# Patient Record
Sex: MALE
[De-identification: names, ages, dates, MRNs, and addresses within clinical notes are randomized per-mention and may not be internally consistent; named-entity substitution may affect disease eponyms.]

---

## 2017-06-08 ENCOUNTER — HOSPITAL ENCOUNTER (EMERGENCY)
Dept: HOSPITAL 42 - ED | Age: 57
Discharge: HOME | End: 2017-06-08
Payer: MEDICAID

## 2017-06-08 VITALS
RESPIRATION RATE: 18 BRPM | HEART RATE: 92 BPM | SYSTOLIC BLOOD PRESSURE: 135 MMHG | OXYGEN SATURATION: 96 % | DIASTOLIC BLOOD PRESSURE: 71 MMHG

## 2017-06-08 VITALS — BODY MASS INDEX: 25.7 KG/M2

## 2017-06-08 VITALS — TEMPERATURE: 99.7 F

## 2017-06-08 DIAGNOSIS — J44.1: Primary | ICD-10-CM

## 2017-06-08 LAB
ADD MANUAL DIFF?: NO
ALBUMIN/GLOB SERPL: 1.5 {RATIO} (ref 1.1–1.8)
ALP SERPL-CCNC: 82 U/L (ref 38–133)
ALT SERPL-CCNC: 54 U/L (ref 7–56)
AST SERPL-CCNC: 25 U/L (ref 15–59)
BASE EXCESS BLDV CALC-SCNC: 2.2 MMOL/L (ref 0–2)
BASOPHILS # BLD AUTO: 0.02 K/MM3 (ref 0–2)
BASOPHILS NFR BLD: 0.2 % (ref 0–3)
BILIRUB SERPL-MCNC: 0.9 MG/DL (ref 0.2–1.3)
BUN SERPL-MCNC: 20 MG/DL (ref 7–21)
CALCIUM SERPL-MCNC: 9.4 MG/DL (ref 8.4–10.5)
CHLORIDE SERPL-SCNC: 99 MMOL/L (ref 98–107)
CO2 SERPL-SCNC: 29 MMOL/L (ref 21–33)
EOSINOPHIL # BLD: 1.2 10*3/UL (ref 0–0.7)
EOSINOPHIL NFR BLD: 14.3 % (ref 1.5–5)
ERYTHROCYTE [DISTWIDTH] IN BLOOD BY AUTOMATED COUNT: 13.5 % (ref 11.5–14.5)
GLOBULIN SER-MCNC: 2.6 GM/DL
GLUCOSE SERPL-MCNC: 339 MG/DL (ref 70–110)
GRANULOCYTES # BLD: 5.03 10*3/UL (ref 1.4–6.5)
GRANULOCYTES NFR BLD: 61.5 % (ref 50–68)
HCT VFR BLD CALC: 45.1 % (ref 42–52)
LYMPHOCYTES # BLD: 1.3 10*3/UL (ref 1.2–3.4)
LYMPHOCYTES NFR BLD AUTO: 16.1 % (ref 22–35)
MCH RBC QN AUTO: 33.5 PG (ref 25–35)
MCHC RBC AUTO-ENTMCNC: 34.6 G/DL (ref 31–37)
MCV RBC AUTO: 97 FL (ref 80–105)
MONOCYTES # BLD AUTO: 0.7 10*3/UL (ref 0.1–0.6)
MONOCYTES NFR BLD: 7.9 % (ref 1–6)
PH BLDV: 7.39 [PH] (ref 7.32–7.43)
PLATELET # BLD: 155 10^3/UL (ref 120–450)
PMV BLD AUTO: 10.3 FL (ref 7–11)
POTASSIUM SERPL-SCNC: 4.5 MMOL/L (ref 3.6–5)
PROT SERPL-MCNC: 6.5 G/DL (ref 5.8–8.3)
SODIUM SERPL-SCNC: 134 MMOL/L (ref 132–148)
TROPONIN I SERPL-MCNC: 0.02 NG/ML
WBC # BLD AUTO: 8.2 10^3/UL (ref 4.5–11)

## 2017-06-08 PROCEDURE — 93005 ELECTROCARDIOGRAM TRACING: CPT

## 2017-06-08 PROCEDURE — 80053 COMPREHEN METABOLIC PANEL: CPT

## 2017-06-08 PROCEDURE — 84484 ASSAY OF TROPONIN QUANT: CPT

## 2017-06-08 PROCEDURE — 82803 BLOOD GASES ANY COMBINATION: CPT

## 2017-06-08 PROCEDURE — 71010: CPT

## 2017-06-08 PROCEDURE — 96374 THER/PROPH/DIAG INJ IV PUSH: CPT

## 2017-06-08 PROCEDURE — 99284 EMERGENCY DEPT VISIT MOD MDM: CPT

## 2017-06-08 PROCEDURE — 83880 ASSAY OF NATRIURETIC PEPTIDE: CPT

## 2017-06-08 PROCEDURE — 85025 COMPLETE CBC W/AUTO DIFF WBC: CPT

## 2017-06-08 RX ADMIN — IPRATROPIUM BROMIDE AND ALBUTEROL SULFATE SCH ML: .5; 3 SOLUTION RESPIRATORY (INHALATION) at 19:56

## 2017-06-08 RX ADMIN — IPRATROPIUM BROMIDE AND ALBUTEROL SULFATE SCH ML: .5; 3 SOLUTION RESPIRATORY (INHALATION) at 20:36

## 2017-06-08 RX ADMIN — IPRATROPIUM BROMIDE AND ALBUTEROL SULFATE SCH ML: .5; 3 SOLUTION RESPIRATORY (INHALATION) at 20:12

## 2017-06-08 NOTE — ED PDOC
Arrival/HPI





- General


Time Seen by Provider: 06/08/17 19:09





- History of Present Illness


Narrative History of Present Illness (Text): 





57M hx COPD and CHF c/o productive cough for last month and sob worsening over 

last 2 days. home neb not helping. no fever or chest pain. saw pcp yesterday 

and rx augmentin and "cough medicine" but feels worse today. 








Past Medical History





- Tetanus Immunization


Tetanus Immunization: Unknown





- Cardiac


Hx Pacemaker: Yes (ST AN IMPLANT 4/2008/6-2015)





- Pulmonary


Hx Bronchitis: Yes (2 weeks ago)





- Neurological


Hx Paralysis: No





- Renal


Hx Renal Failure: Yes





- Endocrine/Metabolic


Hx Diabetes Mellitus Type 2: Yes





- Hematological/Oncological


Hx Blood Transfusions: No


Hx Blood Transfusion Reaction: No





- Musculoskeletal/Rheumatological


Hx Musculoskeletal Disorders: No





- Psychiatric


Hx Emotional Abuse: No


Hx Physical Abuse: No


Hx Substance Use: No





- Surgical History


Hx Kidney Transplant: Yes (5-2011)


Other/Comment: Open heart surgery, pacemaker, Kidney transplant





- Anesthesia


Hx Anesthesia Reactions: No


Hx Malignant Hyperthermia: No





- Suicidal Assessment


Feels Threatened In Home Enviroment: No





Family/Social History


Family/Social History: Other (nc)


Smoking Status: Former Smoker


Hx Alcohol Use: Yes (social)


Hx Substance Use: No


Hx Substance Use Treatment: No





Allergies/Home Meds


Allergies/Adverse Reactions: 


Allergies





No Known Allergies Allergy (Verified 10/12/15 20:00)


 








Home Medications: 


 Home Meds











 Medication  Instructions  Recorded  Confirmed


 


Atorvastatin Calcium [Lipitor] 40 mg PO DAILY 10/30/14 06/08/17


 


Escitalopram [Lexapro] 5 mg PO DAILY 10/30/14 06/08/17


 


Famotidine [Pepcid] 20 mg PO DAILY 10/30/14 06/08/17


 


Insulin Aspart, Recombinant 25 - 30 unit SC AC PRN 10/30/14 06/08/17





[Novolog]   


 


Insulin Detemir [Levemir] 40 - 50 unit SC HS 10/30/14 06/08/17


 


Mycophenolate Sodium [Myfortic] 360 mg PO BID 10/30/14 06/08/17


 


Sulfamethoxazole/Trimethoprim 1 tab PO DAILY 10/30/14 06/08/17





[Bactrim 400-80 mg Tablet]   


 


Tacrolimus [Prograf] 1.5 mg PO HS 10/30/14 06/08/17


 


amLODIPine [Norvasc] 10 mg PO DAILY 10/30/14 06/08/17


 


Aspirin [Ecotrin] 81 mg PO DAILY 06/01/16 06/08/17


 


Docusate [Colace] 100 mg PO DAILY PRN 06/01/16 06/08/17


 


Metoprolol Tartrate 50 mg PO BID 06/01/16 06/08/17


 


Tacrolimus [Prograf] 2 mg PO DAILY 06/01/16 06/08/17


 


traMADol [Ultram] 1 tab PO Q6H PRN 06/20/16 06/08/17














Review of Systems





- Physician Review


All systems were reviewed & negative as marked: Yes





- Review of Systems


Constitutional: absent: Weight Change, Fevers, Night Sweats


Respiratory: SOB, Cough


Cardiovascular: Edema.  absent: Chest Pain


Gastrointestinal: absent: Vomiting





Physical Exam


Vital Signs











  Temp Pulse Resp BP Pulse Ox


 


 06/08/17 19:45      91 L


 


 06/08/17 19:10  99.7 F H  86  24  121/76  89 L














- Systems Exam


Head: Present: Atraumatic


Pupils: Present: PERRL


Mouth: Present: Moist Mucous Membranes


Neck: Present: Normal Range of Motion


Respiratory/Chest: Present: Wheezes, Decreased Breath Sounds.  No: Respiratory 

Distress, Accessory Muscle Use


Cardiovascular: Present: Regular Rate and Rhythm


Abdomen: No: Tenderness, Distention


Upper Extremity: Present: NORMAL PULSES


Lower Extremity: Present: NORMAL PULSES.  No: Edema


Neurological: Present: GCS=15, Motor Func Grossly Intact, Normal Sensory 

Function


Skin: Present: Warm, Dry


Psychiatric: Present: Alert, Oriented x 3





Medical Decision Making


ED Course and Treatment: 





ecg- nsr 1st deg avb 88, no stemi





06/08/17 20:28


the pt says he feels much better and would like to go home. I disc possible 

admission for continued nebs but he says he would rather manage at home and 

will return if worse. 





- Lab Interpretations


Lab Results: 








 06/08/17 19:34 





 06/08/17 19:34 





 Lab Results





06/08/17 19:34: Sodium 134, Chloride 99, Potassium 4.5, Carbon Dioxide 29, 

Anion Gap 11, BUN 20, Creatinine 1.0, Est GFR (African Amer) > 60, Est GFR (Non-

Af Amer) > 60, Random Glucose 339 H* D, Calcium 9.4, Total Bilirubin 0.9, AST 25

, ALT 54, Alkaline Phosphatase 82, Troponin I 0.02, NT-Pro-B Natriuret Pep 4670 

H, Total Protein 6.5, Albumin 3.9, Globulin 2.6, Albumin/Globulin Ratio 1.5


06/08/17 19:34: WBC 8.2  D, RBC 4.65, Hgb 15.6, Hct 45.1, MCV 97.0, MCH 33.5, 

MCHC 34.6, RDW 13.5, Plt Count 155, MPV 10.3, Gran % 61.5, Lymph % (Auto) 16.1 L

, Mono % (Auto) 7.9 H, Eos % (Auto) 14.3 H, Baso % (Auto) 0.2, Gran # 5.03, 

Lymph # 1.3, Mono # 0.7 H, Eos # 1.2 H, Baso # 0.02


06/08/17 19:34: pO2 65 H, VBG pH 7.39, VBG pCO2 46.0, VBG HCO3 27.8, VBG Total 

CO2 29.2 H, VBG O2 Sat (Calc) 94.0 H, VBG Base Excess 2.2 H, VBG Potassium 4.6, 

Sodium 135.0, Chloride 98.0, Glucose 365 H, Lactate 1.6, FiO2 21.0, Venous 

Blood Potassium 4.6











- RAD Interpretation


Radiology Orders: 








06/08/17 19:14


CHEST PORTABLE [RAD] Stat 














- Medication Orders


Current Medication Orders: 











Discontinued Medications





Albuterol/Ipratropium (Duoneb 3 Mg/0.5 Mg (3 Ml) Ud)  3 [facility]


   Stop: 06/08/17 19:46


   Last Admin: 06/08/17 20:12  Dose: 3 ml





Azithromycin (Zithromax)  500 mg PO STAT STA


   PRN Reason: Protocol


   Stop: 06/08/17 20:29


Methylprednisolone (Solu-Medrol)  80 mg IVP STAT STA


   Stop: 06/08/17 19:15


   Last Admin: 06/08/17 19:55  Dose: 80 mg











Disposition/Present on Arrival





- Present on Arrival


Any Indicators Present on Arrival: No


History of DVT/PE: No


History of Uncontrolled Diabetes: No


Urinary Catheter: No


History Surgical Site Infection Following: None





- Disposition


Have Diagnosis and Disposition been Completed?: Yes


Diagnosis: 


 COPD exacerbation





Disposition: HOME/ ROUTINE


Disposition Time: 20:33


Patient Problems: 


 Current Active Problems











Problem Status Onset


 


COPD exacerbation Acute  











Condition: IMPROVED


Discharge Instructions (ExitCare):  COPD (Chronic Obstructive Pulmonary Disease

) (ED)


Additional Instructions: 


Please follow up with your doctor. Start your prescriptions tomorrow. Return to 

the ER for any worsening symptoms or for any other concerns. 


Prescriptions: 


Azithromycin 250 mg PO DAILY #4 tab


Prednisone [Deltasone] 40 mg PO DAILY #8 tablet


Referrals: 


John Conner MD [Primary Care Provider] - Follow up with primary

## 2017-06-09 NOTE — RAD
HISTORY:

sob  



COMPARISON:

06/01/2016 



FINDINGS:



LUNGS:

No active pulmonary disease.



PLEURA:

No significant pleural effusion identified, no pneumothorax apparent.



CARDIOVASCULAR:

Normal.



OSSEOUS STRUCTURES:

No significant abnormalities.



VISUALIZED UPPER ABDOMEN:

Normal.



OTHER FINDINGS:

Dual lead pacemaker.  Sternal wires



IMPRESSION:

No active disease.

## 2017-06-09 NOTE — CARD
--------------- APPROVED REPORT --------------





EKG Measurement

Heart [id number]

MS [id number]

[id number]

[id number]

[id number]



<Conclusion>

100 % V. Paced. A. sensed.

No change except the pacer spikes are less visable

## 2017-07-17 ENCOUNTER — HOSPITAL ENCOUNTER (OUTPATIENT)
Dept: HOSPITAL 42 - SDS | Age: 57
Discharge: HOME | End: 2017-07-17
Attending: PODIATRIST
Payer: MEDICARE

## 2017-07-17 VITALS — RESPIRATION RATE: 18 BRPM

## 2017-07-17 VITALS — HEART RATE: 72 BPM | SYSTOLIC BLOOD PRESSURE: 127 MMHG | DIASTOLIC BLOOD PRESSURE: 67 MMHG

## 2017-07-17 VITALS — OXYGEN SATURATION: 98 %

## 2017-07-17 VITALS — TEMPERATURE: 98 F

## 2017-07-17 VITALS — BODY MASS INDEX: 25.7 KG/M2

## 2017-07-17 DIAGNOSIS — I96: ICD-10-CM

## 2017-07-17 DIAGNOSIS — E11.9: ICD-10-CM

## 2017-07-17 DIAGNOSIS — Z94.0: ICD-10-CM

## 2017-07-17 DIAGNOSIS — N28.9: ICD-10-CM

## 2017-07-17 DIAGNOSIS — I25.10: ICD-10-CM

## 2017-07-17 DIAGNOSIS — I10: ICD-10-CM

## 2017-07-17 DIAGNOSIS — M86.172: Primary | ICD-10-CM

## 2017-07-17 PROCEDURE — 87075 CULTR BACTERIA EXCEPT BLOOD: CPT

## 2017-07-17 PROCEDURE — 88311 DECALCIFY TISSUE: CPT

## 2017-07-17 PROCEDURE — 28820 AMPUTATION OF TOE: CPT

## 2017-07-17 PROCEDURE — 73630 X-RAY EXAM OF FOOT: CPT

## 2017-07-17 PROCEDURE — 88305 TISSUE EXAM BY PATHOLOGIST: CPT

## 2017-07-17 PROCEDURE — 87070 CULTURE OTHR SPECIMN AEROBIC: CPT

## 2017-07-17 NOTE — RAD
PROCEDURE:  Left Foot Radiographs.



HISTORY:

s/p left foot surgery  



COMPARISON:

None.



FINDINGS:



BONES:

Status post amputation of the little toe.  There is no acute fracture 

or bone destruction. There is diffuse bone demineralization. . 



JOINTS:

Normal. 



SOFT TISSUES:

Normal. 



OTHER FINDINGS:

There are atherosclerotic vascular calcifications.



IMPRESSION:

Status post amputation of the little toe. No acute fracture or bone 

destruction.

## 2017-07-17 NOTE — PCM.SURG1
Surgeon's Initial Post Op Note





- Surgeon's Notes


Surgeon: Dr. MYLA Goel


Assistant: Dr. HENRRY Banuelos PGY-2


Type of Anesthesia: IV Sedation, Local


Anesthesia Administered By: Dr. Roblero


Pre-Operative Diagnosis: left foot 5th digit gangrene


Operative Findings: see operative report


Post-Operative Diagnosis: same


Operation Performed: amputation of 5th digit left foot


Specimen/Specimens Removed: bone and soft tissue left 5th digit


Estimated Blood Loss: EBL {In ML}: 20


Blood Products Given: N/A


Post-Op Condition: Good


Date of Surgery/Procedure: 07/17/17


Time of Surgery/Procedure: 09:30

## 2017-07-18 NOTE — PCM.OP
Operative Report





- Operative Report


Date of Surgery/Procedure: 07/17/17


Time of Surgery/Procedure: 20:00


Surgeon: Dr. MYLA Goel


Assistant: Dr. HENRRY Banuelos, PGY-2


Anesthesia/Sedation: IV sedation + local


Pre-Operative Diagnosis: gangrene of 5th digit left foot


Post-Operative Diagnosis: gangrene of 5th digit left foot


Indication for Surgery: The patient is a 57 year-old diabetic male with the 

above diagnosis.  The patient has exhausted conservative treatment at this time 

and now requires surgical intervention. The patient signed the consent after 

careful explanation of risks, benefits, complication and alternatives for 

surgical procedure.  No guarantees were given nor implied. NPO status was 

confirmed prior to taking pt to the OR.


Operative Findings: The patient was brought to the operating room and placed on 

the operating room table in supine position.  Tournique was not requried for 

this procedure. After induction of IV sedation, the patient received a total of 

10 cc of 1:1 mixture of 0.5% Marcaine and 2% lidocaine plain in local block 

fashion to the left foot. Once local anesthesia was achieved, the left  foot 

was then prepped and draped in usual sterile manner and the procedure began.  

Procedure: amputation of 5th digit left foot.  Attention was then directed to 

the left foot 5th digit where necrotic dry gangrenous changed were noted to the 

digit which appeared to be demaracted to the level of the PIPJ. Using a #15 

blade and a towel clamp to stabilize the digit, an incision was made directly 

to the level of bone in a circumferential manner. A combination of sharp and 

blunt dissection were used to free all soft tissue attachments of the 5th MTPJ 

and the toe was then disarticulated from the MTPJ and passed from the operative 

field. At this time wound cultures were taken from the surgical wound. Upon 

inspection of the remaining bone, the 5th metatarsal bone appeard to have 

normal cartilage and appeared healthy with no remaining necrotic tissue seen. 

Using a kocher, the remaining tendons were then resected away and passed from 

the field. The incision was then copiously flushed with sterile normal saline. 

The sucutaneous tissue were then reapproximated using #2-0 vicyl and skin edges 

reapproximated using #3-0 nylon using simple suture technique. The wound was 

then dressed with betadine-soaked adaptic and dressed with DSD, kerlix and 

light ACE bandage.


Procedure/Operation Description: amputation of 5th digit left foot


Estimated Blood Loss: 20


Blood Replaced: none


Sponge/Instrument Count: correct


Drains: none


Complications: none


Specimen: soft tissue and bone left foot 5th toe


Discharge & Condition: The patient tolerated the anesthesia and procedure well 

and was escorted to the recovery room with vital signs stable and neurovascular 

status intact to the left foot. Patient will be weight-bearing as tolerated 

with surgical shoe and will follow up with Dr. MYLA Goel in the office within 1 

week.

## 2017-07-24 ENCOUNTER — HOSPITAL ENCOUNTER (INPATIENT)
Dept: HOSPITAL 42 - ED | Age: 57
LOS: 4 days | Discharge: HOME | DRG: 271 | End: 2017-07-28
Attending: INTERNAL MEDICINE | Admitting: INTERNAL MEDICINE
Payer: MEDICARE

## 2017-07-24 VITALS — BODY MASS INDEX: 25.4 KG/M2

## 2017-07-24 DIAGNOSIS — E11.52: Primary | ICD-10-CM

## 2017-07-24 DIAGNOSIS — I25.2: ICD-10-CM

## 2017-07-24 DIAGNOSIS — L03.116: ICD-10-CM

## 2017-07-24 DIAGNOSIS — I50.9: ICD-10-CM

## 2017-07-24 DIAGNOSIS — E11.621: ICD-10-CM

## 2017-07-24 DIAGNOSIS — Z79.4: ICD-10-CM

## 2017-07-24 DIAGNOSIS — Z79.899: ICD-10-CM

## 2017-07-24 DIAGNOSIS — F41.9: ICD-10-CM

## 2017-07-24 DIAGNOSIS — E78.1: ICD-10-CM

## 2017-07-24 DIAGNOSIS — E11.40: ICD-10-CM

## 2017-07-24 DIAGNOSIS — Z94.0: ICD-10-CM

## 2017-07-24 DIAGNOSIS — Z95.1: ICD-10-CM

## 2017-07-24 DIAGNOSIS — L97.529: ICD-10-CM

## 2017-07-24 DIAGNOSIS — Z79.52: ICD-10-CM

## 2017-07-24 DIAGNOSIS — F32.9: ICD-10-CM

## 2017-07-24 DIAGNOSIS — J44.9: ICD-10-CM

## 2017-07-24 DIAGNOSIS — Z95.810: ICD-10-CM

## 2017-07-24 DIAGNOSIS — I70.262: ICD-10-CM

## 2017-07-24 DIAGNOSIS — D64.9: ICD-10-CM

## 2017-07-24 DIAGNOSIS — I25.10: ICD-10-CM

## 2017-07-24 DIAGNOSIS — Z89.422: ICD-10-CM

## 2017-07-24 DIAGNOSIS — N18.3: ICD-10-CM

## 2017-07-24 DIAGNOSIS — I13.0: ICD-10-CM

## 2017-07-24 DIAGNOSIS — E11.22: ICD-10-CM

## 2017-07-24 DIAGNOSIS — E78.5: ICD-10-CM

## 2017-07-24 LAB
ALBUMIN SERPL-MCNC: 3.6 G/DL (ref 3–4.8)
ALBUMIN/GLOB SERPL: 1.2 {RATIO} (ref 1.1–1.8)
ALT SERPL-CCNC: 49 U/L (ref 7–56)
APPEARANCE UR: CLEAR
APTT BLD: 30.6 SECONDS (ref 23.7–30.8)
AST SERPL-CCNC: 39 U/L (ref 15–59)
BASOPHILS # BLD AUTO: 0.01 K/MM3 (ref 0–2)
BASOPHILS NFR BLD: 0.1 % (ref 0–3)
BILIRUB UR-MCNC: NEGATIVE MG/DL
BUN SERPL-MCNC: 23 MG/DL (ref 7–21)
CALCIUM SERPL-MCNC: 9 MG/DL (ref 8.4–10.5)
COLOR UR: YELLOW
EOSINOPHIL # BLD: 0.3 10*3/UL (ref 0–0.7)
EOSINOPHIL NFR BLD: 2.7 % (ref 1.5–5)
ERYTHROCYTE [DISTWIDTH] IN BLOOD BY AUTOMATED COUNT: 13.3 % (ref 11.5–14.5)
GFR NON-AFRICAN AMERICAN: > 60
GLUCOSE UR STRIP-MCNC: >=1000 MG/DL
GRANULOCYTES # BLD: 6.74 10*3/UL (ref 1.4–6.5)
GRANULOCYTES NFR BLD: 73.8 % (ref 50–68)
HGB BLD-MCNC: 13 GM/DL (ref 14–18)
INR PPP: 1.02 (ref 0.93–1.08)
LEUKOCYTE ESTERASE UR-ACNC: NEGATIVE LEU/UL
LYMPHOCYTES # BLD: 1.2 10*3/UL (ref 1.2–3.4)
LYMPHOCYTES NFR BLD AUTO: 12.6 % (ref 22–35)
MCH RBC QN AUTO: 32.7 PG (ref 25–35)
MCHC RBC AUTO-ENTMCNC: 35 G/DL (ref 31–37)
MCV RBC AUTO: 93.5 FL (ref 80–105)
MONOCYTES # BLD AUTO: 1 10*3/UL (ref 0.1–0.6)
MONOCYTES NFR BLD: 10.8 % (ref 1–6)
PH UR STRIP: 6.5 [PH] (ref 4.7–8)
PLATELET # BLD: 199 10^3/UL (ref 120–450)
PMV BLD AUTO: 9.3 FL (ref 7–11)
PROT UR STRIP-MCNC: NEGATIVE MG/DL
PROTHROMBIN TIME: 11 SECONDS (ref 9.9–11.8)
RBC # BLD AUTO: 3.97 10^6/UL (ref 3.5–6.1)
RBC # UR STRIP: NEGATIVE /UL
SP GR UR STRIP: 1.01 (ref 1–1.03)
URINE NITRATE: NEGATIVE
UROBILINOGEN UR STRIP-ACNC: 2 E.U./DL
WBC # BLD AUTO: 9.1 10^3/UL (ref 4.5–11)

## 2017-07-24 RX ADMIN — MORPHINE SULFATE PRN MG: 4 INJECTION, SOLUTION INTRAMUSCULAR; INTRAVENOUS at 21:42

## 2017-07-24 NOTE — RAD
HISTORY:

cellulitis  



COMPARISON:

06/08/2017 



FINDINGS:



LUNGS:

No active pulmonary disease.



PLEURA:

No significant pleural effusion identified, no pneumothorax apparent.



CARDIOVASCULAR:

Normal.



OSSEOUS STRUCTURES:

Sternal wires



VISUALIZED UPPER ABDOMEN:

Normal.



OTHER FINDINGS:

Dual lead pacemaker.



IMPRESSION:

No active disease.

## 2017-07-24 NOTE — CP.PCM.CON
History of Present Illness





- History of Present Illness


History of Present Illness: 





Vascular Surgery








58 y/o M w/ PMHx of CHF, COPD, DM2 who presented to the ED via Dr. Goel's 

office for evaluation. Pt had his L 5th toe amputated 1week ago. On f/u today, 

wound was found to be non-healing and gangrenous. Pt denies any F/C, discharge, 

or odor from the wound prior to the office visit. Pt's pain is appropriate post 

amputation. Pt denies new or worsening numbness, tingling, pallor, or 

temperature changes. Pt denies CP, SOB, N/V, D/C. 





PMHx: CHF, COPD, DM2, 


Meds: reviewed in chart


NKDA


PSHx: CABG, AICD, kidney transplant, 5th toe amputation


SHx: (+) tobacco use, social EtOH, denies drug use


FHx: noncontributory








Review of Systems





- Review of Systems


All systems: reviewed and no additional remarkable complaints except (see HPI)





Past Patient History





- Infectious Disease


Hx of Infectious Diseases: None





- Tetanus Immunizations


Tetanus Immunization: Unknown





- Past Social History


Smoking Status: Heavy Smoker > 10 Cigarettes Daily





- CARDIAC


Hx Hypertension: Yes


Hx Pacemaker: Yes (ST AN IMPLANT 4/2008/6-2015)


Other/Comment: CABG





- PULMONARY


Hx Bronchitis: Yes (2 weeks ago)


Hx Chronic Obstructive Pulmonary Disease (COPD): Yes





- NEUROLOGICAL


Hx Paralysis: No





- RENAL


Hx Renal Failure: Yes


Other/Comment: s/p Renal transplant





- ENDOCRINE/METABOLIC


Hx Diabetes Mellitus Type 2: Yes





- HEMATOLOGICAL/ONCOLOGICAL


Hx Blood Transfusions: No


Hx Blood Transfusion Reaction: No





- MUSCULOSKELETAL/RHEUMATOLOGICAL


Hx Musculoskeletal Disorders: No





- GASTROINTESTINAL


Other/Comment: h/o c diff





- PSYCHIATRIC


Hx Emotional Abuse: No


Hx Physical Abuse: No


Hx Substance Use: No





- SURGICAL HISTORY


Other/Comment: CABG.  L 5th toe amputation.  Kidney transplant





- ANESTHESIA


Hx Anesthesia: Yes


Hx Anesthesia Reactions: No


Hx Malignant Hyperthermia: No





Meds


Allergies/Adverse Reactions: 


 Allergies











Allergy/AdvReac Type Severity Reaction Status Date / Time


 


No Known Allergies Allergy   Verified 07/24/17 20:12














Physical Exam





- Constitutional


Appears: Non-toxic, No Acute Distress





- Head Exam


Head Exam: NORMAL INSPECTION





- Eye Exam


Eye Exam: Normal appearance





- ENT Exam


ENT Exam: Mucous Membranes Moist





- Respiratory Exam


Respiratory Exam: NORMAL BREATHING PATTERN.  absent: Accessory Muscle Use, 

Respiratory Distress





- Cardiovascular Exam


Cardiovascular Exam: absent: Bradycardia, Tachycardia





- GI/Abdominal Exam


GI & Abdominal Exam: Soft.  absent: Distended, Tenderness





- Extremities Exam


Extremities exam: Positive for: pedal edema, tenderness (L foot)


Additional comments: 





L foot dressing c/d/i


necrotic tissue visible under dressing. foul odor, non-purulent drainage present


DP/TP difficult to palpate





- Neurological Exam


Neurological exam: Alert, Oriented x3





- Psychiatric Exam


Psychiatric exam: Normal Affect, Normal Mood





- Skin


Skin Exam: Dry, Warm





Results





- Vital Signs


Recent Vital Signs: 


 Last Vital Signs











Temp  97.5 F L  07/24/17 11:38


 


Pulse  90   07/24/17 17:22


 


Resp  18   07/24/17 17:22


 


BP  147/85   07/24/17 17:22


 


Pulse Ox  96   07/24/17 14:04














- Labs


Result Diagrams: 


 07/24/17 13:05





 07/24/17 13:05


Labs: 

















  07/24/17 07/24/17 07/24/17





  14:50 14:09 13:10


 


WBC   


 


RBC   


 


Hgb   


 


Hct   


 


MCV   


 


MCH   


 


MCHC   


 


RDW   


 


Plt Count   


 


MPV   


 


Gran %   


 


Lymph % (Auto)   


 


Mono % (Auto)   


 


Eos % (Auto)   


 


Baso % (Auto)   


 


Gran #   


 


Lymph #   


 


Mono #   


 


Eos #   


 


Baso #   


 


ESR   


 


PT   


 


INR   


 


APTT   


 


Sodium   


 


Potassium   


 


Chloride   


 


Carbon Dioxide   


 


Anion Gap   


 


BUN   


 


Creatinine   


 


Est GFR ( Amer)   


 


Est GFR (Non-Af Amer)   


 


Random Glucose   


 


Calcium   


 


Total Bilirubin   


 


AST   


 


ALT   


 


Alkaline Phosphatase   


 


C-React Prot High Sens   


 


Total Protein   


 


Albumin   


 


Globulin   


 


Albumin/Globulin Ratio   


 


Urine Color  Yellow  


 


Urine Appearance  Clear  


 


Urine pH  6.5  


 


Ur Specific Gravity  1.015  


 


Urine Protein  Negative  


 


Urine Glucose (UA)  >=1000  


 


Urine Ketones  Negative  


 


Urine Blood  Negative  


 


Urine Nitrate  Negative  


 


Urine Bilirubin  Negative  


 


Urine Urobilinogen  2.0 H  


 


Ur Leukocyte Esterase  Negative  


 


Blood Type    O POSITIVE


 


Blood Type Confirm   O POSITIVE 


 


Antibody Screen    Negative


 


BBK History Checked    No verified bt














  07/24/17 07/24/17 07/24/17





  13:05 13:05 13:05


 


WBC   


 


RBC   


 


Hgb   


 


Hct   


 


MCV   


 


MCH   


 


MCHC   


 


RDW   


 


Plt Count   


 


MPV   


 


Gran %   


 


Lymph % (Auto)   


 


Mono % (Auto)   


 


Eos % (Auto)   


 


Baso % (Auto)   


 


Gran #   


 


Lymph #   


 


Mono #   


 


Eos #   


 


Baso #   


 


ESR   


 


PT    11.0


 


INR    1.02


 


APTT    30.6


 


Sodium   132 


 


Potassium   4.3 


 


Chloride   95 L 


 


Carbon Dioxide   29 


 


Anion Gap   12 


 


BUN   23 H 


 


Creatinine   0.8 


 


Est GFR ( Amer)   > 60 


 


Est GFR (Non-Af Amer)   > 60 


 


Random Glucose   249 H 


 


Calcium   9.0 


 


Total Bilirubin   0.7 


 


AST   39 


 


ALT   49 


 


Alkaline Phosphatase   139 H 


 


C-React Prot High Sens  > 15.00 H  


 


Total Protein   6.5 


 


Albumin   3.6 


 


Globulin   2.9 


 


Albumin/Globulin Ratio   1.2 


 


Urine Color   


 


Urine Appearance   


 


Urine pH   


 


Ur Specific Gravity   


 


Urine Protein   


 


Urine Glucose (UA)   


 


Urine Ketones   


 


Urine Blood   


 


Urine Nitrate   


 


Urine Bilirubin   


 


Urine Urobilinogen   


 


Ur Leukocyte Esterase   


 


Blood Type   


 


Blood Type Confirm   


 


Antibody Screen   


 


BBK History Checked   














  07/24/17





  13:05


 


WBC  9.1


 


RBC  3.97


 


Hgb  13.0 L


 


Hct  37.1 L


 


MCV  93.5


 


MCH  32.7


 


MCHC  35.0


 


RDW  13.3


 


Plt Count  199


 


MPV  9.3


 


Gran %  73.8 H


 


Lymph % (Auto)  12.6 L


 


Mono % (Auto)  10.8 H


 


Eos % (Auto)  2.7


 


Baso % (Auto)  0.1


 


Gran #  6.74 H


 


Lymph #  1.2


 


Mono #  1.0 H


 


Eos #  0.3


 


Baso #  0.01


 


ESR  43 H


 


PT 


 


INR 


 


APTT 


 


Sodium 


 


Potassium 


 


Chloride 


 


Carbon Dioxide 


 


Anion Gap 


 


BUN 


 


Creatinine 


 


Est GFR ( Amer) 


 


Est GFR (Non-Af Amer) 


 


Random Glucose 


 


Calcium 


 


Total Bilirubin 


 


AST 


 


ALT 


 


Alkaline Phosphatase 


 


C-React Prot High Sens 


 


Total Protein 


 


Albumin 


 


Globulin 


 


Albumin/Globulin Ratio 


 


Urine Color 


 


Urine Appearance 


 


Urine pH 


 


Ur Specific Gravity 


 


Urine Protein 


 


Urine Glucose (UA) 


 


Urine Ketones 


 


Urine Blood 


 


Urine Nitrate 


 


Urine Bilirubin 


 


Urine Urobilinogen 


 


Ur Leukocyte Esterase 


 


Blood Type 


 


Blood Type Confirm 


 


Antibody Screen 


 


BBK History Checked 














- Imaging and Cardiology


  ** Foot x-ray


Status: Image reviewed by me, Report reviewed by me





Assessment & Plan





- Assessment and Plan (Free Text)


Assessment: 





58 y/o M w/ 5th to gangrene s/p amputation





- pain management


- wound care per Dr. Goel


- F/u Dr. Conner recommendations


- previous AIXA shows calcification at multiple levels w/ inaccurate resting 

ABIs. Bilateral tibial occlusive disease.


- recommend CTA w/ Ileofemoral runoff


- Pre-hydrate w/ IVF prior to imaging


- further recs per Dr. Prescott





Pt discussed w/ Dr. Mospamela Coats DO PGY2

## 2017-07-24 NOTE — ED PDOC
Arrival/HPI





- General


Chief Complaint: Lower Extremity Problem/Injury


Time Seen by Provider: 07/24/17 11:05


Historian: Patient, Spouse





- History of Present Illness


Narrative History of Present Illness (Text): 


07/24/17 11:5


A 57 year old male, whose past medical history includes diabetes, CHF, COPD, 

pacemaker and kidney transplant, was sent into the emergency department by 

podiatrist for left foot infection after left 5th toe amputation 1 week ago. 

Wife notes black discoloration in left 5th toe but denies any discharge. The 

patient denies any fever, nausea, vomiting, diarrhea, headaches, chest pain, 

shortness of breath, or any other complaint. 





PMD: Dr. John Conner


Podiatrist: Dr. Benjamin Goel 





Time/Duration: 1 week


Symptom Course: Unchanged


Context: Home





Past Medical History





- Provider Review


Nursing Documentation Reviewed: Yes





- Infectious Disease


Hx of Infectious Diseases: None





- Tetanus Immunization


Tetanus Immunization: Unknown





- Cardiac


Hx Hypertension: Yes


Hx Pacemaker: Yes (ST AN IMPLANT 4/2008/6-2015)


Other/Comment: CABG





- Pulmonary


Hx Bronchitis: Yes (2 weeks ago)


Hx Chronic Obstructive Pulmonary Disease (COPD): Yes





- Neurological


Hx Paralysis: No





- Renal


Hx Renal Failure: Yes


Other/Comment: s/p Renal transplant





- Endocrine/Metabolic


Hx Diabetes Mellitus Type 2: Yes





- Hematological/Oncological


Hx Blood Transfusions: No


Hx Blood Transfusion Reaction: No





- Musculoskeletal/Rheumatological


Hx Musculoskeletal Disorders: No





- Gastrointestinal


Other/Comment: h/o c diff





- Psychiatric


Hx Emotional Abuse: No


Hx Physical Abuse: No


Hx Substance Use: No





- Surgical History


Other/Comment: CABG.  L 5th toe amputation.  Kidney transplant





- Anesthesia


Hx Anesthesia: Yes


Hx Anesthesia Reactions: No


Hx Malignant Hyperthermia: No





- Suicidal Assessment


Feels Threatened In Home Enviroment: No





Family/Social History





- Physician Review


Nursing Documentation Reviewed: Yes


Family/Social History: No Known Family HX


Smoking Status: Heavy Smoker > 10 Cigarettes Daily


Hx Alcohol Use: Yes (social)


Hx Substance Use: No


Hx Substance Use Treatment: No





Allergies/Home Meds


Allergies/Adverse Reactions: 


Allergies





No Known Allergies Allergy (Verified 10/12/15 20:00)


 








Home Medications: 


 Home Meds











 Medication  Instructions  Recorded  Confirmed


 


Atorvastatin Calcium [Lipitor] 40 mg PO DAILY 10/30/14 07/24/17


 


Escitalopram [Lexapro] 5 mg PO DAILY 10/30/14 07/24/17


 


Famotidine [Pepcid] 20 mg PO DAILY 10/30/14 07/24/17


 


Insulin Aspart, Recombinant 25 - 30 unit SC AC PRN 10/30/14 07/24/17





[Novolog]   


 


Insulin Detemir [Levemir] 40 - 50 unit SC HS 10/30/14 07/24/17


 


Mycophenolate Sodium [Myfortic] 360 mg PO BID 10/30/14 07/24/17


 


Sulfamethoxazole/Trimethoprim 1 tab PO DAILY 10/30/14 07/24/17





[Bactrim 400-80 mg Tablet]   


 


Tacrolimus [Prograf] 1.5 mg PO HS 10/30/14 07/24/17


 


amLODIPine [Norvasc] 10 mg PO DAILY 10/30/14 07/24/17


 


Aspirin [Ecotrin] 81 mg PO DAILY 06/01/16 07/24/17


 


Docusate [Colace] 100 mg PO DAILY 06/01/16 07/24/17


 


Metoprolol Tartrate 50 mg PO BID 06/01/16 07/24/17


 


Tacrolimus [Prograf] 2 mg PO DAILY 06/01/16 07/24/17


 


oxyCODONE/Acetaminophen [Percocet 1 tab PO Q4 PRN 07/17/17 07/24/17





5/325 mg Tab]   














Review of Systems





- Physician Review


All systems were reviewed & negative as marked: Yes





- Review of Systems


Constitutional: Normal.  absent: Fevers


Respiratory: absent: SOB


Cardiovascular: absent: Chest Pain


Gastrointestinal: absent: Diarrhea, Nausea, Vomiting


Musculoskeletal: Other (Left foot infection after left 5th toe amputation)


Neurological: absent: Headache





Physical Exam





- Physical Exam


Narrative Physical Exam (Text): 


Constitutional: No acute distress.


Head: Normocephalic.  Atraumatic.  


Eyes:  PERRL.


ENT:  Moist mucous membranes.


Neck:  Supple.


Cardiovascular:  Regular rate.


Chest: No tenderness.


Respiratory:  Clear to auscultation bilaterally.


GI:  Soft. Nontender. Nondistended. 


Back:  No CVA tenderness.


Musculoskeletal:  No tenderness or swelling of extremities. Left 5th toe 

amputation, clean dry dressing intact. 


Skin: Warm, erythematous, blanching area to left upper foot and lower ankle.


Neurologic:  Alert, no focal deficit.





Vital Signs Reviewed: Yes


Vital Signs











  Temp Pulse Resp BP Pulse Ox


 


 07/24/17 14:04   80  18  139/77  96


 


 07/24/17 13:15    18  139/77  98


 


 07/24/17 11:38  97.5 F L  79  18  131/75  95











Temperature: Afebrile


Blood Pressure: Normal


Pulse: Regular


Respiratory Rate: Normal


Appearance: Positive for: Well-Appearing, Non-Toxic, Comfortable


Pain Distress: None


Mental Status: Positive for: Alert and Oriented X 3





Medical Decision Making


ED Course and Treatment: 


07/24/17 12:08


Impression:


A 57 year old male sent in left foot infection after left 5th toe amputation.





Plan:


-- Left foot xray


-- Chest xray


-- EKG


-- Labs


-- Blood and Urine culture


-- Urinalysis 


-- Morphine and Vancomycin





Prior Visits:


Notes and results from previous visits were reviewed. Patient last seen in ED 

on 06/08/17 for COPD exacerbation.   





Progress Notes:


EKG shows ventricular paced rhythm at 80 BPM with small pacemaker spikes, no ST/

T changes, unchanged from prior EKG on 06/08/17. Interpreted by me.





Report Date : 07/24/2017 12:38:16


PROCEDURE:  Left Foot Radiographs.


Dictator : Zach Florentino MD


IMPRESSION: Amputation of the 5th digit. The remainder the foot is unremarkable





Report Date : 07/24/2017 12:41:56


Procedure: Chest xray


Dictator : Zach Florentino MD


IMPRESSION: No active disease.





Dr. Calderon accepts patient to his service. Dr. Goel consult placed. Also as 

per Dr. Goel, Dr. Busch consulted for poor circulation. Dr. Calderon also 

recommends Dr. Conner for vascular.





- Lab Interpretations


Lab Results: 








 07/24/17 13:05 





 07/24/17 13:05 





 Lab Results





07/24/17 14:50: Urine Color Yellow, Urine Appearance Clear, Urine pH 6.5, Ur 

Specific Gravity 1.015, Urine Protein Negative, Urine Glucose (UA) >=1000, 

Urine Ketones Negative, Urine Blood Negative, Urine Nitrate Negative, Urine 

Bilirubin Negative, Urine Urobilinogen 2.0 H, Ur Leukocyte Esterase Negative


07/24/17 14:09: Blood Type Confirm O POSITIVE


07/24/17 13:10: Blood Type O POSITIVE, Antibody Screen Negative, BBK History 

Checked No verified bt


07/24/17 13:05: Sodium 132, Potassium 4.3, Chloride 95 L, Carbon Dioxide 29, 

Anion Gap 12, BUN 23 H, Creatinine 0.8, Est GFR (African Amer) > 60, Est GFR (

Non-Af Amer) > 60, Random Glucose 249 H, Calcium 9.0, Total Bilirubin 0.7, AST 

39, ALT 49, Alkaline Phosphatase 139 H, Total Protein 6.5, Albumin 3.6, 

Globulin 2.9, Albumin/Globulin Ratio 1.2


07/24/17 13:05: PT 11.0, INR 1.02, APTT 30.6


07/24/17 13:05: WBC 9.1, RBC 3.97, Hgb 13.0 L, Hct 37.1 L, MCV 93.5, MCH 32.7, 

MCHC 35.0, RDW 13.3, Plt Count 199, MPV 9.3, Gran % 73.8 H, Lymph % (Auto) 12.6 

L, Mono % (Auto) 10.8 H, Eos % (Auto) 2.7, Baso % (Auto) 0.1, Gran # 6.74 H, 

Lymph # 1.2, Mono # 1.0 H, Eos # 0.3, Baso # 0.01, ESR 43 H








I have reviewed the lab results: Yes





- RAD Interpretation


Radiology Orders: 








07/24/17 11:54


CHEST PORTABLE [RAD] Stat 


FOOT LEFT 3 VIEWS ROUTINE [RAD] Stat 














- Medication Orders


Current Medication Orders: 











Discontinued Medications





Vancomycin HCl (Vancomycin 1gm)  1 gm in 250 mls @ 167 mls/hr IVPB STAT STA


   PRN Reason: Protocol


   Stop: 07/24/17 13:24


   Last Admin: 07/24/17 13:14  Dose: 167 mls/hr





Morphine Sulfate (Morphine)  2 mg IVP STAT STA


   Stop: 07/24/17 11:56


   Last Admin: 07/24/17 13:13  Dose: 2 mg











- Scribe Statement


The provider has reviewed the documentation as recorded by the [facility] training under Sangeetha Jones





Provider Scribe Attestation:


All medical record entries made by the Scribe were at my direction and 

personally dictated by me. I have reviewed the chart and agree that the record 

accurately reflects my personal performance of the history, physical exam, 

medical decision making, and the department course for this patient. I have 

also personally directed, reviewed, and agree with the discharge instructions 

and disposition.








Disposition/Present on Arrival





- Present on Arrival


Any Indicators Present on Arrival: No


History of DVT/PE: No


History of Uncontrolled Diabetes: No


Urinary Catheter: No


History of Decub. Ulcer: No


History Surgical Site Infection Following: None





- Disposition


Have Diagnosis and Disposition been Completed?: Yes


Diagnosis: 


 Gangrene, Cellulitis





Disposition: HOSPITALIZED


Disposition Time: 14:30


Patient Plan: Admission


Condition: FAIR


Discharge Instructions (ExitCare):  Cellulitis (ED)


Referrals: 


PCP,NO [Primary Care Provider] - Follow up with primary

## 2017-07-24 NOTE — RAD
PROCEDURE:  Left Foot Radiographs.



HISTORY:

5th digit amputation, gangrene  



COMPARISON:

None.



FINDINGS:



BONES:

Amputation of the 5th digit. The remainder the foot is unremarkable 



JOINTS:

Normal. 



SOFT TISSUES:

Normal. 



OTHER FINDINGS:

None.



IMPRESSION:

Amputation of the 5th digit. The remainder the foot is unremarkable

## 2017-07-24 NOTE — CARD
--------------- APPROVED REPORT --------------





EKG Measurement

Heart [id number]

CT [id number]

[id number]

[id number]

[id number]



<Conclusion>

Atrial sensed, ventricular paced rhythm

Possible Left atrial enlargement



Abnormal ECG

## 2017-07-25 RX ADMIN — MORPHINE SULFATE PRN MG: 4 INJECTION, SOLUTION INTRAMUSCULAR; INTRAVENOUS at 20:15

## 2017-07-25 RX ADMIN — SULFAMETHOXAZOLE AND TRIMETHOPRIM SCH TAB: 400; 80 TABLET ORAL at 10:09

## 2017-07-25 RX ADMIN — MORPHINE SULFATE PRN MG: 4 INJECTION, SOLUTION INTRAMUSCULAR; INTRAVENOUS at 15:02

## 2017-07-25 RX ADMIN — INSULIN LISPRO SCH: 100 INJECTION, SOLUTION INTRAVENOUS; SUBCUTANEOUS at 17:19

## 2017-07-25 RX ADMIN — INSULIN HUMAN SCH: 100 INJECTION, SOLUTION PARENTERAL at 16:03

## 2017-07-25 RX ADMIN — MORPHINE SULFATE PRN MG: 4 INJECTION, SOLUTION INTRAMUSCULAR; INTRAVENOUS at 06:07

## 2017-07-25 RX ADMIN — INSULIN HUMAN SCH UNITS: 100 INJECTION, SOLUTION PARENTERAL at 22:27

## 2017-07-25 RX ADMIN — INSULIN HUMAN SCH: 100 INJECTION, SOLUTION PARENTERAL at 08:06

## 2017-07-25 RX ADMIN — MEROPENEM AND SODIUM CHLORIDE SCH MLS/HR: 1 INJECTION, SOLUTION INTRAVENOUS at 22:28

## 2017-07-25 RX ADMIN — INSULIN LISPRO SCH UNITS: 100 INJECTION, SOLUTION INTRAVENOUS; SUBCUTANEOUS at 11:47

## 2017-07-25 RX ADMIN — MEROPENEM AND SODIUM CHLORIDE SCH: 1 INJECTION, SOLUTION INTRAVENOUS at 14:39

## 2017-07-25 RX ADMIN — OXYCODONE HYDROCHLORIDE AND ACETAMINOPHEN PRN TAB: 5; 325 TABLET ORAL at 17:54

## 2017-07-25 RX ADMIN — MORPHINE SULFATE PRN MG: 4 INJECTION, SOLUTION INTRAMUSCULAR; INTRAVENOUS at 10:50

## 2017-07-25 RX ADMIN — INSULIN LISPRO SCH: 100 INJECTION, SOLUTION INTRAVENOUS; SUBCUTANEOUS at 08:03

## 2017-07-25 RX ADMIN — INSULIN DETEMIR SCH UNIT: 100 INJECTION, SOLUTION SUBCUTANEOUS at 22:27

## 2017-07-25 RX ADMIN — INSULIN HUMAN SCH UNITS: 100 INJECTION, SOLUTION PARENTERAL at 11:47

## 2017-07-25 RX ADMIN — MEROPENEM AND SODIUM CHLORIDE SCH MLS/HR: 1 INJECTION, SOLUTION INTRAVENOUS at 14:39

## 2017-07-25 NOTE — CON
DATE:  07/25/2017



HISTORY OF PRESENT ILLNESS:  The patient is a 57-year-old white male with a

history of IDDM currently on insulin, history of kidney transplant being

maintained on triple immunosuppressive therapy.  The kidney transplant was

related to donor transplant back in May 2012.  The patient has maintained

excellent BUN and creatinine numbers over the years.  The patient is

followed by Dr. Calderon in the outpatient setting.  History of COPD,

history of CHF, history of peripheral vascular disease, history of ASHD

status post CABG followed by  _____.  The patient comes in for a

nonhealing wound of the left big toe area.  He is status post a left toe

amputation.  He has developed gangrene in the area.  The surgery was done

by Dr. Gaming one week ago.



PAST MEDICAL HISTORY:  Significant for kidney transplant being maintained

on triple immunosuppressive therapy, history of IDDM, COPD, CHF, ASHD,

status post CABG, peripheral vascular disease.



MEDICATIONS:  At home includes that of Percocet, Norvasc, Prograf, Bactrim,

prednisone, Myfortic, metoprolol, Levemir, NovoLog, Pepcid, Lexapro,

Colace, Lipitor, and Ecotrin.



ALLERGIES:  NO KNOWN ALLERGIES TO MEDICATIONS.



CURRENT MEDICATIONS:  In the hospital include that of Bactrim, Colace,

Ecotrin, Myfortic, Humalog, Levemir, Lexapro, Lipitor, Lopressor, p.r.n.

Morphine, Norvasc, Percocet, prednisone, Prograf, Tylenol, and Zofran

p.r.n.



FAMILY HISTORY:  Positive for ASHD.  No history of chronic kidney disease.



SOCIAL HISTORY:  No alcohol and no cigarettes.



REVIEW OF SYSTEMS:  A 10-point systems were reviewed with the patient all

negative except for what is noted above.  Of note, the patient does have

mild diabetic neuropathy.



PHYSICAL EXAMINATION:

GENERAL:  The patient is currently on 5R lying supine in bed.

VITAL SIGNS:  Blood pressure 128/77, temperature 98.8, respiratory rate is

20 with a pulse of 68.

HEENT:  Seem to be normocephalic atraumatic.  Conjunctiva are pink. 

Sclerae nonicteric.  Pupils are equal, round, and reactive to light and

accommodation.  Extraocular muscles are intact.

NECK:  Supple.  No neck vein distention.  No thyromegaly.  No

lymphadenopathy.  No bruits.

CHEST:  Clear to auscultation and percussion.  No rales, no rhonchi, and no

wheezing.

CARDIOVASCULAR:  Shows a regular rate and rhythm without audible murmurs,

rubs, or gallops.

ABDOMEN:  Soft.  Bowel sounds are normal.  No tenderness over his lower

quadrant.  Kidney transplant.  No rebound, no guarding, and no masses.

EXTREMITIES:  Showed diminished pulses in his lower extremity bilaterally. 

No cyanosis, no clubbing, or edema.  I did not remove the dressing from the

left foot in the area that he has gangrene and the area where he had a left

fifth toe amputation.

NEUROLOGIC:  Alert and oriented x2 with no gross focal motor or sensory

deficits noted.



LABORATORY DATA AND IMAGING:  Foot x-ray done yesterday shows amputation of

the fifth left digit, otherwise, unremarkable.  Chest x-ray shows no acute

pulmonary disease.  CBC:  White blood cell count 9.1, hemoglobin 13 with a

platelet count of 199,000.  Coags are normal.  Chemistries show normal

electrolytes.  BUN 23 with a creatinine of 0.8.  Glucose is 249, now down

to 92.  Liver enzymes are normal.  C-reactive protein is elevated greater

than 15.  Albumin is 3.6.  Urine showed no protein.  No white blood cells

and no red blood cells.  Microbiology, nothing available as of yet.



ASSESSMENT:

1.  Stable living related daughter kidney transplant dating back to May

2012.  Creatinine is less than 1.  The patient may be safely maintained on

triple immunosuppressive therapy.  His outpatient course has been managed

excellently by Dr. Calderon.

2.  History of insulin dependent diabetes mellitus.  Continue sliding scale

insulin, long-acting insulin.

3.  History of peripheral vascular disease status post left fifth toe

amputation with nonhealing wound and development of gangrene at the wound

site.  The patient is being evaluated by vascular and by podiatry.

4.  History of atherosclerotic heart disease, status post coronary artery

bypass graft, history of congestive heart failure, currently stable.  The

patient follows with  _____.

5.  History of chronic obstructive pulmonary disease, currently stable.



PLAN:

1.  From a renal standpoint, the patient is entirely stable.  He may

continue to be maintained on triple immunosuppressive therapy as ordered. 

Prograf levels I am certain have been checked in the outpatient setting and

do not need to be done during the hospitalization.

2.  Check magnesium level.

3.  Continue to monitor labs on a routine basis.

4.  We will continue to monitor the patient along with you.



Thank you for letting us sharing the care of your patient.







__________________________________________

Lonnie Alvarez MD



DD:  07/25/2017 10:31:09

DT:  07/25/2017 20:32:03

Job # [id number]

## 2017-07-25 NOTE — CP.PCM.PN
Subjective





- Date & Time of Evaluation


Date of Evaluation: 07/25/17


Time of Evaluation: 08:27





- Subjective


Subjective: 





General Surgery Note:


Resident: Abadier


Attending: Tessa





HPI: Patient seen and examined at bedside. Doing well with no complaints at 

this time. Tolerating diet. +Flatus/+BM. -N/V/D/F/CP/SOB





Objective





- Vital Signs/Intake and Output


Vital Signs (last 24 hours): 


 











Temp Pulse Resp BP Pulse Ox


 


 97.5 F L  104 H  18   157/83 H  98 


 


 07/24/17 23:25  07/24/17 23:25  07/24/17 23:25  07/24/17 23:25  07/24/17 19:00








Intake and Output: 


 











 07/25/17 07/25/17





 06:59 18:59


 


Intake Total 600 


 


Balance 600 














- Medications


Medications: 


 Current Medications





Acetaminophen (Tylenol 325mg Tab)  650 mg PO Q6H PRN


   PRN Reason: Pain, Mild (1-3)


Amlodipine Besylate (Norvasc)  10 mg PO DAILY [facility]


Aspirin (Ecotrin)  81 mg PO DAILY [facility]


Atorvastatin Calcium (Lipitor)  40 mg PO DAILY [facility]


Docusate Sodium (Colace)  100 mg PO DAILY [facility]


Escitalopram Oxalate (Lexapro)  5 mg PO DAILY [facility]


Home Med (Home Med)  2 unit PO BID [facility]


Insulin Detemir (Levemir)  40 unit SC HS [facility]


   Last Admin: 07/24/17 22:43 Dose:  40 unit


Insulin Human Lispro (Humalog)  25 units SC AC [facility]


   Last Admin: 07/25/17 08:03 Dose:  Not Given


Insulin Human Regular (Humulin R High)  0 units SC [facility]


   PRN Reason: Protocol


   Last Admin: 07/25/17 08:06 Dose:  Not Given


Metoprolol Tartrate (Lopressor)  50 mg PO BID [facility]


   Last Admin: 07/24/17 22:42 Dose:  50 mg


Morphine Sulfate (Morphine)  4 mg IVP Q4H PRN


   PRN Reason: Pain, severe (8-10)


   Last Admin: 07/25/17 06:07 Dose:  4 mg


Ondansetron HCl (Zofran Inj)  4 mg IVP Q4H PRN


   PRN Reason: Nausea/Vomiting


   Last Admin: 07/25/17 06:07 Dose:  4 mg


Oxycodone/Acetaminophen (Percocet 5/325 Mg Tab)  1 tab PO Q4 PRN


   PRN Reason: Pain, moderate (4-7)


   Stop: 07/27/17 22:00


Tacrolimus (Prograf Cap)  1.5 mg PO HS [facility]


   Last Admin: 07/24/17 22:42 Dose:  1.5 mg


Tacrolimus (Prograf Cap)  2 mg PO DAILY [facility]


Trimethoprim/Sulfamethoxazole (Bactrim Ss Tab)  1 tab PO DAILY [facility]


   PRN Reason: Protocol











- Labs


Labs: 


 











PT  11.0 Seconds (9.9-11.8)   07/24/17  13:05    


 


INR  1.02  (0.93-1.08)   07/24/17  13:05    


 


APTT  30.6 Seconds (23.7-30.8)   07/24/17  13:05    














- Constitutional


Appears: Well





- Head Exam


Head Exam: ATRAUMATIC





- Eye Exam


Eye Exam: EOMI





- ENT Exam


ENT Exam: Mucous Membranes Moist





- Respiratory Exam


Respiratory Exam: Clear to Ausculation Bilateral





- Cardiovascular Exam


Cardiovascular Exam: REGULAR RHYTHM





- GI/Abdominal Exam


GI & Abdominal Exam: Soft.  absent: Distended, Firm, Tenderness





- Extremities Exam


Additional comments: 





dry gangrene ([id number]) over site of previous L. little toe amputation





- Neurological Exam


Neurological Exam: Alert, Awake, Oriented x3





Assessment and Plan





- Assessment and Plan (Free Text)


Assessment: 





56 y/o WM w/ dry gangrene of L. little toe. 


* possible CTA/MRA


* Pain: Morphine, Perc


* DVT ppx: AE 


* Will discuss with attending





Michael Abadier DO


PGY-1

## 2017-07-25 NOTE — CON
DATE OF CONSULTATION:  07/25/2017



TIME OF CONSULTATION:  0700 hours.



LOCATION OF CONSULTATION:  Room 570, bed 2.



REASON FOR CONSULTATION:  Gangrene and PAD of the left foot.



HISTORY OF PRESENT ILLNESS:  The patient is a 57-year-old white male known

to me who had undergone regular diabetic foot care over the past 10 years,

developed a trauma to the left fifth toe approximately four weeks ago,

which gradually entered into a cyanotic and then gangrenous mode,

demarcated to the digit alone and one week prior on 07/17/2017, he

underwent outpatient surgery for amputation of the left fifth toe and

primary closure.  The wound did well at the three day checkup with some

elements of local postoperative rubor.  The patient came in for the seven

day check up yesterday and developed a 3 cm to 4 cm area of dorsal flap

gangrene and local increased cellulitis and cyanosis of the more proximal

tissues and was referred to the ED and admission with Dr. Borden as his

primary.  He is currently inpatient resting, alert and in bed with the team

of surgical residents in attendance this morning.



PAST MEDICAL HISTORY:  Positive for cardiac conditions status post CABG and

pacemaker implant.  He has history of CKD status post renal transplant,

COPD and recently admitted two weeks ago for bronchitis.  He has type 2

diabetes with long standing with advanced diabetic neuropathy.  He has

associated PAD with known existing to be occlusive disease of the lower

extremities.



REVIEW OF SYSTEMS:  Noncontributory for fever, chills, dyspepsia, diarrhea,

calf or upper limb tenderness.



PAST SURGICAL HISTORY:  As noted above.  The recent fifth toe amputation on

the left foot as well as the CABG, pacemaker placement, and renal

transplant.



FAMILY HISTORY AND SOCIAL HISTORY:  The patient lives at home with his wife

and is retried.  He continues to be a tobacco user, reporting approximately

half a pack per day, long term history of this habit.  He is socially

positive for alcohol use and he denies any recreational or other

substances.



ALLERGIES:  HE REPORTS NO KNOWN DRUG ALLERGIES.



CURRENT MEDICATIONS:  Is as per the medical record is significant.



PHYSICAL EXAMINATION:

GENERAL:  The patient is resting, comfortably in bed this morning.  Aler,

and oriented x3.

VITAL SIGNS:  Stable and in no acute distress.

EXTREMITIES:  The left lower extremity exhibits an improved cellulitis. 

There is local rubor in the left forefoot.  There is a dry gangrenous

plaque on the dorsal flap of the left fifth toe that is approximately 3 cm

x 4 cm in area.  There is no discharge.  There is no moist incision line

where the derm tissue closure was noted.  There is no cyanosis or gangrene

noted on the plantar sole flap of the left foot.  The area remains tender

and the patient is pain controlled with oral narcotics.  There is no calf

tenderness and the patient is ambulatory, but is decreasing his activity in

a current situation.



IMPRESSION:  Diabetes mellitus neuropathy, peripheral arterial disease, the

patient is status post amputation of the left fifth toe by one week with

postoperative flap necrosis and gangrene and local cellulitis.



PLAN:  The patient is admitted for medical workup and vascular workup.  He

is waiting results of vascular for possible intervention, Dr. Conner and Dr. Prescott were consulted for opinions as to vascular course.  No

significant wound care is required with the dry gangrene.  Orders were

written for dressing changes with just dry gauze and no compressive

dressing to be utilized and the patient will be ambulatory, partial

weightbearing in a postoperative surgical shoe only.  He shall endeavor to

keep the left foot dry.  We will await the results of his vascular workup

for possible vascular intervention of some kind and see if we can improve

the local blood flow to the foot and wait for final demarcation of the

narcotic flap and then pending results of the workup, consider debridement

options at that point.







__________________________________________

Benjamin Goel DPM





DD:  07/25/2017 10:15:24

DT:  07/25/2017 13:09:31

Job # [id number]

## 2017-07-25 NOTE — HP
DATE;  07/25/2017



CHIEF COMPLAINT AND HISTORY OF PRESENT ILLNESS:  This is a 57-year-old male

who is coming into the hospital who was sent by Dr. Gaming his podiatrist for

evaluation of the left foot that was infected.  The patient has a past

medical history of diabetes type 2, COPD, pacemaker, renal transplant.  The

patient has been treated for his left foot infection and had a fifth toe

amputation about one week ago.  The patient denies any fevers or chills. 

No nausea, no vomiting.  No abdominal pain.  No back pain.  No dysuria or

frequency.  No nocturia.  He does have pain that time.  All other review of

symptoms are within normal limits except what is mentioned.



ALLERGIES:  NO KNOWN DRUG ALLERGIES.



HOME MEDICATIONS:  Lipitor, Lexapro, Pepcid, NovoLog, Levemir, myfortic,

Bactrim, Norvasc, aspirin, Colace, metoprolol, Prograf.



PAST MEDICAL HISTORY:

1.  Pacemaker [facility].

2.  Dyslipidemia.

3.  Hypertension.

4.  Renal transplant in 05/2011.

5.  Diabetes type 2.

6.  COPD.



SOCIAL HISTORY:  _____ former smoker.



FAMILY HISTORY:  Noncontributory.



PHYSICAL EXAMINATION:

VITAL SIGNS:  Temperature is 97.5, pulse of 104, blood pressure is 157/83,

respirations is 18, O2 saturation 96%,

GENERAL:  The patient is lying on bed, flat, comfortable, in no acute

distress.

HEENT:  Atraumatic and normocephalic.  Anicteric sclerae.  Moist mucosa. 

No oral lesions.  EOMI, PERRLA.

NECK:  No JVD, adenopathy, thyromegaly or bruits.

HEART:  S1 and S2 is regular.  No murmurs, rubs, or gallops.

LUNGS:  Good bilateral air entry.  No wheezes, rales, or rhonchi.

ABDOMEN:  Bowel sounds are positive, soft, nontender, nondistended.  No

hepatosplenomegaly.  There is a scar in the right lower quadrant.  _____

kidney can be felt.

EXTREMITIES:  No cyanosis, clubbing, or edema.

NEUROLOGIC:  No fascial asymmetry.  Tongue is midline.  No uvula deviation.

Power is 5/5 in upper extremity and lower extremity.  Sensation is intact.

VASCULAR:  1+ pulses in the leg and in the pedal pulses.

PSYCHIATRIC:  Alert, awake, and oriented x3.  No facial asymmetry.  Tongue

is midline.  There is good insight.  Normal affect.

GENITOURINARY:  No CVA tenderness.

SKIN:  No erythema or nodules.

SPINE:  There is normal curvature.

ADENOPATHY:  There is no anterior or posterior cervical adenopathy.



In the left foot, the fifth toe, there is a necrotic tissue that discharge

drainage.



Chest x-ray shows no active disease.  Left foot x-ray shows amputation of

the fifth digit, although it has been unremarkable.



ASSESSMENT:

1.  Left foot ulcer.

2.  Pacemaker.

3.  Diabetes type 2.

4.  Renal transplant.

5.  Dyslipidemia.

6.  Anxiety.

7.  Hypertension.



PLAN:  The patient is currently comfortable.  The patient will need

evaluation by Dr. Benjamin Conner for peripheral arterial disease.  The patient

is on Colace for consultation.  The EKG done shows sinus rhythm with

possible left arterial enlargement.  The patient is going to be on his

insulin with homolog, he is on Levemir for his diabetes as well, I placed

him on a high dose insulin sliding scale.  The patient is on Lipitor for

dyslipidemia, he is going to be on amlodipine for hypertension.  He is on

tacrolimus and he is on mycophenolate, and he is also taking prednisone, he

is not sure about his dosages.  The patient would also have evaluation by

ID and podiatry and vascular evaluation from surgery.





__________________________________________

Andrea Calderon MD



cc:



DD:  07/25/2017 8:38:29

DT:  07/25/2017 11:47:58

Job # [id number]

## 2017-07-26 LAB
ALBUMIN SERPL-MCNC: 3.6 G/DL (ref 3–4.8)
ALBUMIN/GLOB SERPL: 1.2 {RATIO} (ref 1.1–1.8)
ALT SERPL-CCNC: 36 U/L (ref 7–56)
AST SERPL-CCNC: 21 U/L (ref 15–59)
BUN SERPL-MCNC: 20 MG/DL (ref 7–21)
CALCIUM SERPL-MCNC: 8.7 MG/DL (ref 8.4–10.5)
ERYTHROCYTE [DISTWIDTH] IN BLOOD BY AUTOMATED COUNT: 13.5 % (ref 11.5–14.5)
GFR NON-AFRICAN AMERICAN: > 60
HGB BLD-MCNC: 14 GM/DL (ref 14–18)
MAGNESIUM SERPL-MCNC: 1.7 MG/DL (ref 1.7–2.2)
MCH RBC QN AUTO: 32.5 PG (ref 25–35)
MCHC RBC AUTO-ENTMCNC: 34.3 G/DL (ref 31–37)
MCV RBC AUTO: 94.7 FL (ref 80–105)
PLATELET # BLD: 220 10^3/UL (ref 120–450)
PMV BLD AUTO: 9.1 FL (ref 7–11)
RBC # BLD AUTO: 4.31 10^6/UL (ref 3.5–6.1)
WBC # BLD AUTO: 9.4 10^3/UL (ref 4.5–11)

## 2017-07-26 RX ADMIN — MORPHINE SULFATE PRN MG: 4 INJECTION, SOLUTION INTRAMUSCULAR; INTRAVENOUS at 21:43

## 2017-07-26 RX ADMIN — INSULIN HUMAN SCH UNITS: 100 INJECTION, SOLUTION PARENTERAL at 08:41

## 2017-07-26 RX ADMIN — OXYCODONE HYDROCHLORIDE AND ACETAMINOPHEN PRN TAB: 5; 325 TABLET ORAL at 18:33

## 2017-07-26 RX ADMIN — INSULIN LISPRO SCH: 100 INJECTION, SOLUTION INTRAVENOUS; SUBCUTANEOUS at 17:31

## 2017-07-26 RX ADMIN — MORPHINE SULFATE PRN MG: 4 INJECTION, SOLUTION INTRAMUSCULAR; INTRAVENOUS at 13:57

## 2017-07-26 RX ADMIN — SULFAMETHOXAZOLE AND TRIMETHOPRIM SCH TAB: 400; 80 TABLET ORAL at 11:04

## 2017-07-26 RX ADMIN — MEROPENEM AND SODIUM CHLORIDE SCH MLS/HR: 1 INJECTION, SOLUTION INTRAVENOUS at 21:43

## 2017-07-26 RX ADMIN — INSULIN LISPRO SCH UNITS: 100 INJECTION, SOLUTION INTRAVENOUS; SUBCUTANEOUS at 11:05

## 2017-07-26 RX ADMIN — MEROPENEM AND SODIUM CHLORIDE SCH MLS/HR: 1 INJECTION, SOLUTION INTRAVENOUS at 06:03

## 2017-07-26 RX ADMIN — INSULIN DETEMIR SCH UNIT: 100 INJECTION, SOLUTION SUBCUTANEOUS at 21:43

## 2017-07-26 RX ADMIN — MORPHINE SULFATE PRN MG: 4 INJECTION, SOLUTION INTRAMUSCULAR; INTRAVENOUS at 08:47

## 2017-07-26 RX ADMIN — INSULIN HUMAN SCH UNITS: 100 INJECTION, SOLUTION PARENTERAL at 12:21

## 2017-07-26 RX ADMIN — INSULIN LISPRO SCH UNITS: 100 INJECTION, SOLUTION INTRAVENOUS; SUBCUTANEOUS at 22:04

## 2017-07-26 RX ADMIN — MEROPENEM AND SODIUM CHLORIDE SCH MLS/HR: 1 INJECTION, SOLUTION INTRAVENOUS at 13:58

## 2017-07-26 NOTE — CON
DATE:



The patient is in room 570, bed 2.



CHIEF COMPLAINT:  Left foot infection x1 to 2 days' duration.



HISTORY OF PRESENT ILLNESS:  This is a 57-year-old male with significant

peripheral vascula disease, diabetes mellitus, chronic obstructive lung

disease, congestive heart failure, coronary artery disease, myocardial

infarction.  The patient had renal disease and a renal transplant in 2011

on immunosuppressive medication.  The patient with a history of depression

and also with hypertension and hyperlipidemia.  However, he is a long-time

ex-smoker.  Approximately over a week ago, had a left fifth toe amputation,

now postprocedure day #8.  Says his left foot is become erythematous and no

abrasion of skin.  No discharge.  No fevers.  No chills.  No chest pain,

shortness of breath or cough.



PAST MEDICAL HISTORY:  Significant for peripheral vascular disease,

coronary artery disease, myocardial infarction, diabetes mellitus,

hypertension, chronic obstructive lung disease, depression,

hypertriglyceridemia, renal disease and renal failure.



PAST SURGICAL HISTORY:  Significant for renal transplant in 2011.  The

patient also had an AICD placement and recent left fifth toe amputation 8

days ago.



ALLERGIES:  The patient has no known allergies.



MEDICATIONS:  At home include Lexapro, Lipitor, Ecotrin, insulin,

metoprolol, mycophenolate, prednisone 40 mg.  The patient is also on

Bactrim, tacrolimus, Percocet and Norvasc.



PHYSICAL EXAMINATION:

GENERAL:  He is in bed, in no acute distress, nontoxic.

VITAL SIGNS:  Temperature of 98, blood pressure is 150/80, respiratory rate

of 20, heart rate of 104.

HEENT:  Unremarkable.

NECK:  Supple.

LUNGS:  Decreased breath sounds.

HEART:  Normal S1 and S2.

ABDOMEN:  Soft and nontender.  No organomegaly.  No rebound.  No guarding.

EXTREMITIES:  Examination of the left foot, there is erythema but no break

in the skin.  There is essential necrotic area with an essential eschar. 

The patient's right foot was also examined.  There is chronic changes.  No

acute infection there.



LABORATORY DATA:  Reveals the patient's white count of 9.1, hemoglobin of

13 and platelets of 199.  BUN of 23, creatinine of 0.8.  Random glucose of

249.  C-reactive protein is greater than 15.  Urinalysis reveals

unremarkable.  Urine cultures, no growth.



The patient's x-ray of the foot is noted, reviewed.  Chest x-ray is

negative.



ASSESSMENT AND PLAN:  This is a 57-year-old male with chronic obstructive

lung disease, diabetes, congestive heart failure, peripheral vascular

disease, coronary artery disease, myocardial infarction, depression,

hypertension, hyperlipidemia, status post amputation of the left fifth

digit, on postprocedure day #8 now with a wound.  Left foot cellulitis and

patient has renal transplant, on immunosuppressive medications, is on

automatic implantable cardioverter-defibrillator.  We will treat the

patient with doxycycline and meropenem.  The patient is already given a

dose of vancomycin.  We will follow the patient clinically.  The pathology

of the toe reveals the margins were free of osteomyelitis 8 days ago.  We

will continue to follow any concern about that and we will make further

recommendations.







__________________________________________

Duc Bee MD



DD:  07/25/2017 13:49:53

DT:  07/26/2017 1:40:12

Job # [id number]

## 2017-07-26 NOTE — CP.PCM.PN
Subjective





- Date & Time of Evaluation


Date of Evaluation: 07/26/17


Time of Evaluation: 11:23





- Subjective


Subjective: 





General Surgery Progress Note


Resident: Abadier


Attending: Kenyon





HPI: Patient seen and examined at bedside. Doing well with no complaints at 

this time. Tolerating diet. 





Objective





- Vital Signs/Intake and Output


Vital Signs (last 24 hours): 


 











Temp Pulse Resp BP Pulse Ox


 


 97.5 F L  77   18   115/65   100 


 


 07/26/17 07:30  07/26/17 11:04  07/26/17 07:30  07/26/17 11:05  07/26/17 07:30








Intake and Output: 


 











 07/26/17 07/26/17





 06:59 18:59


 


Intake Total 840 


 


Balance 840 














- Medications


Medications: 


 Current Medications





Acetaminophen (Tylenol 325mg Tab)  650 mg PO Q6H PRN


   PRN Reason: Pain, Mild (1-3)


Acetylcysteine (Acetylcysteine 20%)  3 ml PO BID [facility]


   Stop: 07/28/17 20:00


Amlodipine Besylate (Norvasc)  10 mg PO DAILY [facility]


   Last Admin: 07/26/17 11:05 Dose:  10 mg


Aspirin (Ecotrin)  81 mg PO DAILY [facility]


   Last Admin: 07/26/17 11:04 Dose:  81 mg


Atorvastatin Calcium (Lipitor)  40 mg PO DAILY [facility]


   Last Admin: 07/26/17 11:04 Dose:  40 mg


Docusate Sodium (Colace)  100 mg PO DAILY [facility]


   Last Admin: 07/26/17 11:04 Dose:  100 mg


Doxycycline Hyclate (Doryx)  100 mg PO Q12 [facility]


   PRN Reason: Protocol


   Stop: 08/02/17 22:01


   Last Admin: 07/26/17 11:04 Dose:  100 mg


Escitalopram Oxalate (Lexapro)  5 mg PO DAILY [facility]


   Last Admin: 07/26/17 11:03 Dose:  5 mg


Home Med (Home Med)  2 unit PO BID [facility]


   Last Admin: 07/26/17 11:05 Dose:  2 unit


Meropenem 1g/NS 100mL IVPB (Meropenem 1g/Ns 100ml Ivpb)  1 gm in 100 mls @ 100 

mls/hr IVPB Q8 [facility]


   PRN Reason: Protocol


   Stop: 11/01/17 13:39


   Last Admin: 07/26/17 06:03 Dose:  100 mls/hr


Sodium Chloride (Sodium Chloride 0.45%)  1,000 mls @ 80 mls/hr IV .[id number] [facility]


   Stop: 07/28/17 12:00


Insulin Detemir (Levemir)  20 unit SC [facility]


   Last Admin: 07/25/17 22:27 Dose:  20 unit


Insulin Human Lispro (Humalog)  12 units SC BID [facility]


   Last Admin: 07/26/17 11:05 Dose:  12 units


Insulin Human Regular (Humulin R High)  0 units SC ACHS [facility]


   PRN Reason: Protocol


   Last Admin: 07/26/17 08:41 Dose:  10 units


Metoprolol Tartrate (Lopressor)  50 mg PO BID [facility]


   Last Admin: 07/26/17 11:04 Dose:  50 mg


Morphine Sulfate (Morphine)  4 mg IVP Q4H PRN


   PRN Reason: Pain, severe (8-10)


   Last Admin: 07/26/17 08:47 Dose:  4 mg


Ondansetron HCl (Zofran Inj)  4 mg IVP Q4H PRN


   PRN Reason: Nausea/Vomiting


   Last Admin: 07/26/17 08:48 Dose:  4 mg


Oxycodone/Acetaminophen (Percocet 5/325 Mg Tab)  1 tab PO Q4 PRN


   PRN Reason: Pain, moderate (4-7)


   Stop: 07/27/17 22:00


   Last Admin: 07/25/17 17:54 Dose:  1 tab


Prednisone (Prednisone Tab)  5 mg PO DAILY [facility]


   Last Admin: 07/26/17 11:04 Dose:  5 mg


Tacrolimus (Prograf Cap)  1.5 mg PO [facility]


   Last Admin: 07/25/17 22:33 Dose:  1.5 mg


Tacrolimus (Prograf Cap)  2 mg PO DAILY [facility]


   Last Admin: 07/26/17 11:03 Dose:  2 mg


Trimethoprim/Sulfamethoxazole (Bactrim Ss Tab)  1 tab PO DAILY [facility]


   PRN Reason: Protocol


   Last Admin: 07/26/17 11:04 Dose:  1 tab











- Labs


Labs: 


 





 07/26/17 07:10 





 07/26/17 07:10 





 











PT  11.0 Seconds (9.9-11.8)   07/24/17  13:05    


 


INR  1.02  (0.93-1.08)   07/24/17  13:05    


 


APTT  30.6 Seconds (23.7-30.8)   07/24/17  13:05    














- Constitutional


Appears: Well, No Acute Distress





- Head Exam


Head Exam: NORMAL INSPECTION





- Eye Exam


Eye Exam: EOMI





- ENT Exam


ENT Exam: Mucous Membranes Moist





- Respiratory Exam


Respiratory Exam: Clear to Ausculation Bilateral





- Cardiovascular Exam


Cardiovascular Exam: Tachycardia





- GI/Abdominal Exam


GI & Abdominal Exam: Distended, Tenderness.  absent: Soft





- Extremities Exam


Additional comments: 





dry gangrene over L. little toe. 





Assessment and Plan





- Assessment and Plan (Free Text)


Assessment: 





58 y/o WM with dry gangrene over L. Little toe


* dry dressing per podiatry


* Angioplasty on thursday per IR


* No surgical intervention at this time





Michael Abadier DO


PGY-1

## 2017-07-26 NOTE — PN
DATE:  07/26/2017



SUBJECTIVE:  The patient is seen lying in bed.  He denies any headaches,

dizziness, lightheadedness.  He denies any chest pain.  He denies any

shortness of breath.  He denies any pain in his left toe.



PHYSICAL EXAMINATION

GENERAL:  Middle age male, lying in bed.

VITAL SIGNS:  Blood pressure 115/65, heart rate 77, respiratory rate 18,

temperature 97.5.

HEENT:  Normocephalic, atraumatic, positive pallor.

NECK:  Supple.  No JVD.

LUNGS:  Bilateral equal air entry, no rales.

CARDIAC:  S1, S2, regular rate and rhythm.  No murmur.  No rubs.

ABDOMEN:  Obese, distended, soft, nontender.  No tenderness over the right

lower quadrant, allograft.

EXTREMITIES:  No known extremity edema.

INTAKE AND OUTPUT:  Not charted.



LABORATORY DATA:  WBC 9.4, hemoglobin 14, hematocrit 40.8, platelets 220. 

Sodium 132, potassium 4.7, chloride 93, CO2 30, BUN 20, creatinine 1.0,

glucose 263, calcium 8.7, phosphorus 3.2, magnesium 1.7, AST 21, ALT 36,

albumin 3.6.  Urinalysis yellow clear, pH 6.5, specific gravity 1.015,

cocaine negative, glucose more than 1000, blood negative, nitrate negative,

bilirubin negative.



CURRENT MEDICATIONS:  Mucomyst, Bactrim, Colace, doxycycline 100 q. 12,

aspirin 81, insulin, mycophenolate 360 b.i.d. Lexapro, Lipitor, Lopressor,

meropenem 1 g q. 8, morphine, amlodipine 10, Percocet, prednisone 5,

Prograf 2 mg a.m., 1.5 mg at bedtime, half-normal saline at 80, Tylenol.



ASSESSMENT:

1.  Kidney transplant.

2.  Noninsulin-dependent diabetes mellitus.

3.  Hypertension.

4.  Peripheral vascular disease.

5.  Atherosclerotic heart disease.

6.  Gangrene, left fifth toe, status post amputation.



PLAN:

1.  The patient is scheduled for angiogram tomorrow, agree with Mucomyst,

we will change IV fluids to normal saline at 1 mL/kg.

2.  Continue triple immunosuppression.

3.  Monitor urine output closely.

4.  Monitor electrolytes.

5.  Dose all antibiotics for creatinine clearance about 50 mL/minute.







__________________________________________

Keyona Mathews MD







DD:  07/26/2017 16:51:17

DT:  07/26/2017 18:02:14

[facility] # [id number]

## 2017-07-26 NOTE — PN
DATE:  07/26/2017



SUBJECTIVE:  The patient has no complaints of any chest pain and no

shortness of breath.  No headaches and no dizziness.



PHYSICAL EXAMINATION:

VITAL SIGNS:  Temperature is 97.9, pulse of 80, blood pressure of 121/61,

and respirations of 20.

NECK:  No JVD, adenopathy, or thyromegaly.

HEENT:  Head is atraumatic and normocephalic.  Anicteric sclerae.  Moist

mucosa.

CARDIOVASCULAR:  S1 and S2 is regular.  No murmurs, rubs, or gallops.

LUNGS:  Clear to auscultation bilaterally.  No wheezes, rales, or rhonchi.

ABDOMEN:  Bowels sounds are positive.  Soft, nontender, and nondistended. 

No hepatosplenomegaly.

EXTREMITIES:  Lower extremities; no cyanosis, clubbing, or edema.  He has

left foot ulcer.



LABORATORY DATA:  Creatinine is 1.0.



ASSESSMENT:

1.  Peripheral arterial disease.

2.  Diabetes type 2.

3.  Renal transplant.

4.  Dyslipidemia.

5.  Anxiety.

6.  Hypertension.

7.  Pacemaker.



PLAN:  The patient is going to have angiogram done tomorrow by Dr. Benjamin Conner, I did speak to him.  The patient is on aspirin, he is going to be

continued.  He is on insulin for diabetes and he is on Levemir for his

diabetes as well.  He is on Lipitor for dyslipidemia.  The patient is

getting meropenem for antibiotics.  Blood cultures and urine cultures have

been negative.  The patient is on morphine for pain as he is receiving his

immunosuppressive regimen.  He is on carbohydrate consistent diet.  He is

agreeable to undergo the procedure with contrast, given that there is a

risk for acute kidney injury secondary to his transplant.  The patient

understands and given the benefits and risk, it is more beneficial for him

to undergo the procedure if he does have a fairly good creatinine and

should make it through the procedure.  If he does not get this procedure

for intervention whether it is an angioplasty or possible bypass, he will

lose his foot and may need amputation, could lead to sepsis and possible

_____ if he develops complications.







__________________________________________

Andrea Calderon MD





DD:  07/26/2017 9:53:12

DT:  07/26/2017 10:41:18

[facility] # [id number]

## 2017-07-27 LAB
ALBUMIN SERPL-MCNC: 3.5 G/DL (ref 3–4.8)
ALBUMIN/GLOB SERPL: 1.3 {RATIO} (ref 1.1–1.8)
ALT SERPL-CCNC: 36 U/L (ref 7–56)
AST SERPL-CCNC: 21 U/L (ref 15–59)
BUN SERPL-MCNC: 17 MG/DL (ref 7–21)
CALCIUM SERPL-MCNC: 8.7 MG/DL (ref 8.4–10.5)
ERYTHROCYTE [DISTWIDTH] IN BLOOD BY AUTOMATED COUNT: 13.6 % (ref 11.5–14.5)
GFR NON-AFRICAN AMERICAN: > 60
HGB BLD-MCNC: 13.3 GM/DL (ref 14–18)
MCH RBC QN AUTO: 32.4 PG (ref 25–35)
MCHC RBC AUTO-ENTMCNC: 34 G/DL (ref 31–37)
MCV RBC AUTO: 95.1 FL (ref 80–105)
PLATELET # BLD: 206 10^3/UL (ref 120–450)
PMV BLD AUTO: 9.4 FL (ref 7–11)
RBC # BLD AUTO: 4.11 10^6/UL (ref 3.5–6.1)
WBC # BLD AUTO: 9.8 10^3/UL (ref 4.5–11)

## 2017-07-27 PROCEDURE — 04CL3ZZ EXTIRPATION OF MATTER FROM LEFT FEMORAL ARTERY, PERCUTANEOUS APPROACH: ICD-10-PCS | Performed by: RADIOLOGY

## 2017-07-27 PROCEDURE — 04CQ3ZZ EXTIRPATION OF MATTER FROM LEFT ANTERIOR TIBIAL ARTERY, PERCUTANEOUS APPROACH: ICD-10-PCS | Performed by: RADIOLOGY

## 2017-07-27 PROCEDURE — 047Q3ZZ DILATION OF LEFT ANTERIOR TIBIAL ARTERY, PERCUTANEOUS APPROACH: ICD-10-PCS | Performed by: RADIOLOGY

## 2017-07-27 PROCEDURE — B41CYZZ FLUOROSCOPY OF PELVIC ARTERIES USING OTHER CONTRAST: ICD-10-PCS | Performed by: RADIOLOGY

## 2017-07-27 PROCEDURE — 047L3ZZ DILATION OF LEFT FEMORAL ARTERY, PERCUTANEOUS APPROACH: ICD-10-PCS | Performed by: RADIOLOGY

## 2017-07-27 RX ADMIN — MORPHINE SULFATE PRN MG: 4 INJECTION, SOLUTION INTRAMUSCULAR; INTRAVENOUS at 08:55

## 2017-07-27 RX ADMIN — INSULIN LISPRO SCH UNITS: 100 INJECTION, SOLUTION INTRAVENOUS; SUBCUTANEOUS at 08:55

## 2017-07-27 RX ADMIN — ACETYLCYSTEINE SCH ML: 200 INHALANT RESPIRATORY (INHALATION) at 08:41

## 2017-07-27 RX ADMIN — MEROPENEM AND SODIUM CHLORIDE SCH MLS/HR: 1 INJECTION, SOLUTION INTRAVENOUS at 16:14

## 2017-07-27 RX ADMIN — SULFAMETHOXAZOLE AND TRIMETHOPRIM SCH TAB: 400; 80 TABLET ORAL at 20:40

## 2017-07-27 RX ADMIN — MEROPENEM AND SODIUM CHLORIDE SCH MLS/HR: 1 INJECTION, SOLUTION INTRAVENOUS at 08:40

## 2017-07-27 RX ADMIN — ACETYLCYSTEINE SCH: 200 INHALANT RESPIRATORY (INHALATION) at 10:00

## 2017-07-27 RX ADMIN — INSULIN LISPRO SCH: 100 INJECTION, SOLUTION INTRAVENOUS; SUBCUTANEOUS at 11:30

## 2017-07-27 RX ADMIN — MEROPENEM AND SODIUM CHLORIDE SCH: 1 INJECTION, SOLUTION INTRAVENOUS at 22:00

## 2017-07-27 RX ADMIN — INSULIN LISPRO SCH: 100 INJECTION, SOLUTION INTRAVENOUS; SUBCUTANEOUS at 18:15

## 2017-07-27 RX ADMIN — POLYETHYLENE GLYCOL 3350 SCH: 17 POWDER, FOR SOLUTION ORAL at 10:00

## 2017-07-27 RX ADMIN — ACETYLCYSTEINE SCH ML: 200 INHALANT RESPIRATORY (INHALATION) at 18:28

## 2017-07-27 RX ADMIN — INSULIN LISPRO SCH: 100 INJECTION, SOLUTION INTRAVENOUS; SUBCUTANEOUS at 22:00

## 2017-07-27 NOTE — VASCULAR
PROCEDURE:  1. Abdominal and pelvic arteriogram with left lower 

extremity runoff 



2. Left anterior tibial artery silver Hawk atherectomy and balloon 

angioplasty



3. Left SFA silver Hawk atherectomy and drug-eluting balloon 

angioplasty



HISTORY:

Severe peripheral vascular disease. Renal transplant. Recent left 5th 

toe amputation with gangrene. 



PHYSICIAN(S):  Benjamin Conner M.D.







TECHNIQUE:

The relative risks and indications of the procedure were explained to 

the patient and consent obtained. The patient was hydrated prior to 

the procedure and the appropriate labs drawn. The patient was placed 

supine on the arteriogram table and the right groin prepped and 

draped in the usual sterile fashion. Conscious sedation and 

monitoring were provided throughout the procedure by a nurse. 



Via a right common femoral artery approach, a 5 Greek sheath was 

placed in the right groin. Through the sheath and over a guidewire, a 

5 Greek flush catheter was placed in the abdominal aorta at the 

level of the aortic bifurcation and an LPO DSA abdominal and pelvic 

arteriogram performed. The catheter was advanced over the bifurcation 

and placed in the left common femoral artery. An overlapping left 

lower extremity DSA arteriogram was performed. 



A 0.035 angled Glidewire was advanced over the bifurcation and placed 

in the distal left SFA. A 7 Greek 65 cm destination sheath was 

placed in the mid left SFA.   Heparin 5000 units IV and nitroglycerin 

in 250 mcg aliquots were given. The calcified irregular stenoses in 

the distal left SFA and the coaxial critical stenoses in the proximal 

left anterior tibial artery were crossed with 5 Greek catheter and 

angled Glidewire. Exchange was made for a 0.014 support guidewire. 



Silver Hawk atherectomy of the proximal left anterior tibial artery 

was performed with an SS catheter. 3 passes were performed. An 

improved but suboptimal result was obtained. The proximal left 

anterior tibial artery was dilated with 3.5 x 120 mm balloon.  An 

excellent angiographic result was obtained. 



Silver Hawk atherectomy of the calcified distal left SFA was 

performed with an LS -M catheter. Six passes were performed. The 

distal left SFA and above knee popliteal artery were dilated a 6 mm 

by 120 mm drug-eluting balloon.  An excellent angiographic result was 

obtained.  No stent was required.  Completion angiograms were 

performed.  The sheath was removed and hemostasis obtained with a 

Mynx device.  The patient tolerated the procedure well. 



FINDINGS:

The terminal abdominal aorta is patent. The common external iliac 

arteries are widely patent on single view. A transplant is noted in 

the right lower quadrant. Arterial supply appears to be off the right 

external iliac artery and patent. 



Left lower extremity: The left common femoral artery is patent.  Left 

profunda femoral artery is patent. The left SFA is diffusely 

calcified.  There are polypoid moderate severe stenoses in the 

terminal left SFA. The left popliteal artery is continuous. There is 

single vessel runoff via the left anterior tibial artery. The left 

posterior tibial and peroneal arteries are occluded proximally.  

There are severe stenoses in the proximal left anterior tibial 

artery. The left dorsalis pedis artery is patent.  The plantar arch 

is occluded.  Collaterals are present. 



IMPRESSION:

1.Successful left anterior tibial artery silver Hawk atherectomy and 

balloon angioplasty



2. Successful distal left SFA silver Hawk atherectomy and 

drug-eluting balloon angioplasty.



3. Severe left tibial and pedal occlusive disease.  There is 1 vessel 

runoff via the anterior tibial artery.

## 2017-07-27 NOTE — PN
DATE:  07/27/2017



SUBJECTIVE:  The patient in bed, was seen earlier this morning in room 517.

No fevers and no chills.



PHYSICAL EXAMINATION

VITAL SIGNS:  Temperature is 98, blood pressure 120/70, respiratory rate of

18.

HEENT:  Unremarkable.

NECK:  Supple.

LUNGS:  Decreased breath sounds.

HEART:  Normal S1, S2.

ABDOMEN:  Soft.



LABORATORY DATA:  Reveals a white count of 9.8, hemoglobin of 13, platelets

of 206,000.  Chemistry reveals a BUN of 17, creatinine of 0.9.  Urinalysis

is noted.  Microbiology; blood cultures are no growth, urine cultures, no

growth.



ASSESSMENT AND PLAN:  This is a 57-year-old male who was seen early this

morning in room 570, bed 2 with the history of chronic obstructive lung

disease, diabetes mellitus, peripheral vascular disease, coronary artery

disease, myocardial infarction, depression, hypertension, hyperlipidemia,

status post amputation of left fifth digit during the post procedure day

#10 and a left foot cellulitis in a patient with the renal transplant on

immunosuppressive medications and automatic implantable cardioverter

defibrillator.  Currently, on meropenem and doxycycline.  A dose of

vancomycin was given in the emergency room.  The patient is scheduled for

vascular intervention.  Dr. Thornton _____ note is reviewed for possible

angioplasty by Dr. Benjamin Conner in IR today.





__________________________________________

Duc Bee MD



cc:



DD:  07/27/2017 11:11:42

DT:  07/27/2017 13:51:52

Job # [id number]

## 2017-07-27 NOTE — PN
DATE OF CONSULTATION:  07/27/2017



TIME OF CONSULTATION:  0645 hours.



LOCATION OF THE PATIENT:  Room 570, bed 2.



SUBJECTIVE:  The patient is approximately 9 days status post amputation of

a gangrenous left fifth toe with no osteomyelitis present on approximately

day number 6, he developed dry gangrene of the dorsal flap and was admitted

into hospital with gangrenous cellulitis.  The patient remains

significantly comorbidity.  The patient with history of ASHD, HTN, and CKD

with kidney transplant, diabetes with neuropathy, diabetes with advanced

peripheral artery disease, and history of depression.  He is currently

maintained on IV antibiotic per infectious disease and immunosuppressive

drugs with renal monitoring by the team.  He is on dry gauze dressings only

for the dry gangrene and he is awaiting scheduled angioplasty by Dr. Benjamin Conner today in hopes of improving his occluded tibial disease and

increasing blood flow to the distal half of the left foot.



PHYSICAL EXAMINATION:

GENERAL:  The patient is alert and oriented x3, sitting in bed comfortably.

No complaint of pain in the left foot.

VITAL SIGNS:  Stable and he is in good condition.

SKIN:  The left dressing is removed.  The dressing applied by the nurses

yesterday was a wound care.  Prefab dressing and bandage _____ and this

morning the gangrene seems to be somewhat moist.  There is no dehiscence. 

There is no sue discharge.

EXTREMITIES:  The cellulitis of the left foot seems improved.  There is

some indication of clear demarcation of the flap.  There is some cyanosis

noted now for the first time in the margins of the plantar flap near the

surgical incision.  There is no lymphangitis or streaking on the calf and

no calf tenderness.  Rest of the toes on the left foot have no evidence of

cyanosis or gangrenous changes.



ASSESSMENT AND PLAN:  We discussed with the patient the current short-term

goal, angioplasty to improve the flow to the left foot and hopefully avoid

more proximal amputation.  We discussed the possibility of open arterial

reconstruction in the form of a bypass graft should the angioplasty fail to

improve the patients' condition.  This would be unnecessary if the

angioplasty sufficiently improves the flow to the left foot.  We discussed

the risk of the angioplasty and the dye and the medical considerations, but

balance them with the medical considerations, if the wound gets infected

and needs more proximal amputation with possible sepsis.  We discussed with

the patient, the advantage of using dry gauze only and trying to avoid

developing a moist gangrenous wound and following the angioplasty today. 

We anticipate being able to start local wound care with debridements and

wound management once sufficient flow to the left foot is obtained.  The

patient understands all that we discussed, a clean, dry gauze dressing is

applied to the left foot.  The surgical residence and the tendons are

advised to write orders and maintain dry gauze dressings as opposed to any

Band-Aid style wound care products and wound gels.  We will await some word

from Dr. Conner following the angioplasty that his opinion of his success.







__________________________________________

Benjamin Goel DPM



DD:  07/27/2017 8:44:04

DT:  07/27/2017 9:39:52

Job # [id number]

## 2017-07-27 NOTE — CP.PCM.PN
Subjective





- Date & Time of Evaluation


Date of Evaluation: 07/27/17


Time of Evaluation: 10:42





- Subjective


Subjective: 





General Surgery Progress Note


Resident: Abadier


Attending: Kenyon





HPI: Pt seen and examined at bedside. Doing well this no complaints at this 

time. Denies N/V/CP/SOB/F 





Objective





- Vital Signs/Intake and Output


Vital Signs (last 24 hours): 


 











Temp Pulse Resp BP Pulse Ox


 


 98.2 F   80   18   122/72   96 


 


 07/27/17 07:30  07/27/17 07:30  07/27/17 07:30  07/27/17 07:30  07/27/17 07:30








Intake and Output: 


 











 07/27/17 07/27/17





 06:59 18:59


 


Intake Total 480 


 


Balance 480 














- Medications


Medications: 


 Current Medications





Acetaminophen (Tylenol 325mg Tab)  650 mg PO Q6H PRN


   PRN Reason: Pain, Mild (1-3)


Acetylcysteine (Acetylcysteine 20%)  3 ml PO BID [facility]


   Stop: 07/28/17 20:00


   Last Admin: 07/27/17 08:41 Dose:  3 ml


Amlodipine Besylate (Norvasc)  10 mg PO DAILY [facility]


   Last Admin: 07/26/17 11:05 Dose:  10 mg


Aspirin (Ecotrin)  81 mg PO DAILY [facility]


   Last Admin: 07/26/17 11:04 Dose:  81 mg


Atorvastatin Calcium (Lipitor)  40 mg PO DAILY [facility]


   Last Admin: 07/26/17 11:04 Dose:  40 mg


Docusate Sodium (Colace)  100 mg PO DAILY [facility]


   Last Admin: 07/26/17 11:04 Dose:  100 mg


Doxycycline Hyclate (Doryx)  100 mg PO Q12 [facility]


   PRN Reason: Protocol


   Stop: 08/02/17 22:01


   Last Admin: 07/26/17 21:42 Dose:  100 mg


Escitalopram Oxalate (Lexapro)  5 mg PO DAILY [facility]


   Last Admin: 07/26/17 11:03 Dose:  5 mg


Home Med (Home Med)  2 unit PO BID [facility]


   Last Admin: 07/26/17 17:31 Dose:  2 unit


Meropenem 1g/NS 100mL IVPB (Meropenem 1g/Ns 100ml Ivpb)  1 gm in 100 mls @ 100 

mls/hr IVPB Q8 [facility]


   PRN Reason: Protocol


   Stop: 11/01/17 13:39


   Last Admin: 07/27/17 08:40 Dose:  100 mls/hr


Sodium Chloride (Sodium Chloride 0.9%)  1,000 mls @ 80 mls/hr IV .[id number] [facility]


   Last Admin: 07/26/17 23:44 Dose:  80 mls/hr


Insulin Detemir (Levemir)  20 unit SC [facility]


   Last Admin: 07/26/17 21:43 Dose:  20 unit


Insulin Human Lispro (Humalog High)  0 units SC ACHS [facility]


   PRN Reason: Protocol


   Last Admin: 07/27/17 08:55 Dose:  7 units


Metoprolol Tartrate (Lopressor)  50 mg PO BID [facility]


   Last Admin: 07/26/17 17:32 Dose:  50 mg


Morphine Sulfate (Morphine)  4 mg IVP Q4H PRN


   PRN Reason: Pain, severe (8-10)


   Last Admin: 07/27/17 08:55 Dose:  4 mg


Ondansetron HCl (Zofran Inj)  4 mg IVP Q4H PRN


   PRN Reason: Nausea/Vomiting


   Last Admin: 07/27/17 07:10 Dose:  4 mg


Oxycodone/Acetaminophen (Percocet 5/325 Mg Tab)  1 tab PO Q4 PRN


   PRN Reason: Pain, moderate (4-7)


   Stop: 07/27/17 22:00


   Last Admin: 07/26/17 18:33 Dose:  1 tab


Polyethylene Glycol (Miralax)  17 gm PO DAILY [facility]


Prednisone (Prednisone Tab)  5 mg PO DAILY [facility]


   Last Admin: 07/26/17 11:04 Dose:  5 mg


Tacrolimus (Prograf Cap)  1.5 mg PO [facility]


   Last Admin: 07/26/17 21:44 Dose:  1.5 mg


Tacrolimus (Prograf Cap)  2 mg PO DAILY [facility]


   Last Admin: 07/26/17 11:03 Dose:  2 mg


Trimethoprim/Sulfamethoxazole (Bactrim Ss Tab)  1 tab PO DAILY [facility]


   PRN Reason: Protocol


   Last Admin: 07/26/17 11:04 Dose:  1 tab











- Labs


Labs: 


 





 07/27/17 07:30 





 07/27/17 07:30 





 











PT  11.0 Seconds (9.9-11.8)   07/24/17  13:05    


 


INR  1.02  (0.93-1.08)   07/24/17  13:05    


 


APTT  30.6 Seconds (23.7-30.8)   07/24/17  13:05    














- Constitutional


Appears: Well, No Acute Distress





- Head Exam


Head Exam: NORMAL INSPECTION





- Eye Exam


Eye Exam: EOMI





- ENT Exam


ENT Exam: Mucous Membranes Moist





- Respiratory Exam


Respiratory Exam: Clear to Ausculation Bilateral





- Cardiovascular Exam


Cardiovascular Exam: REGULAR RHYTHM





- GI/Abdominal Exam


GI & Abdominal Exam: Soft.  absent: Distended, Tenderness





- Extremities Exam


Additional comments: 





well demarcated area of dry gangrene over L. Little toe





Assessment and Plan





- Assessment and Plan (Free Text)


Assessment: 





58 y/o WM w/ dry gangrene of L. Little toe


* NPO


* Angioplasty per IR today





Michael Abadier DO


PGY-1

## 2017-07-27 NOTE — PN
DATE:  07/27/2017



SUBJECTIVE:  The patient is seen lying in bed.  He is awake.  He is alert. 

He is comfortable.



PHYSICAL EXAMINATION

GENERAL:  Middle-aged male, lying in bed.

VITAL SIGNS:  Blood pressure 132/72, heart rate 80, respiratory rate 18,

temperature 98.2.

HEENT:  Normocephalic, atraumatic, positive pallor.

NECK:  No JVD.

LUNGS:  Bilaterally equal air entry, no rales.

CARDIAC:  S1 and S2, regular rate and rhythm. No murmur, no rub.

ABDOMEN:  Obese, distended, soft, nontender, bowel sounds present.

EXTREMITIES:  No lower extremity edema.

INTAKE AND OUTPUT:  Not charted.



LABORATORY DATA:  WBC 9.8, hemoglobin 13.3, hematocrit 39, platelets 206. 

Sodium 134, potassium 5.2, chloride 98, CO2 of 25, BUN 17, creatinine 0.9,

glucose 263, calcium 8.7.  Urine culture:  No growth.  Blood culture:  No

growth.



CURRENT MEDICATIONS:  Mucomyst, Bactrim, Colace, doxycycline 100 mg q. 12,

Ecotrin 81 mg, insulin, Lexapro, Lipitor, Lopressor, ertapenem 1 g q. 8,

MiraLax, morphine, amlodipine 10 mg, prednisone 5, Prograf, normal saline

at 80, Tylenol, Zofran.



ASSESSMENT:

1.  Dry gangrene of left middle toe.

2.  Kidney transplant.

3.  Hypertension.

4.  Non-insulin-dependent diabetes mellitus.

5.  Chronic kidney disease stage 3T.

6.  Peripheral vascular disease, nonhealing ulcer.



PLAN:

1.  Normal saline at 1 mL/kg for renal protection.

2.  Angiogram today.

3.  Continue triple immunosuppression.

4.  Dose all antibiotics for creatinine clearance about 50 mL/minute.

5.  Avoid nephrotoxins.







__________________________________________

Keyona Mathews MD





DD:  07/27/2017 10:38:02

DT:  07/27/2017 13:04:44

Job # [id number]

## 2017-07-27 NOTE — PN
DATE:  07/26/2017



SUBJECTIVE:  The patient in bed in no acute distress, nontoxic.  No fevers.

No chills.



PHYSICAL EXAMINATION:

VITAL SIGNS:  Temperature is 98, blood pressure 120/70, respiratory rate of

18.

HEENT:  Unremarkable.

NECK:  Supple.

CARDIOPULMONARY:  Heart; normal S1, S2.

LUNGS:  Decreased breath sounds.

ABDOMEN:  Soft and nontender.



LABORATORY EXAMINATION:  Reveals white count of 9.4, hemoglobin of 14,

platelets of 220.  Chemistry reveals BUN of 20, creatinine of 1.0. 

Urinalysis is noted.  Microbiology reveals blood cultures, no growth.



ASSESSMENT AND PLAN: This is a 57-year-old male with chronic obstructive

lung disease, diabetes, congestive heart failure, peripheral vascular

disease, coronary artery disease, myocardial infarction, depression,

hypertension, hyperlipidemia, status post amputation of left fifth digit

and post procedure day #9 with a left foot cellulitis in a patient with

renal transplant, on immunosuppressive medications, automatic implantable

cardioverter-defibrillator, on meropenem, doxycycline, dose of vancomycin

was given with blood and urine cultures negative.  Review of the orders

reveal the patient to be on p.o. doxycycline and meropenem, and podiatry

note is reviewed.  Dr. Borden's note is reviewed.  We will follow

closely with you.





__________________________________________

Duc Bee MD





DD:  07/26/2017 20:28:25

DT:  07/26/2017 21:47:09

Job # [id number]

## 2017-07-28 VITALS
TEMPERATURE: 98.5 F | DIASTOLIC BLOOD PRESSURE: 73 MMHG | RESPIRATION RATE: 22 BRPM | HEART RATE: 82 BPM | OXYGEN SATURATION: 97 % | SYSTOLIC BLOOD PRESSURE: 140 MMHG

## 2017-07-28 LAB
ALBUMIN SERPL-MCNC: 3.5 G/DL (ref 3–4.8)
ALBUMIN/GLOB SERPL: 1.1 {RATIO} (ref 1.1–1.8)
ALT SERPL-CCNC: 32 U/L (ref 7–56)
ARTERIAL BLOOD GAS HEMOGLOBIN: 13.7 G/DL (ref 11.7–17.4)
ARTERIAL BLOOD GAS O2 SAT: 94.2 % (ref 95–98)
ARTERIAL BLOOD GAS PCO2: 35 MM/HG (ref 35–45)
ARTERIAL BLOOD GAS TCO2: 23.8 MMOL.L (ref 22–28)
AST SERPL-CCNC: 19 U/L (ref 15–59)
BUN SERPL-MCNC: 15 MG/DL (ref 7–21)
CALCIUM SERPL-MCNC: 8.9 MG/DL (ref 8.4–10.5)
ERYTHROCYTE [DISTWIDTH] IN BLOOD BY AUTOMATED COUNT: 13.6 % (ref 11.5–14.5)
GFR NON-AFRICAN AMERICAN: > 60
HCO3 BLDA-SCNC: 22.7 MMOL/L (ref 21–28)
HGB BLD-MCNC: 14 GM/DL (ref 14–18)
INHALED O2 CONCENTRATION: 40 %
MCH RBC QN AUTO: 32.9 PG (ref 25–35)
MCHC RBC AUTO-ENTMCNC: 35 G/DL (ref 31–37)
MCV RBC AUTO: 93.9 FL (ref 80–105)
O2 CAP BLDA-SCNC: 18.8 ML/DL (ref 16–24)
O2 CT BLDA-SCNC: 17.7 ML/DL (ref 15–23)
PH BLDA: 7.42 [PH] (ref 7.35–7.45)
PLATELET # BLD: 229 10^3/UL (ref 120–450)
PMV BLD AUTO: 9.1 FL (ref 7–11)
PO2 BLDA: 62 MM/HG (ref 80–100)
RBC # BLD AUTO: 4.26 10^6/UL (ref 3.5–6.1)
WBC # BLD AUTO: 11.3 10^3/UL (ref 4.5–11)

## 2017-07-28 RX ADMIN — INSULIN LISPRO SCH: 100 INJECTION, SOLUTION INTRAVENOUS; SUBCUTANEOUS at 09:43

## 2017-07-28 RX ADMIN — MEROPENEM AND SODIUM CHLORIDE SCH MLS/HR: 1 INJECTION, SOLUTION INTRAVENOUS at 06:39

## 2017-07-28 RX ADMIN — INSULIN DETEMIR SCH UNIT: 100 INJECTION, SOLUTION SUBCUTANEOUS at 01:17

## 2017-07-28 RX ADMIN — POLYETHYLENE GLYCOL 3350 SCH: 17 POWDER, FOR SOLUTION ORAL at 09:44

## 2017-07-28 RX ADMIN — SULFAMETHOXAZOLE AND TRIMETHOPRIM SCH: 400; 80 TABLET ORAL at 09:43

## 2017-07-28 NOTE — CP.PCM.PN
Subjective





- Date & Time of Evaluation


Date of Evaluation: 07/28/17


Time of Evaluation: 09:16





- Subjective


Subjective: 





General Surgery Progress Note


Resident: Abadier


Attending: Kenyon





HPI: Patient seen and examined at bedside. Doing well with no complaints at 

this time. Denies N/V/CP/SOB/F








Objective





- Vital Signs/Intake and Output


Vital Signs (last 24 hours): 


 











Temp Pulse Resp BP Pulse Ox


 


 98.5 F   82   22   140/73   97 


 


 07/28/17 07:30  07/28/17 07:30  07/28/17 07:30  07/28/17 07:30  07/28/17 07:30








Intake and Output: 


 











 07/28/17 07/28/17





 06:59 18:59


 


Intake Total 240 


 


Balance 240 














- Medications


Medications: 


 Current Medications





Acetaminophen (Tylenol 325mg Tab)  650 mg PO Q6H PRN


   PRN Reason: Pain, Mild (1-3)


Amlodipine Besylate (Norvasc)  10 mg PO DAILY [facility] the [facility]


   Last Admin: 07/27/17 16:14 Dose:  10 mg


Aspirin (Ecotrin)  81 mg PO DAILY [facility] includes the [facility]


   Last Admin: 07/27/17 15:55 Dose:  Not Given


Atorvastatin Calcium (Lipitor)  40 mg PO DAILY Count includes the [facility]


   Last Admin: 07/27/17 16:14 Dose:  40 mg


Docusate Sodium (Colace)  100 mg PO DAILY [facility] includes the [facility]


   Last Admin: 07/27/17 16:13 Dose:  100 mg


Doxycycline Hyclate (Doryx)  100 mg PO Q12 [facility] includes the [facility]


   PRN Reason: Protocol


   Stop: 08/02/17 22:01


   Last Admin: 07/27/17 22:00 Dose:  Not Given


Escitalopram Oxalate (Lexapro)  5 mg PO DAILY [facility] includes the [facility]


   Last Admin: 07/27/17 16:13 Dose:  5 mg


Home Med (Home Med)  2 unit PO BID [facility] includes the [facility]


   Last Admin: 07/27/17 10:00 Dose:  Not Given


Meropenem 1g/NS 100mL IVPB (Meropenem 1g/Ns 100ml Ivpb)  1 gm in 100 mls @ 100 

mls/hr IVPB Q8 Count includes the [facility]


   PRN Reason: Protocol


   Stop: 11/01/17 13:39


   Last Admin: 07/28/17 06:39 Dose:  100 mls/hr


Insulin Detemir (Levemir)  20 unit SC [facility]


   Last Admin: 07/28/17 01:17 Dose:  20 unit


Insulin Human Lispro (Humalog High)  0 units SC ACHS Count includes the [facility]


   PRN Reason: Protocol


   Last Admin: 07/27/17 22:00 Dose:  Not Given


Metoprolol Tartrate (Lopressor)  50 mg PO BID Count includes the [facility]


   Last Admin: 07/27/17 18:28 Dose:  50 mg


Morphine Sulfate (Morphine)  4 mg IVP Q4H PRN


   PRN Reason: Pain, severe (8-10)


   Last Admin: 07/27/17 08:55 Dose:  4 mg


Ondansetron HCl (Zofran Inj)  4 mg IVP Q4H PRN


   PRN Reason: Nausea/Vomiting


   Last Admin: 07/28/17 06:23 Dose:  4 mg


Polyethylene Glycol (Miralax)  17 gm PO DAILY Count includes the [facility]


   Last Admin: 07/27/17 10:00 Dose:  Not Given


Prednisone (Prednisone Tab)  5 mg PO DAILY [facility] includes the [facility]


   Last Admin: 07/27/17 16:15 Dose:  5 mg


Tacrolimus (Prograf Cap)  1.5 mg PO HS Count includes the [facility]


   Last Admin: 07/27/17 22:00 Dose:  Not Given


Tacrolimus (Prograf Cap)  2 mg PO DAILY Count includes the [facility]


   Last Admin: 07/27/17 16:15 Dose:  2 mg


Trimethoprim/Sulfamethoxazole (Bactrim Ss Tab)  1 tab PO DAILY Count includes the [facility]


   PRN Reason: Protocol


   Last Admin: 07/26/17 11:04 Dose:  1 tab











- Labs


Labs: 


 





 07/28/17 06:45 





 07/28/17 06:45 





 











PT  11.0 Seconds (9.9-11.8)   07/24/17  13:05    


 


INR  1.02  (0.93-1.08)   07/24/17  13:05    


 


APTT  30.6 Seconds (23.7-30.8)   07/24/17  13:05    














- Constitutional


Appears: Well





- Eye Exam


Eye Exam: EOMI





- ENT Exam


ENT Exam: Mucous Membranes Moist





- Respiratory Exam


Respiratory Exam: Clear to Ausculation Bilateral





- Cardiovascular Exam


Cardiovascular Exam: REGULAR RHYTHM





- GI/Abdominal Exam


GI & Abdominal Exam: Soft.  absent: Distended, Tenderness





- Neurological Exam


Neurological Exam: Alert, Awake





Assessment and Plan





- Assessment and Plan (Free Text)


Assessment: 





56 y/o WM with dry gangrene


* IR angio yesterday


* cont. podiatry recs


* No surgical intervention needed at this time





Michael Abadier DO


PGY-1

## 2017-07-28 NOTE — PN
DATE:  07/27/2017



SUBJECTIVE:  The patient has several complaints chest pain, shortness of

breath, headache and dizziness.



PHYSICAL EXAMINATION

GENERAL:  The patient is lying in bed, flat, comfortable, in no acute

distress.

VITAL SIGNS:  Temperature is 98, pulse of 86, blood pressure 146/82,

respirations 18.

HEENT:  Anicteric sclerae.  Moist mucosa.  No oral lesions.

NECK:  No JVD, adenopathy, or thyromegaly or bruits.

CARDIOVASCULAR:  S1 and S2 is regular.  No murmurs, rubs, or gallops.

ABDOMEN:  Bowels sounds are positive.  Soft, nontender, and nondistended. 

No hepatosplenomegaly.

EXTREMITIES:  Lower extremities, no cyanosis, clubbing, or edema.

NEUROLOGIC:  Alert, awake and oriented x3.



LABORATORY DATA:  Potassium is 5.25 and _____.  Hemoglobin is 13.3.



ASSESSMENT:

1.  Peripheral arterial disease.

2.  Diabetes type 2.

3.  Renal transplant.

4.  Dyslipidemia.

5.  Anxiety.

6.  Hypertension.

7.  Pacemaker.

8.  Left foot ulcer.



PLAN:  The patient is currently comfortable.  He is on doxycycline for

antibiotics, Bactrim for prophylaxis.  The patient is on Lexapro.  He is

going to continue his Lipitor for dyslipidemia.  The patient is on

meropenem for antibiotics.  He is on insulin for his diabetes.  He is on

morphine for pain.  He is on his prednisone, Prograf.  For his transplant,

he is being followed by renal.  I did speak to Dr. Benjamin Conner regarding

the case.  He has a non-healing ulcer secondary to peripheral arterial

disease.





__________________________________________

Andrea Calderon MD



DD:  07/27/2017 22:58:29

DT:  07/28/2017 0:09:15

Job # [id number]

## 2017-07-28 NOTE — CP.PCM.PN
Subjective





- Date & Time of Evaluation


Date of Evaluation: 07/28/17


Time of Evaluation: 00:30





- Subjective


Subjective: 





Patient evaluated as was sob and needing O2. 


H/o long term tobacco abuse, vascular procedure yesterday, cabg, s/p pacemaker, 

? recent ef. Patient c/o sob, requested neb as he uses it infrequently at home, 

immunocompromised due to renal transplant on meds. Upon titration of O2 the 

needed about 4lit via NC.





Chest no wheezing on exam post neb


[facility] regular


PA soft


Ext no edema, dressing left foot


CNS patient alert oriented. 


 


ABG confirmed po2 62 on 4lit


CXR done showed increased interstitial markings





Assessment


* SOB hypoxia suspected from interstitial congestion early chf, clinically not 

infection, probably related perioperative ivf


Plan


* Hold ivf


* Lasix 20mg iv single dose


* Titrate o2 to slowly lower down


* Primary team will be notified by nursing


* CHF w/u according to the primary team.











Objective





- Vital Signs/Intake and Output


Vital Signs (last 24 hours): 


 











Temp Pulse Resp BP Pulse Ox


 


 98 F   86   18   136/79   98 


 


 07/27/17 15:50  07/27/17 18:50  07/27/17 18:50  07/28/17 02:55  07/27/17 13:30








Intake and Output: 


 











 07/27/17 07/28/17





 18:59 06:59


 


Intake Total  240


 


Balance  240














- Medications


Medications: 


 Current Medications





Acetaminophen (Tylenol 325mg Tab)  650 mg PO Q6H PRN


   PRN Reason: Pain, Mild (1-3)


Amlodipine Besylate (Norvasc)  10 mg PO DAILY [facility]


   Last Admin: 07/27/17 16:14 Dose:  10 mg


Aspirin (Ecotrin)  81 mg PO DAILY [facility]


   Last Admin: 07/27/17 15:55 Dose:  Not Given


Atorvastatin Calcium (Lipitor)  40 mg PO DAILY [facility]


   Last Admin: 07/27/17 16:14 Dose:  40 mg


Docusate Sodium (Colace)  100 mg PO DAILY [facility]


   Last Admin: 07/27/17 16:13 Dose:  100 mg


Doxycycline Hyclate (Doryx)  100 mg PO Q12 [facility]


   PRN Reason: Protocol


   Stop: 08/02/17 22:01


   Last Admin: 07/27/17 22:00 Dose:  Not Given


Escitalopram Oxalate (Lexapro)  5 mg PO DAILY [facility]


   Last Admin: 07/27/17 16:13 Dose:  5 mg


Home Med (Home Med)  2 unit PO BID [facility]


   Last Admin: 07/27/17 10:00 Dose:  Not Given


Meropenem 1g/NS 100mL IVPB (Meropenem 1g/Ns 100ml Ivpb)  1 gm in 100 mls @ 100 

mls/hr IVPB Q8 [facility]


   PRN Reason: Protocol


   Stop: 11/01/17 13:39


   Last Admin: 07/27/17 22:00 Dose:  Not Given


Insulin Detemir (Levemir)  20 unit SC HS [facility]


   Last Admin: 07/28/17 01:17 Dose:  20 unit


Insulin Human Lispro (Humalog High)  0 units SC ACHS [facility]


   PRN Reason: Protocol


   Last Admin: 07/27/17 22:00 Dose:  Not Given


Metoprolol Tartrate (Lopressor)  50 mg PO BID [facility]


   Last Admin: 07/27/17 18:28 Dose:  50 mg


Morphine Sulfate (Morphine)  4 mg IVP Q4H PRN


   PRN Reason: Pain, severe (8-10)


   Last Admin: 07/27/17 08:55 Dose:  4 mg


Ondansetron HCl (Zofran Inj)  4 mg IVP Q4H PRN


   PRN Reason: Nausea/Vomiting


   Last Admin: 07/28/17 06:23 Dose:  4 mg


Polyethylene Glycol (Miralax)  17 gm PO DAILY [facility]


   Last Admin: 07/27/17 10:00 Dose:  Not Given


Prednisone (Prednisone Tab)  5 mg PO DAILY [facility]


   Last Admin: 07/27/17 16:15 Dose:  5 mg


Tacrolimus (Prograf Cap)  1.5 mg PO HS [facility]


   Last Admin: 07/27/17 22:00 Dose:  Not Given


Tacrolimus (Prograf Cap)  2 mg PO DAILY [facility]


   Last Admin: 07/27/17 16:15 Dose:  2 mg


Trimethoprim/Sulfamethoxazole (Bactrim Ss Tab)  1 tab PO DAILY [facility]


   PRN Reason: Protocol


   Last Admin: 07/26/17 11:04 Dose:  1 tab











- Labs


Labs: 


 





 07/27/17 07:30 





 07/27/17 07:30 





 











PT  11.0 Seconds (9.9-11.8)   07/24/17  13:05    


 


INR  1.02  (0.93-1.08)   07/24/17  13:05    


 


APTT  30.6 Seconds (23.7-30.8)   07/24/17  13:05

## 2017-07-28 NOTE — PN
DATE:  07/28/2017



SUBJECTIVE:  The patient is seen lying in bed.  He is awake, he is alert. 

He reports he was short of breath earlier, but he feels great now.



PHYSICAL EXAMINATION

GENERAL:  Middle-aged male, lying in bed.

VITAL SIGNS:  Blood pressure 140/73, heart rate 82, respiratory rate 22,

temperature 98.5.

HEENT:  Normocephalic, atraumatic.

NECK:  Supple, no JVD.

LUNGS:  Bilateral equal air entry, no rales.

CARDIAC:  S1 and S2, regular rate and rhythm, no murmurs, no rubs.

ABDOMEN:  Obese, distended, soft, nontender, bowel sounds present.

EXTREMITIES:  No new extremity edema.

INTAKE AND OUTPUT:  Not charted.



LABORATORY DATA:  WBC 11, hemoglobin 14, hematocrit 40, platelets 229. 

Sodium 133, potassium 4.5, chloride 95, CO2 of 26, BUN of 15, creatinine

0.9, glucose 290, calcium 8.9, AST 19, ALT 32, albumin 3.5.



CURRENT MEDICATIONS:  Augmentin 875 b.i.d.; Bactrim on Monday, Wednesday,

and Friday; Colace 100; doxycycline 100 q. 12; Ecotrin; Lasix; Levemir;

Lexapro; Lipitor; Lopressor; meropenem; MiraLax; morphine; Prograf 1.5 mg

in the a.m., 2 mg in p.m.; mycophenolate 360 b.i.d.; prednisone 5 mg daily.



ASSESSMENT:

1.  Living related transplant.

2.  Non-insulin-dependent diabetes mellitus.

3.  Hypertension.

4.  Chronic kidney disease, stage 3T.

5.  Peripheral vascular disease.

6.  Status post angiogram and angioplasty of multiple vessels in the left

lower extremity.





PLAN:

1.  Monitor urine output closely.

2.  Monitor creatinine.

3.  Continue triple antirejection meds.

4.  Continue antihypertensive.

5.  Continue antibiotics, status post infectious disease recommendations.

6.  Dose for antibiotics creatinine clearance 50.







__________________________________________

Keyona Mathews MD





DD:  07/28/2017 15:05:29

DT:  07/28/2017 15:28:29

Job # [id number]

## 2017-07-28 NOTE — PN
DATE:  07/28/2017.



SUBJECTIVE:  Patient is in room 570, bed 2.  Patient was seen earlier this

morning, doing well.  He is complaining of occasional nausea, no vomiting,

no chest pain.



PHYSICAL EXAMINATION

VITAL SIGNS:  Temperature is 98, blood pressure is 140/70, respiratory rate

of 16.

HEENT:  Unremarkable.

NECK:  Supple.

LUNGS:  Decreased breath sounds.

HEART:  Normal S1, S2.

ABDOMEN:  Soft.



LABORATORY DATA:  Reveals a white count of 11,300, hemoglobin of 14, and

chemistries are noted: BUN of 15, creatinine of 0.5.  Urinalysis is noted. 

Urine culture is negative.  Blood cultures are negative.  Cultures with

enterococcus corynebacterium are from the fifth toe cultures from

07/17/2017 and corynebacterium _____ and sensitive to ampicillin.  Dr. Calderon's note is reviewed.



ASSESSMENT AND PLAN:  A 57-year-old male, seen earlier this morning,

history of chronic obstructive lung disease, diabetes mellitus, peripheral

vascular disease, coronary artery disease, myocardial infarction,

depression, hypertension, hyperlipidemia, status post amputation of the

left fifth digit post procedure day #11, left foot cellulitis in a patient

with renal transplant, anemia, on suppressive medication and automatic

implantable cardioverter-defibrillator.  Nausea maybe from the doxycycline.

Patient is also on morphine, which can give the nausea.  We will

discontinue the doxycycline.  We will discontinue the meropenem.  Patient's

pathology report from the bone from the OR shows no osteomyelitis.  We will

complete with 5 days of p.o. Augmentin.  Case discussed with Dr. Calderon.





__________________________________________

Duc Bee MD





DD:  07/28/2017 10:00:26

DT:  07/28/2017 11:26:33

Job # [id number]

## 2017-07-28 NOTE — RAD
HISTORY:

hypoxia  



COMPARISON:

No prior. 



FINDINGS:



LUNGS:

Mild diffuse bilateral infiltrates have progressed when compared with 

the prior exam.  Findings may represent pulmonary edema/ CHF for 

clinical correlation recommended.



PLEURA:

No significant pleural effusion identified, no pneumothorax apparent.



CARDIOVASCULAR:

Sternotomy wires, bipolar pacemaker unchanged.  Heart appears mildly 

enlarged.



OSSEOUS STRUCTURES:

No significant abnormalities.



VISUALIZED UPPER ABDOMEN:

Normal.



OTHER FINDINGS:

None.



IMPRESSION:

Mild diffuse bilateral infiltrates have progressed when compared with 

the prior exam.  Findings may represent pulmonary edema/ CHF for 

clinical correlation recommended.

## 2017-07-30 NOTE — DS
HISTORY OF PRESENT ILLNESS:  This is a 57-year-old male who had come in to

the hospital with a left foot ulcer.  The patient had surgery.  He most

likely had peripheral arterial disease.  He had an angiogram done that

showed areas of stenosis in the left leg.  The patient was evaluated by Dr. Benjamin Conner, Dr. Goel, and Dr. Bee.  The patient has successful

left anterior vertebral artery atherectomy and balloon angioplasty.  There

was distal SFA drug eluting balloon angioplasty that was done.  He had

severe left tibial total occlusive disease.  He did well after the

procedure with no significant complications and discharged home to follow

up.  No complaints of any chest pain, any shortness of breath.  No

headaches.  No dizziness.



PHYSICAL EXAMINATION:

VITAL SIGNS:  Temperature is 98, pulse 79, blood pressure 156/75,

respirations 18.

GENERAL:  The patient is comfortable, in no acute distress.

HEENT:  Anicteric sclerae.  Moist mucosa.

NECK:  No JVD or adenopathy.

CARDIAC:  S1, S2.  No murmurs.  No rubs.  Regular.

RESPIRATORY:  Clear to auscultation bilaterally.  No wheezes, rales, or

rhonchi.  Good air entry.

ABDOMEN:  Bowel sounds are positive, soft, nontender, and nondistended.

EXTREMITIES:  No edema.  Has 1+ pulses.



ASSESSMENT:

1.  Left foot ulcer status post angioplasty of left tibial artery.

2.  Diabetes type 2.

3.  Renal transplant.

4.  Dyslipidemia.

5.  Anxiety.

6.  Hypertension.

7.  Pacemaker.

8.  Left foot ulcer.



PLAN:  The patient is currently comfortable.  He needs to continue his

antibiotics orally.  I placed him on p.o. Augmentin with consultation with

Dr. Bee.  I did give him prescriptions for Percocet for pain.  He is

going to follow up with Dr. Goel.  He is continuing his therapy with his

_____ medications.  He is going to follow up in the office.  Condition

stable.  Activities increase as tolerated.





__________________________________________

Andrea Calderon MD



DD:  07/29/2017 6:24:24

DT:  07/30/2017 1:40:32

Job # [id number]

## 2017-08-14 ENCOUNTER — HOSPITAL ENCOUNTER (INPATIENT)
Dept: HOSPITAL 42 - ED | Age: 57
LOS: 3 days | Discharge: NURSING, REHAB OR LONG-TERM CARE | DRG: 617 | End: 2017-08-17
Attending: INTERNAL MEDICINE | Admitting: INTERNAL MEDICINE
Payer: MEDICARE

## 2017-08-14 VITALS — BODY MASS INDEX: 25.5 KG/M2

## 2017-08-14 DIAGNOSIS — B96.89: ICD-10-CM

## 2017-08-14 DIAGNOSIS — J44.9: ICD-10-CM

## 2017-08-14 DIAGNOSIS — F32.89: ICD-10-CM

## 2017-08-14 DIAGNOSIS — E11.22: ICD-10-CM

## 2017-08-14 DIAGNOSIS — N18.9: ICD-10-CM

## 2017-08-14 DIAGNOSIS — E11.69: Primary | ICD-10-CM

## 2017-08-14 DIAGNOSIS — Z89.412: ICD-10-CM

## 2017-08-14 DIAGNOSIS — Z94.0: ICD-10-CM

## 2017-08-14 DIAGNOSIS — I13.0: ICD-10-CM

## 2017-08-14 DIAGNOSIS — B95.2: ICD-10-CM

## 2017-08-14 DIAGNOSIS — I73.9: ICD-10-CM

## 2017-08-14 DIAGNOSIS — M86.172: ICD-10-CM

## 2017-08-14 DIAGNOSIS — E78.5: ICD-10-CM

## 2017-08-14 DIAGNOSIS — L97.519: ICD-10-CM

## 2017-08-14 DIAGNOSIS — Z79.82: ICD-10-CM

## 2017-08-14 DIAGNOSIS — K59.00: ICD-10-CM

## 2017-08-14 DIAGNOSIS — E11.621: ICD-10-CM

## 2017-08-14 DIAGNOSIS — Z95.810: ICD-10-CM

## 2017-08-14 DIAGNOSIS — I49.9: ICD-10-CM

## 2017-08-14 DIAGNOSIS — Z87.891: ICD-10-CM

## 2017-08-14 DIAGNOSIS — E78.00: ICD-10-CM

## 2017-08-14 DIAGNOSIS — K52.9: ICD-10-CM

## 2017-08-14 DIAGNOSIS — I50.9: ICD-10-CM

## 2017-08-14 DIAGNOSIS — L08.9: ICD-10-CM

## 2017-08-14 DIAGNOSIS — Z95.0: ICD-10-CM

## 2017-08-14 DIAGNOSIS — J98.11: ICD-10-CM

## 2017-08-14 DIAGNOSIS — L81.8: ICD-10-CM

## 2017-08-14 DIAGNOSIS — Z79.4: ICD-10-CM

## 2017-08-14 DIAGNOSIS — T81.30XA: ICD-10-CM

## 2017-08-14 DIAGNOSIS — R40.2412: ICD-10-CM

## 2017-08-14 DIAGNOSIS — I25.10: ICD-10-CM

## 2017-08-14 DIAGNOSIS — Z79.899: ICD-10-CM

## 2017-08-14 DIAGNOSIS — Z95.1: ICD-10-CM

## 2017-08-14 DIAGNOSIS — L97.529: ICD-10-CM

## 2017-08-14 DIAGNOSIS — R26.81: ICD-10-CM

## 2017-08-14 LAB
ALBUMIN SERPL-MCNC: 3.9 G/DL (ref 3–4.8)
ALBUMIN/GLOB SERPL: 1.4 {RATIO} (ref 1.1–1.8)
ALT SERPL-CCNC: 25 U/L (ref 7–56)
APTT BLD: 31.4 SECONDS (ref 23.7–30.8)
AST SERPL-CCNC: 21 U/L (ref 15–59)
BASOPHILS # BLD AUTO: 0.01 K/MM3 (ref 0–2)
BASOPHILS NFR BLD: 0.1 % (ref 0–3)
BUN SERPL-MCNC: 20 MG/DL (ref 7–21)
CALCIUM SERPL-MCNC: 10 MG/DL (ref 8.4–10.5)
EOSINOPHIL # BLD: 0.5 10*3/UL (ref 0–0.7)
EOSINOPHIL NFR BLD: 5.1 % (ref 1.5–5)
ERYTHROCYTE [DISTWIDTH] IN BLOOD BY AUTOMATED COUNT: 13.9 % (ref 11.5–14.5)
GFR NON-AFRICAN AMERICAN: > 60
GRANULOCYTES # BLD: 6.89 10*3/UL (ref 1.4–6.5)
GRANULOCYTES NFR BLD: 70.3 % (ref 50–68)
HGB BLD-MCNC: 13.4 G/DL (ref 14–18)
INR PPP: 1.08 (ref 0.93–1.08)
LYMPHOCYTES # BLD: 1.4 10*3/UL (ref 1.2–3.4)
LYMPHOCYTES NFR BLD AUTO: 13.9 % (ref 22–35)
MCH RBC QN AUTO: 32.4 PG (ref 25–35)
MCHC RBC AUTO-ENTMCNC: 34.6 G/DL (ref 31–37)
MCV RBC AUTO: 93.5 FL (ref 80–105)
MONOCYTES # BLD AUTO: 1 10*3/UL (ref 0.1–0.6)
MONOCYTES NFR BLD: 10.6 % (ref 1–6)
PLATELET # BLD: 231 10^3/UL (ref 120–450)
PMV BLD AUTO: 9.7 FL (ref 7–11)
PROTHROMBIN TIME: 11.7 SECONDS (ref 9.9–11.8)
RBC # BLD AUTO: 4.14 10^6/UL (ref 3.5–6.1)
WBC # BLD AUTO: 9.8 10^3/UL (ref 4.5–11)

## 2017-08-14 RX ADMIN — INSULIN HUMAN SCH: 100 INJECTION, SOLUTION PARENTERAL at 22:39

## 2017-08-14 RX ADMIN — MORPHINE SULFATE PRN MG: 4 INJECTION, SOLUTION INTRAMUSCULAR; INTRAVENOUS at 23:02

## 2017-08-14 RX ADMIN — OXYCODONE HYDROCHLORIDE AND ACETAMINOPHEN PRN TAB: 5; 325 TABLET ORAL at 21:30

## 2017-08-14 RX ADMIN — INSULIN DETEMIR SCH UNIT: 100 INJECTION, SOLUTION SUBCUTANEOUS at 22:35

## 2017-08-14 NOTE — ED PDOC
Arrival/HPI





- General


Chief Complaint: Lower Extremity Problem/Injury


Time Seen by Provider: 08/14/17 15:59


Historian: Patient





- History of Present Illness


Narrative History of Present Illness (Text): 





08/14/17 16:00





Ady Mcqueen is a 57 year old male, whose past medical history includes 

diabetes and a pacemaker, who was sent to the emergency department from 

podiatrist's office, Dr. Danielson, for a toe infection today. Patient was sent 

for IV antibiotics and admission.





PMD: Dr. Calderon





Time/Duration: 4-6 hours


Symptom Onset: Gradual


Symptom Course: Unchanged


Activities at Onset: Rest


Context: Home





Past Medical History





- Provider Review


Nursing Documentation Reviewed: Yes





- Infectious Disease


Hx of Infectious Diseases: None





- Tetanus Immunization


Tetanus Immunization: Unknown





- Cardiac


Hx Cardiac Disorders: Yes (MI,CAD)


Hx Hypertension: Yes


Hx Pacemaker: Yes (ST AN IMPLANT 4/2008/6-2015)


Other/Comment: CABG





- Pulmonary


Hx Respiratory Disorders: Yes


Hx Bronchitis: Yes (2 weeks ago)


Hx Chronic Obstructive Pulmonary Disease (COPD): Yes





- Neurological


Hx Neurological Disorder: No





- HEENT


Hx HEENT Disorder: No





- Renal


Hx Renal Disorder: Yes


Hx Renal Failure: Yes


Other/Comment: s/p Renal transplant





- Endocrine/Metabolic


Hx Endocrine Disorders: Yes


Hx Diabetes Mellitus Type 2: Yes





- Hematological/Oncological


Hx Blood Disorders: No





- Integumentary


Hx Dermatological Disorder: Yes


Other/Comment: LEFT BIG TOE POST AMPUTATION-GANGRENE TO AREA AMPUTATED.7-24-17





- Musculoskeletal/Rheumatological


Hx Musculoskeletal Disorders: Yes (GANGRENE L BIG TOE.POST AMPUTATION.HAS 

GANGRENE TO THAT AREA.7-27-`17)


Hx Falls: Yes


Hx Unsteady Gait: Yes





- Gastrointestinal


Hx Gastrointestinal Disorders: Yes (INCARCERATED INCISIONAL HERNIA,

GASTROENTERITIS, CONSTIPATION)


Other/Comment: h/o c diff





- Genitourinary/Gynecological


Hx Genitourinary Disorders: No





- Psychiatric


Hx Psychophysiologic Disorder: Yes (H/O SMOKING CIGARETTES 1/2 PPD,ETOH USE 

SOCAILLY)


Hx Depression: Yes


Hx Emotional Abuse: No


Hx Physical Abuse: No


Hx Substance Use: No





- Surgical History


Other/Comment: CABG.  L 5th toe amputation.  Kidney transplant





- Anesthesia


Hx Anesthesia: Yes


Hx Anesthesia Reactions: No


Hx Malignant Hyperthermia: No





- Suicidal Assessment


Feels Threatened In Home Enviroment: No





Family/Social History





- Physician Review


Nursing Documentation Reviewed: Yes


Family/Social History: No Known Family HX


Smoking Status: Former Smoker


Hx Alcohol Use: Yes (SOCIALLY)


Hx Substance Use: No


Hx Substance Use Treatment: No





Allergies/Home Meds


Allergies/Adverse Reactions: 


Allergies





No Known Allergies Allergy (Verified 07/24/17 20:12)


 








Home Medications: 


 Home Meds











 Medication  Instructions  Recorded  Confirmed


 


Atorvastatin Calcium [Lipitor] 40 mg PO DAILY 10/30/14 08/14/17


 


Escitalopram [Lexapro] 5 mg PO DAILY 10/30/14 08/14/17


 


Famotidine [Pepcid] 20 mg PO DAILY 10/30/14 08/14/17


 


Insulin Aspart, Recombinant 25 - 30 unit SC AC PRN 10/30/14 08/14/17





[Novolog]   


 


Insulin Detemir [Levemir] 40 - 50 unit SC HS 10/30/14 08/14/17


 


Mycophenolate Sodium [Myfortic] 360 mg PO BID 10/30/14 08/14/17


 


Sulfamethoxazole/Trimethoprim 1 tab PO DAILY 10/30/14 08/14/17





[Bactrim 400-80 mg Tablet]   


 


Tacrolimus [Prograf] 1.5 mg PO HS 10/30/14 08/14/17


 


amLODIPine [Norvasc] 10 mg PO DAILY 10/30/14 08/14/17


 


Aspirin [Ecotrin] 81 mg PO DAILY 06/01/16 08/14/17


 


Docusate [Colace] 100 mg PO DAILY 06/01/16 08/14/17


 


Metoprolol Tartrate 50 mg PO BID 06/01/16 08/14/17


 


Tacrolimus [Prograf] 2 mg PO DAILY 06/01/16 08/14/17


 


oxyCODONE/Acetaminophen [Percocet 1 tab PO Q4 PRN 07/17/17 08/14/17





5/325 mg Tab]   














Physical Exam





- Physical Exam


Narrative Physical Exam (Text): 


- Review of Systems





Constitutional: Normal.  absent: Fatigue, Weight Change, Fevers


Eyes: Normal


ENT: Normal


Respiratory: Normal  absent: SOB, Cough, Sputum


Cardiovascular: Normal absent: Chest pain, Palpitations, Syncope


Gastrointestinal: Normal absent: Abdominal pain, Diarrhea, Nausea, Vomiting


Genitourinary: Normal.  absent: Dysuria, Frequency, Hematuria


Musculoskeletal: Normal.  absent: Arthralgias, Back Pain, Neck Pain


Skin: Toe infection.


Neurological: Normal absent: Focal Weakness


Endocrine: Normal


Hemo/Lymphatic: Normal


Psychiatric: Normal





- Physical exam





Patient appears age appropriate, speaking full sentences without difficulty. 





- Systems Exam





Head: Present: Atraumatic, Normocephalic


Pupils: Present: PERRL


Extraocular Muscles: Present: EOMI


Conjunctiva: Present: Normal


Mouth: Present: Moist Mucous Membranes


Neck: Present: Normal Range of Motion.  No: MIDLINE TENDERNESS, Paraspinal 

Tenderness


Respiratory/Chest: Present: Clear to Auscultation, Good Air Exchange.  No: 

Respiratory Distress, Accessory Muscle Use, Tachypnic


Cardiovascular: Present: Regular Rate and Rhythm, Normal S1, S2, Peripheral 

Pulses Present.  No: Murmurs


Abdomen: Present: Normal Bowel Sounds,  No: Tenderness, Peritoneal Signs, 

Rebound, Guarding, Distention


Back: Present: Normal Inspection.  No: Midline Tenderness, Paraspinal Tenderness


Upper Extremity: Present: Normal Inspection.  No: Cyanosis, Edema


Lower Extremity: Clean dressing in place to left foot. Patient refuses to have 

it unwrapped. 


Neurological: Present: GCS=15, Speech Normal, cranial nerves II through XII 

fully intact with no cerebellar abnormality, neuro-sensory fully intact. No 

focal neurological deficits.


Skin: Present: Warm, Dry, Normal Color.  No: Rashes


Lymphatic: Present: OX3, NI, NC


Psychiatric: Present: Alert, Oriented x 3, Normal Insight, Normal Concentration








Vital Signs Reviewed: Yes


Vital Signs











  Temp Pulse Resp BP Pulse Ox


 


 08/14/17 17:24   72  17  140/80  98


 


 08/14/17 15:20  97.9 F  74  18  145/82  97











Temperature: Afebrile


Blood Pressure: Normal


Pulse: Regular


Respiratory Rate: Normal


Appearance: Positive for: Well-Appearing, Non-Toxic, Comfortable


Pain Distress: None


Mental Status: Positive for: Alert and Oriented X 3





Medical Decision Making


ED Course and Treatment: 





08/14/17 16:10


Impression:





57 year old sent in from podiatrist's office for a toe infection. 





Plan:


-- Chest X-ray


-- Right foot x-ray


-- Blood Culture


-- Labs


-- Vibramycin, Mepropenem, and Morphine 


-- Reassess and disposition





Prior Visits:


Notes and results from previous visits were reviewed. Patient was last in 

emergency department on 07/24/17 for one week duration of a left foot infection 

after left 5th toe amputation. Patient was admitted to hospitalist care for 

further evaluation.





Progress Notes:





Case discussed with Dr. Calderon, who states to place patient on IV antibiotics 

per Dr. Bee's previous treatment. Previous records reviewed as per Dr. Bee on 7/27/17,  Patient was on meropenem  and doxycycline.





08/14/17 18:12


Chest X-ray:


Creator : Brittney Gonzalez MD


FINDINGS:


LUNGS:


Interval improvement in previously demonstrated bilateral infiltrates.


Please note that chest x-ray has limited sensitivity for the detection of 

pulmonary masses.


PLEURA:No significant pleural effusion identified. No definite pneumothorax .


CARDIOVASCULAR:Dual lead left-sided pacemaker.  Median sternotomy wires with 

evidence of CABG.  Mild cardiomegaly.


OSSEOUS STRUCTURES: Degenerative changes. Osseous demineralization.


VISUALIZED UPPER ABDOMEN:Mild elevation of the right hemidiaphragm.


OTHER FINDINGS:None.


IMPRESSION:


Interval improvement in previously demonstrated bilateral infiltrates. 


Cardiomegaly.  Left-sided pacemaker.





08/14/17 18:13


Left Foot Radiographs:


Dictator : Eriberto Whitaker MD


FINDINGS:


BONES:


Patient is status post previous amputation of the 5th digit phalanges. Compared 

to the prior exam, the distal half of the 5th metatarsal is absent with 

irregular cortical border of the distal 5th midshaft with decorticated bone, 

suggestive of osteomyelitis. There appears to be an overlying skin defect in 

this region. Bones are demineralized. 


JOINTS:No dislocation seen. Additional details as above. 


SOFT TISSUES:Vascular calcifications noted. 


OTHER FINDINGS:None.


IMPRESSION:


Absent distal half of the 5th metatarsal with the distal cortex appearing 

irregular suggestive of osteomyelitis. Probable overlying soft tissue skin 

defect in this region.








- Lab Interpretations


I have reviewed the lab results: Yes





- Medication Orders


Current Medication Orders: 








Doxycycline Hyclate 100 mg/ (Sodium Chloride)  100 mls @ 100 mls/hr IVPB Q12 MARTÍN


   PRN Reason: Protocol





Discontinued Medications





Meropenem 1g/NS 100mL IVPB (Meropenem 1g/Ns 100ml Ivpb)  1 gm in 100 mls @ 100 

mls/hr IVPB STAT STA


   PRN Reason: Protocol


   Stop: 08/14/17 17:13


   Last Admin: 08/14/17 17:23  Dose: 100 mls/hr





Morphine Sulfate (Morphine)  6 mg IVP STAT STA


   Stop: 08/14/17 16:00


   Last Admin: 08/14/17 16:43  Dose: 6 mg











- Scribe Statement


The provider has reviewed the documentation as recorded by the Girma Carmen





Provider Scribe Attestation:


All medical record entries made by the Scribe were at my direction and 

personally dictated by me. I have reviewed the chart and agree that the record 

accurately reflects my personal performance of the history, physical exam, 

medical decision making, and the department course for this patient. I have 

also personally directed, reviewed, and agree with the discharge instructions 

and disposition.





Disposition/Present on Arrival





- Present on Arrival


Any Indicators Present on Arrival: No


History of DVT/PE: No


History of Uncontrolled Diabetes: No


Urinary Catheter: No


History of Decub. Ulcer: No


History Surgical Site Infection Following: None





- Disposition


Have Diagnosis and Disposition been Completed?: Yes


Diagnosis: 


 Foot infection





Disposition: HOSPITALIZED


Disposition Time: 15:53


Patient Plan: Admission


Condition: FAIR

## 2017-08-14 NOTE — RAD
PROCEDURE:  Left Foot Radiographs.



HISTORY:

ro osteo  



COMPARISON:

Left foot radiographs 07/24/2017.



FINDINGS:



BONES:

Patient is status post previous amputation of the 5th digit 

phalanges. Compared to the prior exam, the distal half of the 5th 

metatarsal is absent with irregular cortical border of the distal 5th 

midshaft with decorticated bone, suggestive of osteomyelitis. There 

appears to be an overlying skin defect in this region. Bones are 

demineralized. 



JOINTS:

No dislocation seen. Additional details as above. 



SOFT TISSUES:

Vascular calcifications noted. 



OTHER FINDINGS:

None.



IMPRESSION:

Absent distal half of the 5th metatarsal with the distal cortex 

appearing irregular suggestive of osteomyelitis. Probable overlying 

soft tissue skin defect in this region.

## 2017-08-15 PROCEDURE — 0JBR0ZZ EXCISION OF LEFT FOOT SUBCUTANEOUS TISSUE AND FASCIA, OPEN APPROACH: ICD-10-PCS | Performed by: PODIATRIST

## 2017-08-15 PROCEDURE — 0Y6N0ZF DETACHMENT AT LEFT FOOT, PARTIAL 5TH RAY, OPEN APPROACH: ICD-10-PCS | Performed by: PODIATRIST

## 2017-08-15 RX ADMIN — SULFAMETHOXAZOLE AND TRIMETHOPRIM SCH TAB: 400; 80 TABLET ORAL at 12:26

## 2017-08-15 RX ADMIN — INSULIN HUMAN SCH UNITS: 100 INJECTION, SOLUTION PARENTERAL at 16:43

## 2017-08-15 RX ADMIN — PIPERACILLIN AND TAZOBACTAM SCH MLS/HR: 3; .375 INJECTION, POWDER, LYOPHILIZED, FOR SOLUTION INTRAVENOUS; PARENTERAL at 23:19

## 2017-08-15 RX ADMIN — INSULIN LISPRO SCH UNITS: 100 INJECTION, SOLUTION INTRAVENOUS; SUBCUTANEOUS at 16:42

## 2017-08-15 RX ADMIN — INSULIN HUMAN SCH: 100 INJECTION, SOLUTION PARENTERAL at 22:36

## 2017-08-15 RX ADMIN — MORPHINE SULFATE PRN MG: 4 INJECTION, SOLUTION INTRAMUSCULAR; INTRAVENOUS at 23:09

## 2017-08-15 RX ADMIN — PIPERACILLIN AND TAZOBACTAM SCH MLS/HR: 3; .375 INJECTION, POWDER, LYOPHILIZED, FOR SOLUTION INTRAVENOUS; PARENTERAL at 06:04

## 2017-08-15 RX ADMIN — INSULIN DETEMIR SCH UNIT: 100 INJECTION, SOLUTION SUBCUTANEOUS at 22:37

## 2017-08-15 RX ADMIN — INSULIN LISPRO SCH: 100 INJECTION, SOLUTION INTRAVENOUS; SUBCUTANEOUS at 12:28

## 2017-08-15 RX ADMIN — PIPERACILLIN AND TAZOBACTAM SCH MLS/HR: 3; .375 INJECTION, POWDER, LYOPHILIZED, FOR SOLUTION INTRAVENOUS; PARENTERAL at 12:27

## 2017-08-15 RX ADMIN — MORPHINE SULFATE PRN MG: 4 INJECTION, SOLUTION INTRAMUSCULAR; INTRAVENOUS at 17:55

## 2017-08-15 RX ADMIN — PIPERACILLIN AND TAZOBACTAM SCH MLS/HR: 3; .375 INJECTION, POWDER, LYOPHILIZED, FOR SOLUTION INTRAVENOUS; PARENTERAL at 17:13

## 2017-08-15 RX ADMIN — PIPERACILLIN AND TAZOBACTAM SCH MLS/HR: 3; .375 INJECTION, POWDER, LYOPHILIZED, FOR SOLUTION INTRAVENOUS; PARENTERAL at 00:15

## 2017-08-15 RX ADMIN — INSULIN HUMAN SCH: 100 INJECTION, SOLUTION PARENTERAL at 12:28

## 2017-08-15 RX ADMIN — INSULIN LISPRO SCH: 100 INJECTION, SOLUTION INTRAVENOUS; SUBCUTANEOUS at 16:50

## 2017-08-15 RX ADMIN — INSULIN LISPRO SCH: 100 INJECTION, SOLUTION INTRAVENOUS; SUBCUTANEOUS at 08:28

## 2017-08-15 RX ADMIN — MORPHINE SULFATE PRN MG: 4 INJECTION, SOLUTION INTRAMUSCULAR; INTRAVENOUS at 13:06

## 2017-08-15 RX ADMIN — INSULIN HUMAN SCH: 100 INJECTION, SOLUTION PARENTERAL at 08:28

## 2017-08-15 NOTE — PCM.SURG1
Surgeon's Initial Post Op Note





- Surgeon's Notes


Surgeon: Dr. Danielson DPM


Assistant: VELVET Matute PGY2


Type of Anesthesia: MAC, Local


Anesthesia Administered By: Dr. Maria Alejandra MD


Pre-Operative Diagnosis: 1) Left foot infected ulcer.


Operative Findings: See dictation.  0 Vicryl


Post-Operative Diagnosis: 1) Left foot infected ulcer.  2) Left foot 5th 

metatarsal osteomyelitis.


Operation Performed: 1) Left foot wound debridement with excisional of infected 

non-viable bone and soft tissue


Specimen/Specimens Removed: 1) Wound culture ( infected margins).  2) Wound 

Culture ( clean margins).  3) 5th ray bone.  4) Resected 5th metararsal shaft


Estimated Blood Loss: EBL {In ML}: 1


Blood Products Given: N/A


Drains Used: No Drains


Post-Op Condition: Good


Date of Surgery/Procedure: 08/15/17


Time of Surgery/Procedure: 09:11

## 2017-08-15 NOTE — OP
PROCEDURE DATE:  08/14/2017



PREOPERATIVE DIAGNOSIS:  Infected left foot ulcer.



POSTOPERATIVE DIAGNOSES:

1.  Infected left foot ulcer.

2.  Left foot fifth metatarsal osteomyelitis.



PROCEDURES PERFORMED:

1.  Left foot wound debridement.

2.  Left foot revisional fifth ray resection.



PRIMARY SURGEON:  Dr. Mitzy Danielson DPM.



ASSISTANTS:  Dr. Hasmukh Matute, PGY2.



ANESTHESIOLOGIST:  Dr. Maria Alejandra MD.



ANESTHESIA TYPE:  MAC IV sedation with local.



SPECIMENS:

1.  Wound culture.

2.  Deep wound culture; clean margins.

3.  Left foot bone, unspecified. 

4.  Left fifth metatarsal, resected shaft.



INDICATIONS:  The patient is a 57-year-old male with the above-stated

diagnoses. The patient has exhausted all conservative outpatient treatment

options including but not limited to local wound care and outpatient

antibiotics and now is in need of surgical intervention.  The patient

signed the surgical consent after careful explanation of risks, benefits,

complications and potential alternatives to the proposed procedure.  The

patient verbalizes understanding and wishes to proceed with the proposed

procedure.  No guarantees were either given nor implied.  All questions

were answered.  The patient's n.p.o. status was confirmed prior to bringing

the patient to the operating room.



PREPARATION:  The patient was brought into the operating room and placed on

the operating room table in a supine position.  Once IV sedation was

confirmed to have been achieved, the patient received a total of 10 mL of

2% lidocaine plain in a local block-type fashion to the left foot.  Once

local anesthesia was confirmed to have been achieved, the left foot was

then prepped and draped in the usual sterile manner and the procedure

began.



PROCEDURE 1:  Attention was then directed to the lateral aspect of the left

foot where a 6.3 x 7.2 cm full-thickness ulceration was noted with

exposure of the remaining segment of fifth metatarsal shaft was noted in

full view of the operative field.  At this time, initial wound culture was

taken and passed from the operative field.  Using a #15 blade, sharp

excisional debridement was performed to all necrotic tissue on the wound

base and margins which accounted for a total of 5% of the total wound base

area. This necrotic tissue was passed from the operative field.  At this

time, an oscillating bone saw was introduced to the operating field and a

transverse bone cut of nonviable bone of the fifth metatarsal shaft was

performed.  This resected section of bone, measuring approximately 0.5 inch, 
was passed from the operative

field to be used as specimen for pathology.  At this time, the wound base

was fully inspected and plantar lateral spicule of bone outside the

previous resected area and remnant total length of the fifth metatarsal was

noted.  This bony fragment was excised and passed from the operative field

to be sent for specimen pathology. At this time, the Versajet system was 
introduced to the operative field.  

High-pressure, mechanical debridement utilized over the entire area of the 
wound base to remove all non-viable, sloughing

fibrotic and hyperkeratotic tissue base and rim of wound.  At this time,

upon completion, total area of wound remeasured.  Total measurements remain

same at 6.3 x 7.2 fibrotic pase with granular rim. At this stage, all excision 
and debridement was

completed, with care having been taken to not violate adjacent periosteal 
structures. Simpulse- pulse lavage system was introduced to the operating

field and the surgical site was flushed with copious amounts of normal

sterile saline.  At this time a clean margin wound culture was taken of 
medullary bone of

fifth metatarsal and accompanying soft-tissue envelope of periosteum,

and passed from the operating field.  Fifth metatarsal periosteum was adhered 
to periosteal envelope of 4th metatarsal using Vicryl.

Drainage portal left, in attempt to accomodate future medullary bone drainage. 

Surgical site was then dressed with bacitracin ointment, Xeroform, 4x4 gauze, 
ABD pad, Kerlix, and

stockinette.



POSTOPERATIVE CONDITION:  The patient tolerated the procedure and

anesthesia well and was escorted to the recovery room with vital signs

stable and neurovascular status intact.  The patient had no complaints, no

complications.  The patient will be followed in-house at [facility]

by Podiatry team.







__________________________________________

Hasmukh Matute DPM







DD:  08/15/2017 11:52:42

DT:  08/15/2017 14:37:31

Job # [id number]









LUCY

## 2017-08-15 NOTE — CP.PCM.HP
<Loretta Burciaga - Last Filed: 08/15/17 13:06>





History of Present Illness





- History of Present Illness


History of Present Illness: 


CC: Sent from podiatrist office for infected foot.





Patient is an 56 y/o with pmh of IDDM2, HTN, dyslipidemia, anxiety, pacemaker, 

renal transplant, PAD, left foot ulcer s/p angioplasty of left tibial artery, s/

p amputated left 5th foot sent from podiatric office for worsening in left foot 

infection. Patient was admitted back in July of this year with similar problems

, and at the time underwent angioplasty. Patient states he has been following 

up with Dr Danielson since. Patient states the pain in the left foot has gotten 

worst.


Patient denies fever, chills, n/v/d, denies cp or sob. Patient denies cough. 


Left foot x-ray in the ED revealed osteomyolitis of the foot. Patient is 

admitted for IV antibiotics and possible debridement. 





PMH: IDDM2, HTN, dyslipidemia, anxiety, pacemaker, CAD, renal transplant, PAD, 

left foot ulcer s/p angioplasty of left tibial artery, s/p amputated left 5th 

foot.


PSH: amputated left 5th toe, angioplasty, pacemaker placement, CABG ( 5 years 

ago) 


Social: Former tobacco, denies alcohol, or illicit drug use. Lives with his 

wife. Patient is able to ambulate unassisted.





Present on Admission





- Present on Admission


Any Indicators Present on Admission: No


History of DVT/PE: No


History of Uncontrolled Diabetes: No


Urinary Catheter: No


Decubitus Ulcer Present: No


History Surgical Site Infection Following: None





Review of Systems





- Review of Systems


All systems: reviewed and no additional remarkable complaints except


Review of Systems: 





12 point ROS reviewed, all negative except as per HPI. 





Past Patient History





- Infectious Disease


Hx of Infectious Diseases: None





- Tetanus Immunizations


Tetanus Immunization: Unknown





- Past Social History


Smoking Status: Former Smoker


Alcohol: None


Drugs: Denies


Home Situation {Lives}: With Family





- CARDIAC


Hx Cardiac Disorders: Yes (MI,CAD)


Hx Congestive Heart Failure: Yes


Hx Hypercholesterolemia: Yes


Hx Hypertension: Yes


Hx Pacemaker: Yes (ST AN IMPLANT 4/2008/6-2015)


Other/Comment: CABG 4 vessels [facility]





- PULMONARY


Hx Respiratory Disorders: Yes


Hx Bronchitis: Yes (2 weeks ago)


Hx Chronic Obstructive Pulmonary Disease (COPD): Yes





- NEUROLOGICAL


Hx Neurological Disorder: No





- HEENT


Hx HEENT Problems: No





- RENAL


Hx Chronic Kidney Disease: Yes


Hx Renal Failure: Yes


Other/Comment: s/p Renal transplant right 5/2012, pt was on dialysis 2011 until 

transplant in 2012, has left arm shunt not in use, pt does void





- ENDOCRINE/METABOLIC


Hx Endocrine Disorders: Yes


Hx Diabetes Mellitus Type 2: Yes





- HEMATOLOGICAL/ONCOLOGICAL


Hx Blood Disorders: No





- INTEGUMENTARY


Hx Dermatological Problems: Yes


Other/Comment: gangrene left 5th toe 7/27/17 infected post amputation dressing 

done by dr danielson today, r heel ulcer round 1cm round wound bed white, 

multiple scabs to ble from gardening, multiple tatoos





- MUSCULOSKELETAL/RHEUMATOLOGICAL


Hx Musculoskeletal Disorders: Yes (GANGRENE L BIG TOE.POST AMPUTATION.HAS 

GANGRENE TO THAT AREA.7-27-`17)


Hx Falls: Yes (past)


Hx Unsteady Gait: Yes





- GASTROINTESTINAL


Hx Gastrointestinal Disorders: Yes (INCARCERATED INCISIONAL HERNIA,

GASTROENTERITIS, CONSTIPATION)


Other/Comment: h/o c diff, gastroenteritis





- GENITOURINARY/GYNECOLOGICAL


Hx Genitourinary Disorders: No





- PSYCHIATRIC


Hx Psychophysiologic Disorder: Yes (H/O SMOKING CIGARETTES 1/2 PPD,ETOH USE 

SOCAILLY)


Hx Depression: Yes


Hx Emotional Abuse: No


Hx Physical Abuse: No


Hx Substance Use: No





- SURGICAL HISTORY


Hx Surgeries: Yes





- ANESTHESIA


Hx Anesthesia Reactions: No





Meds


Allergies/Adverse Reactions: 


 Allergies











Allergy/AdvReac Type Severity Reaction Status Date / Time


 


No Known Allergies Allergy   Verified 07/24/17 20:12














Physical Exam





- Constitutional


Appears: No Acute Distress, Chronically Ill





- Head Exam


Head Exam: ATRAUMATIC, NORMAL INSPECTION, NORMOCEPHALIC





- Eye Exam


Eye Exam: EOMI, Normal appearance, PERRL.  absent: Scleral icterus





- ENT Exam


ENT Exam: Mucous Membranes Moist





- Neck Exam


Neck exam: Positive for: Normal Inspection





- Respiratory Exam


Respiratory Exam: Clear to Auscultation Bilateral, NORMAL BREATHING PATTERN.  

absent: Rales, Rhonchi, Wheezes, Respiratory Distress, Stridor





- Cardiovascular Exam


Cardiovascular Exam: REGULAR RHYTHM, +S1, +S2.  absent: Systolic Murmur





- GI/Abdominal Exam


GI & Abdominal Exam: Normal Bowel Sounds, Soft.  absent: Distended, Firm, 

Guarding, Rigid, Tenderness





- Extremities Exam


Extremities exam: Negative for: pedal edema


Additional comments: 





Left foot with clean dressing,


Unable to palpate posterior tibial and dorsalis pedis pulses on the left. 





- Back Exam


Back exam: NORMAL INSPECTION





- Neurological Exam


Neurological exam: Alert, Oriented x3





- Psychiatric Exam


Psychiatric exam: Normal Affect, Normal Mood





- Skin


Skin Exam: Dry, Normal Color





Results





- Vital Signs


Recent Vital Signs: 





 Last Vital Signs











Temp  97.9 F   08/14/17 21:35


 


Pulse  79   08/14/17 22:40


 


Resp  17   08/14/17 21:35


 


BP  145/89   08/14/17 22:40


 


Pulse Ox  98   08/14/17 18:40














- Labs


Result Diagrams: 


 08/14/17 16:30





 08/14/17 16:30


Labs: 





 Laboratory Results - last 24 hr











  08/14/17 08/14/17 08/14/17





  16:30 16:30 16:30


 


WBC   9.8 


 


RBC   4.14 


 


Hgb   13.4 L 


 


Hct   38.7 L 


 


MCV   93.5 


 


MCH   32.4 


 


MCHC   34.6 


 


RDW   13.9 


 


Plt Count   231 


 


MPV   9.7 


 


Gran %   70.3 H 


 


Lymph % (Auto)   13.9 L 


 


Mono % (Auto)   10.6 H 


 


Eos % (Auto)   5.1 H 


 


Baso % (Auto)   0.1 


 


Gran #   6.89 H 


 


Lymph #   1.4 


 


Mono #   1.0 H 


 


Eos #   0.5 


 


Baso #   0.01 


 


PT    11.7


 


INR    1.08


 


APTT    31.4 H


 


Sodium  135  


 


Potassium  4.8  


 


Chloride  99  


 


Carbon Dioxide  25  


 


Anion Gap  16  


 


BUN  20  


 


Creatinine  0.8  


 


Est GFR ( Amer)  > 60  


 


Est GFR (Non-Af Amer)  > 60  


 


POC Glucose (mg/dL)   


 


Random Glucose  178 H  


 


Calcium  10.0  


 


Total Bilirubin  0.9  


 


AST  21  


 


ALT  25  


 


Alkaline Phosphatase  115  


 


Total Protein  6.6  


 


Albumin  3.9  


 


Globulin  2.7  


 


Albumin/Globulin Ratio  1.4  














  08/14/17 08/15/17





  22:37 06:18


 


WBC  


 


RBC  


 


Hgb  


 


Hct  


 


MCV  


 


MCH  


 


MCHC  


 


RDW  


 


Plt Count  


 


MPV  


 


Gran %  


 


Lymph % (Auto)  


 


Mono % (Auto)  


 


Eos % (Auto)  


 


Baso % (Auto)  


 


Gran #  


 


Lymph #  


 


Mono #  


 


Eos #  


 


Baso #  


 


PT  


 


INR  


 


APTT  


 


Sodium  


 


Potassium  


 


Chloride  


 


Carbon Dioxide  


 


Anion Gap  


 


BUN  


 


Creatinine  


 


Est GFR ( Amer)  


 


Est GFR (Non-Af Amer)  


 


POC Glucose (mg/dL)  269 H  229 H


 


Random Glucose  


 


Calcium  


 


Total Bilirubin  


 


AST  


 


ALT  


 


Alkaline Phosphatase  


 


Total Protein  


 


Albumin  


 


Globulin  


 


Albumin/Globulin Ratio  














Assessment & Plan





- Assessment and Plan (Free Text)


Assessment: 


1) Osteomyolitis of the left distal cortex of the 5th metatarsal. 


 2) Bilateral infiltration on chest x-ray, likely atelectasis 


 3) PAD s/p angioplasty of the left tibial artery


 4) s/p amputated left 5th foot 


 5) IDDM2


 6) Dyslipidemia 


 7) htn


 8) renal transplant


 9) Arrhythmias s/p pacemaker


 10) Constipation


 11) CAD, h/o CABG 


Plan: 


Patient is admitted for wound debridement by podiatry and IV antibiotics. 


Cultures sent, pending. S/p a dose of merrem in the ED, now on zosyn. Patient 

is also on doxy for possible respiratory infection. 


Continued on basal and pre-meal insulin for DM. On tacrolimus, prednisone, 

myfortic, and Bactrim for renal transplant and prophylaxis. Lopressor, asa, and 

Norvasc for htn/CAD. 


Morphine and percocet prn for pain. Pepcid for gi prophylaxis. Patient is also 

on colace for constipation. 





Patient seen, examined and case discussed with Dr Calderon. 





- Date & Time


Date: 08/15/17


Time: 07:00





<Andrea Calderon S - Last Filed: 08/15/17 18:59>





Results





- Vital Signs


Recent Vital Signs: 





 Last Vital Signs











Temp  98.2 F   08/15/17 16:30


 


Pulse  76   08/15/17 17:13


 


Resp  20   08/15/17 16:30


 


BP  111/69   08/15/17 17:13


 


Pulse Ox  97   08/15/17 16:30














- Labs


Result Diagrams: 


 08/14/17 16:30





 08/14/17 16:30


Labs: 





 Laboratory Results - last 24 hr











  08/14/17 08/15/17 08/15/17





  22:37 06:18 06:30


 


ESR    40 H


 


POC Glucose (mg/dL)  269 H  229 H 


 


C-React Prot High Sens   














  08/15/17 08/15/17





  06:30 11:33


 


ESR  


 


POC Glucose (mg/dL)   214 H


 


C-React Prot High Sens  > 15.00 H 














Assessment & Plan





- Assessment and Plan (Free Text)


Plan: 





Pt seen and examined by me. Resident note reviewed and I agree with it. Pt 

going to OR today for intervention by Dr Danielson. He has PAD. Old records 

reviewed.

## 2017-08-15 NOTE — CP.PCM.CON
History of Present Illness





- History of Present Illness


History of Present Illness: 


57 year old male with PMH of CAD S/P CABG, chronic CHF, DM, COPD, S/P AICD 

placement, S/P kidney transplant has been seeing his Podiatrist for weeks now 

and followed up yesterday and was told that he had to be admitted because of 

infection in his left foot with probable osteomyelitis. Today he had excision 

of the bone and dedridement done and was noted to have left 5th metatarsal 

osteomyelitis. He denies fever or chills, no nausea or vomiting, no headache or 

dizziness, no chest pain, no SOB, no cough or colds, no animal contacts. 

Infectious Diseases consult is requested for antibiotic management.











Review of Systems





- Review of Systems


All systems: reviewed and no additional remarkable complaints except (as per HPI

)





Past Patient History





- Infectious Disease


Hx of Infectious Diseases: None





- Tetanus Immunizations


Tetanus Immunization: Unknown





- Past Social History


Smoking Status: Former Smoker





- CARDIAC


Hx Cardiac Disorders: Yes (MI,CAD)


Hx Hypertension: Yes


Hx Pacemaker: Yes (ST AN IMPLANT 4/2008/6-2015)


Other/Comment: CABG





- PULMONARY


Hx Respiratory Disorders: Yes


Hx Bronchitis: Yes (2 weeks ago)


Hx Chronic Obstructive Pulmonary Disease (COPD): Yes





- NEUROLOGICAL


Hx Neurological Disorder: No





- HEENT


Hx HEENT Problems: No





- RENAL


Hx Chronic Kidney Disease: Yes


Hx Renal Failure: Yes


Other/Comment: s/p Renal transplant





- ENDOCRINE/METABOLIC


Hx Endocrine Disorders: Yes


Hx Diabetes Mellitus Type 2: Yes





- HEMATOLOGICAL/ONCOLOGICAL


Hx Blood Disorders: No





- INTEGUMENTARY


Hx Dermatological Problems: Yes


Other/Comment: LEFT BIG TOE POST AMPUTATION-GANGRENE TO AREA AMPUTATED.7-24-17





- MUSCULOSKELETAL/RHEUMATOLOGICAL


Hx Musculoskeletal Disorders: Yes (GANGRENE L BIG TOE.POST AMPUTATION.HAS 

GANGRENE TO THAT AREA.7-27-`17)


Hx Falls: Yes


Hx Unsteady Gait: Yes





- GASTROINTESTINAL


Hx Gastrointestinal Disorders: Yes (INCARCERATED INCISIONAL HERNIA,

GASTROENTERITIS, CONSTIPATION)


Other/Comment: h/o c diff





- GENITOURINARY/GYNECOLOGICAL


Hx Genitourinary Disorders: No





- PSYCHIATRIC


Hx Psychophysiologic Disorder: Yes (H/O SMOKING CIGARETTES 1/2 PPD,ETOH USE 

SOCAILLY)


Hx Depression: Yes


Hx Emotional Abuse: No


Hx Physical Abuse: No


Hx Substance Use: No





- SURGICAL HISTORY


Other/Comment: CABG.  L 5th toe amputation.  Kidney transplant





- ANESTHESIA


Hx Anesthesia: Yes


Hx Anesthesia Reactions: No


Hx Malignant Hyperthermia: No





Meds


Allergies/Adverse Reactions: 


 Allergies











Allergy/AdvReac Type Severity Reaction Status Date / Time


 


No Known Allergies Allergy   Verified 07/24/17 20:12














- Medications


Medications: 


 Current Medications





Amlodipine Besylate (Norvasc)  10 mg PO DAILY [facility]


Aspirin (Ecotrin)  81 mg PO DAILY [facility]


Docusate Sodium (Colace)  100 mg PO DAILY [facility]


Famotidine (Pepcid)  20 mg PO DAILY [facility]


Doxycycline Hyclate 100 mg/ (Sodium Chloride)  100 mls @ 100 mls/hr IVPB Q12 [facility]


   PRN Reason: Protocol


Piperacillin Sod/Tazobactam Sod (Zosyn 3.375 In Ns 100ml)  100 mls @ 200 mls/hr 

IVPB Q6 [facility]


   PRN Reason: Protocol


   Stop: 08/22/17 00:01


Insulin Detemir (Levemir)  40 unit SC HS [facility]


Insulin Human Lispro (Humalog)  20 units SC AC [facility]


Metoprolol Tartrate (Lopressor)  50 mg PO BID [facility]


Non-Formulary Medication (Mycophenolate Sodium [Myfortic])  360 mg PO BID [facility]


Oxycodone/Acetaminophen (Percocet 5/325 Mg Tab)  1 tab PO Q4 PRN


   PRN Reason: Pain, moderate (4-7)


   Stop: 08/17/17 20:01


Prednisone (Prednisone Tab)  10 mg PO DAILY [facility]


Tacrolimus (Prograf Cap)  1.5 mg PO HS [facility]


Tacrolimus (Prograf Cap)  2 mg PO DAILY [facility]


Trimethoprim/Sulfamethoxazole (Bactrim Ss Tab)  1 tab PO DAILY [facility]


   PRN Reason: Protocol











Physical Exam





- Constitutional


Appears: Non-toxic, No Acute Distress





- Head Exam


Head Exam: NORMAL INSPECTION





- ENT Exam


ENT Exam: Mucous Membranes Moist





- Neck Exam


Neck exam: Negative for: Lymphadenopathy, Meningismus





- Respiratory Exam


Respiratory Exam: Decreased Breath Sounds





- Cardiovascular Exam


Cardiovascular Exam: +S1, +S2





- GI/Abdominal Exam


GI & Abdominal Exam: Soft.  absent: Tenderness





- Extremities Exam


Additional comments: 





left foot with dry dressings in place





Results





- Vital Signs


Recent Vital Signs: 


 Last Vital Signs











Temp  97.9 F   08/14/17 15:20


 


Pulse  70   08/14/17 18:40


 


Resp  17   08/14/17 18:40


 


BP  135/82   08/14/17 18:40


 


Pulse Ox  98   08/14/17 18:40














- Labs


Result Diagrams: 


 08/14/17 16:30





 08/14/17 16:30


Labs: 


 Laboratory Results - last 24 hr











  08/14/17 08/14/17 08/14/17





  16:30 16:30 16:30


 


WBC   9.8 


 


RBC   4.14 


 


Hgb   13.4 L 


 


Hct   38.7 L 


 


MCV   93.5 


 


MCH   32.4 


 


MCHC   34.6 


 


RDW   13.9 


 


Plt Count   231 


 


MPV   9.7 


 


Gran %   70.3 H 


 


Lymph % (Auto)   13.9 L 


 


Mono % (Auto)   10.6 H 


 


Eos % (Auto)   5.1 H 


 


Baso % (Auto)   0.1 


 


Gran #   6.89 H 


 


Lymph #   1.4 


 


Mono #   1.0 H 


 


Eos #   0.5 


 


Baso #   0.01 


 


PT    11.7


 


INR    1.08


 


APTT    31.4 H


 


Sodium  135  


 


Potassium  4.8  


 


Chloride  99  


 


Carbon Dioxide  25  


 


Anion Gap  16  


 


BUN  20  


 


Creatinine  0.8  


 


Est GFR ( Amer)  > 60  


 


Est GFR (Non-Af Amer)  > 60  


 


Random Glucose  178 H  


 


Calcium  10.0  


 


Total Bilirubin  0.9  


 


AST  21  


 


ALT  25  


 


Alkaline Phosphatase  115  


 


Total Protein  6.6  


 


Albumin  3.9  


 


Globulin  2.7  


 


Albumin/Globulin Ratio  1.4  














Assessment & Plan





- Assessment and Plan (Free Text)


Plan: 





Assessment


Left 5th metatarsal osteomyelitis S/P debridement and bone excision today


CAD S/P CABG


chronic CHF


DM


COPD


S/P AICD placement


S/P kidney transplant





Plan


Started patient on Doxycycline and Zosyn pending OR cx and pathology


will monitor clinically


will get baseline ESR and CRP

## 2017-08-15 NOTE — CP.PCM.CON
History of Present Illness





- History of Present Illness


History of Present Illness: 





57 year old male seen at bedside concerning right foot ulcer. Pt was sent into 

the emergency department from [facility] by attending Dr. Danielson

, for a toe infection today. Wound is a surgical complication s/p left 5th 

digit amputation (DOS: 7/27/17) and subsequent metatarsal head resection (date 

unknown). Regular outpatient podiatrist is Dr. Benjamin Goel. Pt deneis recent f/c

/cp/sob/n/v. 





Past Patient History





- Infectious Disease


Hx of Infectious Diseases: None





- Tetanus Immunizations


Tetanus Immunization: Unknown





- Past Social History


Smoking Status: Former Smoker





- CARDIAC


Hx Cardiac Disorders: Yes (MI,CAD)


Hx Congestive Heart Failure: Yes


Hx Hypercholesterolemia: Yes


Hx Hypertension: Yes


Hx Pacemaker: Yes (ST AN IMPLANT 4/2008/6-2015)


Other/Comment: CABG 4 vessels [facility]





- PULMONARY


Hx Respiratory Disorders: Yes


Hx Bronchitis: Yes (2 weeks ago)


Hx Chronic Obstructive Pulmonary Disease (COPD): Yes





- NEUROLOGICAL


Hx Neurological Disorder: No





- HEENT


Hx HEENT Problems: No





- RENAL


Hx Chronic Kidney Disease: Yes


Hx Renal Failure: Yes


Other/Comment: s/p Renal transplant right 5/2012, pt was on dialysis 2011 until 

transplant in 2012, has left arm shunt not in use, pt does void





- ENDOCRINE/METABOLIC


Hx Endocrine Disorders: Yes


Hx Diabetes Mellitus Type 2: Yes





- HEMATOLOGICAL/ONCOLOGICAL


Hx Blood Disorders: No





- INTEGUMENTARY


Hx Dermatological Problems: Yes


Other/Comment: gangrene left 5th toe 7/27/17 infected post amputation dressing 

done by dr danielson today, r heel ulcer round 1cm round wound bed white, 

multiple scabs to ble from gardening, multiple tatoos





- MUSCULOSKELETAL/RHEUMATOLOGICAL


Hx Musculoskeletal Disorders: Yes (GANGRENE L BIG TOE.POST AMPUTATION.HAS 

GANGRENE TO THAT AREA.7-27-`17)


Hx Falls: Yes (past)


Hx Unsteady Gait: Yes





- GASTROINTESTINAL


Hx Gastrointestinal Disorders: Yes (INCARCERATED INCISIONAL HERNIA,

GASTROENTERITIS, CONSTIPATION)


Other/Comment: h/o c diff, gastroenteritis





- GENITOURINARY/GYNECOLOGICAL


Hx Genitourinary Disorders: No





- PSYCHIATRIC


Hx Psychophysiologic Disorder: Yes (H/O SMOKING CIGARETTES 1/2 PPD,ETOH USE 

SOCAILLY)


Hx Depression: Yes


Hx Emotional Abuse: No


Hx Physical Abuse: No


Hx Substance Use: No





- SURGICAL HISTORY


Hx Surgeries: Yes





- ANESTHESIA


Hx Anesthesia Reactions: No





Meds


Allergies/Adverse Reactions: 


 Allergies











Allergy/AdvReac Type Severity Reaction Status Date / Time


 


No Known Allergies Allergy   Verified 07/24/17 20:12














- Medications


Medications: 


 Current Medications





Amlodipine Besylate (Norvasc)  10 mg PO DAILY [facility]


Aspirin (Ecotrin)  81 mg PO DAILY [facility]


Docusate Sodium (Colace)  100 mg PO DAILY [facility]


Famotidine (Pepcid)  20 mg PO DAILY [facility]


Doxycycline Hyclate 100 mg/ (Sodium Chloride)  100 mls @ 100 mls/hr IVPB Q12 [facility]


   PRN Reason: Protocol


   Last Admin: 08/14/17 22:34 Dose:  100 mls/hr


Piperacillin Sod/Tazobactam Sod (Zosyn 3.375 In Ns 100ml)  100 mls @ 200 mls/hr 

IVPB Q6 [facility]


   PRN Reason: Protocol


   Stop: 08/22/17 00:01


   Last Admin: 08/15/17 06:04 Dose:  200 mls/hr


Insulin Detemir (Levemir)  40 unit SC HS [facility]


   Last Admin: 08/14/17 22:35 Dose:  40 unit


Insulin Human Lispro (Humalog)  20 units SC AC [facility]


Insulin Human Regular (Humulin R High)  0 units SC ACHS [facility]


   PRN Reason: Protocol


   Last Admin: 08/14/17 22:39 Dose:  Not Given


Metoprolol Tartrate (Lopressor)  50 mg PO BID [facility]


   Last Admin: 08/14/17 22:40 Dose:  50 mg


Morphine Sulfate (Morphine)  4 mg IVP Q4H [facility]


   PRN Reason: Pain, severe (8-10)


   Last Admin: 08/14/17 23:02 Dose:  4 mg


Non-Formulary Medication (Mycophenolate Sodium [Myfortic])  360 mg PO BID [facility]


   Last Admin: 08/14/17 23:15 Dose:  Not Given


Oxycodone/Acetaminophen (Percocet 5/325 Mg Tab)  1 tab PO Q4 PRN


   PRN Reason: Pain, moderate (4-7)


   Stop: 08/17/17 20:01


   Last Admin: 08/14/17 21:30 Dose:  1 tab


Prednisone (Prednisone Tab)  10 mg PO DAILY [facility]


Tacrolimus (Prograf Cap)  1.5 mg PO HS [facility]


   Last Admin: 08/14/17 22:41 Dose:  1.5 mg


Tacrolimus (Prograf Cap)  2 mg PO DAILY [facility]


Trimethoprim/Sulfamethoxazole (Bactrim Ss Tab)  1 tab PO DAILY [facility]


   PRN Reason: Protocol











Physical Exam





- Constitutional


Appears: Well, Non-toxic, No Acute Distress





- Extremities Exam


Additional comments: 





Right lower extremity focused. 





Neuro-vascalar statis is intact to left foot. Cyanotic changes noted to lateral 

borders of left 4th digit. Cappilary refill time to all 4 digital tamiko is < 4 

seconds. 





DERM: 6.3 x 7.2 full thickness ulcer with 3% necrotic 97% fibrotic wound base. 

Distal end of 5th metatarsal shaft noted, medulary bone exposed due to prior 

amputation. No malodor or active drainage noted. Minor non-streaking xiomara-woind 

erythema noted. 














- Neurological Exam


Neurological exam: Alert, Oriented x3





- Psychiatric Exam


Psychiatric exam: Normal Affect, Normal Mood





Results





- Vital Signs


Recent Vital Signs: 


 Last Vital Signs











Temp  97.9 F   08/14/17 21:35


 


Pulse  79   08/14/17 22:40


 


Resp  17   08/14/17 21:35


 


BP  145/89   08/14/17 22:40


 


Pulse Ox  98   08/14/17 18:40














- Labs


Result Diagrams: 


 08/14/17 16:30





 08/14/17 16:30


Labs: 


 Laboratory Results - last 24 hr











  08/14/17 08/14/17 08/14/17





  16:30 16:30 16:30


 


WBC   9.8 


 


RBC   4.14 


 


Hgb   13.4 L 


 


Hct   38.7 L 


 


MCV   93.5 


 


MCH   32.4 


 


MCHC   34.6 


 


RDW   13.9 


 


Plt Count   231 


 


MPV   9.7 


 


Gran %   70.3 H 


 


Lymph % (Auto)   13.9 L 


 


Mono % (Auto)   10.6 H 


 


Eos % (Auto)   5.1 H 


 


Baso % (Auto)   0.1 


 


Gran #   6.89 H 


 


Lymph #   1.4 


 


Mono #   1.0 H 


 


Eos #   0.5 


 


Baso #   0.01 


 


PT    11.7


 


INR    1.08


 


APTT    31.4 H


 


Sodium  135  


 


Potassium  4.8  


 


Chloride  99  


 


Carbon Dioxide  25  


 


Anion Gap  16  


 


BUN  20  


 


Creatinine  0.8  


 


Est GFR ( Amer)  > 60  


 


Est GFR (Non-Af Amer)  > 60  


 


POC Glucose (mg/dL)   


 


Random Glucose  178 H  


 


Calcium  10.0  


 


Total Bilirubin  0.9  


 


AST  21  


 


ALT  25  


 


Alkaline Phosphatase  115  


 


Total Protein  6.6  


 


Albumin  3.9  


 


Globulin  2.7  


 


Albumin/Globulin Ratio  1.4  














  08/14/17 08/15/17





  22:37 06:18


 


WBC  


 


RBC  


 


Hgb  


 


Hct  


 


MCV  


 


MCH  


 


MCHC  


 


RDW  


 


Plt Count  


 


MPV  


 


Gran %  


 


Lymph % (Auto)  


 


Mono % (Auto)  


 


Eos % (Auto)  


 


Baso % (Auto)  


 


Gran #  


 


Lymph #  


 


Mono #  


 


Eos #  


 


Baso #  


 


PT  


 


INR  


 


APTT  


 


Sodium  


 


Potassium  


 


Chloride  


 


Carbon Dioxide  


 


Anion Gap  


 


BUN  


 


Creatinine  


 


Est GFR ( Amer)  


 


Est GFR (Non-Af Amer)  


 


POC Glucose (mg/dL)  269 H  229 H


 


Random Glucose  


 


Calcium  


 


Total Bilirubin  


 


AST  


 


ALT  


 


Alkaline Phosphatase  


 


Total Protein  


 


Albumin  


 


Globulin  


 


Albumin/Globulin Ratio  














Assessment & Plan





- Assessment and Plan (Free Text)


Assessment: 





57 year old male with left foot Nolan grade __ ulcer, secondary to wound 

dehiscence due resulting from prior surgical complication 


Plan: 


Pt seen and evalauted with attending, Dr. Danielson. Chart, labs, and vitals 

reviewed. Afebrile, absent leukocytosis. Pre-op testing was in chart


-Pre-operative radiographs at time of admission show 5th digit and 5th 

metatarsal head are absent, resected from prior amputation. Digital 5th 

metatarsal shaft margins shows cortical changes consistent with osteomyelitis. 


Pt's NPO status was confirmed. 





Pt signed surgical consent after careful explanation of risks, benefits 

complications and alternative. No guarantees were either given or implied. All 

pt's questions were answered. 








- Date & Time


Date: 08/15/17


Time: 07:30

## 2017-08-15 NOTE — RAD
PROCEDURE:  Left Foot Radiographs.



HISTORY:

s/p left foot surgery  



COMPARISON:

08/14/2017



FINDINGS:



BONES:

There is a new more proximal level of amputation in the 5th 

metatarsal. There is an adjacent soft tissue defect. 



JOINTS:

Normal. 



SOFT TISSUES:

Normal. 



OTHER FINDINGS:

None.



IMPRESSION:

There is a new more proximal level of amputation in the 5th 

metatarsal. There is an adjacent soft tissue defect.

## 2017-08-16 LAB
BASOPHILS # BLD AUTO: 0 K/MM3 (ref 0–2)
BASOPHILS NFR BLD: 0 % (ref 0–3)
BUN SERPL-MCNC: 16 MG/DL (ref 7–21)
CALCIUM SERPL-MCNC: 8.8 MG/DL (ref 8.4–10.5)
EOSINOPHIL # BLD: 0.1 10*3/UL (ref 0–0.7)
EOSINOPHIL NFR BLD: 1.2 % (ref 1.5–5)
ERYTHROCYTE [DISTWIDTH] IN BLOOD BY AUTOMATED COUNT: 13.9 % (ref 11.5–14.5)
GFR NON-AFRICAN AMERICAN: > 60
GRANULOCYTES # BLD: 7.92 10*3/UL (ref 1.4–6.5)
GRANULOCYTES NFR BLD: 83.2 % (ref 50–68)
HGB BLD-MCNC: 12.4 G/DL (ref 14–18)
LYMPHOCYTES # BLD: 0.8 10*3/UL (ref 1.2–3.4)
LYMPHOCYTES NFR BLD AUTO: 8 % (ref 22–35)
MCH RBC QN AUTO: 31.6 PG (ref 25–35)
MCHC RBC AUTO-ENTMCNC: 33.7 G/DL (ref 31–37)
MCV RBC AUTO: 93.6 FL (ref 80–105)
MONOCYTES # BLD AUTO: 0.7 10*3/UL (ref 0.1–0.6)
MONOCYTES NFR BLD: 7.6 % (ref 1–6)
PLATELET # BLD: 220 10^3/UL (ref 120–450)
PMV BLD AUTO: 9.5 FL (ref 7–11)
RBC # BLD AUTO: 3.93 10^6/UL (ref 3.5–6.1)
WBC # BLD AUTO: 9.5 10^3/UL (ref 4.5–11)

## 2017-08-16 PROCEDURE — 02HV33Z INSERTION OF INFUSION DEVICE INTO SUPERIOR VENA CAVA, PERCUTANEOUS APPROACH: ICD-10-PCS | Performed by: RADIOLOGY

## 2017-08-16 PROCEDURE — B518ZZA FLUOROSCOPY OF SUPERIOR VENA CAVA, GUIDANCE: ICD-10-PCS | Performed by: RADIOLOGY

## 2017-08-16 PROCEDURE — B54MZZA ULTRASONOGRAPHY OF RIGHT UPPER EXTREMITY VEINS, GUIDANCE: ICD-10-PCS | Performed by: RADIOLOGY

## 2017-08-16 RX ADMIN — INSULIN HUMAN SCH: 100 INJECTION, SOLUTION PARENTERAL at 17:27

## 2017-08-16 RX ADMIN — INSULIN HUMAN SCH: 100 INJECTION, SOLUTION PARENTERAL at 22:00

## 2017-08-16 RX ADMIN — MORPHINE SULFATE PRN MG: 4 INJECTION, SOLUTION INTRAMUSCULAR; INTRAVENOUS at 21:00

## 2017-08-16 RX ADMIN — OXYCODONE HYDROCHLORIDE AND ACETAMINOPHEN PRN TAB: 5; 325 TABLET ORAL at 17:21

## 2017-08-16 RX ADMIN — PIPERACILLIN AND TAZOBACTAM SCH MLS/HR: 3; .375 INJECTION, POWDER, LYOPHILIZED, FOR SOLUTION INTRAVENOUS; PARENTERAL at 12:28

## 2017-08-16 RX ADMIN — PIPERACILLIN AND TAZOBACTAM SCH MLS/HR: 3; .375 INJECTION, POWDER, LYOPHILIZED, FOR SOLUTION INTRAVENOUS; PARENTERAL at 05:34

## 2017-08-16 RX ADMIN — INSULIN LISPRO SCH: 100 INJECTION, SOLUTION INTRAVENOUS; SUBCUTANEOUS at 17:26

## 2017-08-16 RX ADMIN — PIPERACILLIN AND TAZOBACTAM SCH MLS/HR: 3; .375 INJECTION, POWDER, LYOPHILIZED, FOR SOLUTION INTRAVENOUS; PARENTERAL at 23:51

## 2017-08-16 RX ADMIN — MORPHINE SULFATE PRN MG: 4 INJECTION, SOLUTION INTRAMUSCULAR; INTRAVENOUS at 12:44

## 2017-08-16 RX ADMIN — INSULIN DETEMIR SCH UNIT: 100 INJECTION, SOLUTION SUBCUTANEOUS at 21:44

## 2017-08-16 RX ADMIN — INSULIN HUMAN SCH UNITS: 100 INJECTION, SOLUTION PARENTERAL at 12:25

## 2017-08-16 RX ADMIN — SULFAMETHOXAZOLE AND TRIMETHOPRIM SCH TAB: 400; 80 TABLET ORAL at 12:22

## 2017-08-16 RX ADMIN — PIPERACILLIN AND TAZOBACTAM SCH MLS/HR: 3; .375 INJECTION, POWDER, LYOPHILIZED, FOR SOLUTION INTRAVENOUS; PARENTERAL at 17:25

## 2017-08-16 RX ADMIN — INSULIN HUMAN SCH UNITS: 100 INJECTION, SOLUTION PARENTERAL at 07:00

## 2017-08-16 RX ADMIN — INSULIN LISPRO SCH: 100 INJECTION, SOLUTION INTRAVENOUS; SUBCUTANEOUS at 12:25

## 2017-08-16 NOTE — CP.PCM.PN
<Loretta Burciaga - Last Filed: 08/16/17 12:44>





Subjective





- Date & Time of Evaluation


Date of Evaluation: 08/16/17


Time of Evaluation: 07:40





- Subjective


Subjective: 


Progress note for Dr Calderon service. 





Patient with no complaints. Patient states the LE pin has improved. Patient 

denies cp, sob, n.v.d. Patient is afebrile. Patient to get a picc line for long 

term antibiotics. 





Objective





- Vital Signs/Intake and Output


Vital Signs (last 24 hours): 


 











Temp Pulse Resp BP Pulse Ox


 


 98.2 F   76   20   111/69   97 


 


 08/15/17 16:30  08/15/17 17:13  08/15/17 16:30  08/15/17 17:13  08/15/17 16:30








Intake and Output: 


 











 08/16/17 08/16/17





 06:59 18:59


 


Intake Total 1220 


 


Balance 1220 














- Medications


Medications: 


 Current Medications





Amlodipine Besylate (Norvasc)  10 mg PO DAILY [facility]


   Last Admin: 08/15/17 15:32 Dose:  10 mg


Aspirin (Ecotrin)  81 mg PO DAILY [facility]


   Last Admin: 08/15/17 15:32 Dose:  81 mg


Docusate Sodium (Colace)  100 mg PO DAILY [facility]


   Last Admin: 08/15/17 15:32 Dose:  100 mg


Famotidine (Pepcid)  20 mg PO DAILY [facility]


   Last Admin: 08/15/17 12:27 Dose:  20 mg


Doxycycline Hyclate 100 mg/ (Sodium Chloride)  100 mls @ 100 mls/hr IVPB Q12 [facility]


   PRN Reason: Protocol


   Last Admin: 08/15/17 22:10 Dose:  100 mls/hr


Piperacillin Sod/Tazobactam Sod (Zosyn 3.375 In Ns 100ml)  100 mls @ 200 mls/hr 

IVPB Q6 [facility]


   PRN Reason: Protocol


   Stop: 08/22/17 00:01


   Last Admin: 08/16/17 05:34 Dose:  200 mls/hr


Insulin Detemir (Levemir)  40 unit SC HS [facility]


   Last Admin: 08/15/17 22:37 Dose:  40 unit


Insulin Human Lispro (Humalog)  20 units SC AC [facility]


   Last Admin: 08/15/17 16:50 Dose:  Not Given


Insulin Human Regular (Humulin R High)  0 units SC ACHS [facility]


   PRN Reason: Protocol


   Last Admin: 08/15/17 22:36 Dose:  Not Given


Metoprolol Tartrate (Lopressor)  50 mg PO BID [facility]


   Last Admin: 08/15/17 17:13 Dose:  50 mg


Morphine Sulfate (Morphine)  4 mg IVP Q4H [facility]N


   PRN Reason: Pain, severe (8-10)


   Last Admin: 08/15/17 23:09 Dose:  4 mg


Mupirocin (Bactroban Ointment)  1 gm TOP BID [facility]


   Last Admin: 08/15/17 17:10 Dose:  Not Given


Non-Formulary Medication (Mycophenolate Sodium [Myfortic])  360 mg PO BID [facility]


   Last Admin: 08/15/17 17:10 Dose:  Not Given


Oxycodone/Acetaminophen (Percocet 5/325 Mg Tab)  1 tab PO Q4 PRN


   PRN Reason: Pain, moderate (4-7)


   Stop: 08/17/17 20:01


   Last Admin: 08/14/17 21:30 Dose:  1 tab


Prednisone (Prednisone Tab)  10 mg PO DAILY [facility]


   Last Admin: 08/15/17 15:32 Dose:  10 mg


Tacrolimus (Prograf Cap)  1.5 mg PO HS [facility]


   Last Admin: 08/15/17 22:11 Dose:  1.5 mg


Tacrolimus (Prograf Cap)  2 mg PO DAILY [facility]


   Last Admin: 08/15/17 12:26 Dose:  2 mg


Trimethoprim/Sulfamethoxazole (Bactrim Ss Tab)  1 tab PO DAILY [facility]


   PRN Reason: Protocol


   Last Admin: 08/15/17 12:26 Dose:  1 tab











- Labs


Labs: 


 





 08/16/17 06:15 





 08/16/17 06:15 





 











PT  11.7 Seconds (9.9-11.8)   08/14/17  16:30    


 


INR  1.08  (0.93-1.08)   08/14/17  16:30    


 


APTT  31.4 Seconds (23.7-30.8)  H  08/14/17  16:30    














Assessment and Plan





- Assessment and Plan (Free Text)


Assessment: 


1) Osteomyolitis of the left distal cortex of the 5th metatarsal s/p wound 

debridement and bone excision. 


 2) Bilateral infiltration on chest x-ray, likely atelectasis 


 3) PAD s/p angioplasty of the left tibial artery


 4) s/p amputated left 5th foot 


 5) IDDM2


 6) Dyslipidemia 


 7) htn


 8) renal transplant


 9) Arrhythmias s/p pacemaker


 10) Constipation


 11) CAD, h/o CABG 








Plan: 


S/P wound debridement and bone incision day 1. wound culture growing gram 

negative etta and positive cocci pending sensitivity. On zosyn and doxy. 


AM glucose elevated, will check fingerstick, will continue basal and pre-meal 

insulin for DM.


On tacrolimus, prednisone, myfortic, and Bactrim for renal transplant and 

prophylaxis. 


On lopressor, asa, and Norvasc for htn/CAD. 


Morphine and percocet prn for pain. 


Heparin/Pepcid for dvt/gi prophylaxis. 








Patient seen, examined and case discussed with Dr Calderon. 








<Andrea Calderon S - Last Filed: 08/16/17 17:57>





Objective





- Vital Signs/Intake and Output


Vital Signs (last 24 hours): 


 











Temp Pulse Resp BP Pulse Ox


 


 98.0 F   74   20   118/72   96 


 


 08/16/17 16:03  08/16/17 17:26  08/16/17 16:03  08/16/17 17:26  08/16/17 16:03








Intake and Output: 


 











 08/16/17 08/16/17





 06:59 18:59


 


Intake Total 1220 660


 


Balance 1220 660














- Medications


Medications: 


 Current Medications





Amlodipine Besylate (Norvasc)  10 mg PO DAILY [facility]


   Last Admin: 08/16/17 12:23 Dose:  10 mg


Aspirin (Ecotrin)  81 mg PO DAILY [facility]


   Last Admin: 08/16/17 12:24 Dose:  81 mg


Docusate Sodium (Colace)  100 mg PO DAILY [facility]


   Last Admin: 08/16/17 12:23 Dose:  100 mg


Famotidine (Pepcid)  20 mg PO DAILY [facility]


   Last Admin: 08/16/17 12:24 Dose:  20 mg


Heparin Sodium (Porcine) (Heparin)  5,000 units SC Q12 [facility]


   PRN Reason: Protocol


Doxycycline Hyclate 100 mg/ (Sodium Chloride)  100 mls @ 100 mls/hr IVPB Q12 [facility]


   PRN Reason: Protocol


   Last Admin: 08/16/17 14:05 Dose:  100 mls/hr


Piperacillin Sod/Tazobactam Sod (Zosyn 3.375 In Ns 100ml)  100 mls @ 200 mls/hr 

IVPB Q6 [facility]


   PRN Reason: Protocol


   Stop: 08/22/17 00:01


   Last Admin: 08/16/17 17:25 Dose:  200 mls/hr


Insulin Detemir (Levemir)  40 unit SC HS [facility]


   Last Admin: 08/15/17 22:37 Dose:  40 unit


Insulin Human Lispro (Humalog)  20 units SC AC [facility]


   Last Admin: 08/16/17 17:26 Dose:  Not Given


Insulin Human Regular (Humulin R High)  0 units SC ACHS [facility]


   PRN Reason: Protocol


   Last Admin: 08/16/17 17:27 Dose:  Not Given


Metoprolol Tartrate (Lopressor)  50 mg PO BID [facility]


   Last Admin: 08/16/17 17:26 Dose:  50 mg


Morphine Sulfate (Morphine)  4 mg IVP Q4H [facility]


   PRN Reason: Pain, severe (8-10)


   Last Admin: 08/16/17 12:44 Dose:  4 mg


Mupirocin (Bactroban Ointment)  1 gm TOP BID [facility]


   Last Admin: 08/16/17 17:15 Dose:  Not Given


Non-Formulary Medication (Mycophenolate Sodium [Myfortic])  360 mg PO BID [facility]


   Last Admin: 08/16/17 17:28 Dose:  Not Given


Oxycodone/Acetaminophen (Percocet 5/325 Mg Tab)  1 tab PO Q4 [facility]


   PRN Reason: Pain, moderate (4-7)


   Stop: 08/17/17 20:01


   Last Admin: 08/16/17 17:21 Dose:  1 tab


Prednisone (Prednisone Tab)  5 mg PO DAILY [facility]


Tacrolimus (Prograf Cap)  1.5 mg PO [facility]


   Last Admin: 08/15/17 22:11 Dose:  1.5 mg


Tacrolimus (Prograf Cap)  2 mg PO DAILY [facility]


   Last Admin: 08/16/17 12:23 Dose:  2 mg


Trimethoprim/Sulfamethoxazole (Bactrim Ss Tab)  1 tab PO DAILY [facility]


   PRN Reason: Protocol


   Last Admin: 08/16/17 12:22 Dose:  1 tab











- Labs


Labs: 


 





 08/16/17 06:15 





 08/16/17 06:15 





 











PT  11.7 Seconds (9.9-11.8)   08/14/17  16:30    


 


INR  1.08  (0.93-1.08)   08/14/17  16:30    


 


APTT  31.4 Seconds (23.7-30.8)  H  08/14/17  16:30    














Assessment and Plan





- Assessment and Plan (Free Text)


Plan: 





Pt was seen and examined. Resident note reviewed and I agree with it. Will need 

PICC line. May need outpt Abx. Wound Cx pending. Spoke to Dr Danielson.

## 2017-08-16 NOTE — CP.PCM.PN
Subjective





- Date & Time of Evaluation


Date of Evaluation: 08/16/17


Time of Evaluation: 13:10





- Subjective


Subjective: 





57 year old male seen at bedside concerning prognosis 1 day s/p debridement of 

infected right foot ulcer. Pt has no reported post-op pain. Pt has no post-op 

complications. Pt is good spirits. Pt denies recent f/c/cp/sob/n/v. 


 





Objective





- Vital Signs/Intake and Output


Vital Signs (last 24 hours): 


 











Temp Pulse Resp BP Pulse Ox


 


 97.7 F   77   20   177/95 H  96 


 


 08/16/17 08:00  08/16/17 08:00  08/16/17 08:00  08/16/17 12:23  08/16/17 08:00








Intake and Output: 


 











 08/16/17 08/16/17





 06:59 18:59


 


Intake Total 1220 


 


Balance 1220 














- Medications


Medications: 


 Current Medications





Amlodipine Besylate (Norvasc)  10 mg PO DAILY [facility]


   Last Admin: 08/16/17 12:23 Dose:  10 mg


Aspirin (Ecotrin)  81 mg PO DAILY [facility]


   Last Admin: 08/16/17 12:24 Dose:  81 mg


Docusate Sodium (Colace)  100 mg PO DAILY [facility]


   Last Admin: 08/16/17 12:23 Dose:  100 mg


Famotidine (Pepcid)  20 mg PO DAILY [facility]


   Last Admin: 08/16/17 12:24 Dose:  20 mg


Heparin Sodium (Porcine) (Heparin)  5,000 units SC Q12 [facility]


   PRN Reason: Protocol


Doxycycline Hyclate 100 mg/ (Sodium Chloride)  100 mls @ 100 mls/hr IVPB Q12 [facility]


   PRN Reason: Protocol


   Last Admin: 08/15/17 22:10 Dose:  100 mls/hr


Piperacillin Sod/Tazobactam Sod (Zosyn 3.375 In Ns 100ml)  100 mls @ 200 mls/hr 

IVPB Q6 [facility]


   PRN Reason: Protocol


   Stop: 08/22/17 00:01


   Last Admin: 08/16/17 12:28 Dose:  200 mls/hr


Insulin Detemir (Levemir)  40 unit SC HS [facility]


   Last Admin: 08/15/17 22:37 Dose:  40 unit


Insulin Human Lispro (Humalog)  20 units SC AC [facility]


   Last Admin: 08/16/17 12:25 Dose:  Not Given


Insulin Human Regular (Humulin R High)  0 units SC ACHS [facility]


   PRN Reason: Protocol


   Last Admin: 08/16/17 12:25 Dose:  15 units


Metoprolol Tartrate (Lopressor)  50 mg PO BID [facility]


   Last Admin: 08/16/17 12:24 Dose:  50 mg


Morphine Sulfate (Morphine)  4 mg IVP Q4H [facility]N


   PRN Reason: Pain, severe (8-10)


   Last Admin: 08/16/17 12:44 Dose:  4 mg


Mupirocin (Bactroban Ointment)  1 gm TOP BID [facility]


   Last Admin: 08/16/17 12:27 Dose:  Not Given


Non-Formulary Medication (Mycophenolate Sodium [Myfortic])  360 mg PO BID [facility]


   Last Admin: 08/16/17 12:28 Dose:  Not Given


Oxycodone/Acetaminophen (Percocet 5/325 Mg Tab)  1 tab PO Q4 [facility]


   PRN Reason: Pain, moderate (4-7)


   Stop: 08/17/17 20:01


   Last Admin: 08/14/17 21:30 Dose:  1 tab


Prednisone (Prednisone Tab)  5 mg PO DAILY [facility]


Tacrolimus (Prograf Cap)  1.5 mg PO HS [facility]


   Last Admin: 08/15/17 22:11 Dose:  1.5 mg


Tacrolimus (Prograf Cap)  2 mg PO DAILY [facility]


   Last Admin: 08/16/17 12:23 Dose:  2 mg


Trimethoprim/Sulfamethoxazole (Bactrim Ss Tab)  1 tab PO DAILY [facility]


   PRN Reason: Protocol


   Last Admin: 08/16/17 12:22 Dose:  1 tab











- Labs


Labs: 


 





 08/16/17 06:15 





 08/16/17 06:15 





 











PT  11.7 Seconds (9.9-11.8)   08/14/17  16:30    


 


INR  1.08  (0.93-1.08)   08/14/17  16:30    


 


APTT  31.4 Seconds (23.7-30.8)  H  08/14/17  16:30    














- Constitutional


Appears: Well, Non-toxic, No Acute Distress





- Extremities Exam


Additional comments: 





Right lower extremity focused. 





Neuro-vascalar statis is intact to left foot. Cyanotic changes noted to lateral 

borders of left 4th digit. Capillary refill time to all 4 digital tamiko is < 4 

seconds. 





DERM: 5.1 x 7.0 full thickness ulcer with 70% fibrotic to 30% granular wound 

base. No exposire of remaining 5th metatarsal. No malodor or active drainage 

noted. Minor non-streaking xiomara-woind erythema noted. 








- Neurological Exam


Neurological Exam: Alert, Awake, Oriented x3





- Psychiatric Exam


Psychiatric exam: Normal Affect, Normal Mood





Assessment and Plan





- Assessment and Plan (Free Text)


Assessment: 





57 year old male 1 day s/p Left foot Nolan grade 3 ulcer, 5th metatarsal 

Osteomyelitis, secondary to wound dehiscence due resulting from prior surgical 

complication 


Plan: 





Pt seen and evaluated with attending, Dr. Danielson. Chart, labs, and vitals 

reviewed. Afebrile, absent leukocytosis. 


ESR= 51. 


Wound culture preliminary results: G(-) Rods & G(+) Cocci.


PICC line pending. 


Cleansed wound with sterile saline. Dressed with Hydrogel, xeroform, and DSD. 

Resume use of Bactroban to site tomorrow. 


Pt to continue to use forefoot offloading shoe to left foot while 

weightbearing. 


Pt pt be placed partial weightbearing to avoid 5th metatarsal wound 

complications. 





Podiatry will continue to follow patient while inhouse.

## 2017-08-16 NOTE — VASCULAR
PROCEDURE:  Ultrasound and fluoroscopically placed right upper 

extremity PICC line.



HISTORY:

Osteomyelitis and nonhealing postsurgical wound of left foot.  

Long-term IV antibiotics.  Needs PICC line.



PHYSICIAN(S):  Benjamin Conner MD.



TECHNIQUE:

The relative risks and indications of the procedure were explained to 

the patient and consent obtained. The patient was placed supine on 

the arteriogram table and the right arm prepped and draped in the 

usual sterile fashion. A tourniquet was applied to the right axilla.



1% Xylocaine was used to anesthetize the skin and soft tissues at the 

puncture site above the elbow. The right basilic vein was punctured 

under direct ultrasound guidance with a micropuncture set. A 0.018 

guidewire was advanced centrally and used to measure the length to 

the SVC/RA junction. A 5 [place] single-lumen PICC line 40 cm long was 

advanced to the SVC/RA junction. The catheter was flushed and 

secured. The patient tolerated the procedure well.



IMPRESSION:

1. Ultrasound and fluoroscopically placed right upper extremity PICC 

line. A 5 [place] single-lumen PICC line 40 cm long was advanced to 

the SVC/RA junction.

## 2017-08-17 ENCOUNTER — HOSPITAL ENCOUNTER (INPATIENT)
Dept: HOSPITAL 42 - TRCU | Age: 57
LOS: 8 days | Discharge: HOME | DRG: 638 | End: 2017-08-25
Attending: INTERNAL MEDICINE | Admitting: INTERNAL MEDICINE
Payer: MEDICAID

## 2017-08-17 VITALS — BODY MASS INDEX: 27.1 KG/M2

## 2017-08-17 VITALS
DIASTOLIC BLOOD PRESSURE: 84 MMHG | HEART RATE: 70 BPM | SYSTOLIC BLOOD PRESSURE: 122 MMHG | OXYGEN SATURATION: 96 % | RESPIRATION RATE: 18 BRPM | TEMPERATURE: 97.5 F

## 2017-08-17 DIAGNOSIS — I50.9: ICD-10-CM

## 2017-08-17 DIAGNOSIS — I25.10: ICD-10-CM

## 2017-08-17 DIAGNOSIS — E78.5: ICD-10-CM

## 2017-08-17 DIAGNOSIS — E11.65: ICD-10-CM

## 2017-08-17 DIAGNOSIS — L97.529: ICD-10-CM

## 2017-08-17 DIAGNOSIS — Z79.4: ICD-10-CM

## 2017-08-17 DIAGNOSIS — E11.52: ICD-10-CM

## 2017-08-17 DIAGNOSIS — E11.43: ICD-10-CM

## 2017-08-17 DIAGNOSIS — E11.69: Primary | ICD-10-CM

## 2017-08-17 DIAGNOSIS — Z94.0: ICD-10-CM

## 2017-08-17 DIAGNOSIS — Z79.2: ICD-10-CM

## 2017-08-17 DIAGNOSIS — Z95.1: ICD-10-CM

## 2017-08-17 DIAGNOSIS — M86.9: ICD-10-CM

## 2017-08-17 DIAGNOSIS — E11.621: ICD-10-CM

## 2017-08-17 DIAGNOSIS — F41.9: ICD-10-CM

## 2017-08-17 DIAGNOSIS — I11.0: ICD-10-CM

## 2017-08-17 DIAGNOSIS — J44.9: ICD-10-CM

## 2017-08-17 DIAGNOSIS — K59.00: ICD-10-CM

## 2017-08-17 DIAGNOSIS — E11.42: ICD-10-CM

## 2017-08-17 DIAGNOSIS — L03.032: ICD-10-CM

## 2017-08-17 DIAGNOSIS — Z87.891: ICD-10-CM

## 2017-08-17 DIAGNOSIS — K31.84: ICD-10-CM

## 2017-08-17 DIAGNOSIS — Z95.810: ICD-10-CM

## 2017-08-17 LAB
BASOPHILS # BLD AUTO: 0.02 K/MM3 (ref 0–2)
BASOPHILS NFR BLD: 0.2 % (ref 0–3)
BUN SERPL-MCNC: 18 MG/DL (ref 7–21)
CALCIUM SERPL-MCNC: 8.5 MG/DL (ref 8.4–10.5)
EOSINOPHIL # BLD: 0.3 10*3/UL (ref 0–0.7)
EOSINOPHIL NFR BLD: 2.8 % (ref 1.5–5)
ERYTHROCYTE [DISTWIDTH] IN BLOOD BY AUTOMATED COUNT: 14.2 % (ref 11.5–14.5)
GFR NON-AFRICAN AMERICAN: > 60
GRANULOCYTES # BLD: 7.42 10*3/UL (ref 1.4–6.5)
GRANULOCYTES NFR BLD: 74.4 % (ref 50–68)
HGB BLD-MCNC: 11.6 G/DL (ref 14–18)
LYMPHOCYTES # BLD: 1.5 10*3/UL (ref 1.2–3.4)
LYMPHOCYTES NFR BLD AUTO: 15.1 % (ref 22–35)
MCH RBC QN AUTO: 31.5 PG (ref 25–35)
MCHC RBC AUTO-ENTMCNC: 33.5 G/DL (ref 31–37)
MCV RBC AUTO: 94 FL (ref 80–105)
MONOCYTES # BLD AUTO: 0.8 10*3/UL (ref 0.1–0.6)
MONOCYTES NFR BLD: 7.5 % (ref 1–6)
PLATELET # BLD: 223 10^3/UL (ref 120–450)
PMV BLD AUTO: 9.6 FL (ref 7–11)
RBC # BLD AUTO: 3.68 10^6/UL (ref 3.5–6.1)
WBC # BLD AUTO: 10 10^3/UL (ref 4.5–11)

## 2017-08-17 RX ADMIN — TAZOBACTAM SODIUM AND PIPERACILLIN SODIUM SCH MLS/HR: 500; 4 INJECTION, SOLUTION INTRAVENOUS at 21:40

## 2017-08-17 RX ADMIN — TAZOBACTAM SODIUM AND PIPERACILLIN SODIUM SCH MLS/HR: 500; 4 INJECTION, SOLUTION INTRAVENOUS at 12:38

## 2017-08-17 RX ADMIN — SULFAMETHOXAZOLE AND TRIMETHOPRIM SCH TAB: 400; 80 TABLET ORAL at 09:38

## 2017-08-17 RX ADMIN — MORPHINE SULFATE PRN MG: 4 INJECTION, SOLUTION INTRAMUSCULAR; INTRAVENOUS at 16:31

## 2017-08-17 RX ADMIN — INSULIN DETEMIR SCH UNIT: 100 INJECTION, SOLUTION SUBCUTANEOUS at 21:46

## 2017-08-17 RX ADMIN — TAZOBACTAM SODIUM AND PIPERACILLIN SODIUM SCH MLS/HR: 500; 4 INJECTION, SOLUTION INTRAVENOUS at 17:45

## 2017-08-17 RX ADMIN — INSULIN HUMAN SCH: 100 INJECTION, SOLUTION PARENTERAL at 21:47

## 2017-08-17 RX ADMIN — INSULIN LISPRO SCH: 100 INJECTION, SOLUTION INTRAVENOUS; SUBCUTANEOUS at 12:31

## 2017-08-17 RX ADMIN — MORPHINE SULFATE PRN MG: 4 INJECTION, SOLUTION INTRAMUSCULAR; INTRAVENOUS at 09:44

## 2017-08-17 RX ADMIN — INSULIN HUMAN SCH: 100 INJECTION, SOLUTION PARENTERAL at 16:40

## 2017-08-17 RX ADMIN — INSULIN LISPRO SCH: 100 INJECTION, SOLUTION INTRAVENOUS; SUBCUTANEOUS at 16:40

## 2017-08-17 RX ADMIN — INSULIN HUMAN SCH UNITS: 100 INJECTION, SOLUTION PARENTERAL at 12:31

## 2017-08-17 RX ADMIN — INSULIN LISPRO SCH: 100 INJECTION, SOLUTION INTRAVENOUS; SUBCUTANEOUS at 08:25

## 2017-08-17 RX ADMIN — PIPERACILLIN AND TAZOBACTAM SCH MLS/HR: 3; .375 INJECTION, POWDER, LYOPHILIZED, FOR SOLUTION INTRAVENOUS; PARENTERAL at 06:10

## 2017-08-17 RX ADMIN — INSULIN HUMAN SCH UNITS: 100 INJECTION, SOLUTION PARENTERAL at 08:25

## 2017-08-17 RX ADMIN — OXYCODONE HYDROCHLORIDE AND ACETAMINOPHEN PRN TAB: 5; 325 TABLET ORAL at 14:04

## 2017-08-17 RX ADMIN — OXYCODONE HYDROCHLORIDE AND ACETAMINOPHEN PRN TAB: 5; 325 TABLET ORAL at 20:49

## 2017-08-17 NOTE — PN
DATE:  08/17/2017



SUBJECTIVE:  The patient has no complaints of any chest pain or shortness

of breath.  He is comfortable.  He was admitted to the hospital because he

had foot that had a large ulceration.  The left foot was debrided by Dr. Danielson.  Cultures are pending.  The patient has no complaints of any

headaches or dizziness.  No nausea.  No vomiting.  He does have history of

peripheral arterial disease.  The patient's wound cultures show Gram

negative rods.  The sensitivities are pending.



He has a PICC line that was placed in the right arm.  Once the culture and

sensitivities are back, we will decide regarding the patient's discharge. 

He is comfortable.  He has no pain.



PHYSICAL EXAMINATION:

VITAL SIGNS:  Temperature is 98, pulse is 74, blood pressure is 118/72,

respirations 20, and O2 saturation 96%.

GENERAL:  The patient is lying in bed, flat, comfortable.

HEENT:  No oral lesion.  Anicteric sclerae.  Moist mucosa.

NECK:  No JVD, adenopathy, or thyromegaly.

CARDIOVASCULAR:  S1 and S2, regular.  No murmurs, rubs, or gallops.

LUNGS:  Clear to auscultation bilaterally.  No wheeze, rales, or rhonchi.

ABDOMEN:  Bowel sounds are positive, soft, nontender and nondistended.

EXTREMITIES:  Left foot is covered with dressing.



ASSESSMENT:

1.  Osteomyelitis of the left foot metatarsal, status post debridement.

2.  Peripheral arterial disease, status post angioplasty of the left tibial

artery.

3.  Status post amputation of left fifth toe.

4.  Diabetes type 2.

5.  Dyslipidemia.

6.  Hypertension.

7.  Renal transplant.

8.  Pacemaker.

9.  Constipation.

10.  Coronary artery disease/coronary artery bypass grafting.



PLAN:  The patient has cultures that are positive.  There are Gram negative

rods and Gram positive cocci.  The patient is currently on Bactrim as

prophylaxis.  He is on Colace.  The patient is on heparin for DVT

prophylaxis.  He is going to continue with metoprolol for his coronary

artery disease.  He is also on aspirin for his coronary artery disease.  He

is taking his triple immunosuppressive regimen with mycophenolate 360 mg

twice a day.  He is receiving prednisone 5 mg daily, which is his usual

dose.  He is on tacrolimus 1.5 mg in the evening and 2 mg in the daytime. 

He is on Zosyn of his antibiotics.



I did speak with Dr. Guevara yesterday.  We will wait for the culture reports

to see if he requires outpatient versus inpatient antibiotics.



Condition is stable.



Activities increase as tolerated.



The patient's creatinine has been stable at 1.





__________________________________________

Andrea Calderon MD



DD:  08/17/2017 7:35:54

DT:  08/17/2017 9:47:12

Job # [id number]

## 2017-08-17 NOTE — CP.PCM.PN
Subjective





- Date & Time of Evaluation


Date of Evaluation: 08/17/17


Time of Evaluation: 10:55





- Subjective


Subjective: 





No new complaints, no fevers.





Objective





- Vital Signs/Intake and Output


Vital Signs (last 24 hours): 


 











Temp Pulse Resp BP Pulse Ox


 


 98.0 F   74   20   118/72   96 


 


 08/16/17 16:03  08/16/17 17:26  08/16/17 16:03  08/16/17 17:26  08/16/17 16:03








Intake and Output: 


 











 08/17/17 08/17/17





 06:59 18:59


 


Intake Total 1480 


 


Output Total 3 


 


Balance 1477 














- Medications


Medications: 


 Current Medications





Amlodipine Besylate (Norvasc)  10 mg PO DAILY [facility]


   Last Admin: 08/16/17 12:23 Dose:  10 mg


Aspirin (Ecotrin)  81 mg PO DAILY [facility]


   Last Admin: 08/16/17 12:24 Dose:  81 mg


Docusate Sodium (Colace)  100 mg PO DAILY [facility]


   Last Admin: 08/16/17 12:23 Dose:  100 mg


Famotidine (Pepcid)  20 mg PO DAILY [facility]


   Last Admin: 08/16/17 12:24 Dose:  20 mg


Heparin Sodium (Porcine) (Heparin)  5,000 units SC Q12 [facility]


   PRN Reason: Protocol


   Last Admin: 08/16/17 21:00 Dose:  5,000 units


Doxycycline Hyclate 100 mg/ (Sodium Chloride)  100 mls @ 100 mls/hr IVPB Q12 [facility]


   PRN Reason: Protocol


   Last Admin: 08/16/17 21:47 Dose:  100 mls/hr


Piperacillin Sod/Tazobactam Sod (Zosyn 4.5 Gm In Ns 100ml)  4.5 gm in 100 mls @ 

200 mls/hr IVPB Q6 [facility]


   PRN Reason: Protocol


   Stop: 08/25/17 12:01


Insulin Detemir (Levemir)  40 unit SC HS [facility]


   Last Admin: 08/16/17 21:44 Dose:  40 unit


Insulin Human Lispro (Humalog)  20 units SC AC [facility]


   Last Admin: 08/17/17 08:25 Dose:  Not Given


Insulin Human Regular (Humulin R High)  0 units SC ACHS [facility]


   PRN Reason: Protocol


   Last Admin: 08/17/17 08:25 Dose:  7 units


Metoprolol Tartrate (Lopressor)  50 mg PO BID [facility]


   Last Admin: 08/16/17 17:26 Dose:  50 mg


Morphine Sulfate (Morphine)  4 mg IVP Q4H [facility]


   PRN Reason: Pain, severe (8-10)


   Last Admin: 08/16/17 21:00 Dose:  4 mg


Mupirocin (Bactroban Ointment)  1 gm TOP BID [facility]


   Last Admin: 08/16/17 17:15 Dose:  Not Given


Non Formulary Medication ( Mycophenolate Sodium [Myfortic] 180 Mg)  360 mg PO 

BID [facility]


Oxycodone/Acetaminophen (Percocet 5/325 Mg Tab)  1 tab PO Q4 [facility]


   PRN Reason: Pain, moderate (4-7)


   Stop: 08/17/17 20:01


   Last Admin: 08/16/17 17:21 Dose:  1 tab


Prednisone (Prednisone Tab)  5 mg PO DAILY [facility]


Tacrolimus (Prograf Cap)  1.5 mg PO HS [facility]


   Last Admin: 08/16/17 21:01 Dose:  1.5 mg


Tacrolimus (Prograf Cap)  2 mg PO DAILY [facility]


   Last Admin: 08/16/17 12:23 Dose:  2 mg


Trimethoprim/Sulfamethoxazole (Bactrim Ss Tab)  1 tab PO DAILY [facility]


   PRN Reason: Protocol


   Last Admin: 08/16/17 12:22 Dose:  1 tab











- Labs


Labs: 


 





 08/17/17 06:17 





 08/17/17 06:17 





 











PT  11.7 Seconds (9.9-11.8)   08/14/17  16:30    


 


INR  1.08  (0.93-1.08)   08/14/17  16:30    


 


APTT  31.4 Seconds (23.7-30.8)  H  08/14/17  16:30    














- Constitutional


Appears: Non-toxic, No Acute Distress





- Head Exam


Head Exam: NORMAL INSPECTION





- Neck Exam


Neck Exam: absent: Meningismus





- Respiratory Exam


Respiratory Exam: Decreased Breath Sounds





- Cardiovascular Exam


Cardiovascular Exam: +S1, +S2





- GI/Abdominal Exam


GI & Abdominal Exam: Soft.  absent: Tenderness





- Extremities Exam


Additional comments: 





left foot with dressings in place





Assessment and Plan





- Assessment and Plan (Free Text)


Plan: 





Assessment


Left 5th metatarsal osteomyelitis S/P debridement and bone excision POD #1, 

growing Acinetobacter and E. faecalis


CAD S/P CABG


chronic CHF


DM


COPD


S/P AICD placement


S/P kidney transplant





Plan


on Doxycycline and Zosyn - we can d/c Doxycycline and wound recommend 4-6 weeks 

of antibiotics with weekly ESR, CRP, CBC, CMP; as discussed with Dr. Danielson, 

there is still infected bone that's within the foot; follow up final pathology 

report


will continue to monitor clinically

## 2017-08-17 NOTE — CP.PCM.PN
Subjective





- Date & Time of Evaluation


Date of Evaluation: 08/17/17


Time of Evaluation: 10:53





- Subjective


Subjective: 





57 year old male seen at bedside concerning prognosis 2 days s/p debridement of 

infected right foot ulcer. Patient denies of pain to the site of debridement.  

Dressing to Left lower extremity clean dry and intact. Pt denies recent f/c/cp/

sob/n/v. 





Objective





- Vital Signs/Intake and Output


Vital Signs (last 24 hours): 


 











Temp Pulse Resp BP Pulse Ox


 


 98.1 F   74   20   144/89   97 


 


 08/17/17 08:00  08/17/17 09:40  08/17/17 08:00  08/17/17 09:41  08/17/17 08:00








Intake and Output: 


 











 08/17/17 08/17/17





 06:59 18:59


 


Intake Total 1480 


 


Output Total 3 


 


Balance 1477 














- Medications


Medications: 


 Current Medications





Amlodipine Besylate (Norvasc)  10 mg PO DAILY [facility]


   Last Admin: 08/17/17 09:41 Dose:  10 mg


Aspirin (Ecotrin)  81 mg PO DAILY [facility]


   Last Admin: 08/17/17 09:39 Dose:  81 mg


Docusate Sodium (Colace)  100 mg PO DAILY [facility]


   Last Admin: 08/17/17 09:39 Dose:  100 mg


Famotidine (Pepcid)  20 mg PO DAILY [facility]


   Last Admin: 08/17/17 09:41 Dose:  20 mg


Heparin Sodium (Porcine) (Heparin)  5,000 units SC Q12 [facility]


   PRN Reason: Protocol


   Last Admin: 08/17/17 09:39 Dose:  5,000 units


Doxycycline Hyclate 100 mg/ (Sodium Chloride)  100 mls @ 100 mls/hr IVPB Q12 [facility]


   PRN Reason: Protocol


   Last Admin: 08/17/17 09:42 Dose:  100 mls/hr


Piperacillin Sod/Tazobactam Sod (Zosyn 4.5 Gm In Ns 100ml)  4.5 gm in 100 mls @ 

200 mls/hr IVPB Q6 [facility]


   PRN Reason: Protocol


   Stop: 08/25/17 12:01


Insulin Detemir (Levemir)  40 unit SC HS [facility]


   Last Admin: 08/16/17 21:44 Dose:  40 unit


Insulin Human Lispro (Humalog)  20 units SC AC [facility]


   Last Admin: 08/17/17 08:25 Dose:  Not Given


Insulin Human Regular (Humulin R High)  0 units SC ACHS [facility]


   PRN Reason: Protocol


   Last Admin: 08/17/17 08:25 Dose:  7 units


Metoprolol Tartrate (Lopressor)  50 mg PO BID [facility]


   Last Admin: 08/17/17 09:40 Dose:  50 mg


Morphine Sulfate (Morphine)  4 mg IVP Q4H PRN


   PRN Reason: Pain, severe (8-10)


   Last Admin: 08/17/17 09:44 Dose:  4 mg


Mupirocin (Bactroban Ointment)  1 gm TOP BID [facility]


   Last Admin: 08/17/17 09:38 Dose:  Not Given


Non Formulary Medication ( Mycophenolate Sodium [Myfortic] 180 Mg)  360 mg PO 

BID [facility]


   Last Admin: 08/17/17 09:40 Dose:  360 mg


Oxycodone/Acetaminophen (Percocet 5/325 Mg Tab)  1 tab PO Q4 PRN


   PRN Reason: Pain, moderate (4-7)


   Stop: 08/17/17 20:01


   Last Admin: 08/16/17 17:21 Dose:  1 tab


Prednisone (Prednisone Tab)  5 mg PO DAILY [facility]


   Last Admin: 08/17/17 09:41 Dose:  5 mg


Tacrolimus (Prograf Cap)  1.5 mg PO HS [facility]


   Last Admin: 08/16/17 21:01 Dose:  1.5 mg


Tacrolimus (Prograf Cap)  2 mg PO DAILY [facility]


   Last Admin: 08/17/17 09:42 Dose:  2 mg


Trimethoprim/Sulfamethoxazole (Bactrim Ss Tab)  1 tab PO DAILY [facility]


   PRN Reason: Protocol


   Last Admin: 08/17/17 09:38 Dose:  1 tab











- Labs


Labs: 


 





 08/17/17 06:17 





 08/17/17 06:17 





 











PT  11.7 Seconds (9.9-11.8)   08/14/17  16:30    


 


INR  1.08  (0.93-1.08)   08/14/17  16:30    


 


APTT  31.4 Seconds (23.7-30.8)  H  08/14/17  16:30    














- Constitutional


Appears: Well, Non-toxic, No Acute Distress





- Extremities Exam


Additional comments: 








Right lower extremity focused. 


DERM: 5.1 x 7.0 full thickness ulcer with 80% fibrotic to 20% granular wound 

base. No exposire of remaining 5th metatarsal. No malodor or active drainage 

noted. Minor non-streaking xiomara-woind erythema noted. 








Neuro-vascalar statis is intact to left foot. Cyanotic changes noted to lateral 

borders of left 4th digit. Capillary refill time to all 4 digital tamiko is < 4 

seconds. 








- Neurological Exam


Neurological Exam: Awake, Oriented x3





- Psychiatric Exam


Psychiatric exam: Normal Affect, Normal Mood





- Skin


Skin Exam: Normal Color, Warm





Assessment and Plan





- Assessment and Plan (Free Text)


Assessment: 





57 year old male 2 days s/p Left foot Nolan grade 3 ulcer, 5th metatarsal 

Osteomyelitis, secondary to wound dehiscence due resulting from prior surgical 

complication 


Plan: 








Pt seen and evaluated with attending, Dr. Danielson. Chart, labs, and vitals 

reviewed. Afebrile, absent leukocytosis. 


Wound culture preliminary results: G(-) Rods & G(+) Cocci.





Cleansed wound with sterile saline. 


Dressed with Bactroban, xeroform, and DSD.


Pt to continue to use forefoot offloading shoe to left foot while 

weightbearing. 


Pt pt be placed partial weightbearing to avoid 5th metatarsal wound 

complications. 





Podiatry will continue to follow patient while inhouse.

## 2017-08-18 PROCEDURE — F07Z9FZ GAIT TRAINING/FUNCTIONAL AMBULATION TREATMENT USING ASSISTIVE, ADAPTIVE, SUPPORTIVE OR PROTECTIVE EQUIPMENT: ICD-10-PCS | Performed by: INTERNAL MEDICINE

## 2017-08-18 PROCEDURE — F08Z4FZ HOME MANAGEMENT TREATMENT USING ASSISTIVE, ADAPTIVE, SUPPORTIVE OR PROTECTIVE EQUIPMENT: ICD-10-PCS | Performed by: INTERNAL MEDICINE

## 2017-08-18 RX ADMIN — INSULIN HUMAN SCH UNITS: 100 INJECTION, SOLUTION PARENTERAL at 16:27

## 2017-08-18 RX ADMIN — TAZOBACTAM SODIUM AND PIPERACILLIN SODIUM SCH MLS/HR: 500; 4 INJECTION, SOLUTION INTRAVENOUS at 12:22

## 2017-08-18 RX ADMIN — MORPHINE SULFATE PRN MG: 4 INJECTION, SOLUTION INTRAMUSCULAR; INTRAVENOUS at 18:47

## 2017-08-18 RX ADMIN — INSULIN LISPRO SCH: 100 INJECTION, SOLUTION INTRAVENOUS; SUBCUTANEOUS at 16:26

## 2017-08-18 RX ADMIN — INSULIN HUMAN SCH UNITS: 100 INJECTION, SOLUTION PARENTERAL at 06:52

## 2017-08-18 RX ADMIN — OXYCODONE HYDROCHLORIDE AND ACETAMINOPHEN PRN TAB: 5; 325 TABLET ORAL at 21:05

## 2017-08-18 RX ADMIN — MORPHINE SULFATE PRN MG: 4 INJECTION, SOLUTION INTRAMUSCULAR; INTRAVENOUS at 12:09

## 2017-08-18 RX ADMIN — INSULIN HUMAN SCH: 100 INJECTION, SOLUTION PARENTERAL at 21:55

## 2017-08-18 RX ADMIN — INSULIN HUMAN SCH: 100 INJECTION, SOLUTION PARENTERAL at 12:01

## 2017-08-18 RX ADMIN — INSULIN LISPRO SCH: 100 INJECTION, SOLUTION INTRAVENOUS; SUBCUTANEOUS at 06:55

## 2017-08-18 RX ADMIN — INSULIN DETEMIR SCH UNIT: 100 INJECTION, SOLUTION SUBCUTANEOUS at 21:58

## 2017-08-18 RX ADMIN — INSULIN LISPRO SCH: 100 INJECTION, SOLUTION INTRAVENOUS; SUBCUTANEOUS at 12:01

## 2017-08-18 RX ADMIN — SULFAMETHOXAZOLE AND TRIMETHOPRIM SCH TAB: 400; 80 TABLET ORAL at 12:08

## 2017-08-18 RX ADMIN — TAZOBACTAM SODIUM AND PIPERACILLIN SODIUM SCH MLS/HR: 500; 4 INJECTION, SOLUTION INTRAVENOUS at 23:37

## 2017-08-18 RX ADMIN — TAZOBACTAM SODIUM AND PIPERACILLIN SODIUM SCH MLS/HR: 500; 4 INJECTION, SOLUTION INTRAVENOUS at 17:03

## 2017-08-18 RX ADMIN — TAZOBACTAM SODIUM AND PIPERACILLIN SODIUM SCH MLS/HR: 500; 4 INJECTION, SOLUTION INTRAVENOUS at 03:02

## 2017-08-18 NOTE — CP.PCM.CON
History of Present Illness





- History of Present Illness


History of Present Illness: 


57 year old male with PMH of CAD S/P CABG, chronic CHF, DM, COPD, S/P AICD 

placement, S/P kidney transplant was initially admitted because of 

osteomyelitis of the left foot and debdridement was done. Not all of the 

infected bone was removed and he remains on prolonged antibiotics to treat it. 

He is now transferred to [facility] for continued medical therapy and physical 

rehabiltiation. Infectious Diseases consult is requested to continue antibiotic 

therapy. Currently he is comfortable, not in distress, denies fever or chills, 

no nausea or vomiting, no headache or dizziness, no chest pain, no SOB, no 

cough or colds, no diarrhea, no dysuria.





Review of Systems





- Review of Systems


All systems: reviewed and no additional remarkable complaints except (as per HPI

)





Past Patient History





- Infectious Disease


Hx of Infectious Diseases: None





- Tetanus Immunizations


Tetanus Immunization: Unknown





- Past Social History


Smoking Status: Former Smoker





- CARDIAC


Hx Cardiac Disorders: Yes (MI,CAD)


Hx Hypertension: Yes





- PULMONARY


Hx Chronic Obstructive Pulmonary Disease (COPD): Yes





- NEUROLOGICAL


Hx Neurological Disorder: No





- HEENT


Hx HEENT Problems: No





- RENAL


Hx Renal Failure: Yes





- ENDOCRINE/METABOLIC


Hx Diabetes Mellitus Type 2: Yes





- HEMATOLOGICAL/ONCOLOGICAL


Hx Blood Disorders: No





- INTEGUMENTARY


Hx Dermatological Problems: Yes


Other/Comment: LEFT BIG TOE POST AMPUTATION-GANGRENE TO AREA AMPUTATED.7-24-17





- MUSCULOSKELETAL/RHEUMATOLOGICAL


Hx Falls: No





- GASTROINTESTINAL


Hx Gastrointestinal Disorders: Yes (INCARCERATED INCISIONAL HERNIA,

GASTROENTERITIS, CONSTIPATION)


Other/Comment: h/o c diff





- GENITOURINARY/GYNECOLOGICAL


Hx Genitourinary Disorders:  (kidney transplant 2012)


Hx Reproductive Disorders: No





- PSYCHIATRIC


Hx Psychophysiologic Disorder: Yes (H/O SMOKING CIGARETTES 1/2 PPD,ETOH USE 

SOCAILLY)


Hx Depression: Yes


Hx Emotional Abuse: No


Hx Physical Abuse: No





- SURGICAL HISTORY


Other/Comment: CABG.  L 5th toe amputation.  Kidney transplant





- ANESTHESIA


Hx Anesthesia: Yes


Hx Anesthesia Reactions: No


Hx Malignant Hyperthermia: No





Meds


Allergies/Adverse Reactions: 


 Allergies











Allergy/AdvReac Type Severity Reaction Status Date / Time


 


No Known Allergies Allergy   Verified 07/24/17 20:12














- Medications


Medications: 


 Current Medications





Amlodipine Besylate (Norvasc)  10 mg PO DAILY [facility]


   PRN Reason: Protocol


Aspirin (Ecotrin)  81 mg PO 0800 [facility]


   PRN Reason: Protocol


Docusate Sodium (Colace)  100 mg PO DAILY [facility]


   PRN Reason: Protocol


Famotidine (Pepcid)  20 mg PO DAILY [facility]


   PRN Reason: Protocol


Heparin Sodium (Porcine) (Heparin)  5,000 units SC Q12 [facility]


   PRN Reason: Protocol


   Last Admin: 08/17/17 21:34 Dose:  5,000 units


Piperacillin Sod/Tazobactam Sod (Zosyn 4.5 Gm In Ns 100ml)  4.5 gm in 100 mls @ 

200 mls/hr IVPB Q6 [facility]


   PRN Reason: Protocol


   Stop: 08/25/17 12:01


Insulin Detemir (Levemir)  40 unit SC HS [facility]


   PRN Reason: Protocol


   Last Admin: 08/17/17 21:46 Dose:  40 unit


Insulin Human Lispro (Humalog)  20 units SC AC [facility]


   PRN Reason: Protocol


Insulin Human Regular (Humulin R High)  0 units SC ACHS [facility]


   PRN Reason: Protocol


   Last Admin: 08/17/17 21:47 Dose:  Not Given


Metoprolol Tartrate (Lopressor)  50 mg PO 0800,1800 [facility]


   PRN Reason: Protocol


Morphine Sulfate (Morphine)  4 mg IVP Q4H PRN; Protocol


   PRN Reason: Pain, severe (8-10)


Mupirocin (Bactroban Ointment)  0 gm TOP BID [facility]


   PRN Reason: Protocol


Oxycodone/Acetaminophen (Percocet 5/325 Mg Tab)  1 tab PO Q4H PRN; Protocol


   PRN Reason: Pain, moderate (4-7)


   Stop: 08/20/17 20:09


   Last Admin: 08/17/17 20:49 Dose:  1 tab


Prednisone (Prednisone Tab)  5 mg PO DAILY [facility]


   PRN Reason: Protocol


Tacrolimus (Prograf Cap)  1.5 mg PO HS [facility]


   PRN Reason: Protocol


   Last Admin: 08/17/17 21:39 Dose:  1.5 mg


Tacrolimus (Prograf Cap)  2 mg PO DAILY [facility]


   PRN Reason: Protocol


Trimethoprim/Sulfamethoxazole (Bactrim Ss Tab)  1 tab PO DAILY [facility]


   PRN Reason: Protocol











Physical Exam





- Constitutional


Appears: Non-toxic, No Acute Distress





- Head Exam


Head Exam: NORMAL INSPECTION





- ENT Exam


ENT Exam: Mucous Membranes Moist





- Neck Exam


Neck exam: Negative for: Lymphadenopathy, Meningismus





- Respiratory Exam


Respiratory Exam: Decreased Breath Sounds





- Cardiovascular Exam


Cardiovascular Exam: +S1, +S2





- GI/Abdominal Exam


GI & Abdominal Exam: Soft.  absent: Tenderness





- Extremities Exam


Additional comments: 





left foot with dressings in place





Results





- Vital Signs


Recent Vital Signs: 


 Last Vital Signs











Temp  97.6 F   08/17/17 22:02


 


Pulse  72   08/17/17 22:02


 


Resp  20   08/17/17 22:02


 


BP  114/78   08/17/17 22:02


 


Pulse Ox      














Assessment & Plan





- Assessment and Plan (Free Text)


Plan: 





Assessment


Left 5th metatarsal osteomyelitis S/P debridement and bone excision POD #3, 

growing Acinetobacter and E. faecalis


CAD S/P CABG


chronic CHF


DM


COPD


S/P AICD placement


S/P kidney transplant





Plan


continue Zosynfor 4-6 weeks with weekly ESR, CRP, CBC, CMP; as discussed with 

Dr. Danielson, there is still infected bone that's within the foot; follow up 

final pathology report


will continue to monitor clinically

## 2017-08-18 NOTE — CON
DATE:



HISTORY OF PRESENT ILLNESS:  I was called for consult on [facility] for

hyperbaric oxygen.  I saw him in  care unit, called in by Dr. Danielson

and Dr. Calderon.  He is a 57-year-old male, sitting up in bed, resting

comfortably.  He is having a left foot issue.  He had surgery of the left

foot ulcer and debridement with podiatry and he needs hyperbaric oxygen to

help it heal.



PAST MEDICAL HISTORY:  He has a past medical history of insulin-dependent

diabetes, hypertension, high cholesterol, anxiety, pacemaker, renal

transplant, peripheral artery disease, left foot ulcer, status post

angioplasty of left tibial artery, status post amputated left fifth toe,

worsening of the left foot infection.  He is being seen by Infectious

Disease and Podiatry.



PAST SURGICAL HISTORY:  He has had pacemaker placement, CABG.



SOCIAL HISTORY:  Former smoker, no alcohol, no illicit drugs.  He lives

with his wife.  He can ambulate unassisted.



MEDICATIONS:  Now are Bactrim, Bactroban cream, Colace, Ecotrin, heparin,

insulin, Humalog, Humulin, Levemir,Lopressor, morphine, Norvasc, Pepcid,

Percocet, prednisone, Prograf and Zosyn IV.



ALLERGIES:  HE HAS NO KNOWN DRUG ALLERGIES.



REVIEW OF SYSTEMS:  No acute vision changes or hearing changes.  No sore

throat.  No neck pain.  No chest pain or shortness of breath, no

palpitations, no coughing, no wheezing.  No abdominal pain, no nausea,

vomiting, constipation or diarrhea.  Extremities, left foot is bandaged,

right foot is also bandaged, little bit of pain in the feet, status post

debridement.  No numbness or tingling.



PHYSICAL EXAMINATION:

VITAL SIGNS:  He has a temperature of 97.6, pulse 68, blood pressure

114/70, respiratory rate 20.

HEENT:  Head is atraumatic, normocephalic.  Extraocular muscles intact. 

Throat is moist.

NECK:  Supple.

HEART:  Regular rate.

LUNGS:  Decreased breath sounds, but clear to auscultation.

ABDOMEN:  Soft, nontender, positive bowel sounds.

EXTREMITIES:  Wrapped, mild trace edema, status post surgery of the left

foot.

NEUROLOGIC:  He is alert and oriented x3.  Cranial nerves II through XII

grossly intact.

PSYCHIATRIC:  He has normal affect, comfortable, smiling, good mood.

SKIN:  For the most part, is intact except for both feet.

LYMPH:  Thyroid midline.  No palpable appreciable lymphadenopathy.



LABORATORY DATA:  He had a white count of 9.8, hemoglobin 13.4, hematocrit

38.7, with platelets 231.  He had a sodium of 135, potassium 4.8, BUN 20,

creatinine 0.8, GFR is greater than 60, sugar is 178,  is 200 plus,

calcium is 10, total bilirubin 0.9, AST 21, ALT 25, alkaline phosphatase

150, total protein 6.6, albumin 3.9.



ASSESSMENT AND PLAN:  He is here for osteomyelitis of the left foot, he had

bilateral infiltrates on x-ray, likely atelectasis, peripheral artery

disease, status post angioplasty, status post anterior left foot, status

post incision and debridement of the left foot, diabetes, high cholesterol,

hypertension, status post renal transplant, he is status post pacemaker,

coronary artery disease, history of coronary artery bypass graft.  After

review of his chest x-ray, EKG, labs, medications, nonsmoking, I also

looked in his ears, they were clear, tympanic membranes are visualized, in

fact no infection.  He is medically cleared for hyperbaric oxygen.





__________________________________________

Grant Neal DO



DD:  08/18/2017 8:21:54

DT:  08/18/2017 12:38:02

Job # [id number]







MTDD

## 2017-08-18 NOTE — CP.PCM.HP
<Loretta Burciaga - Last Filed: 08/18/17 08:59>





History of Present Illness





- History of Present Illness


History of Present Illness: 


CC: Left foot ulcer s/p surgery, need rehab. 








Patient is a 56 y/o with pmhx of IDDM2, HTN, dyslipidemia, anxiety, pacemaker, 

renal transplant, PAD, left foot ulcer s/p angioplasty of left tibial artery, s/

p amputated left 5th foot whom was sent on 8/14 from podiatric office for 

worsening left foot infection and pain. Left foot x-ray in the ED revealed 

osteomyolitis of the foot. Patient underwent wound debridement and bone 

resection on the 15th. Wound cultures currently growing acinetobacter and E. 

faecalis. Patient is currently on TCU for IV antibiotics and rehab. 


Patient seen and examined in TCU, comfortable, no complaints. Patient denies cp 

sob, n/v/d, denies fever or chills. Patient reported he has occasional left 

lower extremities pain, especially when ambulating, but overall the pain is 

improved compared to when he was admitted. 








PMH: IDDM2, HTN, dyslipidemia, anxiety, pacemaker, CAD, renal transplant, PAD, 

left foot ulcer s/p angioplasty of left tibial artery, s/p amputated left 5th 

foot.


PSH: amputated left 5th toe, angioplasty, pacemaker placement, CABG ( 5 years 

ago) 


FMH: Positive for DM and htn. 


Social: Former tobacco, denies alcohol, or illicit drug use. Lives with his 

wife. Able to ambulate without a cane. 








Present on Admission





- Present on Admission


Any Indicators Present on Admission: No


History of DVT/PE: No


History of Uncontrolled Diabetes: No


Urinary Catheter: No


Decubitus Ulcer Present: No


History Surgical Site Infection Following: Orthopedic Procedures (amputated 

left 5th toe)





Review of Systems





- Review of Systems


All systems: reviewed and no additional remarkable complaints except


Review of Systems: 





As stated in HPI. 





Past Patient History





- Infectious Disease


Hx of Infectious Diseases: None





- Tetanus Immunizations


Tetanus Immunization: Unknown





- Past Social History


Smoking Status: Former Smoker


Alcohol: None


Drugs: Denies


Home Situation {Lives}: With Family





- CARDIAC


Hx Cardiac Disorders: Yes (MI,CAD)


Hx Hypertension: Yes





- PULMONARY


Hx Chronic Obstructive Pulmonary Disease (COPD): Yes





- NEUROLOGICAL


Hx Neurological Disorder: No





- HEENT


Hx HEENT Problems: No





- RENAL


Hx Renal Failure: Yes





- ENDOCRINE/METABOLIC


Hx Diabetes Mellitus Type 2: Yes





- HEMATOLOGICAL/ONCOLOGICAL


Hx Blood Disorders: No





- INTEGUMENTARY


Hx Dermatological Problems: Yes


Other/Comment: LEFT BIG TOE POST AMPUTATION-GANGRENE TO AREA AMPUTATED.7-24-17





- MUSCULOSKELETAL/RHEUMATOLOGICAL


Hx Falls: No





- GASTROINTESTINAL


Hx Gastrointestinal Disorders: Yes (INCARCERATED INCISIONAL HERNIA,

GASTROENTERITIS, CONSTIPATION)


Other/Comment: h/o c diff





- GENITOURINARY/GYNECOLOGICAL


Hx Genitourinary Disorders:  (kidney transplant 2012)


Hx Reproductive Disorders: No





- PSYCHIATRIC


Hx Psychophysiologic Disorder: Yes (H/O SMOKING CIGARETTES 1/2 PPD,ETOH USE 

SOCAILLY)


Hx Depression: Yes


Hx Emotional Abuse: No


Hx Physical Abuse: No





- SURGICAL HISTORY


Other/Comment: CABG.  L 5th toe amputation.  Kidney transplant





- ANESTHESIA


Hx Anesthesia: Yes


Hx Anesthesia Reactions: No


Hx Malignant Hyperthermia: No





Meds


Allergies/Adverse Reactions: 


 Allergies











Allergy/AdvReac Type Severity Reaction Status Date / Time


 


No Known Allergies Allergy   Verified 07/24/17 20:12














Physical Exam





- Constitutional


Appears: No Acute Distress, Chronically Ill





- Head Exam


Head Exam: ATRAUMATIC, NORMAL INSPECTION, NORMOCEPHALIC





- Eye Exam


Eye Exam: EOMI, Normal appearance, PERRL.  absent: Scleral icterus


Pupil Exam: NORMAL ACCOMODATION





- ENT Exam


ENT Exam: Mucous Membranes Moist, Normal Exam





- Neck Exam


Neck exam: Positive for: Full Rom, Normal Inspection.  Negative for: Tenderness





- Respiratory Exam


Respiratory Exam: Clear to Auscultation Bilateral, NORMAL BREATHING PATTERN.  

absent: Prolonged Expiratory Phase, Rales, Rhonchi, Wheezes, Respiratory 

Distress, Stridor





- Cardiovascular Exam


Cardiovascular Exam: REGULAR RHYTHM, RRR, +S1, +S2.  absent: Bradycardia, 

Tachycardia, Gallop, JVD, Rubs, Systolic Murmur





- GI/Abdominal Exam


GI & Abdominal Exam: Normal Bowel Sounds, Soft.  absent: Distended, Firm, 

Guarding, Organomegaly, Rebound, Rigid, Tenderness





- Extremities Exam


Extremities exam: Negative for: pedal edema


Additional comments: 





Left foot wrapped in clean dressing, no signs of drainage thru the dressing. 





- Back Exam


Back exam: NORMAL INSPECTION.  absent: tenderness





- Neurological Exam


Neurological exam: Alert, Oriented x3, Reflexes Normal





- Psychiatric Exam


Psychiatric exam: Normal Affect, Normal Mood





- Skin


Skin Exam: Abrasion (on the legs bilaterally, superficial. ), Dry, Normal Color

, Warm





Results





- Vital Signs


Recent Vital Signs: 





 Last Vital Signs











Temp  97.6 F   08/17/17 22:02


 


Pulse  68   08/18/17 08:07


 


Resp  20   08/17/17 22:02


 


BP  133/72   08/18/17 08:07


 


Pulse Ox      














Assessment & Plan





- Assessment and Plan (Free Text)


Assessment: 


1) Left 5th metatarsal osteomyelitis S/P debridement and bone excision 


 2) PAD s/p angioplasty of the left tibial artery


 3) s/p amputated left 5th foot 


 4) IDDM2


 5) Dyslipidemia 


 6) htn


 7) renal transplant


 8) Arrhythmias s/p pacemaker


 9) Constipation


 10) CAD, h/o CABG 








Plan: 


Wound cultures are growing Acinetobacter and E. faecalis. On zosyn for 4-6 

weeks as per ID. 


Wound care as per prodiatry. 


Will continued on basal and pre-meal insulin for DM. 


On tacrolimus, prednisone, myfortic for renal transplant and bactrim for 

prophylaxis.


Will continue Lopressor, asa for CAD and Norvasc for htn. 


Morphine and percocet prn for pain. 


On pepcid/heparin for GI/DVT prophylaxis. Patient is also on colace for 

constipation prevention. 





Patient seen, examined and case discussed with Dr Calderon. 








- Date & Time


Date: 08/18/17


Time: 07:50





<Andrea Calderon S - Last Filed: 08/18/17 19:49>





Results





- Vital Signs


Recent Vital Signs: 





 Last Vital Signs











Temp  97.8 F   08/18/17 09:08


 


Pulse  70   08/18/17 17:04


 


Resp  20   08/18/17 09:08


 


BP  137/82   08/18/17 17:04


 


Pulse Ox  96   08/18/17 09:08














- Labs


Labs: 





 Laboratory Results - last 24 hr











  08/18/17 08/18/17





  08:58 11:09


 


POC Glucose (mg/dL)  176 H  122 H














Assessment & Plan





- Assessment and Plan (Free Text)


Plan: 








Pt seen and examined. Agree with resident note and plan of care. Meds and labs 

reviewed by me. Will follow. This is a consult note.

## 2017-08-18 NOTE — CP.PCM.PN
<Rosario Ignacio - Last Filed: 08/18/17 12:09>





Subjective





- Date & Time of Evaluation


Date of Evaluation: 08/18/17


Time of Evaluation: 12:09





- Subjective


Subjective: 





57 year old male seen at bedside concerning prognosis 3 days s/p debridement of 

infected right foot ulcer. Dressing appreas clean dry and intact. Patient 

denies of pain to the site of debridement.  Dressing to Left lower extremity 

clean dry and intact. Patient denies recent f/c/cp/sob/n/v. 





Objective





- Vital Signs/Intake and Output


Vital Signs (last 24 hours): 


 











Temp Pulse Resp BP Pulse Ox


 


 97.8 F   68   20   133/72   96 


 


 08/18/17 09:08  08/18/17 09:08  08/18/17 09:08  08/18/17 09:08  08/18/17 09:08











- Medications


Medications: 


 Current Medications





Amlodipine Besylate (Norvasc)  10 mg PO DAILY [facility]


   PRN Reason: Protocol


Aspirin (Ecotrin)  81 mg PO 0800 [facility]


   PRN Reason: Protocol


   Last Admin: 08/18/17 08:06 Dose:  81 mg


Docusate Sodium (Colace)  100 mg PO DAILY [facility]


   PRN Reason: Protocol


Famotidine (Pepcid)  20 mg PO DAILY [facility]


   PRN Reason: Protocol


Heparin Sodium (Porcine) (Heparin)  5,000 units SC Q12 [facility]


   PRN Reason: Protocol


   Last Admin: 08/17/17 21:34 Dose:  5,000 units


Piperacillin Sod/Tazobactam Sod (Zosyn 4.5 Gm In Ns 100ml)  4.5 gm in 100 mls @ 

200 mls/hr IVPB Q6 [facility]


   PRN Reason: Protocol


   Stop: 08/25/17 12:01


Insulin Detemir (Levemir)  40 unit SC HS [facility]


   PRN Reason: Protocol


   Last Admin: 08/17/17 21:46 Dose:  40 unit


Insulin Human Lispro (Humalog)  20 units SC AC [facility]


   PRN Reason: Protocol


   Last Admin: 08/18/17 06:55 Dose:  Not Given


Insulin Human Regular (Humulin R High)  0 units SC ACHS [facility]


   PRN Reason: Protocol


   Last Admin: 08/18/17 06:52 Dose:  4 units


Metoprolol Tartrate (Lopressor)  50 mg PO 0800,1800 [facility]


   PRN Reason: Protocol


   Last Admin: 08/18/17 08:07 Dose:  50 mg


Morphine Sulfate (Morphine)  4 mg IVP Q4H PRN; Protocol


   PRN Reason: Pain, severe (8-10)


Mupirocin (Bactroban Ointment)  0 gm TOP BID [facility]


   PRN Reason: Protocol


Oxycodone/Acetaminophen (Percocet 5/325 Mg Tab)  1 tab PO Q4H PRN; Protocol


   PRN Reason: Pain, moderate (4-7)


   Stop: 08/20/17 20:09


   Last Admin: 08/17/17 20:49 Dose:  1 tab


Prednisone (Prednisone Tab)  5 mg PO DAILY [facility]


   PRN Reason: Protocol


Tacrolimus (Prograf Cap)  1.5 mg PO HS [facility]


   PRN Reason: Protocol


   Last Admin: 08/17/17 21:39 Dose:  1.5 mg


Tacrolimus (Prograf Cap)  2 mg PO DAILY [facility]


   PRN Reason: Protocol


Trimethoprim/Sulfamethoxazole (Bactrim Ss Tab)  1 tab PO DAILY [facility]


   PRN Reason: Protocol











- Constitutional


Appears: Well, Non-toxic, No Acute Distress





- Extremities Exam


Additional comments: 











Right lower extremity focused. 


DERM: 5.1 x 7.0 full thickness ulcer with 80% fibrotic to 20% granular wound 

base. No exposire of remaining 5th metatarsal. No malodor or active drainage 

noted. Minor non-streaking xiomara-woind erythema noted. No purulent discharge is 

noted.








Neuro-vascalar statis is intact to left foot. Cyanotic changes noted to lateral 

borders of left 4th digit. Capillary refill time to all 4 digital tamiko is < 4 

seconds. 





- Neurological Exam


Neurological Exam: Alert, Awake, Oriented x3





- Psychiatric Exam


Psychiatric exam: Normal Affect, Normal Mood





- Skin


Skin Exam: Normal Color, Warm





Assessment and Plan





- Assessment and Plan (Free Text)


Assessment: 





57 year old male 3 days s/p Left foot Nolan grade 3 ulcer, 5th metatarsal 

Osteomyelitis, secondary to wound dehiscence due resulting from prior surgical 

complication 


Plan: 





Pt seen and evaluated with attending, Dr. Delvalle. 


Chart, labs, and vitals reviewed. Afebrile, absent leukocytosis. 


Wound culture preliminary results: G(-) Rods & G(+) Cocci.





Cleansed wound with sterile saline. 


Dressed with Bactroban, xeroform, and DSD.


Pt to continue to use forefoot offloading shoe to left foot while 

weightbearing. 


Pt pt be placed partial weightbearing to avoid 5th metatarsal wound 

complications. 





Podiatry will continue to follow patient while inhouse.  





<Gerald Delvalle - Last Filed: 08/19/17 08:21>





Objective





- Vital Signs/Intake and Output


Vital Signs (last 24 hours): 


 











Temp Pulse Resp BP Pulse Ox


 


 97.8 F   79   20   122/77   96 


 


 08/18/17 09:08  08/19/17 08:14  08/18/17 09:08  08/19/17 08:14  08/18/17 09:08











- Medications


Medications: 


 Current Medications





Amlodipine Besylate (Norvasc)  10 mg PO DAILY [facility]


   PRN Reason: Protocol


   Last Admin: 08/18/17 12:08 Dose:  10 mg


Aspirin (Ecotrin)  81 mg PO 0800 [facility]


   PRN Reason: Protocol


   Last Admin: 08/19/17 08:14 Dose:  81 mg


Docusate Sodium (Colace)  100 mg PO DAILY [facility]


   PRN Reason: Protocol


   Last Admin: 08/18/17 12:07 Dose:  100 mg


Famotidine (Pepcid)  20 mg PO DAILY [facility]


   PRN Reason: Protocol


   Last Admin: 08/18/17 12:08 Dose:  20 mg


Heparin Sodium (Porcine) (Heparin)  5,000 units SC Q12 [facility]


   PRN Reason: Protocol


   Last Admin: 08/18/17 21:04 Dose:  5,000 units


Piperacillin Sod/Tazobactam Sod (Zosyn 4.5 Gm In Ns 100ml)  4.5 gm in 100 mls @ 

200 mls/hr IVPB Q6 [facility]


   PRN Reason: Protocol


   Stop: 08/25/17 12:01


   Last Admin: 08/19/17 06:09 Dose:  200 mls/hr


Insulin Detemir (Levemir)  40 unit SC HS [facility]


   PRN Reason: Protocol


   Last Admin: 08/18/17 21:58 Dose:  40 unit


Insulin Human Lispro (Humalog)  20 units SC AC [facility]


   PRN Reason: Protocol


   Last Admin: 08/19/17 07:48 Dose:  Not Given


Insulin Human Regular (Humulin R High)  0 units SC ACHS [facility]


   PRN Reason: Protocol


   Last Admin: 08/19/17 06:45 Dose:  7 units


Metoprolol Tartrate (Lopressor)  50 mg PO 0800,1800 [facility]


   PRN Reason: Protocol


   Last Admin: 08/19/17 08:14 Dose:  50 mg


Morphine Sulfate (Morphine)  4 mg IVP Q4H PRN; Protocol


   PRN Reason: Pain, severe (8-10)


   Last Admin: 08/18/17 18:47 Dose:  4 mg


Mupirocin (Bactroban Ointment)  0 gm TOP BID [facility]


   PRN Reason: Protocol


   Last Admin: 08/18/17 17:04 Dose:  Not Given


Oxycodone/Acetaminophen (Percocet 5/325 Mg Tab)  1 tab PO Q4H PRN; Protocol


   PRN Reason: Pain, moderate (4-7)


   Stop: 08/20/17 20:09


   Last Admin: 08/18/17 21:05 Dose:  1 tab


Prednisone (Prednisone Tab)  5 mg PO DAILY [facility]


   PRN Reason: Protocol


   Last Admin: 08/18/17 12:06 Dose:  5 mg


Tacrolimus (Prograf Cap)  1.5 mg PO HS [facility]


   PRN Reason: Protocol


   Last Admin: 08/18/17 21:05 Dose:  1.5 mg


Tacrolimus (Prograf Cap)  2 mg PO DAILY [facility]


   PRN Reason: Protocol


   Last Admin: 08/18/17 12:07 Dose:  2 mg


Trimethoprim/Sulfamethoxazole (Bactrim Ss Tab)  1 tab PO DAILY [facility]


   PRN Reason: Protocol


   Last Admin: 08/18/17 12:08 Dose:  1 tab











Attending/Attestation





- Attestation


I have personally seen and examined this patient.: Yes


I have fully participated in the care of the patient.: Yes


I have reviewed all pertinent clinical information, including history, physical 

exam and plan: Yes

## 2017-08-19 RX ADMIN — TAZOBACTAM SODIUM AND PIPERACILLIN SODIUM SCH MLS/HR: 500; 4 INJECTION, SOLUTION INTRAVENOUS at 13:01

## 2017-08-19 RX ADMIN — INSULIN HUMAN SCH UNITS: 100 INJECTION, SOLUTION PARENTERAL at 17:08

## 2017-08-19 RX ADMIN — INSULIN LISPRO SCH: 100 INJECTION, SOLUTION INTRAVENOUS; SUBCUTANEOUS at 07:48

## 2017-08-19 RX ADMIN — INSULIN HUMAN SCH: 100 INJECTION, SOLUTION PARENTERAL at 12:23

## 2017-08-19 RX ADMIN — INSULIN LISPRO SCH: 100 INJECTION, SOLUTION INTRAVENOUS; SUBCUTANEOUS at 12:23

## 2017-08-19 RX ADMIN — SULFAMETHOXAZOLE AND TRIMETHOPRIM SCH TAB: 400; 80 TABLET ORAL at 10:35

## 2017-08-19 RX ADMIN — TAZOBACTAM SODIUM AND PIPERACILLIN SODIUM SCH MLS/HR: 500; 4 INJECTION, SOLUTION INTRAVENOUS at 06:09

## 2017-08-19 RX ADMIN — TAZOBACTAM SODIUM AND PIPERACILLIN SODIUM SCH MLS/HR: 500; 4 INJECTION, SOLUTION INTRAVENOUS at 23:07

## 2017-08-19 RX ADMIN — MORPHINE SULFATE PRN MG: 4 INJECTION, SOLUTION INTRAMUSCULAR; INTRAVENOUS at 16:33

## 2017-08-19 RX ADMIN — INSULIN LISPRO SCH: 100 INJECTION, SOLUTION INTRAVENOUS; SUBCUTANEOUS at 16:36

## 2017-08-19 RX ADMIN — INSULIN HUMAN SCH: 100 INJECTION, SOLUTION PARENTERAL at 21:38

## 2017-08-19 RX ADMIN — TAZOBACTAM SODIUM AND PIPERACILLIN SODIUM SCH MLS/HR: 500; 4 INJECTION, SOLUTION INTRAVENOUS at 17:05

## 2017-08-19 RX ADMIN — OXYCODONE HYDROCHLORIDE AND ACETAMINOPHEN PRN TAB: 5; 325 TABLET ORAL at 14:20

## 2017-08-19 RX ADMIN — INSULIN HUMAN SCH UNITS: 100 INJECTION, SOLUTION PARENTERAL at 06:45

## 2017-08-19 RX ADMIN — INSULIN DETEMIR SCH UNIT: 100 INJECTION, SOLUTION SUBCUTANEOUS at 21:38

## 2017-08-19 RX ADMIN — MORPHINE SULFATE PRN MG: 4 INJECTION, SOLUTION INTRAMUSCULAR; INTRAVENOUS at 09:20

## 2017-08-19 NOTE — PN
DATE:  08/19/2017



SUBJECTIVE:  The patient was seen earlier today in room 318.  No fevers and

no chills.  She is doing well.



PHYSICAL EXAMINATION

VITAL SIGNS:  Temperature is 98, blood pressure is 137/80, respiratory rate

of 16.

HEENT:  Unremarkable.

NECK:  Supple.

LUNGS:  Decreased breath sounds.

HEART:  Normal S1 and S2.

ABDOMEN:  Soft and nontender.



LABORATORY DATA:  Examination reveals laboratories are noted and review of

orders reveal the patient to be on p.o. Bactrim and prednisone, tacrolimus,

and Zofran.  Microbiology reveals left foot cultures are pending.  Cultures

from the 15th show enterococcus and Acinetobacter.



ASSESSMENT AND PLAN:  A 57-year-old male with coronary artery disease,

status post coronary bypass graft; chronic congestive heart failure;

diabetes; chronic obstructive lung disease; automatic implantable

cardioverter-defibrillator, and kidney transplant admitted because of

osteomyelitis of left foot and debridement and now in transitional care

with a diagnosis of a left 5th metatarsal osteomyelitis, status post

debridement and bone excision, postoperative day #4, grown Acinetobacter

and Enterococcus faecalis in a patient with coronary artery disease,

congestive heart failure, chronic obstructive lung disease, diabetes

mellitus, history of kidney transplant and history of automatic implantable

cardioverter-defibrillator placement and coronary bypass graft, and

currently on Zosyn for 4-6 weeks with weekly laboratories of chemistries

and sedimentation rate, C-reactive protein, and CBC.  Dr. Danielson feels

there is severely infected bone that is within the foot and will check on

the pathology report.  We will follow closely with you.



__________________________________________

Duc Bee MD





DD:  08/19/2017 15:50:30

DT:  08/19/2017 19:28:36

Job # [id number]

## 2017-08-19 NOTE — CP.PCM.PN
<CarltonFatou - Last Filed: 08/19/17 09:44>





Subjective





- Date & Time of Evaluation


Date of Evaluation: 08/19/17


Time of Evaluation: 09:44





- Subjective


Subjective: 


57 year old male seen at bedside with attending Dr. Delvalle 4 days s/p 

debridement of infected right foot ulcer. Dressing clean dry and intact. 

Patient denies of pain to the site of debridement.  Dressing to Left lower 

extremity clean dry and intact. Patient denies any n/v/f/c/sob/cp.





Objective





- Vital Signs/Intake and Output


Vital Signs (last 24 hours): 


 











Temp Pulse Resp BP Pulse Ox


 


 97.8 F   79   20   122/77   96 


 


 08/18/17 09:08  08/19/17 08:14  08/18/17 09:08  08/19/17 08:14  08/18/17 09:08











- Medications


Medications: 


 Current Medications





Amlodipine Besylate (Norvasc)  10 mg PO DAILY [facility]


   PRN Reason: Protocol


   Last Admin: 08/18/17 12:08 Dose:  10 mg


Aspirin (Ecotrin)  81 mg PO 0800 [facility]


   PRN Reason: Protocol


   Last Admin: 08/19/17 08:14 Dose:  81 mg


Docusate Sodium (Colace)  100 mg PO DAILY [facility]


   PRN Reason: Protocol


   Last Admin: 08/18/17 12:07 Dose:  100 mg


Famotidine (Pepcid)  20 mg PO DAILY [facility]


   PRN Reason: Protocol


   Last Admin: 08/18/17 12:08 Dose:  20 mg


Heparin Sodium (Porcine) (Heparin)  5,000 units SC Q12 [facility]


   PRN Reason: Protocol


   Last Admin: 08/18/17 21:04 Dose:  5,000 units


Piperacillin Sod/Tazobactam Sod (Zosyn 4.5 Gm In Ns 100ml)  4.5 gm in 100 mls @ 

200 mls/hr IVPB Q6 [facility]


   PRN Reason: Protocol


   Stop: 08/25/17 12:01


   Last Admin: 08/19/17 06:09 Dose:  200 mls/hr


Insulin Detemir (Levemir)  40 unit SC HS [facility]


   PRN Reason: Protocol


   Last Admin: 08/18/17 21:58 Dose:  40 unit


Insulin Human Lispro (Humalog)  20 units SC AC [facility]


   PRN Reason: Protocol


   Last Admin: 08/19/17 07:48 Dose:  Not Given


Insulin Human Regular (Humulin R High)  0 units SC ACHS [facility]


   PRN Reason: Protocol


   Last Admin: 08/19/17 06:45 Dose:  7 units


Metoprolol Tartrate (Lopressor)  50 mg PO 0800,1800 [facility]


   PRN Reason: Protocol


   Last Admin: 08/19/17 08:14 Dose:  50 mg


Morphine Sulfate (Morphine)  4 mg IVP Q4H PRN; Protocol


   PRN Reason: Pain, severe (8-10)


   Last Admin: 08/19/17 09:20 Dose:  4 mg


Mupirocin (Bactroban Ointment)  0 gm TOP BID [facility]


   PRN Reason: Protocol


   Last Admin: 08/18/17 17:04 Dose:  Not Given


Oxycodone/Acetaminophen (Percocet 5/325 Mg Tab)  1 tab PO Q4H PRN; Protocol


   PRN Reason: Pain, moderate (4-7)


   Stop: 08/20/17 20:09


   Last Admin: 08/18/17 21:05 Dose:  1 tab


Prednisone (Prednisone Tab)  5 mg PO DAILY [facility]


   PRN Reason: Protocol


   Last Admin: 08/18/17 12:06 Dose:  5 mg


Tacrolimus (Prograf Cap)  1.5 mg PO HS [facility]


   PRN Reason: Protocol


   Last Admin: 08/18/17 21:05 Dose:  1.5 mg


Tacrolimus (Prograf Cap)  2 mg PO DAILY [facility]


   PRN Reason: Protocol


   Last Admin: 08/18/17 12:07 Dose:  2 mg


Trimethoprim/Sulfamethoxazole (Bactrim Ss Tab)  1 tab PO DAILY [facility]


   PRN Reason: Protocol


   Last Admin: 08/18/17 12:08 Dose:  1 tab











- Constitutional


Appears: Well, Non-toxic, No Acute Distress





- Extremities Exam


Additional comments: 


Left lower extremity focused exam:





DERM: An approximately 5.1 cm by 7.0 cm full thickness ulcer with 80% fibrotic 

to 20% granular wound base. No exposed bone of remaining 5th metatarsal. No 

malodor or active drainage noted. Minor non-streaking xiomara-woind erythema 

noted. No purulent discharge is noted. 4th digit is dusky and CFT is delayed to 

the digit.





VASC: DP and PT pulses palpable. Skin temperature warm to warm from proximal to 

distal





NEURO: Gross sensation diminished





- Neurological Exam


Neurological Exam: Alert, Awake, Oriented x3





- Psychiatric Exam


Psychiatric exam: Normal Affect, Normal Mood





Assessment and Plan





- Assessment and Plan (Free Text)


Assessment: 


57 year old male 4 days s/p Left foot Nolan grade 3 ulcer, 5th metatarsal 

Osteomyelitis, secondary to wound dehiscence due resulting from prior surgical 

complication 


Plan: 


Patient examined and evaluated with attending, Dr. Delvalle. 


Chart, labs, and vitals reviewed. Afebrile, absent leukocytosis. 


Wound culture preliminary results: G(-) Rods & G(+) Cocci.


Surgical site cleansed with sterile saline. 


Dressed with Bactroban, xeroform, and DSD.


Pt to continue to use forefoot offloading shoe to left foot while 

weightbearing. 


Pt be placed partial weightbearing to avoid 5th metatarsal wound complications. 


Podiatry will continue to follow patient while inhouse.  





<Gerald Delvalle - Last Filed: 08/22/17 11:50>





Objective





- Vital Signs/Intake and Output


Vital Signs (last 24 hours): 


 











Temp Pulse Resp BP Pulse Ox


 


 98.5 F   69   15   126/73   97 


 


 08/21/17 17:13  08/22/17 08:17  08/21/17 17:13  08/22/17 09:49  08/21/17 17:13











- Medications


Medications: 


 Current Medications





Amlodipine Besylate (Norvasc)  10 mg PO DAILY [facility]


   PRN Reason: Protocol


   Last Admin: 08/22/17 09:49 Dose:  10 mg


Aspirin (Ecotrin)  81 mg PO 0800 [facility]


   PRN Reason: Protocol


   Last Admin: 08/22/17 08:13 Dose:  81 mg


Docusate Sodium (Colace)  100 mg PO DAILY [facility]


   PRN Reason: Protocol


   Last Admin: 08/22/17 09:48 Dose:  100 mg


Famotidine (Pepcid)  20 mg PO DAILY [facility]


   PRN Reason: Protocol


   Last Admin: 08/22/17 09:50 Dose:  20 mg


Heparin Sodium (Porcine) (Heparin)  5,000 units SC Q12 [facility]


   PRN Reason: Protocol


   Last Admin: 08/21/17 22:27 Dose:  5,000 units


Piperacillin Sod/Tazobactam Sod (Zosyn 4.5 Gm In Ns 100ml)  4.5 gm in 100 mls @ 

200 mls/hr IVPB Q6 [facility]


   PRN Reason: Protocol


   Stop: 08/25/17 12:01


   Last Admin: 08/22/17 05:46 Dose:  200 mls/hr


Insulin Detemir (Levemir)  40 unit SC HS [facility]


   PRN Reason: Protocol


   Last Admin: 08/21/17 22:32 Dose:  Not Given


Insulin Human Lispro (Humalog)  20 units SC AC [facility]


   PRN Reason: Protocol


   Last Admin: 08/22/17 08:37 Dose:  Not Given


Insulin Human Regular (Humulin R High)  0 units SC ACHS [facility]


   PRN Reason: Protocol


   Last Admin: 08/22/17 08:17 Dose:  15 units


Metoprolol Tartrate (Lopressor)  50 mg PO 0800,1800 [facility]


   PRN Reason: Protocol


   Last Admin: 08/22/17 08:17 Dose:  50 mg


Morphine Sulfate (Morphine)  4 mg IVP Q4H PRN; Protocol


   PRN Reason: Pain, severe (8-10)


   Last Admin: 08/20/17 09:33 Dose:  4 mg


Mupirocin (Bactroban Ointment)  0 gm TOP BID [facility]


   PRN Reason: Protocol


   Last Admin: 08/22/17 09:48 Dose:  Not Given


Ondansetron HCl (Zofran Inj)  4 mg IVP Q6H PRN


   PRN Reason: Nausea/Vomiting


   Last Admin: 08/20/17 15:36 Dose:  4 mg


Oxycodone/Acetaminophen (Percocet 5/325 Mg Tab)  1 tab PO Q4H PRN


   PRN Reason: Pain, moderate (4-7)


   Stop: 08/24/17 11:38


   Last Admin: 08/22/17 06:26 Dose:  1 tab


Prednisone (Prednisone Tab)  5 mg PO DAILY [facility]


   PRN Reason: Protocol


   Last Admin: 08/22/17 09:50 Dose:  Not Given


Tacrolimus (Prograf Cap)  1.5 mg PO HS [facility]


   PRN Reason: Protocol


   Last Admin: 08/21/17 22:31 Dose:  1.5 mg


Tacrolimus (Prograf Cap)  2 mg PO DAILY [facility]


   PRN Reason: Protocol


   Last Admin: 08/22/17 09:50 Dose:  2 mg


Trimethoprim/Sulfamethoxazole (Bactrim Ss Tab)  1 tab PO DAILY [facility]


   PRN Reason: Protocol


   Last Admin: 08/22/17 09:48 Dose:  1 tab











Attending/Attestation





- Attestation


I have personally seen and examined this patient.: Yes


I have fully participated in the care of the patient.: Yes


I have reviewed all pertinent clinical information, including history, physical 

exam and plan: Yes

## 2017-08-19 NOTE — PN
SUBJECTIVE:  The patient is 57-year-old, seen and examined, complaining of

feeling a little nauseous, otherwise doing well, eating and tolerating.  No

vomiting.  No diarrhea.



PHYSICAL EXAMINATION:

VITAL SIGNS:  He is afebrile, pulse 70, respirations 18, blood pressure

120/73.

LUNGS:  Bilateral fair airflow.  No rhonchi or crackles.

HEART:  S1, S2 audible.

ABDOMEN:  Soft, nontender.  No rebound.  No guarding.

NEUROLOGIC:  He is awake and alert, able to communicate well.

EXTREMITIES:  He has left foot wrapped in the dressing, but done by a

podiatrist.



ASSESSMENT:

1.  Poorly-controlled diabetes.

2.  Severe peripheral vascular disease.

3.  Left fifth and fourth toe cellulitis, status post fifth toe amputation.

4.  Gastroparesis.

5.  Anxiety disorder.

6.  Coronary artery disease.

7.  Status post renal transplant.

8.  Status post angioplasty, left tibial artery.

9.  Hypertension.

10.  Status post open heart surgery.

11.  Hyperlipidemia.



PLAN:  Currently, the patient is on p.o. Bactrim.  He is getting local

wound care.  We will continue him on his usual medication.  Blood sugar is

being monitored.  We will follow this patient in the morning.







__________________________________________

Christine Zelaya MD



DD:  08/19/2017 11:57:11

DT:  08/19/2017 12:33:56

Job # [id number]

## 2017-08-20 RX ADMIN — OXYCODONE HYDROCHLORIDE AND ACETAMINOPHEN PRN TAB: 5; 325 TABLET ORAL at 20:01

## 2017-08-20 RX ADMIN — INSULIN HUMAN SCH: 100 INJECTION, SOLUTION PARENTERAL at 11:39

## 2017-08-20 RX ADMIN — TAZOBACTAM SODIUM AND PIPERACILLIN SODIUM SCH MLS/HR: 500; 4 INJECTION, SOLUTION INTRAVENOUS at 12:38

## 2017-08-20 RX ADMIN — MORPHINE SULFATE PRN MG: 4 INJECTION, SOLUTION INTRAMUSCULAR; INTRAVENOUS at 09:33

## 2017-08-20 RX ADMIN — INSULIN HUMAN SCH UNITS: 100 INJECTION, SOLUTION PARENTERAL at 21:54

## 2017-08-20 RX ADMIN — SULFAMETHOXAZOLE AND TRIMETHOPRIM SCH TAB: 400; 80 TABLET ORAL at 10:06

## 2017-08-20 RX ADMIN — INSULIN HUMAN SCH UNITS: 100 INJECTION, SOLUTION PARENTERAL at 06:31

## 2017-08-20 RX ADMIN — INSULIN LISPRO SCH: 100 INJECTION, SOLUTION INTRAVENOUS; SUBCUTANEOUS at 18:43

## 2017-08-20 RX ADMIN — INSULIN LISPRO SCH: 100 INJECTION, SOLUTION INTRAVENOUS; SUBCUTANEOUS at 11:39

## 2017-08-20 RX ADMIN — TAZOBACTAM SODIUM AND PIPERACILLIN SODIUM SCH MLS/HR: 500; 4 INJECTION, SOLUTION INTRAVENOUS at 18:49

## 2017-08-20 RX ADMIN — TAZOBACTAM SODIUM AND PIPERACILLIN SODIUM SCH MLS/HR: 500; 4 INJECTION, SOLUTION INTRAVENOUS at 05:50

## 2017-08-20 RX ADMIN — OXYCODONE HYDROCHLORIDE AND ACETAMINOPHEN PRN TAB: 5; 325 TABLET ORAL at 16:18

## 2017-08-20 RX ADMIN — INSULIN LISPRO SCH: 100 INJECTION, SOLUTION INTRAVENOUS; SUBCUTANEOUS at 07:34

## 2017-08-20 RX ADMIN — INSULIN HUMAN SCH UNITS: 100 INJECTION, SOLUTION PARENTERAL at 18:45

## 2017-08-20 RX ADMIN — INSULIN DETEMIR SCH UNIT: 100 INJECTION, SOLUTION SUBCUTANEOUS at 21:55

## 2017-08-20 RX ADMIN — TAZOBACTAM SODIUM AND PIPERACILLIN SODIUM SCH MLS/HR: 500; 4 INJECTION, SOLUTION INTRAVENOUS at 23:50

## 2017-08-20 NOTE — PN
DATE:  08/20/2017



SUBJECTIVE:  The patient is seen earlier today in room 319.  He states he

is doing very well.  He is ready to run a "marathon."



PHYSICAL EXAMINATION:

VITAL SIGNS:  On exam, temperature is 97, blood pressure is 108/50,

respiratory rate of 16.

HEENT:  Unremarkable.

NECK:  Supple.

LUNGS:  Have decreased breath sounds.

HEART:  Normal S1, S2.

ABDOMEN:  Soft, nontender.



MEDICATIONS:  Review of the medications reveals the patient is on Zosyn.



ASSESSMENT AND PLAN:  This is a 57-year-old male who has coronary artery

disease and coronary bypass graft, chronic congestive heart failure,

diabetes, chronic obstructive lung disease, AICD, osteomyelitis of the foot

debridement, now in transitional care with a diagnosis of left fifth

metatarsal osteomyelitis status post debridement and bone excision,

postoperative day #5, growing Acinetobacter and Enterococcus faecalis, in

the setting of coronary artery disease and congestive heart failure and

chronic obstructive lung disease, diabetes mellitus, and history of kidney

transplant, and history of AICD, and coronary bypass graft, currently on

Zosyn, will need 4 to 6 weeks of therapy with weekly laboratories is

recommended.  We will follow with you.





________________________________________

Duc Bee MD



DD:  08/20/2017 13:08:03

DT:  08/20/2017 19:00:31

Job # [id number]

## 2017-08-20 NOTE — PN
SUBJECTIVE:  The patient is 57-year-old seen and examined, ambulating,

eating and tolerating.  No fever.  No chills.  No nausea, vomiting, or

diarrhea.



PHYSICAL EXAMINATION

VITAL SIGNS:  He is afebrile, pulse 69, respirations 18, blood pressure

131/78.

LUNGS:  Bilateral fair airflow.  No rhonchi or crackles.

HEART:  S1, S2 audible.

ABDOMEN:  Soft and nontender.  No rebound.  No guarding.

NEUROLOGIC:  He is awake and alert, communicative.



LABORATORY DATA:  Blood sugar this morning was 335, this afternoon is 111. 

His wound culture positive for gram-positive cocci in chain.



ASSESSMENT AND PLAN:

1.  Left fourth and fifth toe ulcer.

2.  Severe peripheral vascular disease.

3.  Coronary artery disease, status post open heart surgery.

4.  Stable congestive heart failure.

5.  Chronic obstructive pulmonary disease, stable.

6.  Status post defibrillator placement.

7.  Status post renal transplant.



PLAN:  Currently, the patient is on Zosyn, getting local wound care.  Blood

sugar is being monitored.  Encourage ambulation .   Dr. Cobb will

follow up the patient.





__________________________________________

Christine Zelaya MD





DD:  08/20/2017 13:06:17

DT:  08/20/2017 16:27:48

Job # [id number]

## 2017-08-20 NOTE — CP.PCM.PN
<CarltonFatou - Last Filed: 08/20/17 14:25>





Subjective





- Date & Time of Evaluation


Date of Evaluation: 08/20/17


Time of Evaluation: 14:25





- Subjective


Subjective: 


57 year old male seen at bedside  5 days s/p debridement of infected right foot 

ulcer. Dressing clean dry and intact. Patient denies of pain to the site of 

debridement.  Dressing to Left lower extremity clean dry and intact. Patient 

denies any n/v/f/c/sob/cp.





Objective





- Vital Signs/Intake and Output


Vital Signs (last 24 hours): 


 











Temp Pulse Resp BP Pulse Ox


 


 97.9 F   80   18   131/78   100 


 


 08/19/17 15:55  08/20/17 08:19  08/19/17 15:55  08/20/17 10:07  08/19/17 09:55








Intake and Output: 


 











 08/20/17 08/20/17





 06:59 18:59


 


Intake Total 360 600


 


Balance 360 600














- Medications


Medications: 


 Current Medications





Amlodipine Besylate (Norvasc)  10 mg PO DAILY [facility]


   PRN Reason: Protocol


   Last Admin: 08/20/17 10:07 Dose:  10 mg


Aspirin (Ecotrin)  81 mg PO 0800 [facility]


   PRN Reason: Protocol


   Last Admin: 08/20/17 08:19 Dose:  81 mg


Docusate Sodium (Colace)  100 mg PO DAILY [facility]


   PRN Reason: Protocol


   Last Admin: 08/20/17 10:07 Dose:  100 mg


Famotidine (Pepcid)  20 mg PO DAILY [facility]


   PRN Reason: Protocol


   Last Admin: 08/20/17 10:13 Dose:  20 mg


Heparin Sodium (Porcine) (Heparin)  5,000 units SC Q12 [facility]


   PRN Reason: Protocol


   Last Admin: 08/20/17 10:19 Dose:  5,000 units


Piperacillin Sod/Tazobactam Sod (Zosyn 4.5 Gm In Ns 100ml)  4.5 gm in 100 mls @ 

200 mls/hr IVPB Q6 [facility]


   PRN Reason: Protocol


   Stop: 08/25/17 12:01


   Last Admin: 08/20/17 12:38 Dose:  200 mls/hr


Insulin Detemir (Levemir)  40 unit SC HS [facility]


   PRN Reason: Protocol


   Last Admin: 08/19/17 21:38 Dose:  40 unit


Insulin Human Lispro (Humalog)  20 units SC AC [facility]


   PRN Reason: Protocol


   Last Admin: 08/20/17 11:39 Dose:  Not Given


Insulin Human Regular (Humulin R High)  0 units SC ACHS [facility]


   PRN Reason: Protocol


   Last Admin: 08/20/17 11:39 Dose:  Not Given


Metoprolol Tartrate (Lopressor)  50 mg PO 0800,1800 [facility]


   PRN Reason: Protocol


   Last Admin: 08/20/17 08:19 Dose:  50 mg


Morphine Sulfate (Morphine)  4 mg IVP Q4H PRN; Protocol


   PRN Reason: Pain, severe (8-10)


   Last Admin: 08/20/17 09:33 Dose:  4 mg


Mupirocin (Bactroban Ointment)  0 gm TOP BID [facility]


   PRN Reason: Protocol


   Last Admin: 08/20/17 10:06 Dose:  1 applic


Ondansetron HCl (Zofran Odt)  4 mg PO Q8H PRN


   PRN Reason: Nausea/Vomiting


   Last Admin: 08/20/17 11:20 Dose:  4 mg


Oxycodone/Acetaminophen (Percocet 5/325 Mg Tab)  1 tab PO Q4H PRN; Protocol


   PRN Reason: Pain, moderate (4-7)


   Stop: 08/20/17 20:09


   Last Admin: 08/19/17 14:20 Dose:  1 tab


Prednisone (Prednisone Tab)  5 mg PO DAILY [facility]


   PRN Reason: Protocol


   Last Admin: 08/20/17 10:13 Dose:  5 mg


Tacrolimus (Prograf Cap)  1.5 mg PO HS [facility]


   PRN Reason: Protocol


   Last Admin: 08/19/17 21:14 Dose:  1.5 mg


Tacrolimus (Prograf Cap)  2 mg PO DAILY [facility]


   PRN Reason: Protocol


   Last Admin: 08/20/17 10:14 Dose:  2 mg


Trimethoprim/Sulfamethoxazole (Bactrim Ss Tab)  1 tab PO DAILY [facility]


   PRN Reason: Protocol


   Last Admin: 08/20/17 10:06 Dose:  1 tab











- Constitutional


Appears: Well, Non-toxic, No Acute Distress





- Extremities Exam


Additional comments: 


Left lower extremity focused exam:





VASC: DP and PT pulses palpable. Skin temperature warm to warm from proximal to 

distal





DERM: An approximately 5.1 cm by 7.0 cm full thickness ulcer with 80% fibrotic 

to 20% granular wound base. No exposed bone of remaining 5th metatarsal. No 

malodor or active drainage noted. Minor non-streaking xiomara-woind erythema 

noted. No purulent discharge is noted. 4th digit is dusky, with necrotic eschar 

noted to the dorsum of the digit and CFT is delayed to the digit.





NEURO: Gross sensation diminished





- Neurological Exam


Neurological Exam: Alert, Awake, Oriented x3





- Psychiatric Exam


Psychiatric exam: Normal Affect, Normal Mood





Assessment and Plan





- Assessment and Plan (Free Text)


Assessment: 


57 year old male 5days s/p Left foot Nolan grade 3 ulcer, 5th metatarsal 

Osteomyelitis, secondary to wound dehiscence due resulting from prior surgical 

complication


Plan: 


Patient examined and evaluated 


Discussed with attending, Dr. Delvalle. 


Chart, labs, and vitals reviewed. Afebrile, absent leukocytosis. 


Wound culture preliminary results: gram + cocci in chains


Surgical site cleansed with sterile saline. 


Dressed with Bactroban, xeroform, and DSD.


Pt to continue to use forefoot offloading shoe to left foot while 

weightbearing. 


Pt be placed partial weightbearing to avoid 5th metatarsal wound complications. 


Podiatry will continue to follow patient while inhouse.  





<Gerald Delvalle - Last Filed: 08/22/17 11:56>





Objective





- Vital Signs/Intake and Output


Vital Signs (last 24 hours): 


 











Temp Pulse Resp BP Pulse Ox


 


 98.5 F   69   15   126/73   97 


 


 08/21/17 17:13  08/22/17 08:17  08/21/17 17:13  08/22/17 09:49  08/21/17 17:13











- Medications


Medications: 


 Current Medications





Amlodipine Besylate (Norvasc)  10 mg PO DAILY [facility]


   PRN Reason: Protocol


   Last Admin: 08/22/17 09:49 Dose:  10 mg


Aspirin (Ecotrin)  81 mg PO 0800 [facility]


   PRN Reason: Protocol


   Last Admin: 08/22/17 08:13 Dose:  81 mg


Docusate Sodium (Colace)  100 mg PO DAILY [facility]


   PRN Reason: Protocol


   Last Admin: 08/22/17 09:48 Dose:  100 mg


Famotidine (Pepcid)  20 mg PO DAILY [facility]


   PRN Reason: Protocol


   Last Admin: 08/22/17 09:50 Dose:  20 mg


Heparin Sodium (Porcine) (Heparin)  5,000 units SC Q12 [facility]


   PRN Reason: Protocol


   Last Admin: 08/21/17 22:27 Dose:  5,000 units


Piperacillin Sod/Tazobactam Sod (Zosyn 4.5 Gm In Ns 100ml)  4.5 gm in 100 mls @ 

200 mls/hr IVPB Q6 [facility]


   PRN Reason: Protocol


   Stop: 08/25/17 12:01


   Last Admin: 08/22/17 05:46 Dose:  200 mls/hr


Insulin Detemir (Levemir)  40 unit SC HS [facility]


   PRN Reason: Protocol


   Last Admin: 08/21/17 22:32 Dose:  Not Given


Insulin Human Lispro (Humalog)  20 units SC AC [facility]


   PRN Reason: Protocol


   Last Admin: 08/22/17 08:37 Dose:  Not Given


Insulin Human Regular (Humulin R High)  0 units SC ACHS [facility]


   PRN Reason: Protocol


   Last Admin: 08/22/17 08:17 Dose:  15 units


Metoprolol Tartrate (Lopressor)  50 mg PO 0800,1800 [facility]


   PRN Reason: Protocol


   Last Admin: 08/22/17 08:17 Dose:  50 mg


Morphine Sulfate (Morphine)  4 mg IVP Q4H PRN; Protocol


   PRN Reason: Pain, severe (8-10)


   Last Admin: 08/20/17 09:33 Dose:  4 mg


Mupirocin (Bactroban Ointment)  0 gm TOP BID [facility]


   PRN Reason: Protocol


   Last Admin: 08/22/17 09:48 Dose:  Not Given


Ondansetron HCl (Zofran Inj)  4 mg IVP Q6H PRN


   PRN Reason: Nausea/Vomiting


   Last Admin: 08/20/17 15:36 Dose:  4 mg


Oxycodone/Acetaminophen (Percocet 5/325 Mg Tab)  1 tab PO Q4H PRN


   PRN Reason: Pain, moderate (4-7)


   Stop: 08/24/17 11:38


   Last Admin: 08/22/17 06:26 Dose:  1 tab


Prednisone (Prednisone Tab)  5 mg PO DAILY [facility]


   PRN Reason: Protocol


   Last Admin: 08/22/17 09:50 Dose:  Not Given


Tacrolimus (Prograf Cap)  1.5 mg PO HS [facility]


   PRN Reason: Protocol


   Last Admin: 08/21/17 22:31 Dose:  1.5 mg


Tacrolimus (Prograf Cap)  2 mg PO DAILY [facility]


   PRN Reason: Protocol


   Last Admin: 08/22/17 09:50 Dose:  2 mg


Trimethoprim/Sulfamethoxazole (Bactrim Ss Tab)  1 tab PO DAILY [facility]


   PRN Reason: Protocol


   Last Admin: 08/22/17 09:48 Dose:  1 tab











Attending/Attestation





- Attestation


I have personally seen and examined this patient.: Yes


I have fully participated in the care of the patient.: Yes


I have reviewed all pertinent clinical information, including history, physical 

exam and plan: Yes

## 2017-08-21 RX ADMIN — OXYCODONE HYDROCHLORIDE AND ACETAMINOPHEN PRN TAB: 5; 325 TABLET ORAL at 12:06

## 2017-08-21 RX ADMIN — INSULIN DETEMIR SCH: 100 INJECTION, SOLUTION SUBCUTANEOUS at 22:32

## 2017-08-21 RX ADMIN — SULFAMETHOXAZOLE AND TRIMETHOPRIM SCH TAB: 400; 80 TABLET ORAL at 11:27

## 2017-08-21 RX ADMIN — INSULIN LISPRO SCH UNITS: 100 INJECTION, SOLUTION INTRAVENOUS; SUBCUTANEOUS at 18:29

## 2017-08-21 RX ADMIN — TAZOBACTAM SODIUM AND PIPERACILLIN SODIUM SCH MLS/HR: 500; 4 INJECTION, SOLUTION INTRAVENOUS at 18:28

## 2017-08-21 RX ADMIN — INSULIN LISPRO SCH: 100 INJECTION, SOLUTION INTRAVENOUS; SUBCUTANEOUS at 07:07

## 2017-08-21 RX ADMIN — INSULIN HUMAN SCH: 100 INJECTION, SOLUTION PARENTERAL at 22:33

## 2017-08-21 RX ADMIN — INSULIN HUMAN SCH UNITS: 100 INJECTION, SOLUTION PARENTERAL at 12:07

## 2017-08-21 RX ADMIN — TAZOBACTAM SODIUM AND PIPERACILLIN SODIUM SCH MLS/HR: 500; 4 INJECTION, SOLUTION INTRAVENOUS at 06:14

## 2017-08-21 RX ADMIN — TAZOBACTAM SODIUM AND PIPERACILLIN SODIUM SCH MLS/HR: 500; 4 INJECTION, SOLUTION INTRAVENOUS at 12:27

## 2017-08-21 RX ADMIN — OXYCODONE HYDROCHLORIDE AND ACETAMINOPHEN PRN TAB: 5; 325 TABLET ORAL at 18:27

## 2017-08-21 RX ADMIN — INSULIN LISPRO SCH UNITS: 100 INJECTION, SOLUTION INTRAVENOUS; SUBCUTANEOUS at 11:29

## 2017-08-21 RX ADMIN — INSULIN HUMAN SCH: 100 INJECTION, SOLUTION PARENTERAL at 07:07

## 2017-08-21 RX ADMIN — INSULIN HUMAN SCH UNITS: 100 INJECTION, SOLUTION PARENTERAL at 18:31

## 2017-08-21 NOTE — CP.PCM.PN
<Rosario Ignacio - Last Filed: 08/21/17 13:15>





Subjective





- Date & Time of Evaluation


Date of Evaluation: 08/21/17


Time of Evaluation: 13:16





- Subjective


Subjective: 





57 year old male seen at [facility] this morning. Today he is 6 days s/p 

debridement of infected right foot ulcer. Dressing clean dry and intact. 

Patient denies of pain to the site of debridement.  Dressing to Left lower 

extremity clean dry and intact. Patient denies any n/v/f/c/sob/cp.





Objective





- Vital Signs/Intake and Output


Vital Signs (last 24 hours): 


 











Temp Pulse Resp BP Pulse Ox


 


 98.3 F   96 H  20   132/73   95 


 


 08/20/17 16:13  08/21/17 08:13  08/20/17 16:13  08/21/17 11:27  08/20/17 16:13








Intake and Output: 


 











 08/21/17 08/21/17





 06:59 18:59


 


Intake Total 680 


 


Balance 680 














- Medications


Medications: 


 Current Medications





Amlodipine Besylate (Norvasc)  10 mg PO DAILY [facility]


   PRN Reason: Protocol


   Last Admin: 08/21/17 11:27 Dose:  10 mg


Aspirin (Ecotrin)  81 mg PO 0800 [facility]


   PRN Reason: Protocol


   Last Admin: 08/21/17 08:13 Dose:  81 mg


Docusate Sodium (Colace)  100 mg PO DAILY [facility]


   PRN Reason: Protocol


   Last Admin: 08/21/17 11:27 Dose:  100 mg


Famotidine (Pepcid)  20 mg PO DAILY [facility]


   PRN Reason: Protocol


   Last Admin: 08/21/17 11:28 Dose:  20 mg


Heparin Sodium (Porcine) (Heparin)  5,000 units SC Q12 [facility]


   PRN Reason: Protocol


   Last Admin: 08/21/17 11:32 Dose:  5,000 units


Piperacillin Sod/Tazobactam Sod (Zosyn 4.5 Gm In Ns 100ml)  4.5 gm in 100 mls @ 

200 mls/hr IVPB Q6 [facility]


   PRN Reason: Protocol


   Stop: 08/25/17 12:01


   Last Admin: 08/21/17 06:14 Dose:  200 mls/hr


Insulin Detemir (Levemir)  40 unit SC HS [facility]


   PRN Reason: Protocol


   Last Admin: 08/20/17 21:55 Dose:  40 unit


Insulin Human Lispro (Humalog)  20 units SC AC [facility]


   PRN Reason: Protocol


   Last Admin: 08/21/17 07:07 Dose:  Not Given


Insulin Human Regular (Humulin R High)  0 units SC ACHS [facility]


   PRN Reason: Protocol


   Last Admin: 08/21/17 12:07 Dose:  7 units


Metoprolol Tartrate (Lopressor)  50 mg PO 0800,1800 [facility]


   PRN Reason: Protocol


   Last Admin: 08/21/17 08:13 Dose:  50 mg


Morphine Sulfate (Morphine)  4 mg IVP Q4H PRN; Protocol


   PRN Reason: Pain, severe (8-10)


   Last Admin: 08/20/17 09:33 Dose:  4 mg


Mupirocin (Bactroban Ointment)  0 gm TOP BID [facility]


   PRN Reason: Protocol


   Last Admin: 08/20/17 18:48 Dose:  Not Given


Ondansetron HCl (Zofran Inj)  4 mg IVP Q6H PRN


   PRN Reason: Nausea/Vomiting


   Last Admin: 08/20/17 15:36 Dose:  4 mg


Oxycodone/Acetaminophen (Percocet 5/325 Mg Tab)  1 tab PO Q4H PRN


   PRN Reason: Pain, moderate (4-7)


   Stop: 08/24/17 11:38


   Last Admin: 08/21/17 12:06 Dose:  1 tab


Prednisone (Prednisone Tab)  5 mg PO DAILY [facility]


   PRN Reason: Protocol


   Last Admin: 08/21/17 11:28 Dose:  5 mg


Tacrolimus (Prograf Cap)  1.5 mg PO HS [facility]


   PRN Reason: Protocol


   Last Admin: 08/20/17 21:58 Dose:  1.5 mg


Tacrolimus (Prograf Cap)  2 mg PO DAILY [facility]


   PRN Reason: Protocol


   Last Admin: 08/21/17 11:28 Dose:  2 mg


Trimethoprim/Sulfamethoxazole (Bactrim Ss Tab)  1 tab PO DAILY [facility]


   PRN Reason: Protocol


   Last Admin: 08/21/17 11:27 Dose:  1 tab











- Constitutional


Appears: Well, Non-toxic, No Acute Distress





- Extremities Exam


Additional comments: 





Left lower extremity focused exam:





DERM: Open ulceration to lateral aspect of the Left foot measuring 

approximately 7.0 cm x 5.0 cm x 2.0 cm full thickness ulcer with 90% fibrotic 

to 10% granular wound base. No exposed bone of remaining 5th metatarsal. No 

malodor or active drainage noted. Minor non-streaking xiomara-woind erythema 

noted. No purulent discharge is noted. 4th digit is dusky, with necrotic eschar 

noted to the entire digit and CFT is delayed to the digit.





VASC: DP and PT pulses palpable. Skin temperature warm to warm from proximal to 

distal





NEURO: Gross sensation diminished





- Neurological Exam


Neurological Exam: Alert, Awake, Oriented x3





- Psychiatric Exam


Psychiatric exam: Normal Affect, Normal Mood





- Skin


Skin Exam: Normal Color, Warm





Assessment and Plan





- Assessment and Plan (Free Text)


Assessment: 





57 year old male 5days s/p Left foot Nolan grade 3 ulcer, 5th metatarsal 

Osteomyelitis, secondary to wound dehiscence due resulting from prior surgical 

complication


Plan: 





Patient examined and evaluated 


Discussed with attending, Dr. Danielson


Chart, labs, and vitals reviewed. Afebrile, absent leukocytosis. 


Wound culture: Enterococcus Faecalis


Surgical site cleansed with normal sterile saline. 


Dressed with Bactroban, xeroform, and DSD.


Pt to continue to use forefoot offloading shoe to left foot while 

weightbearing. 


Pt to be placed partial weightbearing to avoid 5th metatarsal wound 

complications. 


Podiatry will continue to follow patient while inhouse.  








<Mitzy Danielson - Last Filed: 09/03/17 19:27>





Objective





- Vital Signs/Intake and Output


Vital Signs (last 24 hours): 


 











Temp Pulse Resp BP Pulse Ox


 


 97.6 F   68   18   134/81   98 


 


 08/25/17 14:00  08/25/17 14:00  08/25/17 14:00  08/25/17 17:14  08/25/17 14:00











- Labs


Labs: 


 





 08/25/17 06:45 





 08/25/17 06:45

## 2017-08-21 NOTE — PN
DATE:  08/21/2017



SUBJECTIVE:  The patient has no complaints of any chest pain.  No shortness

of breath.  No headaches or dizziness.



PHYSICAL EXAMINATION:

VITAL SIGNS:  Temperature 98.3, pulse of 96, blood pressure 132/73, and

respirations are 20.

GENERAL:  The patient is lying in bed, flat, comfortable.

HEENT:  No oral lesion.  Anicteric sclerae.  Moist mucosa.

NECK:  No JVD, adenopathy, or thyromegaly.

CARDIOVASCULAR:  S1 and S2, regular.  No murmurs, rubs, or gallops.

LUNGS:  Clear to auscultation bilaterally.  No wheeze, rales, or rhonchi.

ABDOMEN:  Bowel sounds are positive, soft, nontender and nondistended.

EXTREMITIES:  No cyanosis, clubbing or edema.



ASSESSMENT:

1.  Left fifth metatarsal osteomyelitis, status post debridement.

2.  Peripheral arterial disease, status post angioplasty of left tibial

artery.

3.  Diabetes type 2.

4.  Dyslipidemia.

5.  Renal transplant.

6.  Hypertension.

7.  Pacemaker.

8.  Coronary artery disease and coronary artery bypass graft.

9.  Constipation.



PLAN:  The patient is currently comfortable.  He is receiving his triple

immunosuppressive therapy.  The patient is on Bactrim for prophylaxis.  He

is on heparin for DVT prophylaxis.  He is on Levemir for his diabetes and

metoprolol.  The patient is on warfarin for pain.  He is on prednisone

daily.  He is on tacrolimus.  He is on his mycophenolate.  He is being

followed by Physical Therapy.  He is on Zosyn.







__________________________________________

Andrea Calderon MD





DD:  08/21/2017 9:30:42

DT:  08/21/2017 10:41:06

Job # [id number]

## 2017-08-21 NOTE — CP.PCM.PN
Subjective





- Date & Time of Evaluation


Date of Evaluation: 08/21/17


Time of Evaluation: 10:40





- Subjective


Subjective: 





Less pain in the foot, no fevers overnight, no nausea, no diarrhea.





Objective





- Vital Signs/Intake and Output


Vital Signs (last 24 hours): 


 











Temp Pulse Resp BP Pulse Ox


 


 98.3 F   96 H  20   132/73   95 


 


 08/20/17 16:13  08/21/17 08:13  08/20/17 16:13  08/21/17 08:13  08/20/17 16:13








Intake and Output: 


 











 08/21/17 08/21/17





 06:59 18:59


 


Intake Total 680 


 


Balance 680 














- Medications


Medications: 


 Current Medications





Amlodipine Besylate (Norvasc)  10 mg PO DAILY [facility]


   PRN Reason: Protocol


   Last Admin: 08/20/17 10:07 Dose:  10 mg


Aspirin (Ecotrin)  81 mg PO 0800 [facility]


   PRN Reason: Protocol


   Last Admin: 08/21/17 08:13 Dose:  81 mg


Docusate Sodium (Colace)  100 mg PO DAILY [facility]


   PRN Reason: Protocol


   Last Admin: 08/20/17 10:07 Dose:  100 mg


Famotidine (Pepcid)  20 mg PO DAILY [facility]


   PRN Reason: Protocol


   Last Admin: 08/20/17 10:13 Dose:  20 mg


Heparin Sodium (Porcine) (Heparin)  5,000 units SC Q12 [facility]


   PRN Reason: Protocol


   Last Admin: 08/20/17 22:01 Dose:  5,000 units


Piperacillin Sod/Tazobactam Sod (Zosyn 4.5 Gm In Ns 100ml)  4.5 gm in 100 mls @ 

200 mls/hr IVPB Q6 [facility]


   PRN Reason: Protocol


   Stop: 08/25/17 12:01


   Last Admin: 08/21/17 06:14 Dose:  200 mls/hr


Insulin Detemir (Levemir)  40 unit SC HS [facility]


   PRN Reason: Protocol


   Last Admin: 08/20/17 21:55 Dose:  40 unit


Insulin Human Lispro (Humalog)  20 units SC AC [facility]


   PRN Reason: Protocol


   Last Admin: 08/21/17 07:07 Dose:  Not Given


Insulin Human Regular (Humulin R High)  0 units SC ACHS [facility]


   PRN Reason: Protocol


   Last Admin: 08/21/17 07:07 Dose:  Not Given


Metoprolol Tartrate (Lopressor)  50 mg PO 0800,1800 [facility]


   PRN Reason: Protocol


   Last Admin: 08/21/17 08:13 Dose:  50 mg


Morphine Sulfate (Morphine)  4 mg IVP Q4H PRN; Protocol


   PRN Reason: Pain, severe (8-10)


   Last Admin: 08/20/17 09:33 Dose:  4 mg


Mupirocin (Bactroban Ointment)  0 gm TOP BID [facility]


   PRN Reason: Protocol


   Last Admin: 08/20/17 18:48 Dose:  Not Given


Ondansetron HCl (Zofran Inj)  4 mg IVP Q6H PRN


   PRN Reason: Nausea/Vomiting


   Last Admin: 08/20/17 15:36 Dose:  4 mg


Prednisone (Prednisone Tab)  5 mg PO DAILY [facility]


   PRN Reason: Protocol


   Last Admin: 08/20/17 10:13 Dose:  5 mg


Tacrolimus (Prograf Cap)  1.5 mg PO HS [facility]


   PRN Reason: Protocol


   Last Admin: 08/20/17 21:58 Dose:  1.5 mg


Tacrolimus (Prograf Cap)  2 mg PO DAILY [facility]


   PRN Reason: Protocol


   Last Admin: 08/20/17 10:14 Dose:  2 mg


Trimethoprim/Sulfamethoxazole (Bactrim Ss Tab)  1 tab PO DAILY [facility]


   PRN Reason: Protocol


   Last Admin: 08/20/17 10:06 Dose:  1 tab











- Constitutional


Appears: Non-toxic, No Acute Distress





- Head Exam


Head Exam: NORMAL INSPECTION





- Neck Exam


Neck Exam: absent: Meningismus





- Respiratory Exam


Respiratory Exam: Decreased Breath Sounds





- Cardiovascular Exam


Cardiovascular Exam: +S1, +S2





- GI/Abdominal Exam


GI & Abdominal Exam: Soft.  absent: Tenderness





- Extremities Exam


Additional comments: 





left foot with dressings in place





Assessment and Plan





- Assessment and Plan (Free Text)


Plan: 





Assessment


Left 5th metatarsal osteomyelitis S/P debridement and bone excision POD #6, 

growing Acinetobacter and E. faecalis


CAD S/P CABG


chronic CHF


DM


COPD


S/P AICD placement


S/P kidney transplant





Plan


continue Zosyn for at least 4-6 weeks with weekly ESR, CRP, CBC, CMP; as 

discussed with Dr. Danielson, there is still infected bone that's within the foot

; follow up final pathology report


will continue to monitor clinically

## 2017-08-22 RX ADMIN — TAZOBACTAM SODIUM AND PIPERACILLIN SODIUM SCH MLS/HR: 500; 4 INJECTION, SOLUTION INTRAVENOUS at 05:46

## 2017-08-22 RX ADMIN — OXYCODONE HYDROCHLORIDE AND ACETAMINOPHEN PRN TAB: 5; 325 TABLET ORAL at 06:26

## 2017-08-22 RX ADMIN — INSULIN LISPRO SCH UNITS: 100 INJECTION, SOLUTION INTRAVENOUS; SUBCUTANEOUS at 08:14

## 2017-08-22 RX ADMIN — INSULIN LISPRO SCH: 100 INJECTION, SOLUTION INTRAVENOUS; SUBCUTANEOUS at 08:37

## 2017-08-22 RX ADMIN — INSULIN DETEMIR SCH UNIT: 100 INJECTION, SOLUTION SUBCUTANEOUS at 22:52

## 2017-08-22 RX ADMIN — MORPHINE SULFATE PRN MG: 4 INJECTION, SOLUTION INTRAMUSCULAR; INTRAVENOUS at 17:08

## 2017-08-22 RX ADMIN — INSULIN HUMAN SCH UNITS: 100 INJECTION, SOLUTION PARENTERAL at 08:17

## 2017-08-22 RX ADMIN — INSULIN HUMAN SCH UNITS: 100 INJECTION, SOLUTION PARENTERAL at 17:46

## 2017-08-22 RX ADMIN — INSULIN HUMAN SCH UNITS: 100 INJECTION, SOLUTION PARENTERAL at 12:54

## 2017-08-22 RX ADMIN — TAZOBACTAM SODIUM AND PIPERACILLIN SODIUM SCH MLS/HR: 500; 4 INJECTION, SOLUTION INTRAVENOUS at 17:53

## 2017-08-22 RX ADMIN — MORPHINE SULFATE PRN MG: 4 INJECTION, SOLUTION INTRAMUSCULAR; INTRAVENOUS at 13:01

## 2017-08-22 RX ADMIN — SULFAMETHOXAZOLE AND TRIMETHOPRIM SCH TAB: 400; 80 TABLET ORAL at 09:48

## 2017-08-22 RX ADMIN — OXYCODONE HYDROCHLORIDE AND ACETAMINOPHEN PRN TAB: 5; 325 TABLET ORAL at 20:52

## 2017-08-22 RX ADMIN — TAZOBACTAM SODIUM AND PIPERACILLIN SODIUM SCH MLS/HR: 500; 4 INJECTION, SOLUTION INTRAVENOUS at 12:56

## 2017-08-22 RX ADMIN — INSULIN LISPRO SCH: 100 INJECTION, SOLUTION INTRAVENOUS; SUBCUTANEOUS at 12:53

## 2017-08-22 RX ADMIN — INSULIN HUMAN SCH: 100 INJECTION, SOLUTION PARENTERAL at 22:52

## 2017-08-22 RX ADMIN — TAZOBACTAM SODIUM AND PIPERACILLIN SODIUM SCH MLS/HR: 500; 4 INJECTION, SOLUTION INTRAVENOUS at 00:22

## 2017-08-22 RX ADMIN — INSULIN LISPRO SCH: 100 INJECTION, SOLUTION INTRAVENOUS; SUBCUTANEOUS at 17:08

## 2017-08-22 NOTE — CP.PCM.PN
Subjective





- Date & Time of Evaluation


Date of Evaluation: 08/22/17


Time of Evaluation: 11:50





- Subjective


Subjective: 





Comfortable, not in distress, afebrile, still with pain in the left foot but 

better.





Objective





- Vital Signs/Intake and Output


Vital Signs (last 24 hours): 


 











Temp Pulse Resp BP Pulse Ox


 


 98.5 F   69   15   126/73   97 


 


 08/21/17 17:13  08/22/17 08:17  08/21/17 17:13  08/22/17 08:17  08/21/17 17:13











- Medications


Medications: 


 Current Medications





Amlodipine Besylate (Norvasc)  10 mg PO DAILY [facility]


   PRN Reason: Protocol


   Last Admin: 08/21/17 11:27 Dose:  10 mg


Aspirin (Ecotrin)  81 mg PO 0800 [facility]


   PRN Reason: Protocol


   Last Admin: 08/22/17 08:13 Dose:  81 mg


Docusate Sodium (Colace)  100 mg PO DAILY [facility]


   PRN Reason: Protocol


   Last Admin: 08/21/17 11:27 Dose:  100 mg


Famotidine (Pepcid)  20 mg PO DAILY [facility]


   PRN Reason: Protocol


   Last Admin: 08/21/17 11:28 Dose:  20 mg


Heparin Sodium (Porcine) (Heparin)  5,000 units SC Q12 [facility]


   PRN Reason: Protocol


   Last Admin: 08/21/17 22:27 Dose:  5,000 units


Piperacillin Sod/Tazobactam Sod (Zosyn 4.5 Gm In Ns 100ml)  4.5 gm in 100 mls @ 

200 mls/hr IVPB Q6 [facility]


   PRN Reason: Protocol


   Stop: 08/25/17 12:01


   Last Admin: 08/22/17 05:46 Dose:  200 mls/hr


Insulin Detemir (Levemir)  40 unit SC HS [facility]


   PRN Reason: Protocol


   Last Admin: 08/21/17 22:32 Dose:  Not Given


Insulin Human Lispro (Humalog)  20 units SC AC [facility]


   PRN Reason: Protocol


   Last Admin: 08/22/17 08:37 Dose:  Not Given


Insulin Human Regular (Humulin R High)  0 units SC ACHS [facility]


   PRN Reason: Protocol


   Last Admin: 08/22/17 08:17 Dose:  15 units


Metoprolol Tartrate (Lopressor)  50 mg PO 0800,1800 [facility]


   PRN Reason: Protocol


   Last Admin: 08/22/17 08:17 Dose:  50 mg


Morphine Sulfate (Morphine)  4 mg IVP Q4H PRN; Protocol


   PRN Reason: Pain, severe (8-10)


   Last Admin: 08/20/17 09:33 Dose:  4 mg


Mupirocin (Bactroban Ointment)  0 gm TOP BID [facility]


   PRN Reason: Protocol


   Last Admin: 08/21/17 18:24 Dose:  Not Given


Ondansetron HCl (Zofran Inj)  4 mg IVP Q6H PRN


   PRN Reason: Nausea/Vomiting


   Last Admin: 08/20/17 15:36 Dose:  4 mg


Oxycodone/Acetaminophen (Percocet 5/325 Mg Tab)  1 tab PO Q4H PRN


   PRN Reason: Pain, moderate (4-7)


   Stop: 08/24/17 11:38


   Last Admin: 08/22/17 06:26 Dose:  1 tab


Prednisone (Prednisone Tab)  5 mg PO DAILY [facility]


   PRN Reason: Protocol


   Last Admin: 08/22/17 08:18 Dose:  5 mg


Tacrolimus (Prograf Cap)  1.5 mg PO HS [facility]


   PRN Reason: Protocol


   Last Admin: 08/21/17 22:31 Dose:  1.5 mg


Tacrolimus (Prograf Cap)  2 mg PO DAILY [facility]


   PRN Reason: Protocol


   Last Admin: 08/21/17 11:28 Dose:  2 mg


Trimethoprim/Sulfamethoxazole (Bactrim Ss Tab)  1 tab PO DAILY [facility]


   PRN Reason: Protocol


   Last Admin: 08/21/17 11:27 Dose:  1 tab











- Constitutional


Appears: Non-toxic, No Acute Distress





- Head Exam


Head Exam: NORMAL INSPECTION





- ENT Exam


ENT Exam: Mucous Membranes Moist





- Neck Exam


Neck Exam: absent: Meningismus





- Respiratory Exam


Respiratory Exam: Decreased Breath Sounds





- Cardiovascular Exam


Cardiovascular Exam: +S1, +S2





- GI/Abdominal Exam


GI & Abdominal Exam: Soft.  absent: Tenderness





Assessment and Plan





- Assessment and Plan (Free Text)


Plan: 





Assessment


Left 5th metatarsal osteomyelitis S/P debridement and bone excision POD #7, 

growing Acinetobacter and E. faecalis


CAD S/P CABG


chronic CHF


DM


COPD


S/P AICD placement


S/P kidney transplant





Plan


continue Zosyn for at least 4-6 weeks with weekly ESR, CRP, CBC, CMP; as 

discussed with Dr. Danielson, there is still infected bone that's within the foot

; follow up final pathology report


will continue to follow clinically

## 2017-08-22 NOTE — RAD
PROCEDURE:  Left Foot Radiographs.



HISTORY:

post debridement evaluation left foot  



COMPARISON:

08/15/2017



FINDINGS:



BONES:

Amputation at the level of the proximal 5th metatarsal. Soft tissue 

debridement at this level. No other bony abnormalities 



JOINTS:

Normal. 



SOFT TISSUES:

Normal. 



OTHER FINDINGS:

None.



IMPRESSION:

Amputation at the level of the proximal 5th metatarsal. Soft tissue 

debridement at this level. No other bony abnormalities

## 2017-08-22 NOTE — CON
DATE:  08/21/2017



TIME OF CONSULTATION:  13:00 hours.



PRIMARY PHYSICIAN:  Dr. Duc Conner and Dr. Calderon.



LOCATION OF CONSULTATION:  [facility].  Room #318, bed 1.



REASON FOR CONSULTATION:  The patient was admitted with a septic left foot

during my vacation by my covering doctor, Dr. Danielson and is in

transitional care, obtaining wound care and IV antibiotic.



HISTORY OF PRESENT ILLNESS:  The patient developed a trauma to the left

fifth toe approximately 8 weeks prior, which developed into acute gangrene

of left fifth toe.  He underwent a fifth toe amputation, which on day #5,

the flaps necrosed into more gangrene.  He underwent evaluation and

angioplasty by Dr. Benjamin Conner, which was successful in improving flow down

to the midfoot level, but the patient continued to worsen with slow vessel

disease.  He was on wound care by the [place] visiting nurses and all

antibiotic at the time of my vacation and the wound worsened with

cellulitis on or about 08/14, was admitted by Dr. Danielson, my covering

doctor and underwent wound debridement and incision and drainage of the

existing necrotic ulceration.



PAST MEDICAL HISTORY:  This is a 57-year-old male with a history of insulin

dependent diabetes type 2, hypertension, CAD, anxiety, dyslipidemia,

pacemaker placement and renal transplant, peripheral artery disease with

current left foot cellulitis and ulceration.



PAST SURGICAL HISTORY:  The recent amputation of the left fifth toe,

repetitive wound care and debridement of the wound, recent angioplasty of

the left leg, the previously mentioned pacemaker, and a 5-year-old CABG

procedure as well as the renal transplant.



SOCIAL HISTORY:  The patient lives with his wife.  He is a former tobacco

smoker who recently stopped.  He denies alcohol or recreational drug use

and he is able to ambulate, currently using a cane because of a forefoot

offloading shoe on the left side.



REVIEW OF SYSTEMS:  Obtained and is noncontributory except for the current

history.



ALLERGIES:  THE PATIENT HAS NO KNOWN DRUG ALLERGIES.



PHYSICAL EXAMINATION

GENERAL:  The patient is alert and oriented x3, sitting up in bed this

afternoon when I attended.

VITAL SIGNS:  Stable and in no acute distress.

EXTREMITIES:  The bandaging on the left is removed.  The patient has a

palpable dorsalis pedis pulse.  He has no calf pain.  Negative Homans sign.

He has a rubor in the left forefoot.  He has a cyanosis with evidence of

early dry gangrenosis of the left fourth toe.  There is an existing

ulceration.  There is 90% coverage of subcutaneous fibrin and fluff.  There

is no exposed bone at this time.  The ulcer is on the left lateral forefoot

towards the proximal fifth metatarsal level.  The plantar and dorsal

margins are without evidence of gangrene.  There is not overt odor and

there is no sue pus or discharge.  The wound edges are moist and

non-desiccated.  There is no sue lymphangitis or striations noted. 

Capillary return on the first three digits as well as the distal forefoot

both dorsally and plantarly is delayed indicative of small vessel disease.



IMPRESSION:  Type 2 diabetic patient with advanced peripheral artery

disease and small vessel disease with an existing diabetic foot ulcer,

full-thickness tear of the distal lateral left foot with pre-existing

insulin-dependent diabetes mellitus, peripheral arterial disease, coronary

artery disease, and renal transplant.



PLAN:  The case was discussed this morning with Dr. Danielson on hand over of

the patient.  The patient is receiving and hoping to be improved for

continued episodes of ongoing hyperbaric therapy in addition to local wound

care as recommended by Dr. Benjamin Conner.  The wound dressings are being

maintained with Xeroform gauze to maintain the exposed tissue, moisture and

not desiccate the wound.  The patient is ambulatory with partial weight

bearing in a left forefoot offloading shoe.  He is receiving physical

therapy, he has a PICC line and is receiving his IV antibiotic which will

be extended hopefully after discharge from [facility] to home use.  Subsequent

wound care will be performed in the office, daily dressing changes will be

performed at home with the assistance of the [place] visiting nurses, at

such time as the patient is not receiving hyperbaric; otherwise, wound care

will be performed by the hyperbaric nurses and dressing changes at the

hyperbaric center after discharge.  New set of x-rays will be obtained

prior to discharge, and we will follow up with the patient this week before

discharge from [facility].







__________________________________________

Benjamin Goel DPM





DD:  08/22/2017 8:13:36

DT:  08/22/2017 10:09:26

Job # [id number]

## 2017-08-23 RX ADMIN — INSULIN LISPRO SCH: 100 INJECTION, SOLUTION INTRAVENOUS; SUBCUTANEOUS at 16:23

## 2017-08-23 RX ADMIN — TAZOBACTAM SODIUM AND PIPERACILLIN SODIUM SCH MLS/HR: 500; 4 INJECTION, SOLUTION INTRAVENOUS at 00:03

## 2017-08-23 RX ADMIN — INSULIN HUMAN SCH UNITS: 100 INJECTION, SOLUTION PARENTERAL at 11:58

## 2017-08-23 RX ADMIN — INSULIN HUMAN SCH UNITS: 100 INJECTION, SOLUTION PARENTERAL at 21:40

## 2017-08-23 RX ADMIN — TAZOBACTAM SODIUM AND PIPERACILLIN SODIUM SCH MLS/HR: 500; 4 INJECTION, SOLUTION INTRAVENOUS at 05:38

## 2017-08-23 RX ADMIN — OXYCODONE HYDROCHLORIDE AND ACETAMINOPHEN PRN TAB: 5; 325 TABLET ORAL at 11:28

## 2017-08-23 RX ADMIN — SULFAMETHOXAZOLE AND TRIMETHOPRIM SCH TAB: 400; 80 TABLET ORAL at 11:17

## 2017-08-23 RX ADMIN — INSULIN HUMAN SCH UNITS: 100 INJECTION, SOLUTION PARENTERAL at 16:53

## 2017-08-23 RX ADMIN — INSULIN DETEMIR SCH UNIT: 100 INJECTION, SOLUTION SUBCUTANEOUS at 21:42

## 2017-08-23 RX ADMIN — MORPHINE SULFATE PRN MG: 4 INJECTION, SOLUTION INTRAMUSCULAR; INTRAVENOUS at 06:45

## 2017-08-23 RX ADMIN — INSULIN LISPRO SCH: 100 INJECTION, SOLUTION INTRAVENOUS; SUBCUTANEOUS at 11:32

## 2017-08-23 RX ADMIN — INSULIN HUMAN SCH: 100 INJECTION, SOLUTION PARENTERAL at 08:41

## 2017-08-23 RX ADMIN — TAZOBACTAM SODIUM AND PIPERACILLIN SODIUM SCH MLS/HR: 500; 4 INJECTION, SOLUTION INTRAVENOUS at 17:01

## 2017-08-23 RX ADMIN — TAZOBACTAM SODIUM AND PIPERACILLIN SODIUM SCH MLS/HR: 500; 4 INJECTION, SOLUTION INTRAVENOUS at 11:18

## 2017-08-23 RX ADMIN — MORPHINE SULFATE PRN MG: 4 INJECTION, SOLUTION INTRAMUSCULAR; INTRAVENOUS at 20:14

## 2017-08-23 RX ADMIN — INSULIN LISPRO SCH: 100 INJECTION, SOLUTION INTRAVENOUS; SUBCUTANEOUS at 08:41

## 2017-08-23 NOTE — CP.PCM.PN
Subjective





- Date & Time of Evaluation


Date of Evaluation: 08/23/17


Time of Evaluation: 11:00





- Subjective


Subjective: 





No new complaints, not in distress, afebrile.





Objective





- Vital Signs/Intake and Output


Vital Signs (last 24 hours): 


 











Temp Pulse Resp BP Pulse Ox


 


 98.5 F   69   15   138/69   97 


 


 08/21/17 17:13  08/23/17 08:11  08/21/17 17:13  08/23/17 08:11  08/21/17 17:13











- Medications


Medications: 


 Current Medications





Amlodipine Besylate (Norvasc)  10 mg PO DAILY [facility]


   PRN Reason: Protocol


   Last Admin: 08/22/17 09:49 Dose:  10 mg


Aspirin (Ecotrin)  81 mg PO 0800 [facility]


   PRN Reason: Protocol


   Last Admin: 08/23/17 08:10 Dose:  81 mg


Docusate Sodium (Colace)  100 mg PO DAILY [facility]


   PRN Reason: Protocol


   Last Admin: 08/22/17 09:48 Dose:  100 mg


Famotidine (Pepcid)  20 mg PO DAILY [facility]


   PRN Reason: Protocol


   Last Admin: 08/22/17 09:50 Dose:  20 mg


Heparin Sodium (Porcine) (Heparin)  5,000 units SC 0600,1800 [facility]


   PRN Reason: Protocol


   Last Admin: 08/23/17 05:40 Dose:  5,000 units


Piperacillin Sod/Tazobactam Sod (Zosyn 4.5 Gm In Ns 100ml)  4.5 gm in 100 mls @ 

200 mls/hr IVPB Q6 [facility]


   PRN Reason: Protocol


   Stop: 09/22/17 12:01


   Last Admin: 08/23/17 05:38 Dose:  200 mls/hr


Insulin Detemir (Levemir)  40 unit SC HS [facility]


   PRN Reason: Protocol


   Last Admin: 08/22/17 22:52 Dose:  40 unit


Insulin Human Lispro (Humalog)  20 units SC AC [facility]


   PRN Reason: Protocol


   Last Admin: 08/23/17 08:41 Dose:  Not Given


Insulin Human Regular (Humulin R High)  0 units SC ACHS [facility]


   PRN Reason: Protocol


   Last Admin: 08/23/17 08:41 Dose:  Not Given


Metoprolol Tartrate (Lopressor)  50 mg PO 0800,1800 [facility]


   PRN Reason: Protocol


   Last Admin: 08/23/17 08:11 Dose:  50 mg


Morphine Sulfate (Morphine)  4 mg IVP Q4H PRN; Protocol


   PRN Reason: Pain, severe (8-10)


   Last Admin: 08/23/17 06:45 Dose:  4 mg


Mupirocin (Bactroban Ointment)  0 gm TOP BID [facility]


   PRN Reason: Protocol


   Last Admin: 08/22/17 17:45 Dose:  Not Given


Ondansetron HCl (Zofran Inj)  4 mg IVP Q6H PRN


   PRN Reason: Nausea/Vomiting


   Last Admin: 08/20/17 15:36 Dose:  4 mg


Oxycodone/Acetaminophen (Percocet 5/325 Mg Tab)  1 tab PO Q4H PRN


   PRN Reason: Pain, moderate (4-7)


   Stop: 08/24/17 11:38


   Last Admin: 08/22/17 20:52 Dose:  1 tab


Prednisone (Prednisone Tab)  5 mg PO 0800 [facility]


   PRN Reason: Protocol


   Last Admin: 08/23/17 08:12 Dose:  5 mg


Tacrolimus (Prograf Cap)  1.5 mg PO HS [facility]


   PRN Reason: Protocol


   Last Admin: 08/22/17 21:38 Dose:  1.5 mg


Tacrolimus (Prograf Cap)  2 mg PO DAILY [facility]


   PRN Reason: Protocol


   Last Admin: 08/22/17 09:50 Dose:  2 mg


Trimethoprim/Sulfamethoxazole (Bactrim Ss Tab)  1 tab PO DAILY [facility]


   PRN Reason: Protocol


   Last Admin: 08/22/17 09:48 Dose:  1 tab











- Constitutional


Appears: Non-toxic, No Acute Distress





- Head Exam


Head Exam: NORMAL INSPECTION





- Neck Exam


Neck Exam: absent: Meningismus





- Respiratory Exam


Respiratory Exam: Decreased Breath Sounds





- Cardiovascular Exam


Cardiovascular Exam: +S1, +S2





- GI/Abdominal Exam


GI & Abdominal Exam: Soft.  absent: Tenderness





- Extremities Exam


Additional comments: 





left foot with dressings in place





Assessment and Plan





- Assessment and Plan (Free Text)


Plan: 





Assessment


Left 5th metatarsal osteomyelitis S/P debridement and bone excision POD #8, 

growing Acinetobacter and E. faecalis


CAD S/P CABG


chronic CHF


DM


COPD


S/P AICD placement


S/P kidney transplant





Plan


continue Zosyn for at least 4-6 weeks with weekly ESR, CRP, CBC, CMP; as 

discussed with Dr. Danielson, there is still infected bone that's within the foot

; pathology shows acute osteomyelitis


will continue to follow clinically

## 2017-08-23 NOTE — CP.PCM.PN
Subjective





- Date & Time of Evaluation


Date of Evaluation: 08/23/17


Time of Evaluation: 12:20





- Subjective


Subjective: 





57 year old male seen at [facility] this morning. 

Patient is 8 days s/p debridement of infected LEFT foot ulcer. Dressing clean 

dry and intact. Dressing to Left lower extremity clean dry and intact. Patient 

denies of any other pedal complains. Patient denies any n/v/f/c/sob/cp.





Objective





- Vital Signs/Intake and Output


Vital Signs (last 24 hours): 


 











Temp Pulse Resp BP Pulse Ox


 


 98.5 F   69   15   130/75   97 


 


 08/21/17 17:13  08/23/17 08:11  08/21/17 17:13  08/23/17 11:16  08/21/17 17:13











- Medications


Medications: 


 Current Medications





Amlodipine Besylate (Norvasc)  10 mg PO DAILY [facility]


   PRN Reason: Protocol


   Last Admin: 08/23/17 11:16 Dose:  10 mg


Aspirin (Ecotrin)  81 mg PO 0800 [facility]


   PRN Reason: Protocol


   Last Admin: 08/23/17 08:10 Dose:  81 mg


Docusate Sodium (Colace)  100 mg PO DAILY [facility]


   PRN Reason: Protocol


   Last Admin: 08/23/17 11:17 Dose:  100 mg


Famotidine (Pepcid)  20 mg PO DAILY [facility]


   PRN Reason: Protocol


   Last Admin: 08/23/17 11:17 Dose:  20 mg


Heparin Sodium (Porcine) (Heparin)  5,000 units SC 0600,1800 [facility]


   PRN Reason: Protocol


   Last Admin: 08/23/17 05:40 Dose:  5,000 units


Piperacillin Sod/Tazobactam Sod (Zosyn 4.5 Gm In Ns 100ml)  4.5 gm in 100 mls @ 

200 mls/hr IVPB Q6 [facility]


   PRN Reason: Protocol


   Stop: 09/22/17 12:01


   Last Admin: 08/23/17 11:18 Dose:  200 mls/hr


Insulin Detemir (Levemir)  40 unit SC HS [facility]


   PRN Reason: Protocol


   Last Admin: 08/22/17 22:52 Dose:  40 unit


Insulin Human Lispro (Humalog)  20 units SC AC [facility]


   PRN Reason: Protocol


   Last Admin: 08/23/17 11:32 Dose:  Not Given


Insulin Human Regular (Humulin R High)  0 units SC ACHS [facility]


   PRN Reason: Protocol


   Last Admin: 08/23/17 11:58 Dose:  7 units


Metoprolol Tartrate (Lopressor)  50 mg PO 0800,1800 [facility]


   PRN Reason: Protocol


   Last Admin: 08/23/17 08:11 Dose:  50 mg


Morphine Sulfate (Morphine)  4 mg IVP Q4H PRN; Protocol


   PRN Reason: Pain, severe (8-10)


   Last Admin: 08/23/17 06:45 Dose:  4 mg


Mupirocin (Bactroban Ointment)  0 gm TOP BID [facility]


   PRN Reason: Protocol


   Last Admin: 08/23/17 09:40 Dose:  Not Given


Ondansetron HCl (Zofran Inj)  4 mg IVP Q6H PRN


   PRN Reason: Nausea/Vomiting


   Last Admin: 08/20/17 15:36 Dose:  4 mg


Oxycodone/Acetaminophen (Percocet 5/325 Mg Tab)  1 tab PO Q4H PRN


   PRN Reason: Pain, moderate (4-7)


   Stop: 08/24/17 11:38


   Last Admin: 08/23/17 11:28 Dose:  1 tab


Prednisone (Prednisone Tab)  5 mg PO 0800 [facility]


   PRN Reason: Protocol


   Last Admin: 08/23/17 08:12 Dose:  5 mg


Tacrolimus (Prograf Cap)  1.5 mg PO HS [facility]


   PRN Reason: Protocol


   Last Admin: 08/22/17 21:38 Dose:  1.5 mg


Tacrolimus (Prograf Cap)  2 mg PO DAILY [facility]


   PRN Reason: Protocol


   Last Admin: 08/23/17 11:16 Dose:  2 mg


Trimethoprim/Sulfamethoxazole (Bactrim Ss Tab)  1 tab PO DAILY [facility]


   PRN Reason: Protocol


   Last Admin: 08/23/17 11:17 Dose:  1 tab











- Constitutional


Appears: Well, Non-toxic, No Acute Distress





- Extremities Exam


Additional comments: 





Left lower extremity focused exam:





DERM: Open ulceration to lateral aspect of the Left foot measuring 

approximately 7.0 cm x 5.0 cm x 2.0 cm full thickness ulcer with 90% fibrotic 

to 10% granular wound base. No exposed bone of remaining 5th metatarsal. No 

malodor or active drainage noted. Mild xiomara-woind erythema noted with <1cm 

margin. No purulent discharge is noted. 4th digit is dusky, with necrotic 

eschar noted to the entire digit and CFT is delayed to the digit.





VASC: DP and PT pulses palpable. Skin temperature warm to warm from proximal to 

distal





NEURO: Gross sensation diminished





- Neurological Exam


Neurological Exam: Alert, Awake, Oriented x3





- Psychiatric Exam


Psychiatric exam: Normal Affect, Normal Mood





- Skin


Skin Exam: Normal Color, Warm





Assessment and Plan





- Assessment and Plan (Free Text)


Assessment: 





57 year old male 8 days s/p Left foot wound debridement 


Plan: 








Patient examined and evaluated 


Discussed with attending, Dr. Goel


Chart, labs, and vitals reviewed. Afebrile, absent leukocytosis. 


Wound culture: Enterococcus Faecalis


Surgical site cleansed with normal sterile saline. 


Dressed with Xeroform, and DSD.


Continue Hyperbarics therapy


Pt to continue to use forefoot offloading shoe to left foot while 

weightbearing. 


Pt to be placed partial weightbearing to avoid 5th metatarsal wound 

complications. 


Podiatry will continue to follow patient while inhouse.

## 2017-08-24 VITALS — OXYGEN SATURATION: 98 %

## 2017-08-24 RX ADMIN — MORPHINE SULFATE PRN MG: 4 INJECTION, SOLUTION INTRAMUSCULAR; INTRAVENOUS at 21:12

## 2017-08-24 RX ADMIN — INSULIN HUMAN SCH UNITS: 100 INJECTION, SOLUTION PARENTERAL at 18:12

## 2017-08-24 RX ADMIN — INSULIN HUMAN SCH UNITS: 100 INJECTION, SOLUTION PARENTERAL at 06:31

## 2017-08-24 RX ADMIN — MORPHINE SULFATE PRN MG: 4 INJECTION, SOLUTION INTRAMUSCULAR; INTRAVENOUS at 16:21

## 2017-08-24 RX ADMIN — INSULIN HUMAN SCH UNITS: 100 INJECTION, SOLUTION PARENTERAL at 12:46

## 2017-08-24 RX ADMIN — TAZOBACTAM SODIUM AND PIPERACILLIN SODIUM SCH MLS/HR: 500; 4 INJECTION, SOLUTION INTRAVENOUS at 05:42

## 2017-08-24 RX ADMIN — TAZOBACTAM SODIUM AND PIPERACILLIN SODIUM SCH MLS/HR: 500; 4 INJECTION, SOLUTION INTRAVENOUS at 12:57

## 2017-08-24 RX ADMIN — MORPHINE SULFATE PRN MG: 4 INJECTION, SOLUTION INTRAMUSCULAR; INTRAVENOUS at 10:34

## 2017-08-24 RX ADMIN — INSULIN HUMAN SCH: 100 INJECTION, SOLUTION PARENTERAL at 22:51

## 2017-08-24 RX ADMIN — TAZOBACTAM SODIUM AND PIPERACILLIN SODIUM SCH MLS/HR: 500; 4 INJECTION, SOLUTION INTRAVENOUS at 00:10

## 2017-08-24 RX ADMIN — INSULIN DETEMIR SCH UNIT: 100 INJECTION, SOLUTION SUBCUTANEOUS at 22:52

## 2017-08-24 RX ADMIN — SULFAMETHOXAZOLE AND TRIMETHOPRIM SCH TAB: 400; 80 TABLET ORAL at 10:44

## 2017-08-24 RX ADMIN — TAZOBACTAM SODIUM AND PIPERACILLIN SODIUM SCH MLS/HR: 500; 4 INJECTION, SOLUTION INTRAVENOUS at 18:18

## 2017-08-24 NOTE — CP.PCM.PN
Subjective





- Date & Time of Evaluation


Date of Evaluation: 08/24/17


Time of Evaluation: 10:00





- Subjective


Subjective: 





Patient has no new complaints, no fevers.





Objective





- Vital Signs/Intake and Output


Vital Signs (last 24 hours): 


 











Temp Pulse Resp BP Pulse Ox


 


 98.1 F   80   14   135/79   97 


 


 08/23/17 16:17  08/24/17 10:43  08/23/17 16:17  08/24/17 10:43  08/21/17 17:13











- Medications


Medications: 


 Current Medications





Amlodipine Besylate (Norvasc)  10 mg PO DAILY [facility]


   PRN Reason: Protocol


   Last Admin: 08/24/17 10:42 Dose:  10 mg


Aspirin (Ecotrin)  81 mg PO 0800 [facility]


   PRN Reason: Protocol


   Last Admin: 08/24/17 10:40 Dose:  81 mg


Docusate Sodium (Colace)  100 mg PO DAILY [facility]


   PRN Reason: Protocol


   Last Admin: 08/24/17 10:44 Dose:  100 mg


Famotidine (Pepcid)  20 mg PO DAILY [facility]


   PRN Reason: Protocol


   Last Admin: 08/24/17 10:44 Dose:  20 mg


Heparin Sodium (Porcine) (Heparin)  5,000 units SC 0600,1800 [facility]


   PRN Reason: Protocol


   Last Admin: 08/24/17 05:42 Dose:  5,000 units


Piperacillin Sod/Tazobactam Sod (Zosyn 4.5 Gm In Ns 100ml)  4.5 gm in 100 mls @ 

200 mls/hr IVPB Q6 [facility]


   PRN Reason: Protocol


   Stop: 09/22/17 12:01


   Last Admin: 08/24/17 05:42 Dose:  200 mls/hr


Insulin Detemir (Levemir)  40 unit SC HS [facility]


   PRN Reason: Protocol


   Last Admin: 08/23/17 21:42 Dose:  40 unit


Insulin Human Regular (Humulin R High)  0 units SC ACHS [facility]


   PRN Reason: Protocol


   Last Admin: 08/24/17 06:31 Dose:  12 units


Metoprolol Tartrate (Lopressor)  50 mg PO 0800,1800 [facility]


   PRN Reason: Protocol


   Last Admin: 08/24/17 10:43 Dose:  50 mg


Morphine Sulfate (Morphine)  4 mg IVP Q4H PRN; Protocol


   PRN Reason: Pain, severe (8-10)


   Last Admin: 08/24/17 10:34 Dose:  4 mg


Mupirocin (Bactroban Ointment)  0 gm TOP BID [facility]


   PRN Reason: Protocol


   Last Admin: 08/23/17 18:48 Dose:  Not Given


Ondansetron HCl (Zofran Inj)  4 mg IVP Q6H PRN


   PRN Reason: Nausea/Vomiting


   Last Admin: 08/23/17 16:36 Dose:  4 mg


Oxycodone/Acetaminophen (Percocet 5/325 Mg Tab)  1 tab PO Q4H PRN


   PRN Reason: Pain, moderate (4-7)


   Stop: 08/24/17 11:38


   Last Admin: 08/23/17 11:28 Dose:  1 tab


Prednisone (Prednisone Tab)  5 mg PO 0800 [facility]


   PRN Reason: Protocol


   Last Admin: 08/24/17 10:41 Dose:  5 mg


Tacrolimus (Prograf Cap)  1.5 mg PO HS [facility]


   PRN Reason: Protocol


   Last Admin: 08/23/17 21:41 Dose:  1.5 mg


Tacrolimus (Prograf Cap)  2 mg PO DAILY [facility]


   PRN Reason: Protocol


   Last Admin: 08/24/17 10:40 Dose:  2 mg











- Constitutional


Appears: Non-toxic, No Acute Distress





- Head Exam


Head Exam: NORMAL INSPECTION





- ENT Exam


ENT Exam: Mucous Membranes Moist





- Neck Exam


Neck Exam: absent: Lymphadenopathy, Meningismus





- Respiratory Exam


Respiratory Exam: Decreased Breath Sounds





- Cardiovascular Exam


Cardiovascular Exam: +S1, +S2





- GI/Abdominal Exam


GI & Abdominal Exam: Soft.  absent: Tenderness





- Extremities Exam


Additional comments: 





left foot with dressings in place





Assessment and Plan





- Assessment and Plan (Free Text)


Plan: 





Assessment


Left 5th metatarsal osteomyelitis S/P debridement and bone excision POD #9, 

growing Acinetobacter and E. faecalis


CAD S/P CABG


chronic CHF


DM


COPD


S/P AICD placement


S/P kidney transplant





Plan


continue Zosyn for at least 4-6 weeks with weekly ESR, CRP, CBC, CMP (will get 

labs for tomorrow); as discussed with Dr. Danielson, there is still infected bone 

that's within the foot; pathology shows acute osteomyelitis


will continue to monitor clinically

## 2017-08-25 VITALS — RESPIRATION RATE: 18 BRPM

## 2017-08-25 VITALS — HEART RATE: 68 BPM | DIASTOLIC BLOOD PRESSURE: 81 MMHG | SYSTOLIC BLOOD PRESSURE: 134 MMHG | TEMPERATURE: 97.6 F

## 2017-08-25 LAB
ALBUMIN/GLOB SERPL: 1.4 {RATIO} (ref 1.1–1.8)
ALP SERPL-CCNC: 80 U/L (ref 38–133)
ALT SERPL-CCNC: 34 U/L (ref 7–56)
AST SERPL-CCNC: 31 U/L (ref 15–59)
BASOPHILS # BLD AUTO: 0.02 K/MM3 (ref 0–2)
BASOPHILS NFR BLD: 0.2 % (ref 0–3)
BILIRUB SERPL-MCNC: 0.4 MG/DL (ref 0.2–1.3)
BUN SERPL-MCNC: 26 MG/DL (ref 7–21)
CALCIUM SERPL-MCNC: 9.2 MG/DL (ref 8.4–10.5)
CHLORIDE SERPL-SCNC: 100 MMOL/L (ref 98–107)
CO2 SERPL-SCNC: 29 MMOL/L (ref 21–33)
EOSINOPHIL # BLD: 0.6 10*3/UL (ref 0–0.7)
EOSINOPHIL NFR BLD: 6.9 % (ref 1.5–5)
ERYTHROCYTE [DISTWIDTH] IN BLOOD BY AUTOMATED COUNT: 14.5 % (ref 11.5–14.5)
ERYTHROCYTE [SEDIMENTATION RATE] IN BLOOD: 20 MM/HR (ref 0–15)
GLOBULIN SER-MCNC: 2.9 GM/DL
GLUCOSE SERPL-MCNC: 155 MG/DL (ref 70–110)
GRANULOCYTES # BLD: 5.41 10*3/UL (ref 1.4–6.5)
GRANULOCYTES NFR BLD: 64.6 % (ref 50–68)
HCT VFR BLD CALC: 38.4 % (ref 42–52)
LYMPHOCYTES # BLD: 1.6 10*3/UL (ref 1.2–3.4)
LYMPHOCYTES NFR BLD AUTO: 19.6 % (ref 22–35)
MCH RBC QN AUTO: 31.6 PG (ref 25–35)
MCHC RBC AUTO-ENTMCNC: 33.6 G/DL (ref 31–37)
MCV RBC AUTO: 94.1 FL (ref 80–105)
MONOCYTES # BLD AUTO: 0.7 10*3/UL (ref 0.1–0.6)
MONOCYTES NFR BLD: 8.7 % (ref 1–6)
PLATELET # BLD: 198 10^3/UL (ref 120–450)
PMV BLD AUTO: 9.6 FL (ref 7–11)
POTASSIUM SERPL-SCNC: 4.6 MMOL/L (ref 3.6–5)
PROT SERPL-MCNC: 6.9 G/DL (ref 5.8–8.3)
SODIUM SERPL-SCNC: 140 MMOL/L (ref 132–148)
WBC # BLD AUTO: 8.4 10^3/UL (ref 4.5–11)

## 2017-08-25 RX ADMIN — TAZOBACTAM SODIUM AND PIPERACILLIN SODIUM SCH MLS/HR: 500; 4 INJECTION, SOLUTION INTRAVENOUS at 17:13

## 2017-08-25 RX ADMIN — INSULIN HUMAN SCH: 100 INJECTION, SOLUTION PARENTERAL at 07:11

## 2017-08-25 RX ADMIN — TAZOBACTAM SODIUM AND PIPERACILLIN SODIUM SCH MLS/HR: 500; 4 INJECTION, SOLUTION INTRAVENOUS at 06:40

## 2017-08-25 RX ADMIN — TAZOBACTAM SODIUM AND PIPERACILLIN SODIUM SCH MLS/HR: 500; 4 INJECTION, SOLUTION INTRAVENOUS at 12:40

## 2017-08-25 RX ADMIN — MORPHINE SULFATE PRN MG: 4 INJECTION, SOLUTION INTRAMUSCULAR; INTRAVENOUS at 06:56

## 2017-08-25 RX ADMIN — INSULIN HUMAN SCH UNITS: 100 INJECTION, SOLUTION PARENTERAL at 12:38

## 2017-08-25 RX ADMIN — INSULIN HUMAN SCH UNITS: 100 INJECTION, SOLUTION PARENTERAL at 17:20

## 2017-08-25 RX ADMIN — TAZOBACTAM SODIUM AND PIPERACILLIN SODIUM SCH MLS/HR: 500; 4 INJECTION, SOLUTION INTRAVENOUS at 00:03

## 2017-08-25 NOTE — CP.PCM.PN
Subjective





- Date & Time of Evaluation


Date of Evaluation: 08/25/17


Time of Evaluation: 09:40





- Subjective


Subjective: 





57 year old male seen at bedside this morning. Patient is 9 days s/p 

debridement of infected LEFT foot ulcer. Dressing clean dry and intact. 

Dressing to Left lower extremity clean dry and intact. Patient denies of any 

other pedal complains. Patient denies any n/v/f/c/sob/cp.








Objective





- Vital Signs/Intake and Output


Vital Signs (last 24 hours): 


 











Temp Pulse Resp BP Pulse Ox


 


 97.9 F   65   18   157/67 H  98 


 


 08/25/17 11:13  08/25/17 11:13  08/25/17 11:13  08/25/17 11:26  08/25/17 11:13











- Medications


Medications: 


 Current Medications





Amlodipine Besylate (Norvasc)  10 mg PO DAILY [facility]


   PRN Reason: Protocol


   Last Admin: 08/25/17 11:26 Dose:  10 mg


Aspirin (Ecotrin)  81 mg PO 0800 [facility]


   PRN Reason: Protocol


   Last Admin: 08/25/17 08:37 Dose:  81 mg


Docusate Sodium (Colace)  100 mg PO DAILY [facility]


   PRN Reason: Protocol


   Last Admin: 08/25/17 11:24 Dose:  100 mg


Famotidine (Pepcid)  20 mg PO DAILY [facility]


   PRN Reason: Protocol


   Last Admin: 08/25/17 11:27 Dose:  20 mg


Heparin Sodium (Porcine) (Heparin)  5,000 units SC 0600,1800 [facility]


   PRN Reason: Protocol


   Last Admin: 08/25/17 06:55 Dose:  5,000 units


Piperacillin Sod/Tazobactam Sod (Zosyn 4.5 Gm In Ns 100ml)  4.5 gm in 100 mls @ 

200 mls/hr IVPB Q6 [facility]


   PRN Reason: Protocol


   Stop: 09/22/17 12:01


   Last Admin: 08/25/17 12:40 Dose:  200 mls/hr


Insulin Detemir (Levemir)  40 unit SC HS [facility]


   PRN Reason: Protocol


   Last Admin: 08/24/17 22:52 Dose:  40 unit


Insulin Human Regular (Humulin R High)  0 units SC ACHS [facility]


   PRN Reason: Protocol


   Last Admin: 08/25/17 12:38 Dose:  2 units


Metoprolol Tartrate (Lopressor)  50 mg PO 0800,1800 [facility]


   PRN Reason: Protocol


   Last Admin: 08/25/17 08:37 Dose:  50 mg


Morphine Sulfate (Morphine)  4 mg IVP Q4H PRN


   PRN Reason: Pain, moderate (4-7)


   Last Admin: 08/25/17 12:40 Dose:  4 mg


Mupirocin (Bactroban Ointment)  0 gm TOP BID [facility]


   PRN Reason: Protocol


   Last Admin: 08/25/17 11:24 Dose:  Not Given


Prednisone (Prednisone Tab)  5 mg PO 0800 [facility]


   PRN Reason: Protocol


   Last Admin: 08/25/17 08:38 Dose:  5 mg


Tacrolimus (Prograf Cap)  1.5 mg PO HS [facility]


   PRN Reason: Protocol


   Last Admin: 08/24/17 22:57 Dose:  1.5 mg


Tacrolimus (Prograf Cap)  2 mg PO DAILY [facility]


   PRN Reason: Protocol


   Last Admin: 08/25/17 11:27 Dose:  2 mg











- Labs


Labs: 


 





 08/25/17 06:45 





 08/25/17 06:45 











- Constitutional


Appears: Well, Non-toxic, No Acute Distress





- Extremities Exam


Additional comments: 





Left lower extremity focused exam:





DERM: Open ulceration to lateral aspect of the Left foot measuring 

approximately 7.0 cm x 5.0 cm x 2.0 cm full thickness ulcer with 90% fibrotic 

to 10% granular wound base. No exposed bone of remaining 5th metatarsal. No 

malodor or active drainage noted. Mild xiomara-woind erythema noted with <1cm 

margin. No purulent discharge is noted. 4th digit is dusky, with necrotic 

eschar noted to the entire digit and CFT is delayed to the digit.





VASC: DP and PT pulses palpable. Skin temperature warm to warm from proximal to 

distal





NEURO: Gross sensation diminished








- Neurological Exam


Neurological Exam: Alert, Awake, Oriented x3





- Psychiatric Exam


Psychiatric exam: Normal Affect, Normal Mood





- Skin


Skin Exam: Normal Color, Warm





Assessment and Plan





- Assessment and Plan (Free Text)


Assessment: 





57 year old male 9 days s/p Left foot wound debridement 


Plan: 





Patient examined and evaluated 


Discussed with attending, Dr. Goel


Chart, labs, and vitals reviewed. Afebrile 


Wound culture: Enterococcus Faecalis


Surgical site cleansed with normal sterile saline. 


Dressed with Xeroform, and DSD.


Pt to continue to use forefoot offloading shoe to left foot while 

weightbearing. 


Pt to be placed partial weightbearing to avoid 5th metatarsal wound 

complications. 


Podiatry will continue to follow patient while inhouse.

## 2017-08-25 NOTE — PN
DATE:  08/24/2017



SUBJECTIVE:  The patient has no complaints of any headaches, no dizziness.

no nausea, and no vomiting.  He came to the transitional care unit for IV

antibiotics.  He has been going to the hyperbaric chamber.  The patient has

had a debridement and bone excision. It showed a growth of Acinetobacter. 

He is receiving Zosyn.  He will need to continue Zosyn for about 4 to 6

weeks.  The patient's pathology does shows an infected bone osteomyelitis. 

He may need subacute rehab to go to for his IV antibiotics.  I am not sure

if we can arrange for home antibiotics.  I have discussed with the social

worker and  today.



PHYSICAL EXAMINATION:

VITAL SIGNS:  Temperature is 97.8, pulse is 76, blood pressure is 132/74,

and respiratory rate is 16.

GENERAL:  The patient is lying in bed, flat, comfortable.

HEENT:  No oral lesion.  Anicteric sclerae.  Moist mucosa.

NECK:  No JVD, adenopathy, or thyromegaly.

CARDIOVASCULAR:  S1 and S2, regular.  No murmurs, rubs, or gallops.

LUNGS:  Clear to auscultation bilaterally.  No wheeze, rales, or rhonchi.

ABDOMEN:  Bowel sounds are positive, soft, nontender and nondistended.

EXTREMITIES:  no cyanosis, clubbing or edema.



ASSESSMENT:

1.  Left fifth toe osteomyelitis, status post debridement.

2.  Peripheral arterial disease, status post angioplasty of left tibial

artery.

3.  Diabetes type 2.

4.  Dyslipidemia.

5.  Renal transplantation.

6.  Hypertension.

7.  Pacemaker.

8.  Coronary artery disease status post/coronary artery bypass graft.

9.  Constipation.



PLAN:  The patient is currently comfortable.  He is _____ 4 to 6 weeks of

antibiotics.  He is on Colace for constipation as well as continue with

aspirin.  He is on heparin for DVT prophylaxis.  He is receiving Levemir

for his diabetes and he is on metoprolol.  The patient is on Zosyn, I will

discontinue his Zofran.  He is on tacrolimus, steroids and mycophenolate. 

Condition is stable and activities to increase as tolerated.  Follow up

with podiatry in one to two weeks after discharge and follow up with

primary care doctor in one to two weeks.







__________________________________________

Andrea Calderon MD





DD:  08/24/2017 22:45:21

DT:  08/25/2017 9:55:12

[facility] # [id number]

## 2017-08-26 NOTE — PN
SUBJECTIVE:  The patient is in bed, in no acute distress, nontoxic.



PHYSICAL EXAMINATION

VITAL SIGNS:  Temperature is 98, blood pressure is 130/80 and respiratory

rate of 80.

HEENT:  Unremarkable.

NECK:  Supple.

LUNGS:  Decreased breath sounds.

HEART:  Normal S1 and S2.

ABDOMEN:  Soft, nontender.



LABORATORY DATA:  Reveal white count of 8.4, hemoglobin of 12 and platelets

of 198.  BUN of 26, creatinine of 1.3.



ASSESSMENT AND PLAN:  He is a 57-year-old with a left fifth metatarsal

osteomyelitis, status post debridement and bone excision, postoperative

procedure day #10, growing Acinetobacter and Enterococcus faecalis, history

of coronary artery disease, on Zosyn for complete 4 to 6 weeks with a

weekly sed rate, C-reactive protein and SMA-18.  Case discussed with Dr. Danielson and nursing care at length.







__________________________________________

Duc Bee MD





DD:  08/25/2017 21:28:56

DT:  08/26/2017 0:13:15

Job # [id number]

## 2017-08-26 NOTE — DS
HISTORY OF PRESENT ILLNESS:  This is a 57-year-old male who came into the

transitional care unit for IV antibiotics.  He has osteomyelitis of his

left foot.  He has been placed on Zosyn q. 6 hours.  He had at least 1 week

of antibiotics.  He is going to be discharged home on meropenem IV q. 12

hours.  He is going to be getting IV antibiotics in the hospital for

additional 3 weeks.  He is currently comfortable and written prescriptions

for his IV antibiotics as well as admission to the oncology clinic for IV

infusion.  He want to continue his home medications including his

immunosuppressant medications.  He is currently comfortable.  Has no

headaches or dizziness.



PHYSICAL EXAMINATION:

VITAL SIGNS:  Temperature is 97.9, pulse is 65, blood pressure is 107/67,

respirations are 18, and O2 saturation is 98%.

GENERAL:  The patient is lying in bed, flat, comfortable.

HEENT:  No oral lesion.  Anicteric sclerae.  Moist mucosa.

NECK:  No JVD, adenopathy, or thyromegaly.

CARDIOVASCULAR:  S1 and S2, regular.  No murmurs, rubs, or gallops.

LUNGS:  Clear to auscultation bilaterally.  No wheeze, rales, or rhonchi.

ABDOMEN:  Bowel sounds are positive, soft, nontender and nondistended.

EXTREMITIES:  no cyanosis, clubbing or edema.



ASSESSMENT:

1.  Left toe osteomyelitis status post debridement.

2.  Peripheral arterial disease, status post angioplasty of the left tibial

artery.

3.  Diabetes type 2.

4.  Dyslipidemia.

5.  Renal transplantation.

6.  Hypertension.

7.  Pacemaker.

8.  Coronary artery disease status post coronary artery bypass graft.

9  Constipation.



PLAN:  The patient is currently comfortable.  He is on prednisone,

mycophenolate, and tacrolimus for his immunosuppressive medications.  He

will going to continue with meropenem.  He is on aspirin.  He is going to

continue his Levemir for his diabetes.  Condition is stable.  Activity

increase as tolerated.



I spent an additional 30 minutes in coordinating care with the patient's

 and writing prescriptions and helping with the discharge

planning..







__________________________________________

Andrea Calderon MD







DD:  08/25/2017 20:52:59

DT:  08/26/2017 3:17:53

Job # [id number]

## 2017-11-07 ENCOUNTER — HOSPITAL ENCOUNTER (INPATIENT)
Dept: HOSPITAL 42 - ED | Age: 57
LOS: 3 days | Discharge: NURSING, REHAB OR LONG-TERM CARE | DRG: 240 | End: 2017-11-10
Attending: INTERNAL MEDICINE | Admitting: INTERNAL MEDICINE
Payer: MEDICARE

## 2017-11-07 VITALS — BODY MASS INDEX: 26.2 KG/M2

## 2017-11-07 DIAGNOSIS — Z83.3: ICD-10-CM

## 2017-11-07 DIAGNOSIS — M86.172: ICD-10-CM

## 2017-11-07 DIAGNOSIS — E11.69: ICD-10-CM

## 2017-11-07 DIAGNOSIS — I50.9: ICD-10-CM

## 2017-11-07 DIAGNOSIS — E78.5: ICD-10-CM

## 2017-11-07 DIAGNOSIS — I25.2: ICD-10-CM

## 2017-11-07 DIAGNOSIS — Z94.0: ICD-10-CM

## 2017-11-07 DIAGNOSIS — I25.10: ICD-10-CM

## 2017-11-07 DIAGNOSIS — E11.52: Primary | ICD-10-CM

## 2017-11-07 DIAGNOSIS — Z82.49: ICD-10-CM

## 2017-11-07 DIAGNOSIS — Z89.422: ICD-10-CM

## 2017-11-07 DIAGNOSIS — Z95.1: ICD-10-CM

## 2017-11-07 DIAGNOSIS — I11.0: ICD-10-CM

## 2017-11-07 DIAGNOSIS — J44.9: ICD-10-CM

## 2017-11-07 DIAGNOSIS — E11.621: ICD-10-CM

## 2017-11-07 DIAGNOSIS — Z87.891: ICD-10-CM

## 2017-11-07 DIAGNOSIS — Z79.899: ICD-10-CM

## 2017-11-07 DIAGNOSIS — L97.409: ICD-10-CM

## 2017-11-07 DIAGNOSIS — L08.9: ICD-10-CM

## 2017-11-07 DIAGNOSIS — Z95.810: ICD-10-CM

## 2017-11-07 LAB
ALBUMIN/GLOB SERPL: 1.3 {RATIO} (ref 1.1–1.8)
ALP SERPL-CCNC: 114 U/L (ref 38–126)
ALT SERPL-CCNC: 32 U/L (ref 7–56)
APTT BLD: 30.7 SECONDS (ref 25.1–36.5)
AST SERPL-CCNC: 27 U/L (ref 17–59)
BASOPHILS # BLD AUTO: 0.01 K/MM3 (ref 0–2)
BASOPHILS NFR BLD: 0.1 % (ref 0–3)
BILIRUB SERPL-MCNC: 0.8 MG/DL (ref 0.2–1.3)
BUN SERPL-MCNC: 23 MG/DL (ref 7–21)
CALCIUM SERPL-MCNC: 9.8 MG/DL (ref 8.4–10.5)
CHLORIDE SERPL-SCNC: 99 MMOL/L (ref 98–107)
CO2 SERPL-SCNC: 28 MMOL/L (ref 21–33)
EOSINOPHIL # BLD: 0.3 10*3/UL (ref 0–0.7)
EOSINOPHIL NFR BLD: 2.3 % (ref 1.5–5)
ERYTHROCYTE [DISTWIDTH] IN BLOOD BY AUTOMATED COUNT: 14 % (ref 11.5–14.5)
GLOBULIN SER-MCNC: 3.2 GM/DL
GLUCOSE SERPL-MCNC: 202 MG/DL (ref 70–110)
GRANULOCYTES # BLD: 8.87 10*3/UL (ref 1.4–6.5)
GRANULOCYTES NFR BLD: 81.5 % (ref 50–68)
HCT VFR BLD CALC: 37 % (ref 42–52)
INR PPP: 1.25 (ref 0.93–1.08)
LYMPHOCYTES # BLD: 1.1 10*3/UL (ref 1.2–3.4)
LYMPHOCYTES NFR BLD AUTO: 9.7 % (ref 22–35)
MCH RBC QN AUTO: 31.3 PG (ref 25–35)
MCHC RBC AUTO-ENTMCNC: 33.2 G/DL (ref 31–37)
MCV RBC AUTO: 94.1 FL (ref 80–105)
MONOCYTES # BLD AUTO: 0.7 10*3/UL (ref 0.1–0.6)
MONOCYTES NFR BLD: 6.4 % (ref 1–6)
PLATELET # BLD: 269 10^3/UL (ref 120–450)
PMV BLD AUTO: 9.4 FL (ref 7–11)
POTASSIUM SERPL-SCNC: 4.7 MMOL/L (ref 3.6–5)
PROT SERPL-MCNC: 7.3 G/DL (ref 5.8–8.3)
SODIUM SERPL-SCNC: 137 MMOL/L (ref 132–148)
WBC # BLD AUTO: 10.9 10^3/UL (ref 4.5–11)

## 2017-11-07 RX ADMIN — MORPHINE SULFATE PRN MG: 2 INJECTION, SOLUTION INTRAMUSCULAR; INTRAVENOUS at 18:46

## 2017-11-07 NOTE — ED PDOC
Arrival/HPI





- General


Chief Complaint: Medical Clearance


Time Seen by Provider: 11/07/17 13:25


Historian: Patient





- History of Present Illness


Narrative History of Present Illness (Text): 





11/07/17 13:42


56yo male with history of diabetes and a kidney transplant patient referred to 

ED by his Podiatrist for admission. Patient notes that he is scheduled for 

surgery on Thursday morning, and was sent to ED for admission. He notes 

increasing discharge from his left foot and pain. Notes surgical history of 

amputation to the foot. He other wise denies fever, chills, trauma, calf pain, 

SOB, any other complaint.








Past Medical History





- Provider Review


Nursing Documentation Reviewed: Yes





- Infectious Disease


Hx of Infectious Diseases: None





- Tetanus Immunization


Tetanus Immunization: Unknown





- Cardiac


Hx Cardiac Disorders: Yes (MI,CAD)


Hx Hypertension: Yes





- Pulmonary


Hx Chronic Obstructive Pulmonary Disease (COPD): Yes





- Neurological


Hx Neurological Disorder: No





- HEENT


Hx HEENT Disorder: No





- Renal


Hx Renal Failure: Yes





- Endocrine/Metabolic


Hx Diabetes Mellitus Type 2: Yes





- Hematological/Oncological


Hx Blood Disorders: No





- Integumentary


Hx Dermatological Disorder: Yes


Other/Comment: LEFT BIG TOE POST AMPUTATION-GANGRENE TO AREA AMPUTATED.7-24-17





- Musculoskeletal/Rheumatological


Hx Falls: No





- Gastrointestinal


Hx Gastrointestinal Disorders: Yes (INCARCERATED INCISIONAL HERNIA,

GASTROENTERITIS, CONSTIPATION)


Other/Comment: h/o c diff





- Genitourinary/Gynecological


Hx Genitourinary Disorders:  (kidney transplant 2012)





- Psychiatric


Hx Psychophysiologic Disorder: Yes (H/O SMOKING CIGARETTES 1/2 PPD,ETOH USE 

SOCAILLY)


Hx Depression: Yes


Hx Emotional Abuse: No


Hx Physical Abuse: No


Hx Substance Use: No





- Surgical History


Other/Comment: CABG.  L 5th toe amputation.  Kidney transplant





- Anesthesia


Hx Anesthesia: Yes


Hx Anesthesia Reactions: No


Hx Malignant Hyperthermia: No





- Suicidal Assessment


Feels Threatened In Home Enviroment: No





Family/Social History





- Physician Review


Nursing Documentation Reviewed: Yes


Family/Social History: Unknown Family HX


Smoking Status: Former Smoker


Hx Alcohol Use: Yes (social occasional)


Frequency of alcohol use: Socially


Hx Substance Use: No


Hx Substance Use Treatment: No





Allergies/Home Meds


Allergies/Adverse Reactions: 


Allergies





No Known Allergies Allergy (Verified 11/07/17 13:08)


 








Home Medications: 


 Home Meds











 Medication  Instructions  Recorded  Confirmed


 


Atorvastatin Calcium [Lipitor] 40 mg PO DAILY 10/30/14 11/07/17


 


Escitalopram [Lexapro] 5 mg PO DAILY 10/30/14 11/07/17


 


Mycophenolate Sodium [Myfortic] 360 mg PO BID 10/30/14 11/07/17














Review of Systems





- Physician Review


All systems were reviewed & negative as marked: Yes





- Review of Systems


Constitutional: Normal


Eyes: Normal


ENT: Normal


Respiratory: Normal


Cardiovascular: Normal


Gastrointestinal: Normal


Genitourinary Male: Normal


Musculoskeletal: Arthralgias (Left foot)


Skin: Normal


Neurological: Normal


Endocrine: Normal


Hemo/Lymphatic: Normal


Psychiatric: Normal





Physical Exam


Vital Signs Reviewed: Yes


Vital Signs











  Temp Pulse Resp BP Pulse Ox


 


 11/07/17 14:40  97.8 F  84  16  121/66  99


 


 11/07/17 13:33  97.8 F  74  16  121/66  99











Temperature: Afebrile


Blood Pressure: Normal


Pulse: Regular


Respiratory Rate: Normal


Appearance: Positive for: Well-Appearing, Non-Toxic, Comfortable


Pain Distress: None


Mental Status: Positive for: Alert and Oriented X 3





- Systems Exam


Head: Present: Atraumatic, Normocephalic


Pupils: Present: PERRL


Extroacular Muscles: Present: EOMI


Conjunctiva: Present: Normal


Mouth: Present: Moist Mucous Membranes


Neck: Present: Normal Range of Motion


Respiratory/Chest: Present: Clear to Auscultation, Good Air Exchange.  No: 

Respiratory Distress, Accessory Muscle Use


Cardiovascular: Present: Regular Rate and Rhythm, Normal S1, S2.  No: Murmurs


Abdomen: Present: Normal Bowel Sounds.  No: Tenderness, Distention, Peritoneal 

Signs


Back: Present: Normal Inspection


Upper Extremity: Present: Normal Inspection.  No: Cyanosis, Edema


Lower Extremity: Present: Other (Dressing with dishcarge noted in place on left 

foot).  No: Edema, CALF TENDERNESS, Tenderness, Neurovascularly Intact


Neurological: Present: GCS=15, CN II-XII Intact, Speech Normal


Skin: Present: Warm, Dry, Normal Color.  No: Rashes


Psychiatric: Present: Alert, Oriented x 3, Normal Insight, Normal Concentration





Medical Decision Making


ED Course and Treatment: 





11/07/17 18:38


Case was DW Dr. Goel who referred pt to ED. He requested pt to be admitted to 

Dr. Calderon's service. States he notified Dr. calderon.


Notes pt is a kidney transplant patient nd he wants him to be optimized with 

his medications before the surgery that is schedule for Thursday morning. 





Lab was unremarkable, with mild decrease in H/H.  Pt's pain was controlled in 

ED.








- Medication Orders


Current Medication Orders: 








Amlodipine Besylate (Norvasc)  10 mg PO DAILY Replaced by [facility]


Aspirin (Ecotrin)  81 mg PO 0800 Replaced by [facility]


Atorvastatin Calcium (Lipitor)  40 mg PO HS Replaced by [facility]


Docusate Sodium (Colace)  100 mg PO DAILY Replaced by [facility]


Escitalopram Oxalate (Lexapro)  5 mg PO DAILY [facility]


Famotidine (Pepcid)  20 mg PO DAILY Replaced by [facility]


Insulin Detemir (Levemir)  40 unit SC HS Replaced by [facility]


Metoprolol Tartrate (Lopressor)  50 mg PO 0800,1800 Replaced by [facility]


Morphine Sulfate (Morphine)  2 mg IVP Q4H PRN


   PRN Reason: Pain, moderate (4-7)


Non-Formulary Medication (Mycophenolate Sodium [Myfortic])  360 mg PO BID Replaced by [facility]


Prednisone (Prednisone Tab)  5 mg PO 0800 [facility]


Tacrolimus (Prograf Cap)  1.5 mg PO HS Replaced by [facility]


Tacrolimus (Prograf Cap)  2 mg PO DAILY [facility]





Discontinued Medications





Morphine Sulfate (Morphine)  2 mg IVP STAT STA


   Stop: 11/07/17 14:09


   Last Admin: 11/07/17 14:17  Dose: 2 mg





MAR Pain Assessment


 Document     11/07/17 14:17  MS  (Rec: 11/07/17 14:19  MS  [facility]-[id number])


     Pain Reassessment


      Is this a pain reassessment?               No


     Sleep


      Is patient sleeping during reassessment?   No


     Presence of Pain


      Presence of Pain                           Yes


     Pain Scale Used


      Pain Scale Used                            Numeric


     Location


      Left, Right or Bilateral                   Left


      Pain Location Body Site                    Foot


     Description


      Description                                Throbbing


      Alleviating Factors/Management             Medication


       Techniques                                


      Alleviating Factors                        Medication


IVP Administration


 Document     11/07/17 14:17  MS  (Rec: 11/07/17 14:19  MS  [facility]-[id number])


     Charges for Administration


      # of IVP Administrations                   1














Disposition/Present on Arrival





- Present on Arrival


Any Indicators Present on Arrival: No


History of DVT/PE: No


History of Uncontrolled Diabetes: No


Urinary Catheter: No


History of Decub. Ulcer: No


History Surgical Site Infection Following: Orthopedic Procedures





- Disposition


Have Diagnosis and Disposition been Completed?: Yes


Diagnosis: 


 Foot infection





Disposition: HOSPITALIZED


Disposition Time: 13:45


Patient Problems: 


 Current Active Problems











Problem Status Onset


 


Foot infection Acute  











Condition: FAIR

## 2017-11-07 NOTE — RAD
HISTORY:

admission  



COMPARISON:

08/14/2017 



FINDINGS:



LUNGS:

No active pulmonary disease.



PLEURA:

No significant pleural effusion identified, no pneumothorax apparent.



CARDIOVASCULAR:

Mild cardiomegaly.



OSSEOUS STRUCTURES:

Wires



VISUALIZED UPPER ABDOMEN:

Normal.



OTHER FINDINGS:

None.



IMPRESSION:

No active disease.

## 2017-11-08 LAB
APPEARANCE UR: CLEAR
BILIRUB UR-MCNC: NEGATIVE MG/DL
COLOR UR: YELLOW
GLUCOSE UR STRIP-MCNC: >=1000 MG/DL
KETONES UR STRIP-MCNC: NEGATIVE MG/DL
LEUKOCYTE ESTERASE UR-ACNC: NEGATIVE LEU/UL
PH UR STRIP: 5.5 [PH] (ref 4.7–8)
PROT UR STRIP-MCNC: NEGATIVE MG/DL
RBC # UR STRIP: NEGATIVE /UL
SP GR UR STRIP: 1.02 (ref 1–1.03)
UROBILINOGEN UR STRIP-ACNC: 0.2 E.U./DL

## 2017-11-08 RX ADMIN — INSULIN HUMAN SCH UNITS: 100 INJECTION, SOLUTION PARENTERAL at 21:48

## 2017-11-08 RX ADMIN — PIPERACILLIN AND TAZOBACTAM SCH MLS/HR: 3; .375 INJECTION, POWDER, LYOPHILIZED, FOR SOLUTION INTRAVENOUS; PARENTERAL at 18:07

## 2017-11-08 RX ADMIN — MORPHINE SULFATE PRN MG: 2 INJECTION, SOLUTION INTRAMUSCULAR; INTRAVENOUS at 13:21

## 2017-11-08 RX ADMIN — PIPERACILLIN AND TAZOBACTAM SCH MLS/HR: 3; .375 INJECTION, POWDER, LYOPHILIZED, FOR SOLUTION INTRAVENOUS; PARENTERAL at 23:48

## 2017-11-08 RX ADMIN — MORPHINE SULFATE PRN MG: 2 INJECTION, SOLUTION INTRAMUSCULAR; INTRAVENOUS at 09:18

## 2017-11-08 RX ADMIN — INSULIN HUMAN SCH UNITS: 100 INJECTION, SOLUTION PARENTERAL at 16:45

## 2017-11-08 RX ADMIN — MORPHINE SULFATE PRN MG: 2 INJECTION, SOLUTION INTRAMUSCULAR; INTRAVENOUS at 21:48

## 2017-11-08 RX ADMIN — PIPERACILLIN AND TAZOBACTAM SCH MLS/HR: 3; .375 INJECTION, POWDER, LYOPHILIZED, FOR SOLUTION INTRAVENOUS; PARENTERAL at 13:06

## 2017-11-08 RX ADMIN — MORPHINE SULFATE PRN MG: 2 INJECTION, SOLUTION INTRAMUSCULAR; INTRAVENOUS at 18:06

## 2017-11-08 RX ADMIN — INSULIN HUMAN SCH: 100 INJECTION, SOLUTION PARENTERAL at 12:49

## 2017-11-08 RX ADMIN — INSULIN HUMAN SCH UNITS: 100 INJECTION, SOLUTION PARENTERAL at 08:26

## 2017-11-08 NOTE — CP.PCM.PN
Objective





- Vital Signs/Intake and Output


Vital Signs (last 24 hours): 


 











Temp Pulse Resp BP Pulse Ox


 


 98.3 F   70   18   140/80   96 


 


 11/08/17 07:30  11/08/17 08:28  11/08/17 07:30  11/08/17 09:19  11/08/17 07:30








Intake and Output: 


 











 11/08/17 11/08/17





 06:59 18:59


 


Intake Total 540 840


 


Output Total 1 


 


Balance 539 840














- Medications


Medications: 


 Current Medications





Amlodipine Besylate (Norvasc)  10 mg PO DAILY [facility]


   Last Admin: 11/08/17 09:19 Dose:  10 mg


Aspirin (Ecotrin)  81 mg PO 0800 [facility]


   Last Admin: 11/08/17 08:26 Dose:  81 mg


Atorvastatin Calcium (Lipitor)  40 mg PO HS [facility]


   Last Admin: 11/07/17 21:23 Dose:  40 mg


Docusate Sodium (Colace)  100 mg PO DAILY [facility]


   Last Admin: 11/08/17 09:18 Dose:  100 mg


Escitalopram Oxalate (Lexapro)  5 mg PO DAILY [facility]


   Last Admin: 11/08/17 09:18 Dose:  5 mg


Famotidine (Pepcid)  20 mg PO DAILY [facility]


   Last Admin: 11/08/17 09:19 Dose:  20 mg


Piperacillin Sod/Tazobactam Sod (Zosyn 3.375 In Ns 100ml)  100 mls @ 200 mls/hr 

IVPB Q6 [facility]


   PRN Reason: Protocol


   Stop: 11/15/17 12:01


   Last Admin: 11/08/17 13:06 Dose:  200 mls/hr


Insulin Detemir (Levemir)  40 unit SC HS [facility]


   Last Admin: 11/07/17 21:42 Dose:  40 unit


Insulin Human Regular (Humulin R Med)  0 units SC ACHS [facility]


   PRN Reason: Protocol


   Last Admin: 11/08/17 12:49 Dose:  Not Given


Metoprolol Tartrate (Lopressor)  50 mg PO 0800,1800 [facility]


   Last Admin: 11/08/17 08:28 Dose:  50 mg


Morphine Sulfate (Morphine)  2 mg IVP Q4H PRN


   PRN Reason: Pain, moderate (4-7)


   Last Admin: 11/08/17 13:21 Dose:  2 mg


Mycophenolate Mofetil (Cellcept Cap)  750 mg PO BID [facility]


   Last Admin: 11/08/17 12:49 Dose:  750 mg


Non-Formulary Medication (Mycophenolate Sodium [Myfortic])  360 mg PO BID [facility]


Prednisone (Prednisone Tab)  5 mg PO 0800 [facility]


   Last Admin: 11/08/17 08:28 Dose:  5 mg


Tacrolimus (Prograf Cap)  1.5 mg PO HS [facility]


   Last Admin: 11/07/17 21:23 Dose:  1.5 mg


Tacrolimus (Prograf Cap)  2 mg PO DAILY [facility]


   Last Admin: 11/08/17 09:20 Dose:  2 mg











- Labs


Labs: 


 





 11/07/17 14:11 





 11/07/17 14:11 





 











PT  13.7 SECONDS (9.4-12.5)  H  11/07/17  14:11    


 


INR  1.25  (0.93-1.08)  H  11/07/17  14:11    


 


APTT  30.7 Seconds (25.1-36.5)   11/07/17  14:11

## 2017-11-08 NOTE — CP.PCM.CON
History of Present Illness





- History of Present Illness


History of Present Illness: 


56 y/o male known to Dr. Goel's service seen at bedside for left foot non-

healing wound with gangrenous toes. Pt states he has had a wound on his left 

foot for several months now. Pt admits to a previous 5th toe surgery which did 

not heal, and his toes 3 and 4 went on to turn black. Pt admits that his 

diabetes is not always well controlled. Pt admits to pain in the left foot on 

the outside, right where the wound ends. Pt states the pain is throbbing and 

comes and goes. Pt also states he has had a wound on the bottom of his right 

foot for a few months, which is usually just painful when he walks on it. Pt 

denies any F/C/N/V/CP/SOB at this time. 





PSH: kidney transplant, left foot 5th digit amputation, heart surgery with 

pacemakers


All: NKDA


Social: quit smoking 6 mo ago; social EtOH; denies illicit drug use





Review of Systems





- Review of Systems


All systems: reviewed and no additional remarkable complaints except (per HPI)





Past Patient History





- Infectious Disease


Hx of Infectious Diseases: None





- Tetanus Immunizations


Tetanus Immunization: Unknown





- Past Social History


Smoking Status: Former Smoker





- CARDIAC


Hx Cardiac Disorders: Yes (MI,CAD)


Hx Hypertension: Yes





- PULMONARY


Hx Chronic Obstructive Pulmonary Disease (COPD): Yes





- NEUROLOGICAL


Hx Neurological Disorder: No





- HEENT


Hx HEENT Problems: No





- RENAL


Hx Renal Failure: Yes





- ENDOCRINE/METABOLIC


Hx Diabetes Mellitus Type 2: Yes





- HEMATOLOGICAL/ONCOLOGICAL


Hx Blood Disorders: No





- INTEGUMENTARY


Hx Dermatological Problems: Yes


Other/Comment: LEFT BIG TOE POST AMPUTATION-GANGRENE TO AREA AMPUTATED.7-24-17





- MUSCULOSKELETAL/RHEUMATOLOGICAL


Hx Falls: No





- GASTROINTESTINAL


Hx Gastrointestinal Disorders: Yes (INCARCERATED INCISIONAL HERNIA,

GASTROENTERITIS, CONSTIPATION)


Other/Comment: h/o c diff





- GENITOURINARY/GYNECOLOGICAL


Hx Genitourinary Disorders:  (kidney transplant 2012)





- PSYCHIATRIC


Hx Psychophysiologic Disorder: Yes (H/O SMOKING CIGARETTES 1/2 PPD,ETOH USE 

SOCAILLY)


Hx Depression: Yes


Hx Emotional Abuse: No


Hx Physical Abuse: No


Hx Substance Use: No





- SURGICAL HISTORY


Other/Comment: CABG.  L 5th toe amputation.  Kidney transplant





- ANESTHESIA


Hx Anesthesia: Yes


Hx Anesthesia Reactions: No


Hx Malignant Hyperthermia: No





Meds


Allergies/Adverse Reactions: 


 Allergies











Allergy/AdvReac Type Severity Reaction Status Date / Time


 


No Known Allergies Allergy   Verified 11/07/17 13:08














- Medications


Medications: 


 Current Medications





Amlodipine Besylate (Norvasc)  10 mg PO DAILY [facility]


   Last Admin: 11/08/17 09:19 Dose:  10 mg


Aspirin (Ecotrin)  81 mg PO 0800 [facility]


   Last Admin: 11/08/17 08:26 Dose:  81 mg


Atorvastatin Calcium (Lipitor)  40 mg PO HS [facility]


   Last Admin: 11/07/17 21:23 Dose:  40 mg


Docusate Sodium (Colace)  100 mg PO DAILY [facility]


   Last Admin: 11/08/17 09:18 Dose:  100 mg


Escitalopram Oxalate (Lexapro)  5 mg PO DAILY [facility]


   Last Admin: 11/08/17 09:18 Dose:  5 mg


Famotidine (Pepcid)  20 mg PO DAILY [facility]


   Last Admin: 11/08/17 09:19 Dose:  20 mg


Piperacillin Sod/Tazobactam Sod (Zosyn 3.375 In Ns 100ml)  100 mls @ 200 mls/hr 

IVPB Q6 [facility]


   PRN Reason: Protocol


   Stop: 11/15/17 12:01


   Last Admin: 11/08/17 13:06 Dose:  200 mls/hr


Insulin Detemir (Levemir)  40 unit SC [facility]


   Last Admin: 11/07/17 21:42 Dose:  40 unit


Insulin Human Regular (Humulin R Med)  [facility]


   PRN Reason: Protocol


   Last Admin: 11/08/17 12:49 Dose:  Not Given


Metoprolol Tartrate (Lopressor)  50 mg PO 0800,1800 [facility]


   Last Admin: 11/08/17 08:28 Dose:  50 mg


Morphine Sulfate (Morphine)  2 mg IVP Q4H PRN


   PRN Reason: Pain, moderate (4-7)


   Last Admin: 11/08/17 13:21 Dose:  2 mg


Mycophenolate Mofetil (Cellcept Cap)  750 mg PO BID [facility]


   Last Admin: 11/08/17 12:49 Dose:  750 mg


Non-Formulary Medication (Mycophenolate Sodium [Myfortic])  360 mg PO BID [facility]


Prednisone (Prednisone Tab)  5 mg PO 0800 [facility]


   Last Admin: 11/08/17 08:28 Dose:  5 mg


Tacrolimus (Prograf Cap)  1.5 mg PO [facility]


   Last Admin: 11/07/17 21:23 Dose:  1.5 mg


Tacrolimus (Prograf Cap)  2 mg PO DAILY [facility]


   Last Admin: 11/08/17 09:20 Dose:  2 mg











Physical Exam





- Constitutional


Appears: Well, Non-toxic, No Acute Distress





- Extremities Exam


Additional comments: 


Lower extremity focused examination:


Vasc: DP/PT pulses palpable 2/4 B/L. Temperature gradient warm to cool B/L. No 

pedal edema noted at this time. CFT < 3 sec to all digits on R foot, to 1st and 

2nd digits on L foot. Left foot 3rd and 4th digits exhibit dry gangrenous 

ischemic changes.


Derm: Dry gangrenous ischemic changes noted to 3rd an 4th digits of L foot. 

Lateral aspect of L foot at previous 5th digit amputation site exhibits non 

healing wound, measuring approx 5cm x 3cm x 0.3cm with fibrotic wound base and 

slightly macerated wound borders. Significant malodor present. No active 

drainage at this time. Additional circular ulceration measuring 0.4cm in 

diameter noted to distal anterior left leg with sloughing fibrotic tissue 

exiting wound. No xiomara wound erythema, no maceration, no malodor, no active 

drainage, no fluctuance. 


Neuro: Protective sensation grossly diminished


Ortho: Mild tenderness to palpation at L foot 5th metatarsal base and dorsum of 

foot along proximal wound borders. No tenderness noted to right foot. 





- Neurological Exam


Neurological exam: Alert, Oriented x3





- Psychiatric Exam


Psychiatric exam: Normal Affect, Normal Mood





Results





- Vital Signs


Recent Vital Signs: 


 Last Vital Signs











Temp  98.3 F   11/08/17 07:30


 


Pulse  70   11/08/17 08:28


 


Resp  18   11/08/17 07:30


 


BP  140/80   11/08/17 09:19


 


Pulse Ox  96   11/08/17 07:30














- Labs


Result Diagrams: 


 11/07/17 14:11





 11/07/17 14:11





Assessment & Plan





- Assessment and Plan (Free Text)


Assessment: 


56 y/o diabetic male with left lateral foot non-healing wound with gangrenous 

3rd and 4th digits





Plan: 


Pt seen and evaluated at bedside


Discussed plan in detail with attending Dr. Goel


Chart, labs and vitals reviewed- afebrile, WBC 10.9


Left foot X-rays reveal cortical mineralization changes, concerning for 

osteomyelitis of 4th distal metatarsal and lateral 3rd met head


Cleansed wounds with saline


Dressed B/L foot wounds with betadine and DSD


Pt to go to OR tomorrow for transmetatarsal amputation of L foot


Continue IV abx-  Zosyn


Medical and cardiac clearance pending, is appreciated


New EKG ordered; CXR and blood work in chart


Pt to be NPO past midnight


Pt scheduled for OR at 7:30am with Dr. Goel

## 2017-11-08 NOTE — HP
DATE:  2017



CHIEF COMPLAINT AND HISTORY OF PRESENT ILLNESS:  This is a 57-year-old

male, who is coming in to the hospital complaining of left foot gangrene. 

The patient has been seen by Dr. Goel, who is his podiatrist.  The patient

was getting medical management as an outpatient, but did not have

improvement of his foot ulcer; it declined and worsened.  The patient is

now being admitted to the hospital for surgery of his foot.  The patient

has been having significant discharge.  He has been having pain.  He has no

fever or chills.  No nausea, no vomiting.  No abdominal pain or back pain. 

No dysuria or frequency.  No nocturia.



REVIEW OF SYSTEMS:  All other review of systems are within normal limits

except as mentioned.



ALLERGIES:  NO KNOWN DRUG ALLERGIES.



HOME MEDICATIONS:  Lipitor, Lexapro, Myfortic, prednisone, Prograf,

Levemir.  All other medications have been reviewed.



PAST MEDICAL HISTORY:

1.  Diabetes type 2.

2.  Hypertension.

3.  Dyslipidemia.

4.  Anxiety.

5.  Pacemaker.

6.  Coronary artery disease.

7.  Status post CABG.

8.  Renal transplant,  donor.

9.  Peripheral arterial disease.

10.  Angioplasty of the left vertebral artery.



PAST SURGICAL HISTORY:  Status post amputation of the 5th toe.  Also had

pacemaker, CABG.



FAMILY HISTORY:  There is hypertension and diabetes in the family.



PHYSICAL EXAMINATION:

VITAL SIGNS:  Temperature is 98, pulse of 68, blood pressure 141/81,

respirations 18, O2 saturation is 94%, height is 5 feet 9 inches, weight is

178 pounds, BMI is 26.3.

GENERAL:  The patient lying in bed, uncomfortable, and in no acute

distress.

HEENT:  Atraumatic and normocephalic.  Anicteric sclerae.  Moist mucosa.

Pink conjunctivae.  No oral lesions.

NECK:  No JVD, anterior and posterior adenopathy, thyromegaly, or bruits.

CARDIOVASCULAR:  S1 and S2 regular.  No murmur, rubs, or gallop.

LUNGS:  Clear to auscultation bilaterally.  No wheezes, rales, or rhonchi.

ABDOMEN:  Bowel sounds are positive.  Soft, nontender and nondistended.  No

hepatosplenomegaly. No rebound and no guarding.

EXTREMITIES:  On left foot, there is dressing in place.

NEUROLOGIC:  No facial asymmetry.  Tongue is midline.  No uvula deviation. 

Power is 5/5 upper extremity and lower extremity.  Sensation intact in

upper extremity and lower extremity.

PSYCHIATRIC:  She is awake, alert and oriented x3.  No anxiety or

depression.  She has normal affect.

GENITOURINARY:  No CVA tenderness.

VASCULAR:  2+ pulses in the carotid pulses and pedal pulses.

SKIN:  No erythema or nodules

SPINE:  Shows normal curvature.



LABORATORY DATA:  White count of 10.9, hemoglobin of 12.3, INR is 1.25. 

Chemistry shows sodium of 137, potassium 4.7, creatinine is 1.1.



Chest x-ray done shows no active disease.



ASSESSMENT:

1.  Left foot with gangrene, drainage.

2.  Peripheral arterial disease.

3.  Diabetes type 2.

4.  Dyslipidemia.

5.  Hypertension.

6.  Renal transplantation.

7.  Pacemaker.

8.  Coronary artery disease, status post coronary artery bypass graft.



PLAN:  The patient is currently comfortable.  He does not have his Myfortic

with him for his immunosuppressive medications he is going.  He does not

have any bowls at home and has taken them and placed them in his pillbox. 

As per the pharmacy policy, we are not able to give him his home

medications.  He is on 360 mg 2 tables twice a day of Myfortic; I will

change that to mycophenolate at 250 mg 3 tables 3 times a day for total of

750 mg twice a day.  He is going to continue with Levemir for his diabetes.

The patient is going to be on morphine for pain.  He is on Lipitor for

dyslipidemia.  He is going to continue with Norvasc for hypertension.  He

is receiving Pepcid.  The patient is on tacrolimus and prednisone, we will

continue that.  He is going to meet Dr. Goel to perform the procedure. 

Dr. Bee will be on consult for antibiotics.  I will get Dr. Odom

to evaluate the patient for preop.







__________________________________________

Andrea Calderon MD





DD:  2017 8:18:26

DT:  2017 10:08:54

Job # [id number]

## 2017-11-08 NOTE — CP.PCM.CON
History of Present Illness





- History of Present Illness


History of Present Illness: 


57 year old male with PMH of CAD S/P CABG, chronic CHF, DM, COPD, S/P AICD 

placement, S/P kidney transplant, S/P Left 5th metatarsal osteomyelitis S/P 

debridement and bone excision, grew Acinetobacter and E. faecalis was sent in 

by his Podiatrist for admission because he needs further surgery on his left 

foot. He has been having increased pain and discharge from the foot. He denies 

animal contacts, no soaking of feet in water, no fever or chills, no nausea or 

vomiting, no chest pain, no SOB, no headache or dizziness, no abdominal pain, 

no cough or colds, no diarrhea, no dysuria. Infectious Diseases consult is 

requested to further evaluate and manage.





Review of Systems





- Review of Systems


All systems: reviewed and no additional remarkable complaints except (as per HPI

)





Past Patient History





- Infectious Disease


Hx of Infectious Diseases: None





- Tetanus Immunizations


Tetanus Immunization: Unknown





- Past Social History


Smoking Status: Former Smoker





- CARDIAC


Hx Cardiac Disorders: Yes (MI,CAD)


Hx Hypertension: Yes





- PULMONARY


Hx Chronic Obstructive Pulmonary Disease (COPD): Yes





- NEUROLOGICAL


Hx Neurological Disorder: No





- HEENT


Hx HEENT Problems: No





- RENAL


Hx Renal Failure: Yes





- ENDOCRINE/METABOLIC


Hx Diabetes Mellitus Type 2: Yes





- HEMATOLOGICAL/ONCOLOGICAL


Hx Blood Disorders: No





- INTEGUMENTARY


Hx Dermatological Problems: Yes


Other/Comment: LEFT BIG TOE POST AMPUTATION-GANGRENE TO AREA AMPUTATED.7-24-17





- MUSCULOSKELETAL/RHEUMATOLOGICAL


Hx Falls: No





- GASTROINTESTINAL


Hx Gastrointestinal Disorders: Yes (INCARCERATED INCISIONAL HERNIA,

GASTROENTERITIS, CONSTIPATION)


Other/Comment: h/o c diff





- GENITOURINARY/GYNECOLOGICAL


Hx Genitourinary Disorders:  (kidney transplant 2012)





- PSYCHIATRIC


Hx Psychophysiologic Disorder: Yes (H/O SMOKING CIGARETTES 1/2 PPD,ETOH USE 

SOCAILLY)


Hx Depression: Yes


Hx Emotional Abuse: No


Hx Physical Abuse: No


Hx Substance Use: No





- SURGICAL HISTORY


Other/Comment: CABG.  L 5th toe amputation.  Kidney transplant





- ANESTHESIA


Hx Anesthesia: Yes


Hx Anesthesia Reactions: No


Hx Malignant Hyperthermia: No





Meds


Allergies/Adverse Reactions: 


 Allergies











Allergy/AdvReac Type Severity Reaction Status Date / Time


 


No Known Allergies Allergy   Verified 11/07/17 13:08














- Medications


Medications: 


 Current Medications





Amlodipine Besylate (Norvasc)  10 mg PO DAILY [facility]


Aspirin (Ecotrin)  81 mg PO 0800 [facility]


Atorvastatin Calcium (Lipitor)  40 mg PO HS [facility]


Docusate Sodium (Colace)  100 mg PO DAILY [facility]


Escitalopram Oxalate (Lexapro)  5 mg PO DAILY [facility]


Famotidine (Pepcid)  20 mg PO DAILY [facility]


Insulin Detemir (Levemir)  40 unit SC HS [facility]


Metoprolol Tartrate (Lopressor)  50 mg PO 0800,1800 [facility]


   Last Admin: 11/07/17 18:46 Dose:  50 mg


Morphine Sulfate (Morphine)  2 mg IVP Q4H PRN


   PRN Reason: Pain, moderate (4-7)


   Last Admin: 11/07/17 18:46 Dose:  2 mg


Non-Formulary Medication (Mycophenolate Sodium [Myfortic])  360 mg PO BID [facility]


Prednisone (Prednisone Tab)  5 mg PO 0800 [facility]


Tacrolimus (Prograf Cap)  1.5 mg PO HS [facility]


Tacrolimus (Prograf Cap)  2 mg PO DAILY [facility]











Physical Exam





- Constitutional


Appears: Non-toxic





- Head Exam


Head Exam: NORMAL INSPECTION





- Respiratory Exam


Respiratory Exam: Decreased Breath Sounds





- Cardiovascular Exam


Cardiovascular Exam: +S1, +S2





- GI/Abdominal Exam


GI & Abdominal Exam: Soft.  absent: Tenderness (left foot with dressings in 

place)





Results





- Vital Signs


Recent Vital Signs: 


 Last Vital Signs











Temp  97.8 F   11/07/17 18:17


 


Pulse  78   11/07/17 18:46


 


Resp  20   11/07/17 18:17


 


BP  117/77   11/07/17 18:46


 


Pulse Ox  96   11/07/17 16:17














- Labs


Result Diagrams: 


 11/07/17 14:11





 11/07/17 14:11


Labs: 


 Laboratory Results - last 24 hr











  11/07/17 11/07/17 11/07/17





  14:11 14:11 14:11


 


WBC  10.9  D  


 


RBC  3.93  


 


Hgb  12.3 L  


 


Hct  37.0 L  


 


MCV  94.1  


 


MCH  31.3  


 


MCHC  33.2  


 


RDW  14.0  


 


Plt Count  269  


 


MPV  9.4  


 


Gran %  81.5 H  


 


Lymph % (Auto)  9.7 L  


 


Mono % (Auto)  6.4 H  


 


Eos % (Auto)  2.3  


 


Baso % (Auto)  0.1  


 


Gran #  8.87 H  


 


Lymph #  1.1 L  


 


Mono #  0.7 H  


 


Eos #  0.3  


 


Baso #  0.01  


 


PT    13.7 H


 


INR    1.25 H


 


APTT    30.7


 


Sodium   137 


 


Potassium   4.7 


 


Chloride   99 


 


Carbon Dioxide   28 


 


Anion Gap   15 


 


BUN   23 H 


 


Creatinine   1.1 


 


Est GFR ( Amer)   > 60 


 


Est GFR (Non-Af Amer)   > 60 


 


Random Glucose   202 H 


 


Calcium   9.8 


 


Total Bilirubin   0.8 


 


AST   27 


 


ALT   32 


 


Alkaline Phosphatase   114 


 


Total Protein   7.3 


 


Albumin   4.1 


 


Globulin   3.2 


 


Albumin/Globulin Ratio   1.3 














Assessment & Plan





- Assessment and Plan (Free Text)


Plan: 





Assessment


left foot skin and skin structure infection, with probable osteomyelitis


S/P Left 5th metatarsal osteomyelitis S/P debridement and bone excision grew 

Acinetobacter and E. faecalis


CAD S/P CABG


chronic CHF


DM


COPD


S/P AICD placement


S/P kidney transplant





Plan


base on previous cultures, started patient on Zosyn - follow up OR cx and 

pathology - scheduled for tomorrow 


will get baseline ESR, CRP


will monitor clinically

## 2017-11-08 NOTE — CARD
--------------- APPROVED REPORT --------------





EKG Measurement

Heart [id number]

UT [id number]

[id number]

[id number]

[id number]



<Conclusion>

V paced rythm

Underlying NSR

## 2017-11-08 NOTE — RAD
PROCEDURE:  Left Foot Radiographs.



HISTORY:

pre-op eval  



COMPARISON:

8/22/2017



FINDINGS:



BONES:

Normal. No fractureThe mid shaft 5th metatarsal amputation shows 

trace irregularity to the amputated end. Interval radiolucency 

demineralization and penciling of the distal 4th metatarsal and ill 

definition and inferred cortical superficial destruction lateral 3rd 

metatarsal head. The 1st and 2nd digits appear similar and grossly 

intact without periosteal reaction or cortical destruction here. . 



JOINTS:

First metatarsal-phalangeal joint minimal arthrosis. 



SOFT TISSUES:

Extensive arterial vascular calcifications. Extensive soft tissue 

loss radiolucency cellulitis - the 4th metatarsal phalangeal joint 

level it is a consideration radiographically 



OTHER FINDINGS:

None.



IMPRESSION:

Fifth metatarsal postsurgical amputation.  Interval cortical and bone 

mineralization changes concerning for osteomyelitis 4th distal 

metatarsal lateral 3rd metatarsal head.

## 2017-11-08 NOTE — CON
DATE:  11/08/2017



REQUESTING PHYSICIAN:  Dr. Calderon.



REASON FOR CONSULTATION:  Preoperative cardiac evaluation.



HISTORY OF PRESENT ILLNESS:  This is a 57-year-old man well known to me

with history of coronary artery disease, status post prior myocardial

infarction and bypass surgery, who has a fairly complex past medical

history.  He was admitted with gangrenous left toes and plan is being made

for amputation tomorrow.  Preoperative assessment was requested.  He

recently underwent peripheral angiography at [facility] and had

rotational atherectomy of an unspecified lesion.  He is being considered

for possible peripheral bypass in the near future.  He has longstanding

history of diabetes.  He has undergone prior renal transplantation, and he

has had prior amputation of the left fifth toe.  He does have a history of

COPD as well.



PAST MEDICAL HISTORY:  Notable for problems mentioned above.  He has

incarcerated inguinal hernia.



CURRENT MEDICATIONS:  Include CellCept 750 mg b.i.d., Colace, Ecotrin 81 mg

daily, insulin coverage, Levemir insulin 40 units at bedtime, Lexapro 5 mg

daily, Lipitor 40 mg daily, metoprolol 50 mg b.i.d., Norvasc 10 mg daily,

Pepcid 20 mg daily, prednisone 5 mg daily, Prograf 1.5 mg at bedtime and 2

mg in the a.m. and as well as Zosyn.



ALLERGIES:  NONE.



SOCIAL HISTORY:  He has a history of tobacco use for many years and

occasional alcohol use.



FAMILY HISTORY:  His father remains alive with history of heart disease. 

His mother passed away for age-related illness.



REVIEW OF SYSTEMS:  A 10-point review of systems is otherwise unremarkable.



PHYSICAL EXAMINATION:

GENERAL:  He is a healthy-appearing middle-aged man.

VITAL SIGNS:  His blood pressure is 140/80 with pulse of 76 and regular,

respirations are 14.  He is afebrile.

HEENT:  Normocephalic, atraumatic.

NECK:  Supple.  No JVD noted.

CHEST:  Few scattered rhonchi heard.

HEART:  PMI displaced laterally with a systolic murmur at left sternal

border.

ABDOMEN:  Soft, nontender with normoactive bowel sounds.

EXTREMITIES:  His left foot is in a boot.  Pulses were nonpalpable.

SKIN:  Warm and dry.

PSYCHIATRIC:  Normal mood and affect.

NEUROLOGIC:  Alert and oriented x3.  No gross motor or sensory is

appreciable.



DIAGNOSTIC DATA:  Potassium 4.7, BUN and creatinine 23 and 1.1, glucose is

252.  White count 10.9, hemoglobin and hematocrit 12.3 and 37.0 with

platelet count of 269,000.  PT/PTT are normal.  Electrocardiogram reveals

sinus rhythm with nonspecific ST-T abnormalities.  Chest x-ray:  Reveals

post-sternotomy changes, clear lung fields, and normal cardiac silhouette.



IMPRESSION:

1.  Coronary artery disease, status post prior myocardial fraction and

bypass surgery, appears clinically stable at the present time.

2.  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

3.  History of diabetes and tobacco abuse.

4.  Rest of problems as noted.



RECOMMENDATIONS:  From cardiac standpoint, she appears stable and optimized

to proceed with surgery as planned.  No further preoperative workup appears

necessary at this time.  His current oral medications should be continued. 

We will continue to follow and make further recommendations as needed.



Thank you for this consultation.







__________________________________________

Oliver Bradford MD





DD:  11/08/2017 12:41:00

DT:  11/08/2017 12:46:23

Job # [id number]

## 2017-11-09 VITALS — RESPIRATION RATE: 20 BRPM

## 2017-11-09 PROCEDURE — 0Y6N0ZC DETACHMENT AT LEFT FOOT, PARTIAL 3RD RAY, OPEN APPROACH: ICD-10-PCS | Performed by: PODIATRIST

## 2017-11-09 PROCEDURE — 0Y6N0ZD DETACHMENT AT LEFT FOOT, PARTIAL 4TH RAY, OPEN APPROACH: ICD-10-PCS | Performed by: PODIATRIST

## 2017-11-09 PROCEDURE — 0Y6N0Z9 DETACHMENT AT LEFT FOOT, PARTIAL 1ST RAY, OPEN APPROACH: ICD-10-PCS | Performed by: PODIATRIST

## 2017-11-09 PROCEDURE — 0Y6N0ZF DETACHMENT AT LEFT FOOT, PARTIAL 5TH RAY, OPEN APPROACH: ICD-10-PCS | Performed by: PODIATRIST

## 2017-11-09 PROCEDURE — 0Y6N0ZB DETACHMENT AT LEFT FOOT, PARTIAL 2ND RAY, OPEN APPROACH: ICD-10-PCS | Performed by: PODIATRIST

## 2017-11-09 RX ADMIN — PIPERACILLIN AND TAZOBACTAM SCH MLS/HR: 3; .375 INJECTION, POWDER, LYOPHILIZED, FOR SOLUTION INTRAVENOUS; PARENTERAL at 17:40

## 2017-11-09 RX ADMIN — COLLAGENASE SANTYL SCH: 250 OINTMENT TOPICAL at 11:00

## 2017-11-09 RX ADMIN — INSULIN HUMAN SCH UNITS: 100 INJECTION, SOLUTION PARENTERAL at 16:53

## 2017-11-09 RX ADMIN — PIPERACILLIN AND TAZOBACTAM SCH MLS/HR: 3; .375 INJECTION, POWDER, LYOPHILIZED, FOR SOLUTION INTRAVENOUS; PARENTERAL at 05:47

## 2017-11-09 RX ADMIN — INSULIN HUMAN SCH: 100 INJECTION, SOLUTION PARENTERAL at 08:00

## 2017-11-09 RX ADMIN — INSULIN HUMAN SCH UNITS: 100 INJECTION, SOLUTION PARENTERAL at 13:29

## 2017-11-09 RX ADMIN — MORPHINE SULFATE PRN MG: 2 INJECTION, SOLUTION INTRAMUSCULAR; INTRAVENOUS at 21:59

## 2017-11-09 RX ADMIN — MORPHINE SULFATE PRN MG: 2 INJECTION, SOLUTION INTRAMUSCULAR; INTRAVENOUS at 13:36

## 2017-11-09 RX ADMIN — INSULIN HUMAN SCH: 100 INJECTION, SOLUTION PARENTERAL at 22:02

## 2017-11-09 RX ADMIN — PIPERACILLIN AND TAZOBACTAM SCH MLS/HR: 3; .375 INJECTION, POWDER, LYOPHILIZED, FOR SOLUTION INTRAVENOUS; PARENTERAL at 13:35

## 2017-11-09 RX ADMIN — BUPIVACAINE HYDROCHLORIDE ONE ML: 5 INJECTION, SOLUTION EPIDURAL; INTRACAUDAL; PERINEURAL at 09:23

## 2017-11-09 RX ADMIN — BUPIVACAINE HYDROCHLORIDE ONE ML: 5 INJECTION, SOLUTION EPIDURAL; INTRACAUDAL; PERINEURAL at 08:22

## 2017-11-09 NOTE — CP.PCM.PN
Subjective





- Date & Time of Evaluation


Date of Evaluation: 11/09/17


Time of Evaluation: 07:10





- Subjective


Subjective: 





56 y/o male seen at bedside this morning with attending Dr. Goel prior to AM 

surgery. Pt confirms NPO status after 8pm last night. Pt is doing well this 

morning and denies any acute events overnight. Pt denies F/C/N/V/CP/SOB. Pt has 

no further questions regarding the procedure.





Objective





- Vital Signs/Intake and Output


Vital Signs (last 24 hours): 


 











Temp Pulse Resp BP Pulse Ox


 


 97.7 F   69   18   140/71   98 


 


 11/09/17 07:15  11/09/17 07:15  11/09/17 07:15  11/09/17 07:15  11/09/17 07:15








Intake and Output: 


 











 11/09/17 11/09/17





 06:59 18:59


 


Intake Total 300 0


 


Output Total 200 


 


Balance 100 0














- Medications


Medications: 


 Current Medications





Amlodipine Besylate (Norvasc)  10 mg PO DAILY [facility]


   Last Admin: 11/08/17 09:19 Dose:  10 mg


Aspirin (Ecotrin)  81 mg PO 0800 [facility]


   Last Admin: 11/08/17 08:26 Dose:  81 mg


Atorvastatin Calcium (Lipitor)  40 mg PO HS [facility]


   Last Admin: 11/08/17 21:46 Dose:  40 mg


Docusate Sodium (Colace)  100 mg PO DAILY [facility]


   Last Admin: 11/08/17 09:18 Dose:  100 mg


Escitalopram Oxalate (Lexapro)  5 mg PO DAILY [facility]


   Last Admin: 11/08/17 09:18 Dose:  5 mg


Famotidine (Pepcid)  20 mg PO DAILY [facility]


   Last Admin: 11/08/17 09:19 Dose:  20 mg


Piperacillin Sod/Tazobactam Sod (Zosyn 3.375 In Ns 100ml)  100 mls @ 200 mls/hr 

IVPB Q6 [facility]


   PRN Reason: Protocol


   Stop: 11/15/17 12:01


   Last Admin: 11/09/17 05:47 Dose:  200 mls/hr


Insulin Detemir (Levemir)  40 unit SC HS [facility]


   Last Admin: 11/07/17 21:42 Dose:  40 unit


Insulin Human Regular (Humulin R Med)  0 units SC ACHS [facility]


   PRN Reason: Protocol


   Last Admin: 11/08/17 21:48 Dose:  2 units


Metoprolol Tartrate (Lopressor)  50 mg PO 0800,1800 [facility]


   Last Admin: 11/09/17 06:20 Dose:  50 mg


Morphine Sulfate (Morphine)  2 mg IVP Q4H PRN


   PRN Reason: Pain, moderate (4-7)


   Last Admin: 11/08/17 21:48 Dose:  2 mg


Mycophenolate Mofetil (Cellcept Cap)  750 mg PO BID [facility]


   Last Admin: 11/08/17 18:02 Dose:  750 mg


Non-Formulary Medication (Mycophenolate Sodium [Myfortic])  360 mg PO BID [facility]


Prednisone (Prednisone Tab)  5 mg PO 0800 [facility]


   Last Admin: 11/08/17 08:28 Dose:  5 mg


Tacrolimus (Prograf Cap)  1.5 mg PO HS [facility]


   Last Admin: 11/07/17 21:23 Dose:  1.5 mg


Tacrolimus (Prograf Cap)  2 mg PO DAILY [facility]


   Last Admin: 11/08/17 09:20 Dose:  2 mg











- Labs


Labs: 


 





 11/07/17 14:11 





 11/07/17 14:11 





 











PT  13.7 SECONDS (9.4-12.5)  H  11/07/17  14:11    


 


INR  1.25  (0.93-1.08)  H  11/07/17  14:11    


 


APTT  30.7 Seconds (25.1-36.5)   11/07/17  14:11    














- Constitutional


Appears: Well, Non-toxic, No Acute Distress





- Extremities Exam


Additional comments: 





dressings to bilateral feet left clean dry and intact at this time prior to 

surgery. no strikethrough noted. patient able to wiggle toes on left foot. 

neurologic sensation is intact B/L





- Neurological Exam


Neurological Exam: Alert, Awake, Oriented x3





- Psychiatric Exam


Psychiatric exam: Normal Affect, Normal Mood





Assessment and Plan





- Assessment and Plan (Free Text)


Assessment: 


56 y/o male with osteomyelitis of the L foot and gangrenous 3rd and 4th digits, 

to go to OR today for transmetatarsal amputation at 7:30am with Dr. Goel. Pt 

has additional ulcerations that are stable in nature at anterior distal left 

leg and plantar R heel





Plan: 


Pt seen and evaluated at bedside with attending Dr. Goel


Chart, labs, vitals reviewed- afebrile, WBC 10.9


Pt questions and concerns addressed and answered


Cardiac clearance in chart


NPO status confirmed after 8pm last night


Pt to OR with [facility] at 7:30am for TMA of L foot


Cont IV Zosyn on floors


Podiatry will continue to follow

## 2017-11-09 NOTE — CP.PCM.PN
Subjective





- Date & Time of Evaluation


Date of Evaluation: 11/09/17


Time of Evaluation: 11:30





- Subjective


Subjective: 





Had surgery on the foot today, no fevers, still groggy from the anesthesia from 

surgery.





Objective





- Vital Signs/Intake and Output


Vital Signs (last 24 hours): 


 











Temp Pulse Resp BP Pulse Ox


 


 97.7 F   69   18   140/71   98 


 


 11/09/17 07:15  11/09/17 07:15  11/09/17 07:15  11/09/17 07:15  11/09/17 07:15








Intake and Output: 


 











 11/09/17 11/09/17





 06:59 18:59


 


Intake Total 300 0


 


Output Total 200 


 


Balance 100 0














- Medications


Medications: 


 Current Medications





Amlodipine Besylate (Norvasc)  10 mg PO DAILY [facility]


   Last Admin: 11/08/17 09:19 Dose:  10 mg


Aspirin (Ecotrin)  81 mg PO 0800 [facility]


   Last Admin: 11/08/17 08:26 Dose:  81 mg


Atorvastatin Calcium (Lipitor)  40 mg PO HS [facility]


   Last Admin: 11/08/17 21:46 Dose:  40 mg


Docusate Sodium (Colace)  100 mg PO DAILY [facility]


   Last Admin: 11/08/17 09:18 Dose:  100 mg


Escitalopram Oxalate (Lexapro)  5 mg PO DAILY [facility]


   Last Admin: 11/08/17 09:18 Dose:  5 mg


Famotidine (Pepcid)  20 mg PO DAILY [facility]


   Last Admin: 11/08/17 09:19 Dose:  20 mg


Piperacillin Sod/Tazobactam Sod (Zosyn 3.375 In Ns 100ml)  100 mls @ 200 mls/hr 

IVPB Q6 [facility]


   PRN Reason: Protocol


   Stop: 11/15/17 12:01


   Last Admin: 11/09/17 05:47 Dose:  200 mls/hr


Insulin Detemir (Levemir)  40 unit SC HS [facility]


   Last Admin: 11/07/17 21:42 Dose:  40 unit


Insulin Human Regular (Humulin R Med)  0 units SC ACHS [facility]


   PRN Reason: Protocol


   Last Admin: 11/08/17 21:48 Dose:  2 units


Metoprolol Tartrate (Lopressor)  50 mg PO 0800,1800 [facility]


   Last Admin: 11/09/17 06:20 Dose:  50 mg


Morphine Sulfate (Morphine)  2 mg IVP Q4H [facility]


   PRN Reason: Pain, moderate (4-7)


   Last Admin: 11/08/17 21:48 Dose:  2 mg


Mycophenolate Mofetil (Cellcept Cap)  750 mg PO BID [facility]


   Last Admin: 11/08/17 18:02 Dose:  750 mg


Non-Formulary Medication (Mycophenolate Sodium [Myfortic])  360 mg PO BID [facility]


Prednisone (Prednisone Tab)  5 mg PO 0800 [facility]


   Last Admin: 11/08/17 08:28 Dose:  5 mg


Tacrolimus (Prograf Cap)  1.5 mg PO HS [facility]


   Last Admin: 11/07/17 21:23 Dose:  1.5 mg


Tacrolimus (Prograf Cap)  2 mg PO DAILY [facility]


   Last Admin: 11/08/17 09:20 Dose:  2 mg











- Labs


Labs: 


 





 11/07/17 14:11 





 11/07/17 14:11 





 











PT  13.7 SECONDS (9.4-12.5)  H  11/07/17  14:11    


 


INR  1.25  (0.93-1.08)  H  11/07/17  14:11    


 


APTT  30.7 Seconds (25.1-36.5)   11/07/17  14:11    














- Constitutional


Appears: Non-toxic





- Head Exam


Head Exam: NORMAL INSPECTION





- ENT Exam


ENT Exam: Mucous Membranes Moist





- Neck Exam


Neck Exam: absent: Lymphadenopathy, Meningismus





- Respiratory Exam


Respiratory Exam: Decreased Breath Sounds





- Cardiovascular Exam


Cardiovascular Exam: +S1, +S2





- GI/Abdominal Exam


GI & Abdominal Exam: Soft.  absent: Tenderness





- Extremities Exam


Additional comments: 





left foot with dressings in place





Assessment and Plan





- Assessment and Plan (Free Text)


Plan: 





Assessment


left foot skin and skin structure infection, with probable osteomyelitis, S/P 

transmetatarsal amputation today


S/P Left 5th metatarsal osteomyelitis S/P debridement and bone excision grew 

Acinetobacter and E. faecalis


CAD S/P CABG


chronic CHF


DM


COPD


S/P AICD placement


S/P kidney transplant





Plan


base on previous cultures, continue Zosyn - follow up OR cx and pathology 


baseline ESR, CRP are elevated


will continue to monitor clinically

## 2017-11-09 NOTE — RAD
PROCEDURE:  Left Foot Radiographs.



HISTORY:

s/p left foot surgery  



COMPARISON:

11/08/2017.



FINDINGS:



BONES:

Status post amputation at the level of the proximal metatarsals. 

There is no acute fracture or bone destruction.  Bone alignment is 

normal.  There is diffuse bone demineralization. 



JOINTS:

Normal. 



SOFT TISSUES:

There are surgical clips overlying the amputation stump and an 

overlying drain. 



OTHER FINDINGS:

Atherosclerotic vascular calcifications are present.



IMPRESSION:

Status post amputation at the level of the proximal metatarsals.  

Postsurgical changes in the amputation stump.

## 2017-11-09 NOTE — PN
DATE:  11/09/2017



SUBJECTIVE:  The patient was not seen today because he was in the OR.





__________________________________________

Andrea Calderon MD





DD:  11/09/2017 17:41:31

DT:  11/09/2017 19:14:44

Job # [id number]

## 2017-11-09 NOTE — PCM.SURG1
Surgeon's Initial Post Op Note





- Surgeon's Notes


Surgeon: Dr. Benjamin Goel


Assistant: Dr. Jory Mckeon PGY-1 


Type of Anesthesia: General Endo


Anesthesia Administered By: Dr. Son


Pre-Operative Diagnosis: osteomyelitis of left foot with gangrenous 3rd and 4th 

digits


Operative Findings: see op report.  I: 20cc 0.5% Marcaine plain post-op.  M: 2-

0 Prolene sutures and staples. USMAN drain placed.


Post-Operative Diagnosis: same


Operation Performed: transmetatarsal amputation L foot


Specimen/Specimens Removed: digits 1-4, distal mets 1-5


Estimated Blood Loss: EBL {In ML}: 75


Blood Products Given: N/A


Drains Used: Vitor Caban


Post-Op Condition: Good


Date of Surgery/Procedure: 11/09/17


Time of Surgery/Procedure: 07:40

## 2017-11-10 ENCOUNTER — HOSPITAL ENCOUNTER (INPATIENT)
Dept: HOSPITAL 42 - TRCU | Age: 57
LOS: 6 days | Discharge: HOME | DRG: 638 | End: 2017-11-16
Attending: INTERNAL MEDICINE | Admitting: INTERNAL MEDICINE
Payer: COMMERCIAL

## 2017-11-10 VITALS
HEART RATE: 71 BPM | TEMPERATURE: 97.9 F | DIASTOLIC BLOOD PRESSURE: 70 MMHG | SYSTOLIC BLOOD PRESSURE: 119 MMHG | OXYGEN SATURATION: 97 %

## 2017-11-10 VITALS — BODY MASS INDEX: 26.2 KG/M2

## 2017-11-10 DIAGNOSIS — J44.9: ICD-10-CM

## 2017-11-10 DIAGNOSIS — E11.621: ICD-10-CM

## 2017-11-10 DIAGNOSIS — D64.9: ICD-10-CM

## 2017-11-10 DIAGNOSIS — Z87.891: ICD-10-CM

## 2017-11-10 DIAGNOSIS — I25.2: ICD-10-CM

## 2017-11-10 DIAGNOSIS — E78.5: ICD-10-CM

## 2017-11-10 DIAGNOSIS — I11.0: ICD-10-CM

## 2017-11-10 DIAGNOSIS — L97.409: ICD-10-CM

## 2017-11-10 DIAGNOSIS — M86.8X7: ICD-10-CM

## 2017-11-10 DIAGNOSIS — I50.9: ICD-10-CM

## 2017-11-10 DIAGNOSIS — E11.69: Primary | ICD-10-CM

## 2017-11-10 DIAGNOSIS — Z95.1: ICD-10-CM

## 2017-11-10 DIAGNOSIS — Z95.810: ICD-10-CM

## 2017-11-10 DIAGNOSIS — Z94.0: ICD-10-CM

## 2017-11-10 DIAGNOSIS — D72.829: ICD-10-CM

## 2017-11-10 DIAGNOSIS — E11.52: ICD-10-CM

## 2017-11-10 DIAGNOSIS — Z89.432: ICD-10-CM

## 2017-11-10 DIAGNOSIS — I25.10: ICD-10-CM

## 2017-11-10 LAB
ALBUMIN/GLOB SERPL: 1.1 {RATIO} (ref 1.1–1.8)
ALP SERPL-CCNC: 80 U/L (ref 38–126)
ALT SERPL-CCNC: 31 U/L (ref 7–56)
AST SERPL-CCNC: 17 U/L (ref 17–59)
BILIRUB SERPL-MCNC: 1.2 MG/DL (ref 0.2–1.3)
BUN SERPL-MCNC: 17 MG/DL (ref 7–21)
CALCIUM SERPL-MCNC: 9 MG/DL (ref 8.4–10.5)
CHLORIDE SERPL-SCNC: 100 MMOL/L (ref 98–107)
CO2 SERPL-SCNC: 29 MMOL/L (ref 21–33)
ERYTHROCYTE [DISTWIDTH] IN BLOOD BY AUTOMATED COUNT: 14 % (ref 11.5–14.5)
GLOBULIN SER-MCNC: 3.2 GM/DL
GLUCOSE SERPL-MCNC: 147 MG/DL (ref 70–110)
HCT VFR BLD CALC: 35.2 % (ref 42–52)
MCH RBC QN AUTO: 31.2 PG (ref 25–35)
MCHC RBC AUTO-ENTMCNC: 33.2 G/DL (ref 31–37)
MCV RBC AUTO: 93.9 FL (ref 80–105)
PLATELET # BLD: 240 10^3/UL (ref 120–450)
PMV BLD AUTO: 9.5 FL (ref 7–11)
POTASSIUM SERPL-SCNC: 4.7 MMOL/L (ref 3.6–5)
PROT SERPL-MCNC: 6.5 G/DL (ref 5.8–8.3)
SODIUM SERPL-SCNC: 135 MMOL/L (ref 132–148)
WBC # BLD AUTO: 12.9 10^3/UL (ref 4.5–11)

## 2017-11-10 RX ADMIN — INSULIN DETEMIR SCH UNIT: 100 INJECTION, SOLUTION SUBCUTANEOUS at 22:10

## 2017-11-10 RX ADMIN — INSULIN HUMAN SCH UNITS: 100 INJECTION, SOLUTION PARENTERAL at 12:00

## 2017-11-10 RX ADMIN — COLLAGENASE SANTYL SCH APPLIC: 250 OINTMENT TOPICAL at 11:00

## 2017-11-10 RX ADMIN — MORPHINE SULFATE PRN MG: 2 INJECTION, SOLUTION INTRAMUSCULAR; INTRAVENOUS at 10:34

## 2017-11-10 RX ADMIN — INSULIN HUMAN SCH UNITS: 100 INJECTION, SOLUTION PARENTERAL at 17:21

## 2017-11-10 RX ADMIN — INSULIN HUMAN SCH: 100 INJECTION, SOLUTION PARENTERAL at 22:11

## 2017-11-10 RX ADMIN — MORPHINE SULFATE PRN MG: 2 INJECTION, SOLUTION INTRAMUSCULAR; INTRAVENOUS at 14:28

## 2017-11-10 RX ADMIN — MORPHINE SULFATE PRN MG: 2 INJECTION, SOLUTION INTRAMUSCULAR; INTRAVENOUS at 06:16

## 2017-11-10 RX ADMIN — PIPERACILLIN AND TAZOBACTAM SCH MLS/HR: 3; .375 INJECTION, POWDER, LYOPHILIZED, FOR SOLUTION INTRAVENOUS; PARENTERAL at 11:57

## 2017-11-10 RX ADMIN — MORPHINE SULFATE PRN MG: 2 INJECTION, SOLUTION INTRAMUSCULAR; INTRAVENOUS at 18:37

## 2017-11-10 RX ADMIN — PIPERACILLIN AND TAZOBACTAM SCH MLS/HR: 3; .375 INJECTION, POWDER, LYOPHILIZED, FOR SOLUTION INTRAVENOUS; PARENTERAL at 06:20

## 2017-11-10 RX ADMIN — PIPERACILLIN AND TAZOBACTAM SCH MLS/HR: 3; .375 INJECTION, POWDER, LYOPHILIZED, FOR SOLUTION INTRAVENOUS; PARENTERAL at 00:07

## 2017-11-10 RX ADMIN — MORPHINE SULFATE PRN MG: 2 INJECTION, SOLUTION INTRAMUSCULAR; INTRAVENOUS at 22:20

## 2017-11-10 RX ADMIN — INSULIN HUMAN SCH: 100 INJECTION, SOLUTION PARENTERAL at 08:00

## 2017-11-10 RX ADMIN — PIPERACILLIN AND TAZOBACTAM SCH MLS/HR: 3; .375 INJECTION, POWDER, LYOPHILIZED, FOR SOLUTION INTRAVENOUS; PARENTERAL at 17:17

## 2017-11-10 NOTE — CP.PCM.PN
Subjective





- Date & Time of Evaluation


Date of Evaluation: 11/10/17


Time of Evaluation: 11:45





- Subjective


Subjective: 





Comfortable, not in distress, afebrile, still with pain in the left foot.





Objective





- Vital Signs/Intake and Output


Vital Signs (last 24 hours): 


 











Temp Pulse Resp BP Pulse Ox


 


 98.3 F   70   20   136/88   96 


 


 11/10/17 07:57  11/10/17 07:57  11/10/17 07:57  11/10/17 07:57  11/10/17 07:57








Intake and Output: 


 











 11/10/17 11/10/17





 06:59 18:59


 


Intake Total 5124 


 


Output Total 480 


 


Balance 4644 














- Medications


Medications: 


 Current Medications





Amlodipine Besylate (Norvasc)  10 mg PO DAILY [facility]


   Last Admin: 11/09/17 13:29 Dose:  10 mg


Aspirin (Ecotrin)  81 mg PO 0800 [facility]


   Last Admin: 11/09/17 08:00 Dose:  Not Given


Atorvastatin Calcium (Lipitor)  40 mg PO HS [facility]


   Last Admin: 11/10/17 00:07 Dose:  40 mg


Collagenase (Santyl)  0 gm TOP DAILY [facility]


   Last Admin: 11/09/17 11:00 Dose:  Not Given


Docusate Sodium (Colace)  100 mg PO DAILY [facility]


   Last Admin: 11/09/17 13:28 Dose:  100 mg


Escitalopram Oxalate (Lexapro)  5 mg PO DAILY [facility]


   Last Admin: 11/09/17 13:28 Dose:  5 mg


Famotidine (Pepcid)  20 mg PO DAILY [facility]


   Last Admin: 11/09/17 13:28 Dose:  20 mg


Piperacillin Sod/Tazobactam Sod (Zosyn 3.375 In Ns 100ml)  100 mls @ 200 mls/hr 

IVPB Q6 [facility]


   PRN Reason: Protocol


   Stop: 11/15/17 12:01


   Last Admin: 11/10/17 06:20 Dose:  200 mls/hr


Insulin Detemir (Levemir)  40 unit SC HS [facility]


   Last Admin: 11/07/17 21:42 Dose:  40 unit


Insulin Human Regular (Humulin R Med)  0 units SC ACHS [facility]


   PRN Reason: Protocol


   Last Admin: 11/09/17 22:02 Dose:  Not Given


Metoprolol Tartrate (Lopressor)  50 mg PO 0800,1800 [facility]


   Last Admin: 11/09/17 17:39 Dose:  Not Given


Morphine Sulfate (Morphine)  2 mg IVP Q4H PRN


   PRN Reason: Pain, moderate (4-7)


   Last Admin: 11/10/17 06:16 Dose:  2 mg


Mycophenolate Mofetil (Cellcept Cap)  750 mg PO BID [facility]


   Last Admin: 11/09/17 17:39 Dose:  Not Given


Non-Formulary Medication (Mycophenolate Sodium [Myfortic])  360 mg PO BID [facility]


Ondansetron HCl (Zofran Inj)  4 mg IVP Q4H PRN


   PRN Reason: Nausea/Vomiting


   Last Admin: 11/10/17 04:44 Dose:  4 mg


Prednisone (Prednisone Tab)  5 mg PO 0800 [facility]


   Last Admin: 11/09/17 08:00 Dose:  Not Given


Tacrolimus (Prograf Cap)  1.5 mg PO HS [facility]


   Last Admin: 11/10/17 00:06 Dose:  1.5 mg


Tacrolimus (Prograf Cap)  2 mg PO DAILY [facility]


   Last Admin: 11/09/17 13:28 Dose:  2 mg











- Labs


Labs: 


 





 11/07/17 14:11 





 11/07/17 14:11 





 











PT  13.7 SECONDS (9.4-12.5)  H  11/07/17  14:11    


 


INR  1.25  (0.93-1.08)  H  11/07/17  14:11    


 


APTT  30.7 Seconds (25.1-36.5)   11/07/17  14:11    














- Constitutional


Appears: Non-toxic, No Acute Distress





- Head Exam


Head Exam: NORMAL INSPECTION





- ENT Exam


ENT Exam: Mucous Membranes Moist





- Neck Exam


Neck Exam: absent: Lymphadenopathy, Meningismus





- Respiratory Exam


Respiratory Exam: Decreased Breath Sounds





- Cardiovascular Exam


Cardiovascular Exam: +S1, +S2





- GI/Abdominal Exam


GI & Abdominal Exam: Soft.  absent: Tenderness





- Extremities Exam


Additional comments: 





left foot with dressings in place





Assessment and Plan





- Assessment and Plan (Free Text)


Plan: 





Assessment


left foot skin and skin structure infection, with probable osteomyelitis, S/P 

transmetatarsal amputation POD #1


S/P Left 5th metatarsal osteomyelitis S/P debridement and bone excision grew 

Acinetobacter and E. faecalis


CAD S/P CABG


chronic CHF


DM


COPD


S/P AICD placement


S/P kidney transplant





Plan


base on previous cultures, continue Zosyn - follow up OR cx and pathology 


baseline ESR, CRP are elevated


will continue to follow clinically

## 2017-11-10 NOTE — CP.PCM.PN
Subjective





- Date & Time of Evaluation


Date of Evaluation: 11/10/17


Time of Evaluation: 11:50





- Subjective


Subjective: 





56 y/o male seen at bedside 1 day s/p left foot transmetatarsal amputation. Pt 

also has right foot plantar heel ulceration and left anterior distal leg 

ulceration. Pt states he has been having some pain at his surgical site today, 

which is being managed by medications. Pt has kept all dressings clean, dry and 

intact. Pt denies any acute events overnight. Pt has been tolerating his diet 

well and has voided several times since the surgery. Pt admits to using the 

surgical shoe when ambulating. Pt denies F/C/N/V/CP/SOB. 





Objective





- Vital Signs/Intake and Output


Vital Signs (last 24 hours): 


 











Temp Pulse Resp BP Pulse Ox


 


 98.3 F   70   20   136/88   96 


 


 11/10/17 07:57  11/10/17 07:57  11/10/17 07:57  11/10/17 10:28  11/10/17 07:57








Intake and Output: 


 











 11/10/17 11/10/17





 06:59 18:59


 


Intake Total 5124 


 


Output Total 480 


 


Balance 4644 














- Medications


Medications: 


 Current Medications





Amlodipine Besylate (Norvasc)  10 mg PO DAILY [facility]


   Last Admin: 11/10/17 10:28 Dose:  10 mg


Aspirin (Ecotrin)  81 mg PO 0800 [facility]


   Last Admin: 11/10/17 10:22 Dose:  81 mg


Atorvastatin Calcium (Lipitor)  40 mg PO HS [facility]


   Last Admin: 11/10/17 00:07 Dose:  40 mg


Collagenase (Santyl)  0 gm TOP DAILY [facility]


   Last Admin: 11/09/17 11:00 Dose:  Not Given


Docusate Sodium (Colace)  100 mg PO DAILY [facility]


   Last Admin: 11/10/17 10:28 Dose:  100 mg


Escitalopram Oxalate (Lexapro)  5 mg PO DAILY [facility]


   Last Admin: 11/10/17 10:32 Dose:  5 mg


Famotidine (Pepcid)  20 mg PO DAILY [facility]


   Last Admin: 11/10/17 10:28 Dose:  20 mg


Piperacillin Sod/Tazobactam Sod (Zosyn 3.375 In Ns 100ml)  100 mls @ 200 mls/hr 

IVPB Q6 [facility]


   PRN Reason: Protocol


   Stop: 11/15/17 12:01


   Last Admin: 11/10/17 06:20 Dose:  200 mls/hr


Insulin Detemir (Levemir)  40 unit SC [facility]


   Last Admin: 11/07/17 21:42 Dose:  40 unit


Ins[facility] Human [facility] R Med)  [facility]


   PRN Reason: Protocol


   Last Admin: 11/10/17 08:00 Dose:  Not Given


Metoprolol Tartrate (Lopressor)  50 mg PO 0800,1800 [facility]


   Last Admin: 11/10/17 10:22 Dose:  50 mg


Morphine Sulfate (Morphine)  2 mg IVP Q4H PRN


   PRN Reason: Pain, moderate (4-7)


   Last Admin: 11/10/17 10:34 Dose:  2 mg


Mycophenolate Mofetil (Cellcept Cap)  750 mg PO BID [facility]


   Last Admin: 11/10/17 10:28 Dose:  750 mg


Non-Formulary Medication (Mycophenolate Sodium [Myfortic])  360 mg PO BID [facility]


Ondansetron HCl (Zofran Inj)  4 mg IVP Q4H PRN


   PRN Reason: Nausea/Vomiting


   Last Admin: 11/10/17 04:44 Dose:  4 mg


Prednisone (Prednisone Tab)  5 mg PO 0800 [facility]


   Last Admin: 11/10/17 10:22 Dose:  5 mg


Tacrolimus (Prograf Cap)  1.5 mg PO HS [facility]


   Last Admin: 11/10/17 00:06 Dose:  1.5 mg


Tacrolimus (Prograf Cap)  2 mg PO DAILY [facility]


   Last Admin: 11/10/17 10:27 Dose:  2 mg











- Labs


Labs: 


 





 11/10/17 08:55 





 11/10/17 08:55 





 











PT  13.7 SECONDS (9.4-12.5)  H  11/07/17  14:11    


 


INR  1.25  (0.93-1.08)  H  11/07/17  14:11    


 


APTT  30.7 Seconds (25.1-36.5)   11/07/17  14:11    














- Constitutional


Appears: Well, Non-toxic, No Acute Distress





- Extremities Exam


Additional comments: 





Left lower extremity with post-op surgical dressing clean, dry and intact


Right lower extremity focused examination:


Vasc: DP/PT pulses palpable 2/4. Temperature gradient warm to cool. No pedal 

edema noted at this time. CFT < 3 sec to all digits on R foot.


Derm: Plantar heel exhibits stable 0.5 diameter circular ulceration with 

granular wound base. No active drainage, no malodor, no fluctuance. Wound 

borders are hyperkeratotic.


Neuro: Protective sensation grossly diminished


Ortho: Mild tenderness noted to right foot on palpation of ulceration site








- Neurological Exam


Neurological Exam: Alert, Awake, Oriented x3





- Psychiatric Exam


Psychiatric exam: Normal Affect, Normal Mood





Assessment and Plan





- Assessment and Plan (Free Text)


Assessment: 





56 y/o male seen 1 day s/p left foot transmetatarsal amputation, as well as 

left distal anterior leg ulceration and right plantar heel ulceration. 


Plan: 


Pt seen and evaluated at bedside


Discussed plan with attending Dr. Goel


Chart, labs and vitals reviewed- afebrile, WBC 12.9 today


Wound cultures from OR pending; prelim shows growth of gram neg rods


Right foot ulceration cleansed with saline and dressed with Santyl, ABD and DSD 


Continue IV Zosyn as per ID


Left surgical dressing to remain intact until Monday, when it will be examined 

and changed


Pt advised to continue ambulating with surgical shoe to L foot at all times


Encourage patient to participate actively in physical therapy to help minimize 

swelling and practice ambulation post-operatively


Podiatry will continue to monitor closely

## 2017-11-10 NOTE — PN
DATE:  11/10/2017



SUBJECTIVE:  The patient has no complaints of any chest pain.  No shortness

of breath.  No headaches or dizziness.



PHYSICAL EXAMINATION:

VITAL SIGNS:  Temperature is 98.3, pulse is 78, blood pressure is 124/60,

respirations 20.

GENERAL:  The patient is lying in bed, flat, comfortable.

HEENT:  No oral lesion.  Anicteric sclerae.  Moist mucosa.

NECK:  No JVD, adenopathy, or thyromegaly.

CARDIOVASCULAR:  S1 and S2, regular.  No murmurs, rubs, or gallops.

LUNGS:  Clear to auscultation bilaterally.  No wheeze, rales, or rhonchi.

ABDOMEN:  Bowel sounds are positive, soft, nontender and nondistended.

EXTREMITIES:  No cyanosis, clubbing or edema.



LABS:  White count of 10.9, hemoglobin 12.3, creatinine is 1.1.



ASSESSMENT:

1.  Transmetatarsal amputation of left foot secondary to osteomyelitis

postop #1.

2.  Peripheral arterial disease.

3.  Diabetes type 2.

4.  Dyslipidemia.

5.  Hypertension.

6.  Renal transplantation.

7.  Pacemaker.

8.  Coronary artery disease status post coronary artery bypass grafting.



PLAN:  The patient is currently comfortable.  He is on Cellcept for his

immunosuppression as well as prednisone and Prograf.  The patient is going

to continue with Zosyn for antibiotics.  He is having vomiting history and

nausea.  He is on Zofran for that.  He has improvement in his symptoms.  He

is on Norvasc for hypertension.  The patient is on Lipitor for

dyslipidemia.  He is on metoprolol.  The patient is receiving Motrin for

pain.  I renewed his pain medications.  He is being followed by Podiatry as

well as Infectious Disease.





__________________________________________

Andrea Calderon MD



DD:  11/10/2017 6:43:05

DT:  11/10/2017 7:20:08

Job # [id number]

## 2017-11-11 PROCEDURE — F07M6ZZ THERAPEUTIC EXERCISE TREATMENT OF MUSCULOSKELETAL SYSTEM - WHOLE BODY: ICD-10-PCS | Performed by: INTERNAL MEDICINE

## 2017-11-11 PROCEDURE — F08Z2ZZ GROOMING/PERSONAL HYGIENE TREATMENT: ICD-10-PCS | Performed by: INTERNAL MEDICINE

## 2017-11-11 PROCEDURE — F07Z9FZ GAIT TRAINING/FUNCTIONAL AMBULATION TREATMENT USING ASSISTIVE, ADAPTIVE, SUPPORTIVE OR PROTECTIVE EQUIPMENT: ICD-10-PCS | Performed by: INTERNAL MEDICINE

## 2017-11-11 PROCEDURE — F08Z1ZZ DRESSING TECHNIQUES TREATMENT: ICD-10-PCS | Performed by: INTERNAL MEDICINE

## 2017-11-11 RX ADMIN — PIPERACILLIN AND TAZOBACTAM SCH MLS/HR: 3; .375 INJECTION, POWDER, LYOPHILIZED, FOR SOLUTION INTRAVENOUS; PARENTERAL at 05:34

## 2017-11-11 RX ADMIN — PIPERACILLIN AND TAZOBACTAM SCH MLS/HR: 3; .375 INJECTION, POWDER, LYOPHILIZED, FOR SOLUTION INTRAVENOUS; PARENTERAL at 17:20

## 2017-11-11 RX ADMIN — PIPERACILLIN AND TAZOBACTAM SCH MLS/HR: 3; .375 INJECTION, POWDER, LYOPHILIZED, FOR SOLUTION INTRAVENOUS; PARENTERAL at 12:33

## 2017-11-11 RX ADMIN — COLLAGENASE SANTYL SCH APPLIC: 250 OINTMENT TOPICAL at 10:32

## 2017-11-11 RX ADMIN — COLLAGENASE SANTYL SCH APPFUL: 250 OINTMENT TOPICAL at 10:31

## 2017-11-11 RX ADMIN — MORPHINE SULFATE PRN MG: 2 INJECTION, SOLUTION INTRAMUSCULAR; INTRAVENOUS at 12:33

## 2017-11-11 RX ADMIN — MORPHINE SULFATE PRN MG: 2 INJECTION, SOLUTION INTRAMUSCULAR; INTRAVENOUS at 04:14

## 2017-11-11 RX ADMIN — INSULIN HUMAN SCH: 100 INJECTION, SOLUTION PARENTERAL at 12:25

## 2017-11-11 RX ADMIN — MORPHINE SULFATE PRN MG: 2 INJECTION, SOLUTION INTRAMUSCULAR; INTRAVENOUS at 08:40

## 2017-11-11 RX ADMIN — INSULIN DETEMIR SCH UNIT: 100 INJECTION, SOLUTION SUBCUTANEOUS at 21:58

## 2017-11-11 RX ADMIN — INSULIN HUMAN SCH UNITS: 100 INJECTION, SOLUTION PARENTERAL at 22:02

## 2017-11-11 RX ADMIN — PIPERACILLIN AND TAZOBACTAM SCH MLS/HR: 3; .375 INJECTION, POWDER, LYOPHILIZED, FOR SOLUTION INTRAVENOUS; PARENTERAL at 00:00

## 2017-11-11 RX ADMIN — MORPHINE SULFATE PRN MG: 2 INJECTION, SOLUTION INTRAMUSCULAR; INTRAVENOUS at 16:44

## 2017-11-11 RX ADMIN — INSULIN HUMAN SCH UNITS: 100 INJECTION, SOLUTION PARENTERAL at 06:36

## 2017-11-11 RX ADMIN — INSULIN HUMAN SCH UNITS: 100 INJECTION, SOLUTION PARENTERAL at 17:18

## 2017-11-11 RX ADMIN — MORPHINE SULFATE PRN MG: 2 INJECTION, SOLUTION INTRAMUSCULAR; INTRAVENOUS at 20:56

## 2017-11-11 NOTE — CP.PCM.CON
History of Present Illness





- History of Present Illness


History of Present Illness: 


56 y/o male known to Dr. Goel's service seen at bedside in TCU 2 days s/p left 

foot transmetatarsal amputation. Pt also has right foot plantar heel ulceration 

and left anterior distal leg ulceration. Pt states his pain is ok today and the 

medicines are helping. Pt has kept all dressings clean, dry and intact. Pt 

denies any acute events overnight. Pt admits to using the surgical shoe when 

ambulating. Pt says his dressings are staying in place with therapy. Pt denies F

/C/N/V/CP/SOB. 








Review of Systems





- Review of Systems


All systems: reviewed and no additional remarkable complaints except (per HPI)





Past Patient History





- Infectious Disease


Hx of Infectious Diseases: None





- Tetanus Immunizations


Tetanus Immunization: Unknown





- Past Social History


Smoking Status: Former Smoker





- CARDIAC


Hx Cardiac Disorders: Yes (MI, CABG)


Hx Hypertension: Yes





- PULMONARY


Hx Chronic Obstructive Pulmonary Disease (COPD): Yes





- NEUROLOGICAL


Hx Neurological Disorder: No





- HEENT


Hx HEENT Problems: No





- RENAL


Hx Renal Failure: Yes (s/p kidney transplant)





- ENDOCRINE/METABOLIC


Hx Diabetes Mellitus Type 2: Yes





- HEMATOLOGICAL/ONCOLOGICAL


Hx Blood Disorders: No





- INTEGUMENTARY


Hx Dermatological Problems: Yes


Other/Comment: LEFT BIG TOE POST AMPUTATION-GANGRENE TO AREA AMPUTATED.7-24-17





- MUSCULOSKELETAL/RHEUMATOLOGICAL


Hx Falls: No





- GASTROINTESTINAL


Hx Gastrointestinal Disorders: Yes (INCARCERATED INCISIONAL HERNIA,

GASTROENTERITIS, CONSTIPATION)


Other/Comment: h/o c diff





- GENITOURINARY/GYNECOLOGICAL


Hx Genitourinary Disorders:  (kidney transplant 2012)





- PSYCHIATRIC


Hx Psychophysiologic Disorder: Yes (H/O SMOKING CIGARETTES 1/2 PPD,ETOH USE 

SOCAILLY)


Hx Depression: Yes


Hx Emotional Abuse: No


Hx Physical Abuse: No





- SURGICAL HISTORY


Hx Surgeries: Yes (RENAL TRANSPLANT,PM INSERTION X 2, AMP LEFT 5TH TOE)





- ANESTHESIA


Hx Anesthesia Reactions: No


Hx Malignant Hyperthermia: No





Meds


Allergies/Adverse Reactions: 


 Allergies











Allergy/AdvReac Type Severity Reaction Status Date / Time


 


No Known Allergies Allergy   Verified 11/10/17 19:49














- Medications


Medications: 


 Current Medications





Amlodipine Besylate (Norvasc)  10 mg PO DAILY [facility]


Aspirin (Ecotrin)  81 mg PO 0800 [facility]


   Last Admin: 11/11/17 08:39 Dose:  81 mg


Atorvastatin Calcium (Lipitor)  40 mg PO [facility]


   Last Admin: 11/10/17 21:50 Dose:  40 mg


Collagenase (Santyl)  1 gm TOP DAILY [facility]


Docusate Sodium (Colace)  100 mg PO DAILY [facility]


Escitalopram Oxalate (Lexapro)  5 mg PO DAILY [facility]


Famotidine (Pepcid)  20 mg PO DAILY [facility]


Piperacillin Sod/Tazobactam Sod (Zosyn 3.375 In Ns 100ml)  100 mls @ 200 mls/hr 

IVPB Q6 [facility]


   PRN Reason: Protocol


   Stop: 11/18/17 00:01


   Last Admin: 11/11/17 05:34 Dose:  200 mls/hr


Insulin Detemir (Levemir)  40 unit SC [facility]


   Last Admin: 11/10/17 22:10 Dose:  40 unit


Insulin Human Regular (Humulin R Med)  [facility]


   PRN Reason: Protocol


   Last Admin: 11/11/17 06:36 Dose:  1 units


Metoprolol Tartrate (Lopressor)  50 mg PO 0800,1800 [facility]


   Last Admin: 11/11/17 08:39 Dose:  50 mg


Morphine Sulfate (Morphine)  2 mg IVP Q4H PRN


   PRN Reason: Pain, moderate (4-7)


   Last Admin: 11/11/17 08:40 Dose:  2 mg


Mycophenolate Mofetil (Cellcept Cap)  750 mg PO BID [facility]


Ondansetron HCl (Zofran Inj)  4 mg IVP Q4H PRN


   PRN Reason: Nausea/Vomiting


Prednisone (Prednisone Tab)  5 mg PO 0800 [facility]


   Last Admin: 11/11/17 08:39 Dose:  5 mg


Tacrolimus (Prograf Cap)  1.5 mg PO [facility]


   Last Admin: 11/10/17 21:51 Dose:  1.5 mg


Tacrolimus (Prograf Cap)  2 mg PO DAILY [facility]











Physical Exam





- Constitutional


Appears: Well, Non-toxic, No Acute Distress





- Extremities Exam


Additional comments: 


Left lower extremity with post-op surgical dressing clean, dry and intact. USMAN 

drain in place with 7mL of drainage noted (last changed today AM)


Right lower extremity focused examination:


Vasc: DP/PT pulses palpable 2/4. Temperature gradient warm to cool. No pedal 

edema noted at this time. CFT < 3 sec to all digits on R foot.


Derm: Plantar heel exhibits stable 0.5 diameter circular ulceration with 

granular wound base. No active drainage, no malodor, no fluctuance. Wound 

borders are hyperkeratotic.


Neuro: Protective sensation grossly diminished


Ortho: Mild tenderness noted to right foot on palpation of ulceration site








- Neurological Exam


Neurological exam: Alert, Oriented x3





- Psychiatric Exam


Psychiatric exam: Normal Affect, Normal Mood





Results





- Vital Signs


Recent Vital Signs: 


 Last Vital Signs











Temp  97.4 F L  11/11/17 06:00


 


Pulse  78   11/11/17 06:00


 


Resp  16   11/11/17 06:00


 


BP  128/78   11/11/17 08:39


 


Pulse Ox  96   11/11/17 06:00














Assessment & Plan





- Assessment and Plan (Free Text)


Assessment: 


56 y/o male seen in TCU 2 days s/p left foot transmetatarsal amputation, as 

well as left distal anterior leg ulceration and right plantar heel ulceration. 








Plan: 


Pt seen and evaluated at bedside


Discussed plan with attending Dr. Goel


Chart, labs and vitals reviewed- afebrile, last WBC 12.9


Wound cultures from OR show growth of Enterobacter Cloacae


Right foot ulceration cleansed with saline and dressed with Santyl, ABD and DSD 


Continue IV Zosyn as per ID, await new recommendations for directed abx 

treatment


Left surgical dressing to remain intact until Monday, when it will be examined 

and changed


USMAN drain to be changed by nursing as needed


Pt advised to continue ambulating with surgical shoe to L foot at all times


Encourage patient to participate actively in physical therapy to help minimize 

swelling and practice ambulation post-operatively


Podiatry will continue to monitor closely

## 2017-11-11 NOTE — CP.PCM.CON
History of Present Illness





- History of Present Illness


History of Present Illness: 


57 year old male with PMH of CAD S/P CABG, chronic CHF, DM, COPD, S/P AICD 

placement, S/P kidney transplant, S/P Left 5th metatarsal osteomyelitis S/P 

debridement and bone excision, grew Acinetobacter and E. faecalis was initially 

admitted in [facility] for surgery on the foot. He underwent TMA on the left foot and 

is now transferred to [facility] for continued medical therapy and physical rehab. 

Infectious Diseases consult is requested to continue his antibiotic therapy. He 

is currently comfortable but still with pain in the foot, no fevers, no 

headache or dizziness, no abdominal pain, no cough or colds, no diarrhea, no 

dysuria. 





Review of Systems





- Review of Systems


All systems: reviewed and no additional remarkable complaints except (as per HPI

)





Past Patient History





- Infectious Disease


Hx of Infectious Diseases: None





- Tetanus Immunizations


Tetanus Immunization: Unknown





- Past Social History


Smoking Status: Former Smoker





- CARDIAC


Hx Cardiac Disorders: Yes (MI, CABG)


Hx Hypertension: Yes





- PULMONARY


Hx Chronic Obstructive Pulmonary Disease (COPD): Yes





- NEUROLOGICAL


Hx Neurological Disorder: No





- HEENT


Hx HEENT Problems: No





- RENAL


Hx Renal Failure: Yes (s/p kidney transplant)





- ENDOCRINE/METABOLIC


Hx Diabetes Mellitus Type 2: Yes





- HEMATOLOGICAL/ONCOLOGICAL


Hx Blood Disorders: No





- INTEGUMENTARY


Hx Dermatological Problems: Yes


Other/Comment: LEFT BIG TOE POST AMPUTATION-GANGRENE TO AREA AMPUTATED.7-24-17





- MUSCULOSKELETAL/RHEUMATOLOGICAL


Hx Falls: No





- GASTROINTESTINAL


Hx Gastrointestinal Disorders: Yes (INCARCERATED INCISIONAL HERNIA,

GASTROENTERITIS, CONSTIPATION)


Other/Comment: h/o c diff





- GENITOURINARY/GYNECOLOGICAL


Hx Genitourinary Disorders:  (kidney transplant 2012)





- PSYCHIATRIC


Hx Psychophysiologic Disorder: Yes (H/O SMOKING CIGARETTES 1/2 PPD,ETOH USE 

SOCAILLY)


Hx Depression: Yes


Hx Emotional Abuse: No


Hx Physical Abuse: No





- SURGICAL HISTORY


Hx Surgeries: Yes (RENAL TRANSPLANT,PM INSERTION X 2, AMP LEFT 5TH TOE)





- ANESTHESIA


Hx Anesthesia Reactions: No


Hx Malignant Hyperthermia: No





Meds


Allergies/Adverse Reactions: 


 Allergies











Allergy/AdvReac Type Severity Reaction Status Date / Time


 


No Known Allergies Allergy   Verified 11/10/17 19:49














- Medications


Medications: 


 Current Medications





Amlodipine Besylate (Norvasc)  10 mg PO DAILY [facility]


Aspirin (Ecotrin)  81 mg PO 0800 [facility]


Atorvastatin Calcium (Lipitor)  40 mg PO HS [facility]


   Last Admin: 11/10/17 21:50 Dose:  40 mg


Collagenase (Santyl)  1 gm TOP DAILY [facility]


Docusate Sodium (Colace)  100 mg PO DAILY [facility]


Escitalopram Oxalate (Lexapro)  5 mg PO DAILY [facility]


Famotidine (Pepcid)  20 mg PO DAILY [facility]


Piperacillin Sod/Tazobactam Sod (Zosyn 3.375 In Ns 100ml)  100 mls @ 200 mls/hr 

IVPB Q6 [facility]


   PRN Reason: Protocol


   Stop: 11/11/17 06:29


Insulin Detemir (Levemir)  40 unit SC HS [facility]


   Last Admin: 11/10/17 22:10 Dose:  40 unit


Insulin Human Regular (Humulin R Med)  [facility]


   PRN Reason: Protocol


   Last Admin: 11/10/17 22:11 Dose:  Not Given


Metoprolol Tartrate (Lopressor)  50 mg PO 0800,1800 [facility]


Morphine Sulfate (Morphine)  2 mg IVP Q4H PRN


   PRN Reason: Pain, moderate (4-7)


   Last Admin: 11/10/17 22:20 Dose:  2 mg


Mycophenolate Mofetil (Cellcept Cap)  750 mg PO BID [facility]


Ondansetron HCl (Zofran Inj)  4 mg IVP Q4H PRN


   PRN Reason: Nausea/Vomiting


Prednisone (Prednisone Tab)  5 mg PO 0800 [facility]


Tacrolimus (Prograf Cap)  1.5 mg PO HS [facility]


   Last Admin: 11/10/17 21:51 Dose:  1.5 mg


Tacrolimus (Prograf Cap)  2 mg PO DAILY [facility]











Physical Exam





- Constitutional


Appears: Non-toxic, No Acute Distress





- Head Exam


Head Exam: NORMAL INSPECTION





- ENT Exam


ENT Exam: Mucous Membranes Moist





- Neck Exam


Neck exam: Negative for: Lymphadenopathy, Meningismus





- Respiratory Exam


Respiratory Exam: Decreased Breath Sounds





- Cardiovascular Exam


Cardiovascular Exam: +S1, +S2





- GI/Abdominal Exam


GI & Abdominal Exam: Soft.  absent: Tenderness





- Extremities Exam


Additional comments: 





left foot with dressings in place





Assessment & Plan





- Assessment and Plan (Free Text)


Plan: 





Assessment


left foot skin and skin structure infection, with probable osteomyelitis, S/P 

transmetatarsal amputation POD #2


S/P Left 5th metatarsal osteomyelitis S/P debridement and bone excision grew 

Acinetobacter and E. faecalis


CAD S/P CABG


chronic CHF


DM


COPD


S/P AICD placement


S/P kidney transplant





Plan


base on previous cultures, continue Zosyn - follow up OR cx and pathology 


baseline ESR, CRP are elevated


will continue to follow clinically

## 2017-11-12 VITALS — RESPIRATION RATE: 18 BRPM

## 2017-11-12 RX ADMIN — INSULIN HUMAN SCH: 100 INJECTION, SOLUTION PARENTERAL at 22:03

## 2017-11-12 RX ADMIN — INSULIN HUMAN SCH: 100 INJECTION, SOLUTION PARENTERAL at 12:18

## 2017-11-12 RX ADMIN — PIPERACILLIN AND TAZOBACTAM SCH MLS/HR: 3; .375 INJECTION, POWDER, LYOPHILIZED, FOR SOLUTION INTRAVENOUS; PARENTERAL at 01:00

## 2017-11-12 RX ADMIN — MORPHINE SULFATE PRN MG: 2 INJECTION, SOLUTION INTRAMUSCULAR; INTRAVENOUS at 05:28

## 2017-11-12 RX ADMIN — PIPERACILLIN AND TAZOBACTAM SCH MLS/HR: 3; .375 INJECTION, POWDER, LYOPHILIZED, FOR SOLUTION INTRAVENOUS; PARENTERAL at 12:17

## 2017-11-12 RX ADMIN — MORPHINE SULFATE PRN MG: 2 INJECTION, SOLUTION INTRAMUSCULAR; INTRAVENOUS at 17:55

## 2017-11-12 RX ADMIN — INSULIN DETEMIR SCH UNIT: 100 INJECTION, SOLUTION SUBCUTANEOUS at 22:03

## 2017-11-12 RX ADMIN — PIPERACILLIN AND TAZOBACTAM SCH MLS/HR: 3; .375 INJECTION, POWDER, LYOPHILIZED, FOR SOLUTION INTRAVENOUS; PARENTERAL at 05:28

## 2017-11-12 RX ADMIN — INSULIN HUMAN SCH UNITS: 100 INJECTION, SOLUTION PARENTERAL at 17:19

## 2017-11-12 RX ADMIN — MORPHINE SULFATE PRN MG: 2 INJECTION, SOLUTION INTRAMUSCULAR; INTRAVENOUS at 09:40

## 2017-11-12 RX ADMIN — MORPHINE SULFATE PRN MG: 2 INJECTION, SOLUTION INTRAMUSCULAR; INTRAVENOUS at 13:42

## 2017-11-12 RX ADMIN — PIPERACILLIN AND TAZOBACTAM SCH MLS/HR: 3; .375 INJECTION, POWDER, LYOPHILIZED, FOR SOLUTION INTRAVENOUS; PARENTERAL at 17:17

## 2017-11-12 RX ADMIN — INSULIN HUMAN SCH UNITS: 100 INJECTION, SOLUTION PARENTERAL at 06:43

## 2017-11-12 RX ADMIN — MORPHINE SULFATE PRN MG: 2 INJECTION, SOLUTION INTRAMUSCULAR; INTRAVENOUS at 01:38

## 2017-11-12 RX ADMIN — COLLAGENASE SANTYL SCH APPLIC: 250 OINTMENT TOPICAL at 09:42

## 2017-11-12 RX ADMIN — COLLAGENASE SANTYL SCH: 250 OINTMENT TOPICAL at 09:42

## 2017-11-12 NOTE — CP.PCM.PN
Subjective





- Date & Time of Evaluation


Date of Evaluation: 11/12/17


Time of Evaluation: 11:40





- Subjective


Subjective: 


Podiatry Progress Note- Dr. Goel


56 y/o male seen at bedside this morning 3 days s/p left foot transmetatarsal 

amputation, as well as right foot plantar heel ulceration and left distal 

anterior leg ulceration. Pt states he feels well today and is not in any pain 

at this time. Pt has kept his dressings clean, dry and intact. Pt says he is 

walking a lot more since yesterday and feels more stable in his surgical shoes. 

Pt denies any acute events overnight. Pt denies F/C/N/V/CP/SOB. 





Objective





- Vital Signs/Intake and Output


Vital Signs (last 24 hours): 


 











Temp Pulse Resp BP Pulse Ox


 


 98 F   68   18   100/47 L  96 


 


 11/12/17 10:47  11/12/17 10:47  11/12/17 10:47  11/12/17 10:47  11/12/17 06:00











- Medications


Medications: 


 Current Medications





Amlodipine Besylate (Norvasc)  10 mg PO DAILY [facility]


   Last Admin: 11/12/17 09:41 Dose:  10 mg


Aspirin (Ecotrin)  81 mg PO 0800 [facility]


   Last Admin: 11/12/17 08:44 Dose:  81 mg


Atorvastatin Calcium (Lipitor)  40 mg PO HS [facility]


   Last Admin: 11/11/17 22:00 Dose:  40 mg


Collagenase (Santyl)  1 gm TOP DAILY [facility]


   Last Admin: 11/12/17 09:42 Dose:  Not Given


Collagenase (Santyl)  0 gm TOP DAILY [facility]


   Last Admin: 11/12/17 09:42 Dose:  1 applic


Docusate Sodium (Colace)  100 mg PO DAILY [facility]


   Last Admin: 11/12/17 09:40 Dose:  100 mg


Escitalopram Oxalate (Lexapro)  5 mg PO DAILY [facility]


   Last Admin: 11/12/17 09:42 Dose:  5 mg


Famotidine (Pepcid)  20 mg PO HS [facility]


Piperacillin Sod/Tazobactam Sod (Zosyn 3.375 In Ns 100ml)  100 mls @ 200 mls/hr 

IVPB Q6 [facility]


   PRN Reason: Protocol


   Stop: 11/18/17 00:01


   Last Admin: 11/12/17 12:17 Dose:  200 mls/hr


Insulin Detemir (Levemir)  40 unit SC HS [facility]


   Last Admin: 11/11/17 21:58 Dose:  40 unit


Insulin Human Regular (Humulin R Med)  0 units SC ACHS [facility]


   PRN Reason: Protocol


   Last Admin: 11/12/17 12:18 Dose:  Not Given


Metoprolol Tartrate (Lopressor)  50 mg PO 0800,1800 [facility]


   Last Admin: 11/12/17 08:45 Dose:  50 mg


Morphine Sulfate (Morphine)  2 mg IVP Q4H PRN


   PRN Reason: Pain, moderate (4-7)


   Last Admin: 11/12/17 13:42 Dose:  2 mg


Mycophenolate Mofetil (Cellcept Cap)  750 mg PO BID [facility]


   Last Admin: 11/12/17 09:41 Dose:  750 mg


Ondansetron HCl (Zofran Inj)  4 mg IVP Q4H PRN


   PRN Reason: Nausea/Vomiting


Prednisone (Prednisone Tab)  5 mg PO 0800 [facility]


   Last Admin: 11/12/17 08:46 Dose:  5 mg


Tacrolimus (Prograf Cap)  1.5 mg PO [facility]


   Last Admin: 11/11/17 22:00 Dose:  1.5 mg


Tacrolimus (Prograf Cap)  2 mg PO DAILY [facility]


   Last Admin: 11/12/17 09:41 Dose:  2 mg











- Constitutional


Appears: Well, Non-toxic, No Acute Distress





- Extremities Exam


Additional comments: 


Left lower extremity with post-op surgical dressing clean, dry and intact. USMAN 

drain in place,15mL of drainage noted


Right lower extremity focused examination:


Vasc: DP/PT pulses palpable 2/4. Temperature gradient warm to cool. No pedal 

edema noted. CFT < 3 sec to all digits on R foot.


Derm: Plantar heel exhibits stable 0.5 diameter circular ulceration with 

granular wound base. No active drainage, no malodor, no fluctuance. Wound 

borders are hyperkeratotic.


Neuro: Protective sensation grossly diminished


Ortho: No tenderness noted to right foot at ulceration site








- Neurological Exam


Neurological Exam: Alert, Awake, Oriented x3





- Psychiatric Exam


Psychiatric exam: Normal Affect, Normal Mood





Assessment and Plan





- Assessment and Plan (Free Text)


Assessment: 


56 y/o male seen in TCU 3 days s/p left foot transmetatarsal amputation, as 

well as left distal anterior leg ulceration and right plantar heel ulceration. 


Plan: 


Pt seen and evaluated at bedside


Discussed plan with attending Dr. Goel


Chart, labs and vitals reviewed- afebrile, last WBC 12.9


Wound cultures from OR show growth of Enterobacter Cloacae


Right foot ulceration cleansed with saline and dressed with Santyl, ABD and DSD 


Continue IV Zosyn as per ID, await new recommendations for directed abx 

treatment


Left surgical dressing to remain intact until Monday, when it will be examined 

and changed


USMAN drain to be changed by nursing as needed


Pt advised to continue ambulating with surgical shoe to L foot at all times


Encouraged continued participation with PT and ambulation


Podiatry will continue to monitor closely

## 2017-11-12 NOTE — PN
DATE:  11/12/2017



SUBJECTIVE:  He is comfortable in bed, in no acute distress.  Both feet in

dressing.  He is status post amputation of left foot tarsal and metatarsal.

Denies any pain.  He is ambulating with support.  Podiatry is following,

dressing changed.  No bleeding from the surgical site.  No pain.  No fever

or cough with expectoration.



REVIEW OF SYSTEMS:  As per HPI.  Rest of 12-point review of system reviewed

and negative.



PHYSICAL EXAMINATION

GENERAL:  Comfortable in bed, in no acute distress.

VITAL SIGNS:  Temperature 98.7, heart rate is 80 per minute, blood pressure

120/70, and respiratory rate 16 per minute.

HEENT:  Pallor positive.

NECK:  No lymphadenopathy.

CHEST:  Air entry present equal and bilaterally.  No added sounds.

ABDOMEN:  Soft and nontender.  No hepatosplenomegaly.

EXTREMITIES:  No edema.

CENTRAL NERVOUS SYSTEM:  Alert and oriented x3.  No focal sensory or motor

deficits.



MEDICATIONS:  Metoprolol 50 mg b.i.d., morphine p.r.n., Zofran p.r.n.,

prednisone 5 mg daily, Prograf 2 mg and 1.5 mg daily, and Lexapro 5 mg

daily.



ASSESSMENT AND PLAN:

1.  Status post renal transplant, renal function currently is stable.

2.  Osteomyelitis, left foot tarsal and metatarsal amputation.  Podiatry

following.  Currently on ceftriaxone 1 g daily, continue that.  He is

current on Pepcid.  Blood pressure controlled with current medications.  He

is on anti-rejection medication, Prograf, prednisone, and Cellcept and

continue the same dose.  Pain control with the current dose of morphine.





__________________________________________

Yoly Smith MD





DD:  11/12/2017 21:34:54

DT:  11/12/2017 21:59:24

Job # [id number]

## 2017-11-12 NOTE — CP.PCM.PN
Subjective





- Date & Time of Evaluation


Date of Evaluation: 11/12/17


Time of Evaluation: 10:40





- Subjective


Subjective: 





Comfortable on a chair, no fevers.





Objective





- Vital Signs/Intake and Output


Vital Signs (last 24 hours): 


 











Temp Pulse Resp BP Pulse Ox


 


 97.6 F   69   16   137/75   96 


 


 11/12/17 06:00  11/12/17 08:45  11/12/17 06:00  11/12/17 08:45  11/12/17 06:00











- Medications


Medications: 


 Current Medications





Amlodipine Besylate (Norvasc)  10 mg PO DAILY [facility]


   Last Admin: 11/11/17 10:31 Dose:  10 mg


Aspirin (Ecotrin)  81 mg PO 0800 [facility]


   Last Admin: 11/12/17 08:44 Dose:  81 mg


Atorvastatin Calcium (Lipitor)  40 mg PO HS [facility]


   Last Admin: 11/11/17 22:00 Dose:  40 mg


Collagenase (Santyl)  1 gm TOP DAILY [facility]


   Last Admin: 11/11/17 10:31 Dose:  1 appful


Collagenase (Santyl)  0 gm TOP DAILY [facility]


   Last Admin: 11/11/17 10:32 Dose:  1 applic


Docusate Sodium (Colace)  100 mg PO DAILY [facility]


   Last Admin: 11/11/17 10:47 Dose:  100 mg


Escitalopram Oxalate (Lexapro)  5 mg PO DAILY [facility]


   Last Admin: 11/11/17 10:31 Dose:  5 mg


Famotidine (Pepcid)  20 mg PO DAILY [facility]


   Last Admin: 11/11/17 10:31 Dose:  20 mg


Piperacillin Sod/Tazobactam Sod (Zosyn 3.375 In Ns 100ml)  100 mls @ 200 mls/hr 

IVPB Q6 [facility]


   PRN Reason: Protocol


   Stop: 11/18/17 00:01


   Last Admin: 11/12/17 05:28 Dose:  200 mls/hr


Insulin Detemir (Levemir)  40 unit SC HS [facility]


   Last Admin: 11/11/17 21:58 Dose:  40 unit


Insulin Human Regular (Humulin R Med)  0 units SC ACHS [facility]


   PRN Reason: Protocol


   Last Admin: 11/12/17 06:43 Dose:  5 units


Metoprolol Tartrate (Lopressor)  50 mg PO 0800,1800 [facility]


   Last Admin: 11/12/17 08:45 Dose:  50 mg


Morphine Sulfate (Morphine)  2 mg IVP Q4H PRN


   PRN Reason: Pain, moderate (4-7)


   Last Admin: 11/12/17 05:28 Dose:  2 mg


Mycophenolate Mofetil (Cellcept Cap)  750 mg PO BID [facility]


   Last Admin: 11/11/17 17:22 Dose:  750 mg


Ondansetron HCl (Zofran Inj)  4 mg IVP Q4H PRN


   PRN Reason: Nausea/Vomiting


Prednisone (Prednisone Tab)  5 mg PO 0800 [facility]


   Last Admin: 11/12/17 08:46 Dose:  5 mg


Tacrolimus (Prograf Cap)  1.5 mg PO HS [facility]


   Last Admin: 11/11/17 22:00 Dose:  1.5 mg


Tacrolimus (Prograf Cap)  2 mg PO DAILY [facility]


   Last Admin: 11/11/17 10:46 Dose:  2 mg











- Constitutional


Appears: Non-toxic, No Acute Distress





- Head Exam


Head Exam: NORMAL INSPECTION





- ENT Exam


ENT Exam: Mucous Membranes Moist





- Neck Exam


Neck Exam: absent: Lymphadenopathy, Meningismus





- Respiratory Exam


Respiratory Exam: Decreased Breath Sounds





- Cardiovascular Exam


Cardiovascular Exam: +S1, +S2





- GI/Abdominal Exam


GI & Abdominal Exam: Soft.  absent: Tenderness





- Extremities Exam


Additional comments: 





left foot with dressings in place





Assessment and Plan





- Assessment and Plan (Free Text)


Plan: 





Assessment


left foot skin and skin structure infection, with probable osteomyelitis, S/P 

transmetatarsal amputation POD #3, growing Enterobacter


S/P Left 5th metatarsal osteomyelitis S/P debridement and bone excision grew 

Acinetobacter and E. faecalis


CAD S/P CABG


chronic CHF


DM


COPD


S/P AICD placement


S/P kidney transplant





Plan


can switch antibiotics to Rocephin; follow up OR pathology 


baseline ESR, CRP are elevated


will continue to follow clinically

## 2017-11-13 RX ADMIN — INSULIN HUMAN SCH UNITS: 100 INJECTION, SOLUTION PARENTERAL at 06:40

## 2017-11-13 RX ADMIN — COLLAGENASE SANTYL SCH APPLIC: 250 OINTMENT TOPICAL at 10:39

## 2017-11-13 RX ADMIN — INSULIN DETEMIR SCH UNIT: 100 INJECTION, SOLUTION SUBCUTANEOUS at 22:25

## 2017-11-13 RX ADMIN — INSULIN HUMAN SCH UNITS: 100 INJECTION, SOLUTION PARENTERAL at 17:25

## 2017-11-13 RX ADMIN — INSULIN HUMAN SCH: 100 INJECTION, SOLUTION PARENTERAL at 22:19

## 2017-11-13 RX ADMIN — MORPHINE SULFATE PRN MG: 2 INJECTION, SOLUTION INTRAMUSCULAR; INTRAVENOUS at 05:34

## 2017-11-13 RX ADMIN — CEFTRIAXONE SCH MLS/HR: 1 INJECTION, SOLUTION INTRAVENOUS at 05:32

## 2017-11-13 RX ADMIN — COLLAGENASE SANTYL SCH: 250 OINTMENT TOPICAL at 10:47

## 2017-11-13 RX ADMIN — INSULIN HUMAN SCH: 100 INJECTION, SOLUTION PARENTERAL at 12:14

## 2017-11-13 RX ADMIN — OXYCODONE HYDROCHLORIDE AND ACETAMINOPHEN PRN TAB: 5; 325 TABLET ORAL at 16:45

## 2017-11-13 NOTE — CP.PCM.PN
Subjective





- Date & Time of Evaluation


Date of Evaluation: 11/13/17


Time of Evaluation: 11:25





- Subjective


Subjective: 





Left foot pain is less, no fevers overnight. No nausea or diarrhea.





Objective





- Vital Signs/Intake and Output


Vital Signs (last 24 hours): 


 











Temp Pulse Resp BP Pulse Ox


 


 97.8 F   79   18   117/66   99 


 


 11/12/17 16:19  11/13/17 08:28  11/12/17 16:19  11/13/17 08:28  11/12/17 16:19








Intake and Output: 


 











 11/13/17 11/13/17





 06:59 18:59


 


Output Total 50 


 


Balance -50 














- Medications


Medications: 


 Current Medications





Amlodipine Besylate (Norvasc)  10 mg PO DAILY [facility]


   Last Admin: 11/12/17 09:41 Dose:  10 mg


Aspirin (Ecotrin)  81 mg PO 0800 [facility]


   Last Admin: 11/13/17 08:28 Dose:  81 mg


Atorvastatin Calcium (Lipitor)  40 mg PO HS [facility]


   Last Admin: 11/12/17 21:25 Dose:  40 mg


Collagenase (Santyl)  1 gm TOP DAILY [facility]


   Last Admin: 11/12/17 09:42 Dose:  Not Given


Collagenase (Santyl)  0 gm TOP DAILY [facility]


   Last Admin: 11/12/17 09:42 Dose:  1 applic


Docusate Sodium (Colace)  100 mg PO DAILY [facility]


   Last Admin: 11/12/17 09:40 Dose:  100 mg


Escitalopram Oxalate (Lexapro)  5 mg PO DAILY [facility]


   Last Admin: 11/12/17 09:42 Dose:  5 mg


Famotidine (Pepcid)  20 mg PO HS [facility]


   Last Admin: 11/12/17 21:26 Dose:  20 mg


Ceftriaxone Sodium (Rocephin 1 Gram Ivpb)  1 gm in 100 mls @ 100 mls/hr IVPB 

0600 [facility]


   PRN Reason: Protocol


   Last Admin: 11/13/17 05:32 Dose:  100 mls/hr


Insulin Detemir (Levemir)  40 unit SC HS [facility]


   Last Admin: 11/12/17 22:03 Dose:  40 unit


Insulin Human Regular (Humulin R Med)  0 units SC ACHS [facility]


   PRN Reason: Protocol


   Last Admin: 11/13/17 06:40 Dose:  7 units


Metoprolol Tartrate (Lopressor)  50 mg PO 0800,1800 [facility]


   Last Admin: 11/13/17 08:28 Dose:  50 mg


Mycophenolate Mofetil (Cellcept Cap)  750 mg PO BID [facility]


   Last Admin: 11/12/17 17:18 Dose:  750 mg


Ondansetron HCl (Zofran Inj)  4 mg IVP Q4H PRN


   PRN Reason: Nausea/Vomiting


   Last Admin: 11/13/17 06:17 Dose:  4 mg


Oxycodone/Acetaminophen (Percocet 5/325 Mg Tab)  1 tab PO Q4H PRN


   PRN Reason: Pain, moderate (4-7)


   Stop: 11/16/17 09:32


Prednisone (Prednisone Tab)  5 mg PO 0800 [facility]


   Last Admin: 11/13/17 08:28 Dose:  5 mg


Tacrolimus (Prograf Cap)  1.5 mg PO HS [facility]


   Last Admin: 11/12/17 21:26 Dose:  1.5 mg


Tacrolimus (Prograf Cap)  2 mg PO DAILY [facility]


   Last Admin: 11/12/17 09:41 Dose:  2 mg











- Constitutional


Appears: Non-toxic





- Head Exam


Head Exam: NORMAL INSPECTION





- ENT Exam


ENT Exam: Mucous Membranes Moist





- Neck Exam


Neck Exam: absent: Lymphadenopathy, Meningismus





- Respiratory Exam


Respiratory Exam: absent: Rales, Rhonchi





- Cardiovascular Exam


Cardiovascular Exam: +S1, +S2





- GI/Abdominal Exam


GI & Abdominal Exam: Soft.  absent: Tenderness





- Extremities Exam


Additional comments: 





left foot with dressings in place and drain in place





Assessment and Plan





- Assessment and Plan (Free Text)


Plan: 





Assessment


left foot skin and skin structure infection, with probable osteomyelitis, S/P 

transmetatarsal amputation POD #4, growing Enterobacter


S/P Left 5th metatarsal osteomyelitis S/P debridement and bone excision grew 

Acinetobacter and E. faecalis


CAD S/P CABG


chronic CHF


DM


COPD


S/P AICD placement


S/P kidney transplant





Plan


continue Rocephin; follow up OR pathology 


baseline ESR, CRP are elevated


will continue to follow clinically

## 2017-11-13 NOTE — CP.PCM.PN
Subjective





- Date & Time of Evaluation


Date of Evaluation: 11/13/17


Time of Evaluation: 10:05





- Subjective


Subjective: 


58 y/o male seen at bedside with attending Dr. Goel 4 days s/p left foot 

transmetatarsal amputation. Pt has no pedal complaints at this time and is 

resting comfortably at time of visit. Pt denies any acute events overnight. Pt 

states his pain is well managed at this time, and has decreased since his 

surgery on Thursday. Pt denies F/C/N/V/CP/SOB. Dressings to bilateral LE remain 

clean, dry and intact, with USMAN drain in place





Objective





- Vital Signs/Intake and Output


Vital Signs (last 24 hours): 


 











Temp Pulse Resp BP Pulse Ox


 


 97.8 F   79   18   117/66   99 


 


 11/12/17 16:19  11/13/17 08:28  11/12/17 16:19  11/13/17 08:28  11/12/17 16:19








Intake and Output: 


 











 11/13/17 11/13/17





 06:59 18:59


 


Intake Total  420


 


Output Total 50 


 


Balance -50 420














- Medications


Medications: 


 Current Medications





Amlodipine Besylate (Norvasc)  10 mg PO DAILY [facility]


   Last Admin: 11/12/17 09:41 Dose:  10 mg


Aspirin (Ecotrin)  81 mg PO 0800 [facility]


   Last Admin: 11/13/17 08:28 Dose:  81 mg


Atorvastatin Calcium (Lipitor)  40 mg PO HS [facility]


   Last Admin: 11/12/17 21:25 Dose:  40 mg


Collagenase (Santyl)  1 gm TOP DAILY [facility]


   Last Admin: 11/12/17 09:42 Dose:  Not Given


Collagenase (Santyl)  0 gm TOP DAILY [facility]


   Last Admin: 11/12/17 09:42 Dose:  1 applic


Docusate Sodium (Colace)  100 mg PO DAILY [facility]


   Last Admin: 11/12/17 09:40 Dose:  100 mg


Escitalopram Oxalate (Lexapro)  5 mg PO DAILY [facility]


   Last Admin: 11/12/17 09:42 Dose:  5 mg


Famotidine (Pepcid)  20 mg PO HS [facility]


   Last Admin: 11/12/17 21:26 Dose:  20 mg


Ceftriaxone Sodium (Rocephin 1 Gram Ivpb)  1 gm in 100 mls @ 100 mls/hr IVPB 

0600 [facility]


   PRN Reason: Protocol


   Last Admin: 11/13/17 05:32 Dose:  100 mls/hr


Insulin Detemir (Levemir)  40 unit SC [facility]


   Last Admin: 11/12/17 22:03 Dose:  40 unit


Insulin Human Regular (Humulin R Med)  [facility]


   PRN Reason: Protocol


   Last Admin: 11/13/17 06:40 Dose:  7 units


Metoprolol Tartrate (Lopressor)  50 mg PO 0800,1800 [facility]


   Last Admin: 11/13/17 08:28 Dose:  50 mg


Mycophenolate Mofetil (Cellcept Cap)  750 mg PO BID [facility]


   Last Admin: 11/12/17 17:18 Dose:  750 mg


Ondansetron HCl (Zofran Inj)  4 mg IVP Q4H [facility]


   PRN Reason: Nausea/Vomiting


   Last Admin: 11/13/17 06:17 Dose:  4 mg


Oxycodone/Acetaminophen (Percocet 5/325 Mg Tab)  1 tab PO Q4H [facility]


   PRN Reason: Pain, moderate (4-7)


   Stop: 11/16/17 09:32


Prednisone (Prednisone Tab)  5 mg PO 0800 [facility]


   Last Admin: 11/13/17 08:28 Dose:  5 mg


Tacrolimus (Prograf Cap)  1.5 mg PO [facility]


   Last Admin: 11/12/17 21:26 Dose:  1.5 mg


Tacrolimus (Prograf Cap)  2 mg PO DAILY [facility]


   Last Admin: 11/12/17 09:41 Dose:  2 mg











- Constitutional


Appears: Well, Non-toxic, No Acute Distress





- Extremities Exam


Additional comments: 


Right plantar heel dressing left intact at this time. No strikethrough noted.





Left lower extremity focused exam:


USMAN drain in place, approx 5cc drainage at time of visit.


Vasc: DP pulse non-palpable due to non pitting post-surgical edema; PT pulse 1/

4. 


Derm: Transmetatarsal amputation surgical incision site well-coapted with 

sutures and staples. No dehiscence noted. No signs of necrosis at surgical 

incision site. No drainage, no malodor, no erythema, no maceration. 


Neuro: Protective sensation slightly diminished


Ortho: No tenderness elicited on palpation of surgical site. 








- Neurological Exam


Neurological Exam: Alert, Awake, Oriented x3





- Psychiatric Exam


Psychiatric exam: Normal Affect, Normal Mood





Assessment and Plan





- Assessment and Plan (Free Text)


Assessment: 





58 y/o male 4 days s/p left foot transmetatarsal amputation with USMAN drain in 

place. Additional wounds noted to right plantar heel and superficial wound to 

distal anterior left leg. Wounds do not appear clinically infected at this time.


Plan: 


Pt seen and evaluated at bedside with attending Dr. Goel


Labs and vitals reviewed- afebrile, NNL


OR pathology reveals growth of Enterobacter Cloacae


Per ID, pt can be switched to IV Rocephin


Drain remains in place to left foot transmetatarsal amputation site, will pull 

drain in next 1-2 days


Cleansed surgical site with sterile saline and dressed L foot with Adaptic, ABD 

and DSD


Podiatry will continue to monitor surgical site and follow patient for all 

wounds


Encouraged continue participation in physical therapy with surgical shoe to R 

foot and Darco wound shoe to L foot

## 2017-11-13 NOTE — OP
PROCEDURE DATE:  11/09/2017



PREOPERATIVE DIAGNOSES:  Left nonhealing wound with osteomyelitis and

gangrenous third and fourth digits of left foot.



POSTOPERATIVE DIAGNOSES:  Left nonhealing wound with osteomyelitis and

gangrenous third and fourth digits of left foot.



PROCEDURE:  Left foot transmetatarsal amputation.



SURGEON:  Benjamin Goel DPM



ASSISTANT:  Jory Mckeon PGY1



TYPE OF ANESTHESIA:  General endotracheal.



ANESTHESIA ADMINISTERED BY:  Dr. Son.



INDICATIONS:  The patient is a 57-year-old male with the above diagnoses. 

The patient has exhausted all conservative treatment at this time and now

requires surgical intervention.



DESCRIPTION OF PROCEDURE:  The patient signed the consent, after careful

explanation of risks, benefits, complications, and alternatives for

surgical procedure.  No guarantees were given nor implied.  N.p.o. status

was confirmed prior to taking the patient to the OR.



PREPARATION:  The patient was brought into the operating room and placed on

the operating room table in a supine position.  Time out was performed for

identification of the correct patient and procedure.  After induction of

general anesthesia, the left lower extremity was then prepped and draped in

normal sterile manner and the procedure began.  No tourniquet was utilized

during the procedure.  No local anesthesia was necessary as the patient was

fully asleep.



PROCEDURE:  Attention was then directed to the distal aspect of the left

foot, in which the use of a #15 blade was used to create a fish-mouth

incision circumferentially around the level of the mid foot with the

plantar incision made distally to create a long plantar flap.  At this

time, all soft tissue was freed from the distal aspect of the head and neck

of metatarsals 1 through 5, and periosteum was freed using a Key elevator. 

A sagittal saw was then used to resect the first metatarsal at the level of

the neck and the blade was angled from dorsal distal to plantar proximal. 

All devitalized bone and tissue was then passed off the field and sent for

pathology. This step was then repeated for metatarsals 2 through 5.  All

devitalized bone and tissue was passed off the field and sent for

pathology.  The remaining sharp bone edges were then debrided down for

smoothness using a bone rasp.  The site was then irrigated with copious

amounts of bacitracin-infused saline solution with a bulb syringe.  The

plantar tissue was then brought up towards the dorsum of the foot to cover the 
distal

aspects of the metatarsal shaft. 2-0 Prolene was utilized to place several 
vertical mattress retention sutures to hold the flap in place. At this time, a 
Vitor Caban drain was inserted and suture to the dorsal aspect of the flap. 
The remaining skin edges was then reapproximated using

2-0 Prolene and skin staples.  The left foot was dressed with a nonadherent

dressing, ABD pad, Kerlix and stockinette.



POSTOPERATIVE CONDITION:  The patient tolerated the anesthesia and

procedure well and was escorted to the recovery with vital signs stable and

neurovascular status intact to the left lower extremity.  The patient is to

be partial weightbearing to the left lower extremity with axillary

crutches.  Podiatry will continue to follow the patient while in-house. 

The patient will follow up with Dr. Goel in his office upon his discharge

home.





__________________________________________

Jory Mckeon DPM





__________________________________________

Benjamin Goel DPM



DD:  11/10/2017 16:53:03

DT:  11/10/2017 19:51:43

Job # [id number]

LUCY

## 2017-11-13 NOTE — HP
HISTORY OF PRESENT ILLNESS:  Mr. Mcqueen is a 57-year-old male admitted to

Transitional Care Unit.  He underwent left foot tarsometatarsal amputation

secondary to osteomyelitis.  Has right foot plantar heel ulceration. 

Denies any pain overnight.  He is being followed by surgical team.  He has

history of myocardial infarction status post CABG.  No chest pain.  COPD,

no exacerbation in recent past.  He is status post renal transplant,

currently on immunosuppressive agents and diabetes mellitus type 2,

controlled with current medications.



PAST MEDICAL HISTORY:  Coronary artery disease status post CABG, COPD,

status post kidney transplantation, osteomyelitis, and diabetes mellitus

type 2.



PAST SURGICAL HISTORY:  Renal transplantation and amputation of the toe

secondary to osteomyelitis.



ALLERGIES:  NO KNOWN DRUG ALLERGIES.



FAMILY HISTORY:  Noncontributory.



PERSONAL HISTORY:  Nonsmoker.  No history of alcohol abuse.



SOCIAL HISTORY:  Lives at home.



CURRENT MEDICATIONS:  Norvasc 10 mg daily, aspirin 81 mg daily, Lipitor 40

mg daily, Colace 100 mg p.o. daily, Lexapro 5 mg daily, Pepcid 20 mg daily,

Levemir 40 units subcu daily, Lopressor 50 mg b.i.d., morphine p.r.n. for

pain, Cellcept 750 p.o. b.i.d., Zofran 4 mg q.4 hours p.r.n. for pain,

Zosyn, prednisone 5 mg daily, and Prograf 1.5 mg at bedtime and 2 mg daily.



PHYSICAL EXAMINATION

GENERAL:  Comfortable in bed, in no acute distress.

VITAL SIGNS:  Temperature is 98.7, heart rate is 69 per minute, blood

pressure is 116/68, respiratory rate is 18 per minute, and oxygen

saturation is 98% on room air.

HEENT:  Pallor positive.

NECK:  Supple.  No lymphadenopathy.

CHEST:  Air entry present equal and bilaterally.  No added sounds.

CARDIOVASCULAR:  S1 and S2 normal.  No murmur.  No gallop.

ABDOMEN:  Soft and nontender.  No hepatosplenomegaly.

EXTREMITIES:  No edema.  Left foot in dressing.

SKIN:  No petechiae.  No rash.

SPINE:  Nontender.



LABORATORY DATA:  White count 12.9, hemoglobin 11.7, hematocrit 35.2, and

platelets 240.  Sodium 135, potassium 4.7, creatinine 1.1, AST 17, and ALT

31.



IMPRESSION:

1.  Left foot osteomyelitis.

2.  Diabetes mellitus type 2.

3.  Status post renal transplant.

4.  Coronary artery disease.

5.  Leukocytosis.

6.  Anemia.



PLAN:  He is currently admitted to Transitional Care Unit for gait

improvement and deconditioning.  Continue amlodipine 10 mg daily.  Continue

aspirin 81 mg daily and Lipitor 40 mg daily.  He is currently on IV

antibiotic Zosyn, we will continue that.  Continue anti-rejection

medications Prograf, Cellcept, and prednisone.  Pain control with current

medication morphine 2 mg IV q.4 hours p.r.n. for pain.  Continue sliding

scale insulin.





__________________________________________

Yoly Smith MD





DD:  11/11/2017 11:20:16

DT:  11/11/2017 12:04:14

Job # [id number]

## 2017-11-13 NOTE — PN
DATE:  11/13/2017



SUBJECTIVE:  The patient has no complaints of any chest pain.  No shortness

of breath.  No headaches or dizziness.



PHYSICAL EXAMINATION:

VITAL SIGNS: Temperature is 97.8, pulse of 79, blood pressure 117/66, and

respirations 18.

GENERAL:  The patient is lying in bed, flat, comfortable.

HEENT:  No oral lesion.  Anicteric sclerae.  Moist mucosa.

NECK:  No JVD, adenopathy, or thyromegaly.

CARDIOVASCULAR:  S1 and S2, regular.  No murmurs, rubs, or gallops.

LUNGS:  Clear to auscultation bilaterally.  No wheeze, rales, or rhonchi.

ABDOMEN:  Bowel sounds are positive, soft, nontender and nondistended.

EXTREMITIES:  No cyanosis, clubbing or edema.



ASSESSMENT:

1.  Transmetatarsal amputation of the left foot secondary to osteomyelitis.

2.  Peripheral arterial disease.

3.  Diabetes type 2.

4.  Dyslipidemia.

5.  Hypertension.

6.  Renal transplant.

7.  Pacemaker.

8.  Coronary artery disease, status post coronary artery bypass graft.

9.  Status post automatic implantable cardioverter defibrillator placement.



PLAN:  The patient is currently comfortable.  His pain is better

controlled.  We are awaiting for pathology report.  The patient is on

Rocephin for antibiotics.  The patient is currently receiving his Prograf,

prednisone, and CellCept for his kidney transplant.  The patient is on

aspirin.  He is going to continue with Lexapro for anxiety.  He is going to

be on morphine for pain.  He is on Norvasc for hypertension.  I will place

the patient on Percocet for pain and discontinue the patient's morphine. 

Currently, he is being followed by ID as well as Podiatry.





__________________________________________

Andrea Calderon MD



DD:  11/13/2017 9:36:00

DT:  11/13/2017 13:24:57

Job # [id number]

## 2017-11-14 RX ADMIN — INSULIN HUMAN SCH UNITS: 100 INJECTION, SOLUTION PARENTERAL at 06:35

## 2017-11-14 RX ADMIN — COLLAGENASE SANTYL SCH APPLIC: 250 OINTMENT TOPICAL at 10:24

## 2017-11-14 RX ADMIN — OXYCODONE HYDROCHLORIDE AND ACETAMINOPHEN PRN TAB: 5; 325 TABLET ORAL at 13:54

## 2017-11-14 RX ADMIN — INSULIN HUMAN SCH: 100 INJECTION, SOLUTION PARENTERAL at 14:17

## 2017-11-14 RX ADMIN — INSULIN HUMAN SCH: 100 INJECTION, SOLUTION PARENTERAL at 22:27

## 2017-11-14 RX ADMIN — OXYCODONE HYDROCHLORIDE AND ACETAMINOPHEN PRN TAB: 5; 325 TABLET ORAL at 18:37

## 2017-11-14 RX ADMIN — CEFTRIAXONE SCH MLS/HR: 1 INJECTION, SOLUTION INTRAVENOUS at 05:50

## 2017-11-14 RX ADMIN — OXYCODONE HYDROCHLORIDE AND ACETAMINOPHEN PRN TAB: 5; 325 TABLET ORAL at 08:56

## 2017-11-14 RX ADMIN — COLLAGENASE SANTYL SCH APPFUL: 250 OINTMENT TOPICAL at 10:24

## 2017-11-14 RX ADMIN — INSULIN HUMAN SCH: 100 INJECTION, SOLUTION PARENTERAL at 18:19

## 2017-11-14 RX ADMIN — INSULIN DETEMIR SCH UNIT: 100 INJECTION, SOLUTION SUBCUTANEOUS at 22:27

## 2017-11-14 NOTE — CP.PCM.PN
Subjective





- Date & Time of Evaluation


Date of Evaluation: 11/14/17


Time of Evaluation: 11:10





- Subjective


Subjective: 


Less pain in the left foot, no fevers overnight, no nausea, no diarrhea.











Objective





- Vital Signs/Intake and Output


Vital Signs (last 24 hours): 


 











Temp Pulse Resp BP Pulse Ox


 


 97.8 F   84   18   110/57 L  99 


 


 11/12/17 16:19  11/13/17 17:23  11/12/17 16:19  11/14/17 10:24  11/12/17 16:19








Intake and Output: 


 











 11/14/17 11/14/17





 06:59 18:59


 


Intake Total 220 420


 


Output Total 40 


 


Balance 180 420














- Medications


Medications: 


 Current Medications





Amlodipine Besylate (Norvasc)  10 mg PO DAILY [facility]


   Last Admin: 11/14/17 10:24 Dose:  10 mg


Aspirin (Ecotrin)  81 mg PO 0800 [facility]


   Last Admin: 11/14/17 08:53 Dose:  81 mg


Atorvastatin Calcium (Lipitor)  40 mg PO HS [facility]


   Last Admin: 11/13/17 22:21 Dose:  40 mg


Collagenase (Santyl)  1 gm TOP DAILY [facility]


   Last Admin: 11/14/17 10:24 Dose:  1 appful


Collagenase (Santyl)  0 gm TOP DAILY [facility]


   Last Admin: 11/14/17 10:24 Dose:  1 applic


Docusate Sodium (Colace)  100 mg PO DAILY [facility]


   Last Admin: 11/14/17 10:23 Dose:  100 mg


Escitalopram Oxalate (Lexapro)  5 mg PO DAILY [facility]


   Last Admin: 11/14/17 10:23 Dose:  5 mg


Famotidine (Pepcid)  20 mg PO HS [facility]


   Last Admin: 11/13/17 22:22 Dose:  20 mg


Ceftriaxone Sodium (Rocephin 1 Gram Ivpb)  1 gm in 100 mls @ 100 mls/hr IVPB 

0600 [facility]


   PRN Reason: Protocol


   Last Admin: 11/14/17 05:50 Dose:  100 mls/hr


Insulin Detemir (Levemir)  40 unit SC HS [facility]


   Last Admin: 11/13/17 22:25 Dose:  40 unit


Insulin Human Regular (Humulin R Med)  0 units SC ACHS [facility]


   PRN Reason: Protocol


   Last Admin: 11/14/17 06:35 Dose:  7 units


Metoprolol Tartrate (Lopressor)  50 mg PO 0800,1800 [facility]


   Last Admin: 11/14/17 08:53 Dose:  50 mg


Mycophenolate Mofetil (Cellcept Cap)  750 mg PO BID [facility]


   Last Admin: 11/14/17 10:23 Dose:  750 mg


Ondansetron HCl (Zofran Inj)  4 mg IVP Q4H PRN


   PRN Reason: Nausea/Vomiting


   Last Admin: 11/14/17 05:53 Dose:  4 mg


Oxycodone/Acetaminophen (Percocet 5/325 Mg Tab)  1 tab PO Q4H PRN


   PRN Reason: Pain, moderate (4-7)


   Stop: 11/16/17 09:32


   Last Admin: 11/14/17 08:56 Dose:  1 tab


Prednisone (Prednisone Tab)  5 mg PO 0800 [facility]


   Last Admin: 11/14/17 08:53 Dose:  5 mg


Tacrolimus (Prograf Cap)  1.5 mg PO HS [facility]


   Last Admin: 11/13/17 22:22 Dose:  1.5 mg


Tacrolimus (Prograf Cap)  2 mg PO DAILY [facility]


   Last Admin: 11/14/17 10:24 Dose:  2 mg











- Constitutional


Appears: Non-toxic





- Head Exam


Head Exam: NORMAL INSPECTION





- ENT Exam


ENT Exam: Mucous Membranes Moist





- Neck Exam


Neck Exam: absent: Lymphadenopathy, Meningismus





- Respiratory Exam


Respiratory Exam: Decreased Breath Sounds





- Cardiovascular Exam


Cardiovascular Exam: +S1, +S2





- GI/Abdominal Exam


GI & Abdominal Exam: Soft.  absent: Tenderness





- Extremities Exam


Additional comments: 





left foot with dressings in place





Assessment and Plan





- Assessment and Plan (Free Text)


Plan: 





Assessment


left foot skin and skin structure infection, with probable osteomyelitis, S/P 

transmetatarsal amputation POD #5, growing Enterobacter


S/P Left 5th metatarsal osteomyelitis S/P debridement and bone excision grew 

Acinetobacter and E. faecalis


CAD S/P CABG


chronic CHF


DM


COPD


S/P AICD placement


S/P kidney transplant





Plan


continue Rocephin; follow up OR pathology 


baseline ESR, CRP are elevated


will continue to monitor clinically

## 2017-11-14 NOTE — CP.PCM.PN
Subjective





- Date & Time of Evaluation


Date of Evaluation: 11/14/17


Time of Evaluation: 07:15





- Subjective


Subjective: 


Podiatry Progress Note for Dr. Goel-


56 y/o male seen at bedside in TCU with attending Dr. Goel, 5 days s/p left 

foot transmetatarsal amputation. Pt denies any acute events overnight. Pt 

denies any pain at this time. Pt says he has been feeling a little nauseous the 

last 24 hours but is getting Zofran, which helps. Pt denies F/C/V/CP/SOB.





Objective





- Vital Signs/Intake and Output


Vital Signs (last 24 hours): 


 











Temp Pulse Resp BP Pulse Ox


 


 97.8 F   84   18   127/70   99 


 


 11/12/17 16:19  11/13/17 17:23  11/12/17 16:19  11/13/17 17:23  11/12/17 16:19








Intake and Output: 


 











 11/14/17 11/14/17





 06:59 18:59


 


Intake Total 220 


 


Output Total 40 


 


Balance 180 














- Medications


Medications: 


 Current Medications





Amlodipine Besylate (Norvasc)  10 mg PO DAILY [facility]


   Last Admin: 11/13/17 10:37 Dose:  10 mg


Aspirin (Ecotrin)  81 mg PO 0800 [facility]


   Last Admin: 11/13/17 08:28 Dose:  81 mg


Atorvastatin Calcium (Lipitor)  40 mg PO HS [facility]


   Last Admin: 11/13/17 22:21 Dose:  40 mg


Collagenase (Santyl)  1 gm TOP DAILY [facility]


   Last Admin: 11/13/17 10:47 Dose:  Not Given


Collagenase (Santyl)  0 gm TOP DAILY [facility]


   Last Admin: 11/13/17 10:39 Dose:  1 applic


Docusate Sodium (Colace)  100 mg PO DAILY [facility]


   Last Admin: 11/13/17 10:38 Dose:  100 mg


Escitalopram Oxalate (Lexapro)  5 mg PO DAILY [facility]


   Last Admin: 11/13/17 10:38 Dose:  5 mg


Famotidine (Pepcid)  20 mg PO HS [facility]


   Last Admin: 11/13/17 22:22 Dose:  20 mg


Ceftriaxone Sodium (Rocephin 1 Gram Ivpb)  1 gm in 100 mls @ 100 mls/hr IVPB 

0600 [facility]


   PRN Reason: Protocol


   Last Admin: 11/14/17 05:50 Dose:  100 mls/hr


Insulin Detemir (Levemir)  40 unit SC [facility]


   Last Admin: 11/13/17 22:25 Dose:  40 unit


Insulin Human Regular (Humulin R Med)  0 units SC ACHS [facility]


   PRN Reason: Protocol


   Last Admin: 11/14/17 06:35 Dose:  7 units


Metoprolol Tartrate (Lopressor)  50 mg PO 0800,1800 [facility]


   Last Admin: 11/13/17 17:23 Dose:  50 mg


Mycophenolate Mofetil (Cellcept Cap)  750 mg PO BID [facility]


   Last Admin: 11/13/17 17:23 Dose:  750 mg


Ondansetron HCl (Zofran Inj)  4 mg IVP Q4H PRN


   PRN Reason: Nausea/Vomiting


   Last Admin: 11/14/17 05:53 Dose:  4 mg


Oxycodone/Acetaminophen (Percocet 5/325 Mg Tab)  1 tab PO Q4H PRN


   PRN Reason: Pain, moderate (4-7)


   Stop: 11/16/17 09:32


   Last Admin: 11/13/17 16:45 Dose:  1 tab


Prednisone (Prednisone Tab)  5 mg PO 0800 [facility]


   Last Admin: 11/13/17 08:28 Dose:  5 mg


Tacrolimus (Prograf Cap)  1.5 mg PO [facility]


   Last Admin: 11/13/17 22:22 Dose:  1.5 mg


Tacrolimus (Prograf Cap)  2 mg PO DAILY [facility]


   Last Admin: 11/13/17 10:38 Dose:  2 mg











- Constitutional


Appears: Well, Non-toxic, No Acute Distress





- Extremities Exam


Additional comments: 


Right plantar heel dressing left intact with no strikethrough.





Left lower extremity focused exam:


USMAN drain in place to LLE TMA surgical site, approx 12cc drainage at time of 

visit.


Vasc: DP pulse non-palpable due to non pitting post-surgical edema; PT pulse 1/

4. 


Derm: Transmetatarsal amputation surgical incision site well-coapted with 

sutures and staples. No dehiscence noted. No signs of necrosis at surgical 

incision site. No drainage, no malodor, no erythema, no maceration. 


Neuro: Protective sensation slightly diminished


Ortho: No tenderness elicited on palpation of surgical site. 





- Neurological Exam


Neurological Exam: Alert, Awake, Oriented x3





- Psychiatric Exam


Psychiatric exam: Normal Affect, Normal Mood





Assessment and Plan





- Assessment and Plan (Free Text)


Assessment: 


56 y/o male 5 days s/p left foot transmetatarsal amputation with USMAN drain in 

place. Additional wounds noted to right plantar heel and superficial wound to 

distal anterior left leg. Wounds do not appear clinically infected at this time.


Plan: 


Pt seen and evaluated at bedside this morning with attending Dr. Goel


Labs and vitals reviewed- afebrile, NNL


OR pathology left foot reveals positive growth of Enterobacter Cloacae


Per ID, pt to continue with IV Rocephin


Drain remains in place to left foot transmetatarsal amputation site, will pull 

drain in next couple of days


Cleansed surgical site with sterile saline and dressed L foot with Adaptic, ABD 

and DSD


Podiatry will continue to monitor surgical site and follow patient for all 

wounds


Encouraged continue participation in physical therapy with surgical shoe to R 

foot and Darco wound shoe to L foot

## 2017-11-14 NOTE — DS
HOSPITAL COURSE:  This is a 57-year-old male who is coming to the hospital

because of osteomyelitis of his left foot.  He had transmetatarsal

amputation of the left foot secondary to osteomyelitis.  He did well.  He

was seen by Dr. Goel from Podiatry and  _____ from Infectious Disease. 

The patient was discharged to the Transitional Care Unit for wound care and

rehab.





__________________________________________

Andrea Calderon MD





DD:  11/13/2017 14:36:35

DT:  11/14/2017 3:00:55

Job # [id number]

## 2017-11-15 RX ADMIN — INSULIN HUMAN SCH UNITS: 100 INJECTION, SOLUTION PARENTERAL at 06:38

## 2017-11-15 RX ADMIN — COLLAGENASE SANTYL SCH APPLIC: 250 OINTMENT TOPICAL at 10:24

## 2017-11-15 RX ADMIN — CEFTRIAXONE SCH MLS/HR: 1 INJECTION, SOLUTION INTRAVENOUS at 05:48

## 2017-11-15 RX ADMIN — COLLAGENASE SANTYL SCH APPFUL: 250 OINTMENT TOPICAL at 10:24

## 2017-11-15 RX ADMIN — INSULIN HUMAN SCH: 100 INJECTION, SOLUTION PARENTERAL at 12:26

## 2017-11-15 RX ADMIN — INSULIN DETEMIR SCH UNIT: 100 INJECTION, SOLUTION SUBCUTANEOUS at 22:51

## 2017-11-15 RX ADMIN — INSULIN HUMAN SCH UNITS: 100 INJECTION, SOLUTION PARENTERAL at 22:52

## 2017-11-15 RX ADMIN — OXYCODONE HYDROCHLORIDE AND ACETAMINOPHEN PRN TAB: 5; 325 TABLET ORAL at 22:57

## 2017-11-15 RX ADMIN — OXYCODONE HYDROCHLORIDE AND ACETAMINOPHEN PRN TAB: 5; 325 TABLET ORAL at 18:32

## 2017-11-15 RX ADMIN — INSULIN HUMAN SCH UNITS: 100 INJECTION, SOLUTION PARENTERAL at 18:28

## 2017-11-15 NOTE — CP.PCM.PN
Subjective





- Date & Time of Evaluation


Date of Evaluation: 11/15/17


Time of Evaluation: 11:25





- Subjective


Subjective: 





Comfortable on a chair, no fevers, less pain in the left foot.





Objective





- Vital Signs/Intake and Output


Vital Signs (last 24 hours): 


 











Temp Pulse Resp BP Pulse Ox


 


 97.8 F   79   18   144/85   99 


 


 11/12/17 16:19  11/14/17 18:19  11/12/17 16:19  11/14/17 18:19  11/12/17 16:19








Intake and Output: 


 











 11/15/17 11/15/17





 06:59 18:59


 


Intake Total  420


 


Balance  420














- Medications


Medications: 


 Current Medications





Amlodipine Besylate (Norvasc)  10 mg PO DAILY [facility]


   Last Admin: 11/14/17 10:24 Dose:  10 mg


Aspirin (Ecotrin)  81 mg PO 0800 [facility]


   Last Admin: 11/15/17 09:00 Dose:  81 mg


Atorvastatin Calcium (Lipitor)  40 mg PO HS [facility]


   Last Admin: 11/14/17 22:28 Dose:  40 mg


Collagenase (Santyl)  1 gm TOP DAILY [facility]


   Last Admin: 11/14/17 10:24 Dose:  1 appful


Collagenase (Santyl)  0 gm TOP DAILY [facility]


   Last Admin: 11/14/17 10:24 Dose:  1 applic


Docusate Sodium (Colace)  100 mg PO DAILY [facility]


   Last Admin: 11/14/17 10:23 Dose:  100 mg


Escitalopram Oxalate (Lexapro)  5 mg PO DAILY [facility]


   Last Admin: 11/14/17 10:23 Dose:  5 mg


Famotidine (Pepcid)  20 mg PO HS [facility]


   Last Admin: 11/14/17 22:28 Dose:  20 mg


Ceftriaxone Sodium (Rocephin 1 Gram Ivpb)  1 gm in 100 mls @ 100 mls/hr IVPB 

0600 [facility]


   PRN Reason: Protocol


   Last Admin: 11/15/17 05:48 Dose:  100 mls/hr


Insulin Detemir (Levemir)  40 unit SC HS [facility]


   Last Admin: 11/14/17 22:27 Dose:  40 unit


Insulin Human Regular (Humulin R Med)  0 units SC ACHS [facility]


   PRN Reason: Protocol


   Last Admin: 11/15/17 06:38 Dose:  10 units


Metoprolol Tartrate (Lopressor)  50 mg PO 0800,1800 [facility]


   Last Admin: 11/14/17 18:19 Dose:  50 mg


Mycophenolate Mofetil (Cellcept Cap)  750 mg PO BID [facility]


   Last Admin: 11/14/17 10:23 Dose:  750 mg


Oxycodone/Acetaminophen (Percocet 5/325 Mg Tab)  1 tab PO Q4H PRN


   PRN Reason: Pain, moderate (4-7)


   Stop: 11/16/17 09:32


   Last Admin: 11/14/17 18:37 Dose:  1 tab


Prednisone (Prednisone Tab)  5 mg PO 0800 [facility]


   Last Admin: 11/15/17 09:00 Dose:  5 mg


Tacrolimus (Prograf Cap)  1.5 mg PO HS [facility]


   Last Admin: 11/14/17 22:28 Dose:  1.5 mg


Tacrolimus (Prograf Cap)  2 mg PO DAILY [facility]


   Last Admin: 11/14/17 10:24 Dose:  2 mg











- Constitutional


Appears: Non-toxic





- Head Exam


Head Exam: NORMAL INSPECTION





- ENT Exam


ENT Exam: Mucous Membranes Moist





- Neck Exam


Neck Exam: absent: Lymphadenopathy, Meningismus





- Respiratory Exam


Respiratory Exam: Decreased Breath Sounds





- Cardiovascular Exam


Cardiovascular Exam: +S1, +S2





- GI/Abdominal Exam


GI & Abdominal Exam: Soft.  absent: Tenderness





- Extremities Exam


Additional comments: 





left foot with dressings in place





Assessment and Plan





- Assessment and Plan (Free Text)


Plan: 





Assessment


left foot skin and skin structure infection, with probable osteomyelitis, S/P 

transmetatarsal amputation POD #6, growing Enterobacter


S/P Left 5th metatarsal osteomyelitis S/P debridement and bone excision grew 

Acinetobacter and E. faecalis


CAD S/P CABG


chronic CHF


DM


COPD


S/P AICD placement


S/P kidney transplant





Plan


on Rocephin; OR pathology showing acute osteomyelitis but margins are clear for 

the bone, still with soft tissue inflammation - can switch to PO Vantin to 

complete another 7-10 days with outpatient follow up with Podiatry - told 

patient follow up is extremely important


discussed with Dr. Bradshaw

## 2017-11-15 NOTE — PN
SUBJECTIVE:  The patient has no complaints of any chest pain.  No shortness

of breath.  No headaches or dizziness.



PHYSICAL EXAMINATION:

VITAL SIGNS:  Temperature is 97.8, pulse of 79, blood pressure is 144/85,

respirations 18.

GENERAL:  The patient is lying in bed, flat, comfortable.

HEENT:  No oral lesion.  Anicteric sclerae.  Moist mucosa.

NECK:  No JVD, adenopathy, or thyromegaly.

CARDIOVASCULAR:  S1 and S2, regular.  No murmurs, rubs, or gallops.

LUNGS:  Clear to auscultation bilaterally.  No wheeze, rales, or rhonchi.

ABDOMEN:  Bowel sounds are positive, soft, nontender and nondistended.

EXTREMITIES:  No cyanosis, clubbing or edema.  The left foot has a dressing

in place.



LABS:  Pathology of the left transmetatarsal amputation shows burning

fourth toes with gangrenous necrosis.  The acute inflammation and necrosis

involving soft tissue of the forefoot looked focal acute osteomyelitis. 

Skin and soft tissue resection margin shows inflammation and granulation

tissue.  Infected bone with no evidence of acute inflammation.



ASSESSMENT:

1.  Left foot osteomyelitis.

2.  Left second and third toe gangrene.

3.  Status post transmetatarsal amputation.

4.  Peripheral arterial disease.

5.  Renal transplant.

6.  Diabetes type 2.

7.  Dyslipidemia.

8.  Hypertension.

9.  Pacemaker.

10.  Coronary artery disease, status post coronary artery bypass graft.

11.  Status post automatic implantable cardioverter defibrillator.



PLAN:  The patient is currently comfortable.  He is receiving his

antibiotics of Rocephin.  We will speak with ID regarding the pathology

report to help assist with discharge planning.  The patient is currently on

aspirin for his coronary artery disease.  He is on insulin for his

diabetes.  He wants to continue with Lexapro.  He is on Lipitor for

dyslipidemia.  He is on metoprolol.  He is on Percocet for pain.  He is

receiving prednisone, Prograf and mycophenolate for his kidney transplant. 

We will discontinue his Zofran.  He is doing well with physical therapy. 

His pain is controlled.  He is going to continue now with heart-healthy

diet.







__________________________________________

Andrea Calderon MD





DD:  11/15/2017 6:44:06

DT:  11/15/2017 7:02:12

Job # [id number]

## 2017-11-16 VITALS — DIASTOLIC BLOOD PRESSURE: 68 MMHG | SYSTOLIC BLOOD PRESSURE: 130 MMHG

## 2017-11-16 VITALS — HEART RATE: 76 BPM | TEMPERATURE: 97.9 F | OXYGEN SATURATION: 96 %

## 2017-11-16 LAB
ALBUMIN/GLOB SERPL: 1.2 {RATIO} (ref 1.1–1.8)
ALP SERPL-CCNC: 87 U/L (ref 38–126)
ALT SERPL-CCNC: 36 U/L (ref 7–56)
AST SERPL-CCNC: 21 U/L (ref 17–59)
BILIRUB SERPL-MCNC: 0.4 MG/DL (ref 0.2–1.3)
BUN SERPL-MCNC: 24 MG/DL (ref 7–21)
CALCIUM SERPL-MCNC: 9.2 MG/DL (ref 8.4–10.5)
CHLORIDE SERPL-SCNC: 99 MMOL/L (ref 98–107)
CO2 SERPL-SCNC: 28 MMOL/L (ref 21–33)
ERYTHROCYTE [DISTWIDTH] IN BLOOD BY AUTOMATED COUNT: 14 % (ref 11.5–14.5)
GLOBULIN SER-MCNC: 2.9 GM/DL
GLUCOSE SERPL-MCNC: 250 MG/DL (ref 70–110)
HCT VFR BLD CALC: 34 % (ref 42–52)
MCH RBC QN AUTO: 30.6 PG (ref 25–35)
MCHC RBC AUTO-ENTMCNC: 32.9 G/DL (ref 31–37)
MCV RBC AUTO: 92.9 FL (ref 80–105)
PLATELET # BLD: 271 10^3/UL (ref 120–450)
PMV BLD AUTO: 9.4 FL (ref 7–11)
POTASSIUM SERPL-SCNC: 4.2 MMOL/L (ref 3.6–5)
PROT SERPL-MCNC: 6.4 G/DL (ref 5.8–8.3)
SODIUM SERPL-SCNC: 135 MMOL/L (ref 132–148)
WBC # BLD AUTO: 8.8 10^3/UL (ref 4.5–11)

## 2017-11-16 RX ADMIN — OXYCODONE HYDROCHLORIDE AND ACETAMINOPHEN PRN TAB: 5; 325 TABLET ORAL at 06:40

## 2017-11-16 RX ADMIN — COLLAGENASE SANTYL SCH APPFUL: 250 OINTMENT TOPICAL at 10:16

## 2017-11-16 RX ADMIN — INSULIN HUMAN SCH: 100 INJECTION, SOLUTION PARENTERAL at 11:20

## 2017-11-16 RX ADMIN — INSULIN HUMAN SCH UNITS: 100 INJECTION, SOLUTION PARENTERAL at 06:41

## 2017-11-16 RX ADMIN — OXYCODONE HYDROCHLORIDE AND ACETAMINOPHEN PRN TAB: 5; 325 TABLET ORAL at 10:17

## 2017-11-16 RX ADMIN — CEFTRIAXONE SCH MLS/HR: 1 INJECTION, SOLUTION INTRAVENOUS at 05:40

## 2017-11-16 RX ADMIN — COLLAGENASE SANTYL SCH APPLIC: 250 OINTMENT TOPICAL at 10:16

## 2017-11-16 NOTE — CP.PCM.PN
Subjective





- Date & Time of Evaluation


Date of Evaluation: 11/16/17


Time of Evaluation: 10:26





- Subjective


Subjective: 





Podiatry progress note for Dr. Goel





56 y/o male seen and evaluated at bedside with attending Dr. Goel, 7 days s/p 

left foot transmetatarsal amputation.  Pt denies any acute events overnight. Pt 

denies any pain at this time. Patient denies of any recent F/N/V/C/SOB/CP 

today. Patient denies of any other pedal complains at this time





Objective





- Vital Signs/Intake and Output


Vital Signs (last 24 hours): 


 











Temp Pulse Resp BP Pulse Ox


 


 97.9 F   76   18   130/68   96 


 


 11/16/17 06:00  11/16/17 08:12  11/16/17 06:00  11/16/17 10:12  11/16/17 06:00








Intake and Output: 


 











 11/16/17 11/16/17





 06:59 18:59


 


Intake Total 160 420


 


Output Total 40 


 


Balance 120 420














- Medications


Medications: 


 Current Medications





Amlodipine Besylate (Norvasc)  10 mg PO DAILY [facility]


   Last Admin: 11/16/17 10:12 Dose:  10 mg


Aspirin (Ecotrin)  81 mg PO 0800 [facility]


   Last Admin: 11/16/17 08:05 Dose:  81 mg


Atorvastatin Calcium (Lipitor)  40 mg PO [facility]


   Last Admin: 11/15/17 22:54 Dose:  40 mg


Collagenase (Santyl)  1 gm TOP DAILY [facility]


   Last Admin: 11/16/17 10:16 Dose:  1 appful


Collagenase (Santyl)  0 gm TOP DAILY [facility]


   Last Admin: 11/16/17 10:16 Dose:  1 applic


Docusate Sodium (Colace)  100 mg PO DAILY [facility]


   Last Admin: 11/16/17 10:10 Dose:  100 mg


Escitalopram Oxalate (Lexapro)  5 mg PO DAILY [facility]


   Last Admin: 11/16/17 10:11 Dose:  5 mg


Famotidine (Pepcid)  20 mg PO [facility]


   Last Admin: 11/15/17 22:53 Dose:  20 mg


Ceftriaxone Sodium (Rocephin 1 Gram Ivpb)  1 gm in 100 mls @ 100 mls/hr IVPB 

0600 [facility]


   PRN Reason: Protocol


   Last Admin: 11/16/17 05:40 Dose:  100 mls/hr


Insulin Detemir (Levemir)  40 unit SC [facility]


   Last Admin: 11/15/17 22:51 Dose:  40 unit


Insulin Human Regular (Humulin R Med)  0 units SC [facility]


   PRN Reason: Protocol


   Last Admin: 11/16/17 06:41 Dose:  3 units


Metoprolol Tartrate (Lopressor)  50 mg PO 0800,1800 [facility]


   Last Admin: 11/16/17 08:12 Dose:  50 mg


Mycophenolate Mofetil (Cellcept Cap)  750 mg PO BID [facility]


   Last Admin: 11/16/17 10:10 Dose:  750 mg


Prednisone (Prednisone Tab)  5 mg PO 0800 [facility]


   Last Admin: 11/16/17 08:12 Dose:  5 mg


Tacrolimus (Prograf Cap)  1.5 mg PO HS [facility]


   Last Admin: 11/15/17 22:54 Dose:  1.5 mg


Tacrolimus (Prograf Cap)  2 mg PO DAILY [facility]


   Last Admin: 11/16/17 10:15 Dose:  2 mg











- Labs


Labs: 


 





 11/16/17 06:30 





 11/16/17 06:30 











- Constitutional


Appears: Well, Non-toxic, No Acute Distress





- Extremities Exam


Extremities Exam: absent: Calf Tenderness


Additional comments: 





Right plantar heel dressing left intact with minimal strikethrough.





Left lower extremity focused exam:


USMAN drain in place to LLE TMA surgical site, approx 3cc drainage at time of 

visit.


Vasc: DP pulse non-palpable due to non pitting post-surgical edema; PT pulse 1/

4. 


Derm: Transmetatarsal amputation surgical incision site well-coapted with 

sutures and staples. No dehiscence noted. No signs of necrosis at surgical 

incision site. No drainage, no malodor, no erythema, no maceration. 


Neuro: Protective sensation slightly diminished


Ortho: No tenderness elicited on palpation of surgical site.





- Neurological Exam


Neurological Exam: Alert, Awake, Oriented x3





- Psychiatric Exam


Psychiatric exam: Normal Affect, Normal Mood





Assessment and Plan





- Assessment and Plan (Free Text)


Assessment: 





56 y/o male 7 days s/p left foot transmetatarsal amputation. Additional wounds 

noted to right plantar heel and superficial wound to distal anterior left leg. 

Wounds do not appear clinically infected at this time.


Plan: 





Pt seen and evaluated at bedside this morning with attending Dr. Goel


Labs and vitals reviewed- afebrile, WNL


OR pathology left foot reveals positive growth of Enterobacter Cloacae


Per ID, pt to continue with IV Rocephin


Drain removed with no complication from the left foot transmetatarsal 

amputation site


Cleansed surgical site with sterile saline and dressed L foot with Adaptic, ABD 

and DSD


Podiatry will continue to monitor surgical site and follow patient for all 

wounds


Encouraged continue participation in physical therapy with surgical shoe to R 

foot and Darco wound shoe to L foot


Patient is stable from podiatry standpoint and will continue to follow until in-

house

## 2017-11-16 NOTE — CP.PCM.PN
Subjective





- Date & Time of Evaluation


Date of Evaluation: 11/16/17


Time of Evaluation: 10:55





- Subjective


Subjective: 





Patient is ready to go home, no fevers overnight, less pain in the left foot, 

no diarrhea, no vomiting.





Objective





- Vital Signs/Intake and Output


Vital Signs (last 24 hours): 


 











Temp Pulse Resp BP Pulse Ox


 


 97.9 F   76   18   135/78   96 


 


 11/16/17 06:00  11/16/17 06:00  11/16/17 06:00  11/16/17 06:00  11/16/17 06:00








Intake and Output: 


 











 11/16/17 11/16/17





 06:59 18:59


 


Intake Total 160 420


 


Output Total 40 


 


Balance 120 420














- Medications


Medications: 


 Current Medications





Amlodipine Besylate (Norvasc)  10 mg PO DAILY [facility]


   Last Admin: 11/15/17 10:23 Dose:  Not Given


Aspirin (Ecotrin)  81 mg PO 0800 [facility]


   Last Admin: 11/15/17 09:00 Dose:  81 mg


Atorvastatin Calcium (Lipitor)  40 mg PO [facility]


   Last Admin: 11/15/17 22:54 Dose:  40 mg


Collagenase (Santyl)  1 gm TOP DAILY [facility]


   Last Admin: 11/15/17 10:24 Dose:  1 appful


Collagenase (Santyl)  0 gm TOP DAILY [facility]


   Last Admin: 11/15/17 10:24 Dose:  1 applic


Docusate Sodium (Colace)  100 mg PO DAILY [facility]


   Last Admin: 11/15/17 10:21 Dose:  100 mg


Escitalopram Oxalate (Lexapro)  5 mg PO DAILY [facility]


   Last Admin: 11/15/17 10:21 Dose:  5 mg


Famotidine (Pepcid)  20 mg PO [facility]


   Last Admin: 11/15/17 22:53 Dose:  20 mg


Ceftriaxone Sodium (Rocephin 1 Gram Ivpb)  1 gm in 100 mls @ 100 mls/hr IVPB 

0600 [facility]


   PRN Reason: Protocol


   Last Admin: 11/16/17 05:40 Dose:  100 mls/hr


Insulin Detemir (Levemir)  40 unit SC [facility]


   Last Admin: 11/15/17 22:51 Dose:  40 unit


Insulin Human Regular (Humulin R Med)  0 units SC [facility]


   PRN Reason: Protocol


   Last Admin: 11/16/17 06:41 Dose:  3 units


Metoprolol Tartrate (Lopressor)  50 mg PO 0800,1800 [facility]


   Last Admin: 11/15/17 18:28 Dose:  50 mg


Mycophenolate Mofetil (Cellcept Cap)  750 mg PO BID [facility]


   Last Admin: 11/15/17 18:27 Dose:  750 mg


Oxycodone/Acetaminophen (Percocet 5/325 Mg Tab)  1 tab PO Q4H [facility]


   PRN Reason: Pain, moderate (4-7)


   Stop: 11/16/17 09:32


   Last Admin: 11/16/17 06:40 Dose:  1 tab


Prednisone (Prednisone Tab)  5 mg PO 0800 [facility]


   Last Admin: 11/15/17 09:00 Dose:  5 mg


Tacrolimus (Prograf Cap)  1.5 mg PO HS [facility]


   Last Admin: 11/15/17 22:54 Dose:  1.5 mg


Tacrolimus (Prograf Cap)  2 mg PO DAILY [facility]


   Last Admin: 11/15/17 10:24 Dose:  2 mg











- Labs


Labs: 


 





 11/16/17 06:30 





 11/16/17 06:30 











- Constitutional


Appears: Non-toxic, No Acute Distress





- Head Exam


Head Exam: NORMAL INSPECTION





- ENT Exam


ENT Exam: Mucous Membranes Moist





- Neck Exam


Neck Exam: absent: Lymphadenopathy, Meningismus





- Respiratory Exam


Respiratory Exam: Decreased Breath Sounds.  absent: Rales





- Cardiovascular Exam


Cardiovascular Exam: +S1, +S2





- GI/Abdominal Exam


GI & Abdominal Exam: Soft.  absent: Tenderness





- Extremities Exam


Additional comments: 





left foot with dressings in place





Assessment and Plan





- Assessment and Plan (Free Text)


Plan: 





Assessment


left foot skin and skin structure infection, with probable osteomyelitis, S/P 

transmetatarsal amputation POD #7, growing Enterobacter


S/P Left 5th metatarsal osteomyelitis S/P debridement and bone excision grew 

Acinetobacter and E. faecalis


CAD S/P CABG


chronic CHF


DM


COPD


S/P AICD placement


S/P kidney transplant





Plan


on Rocephin; OR pathology showing acute osteomyelitis but margins are clear for 

the bone, still with soft tissue inflammation - can switch to PO Vantin to 

complete another 7-10 days with outpatient follow up with Podiatry - told 

patient follow up is extremely important


discussed with Dr. Bradshaw previously

## 2017-11-17 NOTE — DS
HISTORY OF PRESENT ILLNESS:  This is a 57-year-old male who had come in to

the Transitional Care Unit for rehab.  He started improving with his

walking.  He had a drain that was in his left foot after TMA secondary to

osteomyelitis.  His drain was taken out.  His IV antibiotics were switched

over to p.o. after discussion with infectious diseases, Dr. Waterman.  The

patient was discharged home to follow up as an outpatient.



PHYSICAL EXAMINATION:

VITAL SIGNS:  Temperature is 97.9, pulse is 76, blood pressure is 135/78,

respirations 18, O2 saturation 96%.

GENERAL:  The patient lying in bed, uncomfortable, and in no acute

distress.

HEENT:  Atraumatic and normocephalic.  Anicteric sclerae.  Moist mucosa.

Pink conjunctivae.  No oral lesions.

NECK:  No JVD, anterior and posterior adenopathy, thyromegaly, or bruits.

CARDIOVASCULAR:  S1 and S2 regular.  No murmur, rubs, or gallop.

LUNGS:  Clear to auscultation bilaterally.  No wheezes, rales, or rhonchi.

ABDOMEN:  Bowel sounds are positive.  Soft, nontender and nondistended.  No

hepatosplenomegaly. No rebound and no guarding

EXTREMITIES:  No cyanosis, clubbing, or edema.

NEUROLOGIC:  No facial asymmetry.  Tongue is midline.  No uvula deviation. 

Power is 5/5 upper extremity and lower extremity.  Sensation intact in

upper extremity and lower extremity.

PSYCHIATRIC:  She is awake, alert and oriented x3.  No anxiety or

depression.  She has normal affect.

GENITOURINARY:  No CVA tenderness.

VASCULAR:  2+ pulses in the carotid pulses and pedal pulses.

SKIN:  No erythema or nodules

SPINE:  Shows normal curvature.

EXTREMITIES:  No Cyanosis and clubbing, no edema.



ASSESSMENT:

1.  Left foot osteomyelitis status post transmetatarsal amputation.

2.  Left second and third toe gangrene.

3.  Peripheral arterial disease.

4.  Renal transplant.

5.  Diabetes type 2.

6.  Dyslipidemia.

7.  Hypertension.

8.  Pacemaker.

9.  Coronary artery disease status post coronary artery bypass graft.

10.  Status post automatic implantable cardioverter-defibrillator.



PLAN:  The patient is currently comfortable.  He is going to continue his

antibiotics orally at home.  He is going to continue his Prograf,

mycophenolate, and prednisone.



CONDITION:  Stable.



ACTIVITIES:  Increase as tolerated.





__________________________________________

Andrea Calderon MD



DD:  11/16/2017 21:51:42

DT:  11/17/2017 0:42:18

Job # [id number]

## 2017-12-05 ENCOUNTER — HOSPITAL ENCOUNTER (INPATIENT)
Dept: HOSPITAL 42 - ED | Age: 57
LOS: 4 days | Discharge: NURSING, REHAB OR LONG-TERM CARE | DRG: 565 | End: 2017-12-09
Attending: INTERNAL MEDICINE | Admitting: INTERNAL MEDICINE
Payer: MEDICARE

## 2017-12-05 VITALS — BODY MASS INDEX: 25.5 KG/M2

## 2017-12-05 DIAGNOSIS — F41.9: ICD-10-CM

## 2017-12-05 DIAGNOSIS — T87.44: ICD-10-CM

## 2017-12-05 DIAGNOSIS — E87.1: ICD-10-CM

## 2017-12-05 DIAGNOSIS — Z95.1: ICD-10-CM

## 2017-12-05 DIAGNOSIS — Z95.0: ICD-10-CM

## 2017-12-05 DIAGNOSIS — E78.5: ICD-10-CM

## 2017-12-05 DIAGNOSIS — Z95.810: ICD-10-CM

## 2017-12-05 DIAGNOSIS — T87.89: Primary | ICD-10-CM

## 2017-12-05 DIAGNOSIS — E11.621: ICD-10-CM

## 2017-12-05 DIAGNOSIS — Z79.899: ICD-10-CM

## 2017-12-05 DIAGNOSIS — I25.10: ICD-10-CM

## 2017-12-05 DIAGNOSIS — J44.9: ICD-10-CM

## 2017-12-05 DIAGNOSIS — R40.2412: ICD-10-CM

## 2017-12-05 DIAGNOSIS — Z94.0: ICD-10-CM

## 2017-12-05 DIAGNOSIS — Z87.891: ICD-10-CM

## 2017-12-05 DIAGNOSIS — E11.51: ICD-10-CM

## 2017-12-05 DIAGNOSIS — I25.2: ICD-10-CM

## 2017-12-05 DIAGNOSIS — L03.116: ICD-10-CM

## 2017-12-05 DIAGNOSIS — I50.9: ICD-10-CM

## 2017-12-05 DIAGNOSIS — I11.0: ICD-10-CM

## 2017-12-05 DIAGNOSIS — Z79.4: ICD-10-CM

## 2017-12-05 DIAGNOSIS — L97.419: ICD-10-CM

## 2017-12-05 DIAGNOSIS — E11.69: ICD-10-CM

## 2017-12-05 DIAGNOSIS — L97.529: ICD-10-CM

## 2017-12-05 NOTE — ED PDOC
Arrival/HPI





<John Henry - Last Filed: 12/06/17 00:24>





- General


Historian: Patient, Spouse





- History of Present Illness


Time/Duration: 1 week


Symptom Onset: Gradual


Symptom Course: Worsening


Quality: Stabbing, Burning


Severity Level: 10


Activities at Onset: Rest, Light


Context: Home





<KamCody - Last Filed: 12/06/17 01:20>





- General


Chief Complaint: Lower Extremity Problem/Injury





- History of Present Illness


Narrative History of Present Illness (Text): 


12/05/17 23:36





Mr. Mcqueen is a 57 year old male with a past medical history significant for CAD 

S/P CABG, CHF, DM, COPD, S/P AICD placement, S/P kidney transplant, S/P Left 

5th metatarsal osteomyelitis requiring debridement and bone excision on 11/11/ 17 who [place] to the [facility] [place] after being sent by his PMD for IV antibiotics 

for left foot infection. Patient reports that he was seen by his podiatrist 

today and was told that his left foot looked "worse" and that he should go to 

the hospital for IV antibiotics. Patient reports that he was just started on 

augmentin at home. Patient endorses fever to 101 at home intermittently as well 

as vomiting twice per day for the past 7 days and one weeks worth of 

constipation. Patient denies chills, headache, changes in his vision, chest pain

, palpitations, SOB, cough, abdominal pain, nausea, diarrhea, burning/pain with 

urination, or any new rash.  (Cody Humphrey)





Past Medical History





- Provider Review


Nursing Documentation Reviewed: Yes





- Travel History


Have you recently traveled outside [place] w/in the past 3 mons?: No





- Past History


Past History: No Previous





- Infectious Disease


Hx of Infectious Diseases: None





- Tetanus Immunization


Tetanus Immunization: Unknown





- Cardiac


Hx Cardiac Disorders: Yes (MI, CABG)


Hx Hypertension: Yes





- Pulmonary


Hx Chronic Obstructive Pulmonary Disease (COPD): Yes





- Neurological


Hx Neurological Disorder: No





- HEENT


Hx HEENT Disorder: No





- Renal


Hx Renal Failure: Yes (s/p kidney transplant)





- Endocrine/Metabolic


Hx Diabetes Mellitus Type 2: Yes





- Hematological/Oncological


Hx Blood Disorders: No





- Integumentary


Hx Dermatological Disorder: Yes


Other/Comment: LEFT BIG TOE POST AMPUTATION-GANGRENE TO AREA AMPUTATED.7-24-17





- Musculoskeletal/Rheumatological


Hx Falls: No





- Gastrointestinal


Hx Gastrointestinal Disorders: Yes (INCARCERATED INCISIONAL HERNIA,

GASTROENTERITIS, CONSTIPATION)


Other/Comment: h/o c diff





- Genitourinary/Gynecological


Hx Genitourinary Disorders:  (kidney transplant 2012)





- Psychiatric


Hx Psychophysiologic Disorder: Yes (H/O SMOKING CIGARETTES 1/2 PPD,ETOH USE 

SOCAILLY)


Hx Depression: Yes


Hx Emotional Abuse: No


Hx Physical Abuse: No


Hx Substance Use: No





- Surgical History


Other/Comment: CABG.  L 5th toe amputation.  Kidney transplant





- Anesthesia


Hx Anesthesia Reactions: No


Hx Malignant Hyperthermia: No





- Suicidal Assessment


Feels Threatened In Home Enviroment: No





<Cody Humphrey - Last Filed: 12/06/17 01:20>





Family/Social History





- Physician Review


Nursing Documentation Reviewed: Yes


Family/Social History: No Known Family HX


Smoking Status: Former Smoker


Hx Alcohol Use: Yes (social occasional)


Hx Substance Use: No


Hx Substance Use Treatment: No





<Cody Humphrey - Last Filed: 12/06/17 01:20>





Allergies/Home Meds





<John Henry - Last Filed: 12/06/17 00:24>





<Cody Humphrey - Last Filed: 12/06/17 01:20>


Allergies/Adverse Reactions: 


Allergies





No Known Allergies Allergy (Verified 12/05/17 22:40)


 








Home Medications: 


 Home Meds











 Medication  Instructions  Recorded  Confirmed


 


Atorvastatin Calcium [Lipitor] 40 mg PO DAILY 10/30/14 12/05/17


 


Escitalopram [Lexapro] 5 mg PO DAILY 10/30/14 12/05/17


 


Mycophenolate Sodium [Myfortic] 360 mg PO BID 10/30/14 12/05/17














Review of Systems





- Physician Review


All systems were reviewed & negative as marked: Yes





- Review of Systems


Constitutional: Fevers (101 at home).  absent: Normal, Night Sweats


Eyes: Normal.  absent: Vision Changes


ENT: Normal


Respiratory: Normal.  absent: SOB, Cough


Cardiovascular: Normal.  absent: Chest Pain, Palpitations


Gastrointestinal: Constipation, Vomiting.  absent: Normal, Abdominal Pain, 

Diarrhea, Nausea


Genitourinary Male: Normal.  absent: Dysuria


Musculoskeletal: Arthralgias (Left foot pain).  absent: Normal, Back Pain, Neck 

Pain


Skin: Cellulitis.  absent: Normal (Left foot)


Neurological: Normal.  absent: Headache


Endocrine: Normal


Hemo/Lymphatic: Normal


Psychiatric: Normal





<Cody Humphrey - Last Filed: 12/06/17 01:20>





Physical Exam


Vital Signs Reviewed: Yes


Temperature: Afebrile


Blood Pressure: Normal


Pulse: Regular


Respiratory Rate: Normal


Appearance: Positive for: Well-Appearing, Non-Toxic, Uncomfortable.  No: 

Comfortable


Pain Distress: Moderate


Mental Status: Positive for: Alert and Oriented X 3





- Systems Exam


Head: Present: Atraumatic, Normocephalic


Pupils: Present: PERRL


Extroacular Muscles: Present: EOMI


Conjunctiva: Present: Normal


Mouth: Present: Moist Mucous Membranes


Neck: Present: Normal Range of Motion


Respiratory/Chest: Present: Clear to Auscultation, Good Air Exchange.  No: 

Respiratory Distress, Accessory Muscle Use


Cardiovascular: Present: Regular Rate and Rhythm, Normal S1, S2, Peripheal 

Pulses Present.  No: Murmurs, Irregular Rhythm, Tachycardic, Bradycardic


Abdomen: Present: Normal Bowel Sounds.  No: Tenderness, Distention, Peritoneal 

Signs


Back: Present: Normal Inspection.  No: CVA Tenderness, Midline Tenderness


Upper Extremity: Present: Normal Inspection.  No: Cyanosis, Edema


Lower Extremity: Present: NORMAL PULSES, Tenderness, Erythema, Deformity, 

Neurovascularly Intact, Capillary Refill < 2 s, Other (1cm ulceration in R heel 

with granulation tissue and minimal white discharge; Left foot noted to have no 

digits, erythema diffusely to ankle, and 2cm ulceration with scant white 

discharge in lateral aspect ).  No: Normal Inspection, Edema, CALF TENDERNESS, 

Philip's Sign, Swelling, Temperature Abnormalties


Neurological: Present: GCS=15, CN II-XII Intact, Speech Normal


Skin: Present: Warm, Dry, Normal Color.  No: Rashes


Lymphatic: No: Cervical Adenopathy


Psychiatric: Present: Alert, Oriented x 3, Normal Insight, Normal Concentration





<Cody Humphrey - Last Filed: 12/06/17 01:20>


Vital Signs











  Temp Pulse Resp BP Pulse Ox


 


 12/05/17 22:39  97.9 F  86  16  138/72  99














Medical Decision Making





- Lab Interpretations


I have reviewed the lab results: Yes





<John Henry - Last Filed: 12/06/17 00:24>





- Lab Interpretations


I have reviewed the lab results: Yes





<Cody Humphrey - Last Filed: 12/06/17 01:20>


ED Course and Treatment: 


Impression:


Pt seen and evaluated with medical resident. Aware and agree with HPI, clinical 

findings, plan, and management. Pt, whose past medical history includes CAD s/p 

CABG, CHF, diabetes, COPD, AICD placement, kidney transplant, and left 5th 

metatarsal debridement, presented for left foot infection, told by  podiatrist 

to present to the hospital for further evaluation and admission.





Plan:


-- Labs, blood cultures, wound cultures


-- Vanco


-- Zosyn


-- Zofran


-- Morphine


-- Reassess and disposition


 (John Henry)


12/05/17 23:48





Impression: 57 year old male with a past medical history significant for CAD S/

P CABG, CHF, DM, COPD, S/P AICD placement, S/P kidney transplant, S/P Left 5th 

metatarsal osteomyelitis requiring debridement and bone excision on 11/11/17 

who presents to the [facility] [place] after being sent by his PMD for IV antibiotics for 

left foot infection





Plan:


-CBC, CMP, Blood/Wound culture


-1 dose of Vancomycin and Zosyn IVPB


-4mg Morphine IVP for pain control


-4mg Zofran ODT for vomiting


-Reassure and disposition





Prior Visits:


Patient seen and evaluated on multiple occasions for complications with 

diabetic foot infections





12/06/17 01:16


Spoke with Dr. Bradshaw who accepts patient to his service for diabetic foot 

ulcer (Cody Humphrey)





- Lab Interpretations


Lab Results: 








 12/06/17 00:08 





 12/06/17 00:08 





 Lab Results





12/06/17 00:08: WBC 10.1, RBC 3.69, Hgb 11.1 L, Hct 33.0 L, MCV 89.4  D, MCH 

30.1, MCHC 33.6, RDW 14.1, Plt Count 308, MPV 9.3, Gran % 76.6 H, Lymph % (Auto

) 11.9 L, Mono % (Auto) 9.1 H, Eos % (Auto) 2.4, Baso % (Auto) 0.0, Gran # 7.76 

H, Lymph # 1.2, Mono # 0.9 H, Eos # 0.2, Baso # 0.00


12/06/17 00:08: Sodium 129 L, Potassium 4.2, Chloride 92 L, Carbon Dioxide 26, 

Anion Gap 15, BUN 14, Creatinine 1.0, Est GFR (African Amer) > 60, Est GFR (Non-

Af Amer) > 60, Random Glucose 306 H* D, Calcium 9.1, Total Bilirubin 1.0, AST 20

, ALT 32, Alkaline Phosphatase 99, Total Protein 6.8, Albumin 3.6, Globulin 3.2

, Albumin/Globulin Ratio 1.1











- Medication Orders


Current Medication Orders: 








Amlodipine Besylate (Norvasc)  10 mg PO DAILY [facility]


Aspirin (Ecotrin)  81 mg PO 0800 [facility]


Atorvastatin Calcium (Lipitor)  40 mg PO DAILY [facility]


Docusate Sodium (Colace)  100 mg PO DAILY [facility]


Escitalopram Oxalate (Lexapro)  5 mg PO DAILY [facility]


Famotidine (Pepcid)  20 mg PO DAILY [facility]


Insulin Detemir (Levemir)  40 unit SC HS [facility]


Insulin Human Regular (Humulin R Med)  0 units SC ACHS [facility]


   PRN Reason: Protocol


Metoprolol Tartrate (Lopressor)  50 mg PO 0800,1800 [facility]


Morphine Sulfate (Morphine)  4 mg IVP Q4H PRN


   PRN Reason: Pain, severe (8-10)


Mycophenolate Mofetil (Cellcept Cap)  750 mg PO BID [facility]


Prednisone (Prednisone Tab)  5 mg PO 0800 [facility]


Tacrolimus (Prograf Cap)  1.5 mg PO HS [facility]


Tacrolimus (Prograf Cap)  2 mg PO DAILY [facility]





Discontinued Medications





Vancomycin HCl (Vancomycin 1gm)  1 gm in 250 mls @ 167 mls/hr IVPB STAT STA


   PRN Reason: Protocol


   Stop: 12/06/17 00:45


   Last Admin: 12/06/17 01:17  Dose: 167 mls/hr





eMAR Start Stop


 Document     12/06/17 01:17  MARIA LUZ  (Rec: 12/06/17 01:17  MARIA LUZ  [facility]


     Intravenous Solution


      Start Date                                 12/06/17


      Start Time                                 01:17





Piperacillin Sod/Tazobactam Sod (Zosyn 3.375 In Ns 100ml)  100 mls @ 200 mls/hr 

IVPB STAT STA


   PRN Reason: Protocol


   Stop: 12/05/17 23:51


   Last Admin: 12/06/17 01:15  Dose: 200 mls/hr





eMAR Start Stop


 Document     12/06/17 01:15  MARIA LUZ  (Rec: 12/06/17 01:16  MARIA LUZ  [facility]


     Intravenous Solution


      Start Date                                 12/06/17


      Start Time                                 00:35


      End Date                                   12/06/17


      End time                                   01:16


      Total Infusion Time                        41





Morphine Sulfate (Morphine)  4 mg IVP STAT STA


   Stop: 12/05/17 23:17


   Last Admin: 12/06/17 00:35  Dose: 4 mg





MAR Pain Assessment


 Document     12/06/17 00:35  CAST  (Rec: 12/06/17 00:35  [id number]

[id number])


     Pain Reassessment


      Is this a pain reassessment?               No


     Sleep


      Is patient sleeping during reassessment?   No


     Presence of Pain


      Presence of Pain                           Yes


     Pain Scale Used


      Pain Scale Used                            Numeric


     Location


      Left, Right or Bilateral                   Left


      Pain Location Body Site                    Foot


     Description


      Description                                Constant


      Intensity of Pain at present               7


      Pain Behavior                              Facial Grimacing


      Aggravating Factors                        Changing Position


      Alleviating Factors/Management             Medication


       Techniques                                


      Alleviating Factors                        Medication


IVP Administration


 Document     12/06/17 00:35  CASTS1  (Rec: 12/06/17 00:35  [facility]  [id number]

[id number])


     Charges for Administration


      # of IVP Administrations                   1





Ondansetron HCl (Zofran Odt)  4 mg PO STAT STA


   Stop: 12/05/17 23:17


   Last Admin: 12/06/17 00:36  Dose: 4 mg











Disposition/Present on Arrival





<John Henry - Last Filed: 12/06/17 00:24>





- Present on Arrival


Any Indicators Present on Arrival: No


History of DVT/PE: No


History of Uncontrolled Diabetes: Yes


Urinary Catheter: No


History of Decub. Ulcer: No


History Surgical Site Infection Following: Orthopedic Procedures





- Disposition


Have Diagnosis and Disposition been Completed?: Yes


Disposition Time: 01:16





<Cody Humphrey - Last Filed: 12/06/17 01:20>





- Disposition


Diagnosis: 


 Foot infection





Disposition: HOSPITALIZED


Patient Problems: 


 Current Active Problems











Problem Status Onset


 


Foot infection Acute  











Condition: STABLE


Forms:  [employer] (English)

## 2017-12-06 LAB
ALBUMIN/GLOB SERPL: 1.1 {RATIO} (ref 1.1–1.8)
ALP SERPL-CCNC: 99 U/L (ref 38–126)
ALT SERPL-CCNC: 32 U/L (ref 7–56)
AST SERPL-CCNC: 20 U/L (ref 17–59)
BASOPHILS # BLD AUTO: 0 K/MM3 (ref 0–2)
BASOPHILS NFR BLD: 0 % (ref 0–3)
BILIRUB SERPL-MCNC: 1 MG/DL (ref 0.2–1.3)
BUN SERPL-MCNC: 14 MG/DL (ref 7–21)
CALCIUM SERPL-MCNC: 9.1 MG/DL (ref 8.4–10.5)
CHLORIDE SERPL-SCNC: 92 MMOL/L (ref 98–107)
CO2 SERPL-SCNC: 26 MMOL/L (ref 21–33)
EOSINOPHIL # BLD: 0.2 10*3/UL (ref 0–0.7)
EOSINOPHIL NFR BLD: 2.4 % (ref 1.5–5)
ERYTHROCYTE [DISTWIDTH] IN BLOOD BY AUTOMATED COUNT: 14.1 % (ref 11.5–14.5)
GLOBULIN SER-MCNC: 3.2 GM/DL
GLUCOSE SERPL-MCNC: 306 MG/DL (ref 70–110)
GRANULOCYTES # BLD: 7.76 10*3/UL (ref 1.4–6.5)
GRANULOCYTES NFR BLD: 76.6 % (ref 50–68)
HCT VFR BLD CALC: 33 % (ref 42–52)
LYMPHOCYTES # BLD: 1.2 10*3/UL (ref 1.2–3.4)
LYMPHOCYTES NFR BLD AUTO: 11.9 % (ref 22–35)
MCH RBC QN AUTO: 30.1 PG (ref 25–35)
MCHC RBC AUTO-ENTMCNC: 33.6 G/DL (ref 31–37)
MCV RBC AUTO: 89.4 FL (ref 80–105)
MONOCYTES # BLD AUTO: 0.9 10*3/UL (ref 0.1–0.6)
MONOCYTES NFR BLD: 9.1 % (ref 1–6)
PLATELET # BLD: 308 10^3/UL (ref 120–450)
PMV BLD AUTO: 9.3 FL (ref 7–11)
POTASSIUM SERPL-SCNC: 4.2 MMOL/L (ref 3.6–5)
PROT SERPL-MCNC: 6.8 G/DL (ref 5.8–8.3)
SODIUM SERPL-SCNC: 129 MMOL/L (ref 132–148)
WBC # BLD AUTO: 10.1 10^3/UL (ref 4.5–11)

## 2017-12-06 RX ADMIN — INSULIN HUMAN SCH: 100 INJECTION, SOLUTION PARENTERAL at 21:55

## 2017-12-06 RX ADMIN — PIPERACILLIN AND TAZOBACTAM STA MLS/HR: 3; .375 INJECTION, POWDER, LYOPHILIZED, FOR SOLUTION INTRAVENOUS; PARENTERAL at 00:36

## 2017-12-06 RX ADMIN — MORPHINE SULFATE PRN MG: 4 INJECTION, SOLUTION INTRAMUSCULAR; INTRAVENOUS at 20:36

## 2017-12-06 RX ADMIN — MORPHINE SULFATE PRN MG: 4 INJECTION, SOLUTION INTRAMUSCULAR; INTRAVENOUS at 16:39

## 2017-12-06 RX ADMIN — INSULIN HUMAN SCH UNITS: 100 INJECTION, SOLUTION PARENTERAL at 16:39

## 2017-12-06 RX ADMIN — PIPERACILLIN AND TAZOBACTAM STA MLS/HR: 3; .375 INJECTION, POWDER, LYOPHILIZED, FOR SOLUTION INTRAVENOUS; PARENTERAL at 01:15

## 2017-12-06 RX ADMIN — INSULIN DETEMIR SCH UNIT: 100 INJECTION, SOLUTION SUBCUTANEOUS at 22:11

## 2017-12-06 RX ADMIN — MORPHINE SULFATE PRN MG: 4 INJECTION, SOLUTION INTRAMUSCULAR; INTRAVENOUS at 12:58

## 2017-12-06 RX ADMIN — MORPHINE SULFATE PRN MG: 4 INJECTION, SOLUTION INTRAMUSCULAR; INTRAVENOUS at 03:31

## 2017-12-06 RX ADMIN — INSULIN DETEMIR SCH UNIT: 100 INJECTION, SOLUTION SUBCUTANEOUS at 22:00

## 2017-12-06 RX ADMIN — MORPHINE SULFATE PRN MG: 4 INJECTION, SOLUTION INTRAMUSCULAR; INTRAVENOUS at 08:05

## 2017-12-06 RX ADMIN — INSULIN HUMAN SCH UNITS: 100 INJECTION, SOLUTION PARENTERAL at 08:04

## 2017-12-06 RX ADMIN — INSULIN HUMAN SCH UNITS: 100 INJECTION, SOLUTION PARENTERAL at 11:06

## 2017-12-06 NOTE — CP.PCM.CON
History of Present Illness





- History of Present Illness


History of Present Illness: 





Podiatry Consult Note - Dr. Goel





57 year old PMHx CAD S/P CABG, CHF, DM, COPD, S/P AICD placement, S/P kidney 

transplant, hx of osteomyelitis seen and evaluated at bedside for left foot 

infection. Patient hemodynamically stable and NAD. Patient states he had 

surgery on his left foot last month (DOS: 11/09/17) and his post operative 

course was uneventful for the first two weeks; admits last week Tuesday a fever 

developed and experienced increased pain to his entire left foot. Patient 

admits to decreased level of activity. Patient also complains of swelling and 

erythema that started since his admission. Admits to episodes of vomiting but 

denies any N/V currently. Denies diarrhea, chills, shortness of breath. Offers 

no other pedal complaints at this time.





Review of Systems





- Review of Systems


All systems: reviewed and no additional remarkable complaints except (as per HPI

)





Past Patient History





- Infectious Disease


Hx of Infectious Diseases: None





- Tetanus Immunizations


Tetanus Immunization: Unknown





- Past Social History


Smoking Status: quit 3 wks





- CARDIAC


Hx Cardiac Disorders: Yes (MI, CABG)


Hx Hypertension: Yes





- PULMONARY


Hx Chronic Obstructive Pulmonary Disease (COPD): Yes





- NEUROLOGICAL


Hx Neurological Disorder: No





- HEENT


Hx HEENT Problems: No





- RENAL


Hx Renal Failure: Yes (s/p kidney transplant)





- ENDOCRINE/METABOLIC


Hx Diabetes Mellitus Type 2: Yes





- HEMATOLOGICAL/ONCOLOGICAL


Hx Blood Disorders: No





- INTEGUMENTARY


Hx Dermatological Problems: Yes


Other/Comment: LEFT BIG TOE POST AMPUTATION-GANGRENE TO AREA AMPUTATED.7-24-17.

  ALL TOES LEFT FOOT AMPUTATED, DATE UNKNOWN





- MUSCULOSKELETAL/RHEUMATOLOGICAL


Hx Falls: No





- GASTROINTESTINAL


Hx Gastrointestinal Disorders: Yes (INCARCERATED INCISIONAL HERNIA,

GASTROENTERITIS, CONSTIPATION)


Other/Comment: h/o c diff





- GENITOURINARY/GYNECOLOGICAL


Hx Genitourinary Disorders:  (kidney transplant 2012)





- PSYCHIATRIC


Hx Psychophysiologic Disorder: Yes (H/O SMOKING CIGARETTES 1/2 PPD,ETOH USE 

SOCAILLY)


Hx Depression: Yes


Hx Emotional Abuse: No


Hx Physical Abuse: No


Hx Substance Use: No


Other/Comment: SMOKING CESSATION 3 WEEKS AGO





- SURGICAL HISTORY


Hx Surgeries: Yes


Hx Amputation: Yes


Hx Kidney Transplant: Yes


Hx Orthopedic Surgery: Yes


Other/Comment: CABG.  L 5th toe amputation, All toes amputated l foot.  Kidney 

transplant





- ANESTHESIA


Hx Anesthesia Reactions: No


Hx Malignant Hyperthermia: No





Meds


Allergies/Adverse Reactions: 


 Allergies











Allergy/AdvReac Type Severity Reaction Status Date / Time


 


No Known Allergies Allergy   Verified 12/05/17 22:40














- Medications


Medications: 


 Current Medications





Amlodipine Besylate (Norvasc)  10 mg PO DAILY [facility]


   Last Admin: 12/06/17 10:55 Dose:  10 mg


Aspirin (Ecotrin)  81 mg PO 0800 [facility]


   Last Admin: 12/06/17 08:04 Dose:  81 mg


Atorvastatin Calcium (Lipitor)  40 mg PO DAILY [facility]


   Last Admin: 12/06/17 10:55 Dose:  40 mg


Docusate Sodium (Colace)  100 mg PO DAILY [facility]


   Last Admin: 12/06/17 10:55 Dose:  100 mg


Escitalopram Oxalate (Lexapro)  5 mg PO DAILY [facility]


   Last Admin: 12/06/17 10:55 Dose:  5 mg


Famotidine (Pepcid)  20 mg PO DAILY [facility]


   Last Admin: 12/06/17 10:55 Dose:  20 mg


Sodium Chloride (Sodium Chloride 0.9%)  1,000 mls @ 100 mls/hr IV .Q10H [facility]


   Stop: 12/06/17 18:14


   Last Admin: 12/06/17 08:15 Dose:  100 mls/hr


Insulin Detemir (Levemir)  40 unit SC [facility]


Insulin Human Regular (Humulin R Med)  [facility]


   PRN Reason: Protocol


   Last Admin: 12/06/17 11:06 Dose:  5 units


Metoprolol Tartrate (Lopressor)  50 mg PO 0800,1800 [facility]


   Last Admin: 12/06/17 08:04 Dose:  50 mg


Morphine Sulfate (Morphine)  4 mg IVP Q4H PRN


   PRN Reason: Pain, severe (8-10)


   Last Admin: 12/06/17 08:05 Dose:  4 mg


Mycophenolate Mofetil (Cellcept Cap)  750 mg PO BID [facility]


   Last Admin: 12/06/17 10:54 Dose:  750 mg


Prednisone (Prednisone Tab)  5 mg PO 0800 [facility]


   Last Admin: 12/06/17 08:04 Dose:  5 mg


Tacrolimus (Prograf Cap)  1.5 mg PO HS [facility]


Tacrolimus (Prograf Cap)  2 mg PO DAILY [facility]


   Last Admin: 12/06/17 10:55 Dose:  2 mg











Physical Exam





- Constitutional


Appears: Well, Non-toxic, No Acute Distress





- Extremities Exam


Additional comments: 





Vasc: DP/PT pulses palpable 2/4 right foot and nonpalpable to left secondary to 

edema. Temperature gradient cool to cool RLE, warm to hot LLE. Nonpitting edema 

noted to left foot. CFT < 3 sec to all digits on R foot.


Derm: Right foot plantar heel exhibits stable 0.5 diameter circular ulceration 

with granular wound base and hyperkeratotic rim. No active drainage, no malodor

, no fluctuance - no clinical signs of infection noted at this time. Left foot 

transmetatarsal amputation surgical site with sutures intact. Distal stump 

ulceration noted to plantarlateral aspect with mixed fibronecrotic base. Area 

of necrosis noted to distalmedial TMA stump. Erythema noted globally to left 

foot extending proximally to lower 1/3 of leg.


Neuro: Protective sensation grossly diminished


Ortho: Tenderness to palpation left foot TMA stump, plantar medial arch, and 

heel. Pain on palpation right plantar heel ulceration.





- Neurological Exam


Neurological exam: Alert, Oriented x3





- Psychiatric Exam


Psychiatric exam: Normal Affect, Normal Mood





Results





- Vital Signs


Recent Vital Signs: 


 Last Vital Signs











Temp  98.3 F   12/06/17 07:30


 


Pulse  74   12/06/17 08:04


 


Resp  19   12/06/17 07:30


 


BP  119/66   12/06/17 10:55


 


Pulse Ox  93 L  12/06/17 07:30














- Labs


Result Diagrams: 


 12/06/17 00:08





 12/06/17 00:08


Labs: 


 Laboratory Results - last 24 hr











  12/06/17 12/06/17 12/06/17





  03:54 07:47 11:03


 


POC Glucose (mg/dL)  329 H  287 H  250 H














Assessment & Plan





- Assessment and Plan (Free Text)


Assessment: 





57 year old male patient with 1) nonhealing left TMA w/ulceration 2) plantar 

ulceration right heel, stable


Plan: 





Patient seen and evaluated at bedside


Discussed with attending, Dr. Goel


Afebrisandie, WBC 10.1


Left foot XR reviewed: Plantarlateral ulceration. No periosteal reaction or 

cortical disruption to suggest interval OM. s/p TMA


Betadine applied to left foot TMA and dressed with DSD


Right foot plantar heel ulceration dressed with DSD


Multipodus boots ordered to be worn at all times while in bed


Left foot wound culture pending


Continue abx per ID - Ceftaroline


Pain mgmt per medicine


Podiatry will continue to follow patient while in house

## 2017-12-06 NOTE — RAD
PROCEDURE:  Left Foot Radiographs.



HISTORY:

L ft infection r/o osteo  



COMPARISON:

11/9/2017



FINDINGS:



BONES:

Status post 1st through 5th proximal metatarsal shaft amputations 

-similar status. No interval periosteal reaction or cortical 

destruction seen. 



JOINTS:

Normal. 



SOFT TISSUES:

Interval lateral and plantar soft tissue defect -amputation level 

-compatible with ulcer.  No gas-forming cellulitis 



OTHER FINDINGS:

Arterial vascular calcifications -similar



IMPRESSION:

Interval lateral and plantar soft tissue defect -compatible with 

interval ulcer.  No periosteal reaction or cortical disruption to 

suggest interval osteomyelitis.



Post amputation appearance otherwise similar

## 2017-12-06 NOTE — HP
HISTORY OF PRESENT ILLNESS:  This is a 57-year-old male who is coming into

the hospital because of pain in his left foot.  The patient had recent

osteomyelitis and had a transmetatarsal amputation done of the left foot. 

Since then, he had improvement of his symptoms.  He states over the last

few days, he has been having more pain.  He has been having discharge.  The

patient is concerned that his foot might be infected.  He has also been on

antibiotics.  He has had a fever of 101.  He states he had an episode of

vomiting at least twice in the last week.  He denies any chest pain or

nausea.  Today, he has no headaches, abdominal pain, or weakness in the

arms or in the legs.  He does have a past medical history of coronary

artery disease and CABG, diabetes type 2, COPD, AICD, kidney transplant,

osteomyelitis.  The patient states the pain medication is to help his

symptoms.  The pain is about 7 to 8 out of 10.  He is not able to put much

weight on his foot because of pain.



ALLERGIES:  NO KNOWN DRUG ALLERGIES.



FAMILY HISTORY:  Noncontributory.



SOCIAL HISTORY:  He does not smoke.  He denies alcohol use.



PAST SURGICAL HISTORY:  Renal transplantation, transmetatarsal amputation

of the left foot.



PAST MEDICAL HISTORY:

1.  Coronary artery disease, status post CABG.

2.  COPD.

3.  Kidney transplant.

4.  Diabetes type 2.

5.  Peripheral arterial disease.

6.  Dyslipidemia.

7.  Hypertension.

8.  Pacemaker.



PHYSICAL EXAMINATION

VITAL SIGNS:  Temperature is 97.9, pulse is 77, blood pressure is 143/74,

respirations 18, and O2 saturation is 98%.

GENERAL:  The patient lying in bed, uncomfortable, and in no acute

distress.

HEENT:  Atraumatic and normocephalic.  Anicteric sclerae.  Moist mucosa.

Pink conjunctivae.  No oral lesions.

NECK:  No JVD, anterior and posterior adenopathy, thyromegaly, or bruits.

CARDIOVASCULAR:  S1 and S2 regular.  No murmur, rubs, or gallop.

LUNGS:  Clear to auscultation bilaterally.  No wheezes, rales, or rhonchi.

ABDOMEN:  Bowel sounds are positive.  Soft, nontender and nondistended.  No

hepatosplenomegaly. No rebound and no guarding

EXTREMITIES:  No cyanosis, clubbing, or edema.

NEUROLOGIC:  No facial asymmetry.  Tongue is midline.  No uvula deviation. 

Power is 5/5 upper extremity and lower extremity.  Sensation intact in

upper extremity and lower extremity.

PSYCHIATRIC:  He is awake, alert and oriented x3.  No anxiety or

depression.  He has normal affect.

GENITOURINARY:  No CVA tenderness.

VASCULAR:  2+ pulses in the carotid pulses and pedal pulses.

SKIN:  No erythema or nodules

SPINE:  Shows normal curvature.

EXTREMITIES:  No cyanosis and clubbing, no edema.  In the left foot, there

is amputation, it is covered with dressing.  There is some discharge,

non-healing ulcer that is present in the left foot.



LABORATORY DATA:  The patient has sodium 129, creatinine is 1.0, AST and

ALT is 20 and 32, white count is 10.1, hemoglobin is 11.1, platelet count

is 308.



ASSESSMENT:

1.  Left foot ulcer, rule out osteomyelitis.

2.  Diabetes type 2.

3.  Peripheral arterial disease.

4.  Renal transplant.

5.  Dyslipidemia.

6.  Hypertension.

7.  Pacemaker.

8.  Coronary artery disease, status post coronary artery bypass graft.

9.  Status post  automatic implantable cardioverter-defibrillator.

10.  Hyponatremia.



PLAN:  The patient is going to be admitted to the hospital.  He will need

evaluation by Dr. oGel from Podiatry and also, Dr. Dr. Bee from

Infectious Disease.  The patient is going to continue with CellCept and is

going to continue on aspirin for his coronary artery disease, he is on

Lexapro for his anxiety.  The patient is on amlodipine for hypertension. 

The patient is going to continue with tacrolimus, prednisone, and

mycophenolate for his immunosuppressive medications.  He is on morphine for

pain and the patient is on Pepcid daily.  He is going to continue with

normal saline that I have given him for possible hypovolemia causing

hyponatremia.  He is going to be on a heart-healthy diet.  We will await

further input from the specialist.  He was going to need wound care.







_________________________________

Andrea Calderon MD





DD:  12/06/2017 8:18:51

DT:  12/06/2017 10:47:04

Job # [id number]

## 2017-12-07 LAB
ALBUMIN/GLOB SERPL: 1 {RATIO} (ref 1.1–1.8)
ALP SERPL-CCNC: 84 U/L (ref 38–126)
ALT SERPL-CCNC: 20 U/L (ref 7–56)
AST SERPL-CCNC: 27 U/L (ref 17–59)
BILIRUB SERPL-MCNC: 0.6 MG/DL (ref 0.2–1.3)
BUN SERPL-MCNC: 11 MG/DL (ref 7–21)
CALCIUM SERPL-MCNC: 9.1 MG/DL (ref 8.4–10.5)
CHLORIDE SERPL-SCNC: 93 MMOL/L (ref 98–107)
CO2 SERPL-SCNC: 33 MMOL/L (ref 21–33)
ERYTHROCYTE [DISTWIDTH] IN BLOOD BY AUTOMATED COUNT: 13.9 % (ref 11.5–14.5)
ERYTHROCYTE [SEDIMENTATION RATE] IN BLOOD: 78 MM/HR (ref 0–15)
GLOBULIN SER-MCNC: 3.4 GM/DL
GLUCOSE SERPL-MCNC: 166 MG/DL (ref 70–110)
HCT VFR BLD CALC: 33.5 % (ref 42–52)
MAGNESIUM SERPL-MCNC: 1.9 MG/DL (ref 1.7–2.2)
MCH RBC QN AUTO: 29.8 PG (ref 25–35)
MCHC RBC AUTO-ENTMCNC: 32.8 G/DL (ref 31–37)
MCV RBC AUTO: 90.8 FL (ref 80–105)
PLATELET # BLD: 306 10^3/UL (ref 120–450)
PMV BLD AUTO: 9.3 FL (ref 7–11)
POTASSIUM SERPL-SCNC: 3.9 MMOL/L (ref 3.6–5)
PROT SERPL-MCNC: 6.9 G/DL (ref 5.8–8.3)
SODIUM SERPL-SCNC: 134 MMOL/L (ref 132–148)
WBC # BLD AUTO: 8.6 10^3/UL (ref 4.5–11)

## 2017-12-07 RX ADMIN — INSULIN HUMAN SCH: 100 INJECTION, SOLUTION PARENTERAL at 22:42

## 2017-12-07 RX ADMIN — MORPHINE SULFATE PRN MG: 4 INJECTION, SOLUTION INTRAMUSCULAR; INTRAVENOUS at 14:41

## 2017-12-07 RX ADMIN — MORPHINE SULFATE PRN MG: 4 INJECTION, SOLUTION INTRAMUSCULAR; INTRAVENOUS at 06:12

## 2017-12-07 RX ADMIN — MORPHINE SULFATE PRN MG: 4 INJECTION, SOLUTION INTRAMUSCULAR; INTRAVENOUS at 00:36

## 2017-12-07 RX ADMIN — INSULIN HUMAN SCH: 100 INJECTION, SOLUTION PARENTERAL at 07:55

## 2017-12-07 RX ADMIN — INSULIN HUMAN SCH: 100 INJECTION, SOLUTION PARENTERAL at 11:33

## 2017-12-07 RX ADMIN — MORPHINE SULFATE PRN MG: 4 INJECTION, SOLUTION INTRAMUSCULAR; INTRAVENOUS at 22:45

## 2017-12-07 RX ADMIN — INSULIN DETEMIR SCH: 100 INJECTION, SOLUTION SUBCUTANEOUS at 22:42

## 2017-12-07 RX ADMIN — INSULIN HUMAN SCH UNITS: 100 INJECTION, SOLUTION PARENTERAL at 17:11

## 2017-12-07 RX ADMIN — MORPHINE SULFATE PRN MG: 4 INJECTION, SOLUTION INTRAMUSCULAR; INTRAVENOUS at 10:12

## 2017-12-07 NOTE — CP.PCM.PN
Subjective





- Date & Time of Evaluation


Date of Evaluation: 12/07/17


Time of Evaluation: 06:35





- Subjective


Subjective: 





Podiatry Progress Note - Dr. Goel





57 year old PMHx CAD S/P CABG, CHF, DM, COPD, S/P AICD placement, S/P kidney 

transplant, hx of osteomyelitis seen and evaluated at bedside with attending, 

Dr. Goel, for left foot cellulitis and TMA stump ulceration. Patient 

hemodynamically stable and NAD. Denies any acute events overnight. Mild pain to 

left foot surgical site and right heel, well-controlled. Believes the redness 

to left foot has decreased since yesterday. Denies N/V/F/D/C/SOB.





Objective





- Vital Signs/Intake and Output


Vital Signs (last 24 hours): 


 











Temp Pulse Resp BP Pulse Ox


 


 98.6 F   69   20   119/70   100 


 


 12/06/17 16:00  12/06/17 17:11  12/06/17 16:00  12/06/17 17:11  12/06/17 16:00








Intake and Output: 


 











 12/07/17 12/07/17





 06:59 18:59


 


Intake Total 1340 


 


Balance 1340 














- Medications


Medications: 


 Current Medications





Amlodipine Besylate (Norvasc)  10 mg PO DAILY [facility]


   Last Admin: 12/06/17 10:55 Dose:  10 mg


Aspirin (Ecotrin)  81 mg PO 0800 [facility]


   Last Admin: 12/06/17 08:04 Dose:  81 mg


Atorvastatin Calcium (Lipitor)  40 mg PO DAILY [facility]


   Last Admin: 12/06/17 10:55 Dose:  40 mg


Docusate Sodium (Colace)  100 mg PO DAILY [facility]


   Last Admin: 12/06/17 10:55 Dose:  100 mg


Escitalopram Oxalate (Lexapro)  5 mg PO DAILY [facility]


   Last Admin: 12/06/17 10:55 Dose:  5 mg


Famotidine (Pepcid)  20 mg PO DAILY [facility]


   Last Admin: 12/06/17 10:55 Dose:  20 mg


Ceftaroline Fosamil 600 mg/ (Sodium Chloride)  100 mls @ 100 mls/hr IVPB Q12 [facility]


   PRN Reason: Protocol


   Stop: 12/15/17 22:01


   Last Admin: 12/06/17 22:10 Dose:  100 mls/hr


Insulin Detemir (Levemir)  40 unit SC [facility]


   Last Admin: 12/06/17 22:00 Dose:  40 unit


Insulin Human Regular (Humulin R Med)  0 units SC ACHS [facility]


   PRN Reason: Protocol


   Last Admin: 12/06/17 21:55 Dose:  Not Given


Metoprolol Tartrate (Lopressor)  50 mg PO 0800,1800 [facility]


   Last Admin: 12/06/17 17:11 Dose:  50 mg


Morphine Sulfate (Morphine)  4 mg IVP Q4H PRN


   PRN Reason: Pain, severe (8-10)


   Last Admin: 12/07/17 06:12 Dose:  4 mg


Mycophenolate Mofetil (Cellcept Cap)  750 mg PO BID [facility]


   Last Admin: 12/06/17 17:11 Dose:  750 mg


Prednisone (Prednisone Tab)  5 mg PO 0800 [facility]


   Last Admin: 12/06/17 08:04 Dose:  5 mg


Tacrolimus (Prograf Cap)  1.5 mg PO HS [facility]


   Last Admin: 12/06/17 22:11 Dose:  1.5 mg


Tacrolimus (Prograf Cap)  2 mg PO DAILY [facility]


   Last Admin: 12/06/17 10:55 Dose:  2 mg











- Labs


Labs: 


 





 12/07/17 06:30 











- Constitutional


Appears: Well, Non-toxic, No Acute Distress





- Extremities Exam


Additional comments: 





Vasc: DP pulses palpable 2/4 b/l. PT pulse 2/4 right foot and nonpalpable left 

foot likely secondary to edema. Temperature gradient cool to cool RLE, warm to 

hot LLE. Nonpitting edema noted to left foot. CFT < 3 sec to all digits on R 

foot.





Derm: Right foot plantar heel exhibits stable 0.5 diameter circular ulceration 

with granular wound base and hyperkeratotic rim. No active drainage, no malodor

, no fluctuance - no clinical signs of infection noted at this time. Left foot 

transmetatarsal amputation surgical site with sutures intact. Distal stump 

ulceration noted to plantarlateral aspect with mixed fibronecrotic base. Area 

of necrosis noted to distalmedial TMA stump. Erythema noted globally to left 

foot extending proximally to lower 1/3 of leg.





Neuro: Protective sensation grossly diminished.





Ortho: Tenderness to palpation left foot TMA stump, plantar medial arch, and 

heel. Pain on palpation right plantar heel ulceration.





- Neurological Exam


Neurological Exam: Alert, Awake, Oriented x3





- Psychiatric Exam


Psychiatric exam: Normal Affect, Normal Mood





Assessment and Plan





- Assessment and Plan (Free Text)


Assessment: 





57 year old male patient with 1) left foot distal TMA ulceration 2) plantar 

ulceration right heel, stable


Plan: 





Patient seen and evaluated at bedside with attending, Dr. Goel


afebrile, WBC 8.6 (trending downwards - yesterday 10.1)


Left foot XR reviewed: Plantarlateral ulceration. No periosteal reaction or 

cortical disruption to suggest interval OM. s/p TMA


Erythema resolving


Betadine applied to left foot TMA ulceration and right plantar heel ulceration,

dressed with DSD


Santyl ordered for QD dressing changes


Continue multipodus boots


Left foot wound culture pending


Continue abx per ID - Ceftaroline


Pain mgmt per medicine


Podiatry will continue to follow patient while in house

## 2017-12-07 NOTE — PN
DATE:  12/07/2017



SUBJECTIVE:  The patient has no complaints of any chest pain, shortness of

breath or headaches.  He says his pain is better with narcotics that he is

receiving for his left foot.



PHYSICAL EXAMINATION:

VITAL SIGNS:  Temperature is 98.6, pulse of 69, blood pressure 119/70,

respirations 20.

GENERAL:  The patient is lying in bed, flat, comfortable.

HEENT:  No oral lesion.  Anicteric sclerae.  Moist mucosa.

NECK:  No JVD, adenopathy, or thyromegaly.

CARDIOVASCULAR:  S1 and S2, regular.  No murmurs, rubs, or gallops.

LUNGS:  Clear to auscultation bilaterally.  No wheeze, rales, or rhonchi.

ABDOMEN:  Bowel sounds are positive, soft, nontender and nondistended.

EXTREMITIES:  The left foot is covered with dressing.



LABORATORY DATA:  Foot x-ray done shows no periosteal reaction or cortical

disruption to suggest interval osteomyelitis.



ASSESSMENT:

1.  Left foot ulcer.

2.  Diabetes type 2.

3.  Status post left transmetatarsal surgery.

4.  Renal transplant.

5.  Peripheral arterial disease.

6.  Hypertension.

7.  Pacemaker.

8.  Coronary artery disease status post coronary artery bypass grafting.

9.  Hyponatremia.

10.  Status post automatic implantable cardioverter-defibrillator.



PLAN:  The patient is being followed by Dr. Goel from Podiatry.  The

patient is going to continue with aspirin daily.  He is on Colace for

constipation.  He is on Lipitor for dyslipidemia.  He is going to continue

Cellcept, prednisone and Prograf for his kidney transplant.  The patient is

on amlodipine for hypertension.  He is going to continue with Teflaro for

antibiotics.  He is on heart healthy diet.  His labs are pending _____

hyponatremia.





__________________________________________

Andrea Calderon MD



DD:  12/07/2017 6:11:15

DT:  12/07/2017 6:55:51

Job # [id number]

## 2017-12-07 NOTE — CON
DATE:  12/07/2017



IDENTIFICATION DATA:  The patient is in bed in no acute distress.  The

patient seen in room number 566, bed 3.



CHIEF COMPLAINT:  Left foot infection times several days.



HISTORY OF PRESENT ILLNESS:  This is a 57-year-old male with history of

peripheral vascular disease and diabetes, chronic obstructive lung disease,

congestive heart failure, coronary artery disease, myocardial infarction

and a left foot infection and had status post TMA who was admitted with

worsening pain in the left foot and Infectious Diseases consultation

requested.



REVIEW OF SYSTEMS:  There are no fevers and no chills.  No nausea or

vomiting.  No chest pain.



PAST MEDICAL HISTORY:  Significant for peripheral vascular disease,

diabetes, chronic obstructive pulmonary disease, congestive heart failure,

coronary artery disease, myocardial infarction and left foot skin

infection.



PAST SURGICAL HISTORY:  Significant for transmetatarsal amputation, AICD,

renal transplant in 2011 and the left fifth toe amputation.



ALLERGIES:  THE PATIENT HAS NO KNOWN ALLERGIES.



MEDICATIONS:  Include the patient to be on tacrolimus and CellCept,

prednisone, amlodipine and insulin.



PHYSICAL EXAMINATION:

GENERAL:  The patient is in bed, in no acute distress, and answering

questions appropriately.

VITAL SIGNS:  With a temperature of 98, pulse is 69 and respiratory rate of

20.  Blood pressure is 119/70.

HEENT:  Examination of HEENT is unremarkable.

NECK:  Supple.

LUNGS:  Have decreased breath sounds.

HEART:  Normal S1, S2.

GASTROINTESTINAL:  Abdominal examination is soft and nontender.  No

organomegaly.  No rebound, no guarding and no masses.

EXTREMITIES:  On examination, the foot is noted as per description of the

Podiatry/



LABORATORY DATA:  Laboratory examination reveals a white count of 10,000,

hemoglobin of 11, and platelets of 308.  Chemistries reveals a BUN of 11

and creatinine of 0.9.  C-reactive protein is greater than 15 and

sedimentation rate is 78.  Microbiology reveals the blood cultures are

negative.  The foot culture is pending.



RADIOLOGIC DATA:  X-ray of the left foot reveals no osteomyelitis.





ASSESSMENT AND PLAN:  He is a 57-year-old male with peripheral vascular

disease, diabetes, chronic obstructive lung disease, congestive heart

failure, coronary artery disease, myocardial infarction and left foot skin

infection with status post transmetatarsal amputation with the left foot

ulcer and the left foot cellulitis, _____ underlying osteomyelitis and

progression of peripheral vascular disease because of the renal transplant

immunocompromise _____ interaction is concerning.  The patient is also on a

Lexapro and unable to use Zyvox and the patient has had a renal transplant

and has had renal failure, unable to use vancomycin.  We will check on the

cultures and at this time we will use Teflaro.  The patient did have

Enterobacter cloacae culture from the left foot in November of 2017,

approximately a month ago and we will cover that and we will make further

recommendation, Vascular consultation, Podiatric consultation and imaging

to rule out osteomyelitis.  We will follow closely with you.







__________________________________________

Duc Bee MD







DD:  12/07/2017 13:22:45

DT:  12/07/2017 13:28:29

Job # [id number]

## 2017-12-08 LAB
BASOPHILS # BLD AUTO: 0.01 K/MM3 (ref 0–2)
BASOPHILS NFR BLD: 0.1 % (ref 0–3)
BUN SERPL-MCNC: 14 MG/DL (ref 7–21)
CALCIUM SERPL-MCNC: 9.1 MG/DL (ref 8.4–10.5)
CHLORIDE SERPL-SCNC: 93 MMOL/L (ref 98–107)
CO2 SERPL-SCNC: 29 MMOL/L (ref 21–33)
EOSINOPHIL # BLD: 0.2 10*3/UL (ref 0–0.7)
EOSINOPHIL NFR BLD: 2.2 % (ref 1.5–5)
ERYTHROCYTE [DISTWIDTH] IN BLOOD BY AUTOMATED COUNT: 13.9 % (ref 11.5–14.5)
GLUCOSE SERPL-MCNC: 262 MG/DL (ref 70–110)
GRANULOCYTES # BLD: 8.36 10*3/UL (ref 1.4–6.5)
GRANULOCYTES NFR BLD: 83 % (ref 50–68)
HCT VFR BLD CALC: 34.4 % (ref 42–52)
LYMPHOCYTES # BLD: 0.7 10*3/UL (ref 1.2–3.4)
LYMPHOCYTES NFR BLD AUTO: 7.4 % (ref 22–35)
MCH RBC QN AUTO: 29.7 PG (ref 25–35)
MCHC RBC AUTO-ENTMCNC: 32.6 G/DL (ref 31–37)
MCV RBC AUTO: 91.2 FL (ref 80–105)
MONOCYTES # BLD AUTO: 0.7 10*3/UL (ref 0.1–0.6)
MONOCYTES NFR BLD: 7.3 % (ref 1–6)
PLATELET # BLD: 307 10^3/UL (ref 120–450)
PMV BLD AUTO: 9.1 FL (ref 7–11)
POTASSIUM SERPL-SCNC: 4.7 MMOL/L (ref 3.6–5)
SODIUM SERPL-SCNC: 131 MMOL/L (ref 132–148)
WBC # BLD AUTO: 10.1 10^3/UL (ref 4.5–11)

## 2017-12-08 RX ADMIN — INSULIN HUMAN SCH UNITS: 100 INJECTION, SOLUTION PARENTERAL at 17:49

## 2017-12-08 RX ADMIN — INSULIN HUMAN SCH UNITS: 100 INJECTION, SOLUTION PARENTERAL at 15:21

## 2017-12-08 RX ADMIN — INSULIN HUMAN SCH UNITS: 100 INJECTION, SOLUTION PARENTERAL at 08:59

## 2017-12-08 RX ADMIN — COLLAGENASE SANTYL SCH CRE: 250 OINTMENT TOPICAL at 15:27

## 2017-12-08 RX ADMIN — MORPHINE SULFATE PRN MG: 4 INJECTION, SOLUTION INTRAMUSCULAR; INTRAVENOUS at 23:07

## 2017-12-08 RX ADMIN — INSULIN DETEMIR SCH UNIT: 100 INJECTION, SOLUTION SUBCUTANEOUS at 23:09

## 2017-12-08 RX ADMIN — MORPHINE SULFATE PRN MG: 4 INJECTION, SOLUTION INTRAMUSCULAR; INTRAVENOUS at 17:51

## 2017-12-08 RX ADMIN — MORPHINE SULFATE PRN MG: 4 INJECTION, SOLUTION INTRAMUSCULAR; INTRAVENOUS at 08:39

## 2017-12-08 RX ADMIN — INSULIN HUMAN SCH UNITS: 100 INJECTION, SOLUTION PARENTERAL at 23:10

## 2017-12-08 RX ADMIN — MORPHINE SULFATE PRN MG: 4 INJECTION, SOLUTION INTRAMUSCULAR; INTRAVENOUS at 12:18

## 2017-12-08 NOTE — PN
DATE:



SUBJECTIVE:  This is a 57-year-old vasculopath, who had a left TMA

approximately 1 month ago.  I know the patient from an angiogram performed

in the summer.  At that time, he had a left SFA atherectomy and

drug-eluting balloon angioplasty along with an atherectomy and angioplasty

of a solitary left anterior tibial artery.  A good angiographic result was

obtained.  The patient's TMA was healing well, but the patient presented to

Dr. Goel with swelling, erythema and appearance of progressive infection. 

He was subsequently admitted.



PAST MEDICAL HISTORY:  Significant for coronary artery disease, status post

CABG; diabetes; COPD; and renal transplant.  His current BUN and creatinine

are relatively normal.



I have reviewed the patient's AIXA/PVR exam today.  His pressures and

waveforms on the left are nearly normal.  He has evidence of right SFA and

tibial disease.  I spoke with Dr. Goel, and the patient will be managed

conservatively with wound care and antibiotics at this time.  If his wounds

progress, he can be evaluated for angiography at that time.  There is an

issue of a chronic right heel ulcer that may necessitate angiography

sometime in the future.





__________________________________________

Benjamin Conner MD



DD:  12/08/2017 17:46:53

DT:  12/08/2017 19:01:28

Job # [id number]



MTDD

## 2017-12-08 NOTE — CP.PCM.PN
Subjective





- Date & Time of Evaluation


Date of Evaluation: 12/08/17


Time of Evaluation: 13:29





- Subjective


Subjective: 





Podiatry Progress Note - Dr. Goel





57 year old PMHx CAD S/P CABG, CHF, DM, COPD, S/P AICD placement, S/P kidney 

transplant, hx of osteomyelitis seen and evaluated at bedside for left foot 

cellulitis and TMA stump wound, and right foot nonhealing plantar heel wound. 

Patient hemodynamically stable and NAD. Patient complains of increased 10/10 

pain to left heel and worsening redness to LLE. No new complaints to right 

heel. IV abx in progress at time of visit. Denies N/V/F/D/C/SOB/calf pain. 





Objective





- Vital Signs/Intake and Output


Vital Signs (last 24 hours): 


 











Temp Pulse Resp BP Pulse Ox


 


 98.7 F   70   20   129/72   95 


 


 12/08/17 09:51  12/08/17 09:51  12/08/17 09:51  12/08/17 10:43  12/08/17 09:51








Intake and Output: 


 











 12/08/17 12/08/17





 06:59 18:59


 


Intake Total 2400 


 


Output Total 800 


 


Balance 1600 














- Medications


Medications: 


 Current Medications





Amlodipine Besylate (Norvasc)  10 mg PO DAILY [facility]


   Last Admin: 12/08/17 10:43 Dose:  10 mg


Aspirin (Ecotrin)  81 mg PO 0800 [facility]


   Last Admin: 12/08/17 08:46 Dose:  81 mg


Atorvastatin Calcium (Lipitor)  40 mg PO DAILY [facility]


   Last Admin: 12/08/17 10:44 Dose:  40 mg


Collagenase (Santyl)  0 gm TOP DAILY [facility]


Docusate Sodium (Colace)  100 mg PO DAILY [facility]


   Last Admin: 12/08/17 10:43 Dose:  100 mg


Escitalopram Oxalate (Lexapro)  5 mg PO DAILY [facility]


   Last Admin: 12/08/17 10:44 Dose:  5 mg


Famotidine (Pepcid)  20 mg PO DAILY [facility]


   Last Admin: 12/08/17 10:44 Dose:  20 mg


Ceftaroline Fosamil 600 mg/ (Sodium Chloride)  100 mls @ 100 mls/hr IVPB Q12 [facility]


   PRN Reason: Protocol


   Stop: 12/15/17 22:01


   Last Admin: 12/07/17 22:41 Dose:  100 mls/hr


Insulin Detemir (Levemir)  40 unit SC [facility]


   Last Admin: 12/07/17 22:42 Dose:  Not Given


Insulin Human Regular (Humulin R Med)  0 units SC ACHS [facility]


   PRN Reason: Protocol


   Last Admin: 12/08/17 08:59 Dose:  3 units


Metoprolol Tartrate (Lopressor)  50 mg PO 0800,1800 [facility]


   Last Admin: 12/08/17 08:46 Dose:  50 mg


Morphine Sulfate (Morphine)  4 mg IVP Q4H [facility]


   PRN Reason: Pain, severe (8-10)


   Last Admin: 12/08/17 12:18 Dose:  4 mg


Mycophenolate Mofetil (Cellcept Cap)  750 mg PO BID [facility]


   Last Admin: 12/08/17 10:44 Dose:  750 mg


Ondansetron HCl (Zofran Inj)  4 mg IVP Q6H [facility]


   PRN Reason: Nausea/Vomiting


   Last Admin: 12/07/17 09:30 Dose:  4 mg


Prednisone (Prednisone Tab)  5 mg PO 0800 [facility]


   Last Admin: 12/08/17 08:46 Dose:  5 mg


Tacrolimus (Prograf Cap)  1.5 mg PO [facility]


   Last Admin: 12/07/17 22:44 Dose:  1.5 mg


Tacrolimus (Prograf Cap)  2 mg PO DAILY [facility]


   Last Admin: 12/08/17 10:49 Dose:  2 mg











- Labs


Labs: 


 





 12/07/17 06:30 





 12/07/17 06:30 











- Constitutional


Appears: Well, Non-toxic, No Acute Distress





- Extremities Exam


Additional comments: 





Vasc: DP pulses palpable 2/4 b/l. PT pulse 2/4 right foot and nonpalpable left 

foot likely secondary to edema. Temperature gradient cool to cool RLE, warm to 

hot LLE. Nonpitting edema noted to left foot. CFT < 3 sec to all digits on R 

foot.





Derm: Right foot plantar heel exhibits stable 0.5 diameter circular ulceration 

with granular wound base and hyperkeratotic rim. No active drainage, no malodor

, no fluctuance - no clinical signs of infection noted at this time. Left foot 

transmetatarsal amputation surgical site with sutures intact. Distal stump 

ulceration noted to plantarlateral aspect with mixed fibronecrotic base. Area 

of necrosis noted to distalmedial TMA stump. Erythema noted globally to left 

foot extending proximally to lower 1/3 of leg, deepened in color and extending 

further proximally this visit.





Neuro: Protective sensation grossly diminished.





Ortho: Tenderness to palpation left foot TMA stump, plantar medial arch, and 

heel. Pain on palpation right plantar heel ulceration.





- Neurological Exam


Neurological Exam: Alert, Awake, Oriented x3





- Psychiatric Exam


Psychiatric exam: Normal Affect, Normal Mood





Assessment and Plan





- Assessment and Plan (Free Text)


Assessment: 





57 year old male patient with 1) left foot distal TMA ulceration 2) plantar 

ulceration right heel, stable


Plan: 





Patient seen and evaluated at bedside 


Discussed with attending, Dr. Goel


afebrile, WBC 8.6 yesterday, f/u CBC


Left foot XR reviewed: Plantarlateral ulceration. No periosteal reaction or 

cortical disruption to suggest interval OM. s/p TMA


Continue local wound care - QD Santyl + DSD left foor TMA ulceration and right 

plantar heel ulceration


Right foot XR ordered for nonhealing ulcer plantar heel


Continue multipodus boots - Rigid multipodus boot for LLE with ambulation


Soft multipodus boots ordered to be worn while in bed


Left foot wound culture reveals growth of enterococcus faecalis


Continue abx per ID - Ceftaroline


Pain mgmt per medicine


Podiatry will continue to follow patient while in house

## 2017-12-08 NOTE — PN
DATE:  12/08/2017



SUBJECTIVE:  The patient has no complaints of any chest pain.  No shortness

or breath.  No headache.  No dizziness.



PHYSICAL EXAMINATION:

VITAL SIGNS:  Temperature is 98.7, pulse of 70, blood pressure is 129/72,

respirations 20.

GENERAL:  The patient is lying in bed, flat, comfortable.

HEENT:  No oral lesion.  Anicteric sclerae.  Moist mucosa.

NECK:  No JVD, adenopathy, or thyromegaly.

CARDIOVASCULAR:  S1 and S2, regular.  No murmurs, rubs, or gallops.

LUNGS:  Clear to auscultation bilaterally.  No wheeze, rales, or rhonchi.

ABDOMEN:  Bowel sounds are positive, soft, nontender and nondistended.

EXTREMITIES:  No cyanosis, clubbing or edema.



LABORATORY DATA:  White count of 8.6, hemoglobin of 11.  Creatinine is 0.9.



ASSESSMENT:

1.  Left foot ulcer.

2.  Diabetes type 2.

3.  Renal transplant.

4.  Peripheral arterial disease.

5.  Hypertension.

6.  Hyponatremia, improved.

7.  Status post left transmetatarsal surgery.

8.  Coronary artery disease, status post coronary artery bypass graft.

9.  Status post automated implantable cardioverter defibrillator.



PLAN:  The patient is currently comfortable.  The patient is on Levemir for

his diabetes.  He is on Lexapro for his anxiety.  The patient is on Lipitor

for his dyslipidemia.  The patient is receiving Norvasc for hypertension. 

He is going to continue with Prograf, prednisone and mycophenolate.  He is

on Rocephin for antibiotics.  He may go to transitional care unit tomorrow

_____.  The patient is going to continue with morphine for pain.  I gave

him lactulose for constipation.





__________________________________________

Andrea Calderon MD





DD:  12/08/2017 14:53:17

DT:  12/08/2017 15:20:34

Job # [id number]

## 2017-12-08 NOTE — US
PROCEDURE:  Lower extremity AIXA exam



HISTORY:

Peripheral vascular disease with pain and ulceration. Diabetes. 

Status post renal transplant. Recent left lower extremity 

endovascular intervention with subsequent TMA. 



PHYSICIAN(S):  Benjamin Conner MD.



FINDINGS:

The exam is limited by calcified noncompressible vessels. 



The right resting AIXA is not obtainable. The left resting AIXA is 

normal, 0.06 



The brachial systolic pressures are symmetric.



The high thigh pressures are noncompressible.  The high thigh PVR 

waveforms are normal and symmetric. 



On the left, the PVR waveforms are relatively normal and symmetric at 

all levels. This is consistent with patency of the recent left lower 

extremity endovascular procedures.



The right calf PVR waveform does not augment. This is suggestive of 

right SFA occlusive disease 



The right ankle and metatarsal waveforms are severely blunted. This 

is consistent with right tibial occlusive disease. 



IMPRESSION:

1. The left AIXA and PVR waveforms are relatively normal. 



2. Right SFA and tibial disease

## 2017-12-08 NOTE — CP.PCM.PN
Subjective





- Date & Time of Evaluation


Date of Evaluation: 12/08/17


Time of Evaluation: 12:10





- Subjective


Subjective: 





Comfortable in bed, not in distress, afebrile, less pain in the left foot.





Objective





- Vital Signs/Intake and Output


Vital Signs (last 24 hours): 


 











Temp Pulse Resp BP Pulse Ox


 


 98.7 F   70   20   129/72   95 


 


 12/08/17 09:51  12/08/17 09:51  12/08/17 09:51  12/08/17 10:43  12/08/17 09:51








Intake and Output: 


 











 12/08/17 12/08/17





 06:59 18:59


 


Intake Total 2400 


 


Output Total 800 


 


Balance 1600 














- Medications


Medications: 


 Current Medications





Amlodipine Besylate (Norvasc)  10 mg PO DAILY [facility]


   Last Admin: 12/08/17 10:43 Dose:  10 mg


Aspirin (Ecotrin)  81 mg PO 0800 [facility]


   Last Admin: 12/08/17 08:46 Dose:  81 mg


Atorvastatin Calcium (Lipitor)  40 mg PO DAILY [facility]


   Last Admin: 12/08/17 10:44 Dose:  40 mg


Collagenase (Santyl)  0 gm TOP DAILY [facility]


Docusate Sodium (Colace)  100 mg PO DAILY [facility]


   Last Admin: 12/08/17 10:43 Dose:  100 mg


Escitalopram Oxalate (Lexapro)  5 mg PO DAILY [facility]


   Last Admin: 12/08/17 10:44 Dose:  5 mg


Famotidine (Pepcid)  20 mg PO DAILY [facility]


   Last Admin: 12/08/17 10:44 Dose:  20 mg


Ceftaroline Fosamil 600 mg/ (Sodium Chloride)  100 mls @ 100 mls/hr IVPB Q12 [facility]


   PRN Reason: Protocol


   Stop: 12/15/17 22:01


   Last Admin: 12/07/17 22:41 Dose:  100 mls/hr


Insulin Detemir (Levemir)  40 unit SC HS [facility]


   Last Admin: 12/07/17 22:42 Dose:  Not Given


Insulin Human Regular (Humulin R Med)  0 units SC ACHS [facility]


   PRN Reason: Protocol


   Last Admin: 12/08/17 08:59 Dose:  3 units


Metoprolol Tartrate (Lopressor)  50 mg PO 0800,1800 [facility]


   Last Admin: 12/08/17 08:46 Dose:  50 mg


Morphine Sulfate (Morphine)  4 mg IVP Q4H PRN


   PRN Reason: Pain, severe (8-10)


   Last Admin: 12/08/17 08:39 Dose:  4 mg


Mycophenolate Mofetil (Cellcept Cap)  750 mg PO BID [facility]


   Last Admin: 12/08/17 10:44 Dose:  750 mg


Ondansetron HCl (Zofran Inj)  4 mg IVP Q6H PRN


   PRN Reason: Nausea/Vomiting


   Last Admin: 12/07/17 09:30 Dose:  4 mg


Prednisone (Prednisone Tab)  5 mg PO 0800 [facility]


   Last Admin: 12/08/17 08:46 Dose:  5 mg


Tacrolimus (Prograf Cap)  1.5 mg PO HS [facility]


   Last Admin: 12/07/17 22:44 Dose:  1.5 mg


Tacrolimus (Prograf Cap)  2 mg PO DAILY [facility]


   Last Admin: 12/08/17 10:49 Dose:  2 mg











- Labs


Labs: 


 





 12/07/17 06:30 





 12/07/17 06:30 











- Constitutional


Appears: Non-toxic





- Head Exam


Head Exam: NORMAL INSPECTION





- ENT Exam


ENT Exam: Mucous Membranes Moist





- Neck Exam


Neck Exam: absent: Lymphadenopathy, Meningismus





- Respiratory Exam


Respiratory Exam: Decreased Breath Sounds





- Cardiovascular Exam


Cardiovascular Exam: +S1, +S2





- GI/Abdominal Exam


GI & Abdominal Exam: Soft.  absent: Tenderness





- Extremities Exam


Additional comments: 





left foot with dressings in place





Assessment and Plan





- Assessment and Plan (Free Text)


Plan: 





Assessment


left TMA stump ulceration with cellulitis; growing E. faecalis but may be 

contamination - patient has history of left foot skin and skin structure 

infection, with osteomyelitis, S/P transmetatarsal amputation last month, grew 

Enterobacter


S/P Left 5th metatarsal osteomyelitis S/P debridement and bone excision grew 

Acinetobacter and E. faecalis


CAD S/P CABG


chronic CHF


DM


COPD


S/P AICD placement


S/P kidney transplant





Plan


on Teflaro - will switch to Rocephin; xray of the foot does not show acute 

osteomyelitis and doing MRI will show marrow edema which may be from recent 

surgery


will continue to monitor clinically

## 2017-12-09 ENCOUNTER — HOSPITAL ENCOUNTER (INPATIENT)
Dept: HOSPITAL 42 - TRCU | Age: 57
LOS: 5 days | Discharge: HOME | DRG: 564 | End: 2017-12-14
Attending: INTERNAL MEDICINE | Admitting: INTERNAL MEDICINE
Payer: COMMERCIAL

## 2017-12-09 VITALS — BODY MASS INDEX: 25.1 KG/M2

## 2017-12-09 VITALS — TEMPERATURE: 97.3 F

## 2017-12-09 VITALS — RESPIRATION RATE: 15 BRPM | OXYGEN SATURATION: 98 %

## 2017-12-09 VITALS — HEART RATE: 70 BPM | DIASTOLIC BLOOD PRESSURE: 77 MMHG | SYSTOLIC BLOOD PRESSURE: 131 MMHG

## 2017-12-09 DIAGNOSIS — Z95.810: ICD-10-CM

## 2017-12-09 DIAGNOSIS — I13.2: ICD-10-CM

## 2017-12-09 DIAGNOSIS — Z95.0: ICD-10-CM

## 2017-12-09 DIAGNOSIS — I50.9: ICD-10-CM

## 2017-12-09 DIAGNOSIS — L97.419: ICD-10-CM

## 2017-12-09 DIAGNOSIS — Z79.4: ICD-10-CM

## 2017-12-09 DIAGNOSIS — L03.116: ICD-10-CM

## 2017-12-09 DIAGNOSIS — E11.22: ICD-10-CM

## 2017-12-09 DIAGNOSIS — I25.10: ICD-10-CM

## 2017-12-09 DIAGNOSIS — E11.69: ICD-10-CM

## 2017-12-09 DIAGNOSIS — L97.529: ICD-10-CM

## 2017-12-09 DIAGNOSIS — J44.9: ICD-10-CM

## 2017-12-09 DIAGNOSIS — T87.44: Primary | ICD-10-CM

## 2017-12-09 DIAGNOSIS — Z79.82: ICD-10-CM

## 2017-12-09 DIAGNOSIS — B95.2: ICD-10-CM

## 2017-12-09 DIAGNOSIS — M86.9: ICD-10-CM

## 2017-12-09 DIAGNOSIS — Z94.0: ICD-10-CM

## 2017-12-09 DIAGNOSIS — Z79.899: ICD-10-CM

## 2017-12-09 DIAGNOSIS — F17.200: ICD-10-CM

## 2017-12-09 DIAGNOSIS — E11.51: ICD-10-CM

## 2017-12-09 DIAGNOSIS — K59.00: ICD-10-CM

## 2017-12-09 DIAGNOSIS — E11.621: ICD-10-CM

## 2017-12-09 DIAGNOSIS — Z95.1: ICD-10-CM

## 2017-12-09 DIAGNOSIS — N18.6: ICD-10-CM

## 2017-12-09 RX ADMIN — INSULIN HUMAN SCH: 100 INJECTION, SOLUTION PARENTERAL at 23:00

## 2017-12-09 RX ADMIN — MORPHINE SULFATE PRN MG: 4 INJECTION, SOLUTION INTRAMUSCULAR; INTRAVENOUS at 12:05

## 2017-12-09 RX ADMIN — INSULIN DETEMIR SCH UNIT: 100 INJECTION, SOLUTION SUBCUTANEOUS at 22:44

## 2017-12-09 RX ADMIN — COLLAGENASE SANTYL SCH CRE: 250 OINTMENT TOPICAL at 09:27

## 2017-12-09 RX ADMIN — MORPHINE SULFATE PRN MG: 4 INJECTION, SOLUTION INTRAMUSCULAR; INTRAVENOUS at 16:21

## 2017-12-09 RX ADMIN — INSULIN HUMAN SCH UNITS: 100 INJECTION, SOLUTION PARENTERAL at 08:13

## 2017-12-09 RX ADMIN — INSULIN HUMAN SCH UNITS: 100 INJECTION, SOLUTION PARENTERAL at 12:03

## 2017-12-09 RX ADMIN — MORPHINE SULFATE PRN MG: 4 INJECTION, SOLUTION INTRAMUSCULAR; INTRAVENOUS at 06:23

## 2017-12-09 RX ADMIN — MORPHINE SULFATE PRN MG: 4 INJECTION, SOLUTION INTRAMUSCULAR; INTRAVENOUS at 20:42

## 2017-12-09 RX ADMIN — INSULIN HUMAN SCH: 100 INJECTION, SOLUTION PARENTERAL at 17:25

## 2017-12-09 NOTE — CP.PCM.PN
Subjective





- Date & Time of Evaluation


Date of Evaluation: 12/09/17


Time of Evaluation: 12:30





- Subjective


Subjective: 





Comfortable, no fevers, less pain in the left foot.





Objective





- Vital Signs/Intake and Output


Vital Signs (last 24 hours): 


 











Temp Pulse Resp BP Pulse Ox


 


 97.3 F L  70   15   131/77   98 


 


 12/09/17 07:30  12/09/17 08:13  12/09/17 08:19  12/09/17 09:26  12/09/17 08:19











- Medications


Medications: 


 Current Medications





Amlodipine Besylate (Norvasc)  10 mg PO DAILY [facility]


   Last Admin: 12/09/17 09:26 Dose:  10 mg


Aspirin (Ecotrin)  81 mg PO 0800 [facility]


   Last Admin: 12/09/17 08:13 Dose:  81 mg


Atorvastatin Calcium (Lipitor)  40 mg PO DAILY [facility]


   Last Admin: 12/09/17 09:26 Dose:  40 mg


Collagenase (Santyl)  0 gm TOP DAILY [facility]


   Last Admin: 12/09/17 09:27 Dose:  1 cre


Docusate Sodium (Colace)  100 mg PO DAILY [facility]


   Last Admin: 12/09/17 09:25 Dose:  100 mg


Escitalopram Oxalate (Lexapro)  5 mg PO DAILY [facility]


   Last Admin: 12/09/17 09:26 Dose:  5 mg


Famotidine (Pepcid)  20 mg PO DAILY [facility]


   Last Admin: 12/09/17 09:26 Dose:  20 mg


Ceftriaxone Sodium (Rocephin 1 Gram Ivpb (D5w))  1 gm in 100 mls @ 100 mls/hr 

IVPB DAILY [facility]


   PRN Reason: Protocol


   Last Admin: 12/09/17 09:26 Dose:  100 mls/hr


Insulin Detemir (Levemir)  40 unit SC HS [facility]


   Last Admin: 12/08/17 23:09 Dose:  40 unit


Insulin Human Regular (Humulin R Med)  0 units SC ACHS [facility]


   PRN Reason: Protocol


   Last Admin: 12/09/17 08:13 Dose:  7 units


Metoprolol Tartrate (Lopressor)  50 mg PO 0800,1800 [facility]


   Last Admin: 12/09/17 08:13 Dose:  50 mg


Morphine Sulfate (Morphine)  4 mg IVP Q4H PRN


   PRN Reason: Pain, severe (8-10)


   Last Admin: 12/09/17 06:23 Dose:  4 mg


Mycophenolate Mofetil (Cellcept Cap)  750 mg PO BID [facility]


   Last Admin: 12/09/17 09:26 Dose:  750 mg


Ondansetron HCl (Zofran Inj)  4 mg IVP Q6H PRN


   PRN Reason: Nausea/Vomiting


   Last Admin: 12/09/17 06:22 Dose:  4 mg


Prednisone (Prednisone Tab)  5 mg PO 0800 [facility]


   Last Admin: 12/09/17 08:13 Dose:  5 mg


Tacrolimus (Prograf Cap)  1.5 mg PO HS [facility]


   Last Admin: 12/08/17 23:08 Dose:  1.5 mg


Tacrolimus (Prograf Cap)  2 mg PO DAILY [facility]


   Last Admin: 12/09/17 09:26 Dose:  2 mg











- Labs


Labs: 


 





 12/08/17 15:47 





 12/08/17 15:47 











- Constitutional


Appears: Non-toxic





- Head Exam


Head Exam: NORMAL INSPECTION





- Respiratory Exam


Respiratory Exam: Decreased Breath Sounds





- Cardiovascular Exam


Cardiovascular Exam: +S1, +S2





- GI/Abdominal Exam


GI & Abdominal Exam: Soft.  absent: Tenderness





- Extremities Exam


Additional comments: 





left foot with dressings in place





Assessment and Plan





- Assessment and Plan (Free Text)


Plan: 





Assessment


left TMA stump ulceration with cellulitis; growing E. faecalis but may be 

contamination - patient has history of left foot skin and skin structure 

infection, with osteomyelitis, S/P transmetatarsal amputation last month, grew 

Enterobacter


S/P Left 5th metatarsal osteomyelitis S/P debridement and bone excision grew 

Acinetobacter and E. faecalis


CAD S/P CABG


chronic CHF


DM


COPD


S/P AICD placement


S/P kidney transplant





Plan


on Teflaro - will switch to Rocephin based on previous cx and E. faecalis alone 

is more of colonization than infection; xray of the foot does not show acute 

osteomyelitis and doing MRI will show marrow edema which may be from recent 

surgery as per Podiatry


will continue to monitor clinically

## 2017-12-09 NOTE — DS
HISTORY OF PRESENT ILLNESS:  The patient is a 57-year-old, nondiabetic, who

came to emergency room because of worsening leg wound and also foot pain. 

The patient was recently treated for osteomyelitis and has transmetatarsal

amputation done, but his pain got worse and he started to have discharge

from the wound, so he came to emergency room for evaluation, has been on IV

antibiotics, doing well, and being transferred to TCU today.



PHYSICAL EXAMINATION:

GENERAL:  He is awake, alert, oriented, communicative, and complaint of

feeling nauseous.

VITAL SIGNS:  He is afebrile, pulse 70, respirations 18, and blood pressure

131/77.

LUNGS:  Bilateral fair airflow.  No rhonchi or crackles.

HEART:  S1 and S2 audible.

ABDOMEN:  Soft, obese, and nontender.  No rebound.  No guarding.

NEUROLOGIC:  He is awake, alert, oriented, and communicative.

EXTREMITIES:  Left transmetatarsal amputation, wound is in the dressing.



LABORATORY DATA:  Blood sugar is 287.  He is growing Enterococcus faecalis

from his left foot wound.



ASSESSMENT:

1.  Left foot wound infection, growing Enterococcus faecalis.

2.  Coronary artery disease, status post open heart surgery.

3.  Chronic obstructive pulmonary disease.

4.  Insulin-dependent diabetes.

5.  Status post pacemaker placement.

6.  History of kidney transplant.



PLAN:  The patient is being transferred to TCU.  We will resume all his

medications.  Monitor his blood sugar.  The patient will continue on

CellCept and aspirin.  He is on insulin coverage and Levemir.  He is on

statin and beta-blockers and he is on Rocephin.  He will be transferred to

TCU for wound care and antibiotic.  Follow up in _____ TCU.





__________________________________________

Christine Zelaya MD





DD:  12/09/2017 16:13:26

DT:  12/09/2017 20:27:00

Job # [id number]

## 2017-12-09 NOTE — CP.PCM.PN
Subjective





- Date & Time of Evaluation


Date of Evaluation: 12/09/17


Time of Evaluation: 09:54





- Subjective


Subjective: 





Podiatry Progress Note - Dr. Goel





57 year old PMHx CAD S/P CABG, CHF, DM, COPD, S/P AICD placement, S/P kidney 

transplant, hx of osteomyelitis seen and evaluated at bedside for left foot 

cellulitis and TMA stump wound, and right foot nonhealing plantar heel wound. 

Patient is AAO x 3 and NAD resting comfortably in bed. Patient states that the 

pain in his left foot has decreased since yesterday. No new complaints to right 

heel. No acute events overnight. Denies N/V/F/D/C/SOB/calf pain. 





Objective





- Vital Signs/Intake and Output


Vital Signs (last 24 hours): 


 











Temp Pulse Resp BP Pulse Ox


 


 97.3 F L  70   15   131/77   98 


 


 12/09/17 07:30  12/09/17 08:13  12/09/17 08:19  12/09/17 09:26  12/09/17 08:19











- Medications


Medications: 


 Current Medications





Amlodipine Besylate (Norvasc)  10 mg PO DAILY [facility]


   Last Admin: 12/09/17 09:26 Dose:  10 mg


Aspirin (Ecotrin)  81 mg PO 0800 [facility]


   Last Admin: 12/09/17 08:13 Dose:  81 mg


Atorvastatin Calcium (Lipitor)  40 mg PO DAILY [facility]


   Last Admin: 12/09/17 09:26 Dose:  40 mg


Collagenase (Santyl)  0 gm TOP DAILY [facility]


   Last Admin: 12/09/17 09:27 Dose:  1 cre


Docusate Sodium (Colace)  100 mg PO DAILY [facility]


   Last Admin: 12/09/17 09:25 Dose:  100 mg


Escitalopram Oxalate (Lexapro)  5 mg PO DAILY [facility]


   Last Admin: 12/09/17 09:26 Dose:  5 mg


Famotidine (Pepcid)  20 mg PO DAILY [facility]


   Last Admin: 12/09/17 09:26 Dose:  20 mg


Ceftriaxone Sodium (Rocephin 1 Gram Ivpb (D5w))  1 gm in 100 mls @ 100 mls/hr 

IVPB DAILY [facility]


   PRN Reason: Protocol


   Last Admin: 12/09/17 09:26 Dose:  100 mls/hr


Insulin Detemir (Levemir)  40 unit SC HS [facility]


   Last Admin: 12/08/17 23:09 Dose:  40 unit


Insulin Human Regular (Humulin R Med)  0 units SC ACHS [facility]


   PRN Reason: Protocol


   Last Admin: 12/09/17 08:13 Dose:  7 units


Metoprolol Tartrate (Lopressor)  50 mg PO 0800,1800 [facility]


   Last Admin: 12/09/17 08:13 Dose:  50 mg


Morphine Sulfate (Morphine)  4 mg IVP Q4H PRN


   PRN Reason: Pain, severe (8-10)


   Last Admin: 12/09/17 06:23 Dose:  4 mg


Mycophenolate Mofetil (Cellcept Cap)  750 mg PO BID [facility]


   Last Admin: 12/09/17 09:26 Dose:  750 mg


Ondansetron HCl (Zofran Inj)  4 mg IVP Q6H PRN


   PRN Reason: Nausea/Vomiting


   Last Admin: 12/09/17 06:22 Dose:  4 mg


Prednisone (Prednisone Tab)  5 mg PO 0800 [facility]


   Last Admin: 12/09/17 08:13 Dose:  5 mg


Tacrolimus (Prograf Cap)  1.5 mg PO HS [facility]


   Last Admin: 12/08/17 23:08 Dose:  1.5 mg


Tacrolimus (Prograf Cap)  2 mg PO DAILY [facility]


   Last Admin: 12/09/17 09:26 Dose:  2 mg











- Labs


Labs: 


 





 12/08/17 15:47 





 12/08/17 15:47 











- Constitutional


Appears: Well, Non-toxic, No Acute Distress





- Extremities Exam


Additional comments: 





Vasc: DP pulses palpable 2/4 b/l. PT pulse 2/4 right foot and nonpalpable left 

foot likely secondary to edema. Temperature gradient cool to cool RLE, warm to 

hot LLE. Nonpitting edema noted to left foot. CFT < 3 sec to all digits on R 

foot.





Derm: Right foot plantar heel exhibits stable 0.5 diameter circular ulceration 

with granular wound base and hyperkeratotic rim. No active drainage, no malodor

, no fluctuance - no clinical signs of infection noted at this time. Left foot 

transmetatarsal amputation surgical site with sutures intact. Distal stump 

ulceration noted to plantarlateral aspect with mixed fibronecrotic base. Area 

of necrosis noted to distalmedial TMA stump. Erythema noted globally to left 

foot extending proximally to lower 1/3 of leg, improved since yesterday. 

Fibronecrotic ulceration on medial foot where drain from TMA was placed noted 

today. No periwound erythema, no tunneling, undermining, tracking, probe to bone

, malodor noted. Minimal serous drainage noted at this time. 





Neuro: Protective sensation grossly diminished.





Ortho: Tenderness to palpation left foot TMA stump, plantar medial arch, and 

heel. Pain on palpation right plantar heel ulceration.





- Neurological Exam


Neurological Exam: Alert, Awake, Oriented x3





- Psychiatric Exam


Psychiatric exam: Normal Affect, Normal Mood





Assessment and Plan





- Assessment and Plan (Free Text)


Assessment: 





57 year old male patient with 1) left foot distal TMA ulceration 2) plantar 

ulceration right heel, stable


Plan: 





Patient seen and evaluated at bedside 


Discussed with attending, Dr. Goel


afebrile, WBC increased to 10.1 yesterday, f/u CBC


Left foot XR reviewed: Plantarlateral ulceration. No periosteal reaction or 

cortical disruption to suggest interval OM. s/p TMA


Continue local wound care - QD Santyl + DSD left foor TMA ulceration and right 

plantar heel ulceration


Right foot XR ordered for nonhealing ulcer plantar heel- still no result. Will f

/u tomorrow


Continue multipodus boots - Rigid multipodus boot for LLE with ambulation


Soft multipodus boots re-ordered. Patient to wear while in bed


Left foot wound culture reveals growth of enterococcus faecalis


Continue abx per ID - Ceftriaxone


Pain mgmt per medicine


Podiatry will continue to follow patient while in house

## 2017-12-10 PROCEDURE — F07M6ZZ THERAPEUTIC EXERCISE TREATMENT OF MUSCULOSKELETAL SYSTEM - WHOLE BODY: ICD-10-PCS | Performed by: INTERNAL MEDICINE

## 2017-12-10 PROCEDURE — F07Z9ZZ GAIT TRAINING/FUNCTIONAL AMBULATION TREATMENT: ICD-10-PCS | Performed by: INTERNAL MEDICINE

## 2017-12-10 RX ADMIN — INSULIN HUMAN SCH UNITS: 100 INJECTION, SOLUTION PARENTERAL at 17:05

## 2017-12-10 RX ADMIN — MORPHINE SULFATE PRN MG: 4 INJECTION, SOLUTION INTRAMUSCULAR; INTRAVENOUS at 22:00

## 2017-12-10 RX ADMIN — COLLAGENASE SANTYL SCH APPL: 250 OINTMENT TOPICAL at 10:11

## 2017-12-10 RX ADMIN — MORPHINE SULFATE PRN MG: 4 INJECTION, SOLUTION INTRAMUSCULAR; INTRAVENOUS at 17:33

## 2017-12-10 RX ADMIN — INSULIN HUMAN SCH: 100 INJECTION, SOLUTION PARENTERAL at 11:25

## 2017-12-10 RX ADMIN — MORPHINE SULFATE PRN MG: 4 INJECTION, SOLUTION INTRAMUSCULAR; INTRAVENOUS at 01:54

## 2017-12-10 RX ADMIN — INSULIN HUMAN SCH UNITS: 100 INJECTION, SOLUTION PARENTERAL at 06:57

## 2017-12-10 RX ADMIN — INSULIN HUMAN SCH UNITS: 100 INJECTION, SOLUTION PARENTERAL at 22:02

## 2017-12-10 RX ADMIN — MORPHINE SULFATE PRN MG: 4 INJECTION, SOLUTION INTRAMUSCULAR; INTRAVENOUS at 08:32

## 2017-12-10 RX ADMIN — MORPHINE SULFATE PRN MG: 4 INJECTION, SOLUTION INTRAMUSCULAR; INTRAVENOUS at 13:06

## 2017-12-10 RX ADMIN — PIPERACILLIN AND TAZOBACTAM SCH MLS/HR: 3; .375 INJECTION, POWDER, LYOPHILIZED, FOR SOLUTION INTRAVENOUS; PARENTERAL at 23:35

## 2017-12-10 RX ADMIN — PIPERACILLIN AND TAZOBACTAM SCH MLS/HR: 3; .375 INJECTION, POWDER, LYOPHILIZED, FOR SOLUTION INTRAVENOUS; PARENTERAL at 17:11

## 2017-12-10 RX ADMIN — INSULIN DETEMIR SCH UNIT: 100 INJECTION, SOLUTION SUBCUTANEOUS at 22:02

## 2017-12-10 RX ADMIN — PIPERACILLIN AND TAZOBACTAM SCH MLS/HR: 3; .375 INJECTION, POWDER, LYOPHILIZED, FOR SOLUTION INTRAVENOUS; PARENTERAL at 13:06

## 2017-12-10 NOTE — CP.PCM.CON
History of Present Illness





- History of Present Illness


History of Present Illness: 





Podiatry Progress Note - Dr. Goel





57 year old PMHx CAD S/P CABG, CHF, DM, COPD, S/P AICD placement, S/P kidney 

transplant, hx of osteomyelitis seen and evaluated at bedside for left foot 

cellulitis and TMA stump wound, and right foot nonhealing plantar heel wound. 

Patient is AAO x 3 and NAD resting comfortably in bed. Patient states that the 

pain in his left foot has decreased since yesterday. No new complaints to right 

heel. No acute events overnight. Denies N/V/F/D/C/SOB/calf pain. 





Review of Systems





- Review of Systems


Review of Systems: 





ROS unremarkable outside of HPI





Past Patient History





- Infectious Disease


Hx of Infectious Diseases: None





- Tetanus Immunizations


Tetanus Immunization: Unknown





- Past Social History


Smoking Status: quit 3 wks





- CARDIAC


Hx Cardiac Disorders: Yes


Hx Hypertension: Yes





- PULMONARY


Hx Chronic Obstructive Pulmonary Disease (COPD): Yes





- NEUROLOGICAL


Hx Neurological Disorder: No





- HEENT


Hx HEENT Problems: No





- RENAL


Hx Renal Failure: Yes





- ENDOCRINE/METABOLIC


Hx Diabetes Mellitus Type 2: Yes





- HEMATOLOGICAL/ONCOLOGICAL


Hx Blood Disorders: No





- INTEGUMENTARY


Hx Dermatological Problems: Yes


Other/Comment: LEFT BIG TOE POST AMPUTATION-GANGRENE TO AREA AMPUTATED.7-24-17.

  ALL TOES LEFT FOOT AMPUTATED, DATE UNKNOWN





- MUSCULOSKELETAL/RHEUMATOLOGICAL


Hx Falls: No





- GASTROINTESTINAL


Hx Gastrointestinal Disorders: Yes (CONSTIPATION,INCISIONAL HERNIA)





- GENITOURINARY/GYNECOLOGICAL


Hx Genitourinary Disorders: Yes (KIDNEY TRANSPLANT)


Hx Reproductive Disorders: No





- PSYCHIATRIC


Hx Psychophysiologic Disorder: Yes (H/O SMOKING CIGARETTES 1/2 PPD,ETOH USE 

SOCAILLY)


Hx Depression: Yes


Hx Emotional Abuse: No


Hx Physical Abuse: No


Hx Substance Use: No


Other/Comment: SMOKING CESSATION 3 WEEKS AGO





- SURGICAL HISTORY


Hx Surgeries: Yes


Hx Amputation: Yes


Hx Kidney Transplant: Yes


Hx Orthopedic Surgery: Yes


Other/Comment: CABG.  L 5th toe amputation, All toes amputated l foot.  Kidney 

transplant





- ANESTHESIA


Hx Anesthesia Reactions: No


Hx Malignant Hyperthermia: No





Meds


Allergies/Adverse Reactions: 


 Allergies











Allergy/AdvReac Type Severity Reaction Status Date / Time


 


No Known Allergies Allergy   Verified 12/09/17 16:31














- Medications


Medications: 


 Current Medications





Amlodipine Besylate (Norvasc)  10 mg PO DAILY [facility]


   PRN Reason: Protocol


   Last Admin: 12/10/17 10:10 Dose:  10 mg


Aspirin (Ecotrin)  81 mg PO 0800 [facility]


   PRN Reason: Protocol


   Last Admin: 12/10/17 08:20 Dose:  81 mg


Atorvastatin Calcium (Lipitor)  40 mg PO DIN [facility]


   PRN Reason: Protocol


Collagenase (Santyl)  0 gm TOP DAILY [facility]


   PRN Reason: Protocol


   Last Admin: 12/10/17 10:11 Dose:  1 appl


Docusate Sodium (Colace)  100 mg PO DAILY [facility]


   PRN Reason: Protocol


   Last Admin: 12/10/17 10:09 Dose:  100 mg


Escitalopram Oxalate (Lexapro)  5 mg PO DAILY [facility]


   PRN Reason: Protocol


   Last Admin: 12/10/17 10:10 Dose:  5 mg


Famotidine (Pepcid)  20 mg PO HS [facility]


   PRN Reason: Protocol


Piperacillin Sod/Tazobactam Sod (Zosyn 3.375 In Ns 100ml)  100 mls @ 200 mls/hr 

IVPB Q6 [facility]


   PRN Reason: Protocol


   Stop: 12/17/17 12:01


Insulin Detemir (Levemir)  40 unit SC HS [facility]


   PRN Reason: Protocol


   Last Admin: 12/09/17 22:44 Dose:  40 unit


Insulin Human Regular (Humulin R Med)  0 units SC ACHS [facility]


   PRN Reason: Protocol


   Last Admin: 12/10/17 11:25 Dose:  Not Given


Metoprolol Tartrate (Lopressor)  50 mg PO 0800,1800 [facility]


   PRN Reason: Protocol


   Last Admin: 12/10/17 08:20 Dose:  50 mg


Morphine Sulfate (Morphine)  4 mg IVP Q4H PRN; Protocol


   PRN Reason: Pain, severe (8-10)


   Last Admin: 12/10/17 08:32 Dose:  4 mg


Mycophenolate Mofetil (Cellcept Cap)  750 mg PO BID [facility]


   PRN Reason: Protocol


   Last Admin: 12/10/17 10:09 Dose:  750 mg


Ondansetron HCl (Zofran Inj)  4 mg IVP Q6H PRN; Protocol


   PRN Reason: Nausea/Vomiting


   Last Admin: 12/10/17 06:12 Dose:  4 mg


Prednisone (Prednisone Tab)  5 mg PO DAILY [facility]


   PRN Reason: Protocol


   Last Admin: 12/10/17 10:10 Dose:  5 mg


Tacrolimus (Prograf Cap)  2 mg PO DAILY [facility]


   PRN Reason: Protocol


   Last Admin: 12/10/17 10:11 Dose:  2 mg


Tacrolimus (Prograf Cap)  1.5 mg PO HS [facility]


   PRN Reason: Protocol


   Last Admin: 12/09/17 22:44 Dose:  1.5 mg











Physical Exam





- Constitutional


Appears: Well, Non-toxic, No Acute Distress





- Extremities Exam


Additional comments: 





Vasc: DP pulses palpable 2/4 b/l. PT pulse 2/4 right foot and nonpalpable left 

foot likely secondary to edema. Temperature gradient cool to cool RLE, warm to 

hot LLE. Nonpitting edema noted to left foot. CFT < 3 sec to all digits on R 

foot.





Derm: Right foot plantar heel exhibits stable 0.5 diameter circular ulceration 

with granular wound base and hyperkeratotic rim. No active drainage, no malodor

, no fluctuance - no clinical signs of infection noted at this time. Left foot 

transmetatarsal amputation surgical site with sutures intact. Distal stump 

ulceration noted to plantarlateral aspect with mixed fibronecrotic base. Area 

of necrosis noted to distalmedial TMA stump. Erythema noted globally to left 

foot extending proximally to lower 1/3 of leg, improved since yesterday. 

Fibronecrotic ulceration on medial foot where drain from TMA was placed noted 

today. No periwound erythema, no tunneling, undermining, tracking, probe to bone

, malodor noted. Minimal serous drainage noted at this time. 





Neuro: Protective sensation grossly diminished.





Ortho: Tenderness to palpation left foot TMA stump, plantar medial arch, and 

heel. Pain on palpation right plantar heel ulceration.





- Neurological Exam


Neurological exam: Alert, Oriented x3





- Psychiatric Exam


Psychiatric exam: Normal Affect, Normal Mood





Results





- Vital Signs


Recent Vital Signs: 


 Last Vital Signs











Temp  98.4 F   12/10/17 10:24


 


Pulse  77   12/10/17 10:24


 


Resp  18   12/10/17 10:24


 


BP  142/76   12/10/17 10:24


 


Pulse Ox  95   12/10/17 10:24














Assessment & Plan





- Assessment and Plan (Free Text)


Assessment: 





57 year old male patient with 1) left foot distal TMA ulceration 2) plantar 

ulceration right heel, stable


Plan: 





Patient seen and evaluated at bedside 


Discussed with attending, Dr. Goel


Charts, labs, vitals reviewed


Left foot XR reviewed: Plantarlateral ulceration. No periosteal reaction or 

cortical disruption to suggest interval OM. s/p TMA


Continue local wound care - QD Santyl + DSD left foor TMA ulceration and right 

plantar heel ulceration


Right foot XR ordered for nonhealing ulcer plantar heel- still no result. Tried 

calling radiology with no answer. Will follow up tomorrow


Continue multipodus boots


Left foot wound culture reveals growth of enterococcus faecalis


Continue abx per ID - Zosyn


Pain mgmt per medicine


Podiatry will continue to follow patient while in house





- Date & Time


Date: 12/10/17


Time: 13:09

## 2017-12-10 NOTE — HP
HISTORY OF PRESENT ILLNESS:  The patient is 57-years old, patient of Dr. Calderon, who came to emergency room on 12/06/2017 with a complaint of

increasing pain in his left foot with some discharge.  The patient was

found to have osteomyelitis and recently had transmetatarsal amputation

done.  He states he was doing well for couple of days, but after few days

prior to coming to the hospital, he started to have pain and discharge.  He

started to have fever and chills.  He got scarred that he got bone

infection again, so he came to emergency room on 12/06/2017.  He was

evaluated by foot doctor, wound was cleaned and started on antibiotic.



PAST MEDICAL HISTORY:  Significant for

1.  Hypertension.

2.  Insulin-dependent diabetes.

3.  History of end-stage renal disease, had renal transplant.

4.  History of COPD.

5.  Status post pacemaker placement.

6.  Hypertension.

7.  Peripheral vascular disease.

8.  Coronary artery disease, status post open heart surgery.



ALLERGIES:  HE IS NOT ALLERGIC TO ANY MEDICATIONS.



MEDICATIONS AT HOME:  He is on:

1.  Prednisone 5 mg daily.

2.  Amlodipine 10 mg daily.

3.  Prograf 2 mg daily.

4.  Tacrolimus 1.5 daily.

5.  He is on Cellcept 750 b.i.d.

6.  He is on Lopressor 50 twice a day.

7.  Levemir 40 units at bedtime.

8.  Pepcid 20 mg daily.

9.  Citalopram 5 mg daily.

10.  Docusate 100 daily.

11.  Aspirin 81 daily.



SOCIAL HISTORY:  He denies alcohol use.  However, he is active smoker.  He

used to be heavy smoker in the remote past.



PHYSICAL EXAMINATION:

GENERAL:  He is awake, alert, oriented, complain of constipation.

VITAL SIGNS:  He is afebrile, pulse is 77, respirations are 18, and blood

pressure is 142/76.

LUNGS:  Bilateral good airflow.  No rhonchi or crackle.

HEART:  S1 and S2 audible.

ABDOMEN:  Soft and nontender.  No rebound.  No guarding.

NEUROLOGIC:  The patient is awake, alert, oriented, able to communicate. 

His left transmetatarsal amputation wound is in dress.



ASSESSMENT AND PLAN:

1.  Left foot wound infection growing Enterococcus faecalis.

2.  Insulin-dependent diabetes.

3.  Constipation.

4.  Coronary artery disease.

5.  Chronic obstructive pulmonary disease.



PLAN:  Currently, the patient is on his usual maintenance medication for

renal transplant that include Cellcept and prednisone.  I will Continue him

on aspirin, stool softener, and Lexapro.  He is on amlodipine, famotidine

and Prograf.  We will give him a dose of Relistor.  We will give him

magnesium citrate.  We will continue him on his antibiotic that is _____. 

Dr. Calderon will follow the patient.





__________________________________________

Christine Zelaya MD





DD:  12/10/2017 12:03:35

DT:  12/10/2017 20:47:27

Job # [id number]

## 2017-12-10 NOTE — CP.PCM.CON
History of Present Illness





- History of Present Illness


History of Present Illness: 


57 year old male with PMH of CAD S/P CABG, chronic CHF, DM, COPD, S/P AICD 

placement, S/P kidney transplant, S/P Left 5th metatarsal osteomyelitis S/P 

debridement and bone excision, grew Acinetobacter and E. faecalis was admitted 

after the patient presented with pain in the left TMA stump after he was 

walking for many blocks on the street after his car was towed. He was found to 

have cellulitis but no evidence of exposed bone and the xray of the foot did 

not show osteomyelitis. He is now transferred to [facility] for continued medical 

therapy and physical rehab. Infectious Diseases consult is requested to 

continue his antibiotics. He currently denies fever or chills, no pain in the 

left foot currently, no nausea or vomiting, no chest pain, no SOB, no headache 

or dizziness, no abdominal pain, no diarrhea, no dysuria. 








Review of Systems





- Review of Systems


All systems: reviewed and no additional remarkable complaints except (as per HPI

)





Past Patient History





- Infectious Disease


Hx of Infectious Diseases: None





- Tetanus Immunizations


Tetanus Immunization: Unknown





- Past Social History


Smoking Status: quit 3 wks





- CARDIAC


Hx Cardiac Disorders: Yes (MI, CABG)


Hx Hypertension: Yes





- PULMONARY


Hx Chronic Obstructive Pulmonary Disease (COPD): Yes





- NEUROLOGICAL


Hx Neurological Disorder: No





- HEENT


Hx HEENT Problems: No





- RENAL


Hx Renal Failure: Yes (s/p kidney transplant)





- ENDOCRINE/METABOLIC


Hx Diabetes Mellitus Type 2: Yes





- HEMATOLOGICAL/ONCOLOGICAL


Hx Blood Disorders: No





- INTEGUMENTARY


Hx Dermatological Problems: Yes


Other/Comment: LEFT BIG TOE POST AMPUTATION-GANGRENE TO AREA AMPUTATED.[id number].

  ALL TOES LEFT FOOT AMPUTATED, DATE UNKNOWN





- MUSCULOSKELETAL/RHEUMATOLOGICAL


Hx Falls: No





- GASTROINTESTINAL


Hx Gastrointestinal Disorders: Yes (CONSTIPATION,INCISIONAL HERNIA)





- GENITOURINARY/GYNECOLOGICAL


Hx Genitourinary Disorders: Yes (KIDNEY TRANSPLANT)


Hx Reproductive Disorders: No





- PSYCHIATRIC


Hx Psychophysiologic Disorder: Yes (H/O SMOKING CIGARETTES 1/2 PPD,ETOH USE 

SOCAILLY)


Hx Depression: Yes


Hx Emotional Abuse: No


Hx Physical Abuse: No


Hx Substance Use: No


Other/Comment: SMOKING CESSATION 3 WEEKS AGO





- SURGICAL HISTORY


Hx Surgeries: Yes


Hx Amputation: Yes


Hx Kidney Transplant: Yes


Hx Orthopedic Surgery: Yes


Other/Comment: CABG.  L 5th toe amputation, All toes amputated l foot.  Kidney 

transplant





- ANESTHESIA


Hx Anesthesia Reactions: No


Hx Malignant Hyperthermia: No





Meds


Allergies/Adverse Reactions: 


 Allergies











Allergy/AdvReac Type Severity Reaction Status Date / Time


 


No Known Allergies Allergy   Verified 12/09/17 16:31














- Medications


Medications: 


 Current Medications





Amlodipine Besylate (Norvasc)  10 mg PO DAILY [facility]


   PRN Reason: Protocol


Aspirin (Ecotrin)  81 mg PO 0800 [facility]


   PRN Reason: Protocol


Atorvastatin Calcium (Lipitor)  40 mg PO DIN [facility]


   PRN Reason: Protocol


Collagenase (Santyl)  0 gm TOP DAILY [facility]


   PRN Reason: Protocol


Docusate Sodium (Colace)  100 mg PO DAILY [facility]


   PRN Reason: Protocol


Escitalopram Oxalate (Lexapro)  5 mg PO DAILY [facility]


   PRN Reason: Protocol


Famotidine (Pepcid)  20 mg PO HS [facility]


   PRN Reason: Protocol


Piperacillin Sod/Tazobactam Sod (Zosyn 3.375 In Ns 100ml)  100 mls @ 200 mls/hr 

IVPB Q6 [facility]


   PRN Reason: Protocol


   Stop: 12/17/17 12:01


Insulin Detemir (Levemir)  40 unit SC HS [facility]


   PRN Reason: Protocol


   Last Admin: 12/09/17 22:44 Dose:  40 unit


Insulin Human Regular (Humulin R Med)  0 units SC ACHS [facility]


   PRN Reason: Protocol


   Last Admin: 12/10/17 06:57 Dose:  5 units


Metoprolol Tartrate (Lopressor)  50 mg PO 0800,1800 [facility]


   PRN Reason: Protocol


   Last Admin: 12/09/17 17:26 Dose:  50 mg


Morphine Sulfate (Morphine)  4 mg IVP Q4H PRN; Protocol


   PRN Reason: Pain, severe (8-10)


   Last Admin: 12/10/17 01:54 Dose:  4 mg


Mycophenolate Mofetil (Cellcept Cap)  750 mg PO BID [facility]


   PRN Reason: Protocol


   Last Admin: 12/09/17 17:26 Dose:  750 mg


Ondansetron HCl (Zofran Inj)  4 mg IVP Q6H PRN; Protocol


   PRN Reason: Nausea/Vomiting


   Last Admin: 12/10/17 06:12 Dose:  4 mg


Prednisone (Prednisone Tab)  5 mg PO DAILY [facility]


   PRN Reason: Protocol


Tacrolimus (Prograf Cap)  2 mg PO DAILY [facility]


   PRN Reason: Protocol


Tacrolimus (Prograf Cap)  1.5 mg PO HS [facility]


   PRN Reason: Protocol


   Last Admin: 12/09/17 22:44 Dose:  1.5 mg











Physical Exam





- Constitutional


Appears: Non-toxic





- Head Exam


Head Exam: NORMAL INSPECTION





- ENT Exam


ENT Exam: Mucous Membranes Moist





- Neck Exam


Neck exam: Negative for: Lymphadenopathy, Meningismus





- Respiratory Exam


Respiratory Exam: Decreased Breath Sounds





- Cardiovascular Exam


Cardiovascular Exam: +S1, +S2





- GI/Abdominal Exam


GI & Abdominal Exam: Soft.  absent: Tenderness





- Extremities Exam


Additional comments: 





left foot with dressings in place





Results





- Vital Signs


Recent Vital Signs: 


 Last Vital Signs











Temp  98.1 F   12/09/17 16:41


 


Pulse  64   12/09/17 16:41


 


Resp  18   12/09/17 16:41


 


BP  111/66   12/09/17 17:26


 


Pulse Ox  99   12/09/17 16:41














Assessment & Plan





- Assessment and Plan (Free Text)


Plan: 





Assessment


left TMA stump ulceration with cellulitis; growing E. faecalis - patient has 

history of left foot skin and skin structure infection, with osteomyelitis, S/P 

transmetatarsal amputation last month, grew Enterobacter


S/P Left 5th metatarsal osteomyelitis S/P debridement and bone excision grew 

Acinetobacter and E. faecalis


CAD S/P CABG


chronic CHF


DM


COPD


S/P AICD placement


S/P kidney transplant





Plan


switch to Rocephin to Zosyn based on previous cx and E. faecalis this wound cx; 

xray of the foot does not show acute osteomyelitis and doing MRI will show 

marrow edema which may be from recent surgery as per Podiatry


will continue to monitor clinically

## 2017-12-11 RX ADMIN — INSULIN HUMAN SCH UNITS: 100 INJECTION, SOLUTION PARENTERAL at 07:00

## 2017-12-11 RX ADMIN — INSULIN HUMAN SCH UNITS: 100 INJECTION, SOLUTION PARENTERAL at 22:18

## 2017-12-11 RX ADMIN — OXYCODONE AND ACETAMINOPHEN PRN TAB: 10; 325 TABLET ORAL at 11:19

## 2017-12-11 RX ADMIN — MORPHINE SULFATE PRN MG: 4 INJECTION, SOLUTION INTRAMUSCULAR; INTRAVENOUS at 06:16

## 2017-12-11 RX ADMIN — INSULIN DETEMIR SCH UNIT: 100 INJECTION, SOLUTION SUBCUTANEOUS at 22:17

## 2017-12-11 RX ADMIN — PIPERACILLIN AND TAZOBACTAM SCH MLS/HR: 3; .375 INJECTION, POWDER, LYOPHILIZED, FOR SOLUTION INTRAVENOUS; PARENTERAL at 05:28

## 2017-12-11 RX ADMIN — COLLAGENASE SANTYL SCH APPL: 250 OINTMENT TOPICAL at 11:00

## 2017-12-11 RX ADMIN — INSULIN HUMAN SCH UNITS: 100 INJECTION, SOLUTION PARENTERAL at 17:00

## 2017-12-11 RX ADMIN — PIPERACILLIN AND TAZOBACTAM SCH MLS/HR: 3; .375 INJECTION, POWDER, LYOPHILIZED, FOR SOLUTION INTRAVENOUS; PARENTERAL at 18:08

## 2017-12-11 RX ADMIN — PIPERACILLIN AND TAZOBACTAM SCH MLS/HR: 3; .375 INJECTION, POWDER, LYOPHILIZED, FOR SOLUTION INTRAVENOUS; PARENTERAL at 11:53

## 2017-12-11 RX ADMIN — INSULIN HUMAN SCH: 100 INJECTION, SOLUTION PARENTERAL at 12:00

## 2017-12-11 RX ADMIN — PIPERACILLIN AND TAZOBACTAM SCH MLS/HR: 3; .375 INJECTION, POWDER, LYOPHILIZED, FOR SOLUTION INTRAVENOUS; PARENTERAL at 23:53

## 2017-12-11 RX ADMIN — OXYCODONE AND ACETAMINOPHEN PRN TAB: 10; 325 TABLET ORAL at 18:51

## 2017-12-11 NOTE — CP.PCM.PN
Subjective





- Date & Time of Evaluation


Date of Evaluation: 12/11/17


Time of Evaluation: 11:00





- Subjective


Subjective: 





Comfortable, less pain in the left foot, no fevers, no diarrhea, no nausea.





Objective





- Vital Signs/Intake and Output


Vital Signs (last 24 hours): 


 











Temp Pulse Resp BP Pulse Ox


 


 98.3 F   78   16   128/72   97 


 


 12/11/17 06:00  12/11/17 08:18  12/11/17 06:00  12/11/17 08:18  12/11/17 06:00











- Medications


Medications: 


 Current Medications





Amlodipine Besylate (Norvasc)  10 mg PO DAILY [facility]


   PRN Reason: Protocol


   Last Admin: 12/10/17 10:10 Dose:  10 mg


Aspirin (Ecotrin)  81 mg PO 0800 [facility]


   PRN Reason: Protocol


   Last Admin: 12/11/17 08:18 Dose:  81 mg


Atorvastatin Calcium (Lipitor)  40 mg PO DIN [facility]


   PRN Reason: Protocol


   Last Admin: 12/10/17 17:11 Dose:  40 mg


Collagenase (Santyl)  0 gm TOP DAILY [facility]


   PRN Reason: Protocol


   Last Admin: 12/10/17 10:11 Dose:  1 appl


Docusate Sodium (Colace)  100 mg PO DAILY [facility]


   PRN Reason: Protocol


   Last Admin: 12/10/17 10:09 Dose:  100 mg


Escitalopram Oxalate (Lexapro)  5 mg PO DAILY [facility]


   PRN Reason: Protocol


   Last Admin: 12/10/17 10:10 Dose:  5 mg


Famotidine (Pepcid)  20 mg PO HS [facility]


   PRN Reason: Protocol


   Last Admin: 12/10/17 22:05 Dose:  20 mg


Piperacillin Sod/Tazobactam Sod (Zosyn 3.375 In Ns 100ml)  100 mls @ 200 mls/hr 

IVPB Q6 [facility]


   PRN Reason: Protocol


   Stop: 12/17/17 12:01


   Last Admin: 12/11/17 05:28 Dose:  200 mls/hr


Insulin Detemir (Levemir)  40 unit SC HS [facility]


   PRN Reason: Protocol


   Last Admin: 12/10/17 22:02 Dose:  40 unit


Insulin Human Regular (Humulin R Med)  0 units SC ACHS [facility]


   PRN Reason: Protocol


   Last Admin: 12/11/17 07:00 Dose:  3 units


Metoprolol Tartrate (Lopressor)  50 mg PO 0800,1800 [facility]


   PRN Reason: Protocol


   Last Admin: 12/11/17 08:18 Dose:  50 mg


Mycophenolate Mofetil (Cellcept Cap)  750 mg PO BID [facility]


   PRN Reason: Protocol


   Last Admin: 12/10/17 17:11 Dose:  750 mg


Prednisone (Prednisone Tab)  5 mg PO DAILY [facility]


   PRN Reason: Protocol


   Last Admin: 12/10/17 10:10 Dose:  5 mg


Tacrolimus (Prograf Cap)  2 mg PO DAILY [facility]


   PRN Reason: Protocol


   Last Admin: 12/10/17 10:11 Dose:  2 mg


Tacrolimus (Prograf Cap)  1.5 mg PO HS [facility]


   PRN Reason: Protocol


   Last Admin: 12/10/17 22:05 Dose:  1.5 mg











- Constitutional


Appears: Non-toxic





- Head Exam


Head Exam: NORMAL INSPECTION





- ENT Exam


ENT Exam: Mucous Membranes Moist





- Neck Exam


Neck Exam: absent: Lymphadenopathy, Meningismus





- Respiratory Exam


Respiratory Exam: Decreased Breath Sounds





- Cardiovascular Exam


Cardiovascular Exam: +S1, +S2





- GI/Abdominal Exam


GI & Abdominal Exam: Soft.  absent: Tenderness





- Extremities Exam


Additional comments: 





left foot with dressings in place





Assessment and Plan





- Assessment and Plan (Free Text)


Plan: 





Assessment


left TMA stump ulceration with cellulitis; growing E. faecalis - patient has 

history of left foot skin and skin structure infection, with osteomyelitis, S/P 

transmetatarsal amputation last month, grew Enterobacter


S/P Left 5th metatarsal osteomyelitis S/P debridement and bone excision grew 

Acinetobacter and E. faecalis


CAD S/P CABG


chronic CHF


DM


COPD


S/P AICD placement


S/P kidney transplant





Plan


continue Zosyn based on previous cx and E. faecalis this wound cx; xray of the 

foot does not show acute osteomyelitis and doing MRI will show marrow edema 

which may be from recent surgery as per Podiatry - may switch to PO Augmentin 

on discharge


will continue to monitor clinically

## 2017-12-11 NOTE — PN
DATE:  12/11/2017



SUBJECTIVE:  The patient has no complaints of any chest pain.  No shortness

of breath.  No headache or dizziness.



PHYSICAL EXAMINATION:

VITAL SIGNS:  Temperature is 98.3.  Pulse is 78.  Blood pressure is 128/72.

Respirations 16.

GENERAL:  The patient is lying in bed, flat, comfortable.

HEENT:  No oral lesion.  Anicteric sclerae.  Moist mucosa.

NECK:  No JVD, adenopathy, or thyromegaly.

CARDIOVASCULAR:  S1 and S2, regular.  No murmurs, rubs, or gallops.

LUNGS:  Clear to auscultation bilaterally.  No wheeze, rales, or rhonchi.

ABDOMEN:  Bowel sounds are positive, soft, nontender and nondistended.

EXTREMITIES:  No cyanosis, clubbing or edema.



ASSESSMENT:

1.  Left foot ulcer.

2.  Diabetes type 2.

3.  Renal transplant.

4.  Peripheral arterial disease.

5.  Hypertension.

6.  Status post left transmetatarsal amputation surgery x1 month.

7.  Coronary artery disease, status post coronary artery bypass graft.

8.  Status post automatic implantable cardioverter-defibrillator.



PLAN:  The patient is currently comfortable.  He is getting physical

therapy.  _____.  We will seek Podiatry and ID about the length of

antibiotics.  The patient is currently receiving CellCept 750 mg b.i.d,

prednisone 5 mg daily, and tacrolimus 2 mg/1.5 mg.  The patient is on

Levemir for his diabetes.  He is going to continue with aspirin.  He is on

Colace for his constipation.  He is on insulin sliding scale.  We will

discontinue the patient's morphine.  The patient currently is on Zosyn, but

may need to be changed on to Rocephin.  He is on a heart-healthy diet.





__________________________________________

Andrea Calderon MD



DD:  12/11/2017 8:31:54

DT:  12/11/2017 9:17:47

Job # [id number]

## 2017-12-11 NOTE — CP.PCM.PN
Subjective





- Date & Time of Evaluation


Date of Evaluation: 12/11/17


Time of Evaluation: 06:40





- Subjective


Subjective: 





Podiatry Progress Note - Dr. Goel





57 year old PMHx CAD S/P CABG, CHF, DM, COPD, S/P AICD placement, S/P kidney 

transplant, hx of osteomyelitis seen and evaluated at bedside in TCU with 

attending, Dr. Goel, for left foot cellulitis and TMA stump wound, and right 

foot non-healing plantar heel wound. Patient is hemodynamically stable and NAD. 

Denies any acute events overnight. Admits to occasional pain in bilateral heels

, well-controlled. No new pedal complaints today. Denies N/V/F/D/C/SOB/calf 

pain.





Objective





- Vital Signs/Intake and Output


Vital Signs (last 24 hours): 


 











Temp Pulse Resp BP Pulse Ox


 


 98.3 F   78   16   128/72   97 


 


 12/11/17 06:00  12/11/17 06:00  12/11/17 06:00  12/11/17 06:00  12/11/17 06:00











- Medications


Medications: 


 Current Medications





Amlodipine Besylate (Norvasc)  10 mg PO DAILY [facility]


   PRN Reason: Protocol


   Last Admin: 12/10/17 10:10 Dose:  10 mg


Aspirin (Ecotrin)  81 mg PO 0800 [facility]


   PRN Reason: Protocol


   Last Admin: 12/10/17 08:20 Dose:  81 mg


Atorvastatin Calcium (Lipitor)  40 mg PO DIN [facility]


   PRN Reason: Protocol


   Last Admin: 12/10/17 17:11 Dose:  40 mg


Collagenase (Santyl)  0 gm TOP DAILY [facility]


   PRN Reason: Protocol


   Last Admin: 12/10/17 10:11 Dose:  1 appl


Docusate Sodium (Colace)  100 mg PO DAILY [facility]


   PRN Reason: Protocol


   Last Admin: 12/10/17 10:09 Dose:  100 mg


Escitalopram Oxalate (Lexapro)  5 mg PO DAILY [facility]


   PRN Reason: Protocol


   Last Admin: 12/10/17 10:10 Dose:  5 mg


Famotidine (Pepcid)  20 mg PO HS [facility]


   PRN Reason: Protocol


   Last Admin: 12/10/17 22:05 Dose:  20 mg


Piperacillin Sod/Tazobactam Sod (Zosyn 3.375 In Ns 100ml)  100 mls @ 200 mls/hr 

IVPB Q6 [facility]


   PRN Reason: Protocol


   Stop: 12/17/17 12:01


   Last Admin: 12/11/17 05:28 Dose:  200 mls/hr


Insulin Detemir (Levemir)  40 unit SC HS [facility]


   PRN Reason: Protocol


   Last Admin: 12/10/17 22:02 Dose:  40 unit


Insulin Human Regular (Humulin R Med)  0 units SC ACHS [facility]


   PRN Reason: Protocol


   Last Admin: 12/11/17 07:00 Dose:  3 units


Metoprolol Tartrate (Lopressor)  50 mg PO 0800,1800 [facility]


   PRN Reason: Protocol


   Last Admin: 12/10/17 17:11 Dose:  50 mg


Morphine Sulfate (Morphine)  4 mg IVP Q4H PRN; Protocol


   PRN Reason: Pain, severe (8-10)


   Last Admin: 12/11/17 06:16 Dose:  4 mg


Mycophenolate Mofetil (Cellcept Cap)  750 mg PO BID [facility]


   PRN Reason: Protocol


   Last Admin: 12/10/17 17:11 Dose:  750 mg


Ondansetron HCl (Zofran Inj)  4 mg IVP Q6H PRN; Protocol


   PRN Reason: Nausea/Vomiting


   Last Admin: 12/10/17 13:08 Dose:  4 mg


Prednisone (Prednisone Tab)  5 mg PO DAILY [facility]


   PRN Reason: Protocol


   Last Admin: 12/10/17 10:10 Dose:  5 mg


Tacrolimus (Prograf Cap)  2 mg PO DAILY [facility]


   PRN Reason: Protocol


   Last Admin: 12/10/17 10:11 Dose:  2 mg


Tacrolimus (Prograf Cap)  1.5 mg PO HS [facility]


   PRN Reason: Protocol


   Last Admin: 12/10/17 22:05 Dose:  1.5 mg











- Constitutional


Appears: Well, Non-toxic, No Acute Distress





- Extremities Exam


Additional comments: 





Vasc: DP pulses palpable 2/4 b/l. PT pulse 2/4 right foot and nonpalpable left 

foot likely secondary to edema. Temperature gradient warm to warm bilateral. 

Nonpitting edema noted to left foot. CFT < 3 sec to all digits on R foot.





Derm: Right foot plantar heel exhibits stable 0.5 diameter circular ulceration 

with mixed fibrogranular wound base and hyperkeratotic rim. No active drainage, 

no malodor, no fluctuance - no clinical signs of infection noted at this time. 

Left foot transmetatarsal amputation surgical site with sutures intact. Distal 

stump ulceration noted to plantarlateral aspect with mixed fibronecrotic base. 

Area of necrosis noted to distalmedial TMA stump. Erythema noted globally to 

left foot extending proximally to lower 1/3 of leg, resolving. Fibronecrotic 

ulceration on medial foot where drain from TMA was placed noted. No tunneling, 

undermining, tracking, probe to bone, malodor noted. Minimal serous drainage 

noted at this time. 





Neuro: Protective sensation grossly diminished.





Ortho: Tenderness to palpation left foot TMA stump, plantar medial arch, and 

heel. Pain on palpation right plantar heel ulceration.





- Neurological Exam


Neurological Exam: Alert, Awake





- Psychiatric Exam


Psychiatric exam: Normal Affect, Normal Mood





Assessment and Plan





- Assessment and Plan (Free Text)


Assessment: 





57 year old male patient with 1) left foot distal TMA ulceration 2) plantar 

ulceration right heel, stable


Plan: 





Patient seen and evaluated at bedside with attending, Dr. Goel


afebrile


Left foot XR reviewed: Plantarlateral ulceration. No periosteal reaction or 

cortical disruption to suggest interval OM. s/p TMA


Continue local wound care - QD Santyl + DSD left foor TMA ulceration and right 

plantar heel ulceration


Right foot XR ordered for nonhealing ulcer plantar heel on 12/9/17- still no 

result. Called radiology who state order was mistakenly updated as "taken" 

however never fulfilled.


Bilateral LE arterial duplex reiewed - Right SFA and tibial disease. Left AIXA/

PVR WNL


Continue multipodus boots


Left foot wound culture reveals growth of enterococcus faecalis


Continue abx per ID - Zosyn


-Per ID, may switch to PO augmentin on discharge


Pain mgmt per medicine


Podiatry will continue to follow patient while in house


Patient is to follow up with Dr. Goel in office within 1 week of discharge

## 2017-12-12 PROCEDURE — F08Z4ZZ HOME MANAGEMENT TREATMENT: ICD-10-PCS | Performed by: INTERNAL MEDICINE

## 2017-12-12 RX ADMIN — OXYCODONE AND ACETAMINOPHEN PRN TAB: 10; 325 TABLET ORAL at 11:22

## 2017-12-12 RX ADMIN — PIPERACILLIN AND TAZOBACTAM SCH MLS/HR: 3; .375 INJECTION, POWDER, LYOPHILIZED, FOR SOLUTION INTRAVENOUS; PARENTERAL at 05:02

## 2017-12-12 RX ADMIN — OXYCODONE AND ACETAMINOPHEN PRN TAB: 10; 325 TABLET ORAL at 00:35

## 2017-12-12 RX ADMIN — INSULIN HUMAN SCH UNITS: 100 INJECTION, SOLUTION PARENTERAL at 17:53

## 2017-12-12 RX ADMIN — INSULIN HUMAN SCH UNITS: 100 INJECTION, SOLUTION PARENTERAL at 06:56

## 2017-12-12 RX ADMIN — INSULIN DETEMIR SCH UNIT: 100 INJECTION, SOLUTION SUBCUTANEOUS at 22:33

## 2017-12-12 RX ADMIN — COLLAGENASE SANTYL SCH APPL: 250 OINTMENT TOPICAL at 11:19

## 2017-12-12 RX ADMIN — PIPERACILLIN AND TAZOBACTAM SCH MLS/HR: 3; .375 INJECTION, POWDER, LYOPHILIZED, FOR SOLUTION INTRAVENOUS; PARENTERAL at 23:05

## 2017-12-12 RX ADMIN — INSULIN HUMAN SCH: 100 INJECTION, SOLUTION PARENTERAL at 11:49

## 2017-12-12 RX ADMIN — PIPERACILLIN AND TAZOBACTAM SCH MLS/HR: 3; .375 INJECTION, POWDER, LYOPHILIZED, FOR SOLUTION INTRAVENOUS; PARENTERAL at 17:59

## 2017-12-12 RX ADMIN — OXYCODONE AND ACETAMINOPHEN PRN TAB: 10; 325 TABLET ORAL at 15:49

## 2017-12-12 RX ADMIN — INSULIN HUMAN SCH UNITS: 100 INJECTION, SOLUTION PARENTERAL at 22:32

## 2017-12-12 RX ADMIN — PIPERACILLIN AND TAZOBACTAM SCH MLS/HR: 3; .375 INJECTION, POWDER, LYOPHILIZED, FOR SOLUTION INTRAVENOUS; PARENTERAL at 15:39

## 2017-12-12 NOTE — CP.PCM.PN
Subjective





- Date & Time of Evaluation


Date of Evaluation: 12/12/17


Time of Evaluation: 11:38





- Subjective


Subjective: 





Podiatry Progress Note - Dr. Goel





57 year old PMHx CAD S/P CABG, CHF, DM, COPD, S/P AICD placement, S/P kidney 

transplant, hx of osteomyelitis seen and evaluated at bedside in TCU for left 

foot cellulitis and TMA stump wound, and right foot non-healing plantar heel 

wound. Patient is hemodynamically stable and NAD. Denies any acute events 

overnight. Dressing to left foot fell off while sleeping. Patient states has 

been using the rigid offloading boots without any issues. Reports decreased 

pain to LLE. Denies N/V/F/D/C/SOB/calf pain.





Objective





- Vital Signs/Intake and Output


Vital Signs (last 24 hours): 


 











Temp Pulse Resp BP Pulse Ox


 


 97.8 F   71   16   133/77   98 


 


 12/11/17 10:00  12/11/17 10:00  12/11/17 10:00  12/12/17 11:18  12/11/17 10:00











- Medications


Medications: 


 Current Medications





Amlodipine Besylate (Norvasc)  10 mg PO DAILY [facility]


   PRN Reason: Protocol


   Last Admin: 12/12/17 11:18 Dose:  10 mg


Aspirin (Ecotrin)  81 mg PO 0800 [facility]


   PRN Reason: Protocol


   Last Admin: 12/12/17 08:28 Dose:  81 mg


Atorvastatin Calcium (Lipitor)  40 mg PO DIN [facility]


   PRN Reason: Protocol


   Last Admin: 12/11/17 18:07 Dose:  40 mg


Collagenase (Santyl)  0 gm TOP DAILY [facility]


   PRN Reason: Protocol


   Last Admin: 12/12/17 11:19 Dose:  1 appl


Docusate Sodium (Colace)  100 mg PO DAILY [facility]


   PRN Reason: Protocol


   Last Admin: 12/12/17 11:17 Dose:  100 mg


Escitalopram Oxalate (Lexapro)  5 mg PO DAILY [facility]


   PRN Reason: Protocol


   Last Admin: 12/12/17 11:18 Dose:  5 mg


Famotidine (Pepcid)  20 mg PO HS [facility]


   PRN Reason: Protocol


   Last Admin: 12/11/17 21:16 Dose:  20 mg


Piperacillin Sod/Tazobactam Sod (Zosyn 3.375 In Ns 100ml)  100 mls @ 200 mls/hr 

IVPB Q6 [facility]


   PRN Reason: Protocol


   Stop: 12/17/17 12:01


   Last Admin: 12/12/17 05:02 Dose:  200 mls/hr


Insulin Detemir (Levemir)  40 unit SC HS [facility]


   PRN Reason: Protocol


   Last Admin: 12/11/17 22:17 Dose:  40 unit


Insulin Human Regular (Humulin R Med)  0 units SC ACHS [facility]


   PRN Reason: Protocol


   Last Admin: 12/12/17 06:56 Dose:  5 units


Metoprolol Tartrate (Lopressor)  50 mg PO 0800,1800 [facility]


   PRN Reason: Protocol


   Last Admin: 12/12/17 08:28 Dose:  50 mg


Mycophenolate Mofetil (Cellcept Cap)  750 mg PO BID [facility]


   PRN Reason: Protocol


   Last Admin: 12/12/17 11:17 Dose:  750 mg


Ondansetron HCl (Zofran Inj)  4 mg IVP Q6H PRN


   PRN Reason: Nausea/Vomiting


Oxycodone/Acetaminophen (Percocet 10/325 Mg Tab)  1 tab PO Q4H PRN; Protocol


   PRN Reason: Pain, severe (8-10)


   Last Admin: 12/12/17 11:22 Dose:  1 tab


Prednisone (Prednisone Tab)  5 mg PO DAILY [facility]


   PRN Reason: Protocol


   Last Admin: 12/12/17 11:18 Dose:  5 mg


Tacrolimus (Prograf Cap)  2 mg PO DAILY [facility]


   PRN Reason: Protocol


   Last Admin: 12/12/17 11:18 Dose:  2 mg


Tacrolimus (Prograf Cap)  1.5 mg PO HS [facility]


   PRN Reason: Protocol


   Last Admin: 12/11/17 21:17 Dose:  1.5 mg











- Constitutional


Appears: Well, Non-toxic, No Acute Distress





- Extremities Exam


Additional comments: 





Vasc: DP pulses palpable 2/4 b/l. PT pulse 2/4 right foot and nonpalpable left 

foot likely secondary to edema. Temperature gradient warm to warm bilateral. 

Nonpitting edema noted to left foot, decreasing. CFT < 3 sec to all digits on R 

foot.





Derm: Right foot plantar heel exhibits stable 0.5 diameter circular ulceration 

with mixed fibrogranular wound base and hyperkeratotic rim. No active drainage, 

no malodor, no fluctuance - no clinical signs of infection noted at this time. 

Left foot transmetatarsal amputation surgical site with sutures intact. Distal 

stump ulceration noted to plantarlateral aspect with mixed fibronecrotic base. 

Area of necrosis noted to distalmedial TMA stump, increasing. Erythema noted 

globally to left foot extending proximally to lower 1/3 of leg, resolving. 

Fibronecrotic ulceration on medial foot where drain from TMA was placed noted. 

No tunneling, undermining, tracking, probe to bone, malodor noted. Minimal 

serous drainage noted at this time. 





Neuro: Protective sensation grossly diminished.





Ortho: Mild tenderness to palpation left foot TMA stump, plantar medial arch, 

and heel. Pain on palpation right plantar heel ulceration.





- Neurological Exam


Neurological Exam: Alert, Awake, Oriented x3





- Psychiatric Exam


Psychiatric exam: Normal Affect, Normal Mood





Assessment and Plan





- Assessment and Plan (Free Text)


Assessment: 





57 year old male patient with 1) left foot distal TMA ulceration + cellulitis, 

resolving 2) plantar ulceration right heel, stable


Plan: 





Patient seen and evaluated at bedside 


Discussed with attending, Dr. Goel


afebrile


Left foot XR reviewed: Plantarlateral ulceration. No periosteal reaction or 

cortical disruption to suggest interval OM. s/p TMA


Continue local wound care - QD Santyl + DSD left foor TMA ulceration and right 

plantar heel ulceration


Right foot XR reviewed: Ulceration plantar heel. No cortical disruption; no 

evidence of osteomyelitis; severely calcified vessels.


Bilateral LE arterial duplex reiewed - Right SFA and tibial disease. Left AIXA/

PVR WNL


Continue multipodus boots


Left foot wound culture reveals growth of enterococcus faecalis


Continue abx per ID - Zosyn


-Per ID, may switch to PO augmentin on discharge


Pain mgmt per medicine


Continue PT


Podiatry will continue to follow patient while in house


Patient is to follow up with Dr. Goel in office within 1 week of discharge

## 2017-12-12 NOTE — PN
DATE:  12/12/2017



SUBJECTIVE:  The patient is in bed in no acute distress, nontoxic.



PHYSICAL EXAMINATION:

VITAL SIGNS:  Temperature is 98, blood pressure is 130/70, respiratory rate

18, heart rate of 80.

HEENT:  Examination of HEENT is unremarkable.

NECK:  Supple.

LUNGS:  Have decreased breath sounds.

HEART:  Normal S1, S2.

ABDOMEN:  Soft, nontender.



The patient had an x-ray of the foot yesterday.  No evidence of

osteomyelitis.



ASSESSMENT/PLAN:  A 57-year-old with a left transmetatarsal amputation

stump ulceration with cellulitis growing Enterococcus faecalis with a

history of left foot skin and skin soft tissue infection with

osteomyelitis, status post transmetatarsal amputation last month, grew

Enterobacter, and the patient with coronary artery disease, chronic

congestive heart failure, currently on Zosyn.  The x-ray of the foot does

not show acute osteomyelitis, may be able to switch to p.o. Augmentin upon

discharge.  The patient states that he is feeling much better and less

pain.  Upon the review of the orders reveals the patient to be on

prednisone and Zosyn.





__________________________________________

Duc Bee MD





DD:  12/12/2017 13:35:19

DT:  12/12/2017 13:38:17

Job # [id number]

## 2017-12-13 LAB
BASOPHILS # BLD AUTO: 0.01 K/MM3 (ref 0–2)
BASOPHILS NFR BLD: 0.1 % (ref 0–3)
BUN SERPL-MCNC: 18 MG/DL (ref 7–21)
CALCIUM SERPL-MCNC: 9.6 MG/DL (ref 8.4–10.5)
CHLORIDE SERPL-SCNC: 95 MMOL/L (ref 98–107)
CO2 SERPL-SCNC: 30 MMOL/L (ref 21–33)
EOSINOPHIL # BLD: 0.2 10*3/UL (ref 0–0.7)
EOSINOPHIL NFR BLD: 1.6 % (ref 1.5–5)
ERYTHROCYTE [DISTWIDTH] IN BLOOD BY AUTOMATED COUNT: 13.9 % (ref 11.5–14.5)
GLUCOSE SERPL-MCNC: 302 MG/DL (ref 70–110)
GRANULOCYTES # BLD: 9.01 10*3/UL (ref 1.4–6.5)
GRANULOCYTES NFR BLD: 81.2 % (ref 50–68)
HCT VFR BLD CALC: 34.6 % (ref 42–52)
LYMPHOCYTES # BLD: 1 10*3/UL (ref 1.2–3.4)
LYMPHOCYTES NFR BLD AUTO: 8.6 % (ref 22–35)
MCH RBC QN AUTO: 30.2 PG (ref 25–35)
MCHC RBC AUTO-ENTMCNC: 33.2 G/DL (ref 31–37)
MCV RBC AUTO: 90.8 FL (ref 80–105)
MONOCYTES # BLD AUTO: 0.9 10*3/UL (ref 0.1–0.6)
MONOCYTES NFR BLD: 8.5 % (ref 1–6)
PLATELET # BLD: 319 10^3/UL (ref 120–450)
PMV BLD AUTO: 9 FL (ref 7–11)
POTASSIUM SERPL-SCNC: 5.4 MMOL/L (ref 3.6–5)
SODIUM SERPL-SCNC: 132 MMOL/L (ref 132–148)
WBC # BLD AUTO: 11.1 10^3/UL (ref 4.5–11)

## 2017-12-13 RX ADMIN — INSULIN HUMAN SCH UNITS: 100 INJECTION, SOLUTION PARENTERAL at 17:29

## 2017-12-13 RX ADMIN — INSULIN DETEMIR SCH UNIT: 100 INJECTION, SOLUTION SUBCUTANEOUS at 22:09

## 2017-12-13 RX ADMIN — INSULIN HUMAN SCH UNITS: 100 INJECTION, SOLUTION PARENTERAL at 06:42

## 2017-12-13 RX ADMIN — PIPERACILLIN AND TAZOBACTAM SCH MLS/HR: 3; .375 INJECTION, POWDER, LYOPHILIZED, FOR SOLUTION INTRAVENOUS; PARENTERAL at 17:28

## 2017-12-13 RX ADMIN — COLLAGENASE SANTYL SCH: 250 OINTMENT TOPICAL at 09:25

## 2017-12-13 RX ADMIN — OXYCODONE AND ACETAMINOPHEN PRN TAB: 10; 325 TABLET ORAL at 09:22

## 2017-12-13 RX ADMIN — PIPERACILLIN AND TAZOBACTAM SCH MLS/HR: 3; .375 INJECTION, POWDER, LYOPHILIZED, FOR SOLUTION INTRAVENOUS; PARENTERAL at 05:38

## 2017-12-13 RX ADMIN — INSULIN HUMAN SCH UNITS: 100 INJECTION, SOLUTION PARENTERAL at 12:00

## 2017-12-13 RX ADMIN — INSULIN HUMAN SCH: 100 INJECTION, SOLUTION PARENTERAL at 22:08

## 2017-12-13 RX ADMIN — OXYCODONE AND ACETAMINOPHEN PRN TAB: 10; 325 TABLET ORAL at 14:49

## 2017-12-13 RX ADMIN — OXYCODONE AND ACETAMINOPHEN PRN TAB: 10; 325 TABLET ORAL at 23:17

## 2017-12-13 RX ADMIN — PIPERACILLIN AND TAZOBACTAM SCH MLS/HR: 3; .375 INJECTION, POWDER, LYOPHILIZED, FOR SOLUTION INTRAVENOUS; PARENTERAL at 13:00

## 2017-12-13 NOTE — CP.PCM.PN
Subjective





- Date & Time of Evaluation


Date of Evaluation: 12/13/17


Time of Evaluation: 15:42





- Subjective


Subjective: 





Podiatry Progress Note - Dr. Goel





57 year old PMHx CAD S/P CABG, CHF, DM, COPD, S/P AICD placement, S/P kidney 

transplant, hx of osteomyelitis seen and evaluated at bedside in TCU for left 

foot cellulitis and TMA stump wound, and right foot non-healing plantar heel 

wound. Patient is hemodynamically stable and NAD. Patient states he noticed 

increased drainage to his left foot. Reports continued pain to both heels, well-

controlled.  Denies N/V/F/D/C/SOB/calf pain. No other pedal complaints at this 

time.





Objective





- Vital Signs/Intake and Output


Vital Signs (last 24 hours): 


 











Temp Pulse Resp BP Pulse Ox


 


 98.3 F   75   18   134/70   99 


 


 12/13/17 10:00  12/13/17 10:00  12/13/17 10:00  12/13/17 10:00  12/13/17 10:00











- Medications


Medications: 


 Current Medications





Amlodipine Besylate (Norvasc)  10 mg PO DAILY [facility]


   PRN Reason: Protocol


   Last Admin: 12/13/17 09:24 Dose:  10 mg


Aspirin (Ecotrin)  81 mg PO 0800 [facility]


   PRN Reason: Protocol


   Last Admin: 12/13/17 08:22 Dose:  81 mg


Atorvastatin Calcium (Lipitor)  40 mg PO DIN [facility]


   PRN Reason: Protocol


   Last Admin: 12/12/17 17:54 Dose:  40 mg


Collagenase (Santyl)  0 gm TOP DAILY [facility]


   PRN Reason: Protocol


   Last Admin: 12/13/17 09:25 Dose:  Not Given


Docusate Sodium (Colace)  100 mg PO DAILY [facility]


   PRN Reason: Protocol


   Last Admin: 12/13/17 09:24 Dose:  100 mg


Escitalopram Oxalate (Lexapro)  5 mg PO DAILY [facility]


   PRN Reason: Protocol


   Last Admin: 12/13/17 09:23 Dose:  5 mg


Famotidine (Pepcid)  20 mg PO HS [facility]


   PRN Reason: Protocol


   Last Admin: 12/12/17 22:34 Dose:  20 mg


Piperacillin Sod/Tazobactam Sod (Zosyn 3.375 In Ns 100ml)  100 mls @ 200 mls/hr 

IVPB Q6 [facility]


   PRN Reason: Protocol


   Stop: 12/17/17 12:01


   Last Admin: 12/13/17 13:00 Dose:  200 mls/hr


Insulin Detemir (Levemir)  40 unit SC HS [facility]


   PRN Reason: Protocol


   Last Admin: 12/12/17 22:33 Dose:  40 unit


Insulin Human Regular (Humulin R Med)  0 units SC ACHS [facility]


   PRN Reason: Protocol


   Last Admin: 12/13/17 12:00 Dose:  3 units


Metoprolol Tartrate (Lopressor)  50 mg PO 0800,1800 [facility]


   PRN Reason: Protocol


   Last Admin: 12/13/17 08:23 Dose:  50 mg


Mycophenolate Mofetil (Cellcept Cap)  750 mg PO BID [facility]


   PRN Reason: Protocol


   Last Admin: 12/13/17 09:23 Dose:  750 mg


Ondansetron HCl (Zofran Inj)  4 mg IVP Q6H PRN


   PRN Reason: Nausea/Vomiting


   Last Admin: 12/13/17 07:49 Dose:  4 mg


Oxycodone/Acetaminophen (Percocet 10/325 Mg Tab)  1 tab PO Q4H PRN; Protocol


   PRN Reason: Pain, severe (8-10)


   Last Admin: 12/13/17 14:49 Dose:  1 tab


Prednisone (Prednisone Tab)  5 mg PO DAILY [facility]


   PRN Reason: Protocol


   Last Admin: 12/13/17 09:24 Dose:  5 mg


Tacrolimus (Prograf Cap)  2 mg PO DAILY [facility]


   PRN Reason: Protocol


   Last Admin: 12/13/17 09:24 Dose:  2 mg


Tacrolimus (Prograf Cap)  1.5 mg PO HS [facility]


   PRN Reason: Protocol


   Last Admin: 12/12/17 22:34 Dose:  1.5 mg











- Constitutional


Appears: Well, Non-toxic, No Acute Distress





- Extremities Exam


Additional comments: 





Dressing to right foot appears clean/dry/intact with no strikethrough noted.


Dressing to left foot is intact with moderate strikethrough noted.





Vasc: DP pulses palpable 2/4 b/l. PT pulse 2/4 right foot and nonpalpable left 

foot likely secondary to edema. Temperature gradient warm to warm bilateral. 

Nonpitting edema noted to left foot. CFT < 3 sec to all digits on R foot.





Derm: Right foot plantar heel exhibits stable 0.5 diameter circular ulceration 

with mixed fibrogranular wound base and hyperkeratotic rim. No active drainage, 

no malodor, no fluctuance - no clinical signs of infection noted at this time. 

Left foot transmetatarsal amputation surgical site with sutures intact. Distal 

stump ulceration noted to plantarlateral aspect with mixed fibronecrotic base. 

Area of necrosis noted to distalmedial TMA stump. Erythema noted globally to 

left foot extending proximally to lower 1/3 of leg, resolving proximally 

however localized erythema to distalmedial TMA stump remains. Fibronecrotic 

ulceration on medial foot where drain from TMA was placed noted, with moderate 

serosanguinous drainage noted. No tunneling, undermining, tracking, probe to 

bone, malodor noted.





Neuro: Protective sensation grossly diminished.





Ortho: Tenderness to palpation left foot TMA stump, plantar medial arch, and 

heel. Pain on palpation right plantar heel ulceration.





- Neurological Exam


Neurological Exam: Alert, Awake, Oriented x3





- Psychiatric Exam


Psychiatric exam: Normal Affect, Normal Mood





Assessment and Plan





- Assessment and Plan (Free Text)


Assessment: 





57 year old male patient with 1) left foot distal TMA ulceration + cellulitis, 

resolving 2) plantar ulceration right heel, stable


Plan: 





Patient seen and evaluated at bedside 


Discussed with attending, Dr. Goel


afebrile; WBC 11.1


-patient noted to have increased drainage to distalmedial TMA stump


Left foot XR reviewed: Plantarlateral ulceration. No periosteal reaction or 

cortical disruption to suggest interval OM. s/p TMA


Continue local wound care - QD Santyl + DSD left foor TMA ulceration and right 

plantar heel ulceration


Right foot XR reviewed: Ulceration plantar heel. No cortical disruption; no 

evidence of osteomyelitis; severely calcified vessels.


Bilateral LE arterial duplex reiewed - Right SFA and tibial disease. Left AIXA/

PVR WNL


Continue multipodus boots


Left foot wound culture reveals growth of enterococcus faecalis


Continue abx per ID - Zosyn


-Per ID, may switch to PO augmentin on discharge


Pain mgmt per medicine


Continue PT


Podiatry will continue to follow patient while in house


Patient is to follow up with Dr. Goel in office within 1 week of discharge

## 2017-12-13 NOTE — PN
DATE:  12/13/2017



SUBJECTIVE:  The patient seen earlier today in room 313.  The patient is

doing much better.  No fevers and no chills.  No nausea, no vomiting.



PHYSICAL EXAMINATION:

VITAL SIGNS:  Temperature is 98, blood pressure is 112/70, respiratory rate

of 16.

HEENT:  Unremarkable.

NECK:  Supple.

LUNGS:  Have decreased breath sounds.

HEART:  Normal S1, S2.

ABDOMEN:  Soft and nontender.



LABORATORY DATA:  Reviewed and review of orders reveals the patient to have

prednisone, tacrolimus and Zosyn.



ASSESSMENT/PLAN:  This is a 57-year-old with a left transmetatarsal

amputation stump ulceration and cellulitis growing Enterococcus faecalis

with a history of left foot skin and skin soft tissue infection with

osteomyelitis, status post transmetatarsal amputation last month with

Enterobacter and coronary artery disease, chronic congestive heart failure,

on IV Zosyn.  Repeat x-ray does not show osteomyelitis, may be able to

switch to p.o. Augmentin upon discharge.  We will continue Zosyn at this

time.  The patient with a renal transplant, diabetes mellitus, peripheral

artery disease, hypertension, coronary artery disease, coronary artery

bypass graft and a cardioverter-defibrillator







__________________________________________

Duc Bee MD







DD:  12/13/2017 7:28:20

DT:  12/13/2017 7:31:47

Job # [id number]

## 2017-12-13 NOTE — PN
DATE:  12/13/2017



SUBJECTIVE:  The patient has no complaints of any chest pain.  No shortness

of breath and no headaches or dizziness.



PHYSICAL EXAMINATION:

VITAL SIGNS:  Temperature is 98.3, pulse of 80, blood pressure is 120/69,

and respirations are 18.

GENERAL:  The patient is lying in bed, flat, comfortable.

HEENT:  No oral lesion.  Anicteric sclerae.  Moist mucosa.

NECK:  No JVD, adenopathy, or thyromegaly.

CARDIOVASCULAR:  S1 and S2, regular.  No murmurs, rubs, or gallops.

LUNGS:  Clear to auscultation bilaterally.  No wheeze, rales, or rhonchi.

ABDOMEN:  Bowel sounds are positive, soft, nontender and nondistended.

EXTREMITIES:  No cyanosis, clubbing or edema.



ASSESSMENT:

1.  Left foot ulcer.

2.  Diabetes type 2.

3.  Renal transplant.

4.  Peripheral arterial disease.

5.  Hypertension.

6.  Status post left transmetatarsal amputation surgery times 1 month.

7.  Coronary artery disease status post coronary artery bypass graft.

8.  Status post automated implantable cardioverter-defibrillator.



PLAN:  The patient is currently comfortable.  He is being followed by

[facility], I appreciate their input.  I did read their note.  The

patient does not show acute osteomyelitis.  We will change the patient over

to p.o. antibiotics with Augmentin.  The patient is on his

immunosuppressive medications with Cellcept 750 mg b.i.d., Prednisone 5 mg

daily, Prograf 2 mg in the morning and 1.5 mg in the evening.  The patient

was given a dose of methylnaltrexone for constipation yesterday.  He is on

Zofran for nausea.  The patient is on amlodipine for his hypertension.







__________________________________________

Andrea Calderon MD





DD:  12/13/2017 6:19:45

DT:  12/13/2017 7:31:03

Job # [id number]

## 2017-12-14 VITALS — RESPIRATION RATE: 18 BRPM | OXYGEN SATURATION: 98 %

## 2017-12-14 VITALS — TEMPERATURE: 97.8 F | HEART RATE: 70 BPM

## 2017-12-14 VITALS — DIASTOLIC BLOOD PRESSURE: 73 MMHG | SYSTOLIC BLOOD PRESSURE: 118 MMHG

## 2017-12-14 LAB
BASOPHILS # BLD AUTO: 0.02 K/MM3 (ref 0–2)
BASOPHILS NFR BLD: 0.2 % (ref 0–3)
BUN SERPL-MCNC: 18 MG/DL (ref 7–21)
CALCIUM SERPL-MCNC: 9.2 MG/DL (ref 8.4–10.5)
CHLORIDE SERPL-SCNC: 97 MMOL/L (ref 98–107)
CO2 SERPL-SCNC: 27 MMOL/L (ref 21–33)
EOSINOPHIL # BLD: 0.3 10*3/UL (ref 0–0.7)
EOSINOPHIL NFR BLD: 3.1 % (ref 1.5–5)
ERYTHROCYTE [DISTWIDTH] IN BLOOD BY AUTOMATED COUNT: 14.1 % (ref 11.5–14.5)
GLUCOSE SERPL-MCNC: 301 MG/DL (ref 70–110)
GRANULOCYTES # BLD: 7.46 10*3/UL (ref 1.4–6.5)
GRANULOCYTES NFR BLD: 69.5 % (ref 50–68)
HCT VFR BLD CALC: 35.3 % (ref 42–52)
LYMPHOCYTES # BLD: 1.8 10*3/UL (ref 1.2–3.4)
LYMPHOCYTES NFR BLD AUTO: 16.8 % (ref 22–35)
MCH RBC QN AUTO: 29.1 PG (ref 25–35)
MCHC RBC AUTO-ENTMCNC: 32.3 G/DL (ref 31–37)
MCV RBC AUTO: 90.1 FL (ref 80–105)
MONOCYTES # BLD AUTO: 1.1 10*3/UL (ref 0.1–0.6)
MONOCYTES NFR BLD: 10.4 % (ref 1–6)
PLATELET # BLD: 327 10^3/UL (ref 120–450)
PMV BLD AUTO: 9.1 FL (ref 7–11)
POTASSIUM SERPL-SCNC: 4.5 MMOL/L (ref 3.6–5)
SODIUM SERPL-SCNC: 136 MMOL/L (ref 132–148)
WBC # BLD AUTO: 10.7 10^3/UL (ref 4.5–11)

## 2017-12-14 RX ADMIN — OXYCODONE AND ACETAMINOPHEN PRN TAB: 10; 325 TABLET ORAL at 08:50

## 2017-12-14 RX ADMIN — COLLAGENASE SANTYL SCH APPL: 250 OINTMENT TOPICAL at 10:21

## 2017-12-14 RX ADMIN — PIPERACILLIN AND TAZOBACTAM SCH MLS/HR: 3; .375 INJECTION, POWDER, LYOPHILIZED, FOR SOLUTION INTRAVENOUS; PARENTERAL at 01:27

## 2017-12-14 RX ADMIN — OXYCODONE AND ACETAMINOPHEN PRN TAB: 10; 325 TABLET ORAL at 05:01

## 2017-12-14 RX ADMIN — INSULIN HUMAN SCH UNITS: 100 INJECTION, SOLUTION PARENTERAL at 06:43

## 2017-12-14 RX ADMIN — PIPERACILLIN AND TAZOBACTAM SCH MLS/HR: 3; .375 INJECTION, POWDER, LYOPHILIZED, FOR SOLUTION INTRAVENOUS; PARENTERAL at 05:00

## 2017-12-14 NOTE — PN
DATE:  12/14/2017



SUBJECTIVE:  The patient was seen early this morning.  No fevers.  No

chills.  No nausea.  The patient was seen in room 313.



PHYSICAL EXAMINATION:

VITAL SIGNS:  Temperature is 98, blood pressure is 102/70, respiratory rate

of 16.

HEENT:  Unremarkable.

NECK:  Supple.

HEART:  Normal S1 and S2.

LUNGS:  Decreased breath sounds.

ABDOMEN:  Soft and nontender.



LABORATORY DATA:  Reveals the patient had white count of 10,000, hemoglobin

of 11, platelets of 327.  Chemistry reveals the BUN of 18, creatinine of

1.3.  Microbiology is noted and.



ASSESSMENT AND PLAN:  This is a 57-year-old male with the left

transmetatarsal amputation stump ulceration and cellulitis growing

Enterococcus faecalis with a history of left foot skin and skin soft tissue

infection was given treatment with Zosyn, may be switched to p.o.

Augmentin.  The patient with _____ possible discharge.  Case discussed with

Dr. Calderon while the patient to followup as an outpatient closely.





__________________________________________

Duc Bee MD





DD:  12/14/2017 16:28:11

DT:  12/14/2017 18:38:01

Job # [id number]

## 2017-12-14 NOTE — CP.PCM.PN
Subjective





- Date & Time of Evaluation


Date of Evaluation: 12/14/17


Time of Evaluation: 07:24





- Subjective


Subjective: 





Podiatry Progress Note - Dr. Goel





57 year old seen and evaluated at bedside with attending, Dr. Goel, in TCU for 

left foot cellulitis and TMA stump wound, and right foot non-healing plantar 

heel wound. Patient is hemodynamically stable and NAD. Denies any acute events 

overnight. Reports mild tenderness to both heels, well controlled. Patient 

aware he is to follow up with Dr. Goel within 1 week of discharge. Denies N/V/F

/D/C/SOB/calf pain. 





Objective





- Vital Signs/Intake and Output


Vital Signs (last 24 hours): 


 











Temp Pulse Resp BP Pulse Ox


 


 98.1 F   73   18   125/75   98 


 


 12/14/17 06:00  12/14/17 06:00  12/14/17 06:00  12/14/17 06:00  12/14/17 06:00











- Medications


Medications: 


 Current Medications





Amlodipine Besylate (Norvasc)  10 mg PO DAILY [facility]


   PRN Reason: Protocol


   Last Admin: 12/13/17 09:24 Dose:  10 mg


Aspirin (Ecotrin)  81 mg PO 0800 [facility]


   PRN Reason: Protocol


   Last Admin: 12/13/17 08:22 Dose:  81 mg


Atorvastatin Calcium (Lipitor)  40 mg PO DIN [facility]


   PRN Reason: Protocol


   Last Admin: 12/13/17 17:29 Dose:  40 mg


Collagenase (Santyl)  0 gm TOP DAILY [facility]


   PRN Reason: Protocol


   Last Admin: 12/13/17 09:25 Dose:  Not Given


Docusate Sodium (Colace)  100 mg PO DAILY [facility]


   PRN Reason: Protocol


   Last Admin: 12/13/17 09:24 Dose:  100 mg


Escitalopram Oxalate (Lexapro)  5 mg PO DAILY [facility]


   PRN Reason: Protocol


   Last Admin: 12/13/17 09:23 Dose:  5 mg


Famotidine (Pepcid)  20 mg PO HS [facility]


   PRN Reason: Protocol


   Last Admin: 12/13/17 22:10 Dose:  20 mg


Piperacillin Sod/Tazobactam Sod (Zosyn 3.375 In Ns 100ml)  100 mls @ 200 mls/hr 

IVPB Q6 [facility]


   PRN Reason: Protocol


   Stop: 12/17/17 12:01


   Last Admin: 12/14/17 05:00 Dose:  200 mls/hr


Insulin Detemir (Levemir)  40 unit SC HS [facility]


   PRN Reason: Protocol


   Last Admin: 12/13/17 22:09 Dose:  40 unit


Insulin Human Regular (Humulin R Med)  0 units SC ACHS [facility]


   PRN Reason: Protocol


   Last Admin: 12/14/17 06:43 Dose:  5 units


Metoprolol Tartrate (Lopressor)  50 mg PO 0800,1800 [facility]


   PRN Reason: Protocol


   Last Admin: 12/13/17 17:28 Dose:  50 mg


Mycophenolate Mofetil (Cellcept Cap)  750 mg PO BID [facility]


   PRN Reason: Protocol


   Last Admin: 12/13/17 17:28 Dose:  750 mg


Ondansetron HCl (Zofran Inj)  4 mg IVP Q6H PRN


   PRN Reason: Nausea/Vomiting


   Last Admin: 12/13/17 07:49 Dose:  4 mg


Oxycodone/Acetaminophen (Percocet 10/325 Mg Tab)  1 tab PO Q4H PRN; Protocol


   PRN Reason: Pain, severe (8-10)


   Last Admin: 12/14/17 05:01 Dose:  1 tab


Prednisone (Prednisone Tab)  5 mg PO DAILY [facility]


   PRN Reason: Protocol


   Last Admin: 12/13/17 09:24 Dose:  5 mg


Tacrolimus (Prograf Cap)  2 mg PO DAILY [facility]


   PRN Reason: Protocol


   Last Admin: 12/13/17 09:24 Dose:  2 mg


Tacrolimus (Prograf Cap)  1.5 mg PO HS [facility]


   PRN Reason: Protocol


   Last Admin: 12/13/17 22:10 Dose:  1.5 mg











- Labs


Labs: 


 





 12/14/17 05:40 





 12/14/17 05:40 











- Constitutional


Appears: Well, Non-toxic, No Acute Distress





- Extremities Exam


Additional comments: 





Dressings to both feet appear clean/dry/intact with no strikethrough noted.





Vasc: DP pulses palpable 2/4 b/l. PT pulse 2/4 right foot and nonpalpable left 

foot likely secondary to edema. Temperature gradient warm to warm bilateral. 

Nonpitting edema noted to left foot. CFT < 3 sec to all digits on R foot.





Derm: Right foot plantar heel exhibits stable 0.5 diameter circular ulceration 

with mixed fibrogranular wound base and hyperkeratotic rim. No active drainage, 

no malodor, no fluctuance - no clinical signs of infection noted at this time. 

Left foot transmetatarsal amputation surgical site with sutures intact. Distal 

stump ulceration noted to plantarlateral aspect with mixed fibronecrotic base. 

Area of necrosis noted to distalmedial TMA stump. Erythema noted globally to 

left foot extending proximally to lower 1/3 of leg, resolving proximally 

however localized erythema to distalmedial TMA stump remains. Fibronecrotic 

ulceration on medial foot where drain from TMA was placed noted, with minimal 

serosanguinous drainage noted. No tunneling, undermining, tracking, probe to 

bone, malodor noted.





Neuro: Protective sensation grossly diminished.





Ortho: Tenderness to palpation left foot TMA stump, plantar medial arch, and 

heel. Pain on palpation right plantar heel ulceration.





- Neurological Exam


Neurological Exam: Alert, Awake, Oriented x3





- Psychiatric Exam


Psychiatric exam: Normal Affect, Normal Mood





Assessment and Plan





- Assessment and Plan (Free Text)


Assessment: 





57 year old male patient with 1) left foot distal TMA ulceration + cellulitis, 

resolving 2) plantar ulceration right heel, stable


Plan: 





Patient seen and evaluated at bedside 


Discussed with attending, Dr. Goel


afebrile; WBC decreasing @ 10.7


Left foot XR reviewed: Plantarlateral ulceration. No periosteal reaction or 

cortical disruption to suggest interval OM. s/p TMA


Continue local wound care - QD Santyl + DSD left foor TMA ulceration and right 

plantar heel ulceration


Right foot XR reviewed: Ulceration plantar heel. No cortical disruption; no 

evidence of osteomyelitis; severely calcified vessels.


Bilateral LE arterial duplex reiewed - Right SFA and tibial disease. Left AIXA/

PVR WNL


Continue multipodus boots


Left foot wound culture reveals growth of enterococcus faecalis


Continue abx per ID - Zosyn


-Per ID, may switch to PO augmentin on discharge


Pain mgmt per medicine


Continue PT


Podiatry will continue to follow patient while in house


Patient is to follow up with Dr. Goel in office within 1 week of discharge
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
## 2017-12-19 ENCOUNTER — HOSPITAL ENCOUNTER (INPATIENT)
Dept: HOSPITAL 42 - ED | Age: 57
LOS: 9 days | Discharge: HOME | DRG: 856 | End: 2017-12-28
Attending: INTERNAL MEDICINE | Admitting: INTERNAL MEDICINE
Payer: MEDICARE

## 2017-12-19 VITALS — BODY MASS INDEX: 23.6 KG/M2

## 2017-12-19 DIAGNOSIS — E11.22: ICD-10-CM

## 2017-12-19 DIAGNOSIS — E78.5: ICD-10-CM

## 2017-12-19 DIAGNOSIS — L84: ICD-10-CM

## 2017-12-19 DIAGNOSIS — D63.8: ICD-10-CM

## 2017-12-19 DIAGNOSIS — Z79.4: ICD-10-CM

## 2017-12-19 DIAGNOSIS — Z95.1: ICD-10-CM

## 2017-12-19 DIAGNOSIS — E11.65: ICD-10-CM

## 2017-12-19 DIAGNOSIS — K59.00: ICD-10-CM

## 2017-12-19 DIAGNOSIS — N18.6: ICD-10-CM

## 2017-12-19 DIAGNOSIS — Z79.82: ICD-10-CM

## 2017-12-19 DIAGNOSIS — G89.29: ICD-10-CM

## 2017-12-19 DIAGNOSIS — T81.4XXA: Primary | ICD-10-CM

## 2017-12-19 DIAGNOSIS — E11.52: ICD-10-CM

## 2017-12-19 DIAGNOSIS — L03.116: ICD-10-CM

## 2017-12-19 DIAGNOSIS — E11.69: ICD-10-CM

## 2017-12-19 DIAGNOSIS — J44.9: ICD-10-CM

## 2017-12-19 DIAGNOSIS — L76.82: ICD-10-CM

## 2017-12-19 DIAGNOSIS — Z87.891: ICD-10-CM

## 2017-12-19 DIAGNOSIS — L02.611: ICD-10-CM

## 2017-12-19 DIAGNOSIS — L97.419: ICD-10-CM

## 2017-12-19 DIAGNOSIS — E11.621: ICD-10-CM

## 2017-12-19 DIAGNOSIS — I50.9: ICD-10-CM

## 2017-12-19 DIAGNOSIS — E83.42: ICD-10-CM

## 2017-12-19 DIAGNOSIS — M86.172: ICD-10-CM

## 2017-12-19 DIAGNOSIS — L97.519: ICD-10-CM

## 2017-12-19 DIAGNOSIS — Z94.0: ICD-10-CM

## 2017-12-19 DIAGNOSIS — I25.2: ICD-10-CM

## 2017-12-19 DIAGNOSIS — I13.2: ICD-10-CM

## 2017-12-19 DIAGNOSIS — I25.10: ICD-10-CM

## 2017-12-19 DIAGNOSIS — Z95.810: ICD-10-CM

## 2017-12-19 DIAGNOSIS — Z79.899: ICD-10-CM

## 2017-12-19 DIAGNOSIS — L97.529: ICD-10-CM

## 2017-12-19 DIAGNOSIS — L02.612: ICD-10-CM

## 2017-12-19 DIAGNOSIS — Z95.0: ICD-10-CM

## 2017-12-19 LAB
ALBUMIN/GLOB SERPL: 1.2 {RATIO} (ref 1.1–1.8)
ALP SERPL-CCNC: 99 U/L (ref 38–126)
ALT SERPL-CCNC: 22 U/L (ref 7–56)
APTT BLD: 29.5 SECONDS (ref 25.1–36.5)
AST SERPL-CCNC: 24 U/L (ref 17–59)
BASOPHILS # BLD AUTO: 0.01 K/MM3 (ref 0–2)
BASOPHILS NFR BLD: 0.1 % (ref 0–3)
BILIRUB SERPL-MCNC: 0.4 MG/DL (ref 0.2–1.3)
BUN SERPL-MCNC: 16 MG/DL (ref 7–21)
CALCIUM SERPL-MCNC: 9.5 MG/DL (ref 8.4–10.5)
CHLORIDE SERPL-SCNC: 96 MMOL/L (ref 98–107)
CO2 SERPL-SCNC: 27 MMOL/L (ref 21–33)
EOSINOPHIL # BLD: 0.2 10*3/UL (ref 0–0.7)
EOSINOPHIL NFR BLD: 1.8 % (ref 1.5–5)
ERYTHROCYTE [DISTWIDTH] IN BLOOD BY AUTOMATED COUNT: 14.3 % (ref 11.5–14.5)
GLOBULIN SER-MCNC: 3.4 GM/DL
GLUCOSE SERPL-MCNC: 371 MG/DL (ref 70–110)
GRANULOCYTES # BLD: 7.63 10*3/UL (ref 1.4–6.5)
GRANULOCYTES NFR BLD: 78.8 % (ref 50–68)
HCT VFR BLD CALC: 33.1 % (ref 42–52)
INR PPP: 1.15 (ref 0.93–1.08)
LYMPHOCYTES # BLD: 1 10*3/UL (ref 1.2–3.4)
LYMPHOCYTES NFR BLD AUTO: 10.2 % (ref 22–35)
MCH RBC QN AUTO: 29.9 PG (ref 25–35)
MCHC RBC AUTO-ENTMCNC: 33.8 G/DL (ref 31–37)
MCV RBC AUTO: 88.3 FL (ref 80–105)
MONOCYTES # BLD AUTO: 0.9 10*3/UL (ref 0.1–0.6)
MONOCYTES NFR BLD: 9.1 % (ref 1–6)
PLATELET # BLD: 264 10^3/UL (ref 120–450)
PMV BLD AUTO: 9.6 FL (ref 7–11)
POTASSIUM SERPL-SCNC: 4.2 MMOL/L (ref 3.6–5)
PROT SERPL-MCNC: 7.4 G/DL (ref 5.8–8.3)
SODIUM SERPL-SCNC: 136 MMOL/L (ref 132–148)
WBC # BLD AUTO: 9.7 10^3/UL (ref 4.5–11)

## 2017-12-19 RX ADMIN — MORPHINE SULFATE PRN MG: 2 INJECTION, SOLUTION INTRAMUSCULAR; INTRAVENOUS at 16:38

## 2017-12-19 RX ADMIN — INSULIN DETEMIR SCH UNIT: 100 INJECTION, SOLUTION SUBCUTANEOUS at 22:01

## 2017-12-19 RX ADMIN — MORPHINE SULFATE PRN MG: 2 INJECTION, SOLUTION INTRAMUSCULAR; INTRAVENOUS at 22:13

## 2017-12-19 NOTE — PCM.SURG1
Surgeon's Initial Post Op Note





- Surgeon's Notes


Surgeon: KAILA SchultzM


Assistant: Dr. Luis E Pardo DPM PGY-1


Type of Anesthesia: IV Sedation, Local


Anesthesia Administered By: Dr. Rodríguez


Pre-Operative Diagnosis: left foot eschar gangrene with abcess


Operative Findings: see dications. total injectables: 20 cc of .5% marcaine 

plain; kerlix packing


Post-Operative Diagnosis: same


Operation Performed: irrigation and debridement of left eschar gangrene foot 

and abscess of left foot, including all nonviable tissue, bone and tendons


Specimen/Specimens Removed: bone culture and soft tissue cultured and sent to 

pathology.  wound culture taken


Estimated Blood Loss: EBL {In ML}: 10


Blood Products Given: N/A


Drains Used: No Drains


Post-Op Condition: Good


Date of Surgery/Procedure: 12/19/17


Time of Surgery/Procedure: 06:30

## 2017-12-19 NOTE — ED PDOC
Arrival/HPI





- General


Chief Complaint: Lower Extremity Problem/Injury


Time Seen by Provider: 12/19/17 10:28


Historian: Patient





- History of Present Illness


Narrative History of Present Illness (Text): 





12/19/17 11:28


56yo male with Past medical history  of Diabetes, CAD, PAD who was referred to 

Emergency department by Dr. Goel for left foot surgery this evening. Patient 

notes increasing discharge from the foot. saw Dr. Goel and was scheduled for 

foot surgery this evening. he denies fever, chills, nausea, vomiting, chest pain

, shortness of breath, any other complaint.























Past Medical History





- Provider Review


Nursing Documentation Reviewed: Yes





- Past History


Past History: No Previous





- Infectious Disease


Hx of Infectious Diseases: None





- Tetanus Immunization


Tetanus Immunization: Unknown





- Cardiac


Hx Cardiac Disorders: Yes


Hx Hypertension: Yes





- Pulmonary


Hx Chronic Obstructive Pulmonary Disease (COPD): Yes





- Neurological


Hx Neurological Disorder: No





- HEENT


Hx HEENT Disorder: No





- Renal


Hx Renal Failure: Yes





- Endocrine/Metabolic


Hx Diabetes Mellitus Type 2: Yes





- Hematological/Oncological


Hx Blood Disorders: No





- Integumentary


Hx Dermatological Disorder: Yes


Other/Comment: LEFT BIG TOE POST AMPUTATION-GANGRENE TO AREA AMPUTATED.7-24-17.

  ALL TOES LEFT FOOT AMPUTATED, DATE UNKNOWN





- Musculoskeletal/Rheumatological


Hx Falls: No





- Gastrointestinal


Hx Gastrointestinal Disorders: Yes (CONSTIPATION,INCISIONAL HERNIA)





- Genitourinary/Gynecological


Hx Genitourinary Disorders: Yes (KIDNEY TRANSPLANT)





- Psychiatric


Hx Psychophysiologic Disorder: Yes (H/O SMOKING CIGARETTES 1/2 PPD,ETOH USE 

SOCAILLY)


Hx Depression: Yes


Hx Emotional Abuse: No


Hx Physical Abuse: No


Hx Substance Use: No


Other/Comment: SMOKING CESSATION 3 WEEKS AGO





- Surgical History


Hx Amputation: Yes


Hx Kidney Transplant: Yes


Hx Orthopedic Surgery: Yes


Other/Comment: CABG.  L foot partial amputation 4 weeks ago.  Kidney transplant





- Anesthesia


Hx Anesthesia Reactions: No


Hx Malignant Hyperthermia: No





- Suicidal Assessment


Feels Threatened In Home Enviroment: No





Family/Social History





- Physician Review


Nursing Documentation Reviewed: Yes


Family/Social History: Unknown Family HX


Smoking Status: Former Smoker


Hx Alcohol Use: No


Hx Substance Use: No


Hx Substance Use Treatment: No





Allergies/Home Meds


Allergies/Adverse Reactions: 


Allergies





No Known Allergies Allergy (Verified 12/19/17 13:11)


 








Home Medications: 


 Home Meds











 Medication  Instructions  Recorded  Confirmed


 


Atorvastatin Calcium [Lipitor] 40 mg PO DAILY 10/30/14 12/19/17


 


Escitalopram [Lexapro] 5 mg PO DAILY 10/30/14 12/19/17


 


Mycophenolate Sodium [Myfortic] 360 mg PO BID 10/30/14 12/19/17














Review of Systems





- Physician Review


All systems were reviewed & negative as marked: Yes





- Review of Systems


Constitutional: Normal


Eyes: Normal


ENT: Normal


Respiratory: Normal


Cardiovascular: Normal


Gastrointestinal: Normal


Genitourinary Male: Normal


Musculoskeletal: Arthralgias (Left foot pain)


Skin: Normal


Neurological: Normal


Endocrine: Normal


Hemo/Lymphatic: Normal


Psychiatric: Normal





Physical Exam


Vital Signs Reviewed: Yes


Vital Signs











  Temp Pulse Resp BP Pulse Ox


 


 12/19/17 13:40  98 F  87  18  136/67  98


 


 12/19/17 10:56  98.3 F  102 H  16   99











Temperature: Afebrile


Blood Pressure: Normal


Pulse: Regular


Respiratory Rate: Normal


Appearance: Positive for: Well-Appearing, Non-Toxic, Comfortable


Pain Distress: None


Mental Status: Positive for: Alert and Oriented X 3





- Systems Exam


Head: Present: Atraumatic, Normocephalic


Pupils: Present: PERRL


Extroacular Muscles: Present: EOMI


Conjunctiva: Present: Normal


Mouth: Present: Moist Mucous Membranes


Neck: Present: Normal Range of Motion


Respiratory/Chest: Present: Clear to Auscultation, Good Air Exchange.  No: 

Respiratory Distress, Accessory Muscle Use


Cardiovascular: Present: Regular Rate and Rhythm, Normal S1, S2.  No: Murmurs


Abdomen: Present: Normal Bowel Sounds.  No: Tenderness, Distention, Peritoneal 

Signs


Back: Present: Normal Inspection


Upper Extremity: Present: Normal Inspection.  No: Cyanosis, Edema


Lower Extremity: Present: Normal Inspection, Other (Left foot noted with 

dressing and discharge on the plantar surface of foot).  No: Edema, CALF 

TENDERNESS, NORMAL PULSES (Decreased)


Neurological: Present: GCS=15, CN II-XII Intact, Speech Normal


Skin: Present: Warm, Dry, Normal Color.  No: Rashes


Psychiatric: Present: Alert, Oriented x 3, Normal Insight, Normal Concentration





Medical Decision Making


ED Course and Treatment: 





12/19/17 19:47


Pt in Emergency department for stated history. his pain was controlled in 

Emergency department.





Case was DW Dr. Goel who requested admission of pt to Dr. calderon's service.





Dr. Bridges DC with Dr. Calderon and he accepted pt to his service.





Pt's BS improved in Emergency department with just 1L of NS and insulin was 

stopped.











- Lab Interpretations


Lab Results: 








 12/19/17 11:45 





 12/19/17 11:45 





 Lab Results





12/19/17 12:20: Blood Type O POSITIVE, Antibody Screen Negative, BBK History 

Checked Patient has bt


12/19/17 11:48: POC Glucose (mg/dL) 364 H


12/19/17 11:45: Sodium 136, Potassium 4.2, Chloride 96 L, Carbon Dioxide 27, 

Anion Gap 16, BUN 16, Creatinine 0.9, Est GFR (African Amer) > 60, Est GFR (Non-

Af Amer) > 60, Random Glucose 371 H* D, Calcium 9.5, Total Bilirubin 0.4, AST 24

, ALT 22, Alkaline Phosphatase 99, Total Protein 7.4, Albumin 3.9, Globulin 3.4

, Albumin/Globulin Ratio 1.2


12/19/17 11:45: PT 12.7 H, INR 1.15 H, APTT 29.5


12/19/17 11:45: WBC 9.7, RBC 3.75, Hgb 11.2 L, Hct 33.1 L, MCV 88.3, MCH 29.9, 

MCHC 33.8, RDW 14.3, Plt Count 264, MPV 9.6, Gran % 78.8 H, Lymph % (Auto) 10.2 

L, Mono % (Auto) 9.1 H, Eos % (Auto) 1.8, Baso % (Auto) 0.1, Gran # 7.63 H, 

Lymph # 1.0 L, Mono # 0.9 H, Eos # 0.2, Baso # 0.01











- Medication Orders


Current Medication Orders: 








Acetaminophen (Tylenol 325mg Tab)  650 mg PO Q4H PRN


   PRN Reason: Pain, Mild (1-3)


Amlodipine Besylate (Norvasc)  10 mg PO DAILY [facility]


Aspirin (Ecotrin)  81 mg PO 0800 [facility]


Atorvastatin Calcium (Lipitor)  40 mg PO HS [facility]


Docusate Sodium (Colace)  100 mg PO DAILY [facility]


Escitalopram Oxalate (Lexapro)  5 mg PO DAILY [facility]


Famotidine (Pepcid)  20 mg PO DAILY [facility]


Lactated Ringer's (Lactated Ringer's)  1,000 mls @ 75 mls/hr IV .[id number] [facility]


   Stop: 12/19/17 21:16


Insulin Detemir (Levemir)  40 unit SC HS [facility]


Insulin Human Regular (Humulin R Med)  0 units SC ACHS [facility]


   PRN Reason: Protocol


Metoprolol Tartrate (Lopressor)  50 mg PO 0800,1800 [facility]


Morphine Sulfate (Morphine)  2 mg IVP Q4H PRN


   PRN Reason: Pain, severe (8-10)


   Last Admin: 12/19/17 16:38  Dose: 2 mg





MAR Pain Assessment


 Document     12/19/17 16:38  TAV  (Rec: 12/19/17 16:39  TAV  [id number])


     Pain Reassessment


      Is this a pain reassessment?               No


     Presence of Pain


      Presence of Pain                           Yes


     Pain Scale Used


      Pain Scale Used                            Numeric


     Location


      Left, Right or Bilateral                   Bilateral


      Pain Location Body Site                    Foot


     Description


      Description                                Constant


      Intensity of Pain at present               10


      Pain Behavior                              Moaning


                                                 Irritability


      Aggravating Factors                        Changing Position


      Alleviating Factors/Management             Medication


       Techniques                                


      Alleviating Factors                        Medication


IVP Administration


 Document     12/19/17 16:38  TAV  (Rec: 12/19/17 16:39  TAV  [id number])


     Charges for Administration


      # of IVP Administrations                   1





Mycophenolate Mofetil (Cellcept Cap)  750 mg PO BID [facility]


Oxycodone/Acetaminophen (Percocet 5/325 Mg Tab)  2 tab PO Q4H PRN


   PRN Reason: Pain, severe (8-10)


   Stop: 12/22/17 19:43


Oxycodone/Acetaminophen (Percocet 5/325 Mg Tab)  1 tab PO Q4H PRN


   PRN Reason: Pain, moderate (4-7)


   Stop: 12/22/17 16:18


Prednisone (Prednisone Tab)  5 mg PO 0800 [facility]


Tacrolimus (Prograf Cap)  1.5 mg PO HS [facility]


Tacrolimus (Prograf Cap)  2 mg PO DAILY [facility]





Discontinued Medications





Vancomycin HCl (Vancomycin 1gm)  1 gm in 250 mls @ 167 mls/hr IVPB STAT STA


   PRN Reason: Protocol


   Stop: 12/19/17 13:06


   Last Admin: 12/19/17 13:07  Dose: 167 mls/hr





eMAR Start Stop


 Document     12/19/17 13:07  LA  (Rec: 12/19/17 13:07  LA  [id number])


     Intravenous Solution


      Start Date                                 12/19/17


      Start Time                                 13:07





Piperacillin Sod/Tazobactam Sod (Zosyn 3.375 In Ns 100ml)  100 mls @ 200 mls/hr 

IVPB STAT STA


   PRN Reason: Protocol


   Stop: 12/19/17 12:07


   Last Admin: 12/19/17 12:23  Dose: 200 mls/hr





eMAR Start Stop


 Document     12/19/17 12:23  LA  (Rec: 12/19/17 12:23  [place]  [facility] [id number]


     Intravenous Solution


      Start Date                                 12/19/17


      Start Time                                 12:23





Sodium Chloride (Sodium Chloride 0.9%)  1,000 mls @ 999 mls/hr IV .Q1H1M STA


   Stop: 12/19/17 13:10


   Last Admin: 12/19/17 12:26  Dose: 999 mls/hr





eMAR Start Stop


 Document     12/19/17 12:26  LA  (Rec: 12/19/17 12:36  [place]  [facility] [id number]


     Intravenous Solution


      Start Date                                 12/19/17


      Start Time                                 12:36





Insulin Human Regular (Humulin R Med)  7 units IV ONCE STA


   PRN Reason: Protocol


   Stop: 12/19/17 13:35


   Last Admin: 12/19/17 13:39  Dose:  





MAR Blood Glucose


 Document     12/19/17 13:39  SRE  (Rec: 12/19/17 13:40  SRE  [id number])


     Blood Glucose


      Finger Stick Blood Glucose ()        249





Morphine Sulfate (Morphine)  2 mg IVP STAT STA


   Stop: 12/19/17 12:35


   Last Admin: 12/19/17 12:45  Dose: 2 mg





MAR Pain Assessment


 Document     12/19/17 12:45  LA  (Rec: 12/19/17 12:48  [place]  [facility] [id number]


     Pain Reassessment


      Is this a pain reassessment?               Yes


     Presence of Pain


      Presence of Pain                           Yes


     Pain Scale Used


      Pain Scale Used                            Numeric


     Location


      Left, Right or Bilateral                   Left


      Pain Location Body Site                    Foot


     Description


      Description                                Throbbing


IVP Administration


 Document     12/19/17 12:45  LA  (Rec: 12/19/17 12:48  [place]  [facility] [id number]


     Charges for Administration


      # of IVP Administrations                   1





Oxycodone/Acetaminophen (Percocet 5/325 Mg Tab)  1 tab PO Q4H PRN


   PRN Reason: Pain, severe (8-10)


   Stop: 12/22/17 16:18


Pneumococcal Polyvalent Vaccine (Pneumovax 23 Vaccine)  0.5 ml IM .ONCE ONE


   Stop: 12/19/17 16:32











Disposition/Present on Arrival





- Present on Arrival


Any Indicators Present on Arrival: No


History of DVT/PE: No


History of Uncontrolled Diabetes: No


Urinary Catheter: No


History of Decub. Ulcer: No


History Surgical Site Infection Following: None





- Disposition


Have Diagnosis and Disposition been Completed?: Yes


Diagnosis: 


 Foot infection, Gangrene, Hyperglycemia





Disposition: HOSPITALIZED


Disposition Time: 12:00


Patient Problems: 


 Current Active Problems











Problem Status Onset


 


Foot infection Acute  


 


Gangrene Acute  











Condition: FAIR

## 2017-12-20 RX ADMIN — INSULIN DETEMIR SCH UNIT: 100 INJECTION, SOLUTION SUBCUTANEOUS at 21:46

## 2017-12-20 RX ADMIN — INSULIN HUMAN SCH: 100 INJECTION, SOLUTION PARENTERAL at 17:23

## 2017-12-20 RX ADMIN — MORPHINE SULFATE PRN MG: 2 INJECTION, SOLUTION INTRAMUSCULAR; INTRAVENOUS at 21:47

## 2017-12-20 RX ADMIN — MORPHINE SULFATE PRN MG: 2 INJECTION, SOLUTION INTRAMUSCULAR; INTRAVENOUS at 13:44

## 2017-12-20 RX ADMIN — MORPHINE SULFATE PRN MG: 2 INJECTION, SOLUTION INTRAMUSCULAR; INTRAVENOUS at 04:39

## 2017-12-20 RX ADMIN — INSULIN HUMAN SCH UNITS: 100 INJECTION, SOLUTION PARENTERAL at 08:50

## 2017-12-20 RX ADMIN — MORPHINE SULFATE PRN MG: 2 INJECTION, SOLUTION INTRAMUSCULAR; INTRAVENOUS at 08:58

## 2017-12-20 RX ADMIN — MORPHINE SULFATE PRN MG: 2 INJECTION, SOLUTION INTRAMUSCULAR; INTRAVENOUS at 17:50

## 2017-12-20 RX ADMIN — INSULIN HUMAN SCH UNITS: 100 INJECTION, SOLUTION PARENTERAL at 11:55

## 2017-12-20 RX ADMIN — INSULIN HUMAN SCH: 100 INJECTION, SOLUTION PARENTERAL at 03:18

## 2017-12-20 RX ADMIN — INSULIN HUMAN SCH: 100 INJECTION, SOLUTION PARENTERAL at 21:46

## 2017-12-20 NOTE — CP.PCM.PN
Subjective





- Date & Time of Evaluation


Date of Evaluation: 12/20/17


Time of Evaluation: 15:31





- Subjective


Subjective: 


Podiatry Progress Note for Dr. Goel





57 year old male seen at bedside for 1 day s/p left irrigation and debridement 

of left eschar gangrene foot and abscess of left foot. Patient is seen resting 

comfortably in bed, in NAD, and AA0x3. Patient denies of any acute overnight 

events. Patient reports intermittent pain that comes and goes- well controlled 

by medication. Denies nausea, fever, shortness of breath, vomiting, chills, or 

chest pain. Dressing is seen clean, dry, and intact to the LLE and surgical 

shoe in place for RLE. No new pedal complaints today.





Objective





- Vital Signs/Intake and Output


Vital Signs (last 24 hours): 


 











Temp Pulse Resp BP Pulse Ox


 


 98.4 F   79   18   139/74   96 


 


 12/20/17 08:00  12/20/17 11:29  12/20/17 08:00  12/20/17 11:29  12/20/17 08:00








Intake and Output: 


 











 12/20/17 12/20/17





 06:59 18:59


 


Intake Total 360 975


 


Output Total  800


 


Balance 360 175














- Medications


Medications: 


 Current Medications





Acetaminophen (Tylenol 325mg Tab)  650 mg PO Q4H PRN


   PRN Reason: Pain, Mild (1-3)


Amlodipine Besylate (Norvasc)  10 mg PO DAILY [facility]


   Last Admin: 12/20/17 11:29 Dose:  10 mg


Aspirin (Ecotrin)  81 mg PO 0800 [facility]


   Last Admin: 12/20/17 08:48 Dose:  81 mg


Atorvastatin Calcium (Lipitor)  40 mg PO HS [facility]


   Last Admin: 12/19/17 22:00 Dose:  40 mg


Docusate Sodium (Colace)  100 mg PO DAILY [facility]


   Last Admin: 12/20/17 11:14 Dose:  100 mg


Escitalopram Oxalate (Lexapro)  5 mg PO DAILY [facility]


   Last Admin: 12/20/17 11:14 Dose:  5 mg


Famotidine (Pepcid)  20 mg PO DAILY [facility]


   Last Admin: 12/20/17 11:29 Dose:  20 mg


Insulin Detemir (Levemir)  40 unit SC HS [facility]


   Last Admin: 12/19/17 22:01 Dose:  40 unit


Insulin Human Regular (Humulin R Med)  [facility]


   PRN Reason: Protocol


   Last Admin: 12/20/17 11:55 Dose:  3 units


Metoprolol Tartrate (Lopressor)  50 mg PO 0800,1800 [facility]


   Last Admin: 12/20/17 11:29 Dose:  50 mg


Morphine Sulfate (Morphine)  2 mg IVP Q4H PRN


   PRN Reason: Pain, severe (8-10)


   Last Admin: 12/20/17 13:44 Dose:  2 mg


Mycophenolate Mofetil (Cellcept Cap)  750 mg PO BID [facility]


   Last Admin: 12/20/17 11:28 Dose:  750 mg


Oxycodone/Acetaminophen (Percocet 5/325 Mg Tab)  2 tab PO Q4H PRN


   PRN Reason: Pain, severe (8-10)


   Stop: 12/22/17 19:43


Oxycodone/Acetaminophen (Percocet 5/325 Mg Tab)  1 tab PO Q4H PRN


   PRN Reason: Pain, moderate (4-7)


   Stop: 12/22/17 16:18


   Last Admin: 12/20/17 11:16 Dose:  1 tab


Prednisone (Prednisone Tab)  5 mg PO 0800 [facility]


   Last Admin: 12/20/17 08:48 Dose:  5 mg


Tacrolimus (Prograf Cap)  1.5 mg PO HS [facility]


   Last Admin: 12/19/17 22:00 Dose:  1.5 mg


Tacrolimus (Prograf Cap)  2 mg PO DAILY [facility]


   Last Admin: 12/20/17 11:28 Dose:  2 mg











- Labs


Labs: 


 











PT  12.7 SECONDS (9.4-12.5)  H  12/19/17  11:45    


 


INR  1.15  (0.93-1.08)  H  12/19/17  11:45    


 


APTT  29.5 Seconds (25.1-36.5)   12/19/17  11:45    














- Constitutional


Appears: Well, Non-toxic, No Acute Distress





- Extremities Exam


Additional comments: 





Dressing to left lower extremity is clean, dry, and intact


Temperature gradient WNL





- Neurological Exam


Neurological Exam: Alert, Awake, Oriented x3





- Psychiatric Exam


Psychiatric exam: Normal Affect, Normal Mood





Assessment and Plan





- Assessment and Plan (Free Text)


Assessment: 





57 year old male seen for 1 day s/p left irrigation and debridement of left 

eschar gangrene foot and abscess of left foot. 


Plan: 





Patient examined and evaluated


Labs, charts, vitals reviewed (afebrile, absent leukocytosis WBC=9.7)


Discussed plan in detail with attending Dr. Goel


Dressing is kept on clean, dry, intact- Dressing from OR is unchanged


Will change dressing tomorrow 


F/U pathology reports from OR


Will continue to follow while in house

## 2017-12-20 NOTE — RAD
PROCEDURE:  Left Foot Radiographs.



HISTORY:

PACU  



COMPARISON:

12/06/2017



FINDINGS:



BONES:

Status post forefoot amputation. Postoperative findings in the 

surgical bed are unchanged. 



JOINTS:

No acute findings. 



SOFT TISSUES:

NormalThe degree of soft tissue swelling and ulceration has improved. 

No evidence of air within the soft tissues. . 



OTHER FINDINGS:

None.



IMPRESSION:

Decrease in soft tissue swelling. No acute osseous findings.

## 2017-12-21 LAB
ALBUMIN/GLOB SERPL: 1 {RATIO} (ref 1.1–1.8)
ALP SERPL-CCNC: 70 U/L (ref 38–126)
ALT SERPL-CCNC: 18 U/L (ref 7–56)
AST SERPL-CCNC: 18 U/L (ref 17–59)
BILIRUB SERPL-MCNC: 0.4 MG/DL (ref 0.2–1.3)
BUN SERPL-MCNC: 11 MG/DL (ref 7–21)
CALCIUM SERPL-MCNC: 9 MG/DL (ref 8.4–10.5)
CHLORIDE SERPL-SCNC: 101 MMOL/L (ref 98–107)
CO2 SERPL-SCNC: 28 MMOL/L (ref 21–33)
ERYTHROCYTE [DISTWIDTH] IN BLOOD BY AUTOMATED COUNT: 14.5 % (ref 11.5–14.5)
GLOBULIN SER-MCNC: 3.3 GM/DL
GLUCOSE SERPL-MCNC: 135 MG/DL (ref 70–110)
HCT VFR BLD CALC: 35.1 % (ref 42–52)
MCH RBC QN AUTO: 28.9 PG (ref 25–35)
MCHC RBC AUTO-ENTMCNC: 31.9 G/DL (ref 31–37)
MCV RBC AUTO: 90.7 FL (ref 80–105)
PLATELET # BLD: 214 10^3/UL (ref 120–450)
PMV BLD AUTO: 9.6 FL (ref 7–11)
POTASSIUM SERPL-SCNC: 3.8 MMOL/L (ref 3.6–5)
PROT SERPL-MCNC: 6.7 G/DL (ref 5.8–8.3)
SODIUM SERPL-SCNC: 138 MMOL/L (ref 132–148)
WBC # BLD AUTO: 9.3 10^3/UL (ref 4.5–11)

## 2017-12-21 PROCEDURE — 0JBR0ZZ EXCISION OF LEFT FOOT SUBCUTANEOUS TISSUE AND FASCIA, OPEN APPROACH: ICD-10-PCS | Performed by: PODIATRIST

## 2017-12-21 PROCEDURE — 0QBP0ZZ EXCISION OF LEFT METATARSAL, OPEN APPROACH: ICD-10-PCS | Performed by: PODIATRIST

## 2017-12-21 RX ADMIN — INSULIN HUMAN SCH: 100 INJECTION, SOLUTION PARENTERAL at 07:32

## 2017-12-21 RX ADMIN — PIPERACILLIN AND TAZOBACTAM SCH MLS/HR: 3; .375 INJECTION, POWDER, LYOPHILIZED, FOR SOLUTION INTRAVENOUS; PARENTERAL at 23:44

## 2017-12-21 RX ADMIN — INSULIN HUMAN SCH: 100 INJECTION, SOLUTION PARENTERAL at 21:43

## 2017-12-21 RX ADMIN — MORPHINE SULFATE PRN MG: 2 INJECTION, SOLUTION INTRAMUSCULAR; INTRAVENOUS at 16:54

## 2017-12-21 RX ADMIN — PIPERACILLIN AND TAZOBACTAM SCH MLS/HR: 3; .375 INJECTION, POWDER, LYOPHILIZED, FOR SOLUTION INTRAVENOUS; PARENTERAL at 17:44

## 2017-12-21 RX ADMIN — MORPHINE SULFATE PRN MG: 2 INJECTION, SOLUTION INTRAMUSCULAR; INTRAVENOUS at 21:34

## 2017-12-21 RX ADMIN — MORPHINE SULFATE PRN MG: 2 INJECTION, SOLUTION INTRAMUSCULAR; INTRAVENOUS at 06:09

## 2017-12-21 RX ADMIN — INSULIN DETEMIR SCH UNIT: 100 INJECTION, SOLUTION SUBCUTANEOUS at 21:37

## 2017-12-21 RX ADMIN — INSULIN HUMAN SCH UNITS: 100 INJECTION, SOLUTION PARENTERAL at 16:54

## 2017-12-21 RX ADMIN — MORPHINE SULFATE PRN MG: 2 INJECTION, SOLUTION INTRAMUSCULAR; INTRAVENOUS at 11:25

## 2017-12-21 RX ADMIN — MORPHINE SULFATE PRN MG: 2 INJECTION, SOLUTION INTRAMUSCULAR; INTRAVENOUS at 01:27

## 2017-12-21 RX ADMIN — INSULIN HUMAN SCH UNITS: 100 INJECTION, SOLUTION PARENTERAL at 11:58

## 2017-12-21 RX ADMIN — PIPERACILLIN AND TAZOBACTAM SCH MLS/HR: 3; .375 INJECTION, POWDER, LYOPHILIZED, FOR SOLUTION INTRAVENOUS; PARENTERAL at 11:24

## 2017-12-21 RX ADMIN — PIPERACILLIN AND TAZOBACTAM SCH MLS/HR: 3; .375 INJECTION, POWDER, LYOPHILIZED, FOR SOLUTION INTRAVENOUS; PARENTERAL at 00:40

## 2017-12-21 RX ADMIN — PIPERACILLIN AND TAZOBACTAM SCH MLS/HR: 3; .375 INJECTION, POWDER, LYOPHILIZED, FOR SOLUTION INTRAVENOUS; PARENTERAL at 06:09

## 2017-12-21 NOTE — HP
HISTORY OF PRESENT ILLNESS:  Patient is a 57-year-old, known to me from

previous admission.  Patient stated he had abscess in his right foot.  He

was seen by Dr. Goel who looked at the wound and referred him to emergency

room to have same day procedure done, and I and D done because of

increasing discharge from the wound.  So, he had I and D done last night,

and being admitted for wound care and IV antibiotic.  He denies any fever

or chills.  No history of vomiting.  No history of diarrhea.  Does complain

of nausea off and on.



PAST MEDICAL HISTORY:  Significant for:

1.  Hypertension.

2.  Insulin-dependent diabetes.

3.  Severe peripheral vascular disease.

4.  History of COPD.

5.  End-stage renal disease, status post renal transplant.

6.  History of pacemaker placement.

7.  Coronary artery disease, status post open heart surgery



ALLERGIES:  HE IS NOT ALLERGIC TO ANY MEDICATIONS.



MEDICATIONS:  At home, he is on aspirin 81 daily, citalopram 5 mg daily,

Pepcid 20 mg daily, Levemir 40 units at bedtime, Lopressor 50 mg twice a

day,Cellcept, Tacrolimus and Prograf.  He is on amlodipine.



SOCIAL HISTORY:  He denies alcohol or drug use.  He used to be heavy smoker

but quit a couple of years ago.



PHYSICAL EXAMINATION:

GENERAL:   He is awake, alert, oriented, communicative.

VITAL SIGNS:  He is afebrile, pulse 79, respirations 18, blood pressure

139/74.

LUNGS:  Bilateral good airflow.  No rhonchi or crackle.

HEART:  S1 and S2 audible.

ABDOMEN:  Soft, nontender.  No rebound.  No guarding.

NEUROLOGIC:  The patient is awake, alert, oriented, able to communicate.

EXTREMITIES:  His left foot is in the dressing after debridement.



LABORATORY DATA:  WBC 9.7, hemoglobin 11.2, hematocrit 33.1, platelet of

264.  PT 12.7, INR 1.15.  Chemistry, blood sugar is 207.



ASSESSMENT:

1.  Left foot status post transmetatarsal amputation, and wound with

eschar, status post debridement.

2.  Severe peripheral vascular disease.

3.  Hypertension.

4.  Coronary artery disease, status post open heart surgery



PLAN:  Currently patient is on his usual medications including Cellcept,

aspirin.  We will monitor his blood sugar.

 He was given Zosyn and vancomycin.  Dr. Bee to evaluate the

patient.  Local wound care is being done by Podiatry.





__________________________________________

Christine Zelaya MD





DD:  12/20/2017 16:53:41

DT:  12/20/2017 23:00:09

Job # [id number]

## 2017-12-21 NOTE — PN
DATE:



SUBJECTIVE:  The patient is a 57-year-old, seen and examined, lying in bed,

seems to be comfortable.  He had wound VAC on his left transmetatarsal

wound area.  He also has heel ulcer on the right heel; otherwise he has no

complaint.  He is eating and tolerating.  No nausea or vomiting.  No

diarrhea.



PHYSICAL EXAMINATION:

VITAL SIGNS:  He is afebrile.  Pulse 75, respiration 16, blood pressure is

141/76.

LUNGS:  Bilateral fair airflow.  No rhonchi or crackle.

HEART:  S1, S2 audible.

ABDOMEN:  Soft and nontender.  No rebound, no guarding.

NEUROLOGIC:  The patient is awake, alert, oriented, able to communicate.



LABORATORY DATA:  WBC 9.3 hemoglobin 11.2, hematocrit 35.1, platelet of

214.  Chemistry; sodium 138, potassium 3.8, chloride 101, CO2 28, BUN 11,

creatinine 0.9, and blood sugar of 247.



ASSESSMENT:

1.  Status post left foot abscess and wound VAC amputation.

2.  Right heel ulcer.

3.  Severe peripheral vascular disease.

4.  History of chronic obstructive pulmonary disease.

5.  Hypertension.

6.  Insulin-dependent diabetes mellitus.



PLAN:  The patient is currently on his usual medication including aspirin,

docusate, sodium, Levemir, Lexapro, amlodipine, Percocet, and he is getting

piperacillin.  TCU evaluation has been requested.  We will follow up the

patient in a.m.





__________________________________________

Christine Zelaya MD



DD:  12/21/2017 16:16:00

DT:  12/21/2017 18:35:26

Job # [id number]

## 2017-12-21 NOTE — CON
DATE:  12/20/2017



REASON FOR CONSULTATION:  Kidney transplant status.



HISTORY OF PRESENT ILLNESS:  A 57-year-old male admitted on 12/19/2017 with

complaints of pain and abscess of the left foot.  The patient reports that

he has been having multiple lesions on his left foot.  It started with an

ulcer on the little toe.  He had multiple _____ amputations.  Further he

had a TMA done about a month ago.  Now with

fluctuation/collection/pain/tenderness of the left foot.  Status post

incision and drainage of abscess.  He complaints of pain in his left foot. 

He denies any fevers, chills at present.  Denies any shortness of breath. 

Denies any nausea, vomiting, diarrhea.  Denies any urinary complaints.



PAST MEDICAL AND SURGICAL HISTORY:  NIDDM, hypertension, end-stage renal

disease status post living related transplant 5 years ago, COPD, peripheral

vascular disease, CAD, history of CABG, permanent pacemaker, and multiple

amputations of the left foot.



FAMILY HISTORY:  Hypertension and diabetes.



SOCIAL HISTORY:  No smoking, no alcohol use, no IV drug abuse.



ALLERGIES:  NO KNOWN DRUG ALLERGIES.



MEDICATIONS:  At home Included prednisone 5 mg, amlodipine 10 mg, Prograf 2

mg daily in a.m. and 1.5 mg q.h.s., CellCept 750 mg b.i.d., Lopressor 50 mg

b.i.d., insulin, Pepcid, Colace, Vantin, Lipitor, aspirin, and Augmentin.



REVIEW OF SYSTEMS:  All systems are reviewed, pertinent positives as

mentioned in the history of presenting illness.



PHYSICAL EXAMINATION:

GENERAL:  Middle-aged male, lying in bed.

VITAL SIGNS:  Blood pressure 139/74, heart rate 79, respiratory rate 18,

temperature 98.4.

HEENT:  Normocephalic, atraumatic.

NECK:  Supple, no JVD.

LUNGS:  Bilateral equal air entry, no rales.

CARDIAC:  S1 and S2, regular rate and rhythm, no murmur, no rub.

ABDOMEN:  Soft, nondistended, nontender, bowel sounds present.

EXTREMITIES:  No lower extremity edema, dressing of the left foot.



LABORATORY DATA:  WBC 9.7, hemoglobin 11.2, hematocrit 33, platelets 264. 

Sodium 136, potassium 4.2, chloride 96, CO2 27, BUN 16, creatinine 0.9,

glucose 371, calcium 9.5, AST 24, ALT 22, albumin 3.9.



CURRENT MEDICATIONS:  CellCept 750 mg b.i.d., Colace, Ecotrin, insulin,

Levemir, Lipitor, Lopressor 50 mg b.i.d., morphine, amlodipine, Pepcid,

Percocet, prednisone 5 mg, Prograf 1 mg q.h.s., Prograf 2 mg in a.m.,

Tylenol, vancomycin 1 g given yesterday evening.



ASSESSMENT:

1.  Wound infection of the left foot.

2.  Non-insulin-dependent diabetes mellitus.

3.  Hypertension.

4.  Living related transplant.

5.  Anemia of chronic disease.

6.  Peripheral vascular disease.

7.  Coronary artery disease, history of coronary artery bypass graft.



PLAN:

1.  Continue _____ prednisone 5 mg daily, CellCept 750 mg b.i.d., Prograf 2

mg in a.m. and 1.5 in p.m.

2.  Continue antibiotics as per ID recommendations.

3.  Dose all antibiotics for creatinine clearance of about 50 mL/minute.

4.  Monitor fingersticks and continue insulin coverage.

5.  Tight glycemic control.

6.  Avoid nephrotoxins.

7.  Daily labs.





__________________________________________

Keyona Mathews MD





DD:  12/20/2017 14:20:12

DT:  12/20/2017 14:27:00

Job # [id number]

## 2017-12-21 NOTE — PCM.SURG1
Surgeon's Initial Post Op Note





- Surgeon's Notes


Surgeon: Dr. Benjamin Goel DPM


Assistant: Dr. Luis E Pardo DPM PGY-1


Type of Anesthesia: IV Sedation, Local


Anesthesia Administered By: Dr. Placido BARRON


Pre-Operative Diagnosis: Left diabetic foot ulceration


Operative Findings: see dictations. injectables: none; wound vac application to 

the left foot


Post-Operative Diagnosis: same


Operation Performed: partial 1st metatarsal ostectomy, irrigation and 

debridement of left foot ulceration with application of wound vac


Specimen/Specimens Removed: bone


Estimated Blood Loss: EBL {In ML}: 3


Blood Products Given: N/A


Drains Used: Wound Vac


Date of Surgery/Procedure: 12/21/17


Time of Surgery/Procedure: 08:30

## 2017-12-21 NOTE — PN
DATE:  12/21/2017



SUBJECTIVE:  The patient is in bed, in no acute distress.



PHYSICAL EXAMINATION:

VITAL SIGNS:  Temperature is 97, blood pressure is 140/70, respiratory rate

of 16, heart rate of 64.

HEENT:  Unremarkable.

NECK:  Supple.

LUNGS:  Have decreased breath sounds.

HEART:  Normal S1, S2.

ABDOMEN:  Soft and nontender.



LABORATORY EXAMINATION:  Reveals a white count of 9.3 and hemoglobin of 11.

Chemistries reveals a BUN of 11, creatinine of 0.9.  Microbiology reveals

the bone cultures are pending, the foot cultures are pending.



ASSESSMENT AND PLAN:  This is a 57-year-old male with past medical history

significant for peripheral vascular disease and chronic obstructive lung

disease, congestive heart failure, coronary artery disease, and myocardial

infarction, admitted with a left foot gangrene and cellulitis, surgical

intervention and debridement and awaiting for cultures.  Currently on

Zosyn.  The patient's pathology acute on chronic inflammation with

gangrenous necrosis bone showing acute osteomyelitis.  He will need

prolonged antibiotics.  The choice of antibiotics will be based on the

culture of the bone.





__________________________________________

Duc Bee MD





DD:  12/21/2017 17:45:10

DT:  12/21/2017 17:47:40

Job # [id number]

## 2017-12-21 NOTE — CON
DATE:  12/20/2017



The patient was seen early this morning in room 577, bed 2.



CHIEF COMPLAINT:  Foot infection times 1 to 2 days duration.



HISTORY OF PRESENT ILLNESS:  This is a 57-year-old with peripheral vascular

disease, chronic obstructive lung disease, congestive heart failure,

coronary artery disease, myocardial infarction, the patient had a left foot

infection and also had a transmetatarsal amputation, who is recently

discharged from [facility].  The patient also had a renal

transplant in 2011 and left fifth toe amputation, the patient also has an

AICD, NO KNOWN ALLERGIES, admitted with foot infection.



REVIEW OF SYSTEMS:  Reveals the patient has had no diarrhea or constipation

or bright red blood per rectum.  No melena.  No dysuria or frequency.



PAST MEDICAL HISTORY:  Peripheral vascular disease, chronic obstructive

lung disease, congestive heart failure, coronary artery disease and

myocardial infarction.



PAST SURGICAL HISTORY:  Significant for AICD, left foot transmetatarsal

amputation, renal transplant in 2011 and a left fifth toe amputation.



ALLERGIES:  THE PATIENT HAS NO KNOWN ALLERGIES.



MEDICATIONS:  At home include Prograf, CellCept,  Ecotrin.  The patient is

on insulin and also on prednisone.



PHYSICAL EXAMINATION:

VITAL SIGNS:  The patient was seen earlier this morning with a temperature

of 98; heart rate of 79, it goes up to 102; respiratory rate of 18, it was

18 on admission with blood pressure of 139/70.

HEENT:  Unremarkable.

NECK:  Supple.

LUNGS:  Have decreased breath sounds.

HEART:  Normal S1 and S2.

ABDOMEN:  Soft and nontender.

EXTREMITIES:  Examination of foot is described as per Surgery.



LABORATORY DATA:  Reveals white count of 9.7 and hemoglobin of 11. 

Coagulation is noted.  BUN is 16, creatinine of 0.9, glucose is noted, and

x-ray of the foot is noted.



ASSESSMENT AND PLAN:  This is a 57-year-old male with peripheral vascular

disease, chronic obstructive pulmonary disease, congestive heart failure,

coronary artery disease, myocardial infarction, admitted with a left foot

gangrene and cellulitis, had surgical intervention and debridement, must

rule out underlying osteomyelitis.



OPERATIVE NOTE:  _____ debridement of the left eschar, now postprocedure

day #1, it was done yesterday, incision and drainage of abscess.  We will

check on the OR cultures and check on the pathology.  The patient was given

Zosyn one dose and vancomycin.   We will treat the patient with Zosyn, and

we will make further recommendations upon availability of initial results.





__________________________________________

Duc Bee MD



DD:  12/20/2017 22:24:17

DT:  12/21/2017 2:52:27

Job # [id number]

## 2017-12-21 NOTE — PN
DATE:  12/21/2017



SUBJECTIVE:  The patient is seen sitting in bed.  He is awake, he is alert.

He has a drain to his left foot.



PHYSICAL EXAMINATION:

GENERAL:  Middle-aged male sitting in chair.

VITAL SIGNS:  Blood pressure 141/76, heart rate 75, respiratory rate 16,

temperature 97.

HEENT:  Normocephalic, atraumatic.

NECK:  Supple, no JVD.

LUNGS:  Bilateral equal air entry, no rales.

CARDIAC:  S1, S2, regular rate and rhythm, no murmur, no rub.

ABDOMEN:  Obese, distended, soft, nontender, bowel sounds present.

EXTREMITIES:  Dressing of the left foot, positive drain.



LABORATORY DATA:  WBC 9, hemoglobin 11, hematocrit 35, platelets 214. 

Sodium 138, potassium 3.8, chloride 101, CO2 28, BUN 11, creatinine 0.9,

glucose 135, calcium 9.0, phosphorus not checked, AST 18, ALT 18.



CURRENT MEDICATIONS:  CellCept 750 b.i.d., Colace, Ecotrin, insulin,

Lipitor, Lopressor, morphine, amlodipine, Pepcid, prednisone, Prograf,

normal saline at 100, Tylenol, Zosyn.



ASSESSMENT AND PLAN:

1.  Kidney transplant.

2.  Left foot abscess status post drainage.

3.  Peripheral vascular disease.

4.  Coronary artery disease.

5.  Non-insulin dependent diabetes mellitus.

6.  Chronic pain.



PLAN:

1.  Discontinue IV fluids.

2.  Change CellCept to Myfortic 360 b.i.d. that was the patient's home

medication.

3.  Monitor fingersticks.

4.  Continue antibiotics as per ID recommendations.

5.  Dose all antibiotics for creatinine clearance about 30-50 mL/minute.







__________________________________________

Keyona Mathews MD







DD:  12/21/2017 18:19:01

DT:  12/21/2017 18:21:31

Job # [id number]

## 2017-12-21 NOTE — CP.PCM.PN
Subjective





- Date & Time of Evaluation


Date of Evaluation: 12/21/17


Time of Evaluation: 06:00





- Subjective


Subjective: 


Podiatry Progress Note- Dr. Benjamin Goel





57 y.o male seen at bedside with attending Dr. Benjamin Goel for 2 days s/p left 

irrigation and debridement of left eschar gangrene foot and abscess of left 

foot. Patient is seen resting comfortably in bed, in NAD, and AA0x3. Patient 

denies of any acute overnight events. Patient reports intermittent pain that 

comes and goes- well controlled by medication. Denies nausea, fever, shortness 

of breath, vomiting, chills, or chest pain. Dressing is seen clean, dry, and 

intact to the LLE and surgical shoe in place for RLE. No new pedal complaints 

today. NPO status confirmed. Patient is aware and agrees to go to OR for 

additional resection of 1st partial metatarsal, irrigation and debridement of 

left ulceration, and application of wound vac.








Objective





- Vital Signs/Intake and Output


Vital Signs (last 24 hours): 


 











Temp Pulse Resp BP Pulse Ox


 


 97.0 F L  75   16   141/76   99 


 


 12/21/17 08:00  12/21/17 08:00  12/21/17 08:00  12/21/17 08:00  12/21/17 08:00








Intake and Output: 


 











 12/21/17 12/21/17





 06:59 18:59


 


Intake Total 720 0


 


Balance 720 0














- Medications


Medications: 


 Current Medications





Acetaminophen (Tylenol 325mg Tab)  650 mg PO Q4H PRN


   PRN Reason: Pain, Mild (1-3)


Amlodipine Besylate (Norvasc)  10 mg PO DAILY [facility]


   Last Admin: 12/20/17 11:29 Dose:  10 mg


Aspirin (Ecotrin)  81 mg PO 0800 [facility]


   Last Admin: 12/20/17 08:48 Dose:  81 mg


Atorvastatin Calcium (Lipitor)  40 mg PO HS [facility]


   Last Admin: 12/20/17 21:43 Dose:  40 mg


Docusate Sodium (Colace)  100 mg PO DAILY [facility]


   Last Admin: 12/20/17 11:14 Dose:  100 mg


Escitalopram Oxalate (Lexapro)  5 mg PO DAILY [facility]


   Last Admin: 12/20/17 11:14 Dose:  5 mg


Famotidine (Pepcid)  20 mg PO DAILY [facility]


   Last Admin: 12/20/17 11:29 Dose:  20 mg


Hydromorphone HCl (Dilaudid)  0.5 mg IVP Q15M PRN


   PRN Reason: Pain, severe (8-10)


   Stop: 12/21/17 11:16


Piperacillin Sod/Tazobactam Sod (Zosyn 3.375 In Ns 100ml)  100 mls @ 200 mls/hr 

IVPB Q6 [facility]


   PRN Reason: Protocol


   Stop: 12/30/17 00:01


   Last Admin: 12/21/17 06:09 Dose:  200 mls/hr


Sodium Chloride (Sodium Chloride 0.9%)  1,000 mls @ 100 mls/hr IV .Q10H [facility]


Insulin Detemir (Levemir)  40 unit SC HS [facility]


   Last Admin: 12/20/17 21:46 Dose:  40 unit


Insulin Human Regular (Humulin R Med)  0 units SC ACHS [facility]


   PRN Reason: Protocol


   Last Admin: 12/20/17 21:46 Dose:  Not Given


Metoprolol Tartrate (Lopressor)  50 mg PO 0800,1800 [facility]


   Last Admin: 12/20/17 17:49 Dose:  50 mg


Morphine Sulfate (Morphine)  2 mg IVP Q4H PRN


   PRN Reason: Pain, severe (8-10)


   Last Admin: 12/21/17 06:09 Dose:  2 mg


Mycophenolate Mofetil (Cellcept Cap)  750 mg PO BID [facility]


   Last Admin: 12/20/17 17:50 Dose:  750 mg


Oxycodone/Acetaminophen (Percocet 5/325 Mg Tab)  2 tab PO Q4H PRN


   PRN Reason: Pain, severe (8-10)


   Stop: 12/22/17 19:43


Oxycodone/Acetaminophen (Percocet 5/325 Mg Tab)  1 tab PO Q4H PRN


   PRN Reason: Pain, moderate (4-7)


   Stop: 12/22/17 16:18


   Last Admin: 12/20/17 11:16 Dose:  1 tab


Prednisone (Prednisone Tab)  5 mg PO 0800 [facility]


   Last Admin: 12/20/17 08:48 Dose:  5 mg


Tacrolimus (Prograf Cap)  1.5 mg PO HS [facility]


   Last Admin: 12/20/17 21:43 Dose:  1.5 mg


Tacrolimus (Prograf Cap)  2 mg PO DAILY [facility]


   Last Admin: 12/20/17 11:28 Dose:  2 mg











- Labs


Labs: 


 





 12/21/17 07:15 





 12/21/17 07:15 





 











PT  12.7 SECONDS (9.4-12.5)  H  12/19/17  11:45    


 


INR  1.15  (0.93-1.08)  H  12/19/17  11:45    


 


APTT  29.5 Seconds (25.1-36.5)   12/19/17  11:45    














- Constitutional


Appears: Well, Non-toxic, No Acute Distress





- Extremities Exam


Additional comments: 


Left Lower Extremity Examination





Vasc: DP pulses palpable 1/4 b/l. PT pulse 1/4 right foot and nonpalpable left 

foot likely secondary to edema. Temperature gradient warm to warm on the right. 

Nonpitting edema noted to left foot. 





Derm: Left foot transmetatarsal amputation surgical site with sutures intact. 

Distal stump ulceration noted to plantarlateral aspect with mixed fibronecrotic 

base. Area of necrosis noted to distalmedial TMA stump. Erythema noted globally 

to left foot extending proximally to lower 1/3 of leg has resolved; 

distalmedial TMA stump with erythema present- resolving. Fibronecrotic 

ulceration on medial foot where drain from TMA was placed noted, with minimal 

serosanguinous drainage noted. No tunneling, undermining, tracking, probe to 

bone, malodor noted.





Neuro: Protective sensation grossly diminished.





Ortho: Tenderness to palpation left foot TMA stump, plantar medial arch, and 

heel. Pain on palpation right plantar heel ulceration.











- Neurological Exam


Neurological Exam: Alert, Awake, Oriented x3





Assessment and Plan





- Assessment and Plan (Free Text)


Assessment: 





57 year old male patient with 1) 2 days s/p left irrigation and debridement of 

left eschar gangrene foot and abscess of left foot 2) plantar ulceration right 

heel, stable


Plan: 





Patient seen and evaluated at bedside 


Discussed with attending, Dr. Goel


Charts, labs, vitals reveiwed- afebrile; WBC decreasing @ 9.3


Packing removed from medial ulceration of TMA site, cleansed with betadine 

mixed saline solution and packed with betadine mixed with saline wet 2 dry 

packing. Dressed with dsd and kerlix.


Will go to the OR for additional irrigation and debridement of medial 

ulceration L TMA site, 1st partial metatarsal ostectomy, and application of 

wound vac today in AM


Pt NPO status was confirmed


All Pre-op testing and clearance was in the chart


Pt was explained procedure and post-operative course


All pt's questions were answered to satisfaction


No guarantees were made


Pt understands all risks, benefits and complications of procedure


Pain mgmt per medicine


Continue PT; patient to be NWB to the L foot with assistant device


Patient may WB to the R foot 


Podiatry will continue to follow patient while in house


Patient is to follow up with Dr. Goel upon discharge

## 2017-12-22 PROCEDURE — B548ZZA ULTRASONOGRAPHY OF SUPERIOR VENA CAVA, GUIDANCE: ICD-10-PCS | Performed by: INTERNAL MEDICINE

## 2017-12-22 PROCEDURE — 02HV33Z INSERTION OF INFUSION DEVICE INTO SUPERIOR VENA CAVA, PERCUTANEOUS APPROACH: ICD-10-PCS | Performed by: INTERNAL MEDICINE

## 2017-12-22 RX ADMIN — PIPERACILLIN AND TAZOBACTAM SCH MLS/HR: 3; .375 INJECTION, POWDER, LYOPHILIZED, FOR SOLUTION INTRAVENOUS; PARENTERAL at 23:47

## 2017-12-22 RX ADMIN — INSULIN HUMAN SCH UNITS: 100 INJECTION, SOLUTION PARENTERAL at 09:01

## 2017-12-22 RX ADMIN — INSULIN HUMAN SCH UNITS: 100 INJECTION, SOLUTION PARENTERAL at 11:58

## 2017-12-22 RX ADMIN — INSULIN HUMAN SCH UNITS: 100 INJECTION, SOLUTION PARENTERAL at 16:59

## 2017-12-22 RX ADMIN — INSULIN DETEMIR SCH UNIT: 100 INJECTION, SOLUTION SUBCUTANEOUS at 22:17

## 2017-12-22 RX ADMIN — MORPHINE SULFATE PRN MG: 2 INJECTION, SOLUTION INTRAMUSCULAR; INTRAVENOUS at 14:03

## 2017-12-22 RX ADMIN — PIPERACILLIN AND TAZOBACTAM SCH MLS/HR: 3; .375 INJECTION, POWDER, LYOPHILIZED, FOR SOLUTION INTRAVENOUS; PARENTERAL at 17:02

## 2017-12-22 RX ADMIN — MORPHINE SULFATE PRN MG: 2 INJECTION, SOLUTION INTRAMUSCULAR; INTRAVENOUS at 08:17

## 2017-12-22 RX ADMIN — MORPHINE SULFATE PRN MG: 2 INJECTION, SOLUTION INTRAMUSCULAR; INTRAVENOUS at 04:33

## 2017-12-22 RX ADMIN — OXYCODONE HYDROCHLORIDE AND ACETAMINOPHEN PRN TAB: 5; 325 TABLET ORAL at 20:39

## 2017-12-22 RX ADMIN — PIPERACILLIN AND TAZOBACTAM SCH MLS/HR: 3; .375 INJECTION, POWDER, LYOPHILIZED, FOR SOLUTION INTRAVENOUS; PARENTERAL at 06:01

## 2017-12-22 RX ADMIN — INSULIN HUMAN SCH UNITS: 100 INJECTION, SOLUTION PARENTERAL at 22:18

## 2017-12-22 RX ADMIN — PIPERACILLIN AND TAZOBACTAM SCH MLS/HR: 3; .375 INJECTION, POWDER, LYOPHILIZED, FOR SOLUTION INTRAVENOUS; PARENTERAL at 11:59

## 2017-12-22 RX ADMIN — MORPHINE SULFATE PRN MG: 2 INJECTION, SOLUTION INTRAMUSCULAR; INTRAVENOUS at 18:23

## 2017-12-22 RX ADMIN — MORPHINE SULFATE PRN MG: 2 INJECTION, SOLUTION INTRAMUSCULAR; INTRAVENOUS at 22:13

## 2017-12-22 NOTE — PN
DATE:  12/22/2017



SUBJECTIVE:  The patient is in bed, in no acute distress.  The patient was

seen early this morning 577, bed 2.  The patient is tolerating the

antibiotics well.  No nausea.  No vomiting.  No fever.



PHYSICAL EXAMINATION:

VITAL SIGNS:  Temperature is 97, blood pressure is 130/70, and respiratory

rate of 18.

HEENT:  Unremarkable.

NECK:  Supple.

LUNGS:  Decreased breath sounds.

HEART:  Normal S1 and S2.

ABDOMEN:  Soft.



LABORATORY DATA:  Reveals a white count of 9.3 with a hemoglobin of 11 and

platelets of 214.  Coagulation is noted.



ASSESSMENT AND PLAN:  This is a 57-year-old with past medical history

significant for peripheral vascular disease, chronic obstructive lung

disease, congestive heart failure, coronary artery disease, myocardial

infarction, left foot gangrene and cellulitis _____, surgical intervention

and debridement.  Currently on Zosyn.  Review of orders confirms the

patient's antibiotic to be active, which is Zosyn.  Review of the bone

reveals Gram-positive coccyx and left foot is Corynebacterium species.  The

patient had received a dose of vancomycin.  Awaiting for _____

identification and sensitivity.  The patient's creatinine is 0.9.  We will

follow with you.





__________________________________________

Duc Bee MD





DD:  12/22/2017 21:02:56

DT:  12/22/2017 21:23:00

Job # [id number]

## 2017-12-23 RX ADMIN — MORPHINE SULFATE PRN MG: 2 INJECTION, SOLUTION INTRAMUSCULAR; INTRAVENOUS at 16:28

## 2017-12-23 RX ADMIN — INSULIN HUMAN SCH UNITS: 100 INJECTION, SOLUTION PARENTERAL at 22:01

## 2017-12-23 RX ADMIN — MORPHINE SULFATE PRN MG: 2 INJECTION, SOLUTION INTRAMUSCULAR; INTRAVENOUS at 12:02

## 2017-12-23 RX ADMIN — INSULIN DETEMIR SCH UNIT: 100 INJECTION, SOLUTION SUBCUTANEOUS at 22:00

## 2017-12-23 RX ADMIN — PIPERACILLIN AND TAZOBACTAM SCH MLS/HR: 3; .375 INJECTION, POWDER, LYOPHILIZED, FOR SOLUTION INTRAVENOUS; PARENTERAL at 12:05

## 2017-12-23 RX ADMIN — MORPHINE SULFATE PRN MG: 2 INJECTION, SOLUTION INTRAMUSCULAR; INTRAVENOUS at 07:42

## 2017-12-23 RX ADMIN — PIPERACILLIN AND TAZOBACTAM SCH MLS/HR: 3; .375 INJECTION, POWDER, LYOPHILIZED, FOR SOLUTION INTRAVENOUS; PARENTERAL at 23:25

## 2017-12-23 RX ADMIN — INSULIN HUMAN SCH UNITS: 100 INJECTION, SOLUTION PARENTERAL at 18:52

## 2017-12-23 RX ADMIN — INSULIN HUMAN SCH UNITS: 100 INJECTION, SOLUTION PARENTERAL at 10:16

## 2017-12-23 RX ADMIN — INSULIN HUMAN SCH UNITS: 100 INJECTION, SOLUTION PARENTERAL at 12:04

## 2017-12-23 RX ADMIN — MORPHINE SULFATE PRN MG: 2 INJECTION, SOLUTION INTRAMUSCULAR; INTRAVENOUS at 20:24

## 2017-12-23 RX ADMIN — PIPERACILLIN AND TAZOBACTAM SCH MLS/HR: 3; .375 INJECTION, POWDER, LYOPHILIZED, FOR SOLUTION INTRAVENOUS; PARENTERAL at 06:10

## 2017-12-23 RX ADMIN — PIPERACILLIN AND TAZOBACTAM SCH MLS/HR: 3; .375 INJECTION, POWDER, LYOPHILIZED, FOR SOLUTION INTRAVENOUS; PARENTERAL at 18:28

## 2017-12-23 NOTE — CP.PCM.PN
Subjective





- Date & Time of Evaluation


Date of Evaluation: 12/23/17


Time of Evaluation: 12:00





- Subjective


Subjective: 





Podiatry Progress Note- Dr. Benjamin Goel





57 year old male patient seen and evaluated at bedside POD #4 irrigation and 

debridement of left eschar gangrene foot and abscess of left foot and POD#2  

partial 1st metatarsal ostectomy, irrigation and debridement of left foot 

ulceration with application of wound vac. Patient hemodynamically stable and 

NAD. Denies any acute events overnight. Reports continued pain to bilateral LE 

he believes is due to neuropathy, well-controlled. WoundVAC present to left foot

, appears clean/dry/intact, continuous with no leakage. States nursing came to 

change his dressing last night. No new complaints today. Denies N/V/F/D/C/SOB.











Objective





- Vital Signs/Intake and Output


Vital Signs (last 24 hours): 


 











Temp Pulse Resp BP Pulse Ox


 


 98.0 F   70   20   139/58 L  97 


 


 12/23/17 07:30  12/23/17 07:30  12/23/17 07:30  12/23/17 10:13  12/23/17 07:30








Intake and Output: 


 











 12/23/17 12/23/17





 06:59 18:59


 


Intake Total 820 0


 


Output Total  3


 


Balance 820 -3














- Medications


Medications: 


 Current Medications





Acetaminophen (Tylenol 325mg Tab)  650 mg PO Q4H PRN


   PRN Reason: Pain, Mild (1-3)


Amlodipine Besylate (Norvasc)  10 mg PO DAILY [facility]


   Last Admin: 12/23/17 10:13 Dose:  10 mg


Aspirin (Ecotrin)  81 mg PO 0800 [facility]


   Last Admin: 12/23/17 10:12 Dose:  81 mg


Atorvastatin Calcium (Lipitor)  40 mg PO HS [facility]


   Last Admin: 12/22/17 22:12 Dose:  40 mg


Collagenase (Santyl)  0 gm TOP DAILY [facility]


Docusate Sodium (Colace)  100 mg PO DAILY [facility]


   Last Admin: 12/23/17 10:12 Dose:  100 mg


Escitalopram Oxalate (Lexapro)  5 mg PO DAILY [facility]


   Last Admin: 12/23/17 10:30 Dose:  5 mg


Famotidine (Pepcid)  20 mg PO DAILY [facility]


   Last Admin: 12/23/17 10:15 Dose:  20 mg


Home Med (Home Med)  2 unit PO Q12 [facility]


   Last Admin: 12/23/17 09:52 Dose:  Not Given


Piperacillin Sod/Tazobactam Sod (Zosyn 3.375 In Ns 100ml)  100 mls @ 200 mls/hr 

IVPB Q6 [facility]


   PRN Reason: Protocol


   Stop: 12/30/17 00:01


   Last Admin: 12/23/17 06:10 Dose:  200 mls/hr


Insulin Detemir (Levemir)  40 unit SC HS [facility]


   Last Admin: 12/22/17 22:17 Dose:  40 unit


Insulin Human Regular (Humulin R Med)  0 units SC ACHS [facility]


   PRN Reason: Protocol


   Last Admin: 12/23/17 10:16 Dose:  3 units


Metoprolol Tartrate (Lopressor)  50 mg PO 0800,1800 [facility]


   Last Admin: 12/23/17 10:14 Dose:  50 mg


Morphine Sulfate (Morphine)  2 mg IVP Q4H PRN


   PRN Reason: Pain, severe (8-10)


   Last Admin: 12/23/17 07:42 Dose:  2 mg


Mycophenolate Mofetil (Cellcept Cap)  750 mg PO BID [facility]


   Last Admin: 12/23/17 10:11 Dose:  750 mg


Oxycodone/Acetaminophen (Percocet 5/325 Mg Tab)  2 tab PO Q4H PRN


   PRN Reason: Pain, moderate (4-7)


   Stop: 12/25/17 20:21


   Last Admin: 12/22/17 20:39 Dose:  2 tab


Prednisone (Prednisone Tab)  5 mg PO 0800 [facility]


   Last Admin: 12/23/17 10:12 Dose:  5 mg


Tacrolimus (Prograf Cap)  1.5 mg PO HS [facility]


   Last Admin: 12/22/17 22:13 Dose:  1.5 mg


Tacrolimus (Prograf Cap)  2 mg PO DAILY [facility]


   Last Admin: 12/23/17 10:14 Dose:  2 mg











- Labs


Labs: 


 





 12/21/17 07:15 





 12/21/17 07:15 





 











PT  12.7 SECONDS (9.4-12.5)  H  12/19/17  11:45    


 


INR  1.15  (0.93-1.08)  H  12/19/17  11:45    


 


APTT  29.5 Seconds (25.1-36.5)   12/19/17  11:45    














- Constitutional


Appears: Well, Non-toxic, No Acute Distress





- Extremities Exam


Additional comments: 





Dressings to bilateral LE appear clean/dry/intact with no strikethrough noted. 

Surgical shoes present to both feet.


WoundVAC present to left foot @ 125mmHg continuous; canister noted to have 

approximately 50cc of serosanguinous drainage red/brown in color








- Neurological Exam


Neurological Exam: Alert, Awake, Oriented x3





- Psychiatric Exam


Psychiatric exam: Normal Affect, Normal Mood





Assessment and Plan





- Assessment and Plan (Free Text)


Assessment: 





57 year old male with 


1) Right foot non-healing ulceration 


2) POD #4 irrigation and debridement of left eschar gangrene foot and abscess 

of left foot (DOS 12/19/17)


3) POD#2  partial 1st metatarsal ostectomy, irrigation and debridement of left 

foot ulceration with application of wound vac (DOS 12/21/17)


Plan: 








Patient seen and evaluated at bedside


Discussed with attending, Dr. Benjamin Goel


Mildly febrile overnight 99.9, afebrile currently


Wound care nursing to provide local wound care - QD Santyl + DSD left lateral 

ulceration to TMA and right plantar heel ulceration 


Wound care nursing to change wound VAC every 48h to medial ulceration to left 

TMA; use tegaderm to protect ulceration site from maceration


-Stat order placed for vac change today, 12/23


C/W physical therapy


Weight Bearing Status:


   Pt to be NWB to the L LE with assistant device 


   R WBAT to heels in forefoot wedge offloading shoe


F/U pathology report


   Bone culture final report - enterococcus faecalis


   Wound culture final report pending- preliminary Corynebacterium Species


Per ID, patient currently on Zosyn; awaiting final culture report


Podiatry will continue to follow patient while in house

## 2017-12-23 NOTE — DS
HISTORY OF PRESENT ILLNESS:  The patient is a 57-year-old male who is seen

and examined.  He seems to be very upset and depressed.  He did not want to

go to TCU.  He wants to go home.  He sates he is stuck in the hospital for

the last 45 days.  He has wound VAC on his left foot.



PHYSICAL EXAMINATION:

VITAL SIGNS:  He is afebrile, pulse is 70, respirations are 20, and blood

pressure is 139/74.

LUNGS:  Bilateral fair airflow.  No rhonchi or crackles.

HEART:  S1 and S2 audible.

ABDOMEN:  Soft and nontender.  No rebound.  No guarding.

NEUROLOGIC:  He is awake, alert and oriented, able to communicate.



LABORATORY EXAM:  Blood sugar is 206.



ASSESSMENT AND PLAN:

1.  Status post left transmetatarsal amputation abscess, incision and

drainage and had wound VAC placed.

2.  Right foot heel ulcer.

3.  Insulin dependent diabetes.

4.  Hypertension.

5.  Severe peripheral vascular disease.

6.  Hyperlipidemia.

7.  Status post kidney transplant.



PLAN:  Currently, the patient is on piperacillin.  He is getting his

antirejection medication.  He has wound VAC on.  Awaiting acceptance in TCU

and once the bed is available, he will be transferred to TCU.





__________________________________________

Christine Zelaya MD





DD:  12/22/2017 18:42:50

DT:  12/23/2017 0:26:00

Job # [id number]

## 2017-12-23 NOTE — PN
DATE:



SUBJECTIVE:  The patient is seen in room 577, bed 2.  He is awake and alert

and doing well.



PHYSICAL EXAMINATION:

VITAL SIGNS:  Temperature is 98, blood pressure is 130/50, and respiratory

rate 16.

HEENT:  Unremarkable.

NECK:  Supple.

LUNGS:  Have decreased breath sounds.

HEART:  Normal S1 and S2.

ABDOMEN:  Soft.



LABORATORY EXAMINATION:  White count is 9.3, hemoglobin of 11, coagulation

is noted.  Chemistry is noted.  BUN 11, creatinine of 0.9.  Microbiology

reveals the bone culture is positive for Enterococcus fecalis and sensitive

to ampicillin.  The foot culture is positive for enterococcus and

corynebacterium.



Review of orders reveals the patient to be on Zosyn.  Pathology report,

hyperkeratotic skin and associated skin showing marked acute-on-chronic

inflammation, bone showing acute osteomyelitis.



ASSESSMENT AND PLAN:  A 57-year-old male with past medical history

significant for peripheral vascular disease, chronic obstructive lung

disease, congestive heart failure, coronary artery disease, myocardial

infarction, the patient with a left foot gangrene and cellulitis and status

post debridement with enterococcus coming from bone culture and

corynebacterium, currently on Zosyn.  Based on the pathology report, no

mention is made of the margins of the resected bone, we will find out from

the Pathology.  If the margins are free of osteomyelitis, may need to

switch to p.o. Augmentin 875 p.o. b.i.d.





__________________________________________

Duc Bee MD



DD:  12/23/2017 15:09:58

DT:  12/23/2017 18:53:19

Job # [id number]

## 2017-12-23 NOTE — PN
SUBJECTIVE:  The patient is currently seen lying in bed.  He is anxious to

go home and to continue IV antibiotics at home through his right arm PICC

line and to continue using the VAC at home with visiting nurse services.



MEDICATIONS:  Medication list reviewed.  The patient is on CellCept,

Colace, Ecotrin, Myfortic, insulin, Lexapro, Lipitor, Lopressor, p.r.n.

morphine, Norvasc, Pepcid, p.r.n. Percocet, prednisone, Prograf, Santyl,

Tylenol p.r.n. and Zosyn.



OBJECTIVE:

INTAKE/OUTPUT:  Intake 1540, output not charted.

VITAL SIGNS:  Blood pressure 130/50, temperature 99.9, respiratory rate is

20 with a pulse of 68.

HEENT:  Shows him to be normocephalic, atraumatic.  Conjunctivae are pink. 

Sclerae nonicteric.

NECK:  Supple.  No neck vein distention.

CHEST:  Clear to auscultation and percussion.  No rales, no rhonchi or

wheezing.

CARDIOVASCULAR:  Regular rate and rhythm without murmurs, rubs or gallops.

ABDOMEN:  Soft.  Bowel sounds normal.  No tenderness over his lower

quadrant kidney transplant.

EXTREMITIES:  Show a dressing over his left foot with a VAC attachment

status post left foot surgery.  No lower extremity edema.



LABORATORY DATA AND IMAGING:  CBC, white blood cell count 9.3 with a

hemoglobin of 11.2, platelet count is normal at 214,000.  Chemistries, last

glucose is 227.  Electrolytes are normal.  BUN 11 with a creatinine of 0.9.

Calcium is 9.0.  Liver enzymes are normal.  Microbiology cultures of the

left foot are positive for Corynebacterium and gram-positive cocci.



ASSESSMENT:

1.  Kidney transplant stable.  The patient will continue triple

immunosuppressive therapy.

2.  Left foot abscess status post drainage, now hooked up to a VAC

apparatus.  The patient remains on IV antibiotics and will likely need

wound care along with IV antibiotics along with VAC for the foreseeable

future.  The patient would like to do this at home.

3.  History of ASHD, currently stable.

4.  History of diabetes mellitus, currently on insulin.

5.  History of hyperlipidemia.  Continue statin therapy.



PLAN:

1.  Continue triple immunosuppressive therapy as per the patient's

outpatient regimen.

2.  Continue to monitor fingersticks closely.

3.  Continue IV antibiotic therapy as per ID.

4.  Try and facilitate transfer home so the patient may receive VAC therapy

at home along with IV antibiotics at home through his PICC line.





__________________________________________

Lonnie Alvarez MD





DD:  12/23/2017 9:41:37

DT:  12/23/2017 9:45:23

Job # [id number]

## 2017-12-24 LAB
ALBUMIN/GLOB SERPL: 1.1 {RATIO} (ref 1.1–1.8)
ALP SERPL-CCNC: 91 U/L (ref 38–126)
ALT SERPL-CCNC: 26 U/L (ref 7–56)
AST SERPL-CCNC: 23 U/L (ref 17–59)
BILIRUB SERPL-MCNC: 0.4 MG/DL (ref 0.2–1.3)
BUN SERPL-MCNC: 15 MG/DL (ref 7–21)
CALCIUM SERPL-MCNC: 9.1 MG/DL (ref 8.4–10.5)
CHLORIDE SERPL-SCNC: 98 MMOL/L (ref 98–107)
CO2 SERPL-SCNC: 31 MMOL/L (ref 21–33)
ERYTHROCYTE [DISTWIDTH] IN BLOOD BY AUTOMATED COUNT: 14.7 % (ref 11.5–14.5)
GLOBULIN SER-MCNC: 3.2 GM/DL
GLUCOSE SERPL-MCNC: 242 MG/DL (ref 70–110)
HCT VFR BLD CALC: 35.1 % (ref 42–52)
MAGNESIUM SERPL-MCNC: 1.6 MG/DL (ref 1.7–2.2)
MCH RBC QN AUTO: 28.7 PG (ref 25–35)
MCHC RBC AUTO-ENTMCNC: 31.9 G/DL (ref 31–37)
MCV RBC AUTO: 90 FL (ref 80–105)
PHOSPHATE SERPL-MCNC: 3.1 MG/DL (ref 2.5–4.5)
PLATELET # BLD: 239 10^3/UL (ref 120–450)
PMV BLD AUTO: 9.1 FL (ref 7–11)
POTASSIUM SERPL-SCNC: 4.2 MMOL/L (ref 3.6–5)
PROT SERPL-MCNC: 6.7 G/DL (ref 5.8–8.3)
SODIUM SERPL-SCNC: 137 MMOL/L (ref 132–148)
WBC # BLD AUTO: 7.6 10^3/UL (ref 4.5–11)

## 2017-12-24 RX ADMIN — PIPERACILLIN AND TAZOBACTAM SCH MLS/HR: 3; .375 INJECTION, POWDER, LYOPHILIZED, FOR SOLUTION INTRAVENOUS; PARENTERAL at 17:21

## 2017-12-24 RX ADMIN — PIPERACILLIN AND TAZOBACTAM SCH MLS/HR: 3; .375 INJECTION, POWDER, LYOPHILIZED, FOR SOLUTION INTRAVENOUS; PARENTERAL at 05:43

## 2017-12-24 RX ADMIN — INSULIN HUMAN SCH UNITS: 100 INJECTION, SOLUTION PARENTERAL at 22:11

## 2017-12-24 RX ADMIN — INSULIN HUMAN SCH UNITS: 100 INJECTION, SOLUTION PARENTERAL at 11:50

## 2017-12-24 RX ADMIN — OXYCODONE HYDROCHLORIDE AND ACETAMINOPHEN PRN TAB: 5; 325 TABLET ORAL at 14:27

## 2017-12-24 RX ADMIN — COLLAGENASE SANTYL SCH APPLIC: 250 OINTMENT TOPICAL at 11:51

## 2017-12-24 RX ADMIN — MORPHINE SULFATE PRN MG: 2 INJECTION, SOLUTION INTRAMUSCULAR; INTRAVENOUS at 22:17

## 2017-12-24 RX ADMIN — MORPHINE SULFATE PRN MG: 2 INJECTION, SOLUTION INTRAMUSCULAR; INTRAVENOUS at 06:24

## 2017-12-24 RX ADMIN — MORPHINE SULFATE PRN MG: 2 INJECTION, SOLUTION INTRAMUSCULAR; INTRAVENOUS at 10:37

## 2017-12-24 RX ADMIN — INSULIN HUMAN SCH UNITS: 100 INJECTION, SOLUTION PARENTERAL at 17:19

## 2017-12-24 RX ADMIN — PIPERACILLIN AND TAZOBACTAM SCH MLS/HR: 3; .375 INJECTION, POWDER, LYOPHILIZED, FOR SOLUTION INTRAVENOUS; PARENTERAL at 11:50

## 2017-12-24 RX ADMIN — PIPERACILLIN AND TAZOBACTAM SCH MLS/HR: 3; .375 INJECTION, POWDER, LYOPHILIZED, FOR SOLUTION INTRAVENOUS; PARENTERAL at 23:26

## 2017-12-24 RX ADMIN — INSULIN HUMAN SCH UNITS: 100 INJECTION, SOLUTION PARENTERAL at 10:41

## 2017-12-24 RX ADMIN — INSULIN DETEMIR SCH UNIT: 100 INJECTION, SOLUTION SUBCUTANEOUS at 22:11

## 2017-12-24 RX ADMIN — Medication SCH MG: at 12:12

## 2017-12-24 RX ADMIN — MORPHINE SULFATE PRN MG: 2 INJECTION, SOLUTION INTRAMUSCULAR; INTRAVENOUS at 17:17

## 2017-12-24 NOTE — CP.PCM.PN
Subjective





- Date & Time of Evaluation


Date of Evaluation: 12/24/17


Time of Evaluation: 15:46





- Subjective


Subjective: 





Podiatry Progress Note- Dr. Benjamin Goel





57 year old male patient seen and evaluated at bedside POD #5 irrigation and 

debridement of left eschar gangrene foot and abscess of left foot and POD#3  

partial 1st metatarsal ostectomy, irrigation and debridement of left foot 

ulceration with application of wound vac. Patient hemodynamically stable and 

NAD. Wife present at bedside. No acute events overnight. VAC changed yesterday 

with no complaints; VAC was disconnected at time of visit by patient, states he 

wanted to walk around but otherwise admits it is connected. Patient admits he 

has an offloading boot for his right foot but has not been using it. Denies N/V/

F/D/C/SOB.





Objective





- Vital Signs/Intake and Output


Vital Signs (last 24 hours): 


 











Temp Pulse Resp BP Pulse Ox


 


 97.8 F   80   18   132/60   95 


 


 12/24/17 07:30  12/24/17 07:30  12/24/17 07:30  12/24/17 10:42  12/24/17 07:30








Intake and Output: 


 











 12/24/17 12/24/17





 06:59 18:59


 


Intake Total 480 720


 


Balance 480 720














- Medications


Medications: 


 Current Medications





Acetaminophen (Tylenol 325mg Tab)  650 mg PO Q4H PRN


   PRN Reason: Pain, Mild (1-3)


Amlodipine Besylate (Norvasc)  10 mg PO DAILY [facility]


   Last Admin: 12/24/17 10:42 Dose:  10 mg


Aspirin (Ecotrin)  81 mg PO 0800 [facility]


   Last Admin: 12/24/17 10:40 Dose:  81 mg


Atorvastatin Calcium (Lipitor)  40 mg PO HS [facility]


   Last Admin: 12/23/17 22:01 Dose:  40 mg


Collagenase (Santyl)  0 gm TOP DAILY [facility]


   Last Admin: 12/24/17 11:51 Dose:  1 applic


Docusate Sodium (Colace)  100 mg PO DAILY [facility]


   Last Admin: 12/24/17 10:40 Dose:  100 mg


Escitalopram Oxalate (Lexapro)  5 mg PO DAILY [facility]


   Last Admin: 12/24/17 10:42 Dose:  5 mg


Famotidine (Pepcid)  20 mg PO DAILY [facility]


   Last Admin: 12/24/17 10:42 Dose:  20 mg


Home Med (Home Med)  2 unit PO Q12 [facility]


   Last Admin: 12/24/17 10:41 Dose:  Not Given


Piperacillin Sod/Tazobactam Sod (Zosyn 3.375 In Ns 100ml)  100 mls @ 200 mls/hr 

IVPB Q6 [facility]


   PRN Reason: Protocol


   Stop: 12/30/17 00:01


   Last Admin: 12/24/17 11:50 Dose:  200 mls/hr


Insulin Detemir (Levemir)  40 unit SC HS [facility]


   Last Admin: 12/23/17 22:00 Dose:  40 unit


Insulin Human Regular (Humulin R Med)  0 units SC ACHS [facility]


   PRN Reason: Protocol


   Last Admin: 12/24/17 11:50 Dose:  3 units


Magnesium Oxide (Mag-Ox)  400 mg PO DAILY [facility]


   Last Admin: 12/24/17 12:12 Dose:  400 mg


Metoprolol Tartrate (Lopressor)  50 mg PO 0800,1800 [facility]


   Last Admin: 12/24/17 10:42 Dose:  50 mg


Morphine Sulfate (Morphine)  2 mg IVP Q4H PRN


   PRN Reason: Pain, severe (8-10)


   Last Admin: 12/24/17 10:37 Dose:  2 mg


Mycophenolate Mofetil (Cellcept Cap)  750 mg PO BID [facility]


   Last Admin: 12/24/17 10:40 Dose:  750 mg


Oxycodone/Acetaminophen (Percocet 5/325 Mg Tab)  2 tab PO Q4H PRN


   PRN Reason: Pain, moderate (4-7)


   Stop: 12/25/17 20:21


   Last Admin: 12/24/17 14:27 Dose:  2 tab


Prednisone (Prednisone Tab)  5 mg PO 0800 [facility]


   Last Admin: 12/24/17 10:42 Dose:  5 mg


Tacrolimus (Prograf Cap)  1.5 mg PO HS [facility]


   Last Admin: 12/23/17 22:02 Dose:  1.5 mg


Tacrolimus (Prograf Cap)  2 mg PO DAILY [facility]


   Last Admin: 12/24/17 10:44 Dose:  2 mg











- Labs


Labs: 


 





 12/24/17 07:00 





 12/24/17 07:00 





 











PT  12.7 SECONDS (9.4-12.5)  H  12/19/17  11:45    


 


INR  1.15  (0.93-1.08)  H  12/19/17  11:45    


 


APTT  29.5 Seconds (25.1-36.5)   12/19/17  11:45    














- Constitutional


Appears: Well, Non-toxic, No Acute Distress





- Extremities Exam


Additional comments: 





Dressings to bilateral LE appear clean/dry/intact with no strikethrough noted. 

Surgical shoes present to both feet.


WoundVAC present to left foot @ 125mmHg continuous; canister noted to have 

approximately 100cc of serosanguinous drainage red/brown in color





RLE examination:


VASC: DP and PT pulses palpable 2/4. Temperature gradient cool to cool. CFT <3 

seconds to all digits.


NEURO: Gross sensation diminisehd.


DERM: Right foot plantar heel exhibits stable 0.5 diameter circular ulceration 

with 100% fibrous wound base and hyperkeratotic rim. No active drainage, no 

malodor, no fluctuance - no clinical signs of infection noted at this time.


Neuro: Protective sensation grossly diminished.


Ortho: Mild pain on palpation right plantar heel ulceration.








- Neurological Exam


Neurological Exam: Alert, Awake, Oriented x3





- Psychiatric Exam


Psychiatric exam: Normal Affect, Normal Mood





Assessment and Plan





- Assessment and Plan (Free Text)


Assessment: 





57 year old male with 


1) Right foot non-healing ulceration 


2) POD #5 irrigation and debridement of left eschar gangrene foot and abscess 

of left foot (DOS 12/19/17)


3) POD#3  partial 1st metatarsal ostectomy, irrigation and debridement of left 

foot ulceration with application of wound vac (DOS 12/21/17)


Plan: 





Patient seen and evaluated at bedside


Discussed with attending, Dr. Benjamin Goel


afebrile, WBC 7.6


Wound care nursing to provide local wound care - QD Santyl + DSD left lateral 

ulceration to TMA and right plantar heel ulceration 


Wound care nursing to change wound VAC every 48h to medial ulceration to left 

TMA; use tegaderm to protect ulceration site from maceration


-Last changed yesterday Saturday 12/23/17, plan for next VAC change tomorrow, 

Monday 12/25


C/W physical therapy


Weight Bearing Status:


   Pt to be NWB to the L LE with assistant device; Patient must keep VAC on at 

all times and may walk with machine on IV pole do not disconnect


   R WBAT to heels in forefoot wedge offloading shoe


Pathology report of left foot, stump tissue, debridement reveals acute 

osteomyelitis


   Bone culture final report - enterococcus faecalis


   Wound culture final report - Corynebacterium Species, enterococcus faecalis


Per ID, patient currently on Zosyn; f/u pathology for clean margins. If margins 

free of OM, may need to switch to PO Augmentin 875mg BID


Podiatry will continue to follow patient while in house

## 2017-12-24 NOTE — PN
DATE:  12/24/2017



SUBJECTIVE:  The patient is in bed in no acute distress, nontoxic.



PHYSICAL EXAMINATION:

VITAL SIGNS:  Temperature is 98, blood pressure is 112/70, respiratory rate

of 16.

HEENT:  Unremarkable.

NECK:  Supple.

LUNGS:  Have decreased breath sounds.

HEART:  Normal S1, S2.

ABDOMEN:  Soft, nontender.



Laboratory examination reveals a white count 9.7, hemoglobin is 11, BUN of

15, creatinine of 1.0.  Microbiology reveals Enterococcus faecalis from the

bone culture.  Pathology is reviewed and noted.



ASSESSMENT AND PLAN:  A 57-year-old male with past medical history

significant for peripheral vascular disease, chronic obstructive lung

disease, congestive heart failure, myocardial infarction, left foot

gangrene and cellulitis, status post debridement.  Enterococcus from the

bone culture, corynebacterium, and based on the pathology there is no

mention of the of the resected bone, and if the margins are free of

osteomyelitis and switch to p.o. Augmentin.  The patient is currently on

p.o. Augmentin.  Further pathology is still pending.  The patient has a

wound VAC. Currently the patient is on Zosyn, pending the pathology report.

If the margins are clear, may switch to p.o. Augmentin.





_________________________________________

Duc Bee MD



DD:  12/24/2017 14:53:21

DT:  12/24/2017 14:55:03

Job # [id number]

## 2017-12-24 NOTE — DS
HISTORY OF PRESENT ILLNESS:  The patient is a 57-year-old seen and

examined, anxious to go home, but has wound VAC on.  He denies any nausea

or vomiting.  No fever.  No chills.  Eating and tolerating.



PHYSICAL EXAMINATION:

VITAL SIGNS:  He is afebrile, he has 99.9 this morning, pulse 68,

respirations 20 and blood pressure 139/58.

LUNGS:  Bilateral fair airflow.  No rhonchi or crackle.

HEART:  S1 and S2, audible.

ABDOMEN:  Soft and nontender.  No rebound.  No guarding.

NEUROLOGIC:  The patient is awake, alert and oriented, able to communicate.

EXTREMITIES:  His right foot is in wound VAC.  Left heel ulcer is also

dressed.



ASSESSMENT AND PLAN:

1.  Status post transmetatarsal amputation, status post left foot abscess

incision and drainage and debridement.

2.  Left foot heel ulcer.

3.  Insulin-dependent diabetes.

4.  Severe peripheral vascular disease.

5.  History of renal transplant.

6.  Chronic renal insufficiency.



PLAN:  Currently, the patient is on his anti-rejection medications.  He is

on Apresoline and care is being done by Podiatry.  He will be transferred

to [facility] today.





__________________________________________

Christine Zelaya MD



DD:  12/23/2017 14:56:40

DT:  12/24/2017 0:13:08

Job # [id number]

## 2017-12-24 NOTE — PN
DATE:



SUBJECTIVE:  The patient is currently seen sitting up in bed.  VAC is

attached to his left foot.  He remains on IV antibiotic therapy.  He has

been seen by ID and there is a possibility that he may be able to be

switched over to oral antibiotic therapy.  The patient is awaiting visiting

nurse service follow up, so that he may be able to be discharged home on

the VAC apparatus.



MEDICATIONS:  Medication list reviewed.  The patient is currently on

CellCept, Colace, Ecotrin, insulin, Levemir, Lexapro, Lipitor, Lopressor,

morphine p.r.n., Norvasc, Pepcid, p.r.n. Percocet, prednisone, Prograf,

Santyl, p.r.n. Tylenol and IV Zosyn.



OBJECTIVE:

INTAKE/OUTPUT:  Intake 960 and output not charted.

VITAL SIGNS:  Blood pressure is 132/60, temperature is 97.8, and

respiratory rate of 80 with a pulse of 80.

HEENT:  Exam normocephalic and atraumatic.  Conjunctivae are pink.  Sclerae

are nonicteric.

NECK:  Supple.  No neck vein distention.

CHEST:  Clear to auscultation and percussion.  No rales, rhonchi or

wheezing.

CARDIOVASCULAR:  Shows a regular rate and rhythm without murmurs, rubs or

gallops.

GASTROINTESTINAL:  Abdomen is soft.  Bowel sounds are normal.  No

tenderness over his lower quadrant kidney transplant.  No rebound or

guarding.

EXTREMITIES:  Show dressing with a VAC attachment over his left foot area. 

Status post left foot surgery.  No lower extremity edema.  Positive PICC

line, right upper extremity.



LABORATORY DATA AND IMAGING STUDIES:  CBC:  White blood cell count of 7.6,

hemoglobin is stable at 11.2, and platelet count is 239,000.  Chemistry

showed normal electrolytes.  BUN of 50 with a creatinine of 1.0.  Glucose

is 242.  Calcium and phosphorus are normal.  Magnesium was borderline low

at 1.6.  Microbiology; left foot and bone cultures are positive for

Enterococcus faecalis.



ASSESSMENT:

1.  Stable kidney function in a patient with a kidney transplant.  I will

continue triple immunosuppressive therapy.

2.  Left foot abscess with cellulitis.  Possible osteomyelitis unclear as

per Infectious Diseases note.

3.  The patient is now hooked up to a VAC apparatus.  He remains on

intravenous antibiotic therapy.  There is some possibility he may be able

to be discharged home on oral antibiotic therapy as per Infectious Diseases

note.  The patient would like to go home for the holiday.

4.  History of ASHD, currently stable.

5.  History of diabetes mellitus, currently stable on insulin.

6.  History of hyperlipidemia.  Continue statin therapy.



PLAN:

1.  Continue triple immunosuppressive therapy.

2.  Need to monitor fingerstick closely and insulin accordingly.

3.  IV to perhaps p.o. antibiotic therapy as per ID.  Otherwise the patient

will likely be discharged home receiving antibiotic therapy through the

PICC line.

4.  The patient states that he is awaiting receiving a smaller VAC

apparatus for home use and he is awaiting visiting nurse service evaluation

for home monitoring of his VAC apparatus.







__________________________________________

Lonnie Alvarez MD







DD:  12/24/2017 10:43:01

DT:  12/24/2017 10:46:49

Job # [id number]

## 2017-12-25 RX ADMIN — PIPERACILLIN AND TAZOBACTAM SCH MLS/HR: 3; .375 INJECTION, POWDER, LYOPHILIZED, FOR SOLUTION INTRAVENOUS; PARENTERAL at 23:53

## 2017-12-25 RX ADMIN — INSULIN HUMAN SCH UNITS: 100 INJECTION, SOLUTION PARENTERAL at 08:09

## 2017-12-25 RX ADMIN — MORPHINE SULFATE PRN MG: 2 INJECTION, SOLUTION INTRAMUSCULAR; INTRAVENOUS at 08:10

## 2017-12-25 RX ADMIN — INSULIN DETEMIR SCH UNIT: 100 INJECTION, SOLUTION SUBCUTANEOUS at 21:49

## 2017-12-25 RX ADMIN — COLLAGENASE SANTYL SCH APPLIC: 250 OINTMENT TOPICAL at 10:53

## 2017-12-25 RX ADMIN — MORPHINE SULFATE PRN MG: 2 INJECTION, SOLUTION INTRAMUSCULAR; INTRAVENOUS at 12:59

## 2017-12-25 RX ADMIN — MORPHINE SULFATE PRN MG: 2 INJECTION, SOLUTION INTRAMUSCULAR; INTRAVENOUS at 18:16

## 2017-12-25 RX ADMIN — INSULIN HUMAN SCH UNITS: 100 INJECTION, SOLUTION PARENTERAL at 18:16

## 2017-12-25 RX ADMIN — MORPHINE SULFATE PRN MG: 2 INJECTION, SOLUTION INTRAMUSCULAR; INTRAVENOUS at 18:29

## 2017-12-25 RX ADMIN — POLYETHYLENE GLYCOL 3350 SCH GM: 17 POWDER, FOR SOLUTION ORAL at 18:16

## 2017-12-25 RX ADMIN — MORPHINE SULFATE PRN MG: 2 INJECTION, SOLUTION INTRAMUSCULAR; INTRAVENOUS at 02:10

## 2017-12-25 RX ADMIN — INSULIN HUMAN SCH UNITS: 100 INJECTION, SOLUTION PARENTERAL at 21:49

## 2017-12-25 RX ADMIN — PIPERACILLIN AND TAZOBACTAM SCH MLS/HR: 3; .375 INJECTION, POWDER, LYOPHILIZED, FOR SOLUTION INTRAVENOUS; PARENTERAL at 11:18

## 2017-12-25 RX ADMIN — MORPHINE SULFATE PRN MG: 2 INJECTION, SOLUTION INTRAMUSCULAR; INTRAVENOUS at 22:33

## 2017-12-25 RX ADMIN — PIPERACILLIN AND TAZOBACTAM SCH MLS/HR: 3; .375 INJECTION, POWDER, LYOPHILIZED, FOR SOLUTION INTRAVENOUS; PARENTERAL at 18:15

## 2017-12-25 RX ADMIN — PIPERACILLIN AND TAZOBACTAM SCH MLS/HR: 3; .375 INJECTION, POWDER, LYOPHILIZED, FOR SOLUTION INTRAVENOUS; PARENTERAL at 05:42

## 2017-12-25 RX ADMIN — Medication SCH MG: at 10:47

## 2017-12-25 RX ADMIN — INSULIN HUMAN SCH: 100 INJECTION, SOLUTION PARENTERAL at 11:48

## 2017-12-25 NOTE — CP.PCM.PN
Subjective





- Date & Time of Evaluation


Date of Evaluation: 12/25/17


Time of Evaluation: 10:13





- Subjective


Subjective: 





Podiatry Progress Note- Dr. Benjamin Goel





57 year old male patient seen and evaluated at bedside POD #6 irrigation and 

debridement of left eschar gangrene foot and abscess of left foot and POD#4  

partial 1st metatarsal ostectomy, irrigation and debridement of left foot 

ulceration with application of wound vac. Patient hemodynamically stable and 

NAD. No acute events overnight. VAC connected at time of visit. Rigid 

offloading boot present at bedside; states he uses boot to RLE with ambulation. 

Denies N/V/F/D/C/SOB.





Objective





- Vital Signs/Intake and Output


Vital Signs (last 24 hours): 


 











Temp Pulse Resp BP Pulse Ox


 


 97.9 F   71   18   146/68   98 


 


 12/25/17 07:00  12/25/17 08:10  12/25/17 07:00  12/25/17 10:50  12/25/17 07:00








Intake and Output: 


 











 12/25/17 12/25/17





 06:59 18:59


 


Intake Total 240 980


 


Balance 240 980














- Medications


Medications: 


 Current Medications





Acetaminophen (Tylenol 325mg Tab)  650 mg PO Q4H PRN


   PRN Reason: Pain, Mild (1-3)


Amlodipine Besylate (Norvasc)  10 mg PO DAILY [facility]


   Last Admin: 12/25/17 10:50 Dose:  10 mg


Aspirin (Ecotrin)  81 mg PO 0800 [facility]


   Last Admin: 12/25/17 08:10 Dose:  81 mg


Atorvastatin Calcium (Lipitor)  40 mg PO HS [facility]


   Last Admin: 12/24/17 22:08 Dose:  40 mg


Collagenase (Santyl)  0 gm TOP DAILY [facility]


   Last Admin: 12/25/17 10:53 Dose:  1 applic


Docusate Sodium (Colace)  100 mg PO DAILY [facility]


   Last Admin: 12/25/17 10:47 Dose:  100 mg


Escitalopram Oxalate (Lexapro)  5 mg PO DAILY [facility]


   Last Admin: 12/25/17 10:47 Dose:  5 mg


Famotidine (Pepcid)  20 mg PO DAILY [facility]


   Last Admin: 12/25/17 10:50 Dose:  20 mg


Home Med (Home Med)  2 unit PO Q12 [facility]


   Last Admin: 12/25/17 10:47 Dose:  Not Given


Piperacillin Sod/Tazobactam Sod (Zosyn 3.375 In Ns 100ml)  100 mls @ 200 mls/hr 

IVPB Q6 [facility]


   PRN Reason: Protocol


   Stop: 12/30/17 00:01


   Last Admin: 12/25/17 11:18 Dose:  200 mls/hr


Insulin Detemir (Levemir)  40 unit SC HS [facility]


   Last Admin: 12/24/17 22:11 Dose:  40 unit


Insulin Human Regular (Humulin R Med)  0 units SC ACHS [facility]


   PRN Reason: Protocol


   Last Admin: 12/25/17 11:48 Dose:  Not Given


Magnesium Oxide (Mag-Ox)  400 mg PO DAILY [facility]


   Last Admin: 12/25/17 10:47 Dose:  400 mg


Metoprolol Tartrate (Lopressor)  50 mg PO 0800,1800 [facility]


   Last Admin: 12/25/17 08:10 Dose:  50 mg


Morphine Sulfate (Morphine)  2 mg IVP Q4H PRN


   PRN Reason: Pain, severe (8-10)


   Last Admin: 12/25/17 12:59 Dose:  2 mg


Mycophenolate Mofetil (Cellcept Cap)  750 mg PO BID [facility]


   Last Admin: 12/25/17 10:46 Dose:  750 mg


Oxycodone/Acetaminophen (Percocet 5/325 Mg Tab)  2 tab PO Q4H PRN


   PRN Reason: Pain, moderate (4-7)


   Stop: 12/25/17 20:21


   Last Admin: 12/24/17 14:27 Dose:  2 tab


Polyethylene Glycol (Miralax)  17 gm PO DAILY [facility]


Prednisone (Prednisone Tab)  5 mg PO 0800 [facility]


   Last Admin: 12/25/17 08:10 Dose:  5 mg


Tacrolimus (Prograf Cap)  1.5 mg PO [facility]


   Last Admin: 12/24/17 22:12 Dose:  1.5 mg


Tacrolimus (Prograf Cap)  2 mg PO DAILY [facility]


   Last Admin: 12/25/17 10:47 Dose:  2 mg











- Labs


Labs: 


 





 12/24/17 07:00 





 12/24/17 07:00 





 











PT  12.7 SECONDS (9.4-12.5)  H  12/19/17  11:45    


 


INR  1.15  (0.93-1.08)  H  12/19/17  11:45    


 


APTT  29.5 Seconds (25.1-36.5)   12/19/17  11:45    














- Constitutional


Appears: Well, Non-toxic, No Acute Distress





- Extremities Exam


Additional comments: 





Dressings to bilateral LE appear clean/dry/intact with no strikethrough noted. 

Surgical shoes present to both feet.


WoundVAC present to left foot @ 125mmHg continuous; canister noted to have 

approximately 100cc of serosanguinous drainage red/brown in color, unchanged 

since yesterday





- Neurological Exam


Neurological Exam: Alert, Awake, Oriented x3





- Psychiatric Exam


Psychiatric exam: Normal Affect, Normal Mood





Assessment and Plan





- Assessment and Plan (Free Text)


Assessment: 





57 year old male with 


1) Right foot non-healing ulceration 


2) POD #6 irrigation and debridement of left eschar gangrene foot and abscess 

of left foot (DOS 12/19/17)


3) POD#4  partial 1st metatarsal ostectomy, irrigation and debridement of left 

foot ulceration with application of wound vac (DOS 12/21/17)


Plan: 





Patient seen and evaluated at bedside


Discussed with attending, Dr. Benjamin Goel


afebrile


Wound care nursing to provide local wound care - QD Santyl + DSD left lateral 

ulceration to TMA and right plantar heel ulceration 


Wound care nursing to change wound VAC every 48h to medial ulceration to left 

TMA; use tegaderm to protect ulceration site from maceration


-Last changed Saturday 12/23/17, plan for next VAC change today, Monday 12/25


C/W physical therapy


Weight Bearing Status:


   Pt to be NWB to the L LE with assistant device; Patient must keep VAC on at 

all times and may walk with machine on IV pole do not disconnect


   R WBAT in rigid offloading boot


Pathology report of left foot, stump tissue, debridement reveals acute 

osteomyelitis


   Bone culture final report - enterococcus faecalis


   Wound culture final report - Corynebacterium Species, enterococcus faecalis


ID recs appreciated - patient currently on Zosyn; f/u pathology for clean 

margins. If margins free of OM, may need to switch to PO Augmentin 875mg BID


Podiatry will continue to follow patient while in house

## 2017-12-25 NOTE — PN
DATE:



SUBJECTIVE:  The patient is 57-year-old, seen and examined, sitting in

chair, seems to be comfortable.  No nausea or vomiting.  No diarrhea. 

Anxious to be going home.  Arrangement has to be made for his portable

wound VAC that will be done tomorrow; otherwise he is doing well.



PHYSICAL EXAMINATION:

VITAL SIGNS:  He is afebrile, pulse 74, respirations 18, blood pressure

146/68.

LUNGS:  Bilateral good airflow.  No rhonchi or crackle.

HEART:  S1, S2 audible.

ABDOMEN:  Soft, nontender.  No rebound.  No guarding.

NEUROLOGIC:  The patient is awake and alert, oriented, able to

communicative.



LABORATORY DATA:  Blood sugar is 119.



ASSESSMENT AND PLAN:

1.  Left foot wound debridement status post I and D.

2.  Right foot infected callus.

3.  Insulin dependent diabetes.

4.  Constipation.

5.  Hypertension.

6.  Hyperlipidemia.

7.  Status post renal transplant.

8.  I will continue the patient on current medications.  He is on

piperacillin and that will be renewed.  Awaiting for arrangement for

portable wound VAC.  Once this done, we will make discharge plan.





__________________________________________

Christine Zelaya MD



DD:  12/25/2017 14:54:32

DT:  12/25/2017 17:48:09

Job # [id number]

## 2017-12-25 NOTE — PN
DATE:



SUBJECTIVE:  The patient seen early this morning in room 577, bed 2.  No

fevers.  No chills.



PHYSICAL EXAMINATION

VITAL SIGNS:  Temperature is 97, blood pressure is 160/60 and respiratory

rate of 18.

HEENT:  Unremarkable.

NECK:  Supple.

LUNGS:  Have decreased breath sounds.

HEART:  Normal S1 and S2.

ABDOMEN:  Soft and nontender.



LABORATORY DATA:  Reveals the patient's white count of 7.6, hemoglobin of

11, coagulation is noted.  Chemistries are reviewed.



MEDICATIONS:  Review of order reveals the patient to be on Zosyn, the

patient also on tacrolimus.  The patient has Enterococcus and

corynebacterium.  Enterococcus is sensitive to penicillin and ampicillin.



ASSESSMENT AND PLAN:  This is a 57-year-old male who was seen early this

morning in room 577, bed 2 with past medical history significant for

peripheral vascular disease, chronic obstructive lung disease, congestive

heart failure, myocardial infarction, left foot gangrene and cellulitis

status post debridement, enterococcus from the bone culture,

corynebacterium is pending on the pathology, if the margins are free of

osteomyelitis, we can switch to p.o. Augmentin, if the margins _____ of

osteomyelitis, we will need to treat his osteomyelitis.  Awaiting for

pathology report.





__________________________________________

Duc Bee MD





DD:  12/25/2017 18:50:22

DT:  12/25/2017 20:51:23

Job # [id number]

## 2017-12-26 LAB
ALBUMIN/GLOB SERPL: 1.1 {RATIO} (ref 1.1–1.8)
ALP SERPL-CCNC: 91 U/L (ref 38–126)
ALT SERPL-CCNC: 31 U/L (ref 7–56)
AST SERPL-CCNC: 20 U/L (ref 17–59)
BILIRUB SERPL-MCNC: 0.4 MG/DL (ref 0.2–1.3)
BUN SERPL-MCNC: 20 MG/DL (ref 7–21)
CALCIUM SERPL-MCNC: 9.2 MG/DL (ref 8.4–10.5)
CHLORIDE SERPL-SCNC: 97 MMOL/L (ref 98–107)
CO2 SERPL-SCNC: 29 MMOL/L (ref 21–33)
ERYTHROCYTE [DISTWIDTH] IN BLOOD BY AUTOMATED COUNT: 15 % (ref 11.5–14.5)
GLOBULIN SER-MCNC: 3.2 GM/DL
GLUCOSE SERPL-MCNC: 272 MG/DL (ref 70–110)
HCT VFR BLD CALC: 36.1 % (ref 42–52)
MAGNESIUM SERPL-MCNC: 1.6 MG/DL (ref 1.7–2.2)
MCH RBC QN AUTO: 29.4 PG (ref 25–35)
MCHC RBC AUTO-ENTMCNC: 32.7 G/DL (ref 31–37)
MCV RBC AUTO: 90 FL (ref 80–105)
PHOSPHATE SERPL-MCNC: 3.4 MG/DL (ref 2.5–4.5)
PLATELET # BLD: 238 10^3/UL (ref 120–450)
PMV BLD AUTO: 9.4 FL (ref 7–11)
POTASSIUM SERPL-SCNC: 4.4 MMOL/L (ref 3.6–5)
PROT SERPL-MCNC: 6.8 G/DL (ref 5.8–8.3)
SODIUM SERPL-SCNC: 135 MMOL/L (ref 132–148)
WBC # BLD AUTO: 9.9 10^3/UL (ref 4.5–11)

## 2017-12-26 RX ADMIN — PIPERACILLIN AND TAZOBACTAM SCH MLS/HR: 3; .375 INJECTION, POWDER, LYOPHILIZED, FOR SOLUTION INTRAVENOUS; PARENTERAL at 11:27

## 2017-12-26 RX ADMIN — INSULIN DETEMIR SCH UNIT: 100 INJECTION, SOLUTION SUBCUTANEOUS at 21:56

## 2017-12-26 RX ADMIN — COLLAGENASE SANTYL SCH APPLIC: 250 OINTMENT TOPICAL at 09:57

## 2017-12-26 RX ADMIN — INSULIN HUMAN SCH UNITS: 100 INJECTION, SOLUTION PARENTERAL at 16:50

## 2017-12-26 RX ADMIN — MORPHINE SULFATE PRN MG: 2 INJECTION, SOLUTION INTRAMUSCULAR; INTRAVENOUS at 15:48

## 2017-12-26 RX ADMIN — Medication SCH MG: at 09:56

## 2017-12-26 RX ADMIN — PIPERACILLIN AND TAZOBACTAM SCH MLS/HR: 3; .375 INJECTION, POWDER, LYOPHILIZED, FOR SOLUTION INTRAVENOUS; PARENTERAL at 23:30

## 2017-12-26 RX ADMIN — INSULIN HUMAN SCH UNITS: 100 INJECTION, SOLUTION PARENTERAL at 08:20

## 2017-12-26 RX ADMIN — MORPHINE SULFATE PRN MG: 2 INJECTION, SOLUTION INTRAMUSCULAR; INTRAVENOUS at 06:47

## 2017-12-26 RX ADMIN — MORPHINE SULFATE PRN MG: 2 INJECTION, SOLUTION INTRAMUSCULAR; INTRAVENOUS at 20:21

## 2017-12-26 RX ADMIN — POLYETHYLENE GLYCOL 3350 SCH GM: 17 POWDER, FOR SOLUTION ORAL at 09:55

## 2017-12-26 RX ADMIN — INSULIN HUMAN SCH UNITS: 100 INJECTION, SOLUTION PARENTERAL at 11:27

## 2017-12-26 RX ADMIN — PIPERACILLIN AND TAZOBACTAM SCH MLS/HR: 3; .375 INJECTION, POWDER, LYOPHILIZED, FOR SOLUTION INTRAVENOUS; PARENTERAL at 17:15

## 2017-12-26 RX ADMIN — MORPHINE SULFATE PRN MG: 2 INJECTION, SOLUTION INTRAMUSCULAR; INTRAVENOUS at 11:28

## 2017-12-26 RX ADMIN — PIPERACILLIN AND TAZOBACTAM SCH MLS/HR: 3; .375 INJECTION, POWDER, LYOPHILIZED, FOR SOLUTION INTRAVENOUS; PARENTERAL at 06:00

## 2017-12-26 RX ADMIN — INSULIN HUMAN SCH UNITS: 100 INJECTION, SOLUTION PARENTERAL at 21:57

## 2017-12-26 NOTE — CP.PCM.PN
Subjective





- Date & Time of Evaluation


Date of Evaluation: 12/26/17


Time of Evaluation: 15:24





- Subjective


Subjective: 





Podiatry Progress Note- Dr. Benjamin Goel





57 year old male patient seen and evaluated at bedside POD #7 irrigation and 

debridement of left eschar gangrene foot and abscess of left foot and POD#5  

partial 1st metatarsal ostectomy, irrigation and debridement of left foot 

ulceration with application of wound vac. Patient is seen resting comfortably 

at bedside with dressing c/d/i. Patient reports that he feels better and his 

wounds look better. He denies n/v/sob/cp/chills/f or d. Patient has no new 

complaints today. He denies overnight events. 





Objective





- Vital Signs/Intake and Output


Vital Signs (last 24 hours): 


 











Temp Pulse Resp BP Pulse Ox


 


 98.4 F   80   18   130/60   98 


 


 12/26/17 07:30  12/26/17 08:20  12/26/17 07:30  12/26/17 09:56  12/26/17 07:30








Intake and Output: 


 











 12/26/17 12/26/17





 06:59 18:59


 


Intake Total 1020 1120


 


Balance 1020 1120














- Medications


Medications: 


 Current Medications





Acetaminophen (Tylenol 325mg Tab)  650 mg PO Q4H PRN


   PRN Reason: Pain, Mild (1-3)


Amlodipine Besylate (Norvasc)  10 mg PO DAILY [facility]


   Last Admin: 12/26/17 09:56 Dose:  10 mg


Aspirin (Ecotrin)  81 mg PO 0800 [facility]


   Last Admin: 12/26/17 08:20 Dose:  81 mg


Atorvastatin Calcium (Lipitor)  40 mg PO HS [facility]


   Last Admin: 12/25/17 21:48 Dose:  40 mg


Collagenase (Santyl)  0 gm TOP DAILY [facility]


   Last Admin: 12/26/17 09:57 Dose:  1 applic


Docusate Sodium (Colace)  100 mg PO DAILY [facility]


   Last Admin: 12/26/17 09:55 Dose:  100 mg


Escitalopram Oxalate (Lexapro)  5 mg PO DAILY [facility]


   Last Admin: 12/26/17 09:55 Dose:  5 mg


Famotidine (Pepcid)  20 mg PO DAILY [facility]


   Last Admin: 12/26/17 09:56 Dose:  20 mg


Home Med (Home Med)  2 unit PO Q12 [facility]


   Last Admin: 12/26/17 09:57 Dose:  Not Given


Piperacillin Sod/Tazobactam Sod (Zosyn 3.375 In Ns 100ml)  100 mls @ 200 mls/hr 

IVPB Q6 [facility]


   PRN Reason: Protocol


   Stop: 12/30/17 00:01


   Last Admin: 12/26/17 11:27 Dose:  200 mls/hr


Insulin Detemir (Levemir)  40 unit SC HS [facility]


   Last Admin: 12/25/17 21:49 Dose:  40 unit


Insulin Human Regular (Humulin R Med)  0 units SC ACHS [facility]


   PRN Reason: Protocol


   Last Admin: 12/26/17 11:27 Dose:  5 units


Magnesium Oxide (Mag-Ox)  400 mg PO DAILY [facility]


   Last Admin: 12/26/17 09:56 Dose:  400 mg


Metoprolol Tartrate (Lopressor)  50 mg PO 0800,1800 [facility]


   Last Admin: 12/26/17 08:20 Dose:  50 mg


Morphine Sulfate (Morphine)  2 mg IVP Q4H PRN


   PRN Reason: Pain, severe (8-10)


   Last Admin: 12/26/17 11:28 Dose:  2 mg


Mycophenolate Mofetil (Cellcept Cap)  750 mg PO BID [facility]


   Last Admin: 12/26/17 09:55 Dose:  750 mg


Polyethylene Glycol (Miralax)  17 gm PO DAILY [facility]


   Last Admin: 12/26/17 09:55 Dose:  17 gm


Prednisone (Prednisone Tab)  5 mg PO 0800 [facility]


   Last Admin: 12/26/17 08:20 Dose:  5 mg


Tacrolimus (Prograf Cap)  1.5 mg PO HS [facility]


   Last Admin: 12/25/17 21:48 Dose:  1.5 mg


Tacrolimus (Prograf Cap)  2 mg PO DAILY [facility]


   Last Admin: 12/26/17 09:55 Dose:  2 mg











- Labs


Labs: 


 





 12/26/17 06:00 





 12/26/17 06:00 





 











PT  12.7 SECONDS (9.4-12.5)  H  12/19/17  11:45    


 


INR  1.15  (0.93-1.08)  H  12/19/17  11:45    


 


APTT  29.5 Seconds (25.1-36.5)   12/19/17  11:45    














- Constitutional


Appears: Well, Non-toxic, No Acute Distress





- Extremities Exam


Additional comments: 





Dressing to the lower extremity is c/d/i without strikethrough


Temperature gradient wnl


No cellulitic changes noted


No odor








- Neurological Exam


Neurological Exam: Alert, Awake, Oriented x3





- Psychiatric Exam


Psychiatric exam: Normal Affect, Normal Mood





Assessment and Plan





- Assessment and Plan (Free Text)


Assessment: 





57 year old male with 


1) Right foot non-healing ulceration 


2) POD #7 irrigation and debridement of left eschar gangrene foot and abscess 

of left foot (DOS 12/19/17)


3) POD#5  partial 1st metatarsal ostectomy, irrigation and debridement of left 

foot ulceration with application of wound vac (DOS 12/21/17)


Plan: 


 





Patient seen and evaluated at bedside


Discussed with attending, Dr. Benjamin Goel


Wound care nursing to provide local wound care - QD Santyl + DSD left lateral 

ulceration to TMA and right plantar heel ulceration 


Wound care nursing to change wound VAC every 48h to medial ulceration to left 

TMA; use tegaderm to protect ulceration site from maceration


-Plan for next VAC change today, Wednesday 12/27


C/W physical therapy


Weight Bearing Status:


   Pt to be NWB to the L LE with assistant device; Patient must keep VAC on at 

all times and may walk with machine on IV pole do not disconnect


   R WBAT in rigid offloading boot


Pathology report of left foot, stump tissue, debridement reveals acute 

osteomyelitis


   Bone culture final report - enterococcus faecalis


   Wound culture final report - Corynebacterium Species, enterococcus faecalis


ID recs appreciated - patient currently on Zosyn; f/u pathology for clean 

margins. If margins free of OM, may need to switch to PO Augmentin 875mg BID


Please notify podiatry to be present with wound VAC change prior to discharge. 

Thank you.


Podiatry will continue to follow patient while in house

## 2017-12-26 NOTE — PN
DATE:



SUBJECTIVE:  The patient is currently seen sitting at bedside.  He appears

to be in no acute distress.  He states that there might be a possibility he

will go to the TCU to continue receiving IV antibiotic therapy.  Social

services and discharge planning are working on getting him an outpatient

wound VAC for his foot wound.  There is still some uncertainty about

whether or not he has osteomyelitis, such as a soft tissue infection

regarding the ability to switch over to oral antibiotic therapy from IV

antibiotic therapy.



MEDICATIONS:  Medication list reviewed.  The patient is currently on

CellCept, Colace, Ecotrin, insulin, Lexapro, Lipitor, Lopressor, magnesium

oxide, MiraLax, morphine p.r.n., Norvasc, Pepcid, prednisone, Prograf,

Santyl, p.r.n. Tylenol, and IV Zosyn.



OBJECTIVE

INTAKE/OUTPUT:  Intake 960, output not charted.

VITAL SIGNS:  Blood pressure 130/60, temperature 98.4, respiratory rate 18

with a pulse of 80.

HEENT:  Exam shows him to be normocephalic and atraumatic.  Conjunctivae

are pink.  Sclerae are nonicteric.

NECK:  Supple. No neck vein distention.

CHEST: Clear to auscultation and percussion. No rales. No rhonchi, no

wheezing.

CARDIOVASCULAR:  Shows a regular rate and rhythm without audible murmurs,

rubs, or gallops.

GASTROINTESTINAL:  Abdomen is soft.  Bowel sounds are normal.  No

tenderness over his right lower quadrant kidney transplant.  No rebound or

guarding.

EXTREMITIES:  Show dressing with VAC attachment over his left foot area. 

He is status post left foot surgery.  No lower extremity edema.  He has a

PICC line in his right upper extremity.



LABORATORY DATA AND IMAGING:  CBC:  White blood cell count 9.9, hemoglobin

11.8 with platelet count of 238,000.  Chemistry showed normal electrolytes.

BUN 20 with a creatinine of 1.0.  Last glucose level was 264.  Calcium 9.2,

phosphorus 3.4, magnesium level 1.6 and remaining stable in the low normal

range, on magnesium supplements.



Microbiology, blood cultures are positive for Enterococcus faecalis.  Wound

cultures were positive for Enterococcus faecalis and Corynebacterium

species.



ASSESSMENT:

1.  Stable kidney function in a patient with a kidney transplant.  The

patient will continue triple immunosuppressive therapy with no change.  The

patient will also continue low-dose oral magnesium supplements in light of

his borderline low hypomagnesemia secondary to transplant medication.

2.  Left foot abscess with cellulitis.  Possible osteomyelitis.  Bone

cultures are positive for Enterococcus.  The patient will in all likelihood

continue prolonged course of antibiotic therapy.  For right now, as per

infectious disease, we will continue on intravenous antibiotic therapy. 

The patient is currently hooked up to a VAC apparatus.  He will require

this upon discharge at home and arrangements are being made to obtain this

type of equipment for home use through his insurance.

3. History of arteriosclerotic heart disease, currently stable.

4. History of diabetes mellitus, currently stable on insulin.

5. History of hyperlipidemia, stable on diet and statin therapy.



PLAN:

1. Continue triple immunosuppressive therapy.  Creatinine is stable at 1.0.

2. Continue to monitor his sugars closely.

3. The patient will in all likelihood continue IV antibiotic therapy for

possible osteomyelitis of his left foot.  There is possibility he might go

to TCU prior to being discharged home.

4. Continue to monitor labs on a regular basis.







__________________________________________

Lonnie Alvarez MD



DD:  12/26/2017 12:34:44

DT:  12/26/2017 12:37:13

Job # [id number]

## 2017-12-26 NOTE — PN
DATE:  12/26/2017



SUBJECTIVE:  The patient is in bed, in no acute distress, nontoxic.  No

fevers.



PHYSICAL EXAMINATION:

VITAL SIGNS:  Temperature is 98, blood pressure is 160/60, and respiratory

rate of 18.

HEENT:  Unremarkable.

NECK:  Supple.

LUNGS:  Have decreased breath sounds.

HEART:  Normal S1 and S2.

ABDOMEN:  Soft and nontender.



LABORATORY DATA:  Reveals a white count of 9000, hemoglobin of 11. 

Coagulation is noted.  Chemistries reveal a BUN of 20 and creatinine of

1.0.



ASSESSMENT AND PLAN:  This is a 57-year-old male, was seen earlier this

morning, past medical history for peripheral vascular disease; chronic

obstructive lung disease; congestive heart failure; myocardial infarction;

left foot gangrene and cellulitis, status post debridement; Enterococcus

from the bone culture, Corynebacterium, and if the margins of the pathology

report are free of osteomyelitis, we may switch to p.o. Augmentin.  If the

margins do contain osteomyelitis, we will need to treat with intravenous

antibiotics, so awaiting for pathology report as discussed with the staff.





__________________________________________

Duc Bee MD



DD:  12/26/2017 15:26:15

DT:  12/26/2017 18:43:38

Job # [id number]

## 2017-12-26 NOTE — PN
DATE:  12/24/2017



SUBJECTIVE:  The patient is a 57-year-old, seen and examined; seems to be

little upset, but better than yesterday for being home in [place] time.



PHYSICAL EXAMINATION:

VITAL SIGNS:  He is afebrile, pulse 80, respirations 18, blood pressure

132/60.

LUNGS:  Bilateral fair airflow.  No rhonchi or crackles.

HEART:  S1 and S2 audible.

ABDOMEN:  Soft, nontender.  No rebound.  No guarding.

NEUROLOGIC:  He is awake, alert, oriented, communicative, ambulatory.



LABORATORY DATA:  WBC 7.6, hemoglobin 11.2, hematocrit 35.1, platelet 239. 

Chemistries; sodium 137, potassium 4.2, chloride 98, CO2 of 31, BUN 15,

creatinine 1.0, blood sugar of 212.  Right foot is in the dressing and has

callus and left transmetatarsal amputation has wound VAC on.



ASSESSMENT:

1.  Left foot debridement and incision and drainage.

2.  Right heel callus.

3.  Hypertension.

4.  Insulin-dependent diabetes.

5.  Status post kidney transplant.

6.  Hypertension.

7.  Severe peripheral vascular disease.



PLAN:  Currently, the patient is on CellCept.  He is on aspirin.  He is

receiving Levemir.  He is on Lexapro, metoprolol, magnesium, analgesics,

and _____ continue that.  [facility] will be going to [facility] if [facility] available.







__________________________________________

Carlos Manuel Fisher MD





DD:  12/24/2017 15:16:04

DT:  12/24/2017 18:52:00

Job # [id number]

## 2017-12-26 NOTE — PN
DATE:  12/22/2017



SUBJECTIVE:  The patient is seen lying in bed.  He is awake.  He is alert. 

He is comfortable.  He complains of pain in his left foot.



PHYSICAL EXAMINATION

GENERAL:  Middle-aged male lying in bed.

VITAL SIGNS:  Blood pressure 134/70, heart rate 65, respiratory rate 18,

temperature 97.9.

HEENT:  Normocephalic, atraumatic.

NECK:  Supple, no JVD.

LUNGS:  Bilateral equal air entry, no rales.

CARDIAC:  S1 and S2, regular rate and rhythm, no murmur, no rub.

ABDOMEN:  Obese, distended, soft, nontender, bowel sounds present.

EXTREMITIES:  Dressing of the left foot, with a drain.  No edema of the

right lower leg.

INTAKE AND OUTPUT:  1080/575.



LABORATORY DATA:  WBC 9, hemoglobin 11, hematocrit 35, platelets 215.  No

labs today.



CURRENT MEDICATIONS:  CellCept 750 b.i.d., Colace 100, aspirin, Myfortic,

insulin, Levemir, Lexapro, Lipitor, Lopressor, amlodipine, Pepcid, Prograf

2 mg in a.m. and 1.5 in p.m.



ASSESSMENT AND PLAN:

1.  Kidney transplant status.

2.  Non-insulin-dependent diabetes mellitus.

3.  Hypertension.

4.  Diabetic foot ulcer/abscess.

5.  Pain.



PLAN:

1.  Continue IV antibiotics.

2.  Dose all antibiotics for creatinine clearance about 30-15 mL per

minute.

3.  The patient was on Myfortic 360 mg b.i.d. at home, the patient was

advised to bring the medication from home.  Unclear if he brought it in;

although he told me he did.  Computer reflects that the patient is getting

CellCept instead of Myfortic.





__________________________________________

Keyona Mathews MD



DD:  12/22/2017 15:53:04

DT:  12/22/2017 15:55:39

Job # [id number]

## 2017-12-27 RX ADMIN — MORPHINE SULFATE PRN MG: 2 INJECTION, SOLUTION INTRAMUSCULAR; INTRAVENOUS at 07:08

## 2017-12-27 RX ADMIN — INSULIN HUMAN SCH: 100 INJECTION, SOLUTION PARENTERAL at 22:09

## 2017-12-27 RX ADMIN — MORPHINE SULFATE PRN MG: 2 INJECTION, SOLUTION INTRAMUSCULAR; INTRAVENOUS at 11:27

## 2017-12-27 RX ADMIN — PIPERACILLIN AND TAZOBACTAM SCH: 3; .375 INJECTION, POWDER, LYOPHILIZED, FOR SOLUTION INTRAVENOUS; PARENTERAL at 18:22

## 2017-12-27 RX ADMIN — Medication SCH MG: at 09:46

## 2017-12-27 RX ADMIN — INSULIN HUMAN SCH UNITS: 100 INJECTION, SOLUTION PARENTERAL at 16:50

## 2017-12-27 RX ADMIN — INSULIN HUMAN SCH UNITS: 100 INJECTION, SOLUTION PARENTERAL at 12:00

## 2017-12-27 RX ADMIN — PIPERACILLIN AND TAZOBACTAM SCH MLS/HR: 3; .375 INJECTION, POWDER, LYOPHILIZED, FOR SOLUTION INTRAVENOUS; PARENTERAL at 05:39

## 2017-12-27 RX ADMIN — MORPHINE SULFATE PRN MG: 2 INJECTION, SOLUTION INTRAMUSCULAR; INTRAVENOUS at 00:33

## 2017-12-27 RX ADMIN — MORPHINE SULFATE PRN MG: 2 INJECTION, SOLUTION INTRAMUSCULAR; INTRAVENOUS at 16:13

## 2017-12-27 RX ADMIN — COLLAGENASE SANTYL SCH APPLIC: 250 OINTMENT TOPICAL at 09:49

## 2017-12-27 RX ADMIN — POLYETHYLENE GLYCOL 3350 SCH GM: 17 POWDER, FOR SOLUTION ORAL at 09:47

## 2017-12-27 RX ADMIN — INSULIN HUMAN SCH UNITS: 100 INJECTION, SOLUTION PARENTERAL at 08:15

## 2017-12-27 RX ADMIN — PIPERACILLIN AND TAZOBACTAM SCH MLS/HR: 3; .375 INJECTION, POWDER, LYOPHILIZED, FOR SOLUTION INTRAVENOUS; PARENTERAL at 11:28

## 2017-12-27 RX ADMIN — INSULIN DETEMIR SCH UNIT: 100 INJECTION, SOLUTION SUBCUTANEOUS at 22:05

## 2017-12-27 RX ADMIN — MORPHINE SULFATE PRN MG: 2 INJECTION, SOLUTION INTRAMUSCULAR; INTRAVENOUS at 20:42

## 2017-12-27 NOTE — PN
DATE:



SUBJECTIVE:  The patient is currently seen pacing around the room.  He

anticipates being discharged later today.  He will likely be going home on

IV antibiotic therapy and final arrangements are being made for him to have

a VAC apparatus for his left foot wound.



MEDICATIONS:  Medication list reviewed.  The patient is on CellCept,

Colace, Ecotrin, insulin, Lexapro, Lipitor, Lopressor, magnesium oxide,

MiraLax, p.r.n. morphine, Norvasc, Pepcid, prednisone, Prograf, Santyl,

Tylenol p.r.n., and IV Zosyn.



INTAKE/OUTPUT:   Intake 2000, output not charted.



OBJECTIVE:

VITAL SIGNS:  Blood pressure 134/59, temperature 98.4, respiratory rate 20

with a pulse of 70.

HEENT:  Exam shows him to be normocephalic, atraumatic.  Conjunctivae are

pink.  Sclerae are nonicteric.

NECK:  Supple.  No neck vein distention.

CHEST:  Clear to auscultation and percussion.  No rales, rhonchi, or

wheezing.

CARDIOVASCULAR:  Regular rate and rhythm without murmurs, rubs, or gallops.

GI:  Abdomen is soft.  Bowel sounds are normal.  No tenderness over his

right lower quadrant kidney transplant.  No rebound or guarding.

EXTREMITIES:  Show a dressing over his left foot area.  The VAC is

temporarily discontinued.  He is status post left foot surgery.  No lower

extremity edema.  He has a PICC line in his right upper extremity.



LABORATORY DATA AND IMAGING:  No labs were done today.  Yesterday's labs

showed a CBC, white blood cell count 9.9, hemoglobin 11.8 with a platelet

count of 238,000.  Chemistry showed normal electrolytes with a BUN of 20,

creatinine of 1.0.  Magnesium was 1.6.  Last glucose was 259. 

Microbiology:  Bone cultures are positive for Enterococcus faecalis.  Left

foot cultures were positive for Corynebacterium and Enterococcus faecalis.



ASSESSMENT:

1.  Stable kidney function in a patient with a kidney transplant.  The

patient will continue triple immunosuppressive therapy with no changes. 

The patient will continue oral magnesium supplements for his borderline

hypomagnesemia.  This is secondary to the use of Prograf.

2.  Left foot abscess with cellulitis and likely osteomyelitis.  Bone

cultures are positive for Enterococcus faecalis.  As per my discussion with

the discharge plan is, he will likely be discharged to home on IV

antibiotic therapy for a total of 46 weeks.  Final arrangements are being

made to obtain an outpatient VAC apparatus for him to use at home.

3.  History of arteriosclerotic heart disease, currently stable.

4.  History of diabetes mellitus.  Currently stable on insulin.

5.  History of hyperlipidemia, stable on diet and statin therapy.



PLAN:

1.  Continue triple immunosuppressive therapy.  Creatinine is stable at

1.0.  No change in transplant medications.

2.  Continue to monitor sugars closely, pt  on a long-acting and

short-acting insulin.

3.  Home IV antibiotic administration for total of 4 to 6 weeks for

osteomyelitis of his left lower extremity under the guidance of Dr. Bee.



The patient to have routine followup blood work for his transplant in the

outpatient setting which will include Prograf levels.





__________________________________________

Lonnie Alvarez MD



DD:  12/27/2017 11:29:10

DT:  12/27/2017 11:35:40

Job # [id number]



[email or web address]

## 2017-12-27 NOTE — CON
DATE:  12/26/2017



HISTORY OF PRESENT ILLNESS:  Mr. Mcqueen is a 57-year-old male who is status

post renal transplant.  He was admitted with left foot osteomyelitis.  Also

he has abscess on the right foot.  Status post debridement.  He has history

of renal transplant.  He is immunocompromised, he is on immunosuppressive

drugs Cellcept and Prograf.  He had wound VAC placed today.  Ambulating

with support.



PAST MEDICAL HISTORY:  Hypertension, diabetes mellitus type 2, peripheral

vascular disease, COPD, end-stage renal disease, status post renal

transplant, history of pacemaker placement, and coronary artery disease,

status post open heart surgery.



ALLERGIES:  NO KNOWN DRUG ALLERGIES.



HOME MEDICATIONS:  Aspirin 81 mg daily, Pepcid, insulin, Lopressor,

Prograf, CellCept and amlodipine.



SOCIAL HISTORY:  No history of alcohol abuse.  He is a heavy ex-smoker.



FAMILY HISTORY:  Noncontributory.



REVIEW OF SYSTEMS:  As per HPI.  Rest of 12-point review of systems

reviewed negative.



PHYSICAL EXAMINATION:

GENERAL:  Comfortable in bed in no acute distress.

VITAL SIGNS:  Temperature is 98.7, heart rate is 80 per minute, respiratory

rate is 18 per minute, blood pressure is 139/70.

HEENT:  Normal.  Pallor positive.

NECK:  Supple.  No lymphadenopathy.

CHEST:  Air entry present and equal bilaterally.  No added sounds.

CARDIOVASCULAR:  S1 and S2 normal.  No murmur and no gallop.

GASTROINTESTINAL:  Abdomen is soft and nontender.  No hepatosplenomegaly.

EXTREMITIES:  Bilateral dressing.  Left foot in dressing.



ASSESSMENT AND PLAN:

1.  Osteomyelitis left side status post amputation.

2.  Severe peripheral vascular disease.

3.  Hypertension.

4.  Coronary artery disease.

5.  Immunocompromise status post renal transplant on immunosuppressive

drugs.

6.  Anemia, leukocytosis.



PLAN:  Currently, he is on IV antibiotic as per ID, piperacillin and IV

antibiotic Zosyn as per ID and ID is following.  He is being followed by a

podiatrist.  Continue CellCept 750 mg p.o. b.i.d., prednisone 5 mg daily,

and Prograf 2 mg p.o. b.i.d.  We will continue insulin and blood sugar is

controlled on the current medication and Norvasc 10 mg daily.  Discharge

planning.





__________________________________________

Yoly Smith MD







DD:  12/26/2017 23:14:14

DT:  12/26/2017 23:20:01

Job # [id number]

## 2017-12-28 VITALS — DIASTOLIC BLOOD PRESSURE: 64 MMHG | SYSTOLIC BLOOD PRESSURE: 136 MMHG | HEART RATE: 80 BPM

## 2017-12-28 VITALS — RESPIRATION RATE: 18 BRPM | TEMPERATURE: 97.6 F | OXYGEN SATURATION: 98 %

## 2017-12-28 RX ADMIN — PIPERACILLIN AND TAZOBACTAM SCH MLS/HR: 3; .375 INJECTION, POWDER, LYOPHILIZED, FOR SOLUTION INTRAVENOUS; PARENTERAL at 05:31

## 2017-12-28 RX ADMIN — Medication SCH MG: at 09:31

## 2017-12-28 RX ADMIN — MORPHINE SULFATE PRN MG: 2 INJECTION, SOLUTION INTRAMUSCULAR; INTRAVENOUS at 07:59

## 2017-12-28 RX ADMIN — INSULIN HUMAN SCH UNITS: 100 INJECTION, SOLUTION PARENTERAL at 07:59

## 2017-12-28 RX ADMIN — PIPERACILLIN AND TAZOBACTAM SCH MLS/HR: 3; .375 INJECTION, POWDER, LYOPHILIZED, FOR SOLUTION INTRAVENOUS; PARENTERAL at 00:07

## 2017-12-28 RX ADMIN — POLYETHYLENE GLYCOL 3350 SCH GM: 17 POWDER, FOR SOLUTION ORAL at 09:31

## 2017-12-28 NOTE — PN
SUBJECTIVE:  The patient is in bed, in no acute distress, nontoxic.



PHYSICAL EXAMINATION:

VITAL SIGNS:  Temperature is 97, blood pressure is 120/70, and respiratory

rate of 16.

HEENT:  Unremarkable.

NECK:  Supple.

LUNGS:  Decreased breath sounds.

HEART:  Normal S1 and S2.

ABDOMEN:  Soft and nontender.



LABORATORY DATA:  Examination reveals the white count is 9.9.  The

chemistries are noted and microbiology is noted with enterococcus and

corynebacterium.



ASSESSMENT AND PLAN:  This is a 57-year-old male who was seen early this

morning with peripheral vascular disease, chronic obstructive lung disease,

congestive heart failure, myocardial infarction, left foot ganglion and

cellulitis status post debridement, enterococcus from the bone culture,

Corynebacterium, and unable to get my pathology report _____.  We will

treat his osteomyelitis.  Currently on Zosyn.





__________________________________________

Duc Bee MD

DD:  12/27/2017 22:40:11

DT:  12/27/2017 22:57:12

Job # [id number]

## 2017-12-28 NOTE — PN
DATE:  12/28/2017



SUBJECTIVE:  The patient is in bed, in no acute distress, and nontoxic.



PHYSICAL EXAMINATION:

VITAL SIGNS:  On exam, temperature is 97, blood pressure is 130/60,

respiratory rate of 18, and heart rate of 80.

HEENT:  Examination of HEENT is unremarkable.

NECK:  Supple.

LUNGS:  Have decreased breath sounds.

HEART:  Normal S1 and S2.

GASTROINTESTINAL:  Abdominal examination is soft.



LABORATORY DATA:  Laboratory examination reveals a white count is 9.9,

hemoglobin of 11, and platelets of 238.  BUN of 20 and creatinine of 1.0. 

The bone cultures are noted to be Enterococcus.



ASSESSMENT AND PLAN:  This is a 57-year-old male seen earlier this morning

in room 575, bed 1 with a severe peripheral vascular disease, chronic

obstructive lung disease, congestive heart failure, myocardial infarction,

left foot cellulitis, status post debridement, Enterococcus.  The patient

to be treated his osteomyelitis as outpatient.







__________________________________________

Duc Bee MD







DD:  12/28/2017 13:13:26

DT:  12/28/2017 13:18:15

Job # [id number]

## 2017-12-28 NOTE — PN
DATE:



SUBJECTIVE:  The patient is currently seen sitting up in bed.  He is likely

going to be discharged home later today.  He has always supplies for the

VAC apparatus at home.  He will likely be returning to the infusion center

for IV antibiotic therapy for the Enterococcus faecalis infection in his

foot/osteomyelitis.



MEDICATIONS:  Medication list reviewed.  The patient is on CellCept,

Colace, Ecotrin, insulin, Lexapro, Lipitor, Lopressor, magnesium oxide,

MiraLax, p.r.n. morphine, Norvasc, Pepcid, prednisone, Prograf, Santyl,

Tylenol p.r.n., and Zosyn.



OBJECTIVE:

INTAKE AND OUTPUT:  Intake 1600, output not charted.

VITAL SIGNS:  Blood pressure 136/64, temperature 97.6, and respiratory rate

of 18 with pulse of 80.

HEENT:  Shows him to be normocephalic, atraumatic.  Conjunctivae are pink. 

Sclerae are nonicteric.

NECK:  Supple.  No neck vein distention.

CHEST:  Clear to auscultation and percussion.  No rales, rhonchi or

wheezing.

CARDIOVASCULAR:  Shows a regular rate and rhythm without audible murmurs,

rubs or gallops.

ABDOMEN:  Bowel sounds normal.  No tenderness over his right lower quadrant

kidney transplant.  No rebound, no guarding.  No masses.

EXTREMITIES:  Dressing over his left foot with VAC apparatus in place.  He

is status post left foot surgery.  No lower extremity edema.  He has a PICC

line indwelling in his right upper extremity.



LABORATORY DATA AND IMAGING:  No recent labs were done.  The patient's last

creatinine on 12/26/2017 was 1.0, which is at his baseline.  His last CBC

on 12/26/2017 white blood cell count 9.0 with the hemoglobin of 11.8. 

Microbiology cultures were positive for Enterococcus faecalis in the bone

and Corynebacterium and Enterococcus faecalis in the left foot.



ASSESSMENT:

1.  Stable kidney function in a patient with a kidney transplant.  The

patient will continue triple immunosuppressive therapy.  No alterations

were made in his regimen during the hospitalization.  In light of his

borderline low magnesium levels, we will continue magnesium supplements.

2.  Left foot abscess with cellulitis, likely osteomyelitis:  Bone cultures

are positive for Enterococcus faecalis.  The patient will likely need 4-6

weeks of antibiotic therapy.  He states that he will likely be coming back

to the infusion center to receive at least part of his IV antibiotic

therapy.

3.  History of arteriosclerotic heart disease, currently stable.

4.  History of diabetes mellitus, currently stable on insulin regimen.

5.  History of hyperlipidemia, stable on diet and statin therapy.



PLAN

1..  The patient will follow up with Dr. Calderon in the outpatient

setting.  He serves as his internist and nephrologist.  The patient will

continue triple immunosuppressive therapy with no changes.  The patient

will likely have Prograf levels checked in the outpatient setting.

2.  Continue IV antibiotic therapy as per Dr. Bee for the

above-mentioned 4-6-week total course of antibiotics.

3.  The patient will have visiting nurse service come to the house to

assist him in use of VAC apparatus.





__________________________________________

Lonnie Alvarez MD





DD:  12/28/2017 9:37:44

DT:  12/28/2017 9:44:12

Job # [id number]

## 2017-12-28 NOTE — CP.PCM.PN
Subjective





- Date & Time of Evaluation


Date of Evaluation: 12/28/17


Time of Evaluation: 12:02





- Subjective


Subjective: 





Podiatry Progress Note- Dr. Benjamin Goel





57 year old male patient seen and evaluated at bedside POD #9 irrigation and 

debridement of left eschar gangrene foot and abscess of left foot and POD#7  

partial 1st metatarsal ostectomy, irrigation and debridement of left foot 

ulceration with application of wound vac. Patient is seen resting comfortably 

at bedside with dressing c/d/i. Patient reports that he is in no pain today. He 

denies n/v/sob/cp/chills/f or d. Patient has no new complaints today. He denies 

overnight events. 





Objective





- Vital Signs/Intake and Output


Vital Signs (last 24 hours): 


 











Temp Pulse Resp BP Pulse Ox


 


 97.6 F   80   18   136/64   98 


 


 12/28/17 07:30  12/28/17 07:59  12/28/17 07:30  12/28/17 09:32  12/28/17 07:30








Intake and Output: 


 











 12/28/17 12/28/17





 06:59 18:59


 


Intake Total 720 


 


Output Total 0 


 


Balance 720 














- Medications


Medications: 


 Current Medications





Acetaminophen (Tylenol 325mg Tab)  650 mg PO Q4H PRN


   PRN Reason: Pain, Mild (1-3)


Amlodipine Besylate (Norvasc)  10 mg PO DAILY [facility]


   Last Admin: 12/28/17 09:32 Dose:  10 mg


Aspirin (Ecotrin)  81 mg PO 0800 [facility]


   Last Admin: 12/28/17 07:59 Dose:  81 mg


Atorvastatin Calcium (Lipitor)  40 mg PO HS [facility]


   Last Admin: 12/27/17 22:05 Dose:  40 mg


Collagenase (Santyl)  0 gm TOP DAILY [facility]


   Last Admin: 12/27/17 09:49 Dose:  1 applic


Docusate Sodium (Colace)  100 mg PO DAILY [facility]


   Last Admin: 12/28/17 09:31 Dose:  100 mg


Escitalopram Oxalate (Lexapro)  5 mg PO DAILY [facility]


   Last Admin: 12/28/17 09:31 Dose:  5 mg


Famotidine (Pepcid)  20 mg PO DAILY [facility]


   Last Admin: 12/28/17 09:32 Dose:  20 mg


Home Med (Home Med)  2 unit PO Q12 [facility]


   Last Admin: 12/28/17 09:40 Dose:  Not Given


Piperacillin Sod/Tazobactam Sod (Zosyn 3.375 In Ns 100ml)  100 mls @ 200 mls/hr 

IVPB Q6 [facility]


   PRN Reason: Protocol


   Last Admin: 12/28/17 05:31 Dose:  200 mls/hr


Insulin Detemir (Levemir)  40 unit SC HS [facility]


   Last Admin: 12/27/17 22:05 Dose:  40 unit


Insulin Human Regular (Humulin R Med)  0 units SC ACHS [facility]


   PRN Reason: Protocol


   Last Admin: 12/28/17 07:59 Dose:  3 units


Magnesium Oxide (Mag-Ox)  400 mg PO DAILY [facility]


   Last Admin: 12/28/17 09:31 Dose:  400 mg


Metoprolol Tartrate (Lopressor)  50 mg PO 0800,1800 [facility]


   Last Admin: 12/28/17 07:59 Dose:  50 mg


Mycophenolate Mofetil (Cellcept Cap)  750 mg PO BID [facility]


   Last Admin: 12/28/17 09:40 Dose:  Not Given


Oxycodone/Acetaminophen (Percocet 5/325 Mg Tab)  2 tab PO Q6H PRN


   PRN Reason: Pain, severe (8-10)


   Stop: 12/31/17 09:43


Polyethylene Glycol (Miralax)  17 gm PO DAILY [facility]


   Last Admin: 12/28/17 09:31 Dose:  17 gm


Prednisone (Prednisone Tab)  5 mg PO 0800 [facility]


   Last Admin: 12/28/17 07:59 Dose:  5 mg


Tacrolimus (Prograf Cap)  1.5 mg PO HS [facility]


   Last Admin: 12/27/17 22:04 Dose:  1.5 mg


Tacrolimus (Prograf Cap)  2 mg PO DAILY [facility]


   Last Admin: 12/28/17 09:31 Dose:  2 mg











- Labs


Labs: 


 





 12/26/17 06:00 





 12/26/17 06:00 





 











PT  12.7 SECONDS (9.4-12.5)  H  12/19/17  11:45    


 


INR  1.15  (0.93-1.08)  H  12/19/17  11:45    


 


APTT  29.5 Seconds (25.1-36.5)   12/19/17  11:45    














- Constitutional


Appears: Well, Non-toxic, No Acute Distress





- Extremities Exam


Additional comments: 


 WoundVAC present to left foot @ 125mmHg continuous; canister noted to have 

approximately 100cc of serosanguinous drainage red/brown in color





Vasc: DP pulses palpable 2/4 b/l. PT pulse 2/4 right foot and nonpalpable left 

foot likely secondary to edema. Temperature gradient to the lower extremity 

WNL. Nonpitting edema noted to left foot. CFT < 3 sec to all digits on R foot.





Derm: 





Right foot plantar heel exhibits stable 0.5 diameter circular ulceration with 

granular wound base and hyperkeratotic rim. No active drainage, no malodor, no 

fluctuance - no clinical signs of infection noted at this time. 





Left foot TMA surgical site measuring approximately 8 x 5 x 3 cm. Wound base is 

85% fibrotic with 15% granular tissue noted, periwound is macerated but intact, 

decreased erythema from initial encounter this visit, no tunneling, undermining

, tracking, probe to bone noted, less odorous noted. Minimal serous drainage 

noted at this time. 





Left foot lateral ulceration present with fibrotic base. No tunneling, 

undermining, tracking, no probe to bone, no calor, no active drainage, no 

streaking, no erythema noted.





Neuro: Protective sensation grossly diminished.





Ortho: Very mild tenderness to palpation left foot TMA stump, plantar medial 

arch, and heel.





- Psychiatric Exam


Psychiatric exam: Normal Affect, Normal Mood





Assessment and Plan





- Assessment and Plan (Free Text)


Assessment: 


57 year old male with 


1) Right foot non-healing ulceration 


2) POD #8 irrigation and debridement of left eschar gangrene foot and abscess 

of left foot (DOS 12/19/17)


3) POD#6  partial 1st metatarsal ostectomy, irrigation and debridement of left 

foot ulceration with application of wound vac (DOS 12/21/17)





Plan: 





Patient seen and evaluated at bedside with attending, Dr. Goel in the AM


Discussed plan in detial with attending, Dr. Benjamin Goel


Portable wound vac applied to L LE today. Will f/u with Dr. Goel in office to 

change Wound VAC 2-3 times weekly


Weight Bearing Status:


   Pt to be NWB to the L LE with assistant device; Patient must keep VAC on at 

all times and may walk with machine on IV pole do not disconnect


   R WBAT in rigid offloading boot


Pathology report of left foot, stump tissue, debridement reveals acute 

osteomyelitis


   Bone culture final report - enterococcus faecalis


   Wound culture final report - Corynebacterium Species, enterococcus faecalis


C/W Abx per ID as planned


Podiatry will continue to provide local wound care as outpatient


Patient is stable per podiatry standpoint for discharge.


Will f/u with Dr Goel in office within 1 week.

## 2018-02-08 ENCOUNTER — HOSPITAL ENCOUNTER (EMERGENCY)
Dept: HOSPITAL 42 - ED | Age: 58
Discharge: HOME | End: 2018-02-08
Payer: MEDICARE

## 2018-02-08 VITALS — BODY MASS INDEX: 23.8 KG/M2

## 2018-02-08 VITALS — OXYGEN SATURATION: 98 % | SYSTOLIC BLOOD PRESSURE: 146 MMHG | DIASTOLIC BLOOD PRESSURE: 78 MMHG | HEART RATE: 76 BPM

## 2018-02-08 VITALS — TEMPERATURE: 98.1 F

## 2018-02-08 VITALS — RESPIRATION RATE: 18 BRPM

## 2018-02-08 DIAGNOSIS — M25.512: ICD-10-CM

## 2018-02-08 DIAGNOSIS — Z23: ICD-10-CM
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
## 2018-02-08 DIAGNOSIS — M79.641: ICD-10-CM

## 2018-02-08 DIAGNOSIS — M79.642: ICD-10-CM
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
## 2018-02-08 DIAGNOSIS — S60.511A: ICD-10-CM

## 2018-02-08 DIAGNOSIS — S22.32XA: ICD-10-CM

## 2018-02-08 DIAGNOSIS — S09.90XA: Primary | ICD-10-CM

## 2018-02-08 PROCEDURE — 73030 X-RAY EXAM OF SHOULDER: CPT

## 2018-02-08 PROCEDURE — 96372 THER/PROPH/DIAG INJ SC/IM: CPT

## 2018-02-08 PROCEDURE — 90471 IMMUNIZATION ADMIN: CPT

## 2018-02-08 PROCEDURE — 99285 EMERGENCY DEPT VISIT HI MDM: CPT

## 2018-02-08 PROCEDURE — 71250 CT THORAX DX C-: CPT

## 2018-02-08 PROCEDURE — 73130 X-RAY EXAM OF HAND: CPT

## 2018-02-08 PROCEDURE — 70450 CT HEAD/BRAIN W/O DYE: CPT

## 2018-02-08 PROCEDURE — 71101 X-RAY EXAM UNILAT RIBS/CHEST: CPT

## 2018-02-08 PROCEDURE — 90715 TDAP VACCINE 7 YRS/> IM: CPT

## 2018-02-08 NOTE — RAD
PROCEDURE:  Radiographs of the Left Shoulder



HISTORY:

shoulder pain







COMPARISON:

No prior.



FINDINGS:



BONES:

Normal. No fracture.



JOINTS:

Normal. Glenohumeral and acromioclavicular joints preserved. No 

osteoarthritis.



SOFT TISSUES:

Normal.



OTHER FINDINGS:

None. 



IMPRESSION:

Normal radiographs of the left shoulder.

## 2018-02-08 NOTE — ED PDOC
Arrival/HPI





- General


Chief Complaint: Trauma


Time Seen by Provider: 02/08/18 09:56


Historian: Patient





- History of Present Illness


Narrative History of Present Illness (Text): 





02/08/18 11:02


57-year-old male presents today with left-sided upper anterior rib pain status 

post fall. Patient states he tripped on his postoperative boot and fell forward 

landing on both hands and the left anterior upper ribs. pt c/o pain with deep 

inspiration. pt states a few months ago he also had fallen injuring his ribs. 

pt states he wasn't evaluated by believes that he broke ribs on the left side 

at that time. pt c/o left shoulder pain and pain to the left hand. pt denies 

wrist pain. c/o abrasions to right hand/fingers. unsure of last tetanus shot. 

pt denies dizziness or weakness. denies headache. 


Time/Duration: Prior to Arrival





Past Medical History





- Provider Review


Nursing Documentation Reviewed: Yes





- Travel History


Have you recently traveled outside [place] w/in the past 3 mons?: No





- Past History


Past History: No Previous





- Infectious Disease


Hx of Infectious Diseases: None





- Tetanus Immunization


Tetanus Immunization: Unknown





- Cardiac


Hx Pacemaker: Yes





- Pulmonary


Hx Chronic Obstructive Pulmonary Disease (COPD): Yes





- Neurological


Hx Paralysis: No





- HEENT


Hx HEENT Disorder: No





- Renal


Hx Renal Failure: Yes





- Endocrine/Metabolic


Hx Diabetes Mellitus Type 2: Yes





- Hematological/Oncological


Hx Cancer: No





- Integumentary


Hx Dermatological Disorder: Yes


Other/Comment: LEFT BIG TOE POST AMPUTATION-GANGRENE TO AREA AMPUTATED.7-24-17.

  ALL TOES LEFT FOOT AMPUTATED, DATE UNKNOWN





- Musculoskeletal/Rheumatological


Hx Musculoskeletal Disorders: Yes





- Gastrointestinal


Hx Gastrointestinal Disorders: Yes (CONSTIPATION,INCISIONAL HERNIA)





- Genitourinary/Gynecological


Hx Genitourinary Disorders: Yes (KIDNEY TRANSPLANT)





- Psychiatric


Hx Emotional Abuse: No


Hx Physical Abuse: No


Hx Substance Use: No





- Surgical History


Hx Mastectomy: No





- Anesthesia


Hx Anesthesia Reactions: No


Hx Malignant Hyperthermia: No





- Suicidal Assessment


Feels Threatened In Home Enviroment: No





Family/Social History





- Physician Review


Nursing Documentation Reviewed: Yes


Family/Social History: Unknown Family HX


Smoking Status: Former Smoker


Hx Alcohol Use: No


Hx Substance Use: No


Hx Substance Use Treatment: No





Allergies/Home Meds


Allergies/Adverse Reactions: 


Allergies





No Known Allergies Allergy (Verified 02/08/18 12:52)


 








Home Medications: 


 Home Meds











 Medication  Instructions  Recorded  Confirmed


 


Atorvastatin Calcium [Lipitor] 40 mg PO DIN 10/30/14 02/08/18


 


Escitalopram [Lexapro] 5 mg PO QAM 10/30/14 02/08/18


 


Mycophenolate Sodium [Myfortic] 360 mg PO BID 10/30/14 02/08/18


 


Insulin Aspart, Recombinant 0 unit SQ ACTID 01/10/18 02/08/18





[Novolog]   


 


Insulin Detemir [Levemir] 0 unit SC ACHS 02/01/18 02/08/18


 


Sulfamethoxazole/Trimethoprim 1 each PO DAILY 02/01/18 02/08/18





[Bactrim 400-80 mg Tablet]   


 


Tacrolimus [Prograf Cap] 0.5 mg PO HS 02/01/18 02/08/18


 


Tacrolimus [Prograf Cap] 2 mg PO BID 02/01/18 02/08/18


 


Famotidine [Pepcid] 20 mg PO QAM 02/08/18 02/08/18


 


amLODIPine [Norvasc] 10 mg PO QAM 02/08/18 02/08/18














Review of Systems





- Review of Systems


Constitutional: absent: Fatigue, Fevers


Eyes: absent: Vision Changes, Photophobia, Eye Pain


ENT: absent: Sinus Congestion


Respiratory: absent: SOB, Cough


Cardiovascular: Other (left anterior rib pain).  absent: Palpitations


Gastrointestinal: absent: Abdominal Pain, Nausea, Vomiting


Genitourinary Male: absent: Dysuria, Frequency


Musculoskeletal: Arthralgias.  absent: Back Pain, Neck Pain


Skin: Rash


Neurological: absent: Headache, Dizziness


Psychiatric: absent: Anxiety





Physical Exam


Vital Signs Reviewed: Yes


Vital Signs











  Temp Pulse Resp BP Pulse Ox


 


 02/08/18 12:51   79  18  153/67 H  96


 


 02/08/18 12:18   83  18  132/79  100


 


 02/08/18 10:18  98.1 F  90  17  164/67 H  99


 


 02/08/18 10:08  98.1 F  90  17   99











Temperature: Afebrile


Blood Pressure: Normal


Pulse: Regular


Respiratory Rate: Normal


Appearance: Positive for: Well-Appearing, Non-Toxic, Comfortable


Pain Distress: None


Mental Status: Positive for: Alert and Oriented X 3





- Systems Exam


Head: Present: Atraumatic.  No: Tenderness, Abrasion, Laceration


Pupils: Present: PERRL


Extroacular Muscles: Present: EOMI


Mouth: Present: Moist Mucous Membranes


Neck: Present: Normal Range of Motion.  No: MIDLINE TENDERNESS, Paraspinal 

Tenderness


Respiratory/Chest: Present: Clear to Auscultation, Good Air Exchange, Tender to 

Palpation (+ ttp over left anterior chest, left upper lateral ribs), Other (no 

step offs or crepitus).  No: Respiratory Distress, Accessory Muscle Use, Wheezes

, Rales, Retracting, Rhonchi, Tachypneic


Cardiovascular: Present: Regular Rate and Rhythm


Abdomen: No: Tenderness, Distention, Rebound, Guarding


Back: Present: Normal Inspection.  No: Midline Tenderness, Paraspinal Tenderness


Upper Extremity: Present: Normal ROM, NORMAL PULSES, Tenderness (right hand; 

there are superficial skin avusions noted to the right 2nd and 3rd fingers at 

the distal tip; full rom of hand; left shoulder; + ttp over anterior aspect of 

shoulder; full rom; left hand; + ttp over dorsal aspect of hand; no edema, no 

erythema; no ecchymosis; full rom of hand. sensation intact. ), Capillary 

Refill < 2s.  No: Swelling, Deformity


Neurological: Present: GCS=15


Skin: Present: Warm, Dry


Psychiatric: Present: Alert, Oriented x 3





Medical Decision Making


ED Course and Treatment: 





02/08/18 11:30


57-year-old male with left anterior rib pain and bilateral hand pain and left 

shoulder pain status post mechanical trip and fall.








Head CT:FINDINGS:


HEMORRHAGE:


No intracranial hemorrhage. 


BRAIN:


No mass effect or edema.  No atrophy or chronic microvascular ischemic changes.


VENTRICLES:


Unremarkable. No hydrocephalus. 


CALVARIUM:


Unremarkable.


PARANASAL SINUSES:


Unremarkable as visualized. No significant inflammatory changes.


MASTOID AIR CELLS:


Unremarkable as visualized. No inflammatory changes.


OTHER FINDINGS:


None.


IMPRESSION:


No acute intracranial finding











Left ribs, PA chest: ? fx rib; will do CT chest to r/o fx





hand bilateral x-rays:no fracture





Left shoulder x-ray: no fracture








Tetanus updated.


Percocet given for pain.











Patient reassessment: pt with continued pain; morphine added. 








ct chest;  FINDINGS:


LUNGS:


There is a dense linear scar in the right upper lobe.  There is no evidence of 

a discrete mass. 


MEDIASTINUM:


Unremarkable thoracic aorta. No aneurysm. Coronary artery calcification. Main 

pulmonary artery unremarkable. No vascular congestion. No lymphadenopathy.


PLEURA:


No pleural fluid. No pneumothorax.


BONES:


There is a transverse minimally displaced fracture of the posterior left 6th 

rib. The finding is seen on image 53 of series 3


UPPER ABDOMEN:


Grossly unremarkable.


OTHER FINDINGS:


None.


IMPRESSION:


Minimally displaced fracture of the left posterior 6th rib.


Linear parenchymal scarring in the right upper lobe





02/08/18 14:40


pt reassessment; pt feeling better with medications; vitals stable. no 

distress. abdomen soft non tender, non distended. 





wounds on right hand were cleaned and irrigated well; bacitracin and dressing 

applied. 





discussed all results in depth with patient; advised patient of rib fracture 

and need for close f/u and USe of incentive spirometer. 





pt states he has Incentive spirometer at home; 


pt was advised to apply ice frequently, USE incentive spirometer frequently. pt 

was advised to f/u with PMD and f/u with orthopedist for left shoulder pain. pt 

was advised immediate return if symptoms worsen,persist or if new symptoms 

develop. 





Patient verbalizes understanding of discharge instructions and need for 

immediate followup.








all aspects of this case were discussed the attending of record. 








impression; rib fracture, head injury, fall, shoulder pain, hand pain, abrasion

, hand


Take Percocet every 6 hours as needed for pain


Apply ice frequently


Use incentive spirometer frequently


Follow-up with a primary care physician within the next 2 days


Follow-up with the orthopedist within the next 2 days


keep wounds clean and dry. Apply bacitracin twice daily.


Return immediately if symptoms worsen or persist or if new concerning symptoms 

develop; high fevers, increasing pain, shortness of breath, chest pain, 

abdominal pain, vomiting, or if any other concerning symptoms develop. 





Reassessment Condition: Re-examined, Improved





- RAD Interpretation


Radiology Orders: 








02/08/18 10:28


HAND 3 VIEWS BI [RAD] Stat 


RIBS LEFT & PA CHEST [RAD] Stat 





02/08/18 10:29


HEAD W/O CONTRAST [CT] Stat 





02/08/18 10:44


SHOULDER LEFT [RAD] Stat 





02/08/18 12:46


CHEST W/O CONTRAST [CT] Stat 














- Medication Orders


Current Medication Orders: 











Discontinued Medications





Morphine Sulfate (Morphine)  2 mg IM STAT STA


   Stop: 02/08/18 13:23


   Last Admin: 02/08/18 14:08  Dose: 2 mg





MAR Pain Assessment


 Document     02/08/18 14:08  GMD  (Rec: 02/08/18 14:08  GMD  [facility] [id number])


     Pain Reassessment


      Is this a pain reassessment?               No


     Sleep


      Is patient sleeping during reassessment?   No


     Presence of Pain


      Presence of Pain                           Yes


IM Administration Charges


 Document     02/08/18 14:08  GMD  (Rec: 02/08/18 14:08  GMD  [facility] [id number])


     Injection Site


      MAR Injection Site                         Left Deltoid


     Charges for Administration


      # of IM Administrations                    1





Oxycodone/Acetaminophen (Percocet 5/325 Mg Tab)  1 tab PO STAT STA


   Stop: 02/08/18 10:29


   Last Admin: 02/08/18 10:57  Dose: 1 tab





MAR Pain Assessment


 Document     02/08/18 10:57  GMD  (Rec: 02/08/18 10:57  GMD  [facility] [id number])


     Pain Reassessment


      Is this a pain reassessment?               No


     Presence of Pain


      Presence of Pain                           Yes





Oxycodone/Acetaminophen (Percocet 5/325 Mg Tab)  1 tab PO STAT STA


   Stop: 02/08/18 10:36


   Last Admin: 02/08/18 10:57  Dose: 1 tab





MAR Pain Assessment


 Document     02/08/18 10:57  GMD  (Rec: 02/08/18 10:57  GMD  [facility] [id number])


     Pain Reassessment


      Is this a pain reassessment?               No


     Presence of Pain


      Presence of Pain                           Yes





Tetanus/Reduced Diphtheria/Acell Pertussis (Boostrix Vaccine Inj)  0.5 ml IM 

.ONCE ONE


   Stop: 02/08/18 10:29


   Last Admin: 02/08/18 14:08  Dose: 0.5 ml





MAR Immunization Data


 Document     02/08/18 14:08  GMD  (Rec: 02/08/18 14:08  GMD  [facility] [id number])


     Immunization Data


      Vaccine Information Sheet Given            No


Immunization Registry


 Document     02/08/18 14:08  GMD  (Rec: 02/08/18 14:08  GMD  [facility] [id number])


      Immunization Registry Consent Date         11/10/17














Disposition/Present on Arrival





- Present on Arrival


Any Indicators Present on Arrival: No


History of DVT/PE: No


History of Uncontrolled Diabetes: No


Urinary Catheter: No


History of Decub. Ulcer: No


History Surgical Site Infection Following: None





- Disposition


Have Diagnosis and Disposition been Completed?: Yes


Diagnosis: 


 Rib fracture, Head injury, Abrasion hand, Hand pain, Shoulder pain





Disposition: HOME/ ROUTINE


Disposition Time: 14:47


Patient Plan: Discharge


Patient Problems: 


 Current Active Problems











Problem Status Onset


 


Abrasion hand Acute  


 


Hand pain Acute  


 


Head injury Acute  


 


Rib fracture Acute  


 


Shoulder pain Acute  











Condition: GOOD


Discharge Instructions (ExitCare):  Rib Fracture (ED), Head Injury (ED), 

Abrasion (ED), Shoulder Pain (ED)


Additional Instructions: 


Take Percocet every 6 hours as needed for pain


Apply ice frequently


Use incentive spirometer frequently


Follow-up with a primary care physician within the next 2 days


Follow-up with the orthopedist within the next 2 days


Keep wounds clean and dry; apply bacitracin twice daily.


Return immediately if symptoms worsen or persist or if new symptoms develop; 

high fevers, increasing pain, shortness of breath, chest pain, abdominal pain, 

vomiting, or if any other concerning symptoms develop. 


Referrals: 


Andrea Calderon MD [Primary Care Provider] - Follow up with primary


Kyler Geiger III, MD [Medical Doctor] - Follow up with primary


Forms:  [employer] (English)

## 2018-02-08 NOTE — CT
PROCEDURE:  CT HEAD WITHOUT CONTRAST.



HISTORY:

fall, head injury



COMPARISON:

None available. 



TECHNIQUE:

Axial computed tomography images were obtained through the head/brain 

without intravenous contrast.  



Radiation dose:



Total exam DLP = 859 mGy-cm.



This CT exam was performed using one or more of the following dose 

reduction techniques: Automated exposure control, adjustment of the 

mA and/or kV according to patient size, and/or use of iterative 

reconstruction technique.



FINDINGS:



HEMORRHAGE:

No intracranial hemorrhage. 



BRAIN:

No mass effect or edema.  No atrophy or chronic microvascular 

ischemic changes.



VENTRICLES:

Unremarkable. No hydrocephalus. 



CALVARIUM:

Unremarkable.



PARANASAL SINUSES:

Unremarkable as visualized. No significant inflammatory changes.



MASTOID AIR CELLS:

Unremarkable as visualized. No inflammatory changes.



OTHER FINDINGS:

None.



IMPRESSION:

No acute intracranial finding

## 2018-02-08 NOTE — RAD
PROCEDURE:  Radiographs of the Chest and Left Ribs.



HISTORY:

fall, left upper/mid rib pain



COMPARISON:

CT same day. 



TECHNIQUE:

Frontal radiograph of the chest and multiple oblique radiographs of 

the left ribs were obtained.



FINDINGS:



LEFT RIBS:

No fracture or focal lesion visualized. The CT scan showed a 

minimally displaced fracture of the left 6th rib. This is not visible 

on plain film 



LUNGS:

Clear.



PLEURA:

No pneumothorax or pleural fluid.



CARDIOVASCULAR:

Normal sized heart. No pulmonary vascular congestion.



OTHER FINDINGS:

None.



IMPRESSION:

Unremarkable radiographs of the chest and left ribs. No left rib 

fracture.  See comments

## 2018-02-08 NOTE — CT
PROCEDURE:  CT Chest without contrast



HISTORY:

left sided rib pain s/p fall



COMPARISON:

Chest and left ribs same day



TECHNIQUE:

Contiguous axial images were obtained through the chest without 

intravenous contrast enhancement. Sagittal and coronal 

reconstructions were performed.







Radiation dose (DLP): 365 mGy-cm. 



This CT exam was performed using one or more of the following dose 

reduction techniques: Automated exposure control, adjustment of the 

mA and/or kV according to patient size, and/or use of iterative 

reconstruction technique.



FINDINGS:



LUNGS:

There is a dense linear scar in the right upper lobe.  There is no 

evidence of a discrete mass. 



MEDIASTINUM:

Unremarkable thoracic aorta. No aneurysm. Coronary artery 

calcification. Main pulmonary artery unremarkable. No vascular 

congestion. No lymphadenopathy.



PLEURA:

No pleural fluid. No pneumothorax.



BONES:

There is a transverse minimally displaced fracture of the posterior 

left 6th rib. The finding is seen on image 53 of series 3



UPPER ABDOMEN:

Grossly unremarkable.



OTHER FINDINGS:

None.



IMPRESSION:

Minimally displaced fracture of the left posterior 6th rib.



Linear parenchymal scarring in the right upper lobe

## 2018-02-08 NOTE — RAD
PROCEDURE:  Bilateral hand radiographs.



HISTORY:

fall, hand pain



COMPARISON:

None.



FINDINGS:



BONES:

Right Hand: Normal. No osteoarthritic changes.



Left Hand:   Normal. No osteoarthritic changes.



JOINTS:

Right Hand: Normal.



Left Hand: Normal.



SOFT TISSUES:

Right Hand: Normal.



Left Hand: Normal.



OTHER FINDINGS:

None.



IMPRESSION:

Normal radiographs of the hands.

## 2018-02-12 ENCOUNTER — HOSPITAL ENCOUNTER (INPATIENT)
Dept: HOSPITAL 42 - SDS | Age: 58
LOS: 16 days | Discharge: NURSING, REHAB OR LONG-TERM CARE | DRG: 464 | End: 2018-02-28
Attending: GENERAL PRACTICE | Admitting: GENERAL PRACTICE
Payer: COMMERCIAL

## 2018-02-12 VITALS — BODY MASS INDEX: 23.6 KG/M2

## 2018-02-12 DIAGNOSIS — D64.9: ICD-10-CM

## 2018-02-12 DIAGNOSIS — E11.621: ICD-10-CM

## 2018-02-12 DIAGNOSIS — I99.8: ICD-10-CM

## 2018-02-12 DIAGNOSIS — E11.51: ICD-10-CM

## 2018-02-12 DIAGNOSIS — S91.302A: ICD-10-CM

## 2018-02-12 DIAGNOSIS — Z95.1: ICD-10-CM
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
## 2018-02-12 DIAGNOSIS — T87.40: Primary | ICD-10-CM

## 2018-02-12 DIAGNOSIS — Z87.891: ICD-10-CM

## 2018-02-12 DIAGNOSIS — J43.9: ICD-10-CM

## 2018-02-12 DIAGNOSIS — I25.10: ICD-10-CM

## 2018-02-12 DIAGNOSIS — E78.00: ICD-10-CM

## 2018-02-12 DIAGNOSIS — Z95.0: ICD-10-CM

## 2018-02-12 DIAGNOSIS — D72.829: ICD-10-CM

## 2018-02-12 DIAGNOSIS — I50.9: ICD-10-CM

## 2018-02-12 DIAGNOSIS — L08.9: ICD-10-CM

## 2018-02-12 DIAGNOSIS — Z94.0: ICD-10-CM

## 2018-02-12 DIAGNOSIS — H35.30: ICD-10-CM

## 2018-02-12 DIAGNOSIS — E11.69: ICD-10-CM

## 2018-02-12 DIAGNOSIS — M86.172: ICD-10-CM

## 2018-02-12 DIAGNOSIS — K21.9: ICD-10-CM

## 2018-02-12 DIAGNOSIS — Z95.810: ICD-10-CM

## 2018-02-12 DIAGNOSIS — Z79.82: ICD-10-CM

## 2018-02-12 DIAGNOSIS — I11.0: ICD-10-CM

## 2018-02-12 DIAGNOSIS — L97.419: ICD-10-CM

## 2018-02-12 DIAGNOSIS — E11.65: ICD-10-CM

## 2018-02-12 DIAGNOSIS — Z89.422: ICD-10-CM

## 2018-02-12 DIAGNOSIS — F32.89: ICD-10-CM

## 2018-02-12 DIAGNOSIS — B96.5: ICD-10-CM

## 2018-02-12 DIAGNOSIS — E78.5: ICD-10-CM

## 2018-02-12 DIAGNOSIS — I25.2: ICD-10-CM

## 2018-02-12 LAB
APTT BLD: 30.6 SECONDS (ref 25.1–36.5)
BUN SERPL-MCNC: 20 MG/DL (ref 7–21)
CALCIUM SERPL-MCNC: 9.4 MG/DL (ref 8.4–10.5)
GFR NON-AFRICAN AMERICAN: > 60
INR PPP: 1.17 (ref 0.93–1.08)
PROTHROMBIN TIME: 13.5 SECONDS (ref 9.4–12.5)

## 2018-02-12 PROCEDURE — 0QBP0ZZ EXCISION OF LEFT METATARSAL, OPEN APPROACH: ICD-10-PCS | Performed by: GENERAL PRACTICE

## 2018-02-12 PROCEDURE — 0JBQ0ZZ EXCISION OF RIGHT FOOT SUBCUTANEOUS TISSUE AND FASCIA, OPEN APPROACH: ICD-10-PCS | Performed by: GENERAL PRACTICE

## 2018-02-12 RX ADMIN — HYDROMORPHONE HYDROCHLORIDE PRN MG: 1 INJECTION, SOLUTION INTRAMUSCULAR; INTRAVENOUS; SUBCUTANEOUS at 21:50

## 2018-02-12 RX ADMIN — HYDROMORPHONE HYDROCHLORIDE PRN MG: 1 INJECTION, SOLUTION INTRAMUSCULAR; INTRAVENOUS; SUBCUTANEOUS at 13:15

## 2018-02-12 RX ADMIN — HYDROMORPHONE HYDROCHLORIDE PRN MG: 1 INJECTION, SOLUTION INTRAMUSCULAR; INTRAVENOUS; SUBCUTANEOUS at 17:32

## 2018-02-12 RX ADMIN — INSULIN HUMAN SCH UNITS: 100 INJECTION, SOLUTION PARENTERAL at 16:27

## 2018-02-12 RX ADMIN — INSULIN HUMAN SCH UNITS: 100 INJECTION, SOLUTION PARENTERAL at 21:52

## 2018-02-12 NOTE — OP
PROCEDURE DATE:  02/12/2018



PREOPERATIVE DIAGNOSES:

1.  Infected and nonhealing left transmetatarsal wound.

2.  Necrotic wound at the right heel.



SURGEON:  Yovany Prescott MD



ASSISTANT:  Dr. Lockwood



ANESTHESIOLOGIST:  Tomasa Carvajal MD



TYPE OF ANESTHESIA:  General LMA anesthesia.



ESTIMATED BLOOD LOSS:  Minimal.



SPECIMEN:  Debrided necrotic tissues including skin, fat, tendons and bone.



PROCEDURES:

1.  Revision of the left transmetatarsal amputation site wound.

2.  Debridement of the same wound measuring 12 x 10 x 3 cm, with

debridement of the skin, fat, tendons and bone.

3.  Debridement of the right heel, necrotic wound including tissues of

skin, fat, tendon and bone from the wound measuring 5 x 6 x 3 cm.



INDICATION:  Patient is a 57-year-old male with history of severe

peripheral arterial disease, who had balloon angioplasty done on both legs

with significant improvement, now comes in after left transmetatarsal

amputation with infected wound.  Patient also has an ulcer on the right

heel with skin necrosis overlying it.



DESCRIPTION OF PROCEDURE:  The patient was brought to the operating room,

placed on the operating table in supine position.  The patient was

connected to the EKG, blood pressure, and pulse oximetry monitor.  The

patient then underwent general LMA anesthesia, and was prepped and draped

in usual sterile fashion.



First, using scalpel and a grasper, we carefully debrided large chunks of

necrotic skin, underlying tendons, all the way down to the bone of the

transmetatarsal wound.  The wound was opened up further, and further

debridement was done using rongeur for trimming up the metatarsal bones

which were exposed.  Once the bony parts were rongeured and there was good

bleeding noted, I then proceed with further debridement of the necrotic

liquefied tissue and solid tissue of tendons and fascia using Versajet

system.  Once all the devitalized tissues of skin, fat, fascia, tendons and

bones were removed, the wound was copiously irrigated.  There was excellent

back bleeding noted.  The wound was then reapproximated with a 3-0 nylon

stitch in order to decrease its size and then packed with Iodoform gauze

packing and wrapped with Kerlix.  The right foot was dealt in similar

fashion by excising firstly necrotic tissue using scalpel, then

subsequently removing all the skin, fat and underlying infected tendons

with the edge of the calcaneus bone.  This was copiously irrigated and

Versajet was used again to debride all the necrotic tissues to the bleeding

tissue.  Once this was completed, the wound was then packed with Iodoform

gauze packing and wrapped with Kerlix.  Patient was then awakened,

extubated and transferred to recovery room for further observation.





__________________________________________

Yovany Prescott MD



DD:  02/12/2018 11:08:20

DT:  02/12/2018 19:19:39

Job # [id number]





MTDCOLIN

## 2018-02-12 NOTE — PCM.SURG1
Surgeon's Initial Post Op Note





- Surgeon's Notes


Surgeon: Dr. Prescott


Assistant: Dr. Lockwood PGY3, Student Doctor Wiley GORDON


Type of Anesthesia: General LMA


Anesthesia Administered By: Dr. Son


Pre-Operative Diagnosis: nonhealing bilateral foot wound, prior TMA on right, 

PVD


Operative Findings: necrotic tissue, sharp bone endings, debrided tissue to 

healthy bright red bleeding.  right calcaneal wound, ovid shaped approximately 

5x6x3 cm. Depth to bone.  Left TMA Yoder shaped wound approximately 62a48l5 cm, 

depth to bone


Post-Operative Diagnosis: same


Operation Performed: sharp debridement of bilateral foot wounds with versajet 

debridement. Right TMA wound primary approximation.


Specimen/Specimens Removed: right foot wound culture, bilateral foot wound 

tissue specimen, right TMA bone fragment for culture


Estimated Blood Loss: EBL {In ML}: 30


Blood Products Given: N/A


Drains Used: No Drains


Post-Op Condition: Good


Date of Surgery/Procedure: 02/12/18


Time of Surgery/Procedure: 10:14

## 2018-02-12 NOTE — CARD
--------------- APPROVED REPORT --------------





EKG Measurement

Heart [id number]

[id number]

[id number]

[id number]

[id number]



<Conclusion>

100 % V. Paced, A. Sensed.

No change

## 2018-02-13 LAB
BASOPHILS # BLD AUTO: 0 K/MM3 (ref 0–2)
BASOPHILS NFR BLD: 0 % (ref 0–3)
BUN SERPL-MCNC: 21 MG/DL (ref 7–21)
CALCIUM SERPL-MCNC: 9.4 MG/DL (ref 8.4–10.5)
EOSINOPHIL # BLD: 0 10*3/UL (ref 0–0.7)
EOSINOPHIL NFR BLD: 0.1 % (ref 1.5–5)
ERYTHROCYTE [DISTWIDTH] IN BLOOD BY AUTOMATED COUNT: 15 % (ref 11.5–14.5)
GFR NON-AFRICAN AMERICAN: > 60
GRANULOCYTES # BLD: 11.49 10*3/UL (ref 1.4–6.5)
GRANULOCYTES NFR BLD: 87.2 % (ref 50–68)
HGB BLD-MCNC: 11 G/DL (ref 14–18)
LYMPHOCYTES # BLD: 0.7 10*3/UL (ref 1.2–3.4)
LYMPHOCYTES NFR BLD AUTO: 5.5 % (ref 22–35)
MCH RBC QN AUTO: 27.8 PG (ref 25–35)
MCHC RBC AUTO-ENTMCNC: 31.5 G/DL (ref 31–37)
MCV RBC AUTO: 88.1 FL (ref 80–105)
MONOCYTES # BLD AUTO: 1 10*3/UL (ref 0.1–0.6)
MONOCYTES NFR BLD: 7.2 % (ref 1–6)
PLATELET # BLD: 307 10^3/UL (ref 120–450)
PMV BLD AUTO: 9.5 FL (ref 7–11)
RBC # BLD AUTO: 3.96 10^6/UL (ref 3.5–6.1)
WBC # BLD AUTO: 13.2 10^3/UL (ref 4.5–11)

## 2018-02-13 RX ADMIN — HYDROMORPHONE HYDROCHLORIDE PRN MG: 1 INJECTION, SOLUTION INTRAMUSCULAR; INTRAVENOUS; SUBCUTANEOUS at 14:03

## 2018-02-13 RX ADMIN — HYDROMORPHONE HYDROCHLORIDE PRN MG: 1 INJECTION, SOLUTION INTRAMUSCULAR; INTRAVENOUS; SUBCUTANEOUS at 21:14

## 2018-02-13 RX ADMIN — SODIUM HYPOCHLORITE SCH APPLIC: 2.5 SOLUTION TOPICAL at 11:50

## 2018-02-13 RX ADMIN — INSULIN HUMAN SCH UNITS: 100 INJECTION, SOLUTION PARENTERAL at 17:14

## 2018-02-13 RX ADMIN — INSULIN HUMAN SCH: 100 INJECTION, SOLUTION PARENTERAL at 22:31

## 2018-02-13 RX ADMIN — INSULIN HUMAN SCH UNITS: 100 INJECTION, SOLUTION PARENTERAL at 08:09

## 2018-02-13 RX ADMIN — INSULIN HUMAN SCH UNITS: 100 INJECTION, SOLUTION PARENTERAL at 11:52

## 2018-02-13 RX ADMIN — HYDROMORPHONE HYDROCHLORIDE PRN MG: 1 INJECTION, SOLUTION INTRAMUSCULAR; INTRAVENOUS; SUBCUTANEOUS at 08:08

## 2018-02-13 RX ADMIN — HYDROMORPHONE HYDROCHLORIDE PRN MG: 1 INJECTION, SOLUTION INTRAMUSCULAR; INTRAVENOUS; SUBCUTANEOUS at 03:08

## 2018-02-13 NOTE — CP.PCM.CON
<Everett Whitaker - Last Filed: 02/13/18 14:37>





History of Present Illness





- History of Present Illness


History of Present Illness: 





Consult note - Dr. Delvalle





57 year old male patient with PMHx CAD S/P CABG, CHF, DM, COPD, S/P AICD 

placement, S/P kidney transplant, hx of osteomyelitis seen and evaluated at 

bedside for left foot pain secondary to the wound. Patient is AAOx3 and is in 

NAD. Patient reports that he had been seeing his podiatrist Dr. Goel in the 

past but recently decided to be seen by Dr. Prescott. Patient reports that 

he had a wound debridement procedure yesterday by general surgery. Patient 

reports that Dr. Prescott is taking care of both the foot wounds. Patient 

reports denies of any recent F/N/V/C/SOB/CP/headache. Patient denies of any 

other complains at this time. 








Review of Systems





- Constitutional


Constitutional: As Per HPI





Past Patient History





- Infectious Disease


Hx of Infectious Diseases: None





- Tetanus Immunizations


Tetanus Immunization: Unknown





- Past Social History


Smoking Status: Former Smoker





- CARDIAC


Hx Cardiac Disorders: Yes (cad)


Hx Cardia Arrhythmia: Yes


Hx Congestive Heart Failure: Yes


Hx Hypercholesterolemia: Yes


Hx Hypertension: Yes


Hx Pacemaker: Yes (st song)


Hx Peripheral Edema: Yes (lle +1)


Hx Peripheral Vascular Disease: Yes


Other/Comment: denies mi, cabg 4 vessels





- PULMONARY


Hx Bronchitis: Yes


Hx Chronic Obstructive Pulmonary Disease (COPD): Yes





- NEUROLOGICAL


Hx Neurological Disorder: No





- HEENT


Hx HEENT Problems: Yes


Hx Macular Degeneration: Yes


Other/Comment: laser sx for macular degeneration 5 yrs ago both eyes





- RENAL


Hx Renal Failure: Yes


Other/Comment: both kidneys stopped functioning pt was on hd x 1 yr received 

kidney transplant from his cousin its on his right side on 5/2013 had not had 

hd since





- ENDOCRINE/METABOLIC


Hx Diabetes Mellitus Type 2: Yes





- HEMATOLOGICAL/ONCOLOGICAL


Hx Cancer: No





- INTEGUMENTARY


Hx Dermatological Problems: Yes


Other/Comment: LEFT BIG TOE POST AMPUTATION-GANGRENE TO AREA AMPUTATED.7-24-17 

second sx all toes amputated left ft, hx gangrene, skin discolorations ble, old 

dry scabs r knnee x2, left knee x2 dry scabs fell recently outside dr goel's 

office, small 0.2cm dry red wound to r great toe, dry skin and thick nails to 

toes r ft, dsd intact to r ad left foot had revision of stump left ft and 

debridement b/l foot wounds, multiple tatoos





- MUSCULOSKELETAL/RHEUMATOLOGICAL


Hx Falls: No





- GASTROINTESTINAL


Hx Gastrointestinal Disorders: Yes (CONSTIPATION,INCISIONAL HERNIA)


Hx Gastroesophageal Reflux: Yes





- GENITOURINARY/GYNECOLOGICAL


Hx Genitourinary Disorders: Yes (KIDNEY TRANSPLANT)


Other/Comment: pt voids well





- PSYCHIATRIC


Hx Emotional Abuse: No


Hx Physical Abuse: No





- SURGICAL HISTORY


Hx Surgeries: Yes


Hx Amputation: Yes (all toes left ft 2 sx's)


Hx Cardiac Catheterization: Yes


Other/Comment: left shoulder rotator cuff arthroscopic sx, left arm shunt 

capped off





- ANESTHESIA


Hx Anesthesia Reactions: No


Hx Malignant Hyperthermia: No





Meds


Allergies/Adverse Reactions: 


 Allergies











Allergy/AdvReac Type Severity Reaction Status Date / Time


 


No Known Allergies Allergy   Verified 02/08/18 12:52














- Medications


Medications: 


 Current Medications





Acetaminophen (Tylenol 325mg Tab)  650 mg PO Q6 PRN


   PRN Reason: Pain, Mild (1-3)


Amlodipine Besylate (Norvasc)  10 mg PO QAM [facility]


   Last Admin: 02/13/18 09:33 Dose:  10 mg


Aspirin (Ecotrin)  81 mg PO 0800 [facility]


   Last Admin: 02/13/18 09:33 Dose:  81 mg


Atorvastatin Calcium (Lipitor)  40 mg PO DIN [facility]


   Last Admin: 02/12/18 16:27 Dose:  40 mg


Docusate Sodium (Colace)  100 mg PO BID [facility]


   Last Admin: 02/13/18 09:33 Dose:  100 mg


Escitalopram Oxalate (Lexapro)  5 mg PO QAM [facility]


   Last Admin: 02/13/18 09:32 Dose:  5 mg


Famotidine (Pepcid)  20 mg PO QAM [facility]


   Last Admin: 02/13/18 09:30 Dose:  20 mg


Heparin Sodium (Porcine) (Heparin)  5,000 units SC Q8 MARTÍN


   PRN Reason: Protocol


   Last Admin: 02/13/18 14:02 Dose:  5,000 units


Hydromorphone HCl (Dilaudid)  1 mg IVP Q4H PRN


   PRN Reason: Pain, severe (8-10)


   Last Admin: 02/13/18 14:03 Dose:  1 mg


Insulin Human Regular (Humulin R Med)  0 units SC ACHS [facility]


   PRN Reason: Protocol


   Last Admin: 02/13/18 11:52 Dose:  3 units


Metoprolol Tartrate (Lopressor)  50 mg PO 0800,1800 [facility]


   Last Admin: 02/13/18 08:08 Dose:  50 mg


Mycophenolate Mofetil (Cellcept Cap)  750 mg PO BID [facility]


   Last Admin: 02/13/18 09:32 Dose:  750 mg


Ondansetron HCl (Zofran Inj)  4 mg IVP Q6 PRN


   PRN Reason: Nausea/Vomiting


   Last Admin: 02/13/18 10:58 Dose:  4 mg


Oxycodone/Acetaminophen (Percocet 10/325 Mg Tab)  1 tab PO Q4H PRN


   PRN Reason: Pain, moderate (4-7)


Prednisone (Prednisone Tab)  5 mg PO 0800 [facility]


   Last Admin: 02/13/18 09:33 Dose:  5 mg


Sodium Hypochlorite (Dakins Solution 0.25%)  0 ml TOP DAILY [facility]


   Last Admin: 02/13/18 11:50 Dose:  1 applic


Tacrolimus (Prograf Cap)  0.5 mg PO HS [facility]


   Last Admin: 02/12/18 21:59 Dose:  0.5 mg


Tacrolimus (Prograf Cap)  2 mg PO BID [facility]


   Last Admin: 02/13/18 09:33 Dose:  2 mg











Physical Exam





- Constitutional


Appears: Well, Non-toxic, No Acute Distress





- Extremities Exam


Additional comments: 





Dressing on bilateral feet is clean, dry and intact with no strikethrough noted


No pain on calf squeeze b/l





- Neurological Exam


Neurological exam: Alert, Oriented x3





- Psychiatric Exam


Psychiatric exam: Normal Affect, Normal Mood





Results





- Vital Signs


Recent Vital Signs: 


 Last Vital Signs











Temp  98.0 F   02/13/18 07:30


 


Pulse  70   02/13/18 08:08


 


Resp  20   02/13/18 07:30


 


BP  142/69   02/13/18 09:33


 


Pulse Ox  93 L  02/13/18 07:30














- Labs


Result Diagrams: 


 02/13/18 06:45





 02/13/18 06:45


Labs: 


 Laboratory Results - last 24 hr











  02/12/18 02/12/18 02/13/18





  16:10 21:25 02:48


 


WBC   


 


RBC   


 


Hgb   


 


Hct   


 


MCV   


 


MCH   


 


MCHC   


 


RDW   


 


Plt Count   


 


MPV   


 


Gran %   


 


Lymph % (Auto)   


 


Mono % (Auto)   


 


Eos % (Auto)   


 


Baso % (Auto)   


 


Gran #   


 


Lymph # (Auto)   


 


Mono # (Auto)   


 


Eos # (Auto)   


 


Baso # (Auto)   


 


Sodium   


 


Potassium   


 


Chloride   


 


Carbon Dioxide   


 


Anion Gap   


 


BUN   


 


Creatinine   


 


Est GFR ( Amer)   


 


Est GFR (Non-Af Amer)   


 


POC Glucose (mg/dL)  268 H  424 H*  281 H


 


Random Glucose   


 


Calcium   














  02/13/18 02/13/18 02/13/18





  06:45 06:45 07:14


 


WBC  13.2 H  


 


RBC  3.96  


 


Hgb  11.0 L  


 


Hct  34.9 L  


 


MCV  88.1  


 


MCH  27.8  


 


MCHC  31.5  


 


RDW  15.0 H  


 


Plt Count  307  


 


MPV  9.5  


 


Gran %  87.2 H  


 


Lymph % (Auto)  5.5 L  


 


Mono % (Auto)  7.2 H  


 


Eos % (Auto)  0.1 L  


 


Baso % (Auto)  0.0  


 


Gran #  11.49 H  


 


Lymph # (Auto)  0.7 L  


 


Mono # (Auto)  1.0 H  


 


Eos # (Auto)  0.0  


 


Baso # (Auto)  0.00  


 


Sodium   135 


 


Potassium   4.8 


 


Chloride   95 L 


 


Carbon Dioxide   29 


 


Anion Gap   16 


 


BUN   21 


 


Creatinine   0.8 


 


Est GFR ( Amer)   > 60 


 


Est GFR (Non-Af Amer)   > 60 


 


POC Glucose (mg/dL)    279 H


 


Random Glucose   318 H* D 


 


Calcium   9.4 














  02/13/18





  11:11


 


WBC 


 


RBC 


 


Hgb 


 


Hct 


 


MCV 


 


MCH 


 


MCHC 


 


RDW 


 


Plt Count 


 


MPV 


 


Gran % 


 


Lymph % (Auto) 


 


Mono % (Auto) 


 


Eos % (Auto) 


 


Baso % (Auto) 


 


Gran # 


 


Lymph # (Auto) 


 


Mono # (Auto) 


 


Eos # (Auto) 


 


Baso # (Auto) 


 


Sodium 


 


Potassium 


 


Chloride 


 


Carbon Dioxide 


 


Anion Gap 


 


BUN 


 


Creatinine 


 


Est GFR ( Amer) 


 


Est GFR (Non-Af Amer) 


 


POC Glucose (mg/dL)  237 H


 


Random Glucose 


 


Calcium 














Assessment & Plan





- Assessment and Plan (Free Text)


Assessment: 





57 year old male patient with PMHx of CAD S/P CABG, CHF, DM, COPD, S/P AICD 

placement, S/P kidney transplant, hx of osteomyelitis was evaluated at bedside 

for bilateral foot wounds


Plan: 





Patient seen and evaluated with attending Dr. Delvalle


Labs, vitals and charts reviewed - WBC @ 13.2, afebrile


Patient is currently followed by general surgery team - wound care management 

as per surgery team


Podiatry will sign-off on the patient


Thank you for the podiatry consult - please re-consult if needed.





- Date & Time


Date: 02/13/18


Time: 14:45





<Gerald Delvalle - Last Filed: 02/13/18 16:12>





Meds





- Medications


Medications: 


 Current Medications





Acetaminophen (Tylenol 325mg Tab)  650 mg PO Q6 PRN


   PRN Reason: Pain, Mild (1-3)


Amlodipine Besylate (Norvasc)  10 mg PO QAM [facility]


   Last Admin: 02/13/18 09:33 Dose:  10 mg


Aspirin (Ecotrin)  81 mg PO 0800 [facility]


   Last Admin: 02/13/18 09:33 Dose:  81 mg


Atorvastatin Calcium (Lipitor)  40 mg PO DIN [facility]


   Last Admin: 02/12/18 16:27 Dose:  40 mg


Chlorpromazine (Thorazine)  25 mg PO Q6 [facility]


   PRN Reason: Protocol


Docusate Sodium (Colace)  100 mg PO BID [facility]


   Last Admin: 02/13/18 09:33 Dose:  100 mg


Escitalopram Oxalate (Lexapro)  5 mg PO QAM [facility]


   Last Admin: 02/13/18 09:32 Dose:  5 mg


Famotidine (Pepcid)  20 mg PO QAM [facility]


   Last Admin: 02/13/18 09:30 Dose:  20 mg


Heparin Sodium (Porcine) (Heparin)  5,000 units SC Q8 [facility]


   PRN Reason: Protocol


   Last Admin: 02/13/18 14:02 Dose:  5,000 units


Hydromorphone HCl (Dilaudid)  1 mg IVP Q4H PRN


   PRN Reason: Pain, severe (8-10)


   Last Admin: 02/13/18 14:03 Dose:  1 mg


Insulin Human Regular (Humulin R Med)  0 units SC ACHS [facility]


   PRN Reason: Protocol


   Last Admin: 02/13/18 11:52 Dose:  3 units


Metoprolol Tartrate (Lopressor)  50 mg PO 0800,1800 [facility]


   Last Admin: 02/13/18 08:08 Dose:  50 mg


Mycophenolate Mofetil (Cellcept Cap)  750 mg PO BID [facility]


   Last Admin: 02/13/18 09:32 Dose:  750 mg


Ondansetron HCl (Zofran Inj)  4 mg IVP Q6 PRN


   PRN Reason: Nausea/Vomiting


   Last Admin: 02/13/18 10:58 Dose:  4 mg


Oxycodone/Acetaminophen (Percocet 10/325 Mg Tab)  1 tab PO Q4H PRN


   PRN Reason: Pain, moderate (4-7)


Prednisone (Prednisone Tab)  5 mg PO 0800 [facility]


   Last Admin: 02/13/18 09:33 Dose:  5 mg


Sodium Hypochlorite (Dakins Solution 0.25%)  0 ml TOP DAILY [facility]


   Last Admin: 02/13/18 11:50 Dose:  1 applic


Tacrolimus (Prograf Cap)  0.5 mg PO HS [facility]


   Last Admin: 02/12/18 21:59 Dose:  0.5 mg


Tacrolimus (Prograf Cap)  2 mg PO BID [facility]


   Last Admin: 02/13/18 09:33 Dose:  2 mg











Results





- Vital Signs


Recent Vital Signs: 


 Last Vital Signs











Temp  98.0 F   02/13/18 07:30


 


Pulse  70   02/13/18 08:08


 


Resp  20   02/13/18 07:30


 


BP  142/69   02/13/18 09:33


 


Pulse Ox  93 L  02/13/18 07:30














- Labs


Result Diagrams: 


 02/13/18 06:45





 02/13/18 06:45


Labs: 


 Laboratory Results - last 24 hr











  02/12/18 02/12/18 02/13/18





  16:10 21:25 02:48


 


WBC   


 


RBC   


 


Hgb   


 


Hct   


 


MCV   


 


MCH   


 


MCHC   


 


RDW   


 


Plt Count   


 


MPV   


 


Gran %   


 


Lymph % (Auto)   


 


Mono % (Auto)   


 


Eos % (Auto)   


 


Baso % (Auto)   


 


Gran #   


 


Lymph # (Auto)   


 


Mono # (Auto)   


 


Eos # (Auto)   


 


Baso # (Auto)   


 


Sodium   


 


Potassium   


 


Chloride   


 


Carbon Dioxide   


 


Anion Gap   


 


BUN   


 


Creatinine   


 


Est GFR ( Amer)   


 


Est GFR (Non-Af Amer)   


 


POC Glucose (mg/dL)  268 H  424 H*  281 H


 


Random Glucose   


 


Calcium   














  02/13/18 02/13/18 02/13/18





  06:45 06:45 07:14


 


WBC  13.2 H  


 


RBC  3.96  


 


Hgb  11.0 L  


 


Hct  34.9 L  


 


MCV  88.1  


 


MCH  27.8  


 


MCHC  31.5  


 


RDW  15.0 H  


 


Plt Count  307  


 


MPV  9.5  


 


Gran %  87.2 H  


 


Lymph % (Auto)  5.5 L  


 


Mono % (Auto)  7.2 H  


 


Eos % (Auto)  0.1 L  


 


Baso % (Auto)  0.0  


 


Gran #  11.49 H  


 


Lymph # (Auto)  0.7 L  


 


Mono # (Auto)  1.0 H  


 


Eos # (Auto)  0.0  


 


Baso # (Auto)  0.00  


 


Sodium   135 


 


Potassium   4.8 


 


Chloride   95 L 


 


Carbon Dioxide   29 


 


Anion Gap   16 


 


BUN   21 


 


Creatinine   0.8 


 


Est GFR ( Amer)   > 60 


 


Est GFR (Non-Af Amer)   > 60 


 


POC Glucose (mg/dL)    279 H


 


Random Glucose   318 H* D 


 


Calcium   9.4 














  02/13/18





  11:11


 


WBC 


 


RBC 


 


Hgb 


 


Hct 


 


MCV 


 


MCH 


 


MCHC 


 


RDW 


 


Plt Count 


 


MPV 


 


Gran % 


 


Lymph % (Auto) 


 


Mono % (Auto) 


 


Eos % (Auto) 


 


Baso % (Auto) 


 


Gran # 


 


Lymph # (Auto) 


 


Mono # (Auto) 


 


Eos # (Auto) 


 


Baso # (Auto) 


 


Sodium 


 


Potassium 


 


Chloride 


 


Carbon Dioxide 


 


Anion Gap 


 


BUN 


 


Creatinine 


 


Est GFR ( Amer) 


 


Est GFR (Non-Af Amer) 


 


POC Glucose (mg/dL)  237 H


 


Random Glucose 


 


Calcium 














Attending/Attestation





- Attestation


I have personally seen and examined this patient.: Yes


I have fully participated in the care of the patient.: Yes


I have reviewed all pertinent clinical information: Yes

## 2018-02-13 NOTE — CP.PCM.PN
Subjective





- Date & Time of Evaluation


Date of Evaluation: 02/13/18


Time of Evaluation: 07:41





- Subjective


Subjective: 





Surgery: Dr. Prescott 





Patient feeling well today. Some pain but improved from prior to OR. Tolerating 

diet. Denies f/c/n/v. 





Objective





- Vital Signs/Intake and Output


Vital Signs (last 24 hours): 


 











Temp Pulse Resp BP Pulse Ox


 


 97.9 F   76   18   156/73 H  94 L


 


 02/12/18 19:52  02/12/18 19:52  02/12/18 19:52  02/12/18 19:52  02/12/18 16:00








Intake and Output: 


 











 02/13/18 02/13/18





 06:59 18:59


 


Intake Total 540 


 


Balance 540 














- Medications


Medications: 


 Current Medications





Acetaminophen (Tylenol 325mg Tab)  650 mg PO Q6 PRN


   PRN Reason: Pain, Mild (1-3)


Amlodipine Besylate (Norvasc)  10 [facility]


Aspirin (Ecotrin)  81 mg PO 0800 [facility]


Atorvastatin Calcium (Lipitor)  40 mg PO DIN [facility]


   Last Admin: 02/12/18 16:27 Dose:  40 mg


Docusate Sodium (Colace)  100 mg PO BID [facility]


   Last Admin: 02/12/18 17:30 Dose:  100 mg


Escitalopram Oxalate (Lexapro)  5 mg PO QAM [facility]


Famotidine (Pepcid)  20 mg PO QAM [facility]


Heparin Sodium (Porcine) (Heparin)  5,000 units SC Q8 [facility]


   PRN Reason: Protocol


   Last Admin: 02/12/18 21:51 Dose:  5,000 units


Hydromorphone HCl (Dilaudid)  1 mg IVP Q4H PRN


   PRN Reason: Pain, severe (8-10)


   Last Admin: 02/13/18 03:08 Dose:  1 mg


Sodium Chloride (Sodium Chloride 0.9%)  1,000 mls @ 100 mls/hr IV .Q10H [facility]


   Last Admin: 02/12/18 13:11 Dose:  100 mls/hr


Cefazolin Sodium 2 gm/ Sodium (Chloride)  100 mls @ 200 mls/hr IVPB 1245,1645 

[facility]


   Stop: 02/13/18 13:14


   Last Admin: 02/12/18 16:29 Dose:  200 mls/hr


Insulin Human Regular (Humulin R Med)  0 units SC ACHS [facility]


   PRN Reason: Protocol


Metoprolol Tartrate (Lopressor)  50 mg PO 0800,1800 [facility]


   Last Admin: 02/12/18 17:30 Dose:  50 mg


Non-Formulary Medication (Mycophenolate Sodium [Myfortic])  360 mg PO BID [facility]


   Last Admin: 02/12/18 17:31 Dose:  Not Given


Ondansetron HCl (Zofran Inj)  4 mg IVP ONCE PRN


   PRN Reason: Nausea/Vomiting


Ondansetron HCl (Zofran Inj)  4 mg IVP Q6 PRN


   PRN Reason: Nausea/Vomiting


Oxycodone/Acetaminophen (Percocet 10/325 Mg Tab)  1 tab PO Q4H PRN


   PRN Reason: Pain, moderate (4-7)


Prednisone (Prednisone Tab)  5 mg PO 0800 [facility]


Tacrolimus (Prograf Cap)  0.5 mg PO HS [facility]


   Last Admin: 02/12/18 21:59 Dose:  0.5 mg


Tacrolimus (Prograf Cap)  2 mg PO BID [facility]


   Last Admin: 02/12/18 17:30 Dose:  2 mg











- Labs


Labs: 


 





 02/13/18 06:45 





 02/12/18 07:00 





 











PT  13.5 SECONDS (9.4-12.5)  H  02/12/18  07:00    


 


INR  1.17  (0.93-1.08)  H  02/12/18  07:00    


 


APTT  30.6 Seconds (25.1-36.5)   02/12/18  07:00    














- Constitutional


Appears: Non-toxic, No Acute Distress





- Head Exam


Head Exam: ATRAUMATIC, NORMOCEPHALIC





- Eye Exam


Eye Exam: EOMI, Normal appearance





- ENT Exam


ENT Exam: Mucous Membranes Moist





- Respiratory Exam


Respiratory Exam: NORMAL BREATHING PATTERN.  absent: Respiratory Distress





- Cardiovascular Exam


Cardiovascular Exam: REGULAR RHYTHM.  absent: Tachycardia





- Extremities Exam


Additional comments: 





bilateral foot dressing with dried SA fluid drainage. LE warm to touch. 

Sensation intact





- Neurological Exam


Neurological Exam: Alert, Awake





- Psychiatric Exam


Psychiatric exam: Normal Affect, Normal Mood





- Skin


Skin Exam: Dry, Warm





Assessment and Plan





- Assessment and Plan (Free Text)


Assessment: 





58 y/o male w/ chronic nonhealing bilateral foot wounds 


Plan: 





-will change packing today


-PT 


-OOB


-medical consult 


-increase SSI to moderate coverage 


-IS use 


- abx 


-d/w Dr. Prescott 





[facility] PGY3

## 2018-02-14 LAB
ALBUMIN SERPL-MCNC: 3.2 G/DL (ref 3–4.8)
ALBUMIN/GLOB SERPL: 1 {RATIO} (ref 1.1–1.8)
ALT SERPL-CCNC: 30 U/L (ref 7–56)
AST SERPL-CCNC: 29 U/L (ref 17–59)
BUN SERPL-MCNC: 18 MG/DL (ref 7–21)
CALCIUM SERPL-MCNC: 9.1 MG/DL (ref 8.4–10.5)
ERYTHROCYTE [DISTWIDTH] IN BLOOD BY AUTOMATED COUNT: 15.1 % (ref 11.5–14.5)
GFR NON-AFRICAN AMERICAN: > 60
HGB BLD-MCNC: 11 G/DL (ref 14–18)
MCH RBC QN AUTO: 28 PG (ref 25–35)
MCHC RBC AUTO-ENTMCNC: 31.7 G/DL (ref 31–37)
MCV RBC AUTO: 88.3 FL (ref 80–105)
PLATELET # BLD: 303 10^3/UL (ref 120–450)
PMV BLD AUTO: 9.7 FL (ref 7–11)
RBC # BLD AUTO: 3.93 10^6/UL (ref 3.5–6.1)
WBC # BLD AUTO: 13.5 10^3/UL (ref 4.5–11)

## 2018-02-14 RX ADMIN — INSULIN HUMAN SCH UNITS: 100 INJECTION, SOLUTION PARENTERAL at 13:02

## 2018-02-14 RX ADMIN — SODIUM HYPOCHLORITE SCH APPLIC: 2.5 SOLUTION TOPICAL at 10:44

## 2018-02-14 RX ADMIN — INSULIN HUMAN SCH UNITS: 100 INJECTION, SOLUTION PARENTERAL at 17:01

## 2018-02-14 RX ADMIN — MEROPENEM AND SODIUM CHLORIDE SCH MLS/HR: 1 INJECTION, SOLUTION INTRAVENOUS at 21:42

## 2018-02-14 RX ADMIN — MEROPENEM AND SODIUM CHLORIDE SCH MLS/HR: 1 INJECTION, SOLUTION INTRAVENOUS at 15:28

## 2018-02-14 RX ADMIN — HYDROMORPHONE HYDROCHLORIDE PRN MG: 1 INJECTION, SOLUTION INTRAMUSCULAR; INTRAVENOUS; SUBCUTANEOUS at 17:02

## 2018-02-14 RX ADMIN — INSULIN HUMAN SCH UNITS: 100 INJECTION, SOLUTION PARENTERAL at 08:16

## 2018-02-14 RX ADMIN — HYDROMORPHONE HYDROCHLORIDE PRN MG: 1 INJECTION, SOLUTION INTRAMUSCULAR; INTRAVENOUS; SUBCUTANEOUS at 13:03

## 2018-02-14 RX ADMIN — MEROPENEM AND SODIUM CHLORIDE SCH MLS/HR: 1 INJECTION, SOLUTION INTRAVENOUS at 05:11

## 2018-02-14 NOTE — CP.PCM.PN
Subjective





- Date & Time of Evaluation


Date of Evaluation: 02/14/18


Time of Evaluation: 12:16





- Subjective


Subjective: 





Surgery: Dr. Prescott 





Patient doing well. Denies f/c. Dressing changed at bedside in pm with minimal 

serous drainage noted on dressing. 





Objective





- Vital Signs/Intake and Output


Vital Signs (last 24 hours): 


 











Temp Pulse Resp BP Pulse Ox


 


 98.8 F   86   20   145/77   95 


 


 02/14/18 07:30  02/14/18 08:16  02/14/18 07:30  02/14/18 08:16  02/14/18 07:30








Intake and Output: 


 











 02/14/18 02/14/18





 06:59 18:59


 


Intake Total 780 


 


Balance 780 














- Medications


Medications: 


 Current Medications





Acetaminophen (Tylenol 325mg Tab)  650 mg PO Q6 PRN


   PRN Reason: Pain, Mild (1-3)


[facility] ([facility]asc)  [facility] [place]


   Last Admin: 02/13/18 09:33 Dose:  10 mg


Aspirin (Ecotrin)  81 mg PO 0800 [facility]


   Last Admin: 02/13/18 09:33 Dose:  81 mg


Atorvastatin Calcium (Lipitor)  40 mg PO DIN [facility]


   Last Admin: 02/13/18 17:15 Dose:  40 mg


Chlorpromazine (Thorazine)  25 mg PO Q6 [facility]


   PRN Reason: Protocol


   Last Admin: 02/14/18 00:34 Dose:  Not Given


Docusate Sodium (Colace)  100 mg PO BID [facility]


   Last Admin: 02/13/18 17:14 Dose:  100 mg


Escitalopram Oxalate (Lexapro)  [facility] [place]


   Last Admin: 02/13/18 09:32 Dose:  5 mg


Famotidine (Pepcid)  [facility] [place]


   Last Admin: 02/13/18 09:30 Dose:  20 mg


Heparin Sodium (Porcine) (Heparin)  5,000 units SC Q8 [facility]


   PRN Reason: Protocol


   Last Admin: 02/14/18 05:10 Dose:  5,000 units


Hydromorphone HCl (Dilaudid)  1 mg IVP Q4H PRN


   PRN Reason: Pain, severe (8-10)


   Last Admin: 02/13/18 21:14 Dose:  1 mg


Meropenem/Sodium Chloride (Meropenem 1g/Ns 100ml Ivpb)  1 gm in 100 mls @ 100 

mls/hr IVPB Q8 [facility]


   PRN Reason: Protocol


   Stop: 02/23/18 06:01


   Last Admin: 02/14/18 05:11 Dose:  100 mls/hr


Insulin Human Regular (Humulin R Med)  0 units SC ACHS [facility]


   PRN Reason: Protocol


   Last Admin: 02/14/18 08:16 Dose:  7 units


Metoclopramide HCl (Reglan)  10 mg PO 0600,1130,1630,2200 [facility]


Metoprolol Tartrate (Lopressor)  50 mg PO 0800,1800 [facility]


   Last Admin: 02/14/18 08:16 Dose:  50 mg


Mycophenolate Mofetil (Cellcept Cap)  750 mg PO BID [facility]


   Last Admin: 02/13/18 17:14 Dose:  750 mg


Ondansetron HCl (Zofran Inj)  4 mg IVP Q6 PRN


   PRN Reason: Nausea/Vomiting


   Last Admin: 02/14/18 08:16 Dose:  4 mg


Oxycodone/Acetaminophen (Percocet 10/325 Mg Tab)  1 tab PO Q4H PRN


   PRN Reason: Pain, moderate (4-7)


Prednisone (Prednisone Tab)  5 mg PO 0800 [facility]


   Last Admin: 02/13/18 09:33 Dose:  5 mg


Sodium Hypochlorite (Dakins Solution 0.25%)  0 ml TOP DAILY [facility]


   Last Admin: 02/13/18 11:50 Dose:  1 applic


Tacrolimus (Prograf Cap)  0.5 mg PO HS [facility]


   Last Admin: 02/13/18 21:16 Dose:  0.5 mg


Tacrolimus (Prograf Cap)  2 mg PO BID [facility]


   Last Admin: 02/13/18 17:15 Dose:  2 mg











- Labs


Labs: 


 





 02/14/18 07:30 





 02/14/18 07:30 





 











PT  13.5 SECONDS (9.4-12.5)  H  02/12/18  07:00    


 


INR  1.17  (0.93-1.08)  H  02/12/18  07:00    


 


APTT  30.6 Seconds (25.1-36.5)   02/12/18  07:00    














- Constitutional


Appears: Non-toxic, No Acute Distress





- Head Exam


Head Exam: ATRAUMATIC, NORMOCEPHALIC





- Eye Exam


Eye Exam: EOMI, Normal appearance





- ENT Exam


ENT Exam: Mucous Membranes Moist





- Respiratory Exam


Respiratory Exam: NORMAL BREATHING PATTERN.  absent: Respiratory Distress





- Cardiovascular Exam


Cardiovascular Exam: REGULAR RHYTHM.  absent: Tachycardia





- GI/Abdominal Exam


GI & Abdominal Exam: Soft.  absent: Distended, Tenderness





- Extremities Exam


Additional comments: 





left ovid foot wound with pink granulation tissue, right heal wound with 

necrotic green tissue





Assessment and Plan





- Assessment and Plan (Free Text)


Assessment: 





56 y/o male w/ bilateral nonhealing foot wounds s/p debridement 


Plan: 





-cont Dakins soultion packing dressing, TID 


-abx per ID 


-appreciate medical recs 


-will consider debridement in OR Thursday or Friday of this week for right heal 


-d/w DR. April ORTEGALoma PGY3

## 2018-02-14 NOTE — CON
DATE:  2018



CHIEF COMPLAINT AND HISTORY OF PRESENT ILLNESS:  This is a 57-year-old male

who was coming in to the hospital electively for debridement of a left

stump by Dr. Prescott.  I have been asked to evaluate the patient for

his diabetes and hypertension and follow along for management of his

medications that he is taking for his kidney transplant.  The patient has

no complaints of any chest pain or shortness of breath.  No headaches or

dizziness.  The patient states that he otherwise feels well.  He had been

taken to the OR yesterday because of an infected non-healing left

transmetatarsal wound.  There was a necrotic area in the right heel as

well.  The patient had revision of the left transmetatarsal amputation

site.  There is debridement of the wound that was 12 x 10 x 3 cm.  There is

also debridement of the right heel that was necrotic.  The patient states

that he has no complaints of any chest pain or shortness of breath.  No

headaches or dizziness.  He has no pain.



ALLERGIES:  NO KNOWN DRUG ALLERGIES.



HOME MEDICATIONS:  Have been reviewed on the [facility].



PAST MEDICAL HISTORY:

1.  Coronary artery disease status post CABG.

2.  Kidney transplant.

3.  COPD _____.

4.  Diabetes type 2.

5.  Peripheral arterial disease.

6.  Dyslipidemia.

7.  Hypertension.

8.  Pacemaker.



PAST SURGICAL HISTORY:

1.  Renal transplant.

2.  Left transmetatarsal amputation of the foot.



SOCIAL HISTORY:  Denies smoking or drinking alcohol.



PHYSICAL EXAMINATION:

VITAL SIGNS:  Temperature is 98, pulse of 80, blood pressure is 126/57,

respirations 20, O2 saturation 93%.  Height is 5 feet 9 inches, weight is

159 pounds, BMI is 23.

GENERAL:  The patient lying in bed, uncomfortable, and in no acute

distress.

HEENT:  Atraumatic and normocephalic.  Anicteric sclerae.  Moist mucosa.

Pink conjunctivae.  No oral lesions.

NECK:  No JVD, anterior and posterior adenopathy, thyromegaly, or bruits.

CARDIOVASCULAR:  S1 and S2 regular.  No murmur, rubs, or gallop.

LUNGS:  Clear to auscultation bilaterally.  No wheezes, rales, or rhonchi.

ABDOMEN:  Bowel sounds are positive.  Soft, nontender and nondistended.  No

hepatosplenomegaly. No rebound and no guarding.

EXTREMITIES:  No cyanosis, clubbing, or edema.  Both of his feet are

wrapped.  I did not look at the wounds.

NEUROLOGIC:  No facial asymmetry.  Tongue is midline.  No uvula deviation. 

Power is 5/5 upper extremity and lower extremity.  Sensation intact in

upper extremity and lower extremity.

PSYCHIATRIC:  He is awake, alert and oriented x3.  No anxiety or

depression.  He has normal affect.

GENITOURINARY:  No CVA tenderness.

VASCULAR:  2+ pulses in the carotid pulses and pedal pulses.

SKIN:  No erythema or nodules.

SPINE:  Shows normal curvature.



LABORATORY DATA:  White count of 13.2, hemoglobin 11, INR is 1.1. 

Chemistry showed sodium 135, potassium 4.8, creatinine 0.8, glucose is 318.



ASSESSMENT:

1.  Status post revision of the left metatarsal amputation.

2.  Debridement of right heel measuring 5 x 6 x 3 cm.

3.  Diabetes type 2.

4.  Peripheral arterial disease.

5.  Status post  donor renal transplant.

6.  Dyslipidemia.

7.  Hypertension.

8.  History of coronary artery disease status post coronary artery bypass

graft.

9.  Status post automatic implantable cardioverter-defibrillator.

10.  Pacemaker.



PLAN:  The patient is currently comfortable.  He is continuing on his

CellCept.  The patient is on Myfortic at home, but he is not able to take

it.  He is on Colace.  He is going to continue with aspirin.  He is on

heparin for DVT prophylaxis.  He is going to get prednisone.  He is on

Prograf.  I did speak to Dr. Prescott who signed the case.  We will get

ID evaluation and also Podiatry to follow up.  He wants to follow up with

Dr. Danielson.  He was formerly seeing Dr. Goel.  We will continue to follow

closely.





__________________________________________

Andrea Calderon MD





DD:  2018 16:10:57

DT:  2018 23:42:29

Job # [id number]

## 2018-02-14 NOTE — PN
DATE:  02/14/2018



SUBJECTIVE:  The patient is in bed, in no acute distress, nontoxic.



PHYSICAL EXAMINATION:

VITAL SIGNS:  Temperature is 98, blood pressure is 140/70, respiratory rate

of 20, heart rate of 86.

HEENT:  Examination of HEENT is unremarkable.

NECK:  Supple.

LUNGS:  Have decreased breath sounds.

HEART:  Reveals normal S1 and S2.

ABDOMEN:  Soft, nontender.



LABORATORY DATA:  Laboratory examination reveals the patient's white count

of 13,500, hemoglobin 11, platelets of 303.  Coagulation is noted.  BUN of

18, creatinine of 0.7.  Microbiology reveals the bone culture has

Pseudomonas aeruginosa and sensitive to meropenem and cefepime.  The left

foot culture has Pseudomonas aeruginosa with heavy growth.  Review of

orders reveals the patient to be on meropenem, prednisone.



ASSESSMENT AND PLAN:  This is a 57-year-old male with peripheral artery

disease, peripheral vascular disease, coronary artery disease, myocardial

infarction, congestive heart failure, chronic obstructive lung disease,

admitted with foot infection and Pseudomonas osteomyelitis with the patient

with a renal transplant, who is on immunocompromising drug and currently on

meropenem and pathology of the bone is consistent with osteomyelitis.  Will

need 4 to 6 weeks of meropenem with weekly CBC, SMA-18, sed rate,

C-reactive protein.



__________________________________________

Duc Bee MD





DD:  02/14/2018 18:14:02

DT:  02/14/2018 18:17:53

Job # [id number]

## 2018-02-14 NOTE — CON
DATE:  02/13/2018



LOCATION:  The patient was seen in room 569, bed 2, this morning.



CHIEF COMPLAINT:  Stump infection, foot infection.



HISTORY OF PRESENT ILLNESS:  This is a 57-year-old male known to me with

multiple admissions in the past and the patient with history of peripheral

vascular disease, coronary artery disease, congestive heart failure,

chronic obstructive lung disease, myocardial infarction with history of

AICD, left foot TMA, renal transplant in 2011, and left fifth toe

amputation, _____ admitted with stump infection.  The patient denies any

fevers or any chills.  The patient denies any abdominal pain, diarrhea,

constipation, or bright red blood per rectum.



PAST MEDICAL HISTORY:  Significant for coronary artery disease, peripheral

vascular disease, myocardial infarction, coronary artery disease, chronic

obstructive lung disease, and congestive heart failure.



PAST SURGICAL HISTORY:  Significant for AICD, left fifth toe amputation,

renal transplant, and left foot transmetatarsal amputation.



ALLERGIES:  THE PATIENT HAS NO KNOWN ALLERGIES.



MEDICATIONS AT HOME:  Include prednisone, tacrolimus, Bactrim,

mycophenolate, metoprolol, insulin, and aspirin.



PHYSICAL EXAMINATION

GENERAL:  The patient is in bed, in no acute distress, answering questions.

VITAL SIGNS:  Temperature of 98, blood pressure is 126/50, respiratory rate

20, heart rate of 80, patient's saturation is at 93%.

HEENT:  Examination is unremarkable.

NECK:  Supple.

LUNGS:  Has decreased breath sounds.

HEART:  Normal S1, S2.

ABDOMEN:  Soft and nontender.

EXTREMITIES:  Left foot and stump is noted as per Dr. Delvalle's note.



LABORATORY DATA:  Reveals the patient's white count of 13,200, hemoglobin

of 11, platelets of 207,000.  Coagulation is noted and chemistry reveals a

BUN of 21 and creatinine of 0.8.  Dr. Gerald Delvalle's consultation is

reviewed.  Surgical note from _____ debridement of the left foot by Dr. Prescott is reviewed.  Operative note by Dr. Prescott is also

reviewed.  Preoperative, infected nonhealing left transmetatarsal wound and

necrotic right heel debridement is noted.



ASSESSMENT AND PLAN:  This is a 57-year-old male with peripheral artery

disease, peripheral vascular disease, coronary artery disease, myocardial

infarction, congestive heart failure, and chronic obstructive lung disease

admitted with left foot and stump infection with leukocytosis in a renal

transplant patient and immunocompromised.  We will check on the OR

cultures.  The patient was given cefazolin.  Reviewed microbiology,

reviewed bone culture from 02/12 and yesterday is gram-negative etta.  The

left foot cultures are gram-negative etta.  The patient did have

Enterococcus and Corynebacterium in cultures in December and we will start

the patient on meropenem pending further identification and sensitivity of

the gram-negative etta.  Pathology to determine if there is still

osteomyelitis, which probably is more consistent.  We will follow closely

with you.







__________________________________________

Duc eBe MD



DD:  02/13/2018 22:50:26

DT:  02/14/2018 3:02:54

Job # [id number]

## 2018-02-15 LAB
ALBUMIN SERPL-MCNC: 3 G/DL (ref 3–4.8)
ALBUMIN/GLOB SERPL: 1 {RATIO} (ref 1.1–1.8)
ALT SERPL-CCNC: 25 U/L (ref 7–56)
AST SERPL-CCNC: 17 U/L (ref 17–59)
BUN SERPL-MCNC: 15 MG/DL (ref 7–21)
CALCIUM SERPL-MCNC: 9 MG/DL (ref 8.4–10.5)
ERYTHROCYTE [DISTWIDTH] IN BLOOD BY AUTOMATED COUNT: 15.2 % (ref 11.5–14.5)
GFR NON-AFRICAN AMERICAN: > 60
HGB BLD-MCNC: 11.4 G/DL (ref 14–18)
MCH RBC QN AUTO: 27.9 PG (ref 25–35)
MCHC RBC AUTO-ENTMCNC: 31.7 G/DL (ref 31–37)
MCV RBC AUTO: 88 FL (ref 80–105)
PLATELET # BLD: 295 10^3/UL (ref 120–450)
PMV BLD AUTO: 9.3 FL (ref 7–11)
RBC # BLD AUTO: 4.09 10^6/UL (ref 3.5–6.1)
WBC # BLD AUTO: 9.9 10^3/UL (ref 4.5–11)

## 2018-02-15 RX ADMIN — MEROPENEM AND SODIUM CHLORIDE SCH MLS/HR: 1 INJECTION, SOLUTION INTRAVENOUS at 06:52

## 2018-02-15 RX ADMIN — INSULIN HUMAN SCH UNITS: 100 INJECTION, SOLUTION PARENTERAL at 17:00

## 2018-02-15 RX ADMIN — MORPHINE SULFATE PRN MG: 2 INJECTION, SOLUTION INTRAMUSCULAR; INTRAVENOUS at 21:44

## 2018-02-15 RX ADMIN — MEROPENEM AND SODIUM CHLORIDE SCH MLS/HR: 1 INJECTION, SOLUTION INTRAVENOUS at 13:01

## 2018-02-15 RX ADMIN — INSULIN HUMAN SCH UNITS: 100 INJECTION, SOLUTION PARENTERAL at 08:22

## 2018-02-15 RX ADMIN — INSULIN HUMAN SCH: 100 INJECTION, SOLUTION PARENTERAL at 22:22

## 2018-02-15 RX ADMIN — HYDROMORPHONE HYDROCHLORIDE PRN MG: 1 INJECTION, SOLUTION INTRAMUSCULAR; INTRAVENOUS; SUBCUTANEOUS at 09:31

## 2018-02-15 RX ADMIN — INSULIN HUMAN SCH: 100 INJECTION, SOLUTION PARENTERAL at 00:28

## 2018-02-15 RX ADMIN — MEROPENEM AND SODIUM CHLORIDE SCH MLS/HR: 1 INJECTION, SOLUTION INTRAVENOUS at 21:37

## 2018-02-15 RX ADMIN — HYDROMORPHONE HYDROCHLORIDE PRN MG: 1 INJECTION, SOLUTION INTRAMUSCULAR; INTRAVENOUS; SUBCUTANEOUS at 13:02

## 2018-02-15 RX ADMIN — INSULIN HUMAN SCH UNITS: 100 INJECTION, SOLUTION PARENTERAL at 12:48

## 2018-02-15 RX ADMIN — SODIUM HYPOCHLORITE SCH APPLIC: 2.5 SOLUTION TOPICAL at 09:34

## 2018-02-15 RX ADMIN — MORPHINE SULFATE PRN MG: 2 INJECTION, SOLUTION INTRAMUSCULAR; INTRAVENOUS at 17:09

## 2018-02-15 NOTE — CP.SDSHP
Same Day Surgery [facility] & [facility]





- History


Proposed Procedure: B/L LE debridement


Pre-Op Diagnosis: B/L Chronic foot wounds





- Previous Medical/Surgical History


Cardiac: Hypertension, Hx of CHF


Pulmonary: Emphysema/COPD


Endocrine/Metabolic: Diabetes, Renal Disease


Previous Surgical History: TMA, renal TXP





- Allergies


Allergies: 


Allergies





No Known Allergies Allergy (Verified 02/08/18 12:52)


 











- Physical Exam


Vital Signs: 


 Vital Signs











  02/15/18 02/15/18





  09:30 09:31


 


Pulse Rate 110 H 


 


Blood Pressure 150/92 H 150/92 H











Mental Status: Alert & Oriented x3


Neuro: WNL


Heart: WNL


Lungs: WNL


GI: WNL





- Impression


Impression: 57M w. chronic B/L LE wounds for debridement


Pt. Evaluated Today:Candidate for Anesthesia & Procedure: Yes





- Date & Time


Date: 02/12/18


Time: 07:30





Short Stay Discharge





- Short Stay Discharge


Admitting Diagnosis/Reason for Visit: L97.526


Disposition: HOME/ ROUTINE


Referrals: 


Andrea Calderon MD [Primary Care Provider] -

## 2018-02-15 NOTE — CP.PCM.PN
Subjective





- Date & Time of Evaluation


Date of Evaluation: 02/15/18


Time of Evaluation: 16:43





- Subjective


Subjective: 


Surgery progress note for Dr. Prescott 


Patient seen and examined at bedside.  Patient is doing well, denies fevers and 

chills.  Dressing changed bid yesterday and this am with minimal serous 

drainage.





Objective





- Vital Signs/Intake and Output


Vital Signs (last 24 hours): 


 











Temp Pulse Resp BP Pulse Ox


 


 98.3 F   110 H  20   150/92 H  97 


 


 02/15/18 07:00  02/15/18 09:30  02/15/18 07:00  02/15/18 09:31  02/14/18 16:00








Intake and Output: 


 











 02/15/18 02/15/18





 06:59 18:59


 


Intake Total  480


 


Balance  480














- Medications


Medications: 


 Current Medications





Acetaminophen (Tylenol 325mg Tab)  650 mg PO Q6 PRN


   PRN Reason: Pain, Mild (1-3)


Amlodipine Besylate (Norvasc)  10 [facility]AM [facility]


   Last Admin: 02/15/18 09:31 Dose:  10 mg


Aspirin (Ecotrin)  81 mg PO 0800 [facility]


   Last Admin: 02/15/18 08:27 Dose:  81 mg


Atorvastatin Calcium (Lipitor)  40 mg PO DIN [facility]


   Last Admin: 02/14/18 19:42 Dose:  40 mg


Chlorpromazine (Thorazine)  25 mg PO Q6 [facility]


   PRN Reason: Protocol


   Last Admin: 02/15/18 12:48 Dose:  25 mg


Docusate Sodium (Colace)  100 mg PO BID [facility]


   Last Admin: 02/15/18 09:30 Dose:  100 mg


Escitalopram Oxalate (Lexapro)  [facility]


   Last Admin: 02/15/18 09:31 Dose:  5 mg


Famotidine (Pepcid)  [facility]


   Last Admin: 02/15/18 09:31 Dose:  20 mg


Heparin Sodium (Porcine) (Heparin)  5,000 units SC Q8 [facility]


   PRN Reason: Protocol


   Last Admin: 02/15/18 13:01 Dose:  5,000 units


Meropenem/Sodium Chloride (Meropenem 1g/Ns 100ml Ivpb)  1 gm in 100 mls @ 100 

mls/hr IVPB Q8 [facility]


   PRN Reason: Protocol


   Stop: 02/23/18 06:01


   Last Admin: 02/15/18 13:01 Dose:  100 mls/hr


Insulin Human Regular (Humulin R Med)  0 units SC ACHS [facility]


   PRN Reason: Protocol


   Last Admin: 02/15/18 12:48 Dose:  3 units


Metoclopramide HCl (Reglan)  10 mg PO 0600,1130,1630,2200 [facility]


   Last Admin: 02/15/18 12:48 Dose:  10 mg


Metoprolol Tartrate (Lopressor)  50 mg PO 0800,1800 [facility]


   Last Admin: 02/15/18 09:30 Dose:  50 mg


Morphine Sulfate (Morphine)  6 mg IVP Q4H PRN


   PRN Reason: Pain, severe (8-10)


Mycophenolate Mofetil (Cellcept Cap)  750 mg PO BID [facility]


   Last Admin: 02/15/18 09:31 Dose:  750 mg


Ondansetron HCl (Zofran Inj)  4 mg IVP Q6 PRN


   PRN Reason: Nausea/Vomiting


   Last Admin: 02/14/18 08:16 Dose:  4 mg


Oxycodone/Acetaminophen (Percocet 10/325 Mg Tab)  1 tab PO Q4H PRN


   PRN Reason: Pain, moderate (4-7)


Prednisone (Prednisone Tab)  5 mg PO 0800 [facility]


   Last Admin: 02/15/18 09:31 Dose:  5 mg


Sodium Hypochlorite (Dakins Solution 0.25%)  0 ml TOP DAILY [facility]


   Last Admin: 02/15/18 09:34 Dose:  1 applic


Tacrolimus (Prograf Cap)  0.5 mg PO HS [facility]


   Last Admin: 02/14/18 21:41 Dose:  0.5 mg


Tacrolimus (Prograf Cap)  2 mg PO BID [facility]


   Last Admin: 02/15/18 12:49 Dose:  2 mg











- Labs


Labs: 


 





 02/15/18 06:20 





 02/15/18 06:20 





 











PT  13.5 SECONDS (9.4-12.5)  H  02/12/18  07:00    


 


INR  1.17  (0.93-1.08)  H  02/12/18  07:00    


 


APTT  30.6 Seconds (25.1-36.5)   02/12/18  07:00    














- Constitutional


Appears: Well





- Head Exam


Head Exam: ATRAUMATIC, NORMAL INSPECTION, NORMOCEPHALIC





- Eye Exam


Eye Exam: EOMI, Normal appearance, PERRL


Pupil Exam: NORMAL ACCOMODATION, PERRL





- ENT Exam


ENT Exam: Mucous Membranes Moist, Normal Exam





- Neck Exam


Neck Exam: Full ROM, Normal Inspection.  absent: Lymphadenopathy





- Respiratory Exam


Respiratory Exam: Clear to Ausculation Bilateral, NORMAL BREATHING PATTERN





- Cardiovascular Exam


Cardiovascular Exam: REGULAR RHYTHM, +S1, +S2.  absent: Murmur





- GI/Abdominal Exam


GI & Abdominal Exam: Soft, Normal Bowel Sounds.  absent: Tenderness





- Rectal Exam


Rectal Exam: NORMAL INSPECTION





-  Exam


 Exam: Circumcision, NORMAL INSPECTION


External exam: NORMAL EXTERNAL EXAM


Speculum exam: NORMAL SPECULUM EXAM


Bimanual exam: NORMAL BIMANUAL EXAM





- Extremities Exam


Extremities Exam: Full ROM, Normal Capillary Refill, Normal Inspection.  absent

: Joint Swelling, Pedal Edema





- Back Exam


Back Exam: NORMAL INSPECTION





- Neurological Exam


Neurological Exam: Alert, Awake, CN II-XII Intact, Normal Gait, Oriented x3





- Psychiatric Exam


Psychiatric exam: Normal Affect, Normal Mood





- Skin


Skin Exam: Dry, Intact, Normal Color, Warm





- Additional Findings


Additional findings: 


left ovid foot wound with pink granulation tissue, right heal wound with 

necrotic green tissue





Assessment and Plan





- Assessment and Plan (Free Text)


Assessment: 


56 y/o male w/ bilateral nonhealing foot wounds s/p debridement 





-cont Dakins soultion packing dressing, TID 


-abx per ID 


-appreciate medical recs 


-debridement Friday of this week for right heal 


-rest of recs per Dr. Prescott

## 2018-02-16 LAB
ALBUMIN SERPL-MCNC: 2.9 G/DL (ref 3–4.8)
ALBUMIN/GLOB SERPL: 1 {RATIO} (ref 1.1–1.8)
ALT SERPL-CCNC: 21 U/L (ref 7–56)
AST SERPL-CCNC: 25 U/L (ref 17–59)
BUN SERPL-MCNC: 13 MG/DL (ref 7–21)
CALCIUM SERPL-MCNC: 8.8 MG/DL (ref 8.4–10.5)
ERYTHROCYTE [DISTWIDTH] IN BLOOD BY AUTOMATED COUNT: 15.3 % (ref 11.5–14.5)
GFR NON-AFRICAN AMERICAN: > 60
HGB BLD-MCNC: 10.8 G/DL (ref 14–18)
MCH RBC QN AUTO: 28.1 PG (ref 25–35)
MCHC RBC AUTO-ENTMCNC: 32 G/DL (ref 31–37)
MCV RBC AUTO: 87.8 FL (ref 80–105)
PLATELET # BLD: 289 10^3/UL (ref 120–450)
PMV BLD AUTO: 9.3 FL (ref 7–11)
RBC # BLD AUTO: 3.85 10^6/UL (ref 3.5–6.1)
WBC # BLD AUTO: 12.2 10^3/UL (ref 4.5–11)

## 2018-02-16 PROCEDURE — 0JBR0ZZ EXCISION OF LEFT FOOT SUBCUTANEOUS TISSUE AND FASCIA, OPEN APPROACH: ICD-10-PCS | Performed by: GENERAL PRACTICE

## 2018-02-16 PROCEDURE — 0JBQ0ZZ EXCISION OF RIGHT FOOT SUBCUTANEOUS TISSUE AND FASCIA, OPEN APPROACH: ICD-10-PCS | Performed by: GENERAL PRACTICE

## 2018-02-16 RX ADMIN — MORPHINE SULFATE PRN MG: 2 INJECTION, SOLUTION INTRAMUSCULAR; INTRAVENOUS at 09:30

## 2018-02-16 RX ADMIN — MEROPENEM AND SODIUM CHLORIDE SCH MLS/HR: 1 INJECTION, SOLUTION INTRAVENOUS at 05:30

## 2018-02-16 RX ADMIN — INSULIN HUMAN SCH: 100 INJECTION, SOLUTION PARENTERAL at 21:59

## 2018-02-16 RX ADMIN — INSULIN HUMAN SCH: 100 INJECTION, SOLUTION PARENTERAL at 09:33

## 2018-02-16 RX ADMIN — SODIUM HYPOCHLORITE SCH APPLIC: 2.5 SOLUTION TOPICAL at 10:00

## 2018-02-16 RX ADMIN — OXYCODONE AND ACETAMINOPHEN PRN TAB: 10; 325 TABLET ORAL at 15:50

## 2018-02-16 RX ADMIN — MEROPENEM AND SODIUM CHLORIDE SCH MLS/HR: 1 INJECTION, SOLUTION INTRAVENOUS at 18:41

## 2018-02-16 RX ADMIN — MORPHINE SULFATE PRN MG: 2 INJECTION, SOLUTION INTRAMUSCULAR; INTRAVENOUS at 02:47

## 2018-02-16 RX ADMIN — INSULIN HUMAN SCH: 100 INJECTION, SOLUTION PARENTERAL at 15:34

## 2018-02-16 RX ADMIN — INSULIN HUMAN SCH UNITS: 100 INJECTION, SOLUTION PARENTERAL at 19:04

## 2018-02-16 RX ADMIN — MEROPENEM AND SODIUM CHLORIDE SCH MLS/HR: 1 INJECTION, SOLUTION INTRAVENOUS at 21:56

## 2018-02-16 NOTE — RAD
HISTORY:

preop eval  



COMPARISON:

02/08/2018. 



FINDINGS:



LUNGS:

The lungs are well inflated and clear.



PLEURA:

No significant pleural effusion identified, no pneumothorax apparent.



CARDIOVASCULAR:

There is mild cardiomegaly.  Status post CABG.  There is stable 

position of left-sided pacemaker.



OSSEOUS STRUCTURES:

No significant abnormalities.



VISUALIZED UPPER ABDOMEN:

Normal.



OTHER FINDINGS:

None.



IMPRESSION:

No active pulmonary disease.

## 2018-02-16 NOTE — PCM.SURG1
Surgeon's Initial Post Op Note





- Surgeon's Notes


Surgeon: Dr. Prescott 


Assistant: Dr. Crawford PGY3, Dr. Lockwood PGY3 


Type of Anesthesia: General LMA


Pre-Operative Diagnosis: bilateral chronic foot wounds, poor healing with 

necrosis


Operative Findings: necrotic tissue


Post-Operative Diagnosis: same


Operation Performed: sharp and VersaJet debridement of right calcaneal 

nonhealing wound and left TMA wound


Specimen/Specimens Removed: none


Estimated Blood Loss: EBL {In ML}: 10


Blood Products Given: N/A


Drains Used: No Drains


Post-Op Condition: Good


Date of Surgery/Procedure: 02/16/18


Time of Surgery/Procedure: 13:33

## 2018-02-16 NOTE — PN
DATE:  02/16/2018



SUBJECTIVE:  The patient is in bed, in no acute distress, nontoxic.



PHYSICAL EXAMINATION:

VITAL SIGNS:  Temperature is 97, blood pressure is 130/70, respiratory rate

of 18, heart rate of 73.

HEENT:  Examination of HEENT is unremarkable.

NECK:  Supple.

LUNGS:  Have decreased breath sounds.

HEART:  Normal S1 and S2.

ABDOMEN:  Soft, nontender.



LABORATORY DATA:  Laboratory examination reveals a white count of 12,200,

hemoglobin of 10, platelets of 289.  Coagulation is noted.  Chemistries

reveals a BUN of 13, creatinine 0.7.  The patient had a chest x-ray.



ASSESSMENT AND PLAN:  A 57-year-old male with peripheral artery disease,

peripheral vascular disease, coronary artery disease, myocardial

infarction, congestive heart failure, chronic obstructive lung disease in a

patient who has renal transplant, immunocompromised, on immunosuppressive

medications and a renal transplant, now with Pseudomonas, osteomyelitis of

the foot, will need 4 to 6 weeks of antibiotics.  Although the Pseudomonas

is sensitive to Cipro, Cipro use in this patient with the immunosuppressant

medication is contraindicated.  We will continue the meropenem.  Weekly

CBC, SMA-18, sed rate, C-reactive protein.  We will follow with you.





__________________________________________

Duc Bee MD





DD:  02/16/2018 19:31:09

DT:  02/16/2018 19:34:29

Job # [id number]

## 2018-02-16 NOTE — PN
DATE:  02/15/2018



SUBJECTIVE:  The patient has no complaints of any chest pain.  No shortness

of breath.  No headaches.



PHYSICAL EXAMINATION:

VITAL SIGNS:  Temperature is 98.3, pulse of 100, blood pressure is 110/63,

respirations 20.

GENERAL:  The patient is lying in bed, flat, comfortable.

HEENT:  No oral lesion.  Anicteric sclerae.  Moist mucosa.

NECK:  No JVD, adenopathy, or thyromegaly.

CARDIOVASCULAR:  S1 and S2, regular.  No murmurs, rubs, or gallops.

LUNGS:  Clear to auscultation bilaterally.  No wheeze, rales, or rhonchi.

ABDOMEN:  Bowel sounds are positive, soft, nontender and nondistended.

EXTREMITIES:  No cyanosis, clubbing or edema.



LABORATORY DATA:  White count is 9.9, hemoglobin is 11.4.  Creatinine is

0.7.



ASSESSMENT:

1.  Status post revision of the left metatarsal amputation.

2.  Debridement of the right heel measuring 5.6 x 3 cm.

3.  Diabetes type 2.

4.  Peripheral arterial disease.

5.  Status post  donor renal transplant.

6.  Dyslipidemia.

7.  Hypertension.

8.  Coronary artery disease, status post coronary artery bypass graft.

9.  Pacemaker.

10.  Automatic implantable cardioverter-defibrillator.



PLAN:  The patient is currently comfortable.  I did speak to Dr. Prescott yesterday regarding the case.  Local wound care is being done,

status post debridement.  The patient is on his CellCept, his prednisone

and his Prograf for his transplant.  The patient is on IV fluids.  At this

point, I will discontinue the patient's IV fluids.  He is eating and

drinking.  The patient is on Tylenol as needed.  The patient is on Pepcid. 

He is going to continue with morphine for pain.  He is on Norvasc for

hypertension.  He is on Lipitor for his dyslipidemia.  He is on Colace for

his constipation.  He is being followed by Dr. Bee and Podiatry as

well.



__________________________________________

Andrea Calderon MD



DD:  02/15/2018 22:13:39

DT:  02/15/2018 23:10:22

Job # [id number]

## 2018-02-16 NOTE — PN
DATE:  02/15/2018



SUBJECTIVE:  Patient is in bed, in no acute distress, nontoxic.



PHYSICAL EXAMINATION:

VITAL SIGNS:  Temperature is 98, blood pressure is 150/90, respiratory rate

of 16.

HEENT:  Unremarkable.

NECK:  Supple.

LUNGS:  Have decreased breath sounds.

HEART:  Normal S1 and S2.

ABDOMEN:  Soft and nontender.



LABORATORY EXAMINATION:  Reveals the patient's white count is 9.9,

hemoglobin of 11, coagulation is noted.  Chemistry reveals a BUN of 15,

creatinine of 0.7.



ASSESSMENT AND PLAN:  This is a 57-year-old male with peripheral artery

disease, peripheral vascular disease, coronary artery disease, myocardial

infarction, congestive heart failure, chronic obstructive lung disease,

admitted with foot infection with Pseudomonas osteomyelitis and the patient

was renal transplant immunocompromised, currently on meropenem.  His bone

pathology is consistent with osteomyelitis.  Will need 4 to 6 weeks of

meropenem with  CBC, SMA-18, sed rate, C-reactive protein weekly x4 to 6

weeks.







__________________________________________

Duc Bee MD





DD:  02/15/2018 20:33:38

DT:  02/15/2018 21:35:12

Job # [id number]

## 2018-02-17 LAB
ALBUMIN SERPL-MCNC: 3.2 G/DL (ref 3–4.8)
ALBUMIN/GLOB SERPL: 1 {RATIO} (ref 1.1–1.8)
ALT SERPL-CCNC: 28 U/L (ref 7–56)
AST SERPL-CCNC: 24 U/L (ref 17–59)
BUN SERPL-MCNC: 11 MG/DL (ref 7–21)
CALCIUM SERPL-MCNC: 9.3 MG/DL (ref 8.4–10.5)
ERYTHROCYTE [DISTWIDTH] IN BLOOD BY AUTOMATED COUNT: 15.5 % (ref 11.5–14.5)
GFR NON-AFRICAN AMERICAN: > 60
HGB BLD-MCNC: 11.1 G/DL (ref 14–18)
MCH RBC QN AUTO: 28.4 PG (ref 25–35)
MCHC RBC AUTO-ENTMCNC: 32.3 G/DL (ref 31–37)
MCV RBC AUTO: 88 FL (ref 80–105)
PLATELET # BLD: 294 10^3/UL (ref 120–450)
PMV BLD AUTO: 9.7 FL (ref 7–11)
RBC # BLD AUTO: 3.91 10^6/UL (ref 3.5–6.1)
WBC # BLD AUTO: 12.7 10^3/UL (ref 4.5–11)

## 2018-02-17 RX ADMIN — INSULIN HUMAN SCH UNITS: 100 INJECTION, SOLUTION PARENTERAL at 09:24

## 2018-02-17 RX ADMIN — OXYCODONE AND ACETAMINOPHEN PRN TAB: 10; 325 TABLET ORAL at 21:36

## 2018-02-17 RX ADMIN — OXYCODONE AND ACETAMINOPHEN PRN TAB: 10; 325 TABLET ORAL at 10:03

## 2018-02-17 RX ADMIN — SODIUM HYPOCHLORITE SCH APPLIC: 2.5 SOLUTION TOPICAL at 09:33

## 2018-02-17 RX ADMIN — OXYCODONE AND ACETAMINOPHEN PRN TAB: 10; 325 TABLET ORAL at 14:51

## 2018-02-17 RX ADMIN — INSULIN HUMAN SCH UNITS: 100 INJECTION, SOLUTION PARENTERAL at 11:00

## 2018-02-17 RX ADMIN — MEROPENEM AND SODIUM CHLORIDE SCH MLS/HR: 1 INJECTION, SOLUTION INTRAVENOUS at 21:25

## 2018-02-17 RX ADMIN — MEROPENEM AND SODIUM CHLORIDE SCH MLS/HR: 1 INJECTION, SOLUTION INTRAVENOUS at 05:44

## 2018-02-17 RX ADMIN — INSULIN HUMAN SCH: 100 INJECTION, SOLUTION PARENTERAL at 22:00

## 2018-02-17 RX ADMIN — INSULIN HUMAN SCH UNITS: 100 INJECTION, SOLUTION PARENTERAL at 17:36

## 2018-02-17 RX ADMIN — MEROPENEM AND SODIUM CHLORIDE SCH MLS/HR: 1 INJECTION, SOLUTION INTRAVENOUS at 14:49

## 2018-02-17 NOTE — PN
DATE:



SUBJECTIVE:  The patient is 57 years old, known to me from multiple

previous admission.  He came to emergency room because of right heel ulcer,

nonhealing.  The patient has poorly-controlled diabetes with severe

peripheral vascular disease.  Recently had left TMA done.



PHYSICAL EXAMINATION:

GENERAL:  On examination today, he is awake and alert, able to communicate.

VITAL SIGNS:  He is afebrile, pulse 76, respirations 16, blood pressure

138/55.

LUNGS:  Bilateral fair airflow.  No rhonchi or crackle.

HEART:  S1 and S2 audible.

ABDOMEN:  Soft.  Nontender.  No rebound.  No guarding.

NEUROLOGIC:  He is awake, alert, oriented, able to communicative.

EXTREMITIES:  Right foot is in the dressing and the left foot is in

dressing after TMA that is seemed to be healing.



LABORATORY DATA:  WBC is 12.7, hemoglobin 11, hematocrit 34, platelet 294. 

Chemistry:  Sodium 133, potassium 4.3, chloride 96, CO2 of 29, BUN 11,

creatinine 0.6, blood sugar 259.  His foot wound is growing Pseudomonas

aeruginosa culture.



ASSESSMENT AND PLAN:

1.  Right heel ulcer.

2.  Uncontrolled diabetes.

3.  Peripheral vascular disease.

4.  Hypertension.

5.  Status post transmetatarsal amputation of left foot and debridement of

right heel ulcer.

6.  Status post renal transplant.

7.  Coronary artery disease.

8.  Pacemaker and defibrillator placement.



The patient is on Cellcept.  He is on aspirin 81 daily.  He is on Duragesic

patch.  He is on DVT prophylaxis and Lexapro.  He is getting meropenem. 

His wound is being taken care by Podiatry team.





__________________________________________

Christine Zelaya MD

 



DD:  02/17/2018 19:58:27

DT:  02/17/2018 22:35:19

Job # [id number]

## 2018-02-17 NOTE — CP.PCM.PN
Subjective





- Date & Time of Evaluation


Date of Evaluation: 02/17/18


Time of Evaluation: 07:10





- Subjective


Subjective: 





Patient seen and examined at bedside. Patient serosanguinous saturation of his 

left foot incision overnight but denies any fevers, chills, or any other 

symptoms. Pain well controlled on current regimen





Objective





- Vital Signs/Intake and Output


Vital Signs (last 24 hours): 


 











Temp Pulse Resp BP Pulse Ox


 


 97.4 F L  69   18   128/68   98 


 


 02/17/18 07:30  02/17/18 09:29  02/17/18 07:30  02/17/18 10:06  02/17/18 07:30








Intake and Output: 


 











 02/17/18 02/17/18





 06:59 18:59


 


Intake Total 960 


 


Output Total 3 


 


Balance 957 














- Medications


Medications: 


 Current Medications





Acetaminophen (Tylenol 325mg Tab)  650 mg PO Q6 PRN


   PRN Reason: Pain, Mild (1-3)


   Last Admin: 02/16/18 18:43 Dose:  650 mg


Amlodipine Besylate (Norvasc)  10 mg PO QAM [facility]


   Last Admin: 02/17/18 10:06 Dose:  10 mg


Aspirin (Ecotrin)  81 mg PO 0800 [facility]


   Last Admin: 02/17/18 09:33 Dose:  81 mg


Atorvastatin Calcium (Lipitor)  40 mg PO DIN [facility]


   Last Admin: 02/16/18 18:35 Dose:  40 mg


Chlorpromazine (Thorazine)  25 mg PO Q6 [facility]


   PRN Reason: Protocol


   Last Admin: 02/17/18 06:37 Dose:  25 mg


Docusate Sodium (Colace)  100 mg PO BID [facility]


   Last Admin: 02/17/18 09:29 Dose:  100 mg


Escitalopram Oxalate (Lexapro)  5 mg PO QAM [facility]


   Last Admin: 02/16/18 09:13 Dose:  5 mg


[facility]otidine ([facility]epcid)  [facility] [place]


   Last Admin: 02/17/18 09:32 Dose:  20 mg


Fentanyl (Fentanyl)  25 mcg IV Q5M PRN


   PRN Reason: Pain, moderate (4-7)


Heparin Sodium (Porcine) (Heparin)  5,000 units SC Q8 [facility]


   PRN Reason: Protocol


   Last Admin: 02/15/18 21:34 Dose:  Not Given


Meropenem/Sodium Chloride (Meropenem 1g/Ns 100ml Ivpb)  1 gm in 100 mls @ 100 

mls/hr IVPB Q8 [facility]


   PRN Reason: Protocol


   Stop: 02/23/18 06:01


   Last Admin: 02/17/18 05:44 Dose:  100 mls/hr


Sodium Chloride (Sodium Chloride 0.45%)  1,000 mls @ 80 mls/hr IV .[id number] [facility]


   Last Admin: 02/16/18 19:00 Dose:  80 mls/hr


Insulin Human Regular (Humulin R Med)  0 units SC ACHS [facility]


   PRN Reason: Protocol


   Last Admin: 02/17/18 09:24 Dose:  5 units


Metoclopramide HCl (Reglan)  10 mg PO 0600,1130,1630,2200 [facility]


   Last Admin: 02/17/18 06:38 Dose:  10 mg


Metoprolol Tartrate (Lopressor)  50 mg PO 0800,1800 [facility]


   Last Admin: 02/17/18 09:29 Dose:  50 mg


Morphine Sulfate (Morphine)  6 mg IVP Q4H PRN


   PRN Reason: Pain, severe (8-10)


   Last Admin: 02/16/18 09:30 Dose:  6 mg


Mycophenolate Mofetil (Cellcept Cap)  750 mg PO BID [facility]


   Last Admin: 02/17/18 09:26 Dose:  750 mg


Ondansetron HCl (Zofran Inj)  4 mg IVP Q6 PRN


   PRN Reason: Nausea/Vomiting


   Last Admin: 02/14/18 08:16 Dose:  4 mg


Ondansetron HCl (Zofran Inj)  4 mg IVP ONCE PRN


   PRN Reason: Nausea/Vomiting


Oxycodone/Acetaminophen (Percocet 10/325 Mg Tab)  1 tab PO Q4H PRN


   PRN Reason: Pain, moderate (4-7)


   Last Admin: 02/17/18 10:03 Dose:  1 tab


Prednisone (Prednisone Tab)  5 mg PO 0800 [facility]


   Last Admin: 02/17/18 09:29 Dose:  5 mg


Scopolamine (Transderm-Scop)  1 patch TD Q3D [facility]


Sodium Hypochlorite (Dakins Solution 0.25%)  0 ml TOP DAILY [facility]


   Last Admin: 02/17/18 09:33 Dose:  1 applic


Tacrolimus (Prograf Cap)  0.5 mg PO HS [facility]


   Last Admin: 02/16/18 21:58 Dose:  0.5 mg


Tacrolimus (Prograf Cap)  2 mg PO BID [facility]


   Last Admin: 02/17/18 09:27 Dose:  2 mg











- Labs


Labs: 


 





 02/16/18 06:15 





 02/16/18 06:15 





 











PT  13.5 SECONDS (9.4-12.5)  H  02/12/18  07:00    


 


INR  1.17  (0.93-1.08)  H  02/12/18  07:00    


 


APTT  30.6 Seconds (25.1-36.5)   02/12/18  07:00    














- Constitutional


Appears: Well, Non-toxic, No Acute Distress





- Head Exam


Head Exam: ATRAUMATIC, NORMOCEPHALIC





- Eye Exam


Eye Exam: Normal appearance.  absent: Conjunctival injection, Scleral icterus





- Respiratory Exam


Respiratory Exam: NORMAL BREATHING PATTERN.  absent: Accessory Muscle Use, 

Respiratory Distress





- Cardiovascular Exam


Cardiovascular Exam: RRR





- GI/Abdominal Exam


GI & Abdominal Exam: absent: Distended





- Extremities Exam


Additional comments: 





right heel wound with persistent dusky/necrotic tissue on the posterior base of 

the wound, no foul odor, drainage, or bleeding


Left forefoot wound with healthy tissue, no sign of necrotic tissue, no bleeding

, odor, or dis harge





- Neurological Exam


Neurological Exam: Alert, Awake, Oriented x3





- Psychiatric Exam


Psychiatric exam: Normal Affect, Normal Mood





- Skin


Skin Exam: Dry, Normal Color, Warm





Assessment and Plan





- Assessment and Plan (Free Text)


Assessment: 


57M POD#1 s/p debridement of right calcaneal nonhealing wound and left TMA wound





Plan:


-Continue TID dressing packing changes with dakin's solution to the wounds BL


-F/U IR consult recommendations for possible revascularization for healing


-Further surgical recommendations pending clinical course and IR plans


-PRN pain medications


-antibiotics per ID





Discussed with Dr. Kenyon Holt, PGY2


[phone]

## 2018-02-17 NOTE — PN
DATE:  02/17/2018



SUBJECTIVE:  Patient is in bed, seen earlier today, in no acute distress,

nontoxic.



PHYSICAL EXAMINATION:

VITAL SIGNS:  Temperature is 98, blood pressure is 104/30, respiratory rate

of 18.

HEENT:  Unremarkable.

NECK:  Supple.

LUNGS:  Have decreased breath sounds.

HEART:  Normal S1, S2.

ABDOMEN:  Soft, nontender.



LABORATORY EXAMINATION:  Reveals a white count of 12,200, hemoglobin of 10

and platelets of 289.  Chemistries reveal a BUN of 13, creatinine of 0.7. 

Microbiology reveals Pseudomonas aeruginosa.



ASSESSMENT:  A 57-year-old male with peripheral artery disease, peripheral

vascular disease, coronary artery disease, myocardial infarction,

congestive heart failure, chronic obstructive lung disease, renal

transplant, immunocompromised, on immunosuppressive medications in a renal

transplantation with Pseudomonas, osteomyelitis of the foot, will need 4 to

6 weeks of antibiotics with a CBC, SMA-18, sedimentation rate, C-reactive

protein, on meropenem, due to his renal transplant immunosuppressive

medications, limited options and the use of Cipro, which the patient is

_____ contraindicated.





__________________________________________

Duc Bee MD





DD:  02/17/2018 8:42:23

DT:  02/17/2018 8:43:35

Job # [id number]

## 2018-02-18 LAB
BASOPHILS # BLD AUTO: 0.01 K/MM3 (ref 0–2)
BASOPHILS NFR BLD: 0.1 % (ref 0–3)
EOSINOPHIL # BLD: 0.4 10*3/UL (ref 0–0.7)
EOSINOPHIL NFR BLD: 3.9 % (ref 1.5–5)
ERYTHROCYTE [DISTWIDTH] IN BLOOD BY AUTOMATED COUNT: 15.5 % (ref 11.5–14.5)
GRANULOCYTES # BLD: 8.46 10*3/UL (ref 1.4–6.5)
GRANULOCYTES NFR BLD: 75.7 % (ref 50–68)
HGB BLD-MCNC: 11.5 G/DL (ref 14–18)
LYMPHOCYTES # BLD: 1.3 10*3/UL (ref 1.2–3.4)
LYMPHOCYTES NFR BLD AUTO: 11.5 % (ref 22–35)
MCH RBC QN AUTO: 27.8 PG (ref 25–35)
MCHC RBC AUTO-ENTMCNC: 31.7 G/DL (ref 31–37)
MCV RBC AUTO: 87.9 FL (ref 80–105)
MONOCYTES # BLD AUTO: 1 10*3/UL (ref 0.1–0.6)
MONOCYTES NFR BLD: 8.8 % (ref 1–6)
PLATELET # BLD: 320 10^3/UL (ref 120–450)
PMV BLD AUTO: 9.5 FL (ref 7–11)
RBC # BLD AUTO: 4.13 10^6/UL (ref 3.5–6.1)
WBC # BLD AUTO: 11.2 10^3/UL (ref 4.5–11)

## 2018-02-18 RX ADMIN — Medication PRN MG: at 19:32

## 2018-02-18 RX ADMIN — INSULIN LISPRO SCH UNIT: 100 INJECTION, SUSPENSION SUBCUTANEOUS at 17:50

## 2018-02-18 RX ADMIN — INSULIN DETEMIR SCH: 100 INJECTION, SOLUTION SUBCUTANEOUS at 22:18

## 2018-02-18 RX ADMIN — SODIUM HYPOCHLORITE SCH APPLIC: 2.5 SOLUTION TOPICAL at 11:31

## 2018-02-18 RX ADMIN — INSULIN HUMAN SCH UNITS: 100 INJECTION, SOLUTION PARENTERAL at 08:43

## 2018-02-18 RX ADMIN — INSULIN HUMAN SCH: 100 INJECTION, SOLUTION PARENTERAL at 22:58

## 2018-02-18 RX ADMIN — INSULIN HUMAN SCH UNITS: 100 INJECTION, SOLUTION PARENTERAL at 17:30

## 2018-02-18 RX ADMIN — MEROPENEM AND SODIUM CHLORIDE SCH MLS/HR: 1 INJECTION, SOLUTION INTRAVENOUS at 15:10

## 2018-02-18 RX ADMIN — MEROPENEM AND SODIUM CHLORIDE SCH MLS/HR: 1 INJECTION, SOLUTION INTRAVENOUS at 05:50

## 2018-02-18 RX ADMIN — OXYCODONE AND ACETAMINOPHEN PRN TAB: 10; 325 TABLET ORAL at 11:29

## 2018-02-18 RX ADMIN — INSULIN HUMAN SCH UNITS: 100 INJECTION, SOLUTION PARENTERAL at 12:34

## 2018-02-18 RX ADMIN — MEROPENEM AND SODIUM CHLORIDE SCH MLS/HR: 1 INJECTION, SOLUTION INTRAVENOUS at 21:27

## 2018-02-18 NOTE — PN
DATE:  02/18/2018



SUBJECTIVE:  The patient is in bed, in no acute distress, nontoxic.



OBJECTIVE

VITAL SIGNS:  Temperature is 98, blood pressure is 119/30, respiratory rate

of 20, heart rate of 71.

HEENT:  Examination is unremarkable.

NECK:  Supple.

LUNGS:  Have decreased breath sounds.

HEART:  Normal S1, S2.

ABDOMEN:  Soft and nontender.



DATA:  Laboratory examination reveals a white count of 11,200, hemoglobin

of 11, platelets of 320,000.  Coagulation is noted.  The chemistries reveal

a BUN of 11, creatinine of 0.6.  Microbiology reveals the bone culture with

Pseudomonas aeruginosa.



ASSESSMENT AND PLAN:  This is a 57-year-old male with peripheral artery

disease; peripheral vascular disease; coronary artery disease; myocardial

infarction; congestive heart failure; chronic obstructive lung disease;

renal transplant immunocompromised, on immunosuppressive medication and

renal transplant with Pseudomonas osteomyelitis of the foot.  He would need

four to six weeks of antibiotics, on meropenem with a weekly CBC, SMA-18,

sedimentation rate, C-reactive protein; although the Pseudomonas is

sensitive to Cipro _____ immunosuppressive medication for renal transplant

during drug and drug interactions of concern.  We will follow with you











__________________________________________

Duc Bee MD



DD:  02/18/2018 12:48:50

DT:  02/18/2018 12:52:23

Job # [id number]<

## 2018-02-18 NOTE — CP.PCM.PN
Subjective





- Date & Time of Evaluation


Date of Evaluation: 02/18/18


Time of Evaluation: 07:40





- Subjective


Subjective: 





Patient seen and examined at bedside this AM. No acute events overnight. 

Patient states that pain is well controlled on current regimen. Denies fevers 

or chill.





Objective





- Vital Signs/Intake and Output


Vital Signs (last 24 hours): 


 











Temp Pulse Resp BP Pulse Ox


 


 98.6 F   71   20   119/32 L  97 


 


 02/18/18 08:00  02/18/18 08:43  02/18/18 08:00  02/18/18 08:43  02/18/18 08:00








Intake and Output: 


 











 02/18/18 02/18/18





 06:59 18:59


 


Intake Total 2520 


 


Balance 2520 














- Medications


Medications: 


 Current Medications





Acetaminophen (Tylenol 325mg Tab)  650 mg PO Q6 PRN


   PRN Reason: Pain, Mild (1-3)


   Last Admin: 02/16/18 18:43 Dose:  650 mg


[facility] (Norvasc)  [facility] [place]


   Last Admin: 02/17/18 10:06 Dose:  10 mg


Aspirin (Ecotrin)  81 mg PO 0800 [facility]


   Last Admin: 02/18/18 08:43 Dose:  81 mg


Atorvastatin Calcium (Lipitor)  40 mg PO DIN [facility]


   Last Admin: 02/17/18 17:43 Dose:  40 mg


Chlorpromazine (Thorazine)  25 mg PO Q6 [facility]


   PRN Reason: Protocol


   Last Admin: 02/18/18 05:50 Dose:  Not Given


Docusate Sodium (Colace)  100 mg PO BID [facility]


   Last Admin: 02/17/18 17:35 Dose:  100 mg


Escitalopram Oxalate (Lexapro)  [facility] [place]


   Last Admin: 02/17/18 10:00 Dose:  5 mg


Famotidine (Pepcid)  [facility] [place]


   Last Admin: 02/17/18 09:32 Dose:  20 mg


Fentanyl (Fentanyl)  25 mcg IV Q5M PRN


   PRN Reason: Pain, moderate (4-7)


Heparin Sodium (Porcine) (Heparin)  5,000 units SC Q8 [facility]


   PRN Reason: Protocol


   Last Admin: 02/18/18 05:50 Dose:  5,000 units


Meropenem/Sodium Chloride (Meropenem 1g/Ns 100ml Ivpb)  1 gm in 100 mls @ 100 

mls/hr IVPB Q8 [facility]


   PRN Reason: Protocol


   Stop: 02/23/18 06:01


   Last Admin: 02/18/18 05:50 Dose:  100 mls/hr


Insulin Human Regular (Humulin R Med)  0 units SC ACHS [facility]


   PRN Reason: Protocol


   Last Admin: 02/18/18 08:43 Dose:  7 units


Insulin Lispro Protam/Lispro Human (Humalog Mix 75/25)  8 units SC ACBD [facility]


Metoclopramide HCl (Reglan)  10 mg PO 0600,1130,1630,2200 [facility]


   Last Admin: 02/18/18 05:50 Dose:  Not Given


Metoprolol Tartrate (Lopressor)  50 mg PO 0800,1800 [facility]


   Last Admin: 02/18/18 08:43 Dose:  50 mg


Mycophenolate Mofetil (Cellcept Cap)  750 mg PO BID [facility]


   Last Admin: 02/17/18 17:34 Dose:  750 mg


Ondansetron HCl (Zofran Inj)  4 mg IVP Q6 PRN


   PRN Reason: Nausea/Vomiting


   Last Admin: 02/14/18 08:16 Dose:  4 mg


Ondansetron HCl (Zofran Inj)  4 mg IVP ONCE PRN


   PRN Reason: Nausea/Vomiting


Oxycodone/Acetaminophen (Percocet 10/325 Mg Tab)  1 tab PO Q4H PRN


   PRN Reason: Pain, moderate (4-7)


   Last Admin: 02/17/18 21:36 Dose:  1 tab


Prednisone (Prednisone Tab)  5 mg PO 0800 [facility]


   Last Admin: 02/18/18 08:43 Dose:  5 mg


Scopolamine (Transderm-Scop)  1 patch TD Q3D [facility]


Sodium Hypochlorite (Dakins Solution 0.25%)  0 ml TOP DAILY [facility]


   Last Admin: 02/17/18 09:33 Dose:  1 applic


Tacrolimus (Prograf Cap)  0.5 mg PO HS [facility]


   Last Admin: 02/17/18 21:26 Dose:  0.5 mg


Tacrolimus (Prograf Cap)  2 mg PO BID [facility]


   Last Admin: 02/17/18 17:37 Dose:  2 mg











- Labs


Labs: 


 





 02/18/18 05:00 





 02/17/18 11:00 





 











PT  13.5 SECONDS (9.4-12.5)  H  02/12/18  07:00    


 


INR  1.17  (0.93-1.08)  H  02/12/18  07:00    


 


APTT  30.6 Seconds (25.1-36.5)   02/12/18  07:00    














- Constitutional


Appears: Non-toxic, No Acute Distress





- Head Exam


Head Exam: ATRAUMATIC, NORMOCEPHALIC





- Eye Exam


Eye Exam: Normal appearance.  absent: Conjunctival injection, Scleral icterus





- ENT Exam


ENT Exam: Mucous Membranes Moist, Normal Oropharynx





- Respiratory Exam


Respiratory Exam: NORMAL BREATHING PATTERN.  absent: Accessory Muscle Use, 

Respiratory Distress





- Extremities Exam


Additional comments: 





right heel wound with persistent dusky/necrotic tissue on the posterior base of 

the wound, no foul odor, drainage, or bleeding


Left forefoot wound with healthy tissue, no sign of necrotic tissue, no bleeding

, odor, or discharge





- Neurological Exam


Neurological Exam: Alert, Awake, Oriented x3





- Psychiatric Exam


Psychiatric exam: Normal Affect, Normal Mood





- Skin


Skin Exam: Dry, Normal Color, Warm





Assessment and Plan





- Assessment and Plan (Free Text)


Assessment: 





57M POD#2 s/p debridement of right calcaneal nonhealing wound and left TMA wound





Plan:


-Continue TID dressing changes with dakin's solution packed plain gauze to the 

wounds BL


-F/U IR consult recommendations for possible revascularization for healing--

further surgical intervention pending these recommendations


-monitor clinical course, vitals, and CBC


-PRN pain medications


-antibiotics per ID





Discussed with Dr. Kenyon Holt, PGY2


[phone]

## 2018-02-18 NOTE — PN
DATE:



SUBJECTIVE:  Patient is 57 years old, seen and examined.  Lying in bed,

seems to be comfortable.  No nausea or vomiting.  No diarrhea.  Eating and

tolerating.  Frustrated with his situation.  He cannot ambulate.



PHYSICAL EXAMINATION:

VITAL SIGNS:  He is afebrile, pulse 71, respirations 20, blood pressure

119/52.

LUNGS:  Bilateral fair airflow.  No rhonchi or crackle.

HEART:  S1 and S2 audible.

ABDOMEN:  Soft.  Nontender.  No rebound.  No guarding.

NEUROLOGIC:  Patient is awake and alert, able to communicate.



LABORATORY DATA:  WBC 11.2, hemoglobin 11.5, hematocrit 36, platelets 320. 

Chemistries, blood sugar is 303.



ASSESSMENT:

1.  Uncontrolled diabetes.

2.  Severe peripheral vascular disease.

3.  Status post left transmetatarsal amputation.

4.  Status post renal transplant.

5.  Hypertension.

6.  Hyperlipidemia.

7.  History of depression.



PLAN:  Currently, patient is on meropenem.  His wound care is being done by

the podiatry team.  We will continue him on 8 units of lispro insulin and I

will add Levemir 10 units at bedtime.  Dr. Calderon will follow up on the

patient in a.m.





__________________________________________

Christine Zelaya MD





DD:  02/18/2018 16:32:14

DT:  02/18/2018 20:55:22

Job # [id number]

## 2018-02-19 LAB
BASOPHILS # BLD AUTO: 0.01 K/MM3 (ref 0–2)
BASOPHILS NFR BLD: 0.1 % (ref 0–3)
EOSINOPHIL # BLD: 0.4 10*3/UL (ref 0–0.7)
EOSINOPHIL NFR BLD: 3.9 % (ref 1.5–5)
ERYTHROCYTE [DISTWIDTH] IN BLOOD BY AUTOMATED COUNT: 15.4 % (ref 11.5–14.5)
GRANULOCYTES # BLD: 6.85 10*3/UL (ref 1.4–6.5)
GRANULOCYTES NFR BLD: 74.1 % (ref 50–68)
HGB BLD-MCNC: 10.6 G/DL (ref 14–18)
LYMPHOCYTES # BLD: 1.1 10*3/UL (ref 1.2–3.4)
LYMPHOCYTES NFR BLD AUTO: 12 % (ref 22–35)
MCH RBC QN AUTO: 27.8 PG (ref 25–35)
MCHC RBC AUTO-ENTMCNC: 31.7 G/DL (ref 31–37)
MCV RBC AUTO: 87.7 FL (ref 80–105)
MONOCYTES # BLD AUTO: 0.9 10*3/UL (ref 0.1–0.6)
MONOCYTES NFR BLD: 9.9 % (ref 1–6)
PLATELET # BLD: 290 10^3/UL (ref 120–450)
PMV BLD AUTO: 9.6 FL (ref 7–11)
RBC # BLD AUTO: 3.81 10^6/UL (ref 3.5–6.1)
WBC # BLD AUTO: 9.3 10^3/UL (ref 4.5–11)

## 2018-02-19 PROCEDURE — 047P3ZZ DILATION OF RIGHT ANTERIOR TIBIAL ARTERY, PERCUTANEOUS APPROACH: ICD-10-PCS | Performed by: RADIOLOGY

## 2018-02-19 PROCEDURE — 047T34Z DILATION OF RIGHT PERONEAL ARTERY WITH DRUG-ELUTING INTRALUMINAL DEVICE, PERCUTANEOUS APPROACH: ICD-10-PCS | Performed by: RADIOLOGY

## 2018-02-19 PROCEDURE — 047M34Z DILATION OF RIGHT POPLITEAL ARTERY WITH DRUG-ELUTING INTRALUMINAL DEVICE, PERCUTANEOUS APPROACH: ICD-10-PCS | Performed by: RADIOLOGY

## 2018-02-19 PROCEDURE — 047K341 DILATION OF RIGHT FEMORAL ARTERY WITH DRUG-ELUTING INTRALUMINAL DEVICE, USING DRUG-COATED BALLOON, PERCUTANEOUS APPROACH: ICD-10-PCS | Performed by: RADIOLOGY

## 2018-02-19 RX ADMIN — INSULIN HUMAN SCH UNITS: 100 INJECTION, SOLUTION PARENTERAL at 12:24

## 2018-02-19 RX ADMIN — Medication PRN MG: at 18:58

## 2018-02-19 RX ADMIN — POLYETHYLENE GLYCOL 3350 SCH: 17 POWDER, FOR SOLUTION ORAL at 17:24

## 2018-02-19 RX ADMIN — INSULIN LISPRO SCH UNIT: 100 INJECTION, SUSPENSION SUBCUTANEOUS at 08:22

## 2018-02-19 RX ADMIN — POLYETHYLENE GLYCOL 3350 SCH GM: 17 POWDER, FOR SOLUTION ORAL at 09:15

## 2018-02-19 RX ADMIN — MEROPENEM AND SODIUM CHLORIDE SCH MLS/HR: 1 INJECTION, SOLUTION INTRAVENOUS at 13:04

## 2018-02-19 RX ADMIN — INSULIN DETEMIR SCH UNIT: 100 INJECTION, SOLUTION SUBCUTANEOUS at 22:26

## 2018-02-19 RX ADMIN — SODIUM HYPOCHLORITE SCH APPLIC: 2.5 SOLUTION TOPICAL at 09:13

## 2018-02-19 RX ADMIN — MEROPENEM AND SODIUM CHLORIDE SCH MLS/HR: 1 INJECTION, SOLUTION INTRAVENOUS at 22:27

## 2018-02-19 RX ADMIN — INSULIN HUMAN SCH UNITS: 100 INJECTION, SOLUTION PARENTERAL at 22:27

## 2018-02-19 RX ADMIN — INSULIN HUMAN SCH: 100 INJECTION, SOLUTION PARENTERAL at 15:39

## 2018-02-19 RX ADMIN — MORPHINE SULFATE STA: 5 INJECTION, SOLUTION INTRAMUSCULAR; INTRAVENOUS at 14:52

## 2018-02-19 RX ADMIN — MEROPENEM AND SODIUM CHLORIDE SCH MLS/HR: 1 INJECTION, SOLUTION INTRAVENOUS at 06:08

## 2018-02-19 RX ADMIN — INSULIN LISPRO SCH: 100 INJECTION, SUSPENSION SUBCUTANEOUS at 15:38

## 2018-02-19 RX ADMIN — Medication PRN MG: at 12:24

## 2018-02-19 RX ADMIN — Medication PRN MG: at 23:15

## 2018-02-19 RX ADMIN — MORPHINE SULFATE STA MG: 5 INJECTION, SOLUTION INTRAMUSCULAR; INTRAVENOUS at 14:44

## 2018-02-19 RX ADMIN — Medication PRN MG: at 07:11

## 2018-02-19 RX ADMIN — INSULIN HUMAN SCH UNITS: 100 INJECTION, SOLUTION PARENTERAL at 08:22

## 2018-02-19 NOTE — CP.PCM.PN
Subjective





- Date & Time of Evaluation


Date of Evaluation: 02/19/18


Time of Evaluation: 08:38





- Subjective


Subjective: 





Surgery: Dr. Prescott 





Patient doing well. Still with pain to the right heal. Denies f/c. Patient for 

IR evaluation of vascular disease to the RLE today. 





Objective





- Vital Signs/Intake and Output


Vital Signs (last 24 hours): 


 











Temp Pulse Resp BP Pulse Ox


 


 98.3 F   80   20   120/52 L  96 


 


 02/19/18 07:00  02/19/18 07:00  02/19/18 07:00  02/19/18 07:00  02/19/18 07:00








Intake and Output: 


 











 02/19/18 02/19/18





 06:59 18:59


 


Intake Total 910 


 


Balance 910 














- Medications


Medications: 


 Current Medications





Acetaminophen (Tylenol 325mg Tab)  650 mg PO Q6 PRN


   PRN Reason: Pain, Mild (1-3)


   Last Admin: 02/16/18 18:43 Dose:  650 mg


[facility])  [facility]


   Last Admin: 02/18/18 11:30 Dose:  10 mg


Aspirin (Ecotrin)  81 mg PO 0800 [facility]


   Last Admin: 02/19/18 08:23 Dose:  Not Given


Atorvastatin Calcium (Lipitor)  40 mg PO DIN [facility]


   Last Admin: 02/18/18 17:51 Dose:  40 mg


Chlorpromazine (Thorazine)  25 mg PO Q6 [facility]


   PRN Reason: Protocol


   Last Admin: 02/18/18 17:57 Dose:  25 mg


Docusate Sodium (Colace)  100 mg PO BID [facility]


   Last Admin: 02/18/18 17:51 Dose:  100 mg


Escitalopram Oxalate (Lexapro)  5 mg PO QAM [facility]


   Last Admin: 02/18/18 11:30 Dose:  5 mg


Famotidine (Pepcid)  [facility]


   Last Admin: 02/18/18 11:29 Dose:  20 mg


Fentanyl (Fentanyl)  25 mcg IV Q5M PRN


   PRN Reason: Pain, moderate (4-7)


Heparin Sodium (Porcine) (Heparin)  5,000 units SC Q8 [facility]


   PRN Reason: Protocol


   Last Admin: 02/19/18 08:24 Dose:  Not Given


Meropenem/Sodium Chloride (Meropenem 1g/Ns 100ml Ivpb)  1 gm in 100 mls @ 100 

mls/hr IVPB Q8 [facility]


   PRN Reason: Protocol


   Stop: 02/23/18 06:01


   Last Admin: 02/19/18 06:08 Dose:  100 mls/hr


Sodium Chloride (Sodium Chloride 0.9%)  1,000 mls @ 100 mls/hr IV .Q10H [facility]


   Last Admin: 02/19/18 01:38 Dose:  100 mls/hr


Insulin Detemir (Levemir)  12 unit SC HS [facility]


   Last Admin: 02/18/18 22:18 Dose:  Not Given


Insulin Human Regular (Humulin R Med)  0 units SC ACHS [facility]


   PRN Reason: Protocol


   Last Admin: 02/19/18 08:22 Dose:  7 units


Insulin Lispro Protam/Lispro Human (Humalog Mix 75/25)  8 units SC ACBD [facility]


   Last Admin: 02/19/18 08:22 Dose:  8 unit


Metoclopramide HCl (Reglan)  10 mg PO 0600,1130,1630,2200 [facility]


   Last Admin: 02/19/18 06:08 Dose:  10 mg


Metoprolol Tartrate (Lopressor)  50 mg PO 0800,1800 [facility]


   Last Admin: 02/19/18 08:22 Dose:  50 mg


Mycophenolate Mofetil (Cellcept Cap)  750 mg PO BID [facility]


   Last Admin: 02/18/18 17:51 Dose:  750 mg


Ondansetron HCl (Zofran Inj)  4 mg IVP Q6 PRN


   PRN Reason: Nausea/Vomiting


   Last Admin: 02/14/18 08:16 Dose:  4 mg


Ondansetron HCl (Zofran Inj)  4 mg IVP ONCE PRN


   PRN Reason: Nausea/Vomiting


Oxycodone HCl (Oxycodone Immediate Release Tab)  5 mg PO Q4H PRN


   PRN Reason: Pain, moderate (4-7)


   Last Admin: 02/19/18 07:11 Dose:  5 mg


Prednisone (Prednisone Tab)  5 mg PO 0800 [facility]


   Last Admin: 02/19/18 08:26 Dose:  5 mg


Scopolamine (Transderm-Scop)  1 patch TD Q3D [facility]


Sodium Hypochlorite (Dakins Solution 0.25%)  0 ml TOP DAILY [facility]


   Last Admin: 02/18/18 11:31 Dose:  1 applic


Tacrolimus (Prograf Cap)  0.5 mg PO [facility]


   Last Admin: 02/18/18 21:28 Dose:  0.5 mg


Tacrolimus (Prograf Cap)  2 mg PO BID [facility]


   Last Admin: 02/18/18 18:01 Dose:  2 mg











- Labs


Labs: 


 





 02/19/18 06:30 





 02/17/18 11:00 





 











PT  13.5 SECONDS (9.4-12.5)  H  02/12/18  07:00    


 


INR  1.17  (0.93-1.08)  H  02/12/18  07:00    


 


APTT  30.6 Seconds (25.1-36.5)   02/12/18  07:00    














- Constitutional


Appears: Non-toxic, No Acute Distress





- Head Exam


Head Exam: ATRAUMATIC, NORMOCEPHALIC





- Eye Exam


Eye Exam: EOMI, Normal appearance





- Respiratory Exam


Respiratory Exam: NORMAL BREATHING PATTERN.  absent: Respiratory Distress





- Cardiovascular Exam


Cardiovascular Exam: REGULAR RHYTHM.  absent: Tachycardia





- Extremities Exam


Additional comments: 





left LE TMA wound with paink granulation tissue, healthy. No evidence of green 

or necrotic exudate. Dakins packing replaced and Kerlex dressing wrapped





right heal wound some deep necrosis noted over calcaneus. Skin edges appear 

viable no extension of wound or necrosis noted. Dakins soaked packing replaced 

and Kerlex dressing wrapped. 





Assessment and Plan





- Assessment and Plan (Free Text)


Assessment: 





58 y/o male with bilateral poor healing foot wounds s/p debridement x2 


Plan: 





-vascular eval with IR today 


-cont local wound care with Dakins solution packing BID 


-off load right heal 


-pending IR eval determines further surgical intervention


-further recs per Dr. Kenyon Goel PGY3

## 2018-02-19 NOTE — OP
PROCEDURE DATE:  02/16/2018



PREOPERATIVE DIAGNOSES

1.  Right heel ischemia and necrosis of the wound.

2.  Left foot wound with devitalized tissues, poorly healing.



POSTOPERATIVE DIAGNOSES

1.  Right heel ischemia and necrosis of the wound.

2.  Left foot wound with devitalized tissues, poorly healing.



PROCEDURES PERFORMED

1.  Debridement of the right heel ulcer of devitalized tissues of bone,

skin, fat and tendon.

2.  Debridement of the left foot wound after the forefoot amputation

including the tissue of bone, skin, fat and tendon.



SURGEON:  Yovany Prescott MD.



ASSISTANT:  Dr. Reed and Dr. Lockwood.



ANESTHESIOLOGIST:  Dr. Son.



TYPE OF ANESTHESIA:  General LMA anesthesia.



ESTIMATED BLOOD LOSS:  Minimal.



SPECIMEN: Devitalized tissues and cultures.



INDICATION:  Patient is a 57-year-old male with history of previous left

transmetatarsal amputation and subsequent infection of the wound with

Pseudomonas and some necrosis  and the patient is status post

atherectomy of left lower extremity secondary to severe ischemia of the

feet. Patient was brought in for a non-healing

wound, that was about a week earlier.  It radiated mostly down to left foot

wound.  However, patient was noted to have a new ulcer on the right heel,

which was also initially debrided a week ago and this week, the left foot

significantly improved; however still has lots of tissue and the right heel

seems to be more involved and needs bony debridement.



DESCRIPTION OF PROCEDURE:  The patient was brought to the operating room,

placed on the operating table in supine position.  The patient was

connected to the EKG, blood pressure, and pulse oximetry monitor.  The

patient then underwent general LMA anesthesia   , and was prepped and

draped in usual sterile fashion.



First, when the timeout procedure took place, then everybody in the room

agreed to this patient identity, diagnosis and the procedure to be

performed.  A #11 blade was used debride skin fat and tendon tissues wound was 
cleaned.  There

appeared to be some bleeding from the edges of the skin and the underlying

fat of the right heel.  Then, my attention was turned to the left foot with

the open wound, the edges also appeared to need debridement; there was

also a need for further debridement of the devitalized tendon and fat and skin.
  The

bony edges from the previously debrided metatarsal bones were also trimmed

in order to smooth the surface as previously debrided bone appeared

to be granulating at this time.  Once all this was done, the wound was

copiously irrigated with saline containing antibiotics.  The wound was then

packed with Iodoform gauze on both sides.  Next, sterile 4 x 4 were placed

on top of that and both feet were wrapped with Kerlix.











__________________________________________

Yovany Prescott MD



DD:  02/18/2018 22:57:15

DT:  02/19/2018 5:30:37

Job # [id number]









MTDD

## 2018-02-19 NOTE — VASCULAR
PROCEDURE:  1. Abdominal aortogram and bilateral lower extremity 

runoff.



2. Right distal SFA and above knee popliteal drug-eluting balloon 

angioplasty and stent placement 



3. Tibioperoneal trunk angioplasty and stent placement 



4. Right dorsalis pedis artery angioplasty 







HISTORY:

Severe peripheral vascular disease. Nonhealing ulcer and gangrene 

right heel. Renal transplant. 



PHYSICIAN(S):  Benjamin Conner M.D.







TECHNIQUE:

The relative risks and indications of the procedure were explained to 

the patient and consent obtained. The patient was hydrated prior to 

the procedure and the appropriate labs drawn. The patient was placed 

supine on the arteriogram table and the left groin prepped and draped 

in the usual sterile fashion. Conscious sedation and monitoring were 

provided throughout the procedure by a nurse. 



Via a left common femoral artery approach, a 5 St Helenian sheath was 

placed in the left groin. Through the sheath and over a guidewire, a 

5 St Helenian flush catheter was placed in the abdominal aorta at the 

level of the aortic bifurcation and an RPO DSA abdominal aortogram 

and pelvic arteriogram performed. Overlapping bilateral lower 

extremity DSA arteriograms were obtained from the inguinal ligaments 

to the trifurcation. 



A 0.035 angled Glidewire was advanced over the bifurcation and placed 

in the distal right SFA. A 7 St Helenian 65 cm destination sheath was 

placed in the mid right SFA. Heparin 5000 units IV and nitroglycerin 

in 250 mcg aliquots were given. Calcified disease in the distal right 

SFA and popliteal arteries were crossed with a 5 St Helenian catheter and 

angled Glidewire. The chronic occlusion of the mid to distal right 

posterior tibial artery could not be crossed. Subsequently the severe 

focal stenosis in the larger right tibioperoneal trunk was dilated 

with a 4 mm x 4 cm angioplasty balloon.  Next a 3.5 by 22 mm 

drug-eluting stent was placed in the right tibioperoneal trunk.







The right popliteal artery above the knee and distal right SFA were 

dilated with a 6 mm x 150 mm drug-eluting balloon. Next a 5.5 x 120 

mm Supera stent was deployed.  An excellent angiographic result was 

obtained.



The focal severe stenosis in the proximal right dorsalis pedis artery 

at the ankle was dilated with a 2.5 x 2 cm balloon.  A good 

angiographic result was obtained.  Completion arteriograms were 

performed.  The sheath was removed hemostasis obtained.  The patient 

tolerated the procedure well.. 



FINDINGS:

The distal abdominal aorta and aortic bifurcation widely patent. The 

common external iliac arteries are calcified but widely patent.  The 

right renal transplant appears to be patent though the obliquity is 

limited. 



Right lower extremity: The right common femoral artery is patent. The 

right profunda femoral artery is patent. The right superficial 

femoral artery is calcified and patent with distal moderate stenoses 

present.  Multi focal stenoses are appreciated in the right popliteal 

artery above the knee. The right popliteal artery is continuous.  

There is severe right trifurcation and tibial occlusive disease. The 

right anterior tibial artery is a predominant supply to the foot.  

There is a focal E centric stenosis of the terminal right anterior 

tibial artery.  There is a severe calcified stenosis of the large 

right tibioperoneal trunk. The right posterior tibial artery is 

occluded in its mid to distal segment.  There is no could 

reconstitution of the distal right posterior tibial artery. The 

plantar arch is occluded. The right dorsalis pedis artery is patent.  

Small collaterals are present. 



Left lower extremity: Left common femoral artery is patent. The left 

profunda femoral artery is patent. The left superficial femoral 

artery is calcified and patent with a moderate focal stenosis 

present.. The left popliteal artery is calcified patent. There is 2 

vessel runoff via the left anterior tibial and peroneal arteries.  

The left posterior tibial artery is occluded 



IMPRESSION:

1.Successful distal right SFA and above knee popliteal drug-eluting 

balloon angioplasty and Supera stent placement



2. 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 

crossed.



3. Successful right tibioperoneal trunk angioplasty and stent 

placement.



4. Terminal right anterior tibial artery angioplasty



5. Patent right renal transplant artery.

## 2018-02-19 NOTE — PN
DATE:



SUBJECTIVE:  Patient has no complaints of any chest pain, no shortness of

breath, no headaches or dizziness.



PHYSICAL EXAMINATION:

VITAL SIGNS:  Temperature is 98.3, pulse of 80, blood pressure 120/52, and

respirations 20.

GENERAL:  The patient is lying in bed, flat, comfortable.

HEENT:  No oral lesion.  Anicteric sclerae.  Moist mucosa.

NECK:  No JVD, adenopathy, or thyromegaly.

CARDIOVASCULAR:  S1 and S2, regular.  No murmurs, rubs, or gallops.

LUNGS:  Clear to auscultation bilaterally.  No wheeze, rales, or rhonchi.

ABDOMEN:  Bowel sounds are positive, soft, nontender and nondistended.

EXTREMITIES:  no cyanosis, clubbing or edema.



LABORATORY DATA:  White count of 9.3, hemoglobin 10.6, creatinine 0.6.



ASSESSMENT:

1.  Status post revision of the left metatarsal amputation.

2.  Debridement of right heel measuring 5.6 x 3 cm.

3.  Diabetes type 2.

4.  Peripheral arterial disease.

5.  Status post  donor renal transplant.

6.  Dyslipidemia.

7.  Hypertension.

8.  Coronary artery disease, status post coronary artery bypass graft.

9.  Pacemaker.

10.  Automatic implantable cardioverter-defibrillator.



PLAN:  The patient is currently comfortable.  He is going to continue with

his wound care.  He is on Cellcept.  The patient is going to receive

aspirin daily.  He is on Levemir for his diabetes.  He is on Lexapro.  The

patient is on meropenem for antibiotics.  He is receiving Lexapro for his

anxiety.  The patient is on Lipitor for dyslipidemia.  He is on his

prednisone and tacrolimus for his transplant.  The patient is on IV fluids.

I will discontinue the patient's IV fluids at this point.  The patient is

eating and drinking.  He is on Zofran as needed.  He is going for procedure

today, so he will need to be on IV fluid, but I will decrease the rate.  He

is going for an angiogram of his right leg.





__________________________________________

Andrea Calderon MD



DD:  2018 8:58:19

DT:  2018 10:14:52

Job # [id number]

## 2018-02-19 NOTE — CP.PCM.PN
Subjective





- Date & Time of Evaluation


Date of Evaluation: 02/19/18


Time of Evaluation: 12:15





- Subjective


Subjective: 





Comfortable in bed, less pain in the left foot, no fevers.





Objective





- Vital Signs/Intake and Output


Vital Signs (last 24 hours): 


 











Temp Pulse Resp BP Pulse Ox


 


 98.3 F   80   20   122/55 L  96 


 


 02/19/18 07:00  02/19/18 07:00  02/19/18 07:00  02/19/18 09:11  02/19/18 07:00








Intake and Output: 


 











 02/19/18 02/19/18





 06:59 18:59


 


Intake Total 910 


 


Balance 910 














- Medications


Medications: 


 Current Medications





Acetaminophen (Tylenol 325mg Tab)  650 mg PO Q6 PRN


   PRN Reason: Pain, Mild (1-3)


   Last Admin: 02/16/18 18:43 Dose:  650 mg


[facility]od[facility])  [facility] [place]


   Last Admin: 02/19/18 09:11 Dose:  10 mg


Aspirin (Ecotrin)  81 mg PO 0800 [facility]


   Last Admin: 02/19/18 09:11 Dose:  81 mg


Atorvastatin Calcium (Lipitor)  40 mg PO DIN [facility]


   Last Admin: 02/18/18 17:51 Dose:  40 mg


Chlorpromazine (Thorazine)  25 mg PO Q6 [facility]


   PRN Reason: Protocol


   Last Admin: 02/18/18 17:57 Dose:  25 mg


Docusate Sodium (Colace)  100 mg PO BID [facility]


   Last Admin: 02/19/18 09:11 Dose:  100 mg


Escitalopram Oxalate (Lexapro)  5 mg PO [facility]


   Last Admin: 02/19/18 09:11 Dose:  5 mg


Famotidine (Pepcid)  20 mg PO [facility]


   Last Admin: 02/19/18 09:12 Dose:  20 mg


Heparin Sodium (Porcine) (Heparin)  5,000 units SC Q8 [facility]


   PRN Reason: Protocol


   Last Admin: 02/19/18 08:24 Dose:  Not Given


Meropenem/Sodium Chloride (Meropenem 1g/Ns 100ml Ivpb)  1 gm in 100 mls @ 100 

mls/hr IVPB Q8 [facility]


   PRN Reason: Protocol


   Stop: 02/23/18 06:01


   Last Admin: 02/19/18 06:08 Dose:  100 mls/hr


Sodium Chloride (Sodium Chloride 0.9%)  1,000 mls @ 75 mls/hr IV .[id number] [facility]


   Stop: 02/19/18 22:19


   Last Admin: 02/19/18 09:14 Dose:  75 mls/hr


Insulin Detemir (Levemir)  12 unit SC HS [facility]


   Last Admin: 02/18/18 22:18 Dose:  Not Given


Insulin Human Regular (Humulin R Med)  0 units SC ACHS [facility]


   PRN Reason: Protocol


   Last Admin: 02/19/18 08:22 Dose:  7 units


Insulin Lispro Protam/Lispro Human (Humalog Mix 75/25)  8 units SC ACBD [facility]


   Last Admin: 02/19/18 08:22 Dose:  8 unit


Metoclopramide HCl (Reglan)  10 mg PO 0600,1130,1630,2200 [facility]


   Last Admin: 02/19/18 06:08 Dose:  10 mg


Metoprolol Tartrate (Lopressor)  50 mg PO 0800,1800 [facility]


   Last Admin: 02/19/18 08:22 Dose:  50 mg


Mycophenolate Mofetil (Cellcept Cap)  750 mg PO BID [facility]


   Last Admin: 02/19/18 09:11 Dose:  750 mg


Ondansetron HCl (Zofran Inj)  4 mg IVP Q6 [facility]


   PRN Reason: Nausea/Vomiting


   Last Admin: 02/14/18 08:16 Dose:  4 mg


Ondansetron HCl (Zofran Inj)  4 mg IVP ONCE PRN


   PRN Reason: Nausea/Vomiting


Oxycodone HCl (Oxycodone Immediate Release Tab)  5 mg PO Q4H [facility]N


   PRN Reason: Pain, moderate (4-7)


   Last Admin: 02/19/18 07:11 Dose:  5 mg


Polyethylene Glycol (Miralax)  17 gm PO BID [facility]


   Last Admin: 02/19/18 09:15 Dose:  17 gm


Prednisone (Prednisone Tab)  5 mg PO 0800 [facility]


   Last Admin: 02/19/18 08:26 Dose:  5 mg


Sodium Hypochlorite (Dakins Solution 0.25%)  0 ml TOP DAILY [facility]


   Last Admin: 02/19/18 09:13 Dose:  1 applic


Tacrolimus (Prograf Cap)  0.5 mg PO [facility]


   Last Admin: 02/18/18 21:28 Dose:  0.5 mg


Tacrolimus (Prograf Cap)  2 mg PO BID [facility]


   Last Admin: 02/19/18 09:11 Dose:  2 mg











- Labs


Labs: 


 





 02/19/18 06:30 





 02/17/18 11:00 





 











PT  13.5 SECONDS (9.4-12.5)  H  02/12/18  07:00    


 


INR  1.17  (0.93-1.08)  H  02/12/18  07:00    


 


APTT  30.6 Seconds (25.1-36.5)   02/12/18  07:00    














- Constitutional


Appears: Non-toxic, Chronically Ill





- Head Exam


Head Exam: NORMAL INSPECTION





- ENT Exam


ENT Exam: Mucous Membranes Moist





- Neck Exam


Neck Exam: absent: Meningismus





- Respiratory Exam


Respiratory Exam: Decreased Breath Sounds





- Cardiovascular Exam


Cardiovascular Exam: +S1, +S2





- GI/Abdominal Exam


GI & Abdominal Exam: Soft.  absent: Tenderness





- Extremities Exam


Additional comments: 





left foot with dressings in place





Assessment and Plan





- Assessment and Plan (Free Text)


Plan: 





Assessment


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 

amputation 2 months ago, grew Enterobacter


S/P Left 5th metatarsal osteomyelitis S/P debridement and bone excision grew 

Acinetobacter and E. faecalis


CAD S/P CABG


chronic CHF


DM


COPD


S/P AICD placement


S/P kidney transplant





Plan


continue Merrem for 4-6 weeks with weekly ESR, CRP, CBC, CMP with outpatient 

follow up with Podiatry

## 2018-02-20 LAB
BASOPHILS # BLD AUTO: 0.01 K/MM3 (ref 0–2)
BASOPHILS NFR BLD: 0.1 % (ref 0–3)
BUN SERPL-MCNC: 11 MG/DL (ref 7–21)
CALCIUM SERPL-MCNC: 9 MG/DL (ref 8.4–10.5)
EOSINOPHIL # BLD: 0.4 10*3/UL (ref 0–0.7)
EOSINOPHIL NFR BLD: 4.1 % (ref 1.5–5)
ERYTHROCYTE [DISTWIDTH] IN BLOOD BY AUTOMATED COUNT: 15.4 % (ref 11.5–14.5)
GFR NON-AFRICAN AMERICAN: > 60
GRANULOCYTES # BLD: 7.68 10*3/UL (ref 1.4–6.5)
GRANULOCYTES NFR BLD: 74.1 % (ref 50–68)
HGB BLD-MCNC: 11.3 G/DL (ref 14–18)
LYMPHOCYTES # BLD: 1.2 10*3/UL (ref 1.2–3.4)
LYMPHOCYTES NFR BLD AUTO: 11.9 % (ref 22–35)
MCH RBC QN AUTO: 28.3 PG (ref 25–35)
MCHC RBC AUTO-ENTMCNC: 32.6 G/DL (ref 31–37)
MCV RBC AUTO: 87 FL (ref 80–105)
MONOCYTES # BLD AUTO: 1 10*3/UL (ref 0.1–0.6)
MONOCYTES NFR BLD: 9.8 % (ref 1–6)
PLATELET # BLD: 301 10^3/UL (ref 120–450)
PMV BLD AUTO: 9.6 FL (ref 7–11)
RBC # BLD AUTO: 3.99 10^6/UL (ref 3.5–6.1)
WBC # BLD AUTO: 10.4 10^3/UL (ref 4.5–11)

## 2018-02-20 RX ADMIN — INSULIN LISPRO SCH UNIT: 100 INJECTION, SUSPENSION SUBCUTANEOUS at 16:33

## 2018-02-20 RX ADMIN — MORPHINE SULFATE PRN MG: 2 INJECTION, SOLUTION INTRAMUSCULAR; INTRAVENOUS at 11:38

## 2018-02-20 RX ADMIN — Medication PRN MG: at 22:59

## 2018-02-20 RX ADMIN — MORPHINE SULFATE PRN MG: 2 INJECTION, SOLUTION INTRAMUSCULAR; INTRAVENOUS at 17:33

## 2018-02-20 RX ADMIN — INSULIN HUMAN SCH UNITS: 100 INJECTION, SOLUTION PARENTERAL at 16:33

## 2018-02-20 RX ADMIN — MEROPENEM AND SODIUM CHLORIDE SCH MLS/HR: 1 INJECTION, SOLUTION INTRAVENOUS at 13:56

## 2018-02-20 RX ADMIN — SODIUM HYPOCHLORITE SCH: 2.5 SOLUTION TOPICAL at 09:07

## 2018-02-20 RX ADMIN — POLYETHYLENE GLYCOL 3350 SCH GM: 17 POWDER, FOR SOLUTION ORAL at 09:12

## 2018-02-20 RX ADMIN — INSULIN HUMAN SCH UNITS: 100 INJECTION, SOLUTION PARENTERAL at 08:22

## 2018-02-20 RX ADMIN — MEROPENEM AND SODIUM CHLORIDE SCH MLS/HR: 1 INJECTION, SOLUTION INTRAVENOUS at 22:58

## 2018-02-20 RX ADMIN — INSULIN HUMAN SCH: 100 INJECTION, SOLUTION PARENTERAL at 22:50

## 2018-02-20 RX ADMIN — INSULIN HUMAN SCH: 100 INJECTION, SOLUTION PARENTERAL at 11:27

## 2018-02-20 RX ADMIN — MEROPENEM AND SODIUM CHLORIDE SCH MLS/HR: 1 INJECTION, SOLUTION INTRAVENOUS at 06:03

## 2018-02-20 RX ADMIN — Medication PRN MG: at 13:59

## 2018-02-20 RX ADMIN — INSULIN LISPRO SCH UNIT: 100 INJECTION, SUSPENSION SUBCUTANEOUS at 09:05

## 2018-02-20 RX ADMIN — INSULIN DETEMIR SCH: 100 INJECTION, SOLUTION SUBCUTANEOUS at 22:51

## 2018-02-20 RX ADMIN — POLYETHYLENE GLYCOL 3350 SCH: 17 POWDER, FOR SOLUTION ORAL at 17:03

## 2018-02-20 RX ADMIN — Medication PRN MG: at 09:13

## 2018-02-21 RX ADMIN — POLYETHYLENE GLYCOL 3350 SCH: 17 POWDER, FOR SOLUTION ORAL at 09:36

## 2018-02-21 RX ADMIN — INSULIN HUMAN SCH UNITS: 100 INJECTION, SOLUTION PARENTERAL at 17:25

## 2018-02-21 RX ADMIN — SODIUM HYPOCHLORITE SCH: 2.5 SOLUTION TOPICAL at 10:00

## 2018-02-21 RX ADMIN — POLYETHYLENE GLYCOL 3350 SCH: 17 POWDER, FOR SOLUTION ORAL at 17:26

## 2018-02-21 RX ADMIN — INSULIN HUMAN SCH UNITS: 100 INJECTION, SOLUTION PARENTERAL at 12:00

## 2018-02-21 RX ADMIN — INSULIN LISPRO SCH UNIT: 100 INJECTION, SUSPENSION SUBCUTANEOUS at 09:28

## 2018-02-21 RX ADMIN — INSULIN DETEMIR SCH: 100 INJECTION, SOLUTION SUBCUTANEOUS at 22:26

## 2018-02-21 RX ADMIN — MORPHINE SULFATE PRN MG: 2 INJECTION, SOLUTION INTRAMUSCULAR; INTRAVENOUS at 14:35

## 2018-02-21 RX ADMIN — MEROPENEM AND SODIUM CHLORIDE SCH MLS/HR: 1 INJECTION, SOLUTION INTRAVENOUS at 14:35

## 2018-02-21 RX ADMIN — POLYETHYLENE GLYCOL 3350 SCH GM: 17 POWDER, FOR SOLUTION ORAL at 09:16

## 2018-02-21 RX ADMIN — MEROPENEM AND SODIUM CHLORIDE SCH MLS/HR: 1 INJECTION, SOLUTION INTRAVENOUS at 06:13

## 2018-02-21 RX ADMIN — INSULIN HUMAN SCH: 100 INJECTION, SOLUTION PARENTERAL at 22:25

## 2018-02-21 RX ADMIN — MORPHINE SULFATE PRN MG: 2 INJECTION, SOLUTION INTRAMUSCULAR; INTRAVENOUS at 21:44

## 2018-02-21 RX ADMIN — Medication PRN MG: at 09:43

## 2018-02-21 RX ADMIN — MEROPENEM AND SODIUM CHLORIDE SCH MLS/HR: 1 INJECTION, SOLUTION INTRAVENOUS at 21:36

## 2018-02-21 RX ADMIN — Medication PRN MG: at 17:25

## 2018-02-21 RX ADMIN — INSULIN HUMAN SCH UNITS: 100 INJECTION, SOLUTION PARENTERAL at 08:00

## 2018-02-21 RX ADMIN — MORPHINE SULFATE PRN MG: 2 INJECTION, SOLUTION INTRAMUSCULAR; INTRAVENOUS at 08:24

## 2018-02-21 RX ADMIN — MORPHINE SULFATE PRN MG: 2 INJECTION, SOLUTION INTRAMUSCULAR; INTRAVENOUS at 00:25

## 2018-02-21 NOTE — PN
DATE:



SUBJECTIVE:  The patient has no complaints of any chest pain.  No shortness

of breath.  No headaches.  No dizziness.



PHYSICAL EXAMINATION

VITAL SIGNS:  Temperature is 98.7, pulse is 70, blood pressure 114/54,

respirations 18.

GENERAL:  The patient is lying in bed, flat, comfortable.

HEENT:  No oral lesion.  Anicteric sclerae.  Moist mucosa.

NECK:  No JVD, adenopathy, or thyromegaly.

CARDIOVASCULAR:  S1 and S2, regular.  No murmurs, rubs, or gallops.

LUNGS:  Clear to auscultation bilaterally.  No wheeze, rales, or rhonchi.

ABDOMEN:  Bowel sounds are positive.  Soft, nontender and nondistended.

EXTREMITIES:  No cyanosis, clubbing or edema.



LABORATORY DATA:  White count is 10.4, hemoglobin 11.3, creatinine is 0.7.



ASSESSMENT

1.  Status post revision of the left metatarsal amputation.

2.  Debridement of the right heel measuring 5.6 x 3 cm.

3.  Diabetes type 2.

4.  Peripheral arterial disease.

5.  Status post  donor renal transplant.

6.  Dyslipidemia.

7.  _____.

8.  Coronary artery disease, status post coronary artery bypass graft.

9.  Pacemaker.

10.  Automatic implantable cardioverter-defibrillator.



PLAN:  The patient is currently comfortable.  He is on his transplant

medications with Cellcept, prednisone and Prograf.  The patient is on

metoprolol for his coronary artery disease.  He is on Lipitor for

dyslipidemia.  He is on Lexapro for his anxiety.  The patient is on Levemir

for his diabetes.  The patient is on heparin for his DVT prophylaxis.  He

is on Colace for his constipation.  The patient is on oxycodone for pain.























He is willing to go to [facility] for continued care of his wound and

physical therapy.











__________________________________________

Andrea Calderon MD



DD:  2018 6:13:47

DT:  2018 7:30:33

Job # [id number]

## 2018-02-21 NOTE — CP.PCM.PN
Subjective





- Date & Time of Evaluation


Date of Evaluation: 02/20/18


Time of Evaluation: 11:55





- Subjective


Subjective: 





Comfortable, no fevers, less pain in the left leg.





Objective





- Vital Signs/Intake and Output


Vital Signs (last 24 hours): 


 











Temp Pulse Resp BP Pulse Ox


 


 98.6 F   79   16   138/60   95 


 


 02/20/18 07:35  02/20/18 07:35  02/20/18 07:35  02/20/18 09:12  02/20/18 07:35








Intake and Output: 


 











 02/20/18 02/20/18





 06:59 18:59


 


Intake Total 1020 


 


Output Total 2550 


 


Balance -1530 














- Medications


Medications: 


 Current Medications





Acetaminophen (Tylenol 325mg Tab)  650 mg PO Q6 PRN


   PRN Reason: Pain, Mild (1-3)


   Last Admin: 02/16/18 18:43 Dose:  650 mg


Amlodipine [facility] ([facility]vasc)  [facility] [place]


   Last Admin: 02/20/18 09:12 Dose:  10 mg


Aspirin (Ecotrin)  81 mg PO 0800 [facility]


   Last Admin: 02/20/18 08:23 Dose:  81 mg


Atorvastatin Calcium (Lipitor)  40 mg PO DIN [facility]


   Last Admin: 02/19/18 17:23 Dose:  Not Given


Chlorpromazine (Thorazine)  25 mg PO Q6 [facility]


   PRN Reason: Protocol


   Last Admin: 02/20/18 05:58 Dose:  Not Given


Docusate Sodium (Colace)  100 mg PO BID [facility]


   Last Admin: 02/20/18 09:12 Dose:  100 mg


Escitalopram Oxalate (Lexapro)  [facility] [place]


   Last Admin: 02/20/18 09:12 Dose:  5 mg


Famotidine (Pepcid)  20 mg PO [facility]


   Last Admin: 02/20/18 09:12 Dose:  20 mg


Heparin Sodium (Porcine) (Heparin)  5,000 units SC Q8 [facility]


   PRN Reason: Protocol


   Last Admin: 02/20/18 06:03 Dose:  5,000 units


Meropenem/Sodium Chloride (Meropenem 1g/Ns 100ml Ivpb)  1 gm in 100 mls @ 100 

mls/hr IVPB Q8 [facility]


   PRN Reason: Protocol


   Stop: 02/23/18 06:01


   Last Admin: 02/20/18 06:03 Dose:  100 mls/hr


Insulin Detemir (Levemir)  12 unit SC HS [facility]


   Last Admin: 02/19/18 22:26 Dose:  12 unit


Insulin Human Regular (Humulin R Med)  0 units SC ACHS [facility]


   PRN Reason: Protocol


   Last Admin: 02/20/18 08:22 Dose:  3 units


Insulin Lispro Protam/Lispro Human (Humalog Mix 75/25)  8 units SC ACBD [facility]


   Last Admin: 02/20/18 09:05 Dose:  8 unit


Metoclopramide HCl (Reglan)  10 mg PO 0600,1130,1630,2200 [facility]


   Last Admin: 02/20/18 05:58 Dose:  Not Given


Metoprolol Tartrate (Lopressor)  50 mg PO 0800,1800 [facility]


   Last Admin: 02/20/18 08:23 Dose:  50 mg


Mycophenolate Mofetil (Cellcept Cap)  750 mg PO BID [facility]


   Last Admin: 02/20/18 09:12 Dose:  750 mg


Ondansetron HCl (Zofran Inj)  4 mg IVP Q6 PRN


   PRN Reason: Nausea/Vomiting


   Last Admin: 02/20/18 01:36 Dose:  4 mg


Ondansetron HCl (Zofran Inj)  4 mg IVP ONCE PRN


   PRN Reason: Nausea/Vomiting


Oxycodone HCl (Oxycodone Immediate Release Tab)  5 mg PO Q4H PRN


   PRN Reason: Pain, moderate (4-7)


   Last Admin: 02/20/18 09:13 Dose:  5 mg


Polyethylene Glycol (Miralax)  17 gm PO BID [facility]


   Last Admin: 02/20/18 09:12 Dose:  17 gm


Prednisone (Prednisone Tab)  5 mg PO 0800 [facility]


   Last Admin: 02/20/18 08:23 Dose:  5 mg


Sodium Hypochlorite (Dakins Solution 0.25%)  0 ml TOP DAILY [facility]


   Last Admin: 02/20/18 09:07 Dose:  Not Given


Tacrolimus (Prograf Cap)  0.5 mg PO [facility]


   Last Admin: 02/19/18 22:26 Dose:  0.5 mg


Tacrolimus (Prograf Cap)  2 mg PO BID [facility]


   Last Admin: 02/19/18 17:24 Dose:  Not Given











- Labs


Labs: 


 





 02/20/18 06:15 





 02/20/18 06:00 





 











PT  13.5 SECONDS (9.4-12.5)  H  02/12/18  07:00    


 


INR  1.17  (0.93-1.08)  H  02/12/18  07:00    


 


APTT  30.6 Seconds (25.1-36.5)   02/12/18  07:00    














- Constitutional


Appears: Non-toxic, Chronically Ill





- Head Exam


Head Exam: NORMAL INSPECTION





- ENT Exam


ENT Exam: Mucous Membranes Moist





- Neck Exam


Neck Exam: absent: Meningismus





- Respiratory Exam


Respiratory Exam: Decreased Breath Sounds





- Cardiovascular Exam


Cardiovascular Exam: +S1, +S2





- GI/Abdominal Exam


GI & Abdominal Exam: Soft.  absent: Tenderness





- Extremities Exam


Additional comments: 





left foot stump with dressings in place





Assessment and Plan





- Assessment and Plan (Free Text)


Plan: 





Assessment


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 

amputation 2 months ago, grew Enterobacter


S/P Left 5th metatarsal osteomyelitis S/P debridement and bone excision grew 

Acinetobacter and E. faecalis


CAD S/P CABG


chronic CHF


DM


COPD


S/P AICD placement


S/P kidney transplant





Plan


continue Merrem for 4-6 weeks with weekly ESR, CRP, CBC, CMP with outpatient 

follow up with Podiatry

## 2018-02-21 NOTE — CP.PCM.PN
Subjective





- Date & Time of Evaluation


Date of Evaluation: 02/21/18


Time of Evaluation: 12:13





- Subjective


Subjective: 


Surgery progress note Dr. Prescott


Patient seen and examined at bedside, no fevers or chills, doing well.  Wound 

dressing and packing changed at bedside.





Objective





- Vital Signs/Intake and Output


Vital Signs (last 24 hours): 


 











Temp Pulse Resp BP Pulse Ox


 


 98 F   74   20   128/52 L  99 


 


 02/21/18 07:00  02/21/18 09:26  02/21/18 07:00  02/21/18 09:27  02/21/18 07:00











- Medications


Medications: 


 Current Medications





Acetaminophen (Tylenol 325mg Tab)  650 mg PO Q6 PRN


   PRN Reason: Pain, Mild (1-3)


   Last Admin: 02/16/18 18:43 Dose:  650 mg


[facility]odip[facility] ([facility]asc)  [facility] [place]


   Last Admin: 02/21/18 09:27 Dose:  10 mg


Aspirin (Ecotrin)  81 mg PO 0800 [facility]


   Last Admin: 02/21/18 09:16 Dose:  81 mg


Atorvastatin Calcium (Lipitor)  40 mg PO DIN [facility]


   Last Admin: 02/20/18 16:33 Dose:  40 mg


Chlorpromazine (Thorazine)  25 mg PO Q6H PRN; Protocol


   PRN Reason: Hiccups


Docusate Sodium (Colace)  100 mg PO BID [facility]


   Last Admin: 02/21/18 09:16 Dose:  100 mg


Escitalopram Oxalate (Lexapro)  [facility] [place]


   Last Admin: 02/21/18 09:16 Dose:  5 mg


Famotidine (Pepcid)  20 mg PO [facility]


   Last Admin: 02/21/18 09:17 Dose:  20 mg


Heparin Sodium (Porcine) (Heparin)  5,000 units SC Q8 [facility]


   PRN Reason: Protocol


   Last Admin: 02/21/18 06:13 Dose:  5,000 units


Meropenem/Sodium Chloride (Meropenem 1g/Ns 100ml Ivpb)  1 gm in 100 mls @ 100 

mls/hr IVPB Q8 [facility]


   PRN Reason: Protocol


   Stop: 02/23/18 06:01


   Last Admin: 02/21/18 06:13 Dose:  100 mls/hr


Insulin Detemir (Levemir)  12 unit SC [facility]


   Last Admin: 02/20/18 22:51 Dose:  Not Given


Insulin Human Regular (Humulin R Med)  0 units SC ACHS [facility]


   PRN Reason: Protocol


   Last Admin: 02/21/18 08:00 Dose:  3 units


Insulin Lispro Protam/Lispro Human (Humalog Mix 75/25)  8 units SC ACBD [facility]


   Last Admin: 02/21/18 09:28 Dose:  8 unit


Metoprolol Tartrate (Lopressor)  50 mg PO 0800,1800 [facility]


   Last Admin: 02/21/18 09:26 Dose:  Not Given


Morphine Sulfate (Morphine)  2 mg IVP Q6H PRN


   PRN Reason: Pain, severe (8-10)


   Last Admin: 02/21/18 08:24 Dose:  2 mg


Mycophenolate Mofetil (Cellcept Cap)  750 mg PO BID [facility]


   Last Admin: 02/21/18 09:16 Dose:  750 mg


Ondansetron HCl (Zofran Inj)  4 mg IVP Q6 PRN


   PRN Reason: Nausea/Vomiting


   Last Admin: 02/21/18 08:03 Dose:  4 mg


Oxycodone HCl (Oxycodone Immediate Release Tab)  5 mg PO Q4H PRN


   PRN Reason: Pain, moderate (4-7)


   Last Admin: 02/21/18 09:43 Dose:  5 mg


Polyethylene Glycol (Miralax)  17 gm PO BID [facility]


   Last Admin: 02/21/18 09:36 Dose:  Not Given


Prednisone (Prednisone Tab)  5 mg PO 0800 [facility]


   Last Admin: 02/21/18 09:16 Dose:  5 mg


Sodium Hypochlorite (Dakins Solution 0.25%)  0 ml TOP DAILY [facility]


   Last Admin: 02/20/18 09:07 Dose:  Not Given


Tacrolimus (Prograf Cap)  0.5 mg PO HS [facility]


   Last Admin: 02/20/18 22:59 Dose:  0.5 mg


Tacrolimus (Prograf Cap)  2 mg PO BID [facility]


   Last Admin: 02/21/18 09:39 Dose:  2 mg











- Labs


Labs: 


 





 02/20/18 06:15 





 02/20/18 06:00 





 











PT  13.5 SECONDS (9.4-12.5)  H  02/12/18  07:00    


 


INR  1.17  (0.93-1.08)  H  02/12/18  07:00    


 


APTT  30.6 Seconds (25.1-36.5)   02/12/18  07:00    














- Constitutional


Appears: Well





- Head Exam


Head Exam: ATRAUMATIC, NORMAL INSPECTION, NORMOCEPHALIC





- Eye Exam


Eye Exam: EOMI, Normal appearance, PERRL


Pupil Exam: NORMAL ACCOMODATION, PERRL





- ENT Exam


ENT Exam: Mucous Membranes Moist, Normal Exam





- Neck Exam


Neck Exam: Full ROM, Normal Inspection.  absent: Lymphadenopathy





- Respiratory Exam


Respiratory Exam: Clear to Ausculation Bilateral, NORMAL BREATHING PATTERN





- Cardiovascular Exam


Cardiovascular Exam: REGULAR RHYTHM, +S1, +S2.  absent: Murmur





- GI/Abdominal Exam


GI & Abdominal Exam: Soft, Normal Bowel Sounds.  absent: Tenderness





- Extremities Exam


Extremities Exam: Full ROM, Normal Capillary Refill, Normal Inspection.  absent

: Joint Swelling, Pedal Edema





- Back Exam


Back Exam: NORMAL INSPECTION





- Neurological Exam


Neurological Exam: Alert, Awake, CN II-XII Intact, Normal Gait, Oriented x3





- Psychiatric Exam


Psychiatric exam: Normal Affect, Normal Mood





- Skin


Skin Exam: Dry, Intact, Normal Color, Warm





Assessment and Plan





- Assessment and Plan (Free Text)


Assessment: 


Assessment


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 

amputation 2 months ago, grew Enterobacter


S/P Left 5th metatarsal osteomyelitis S/P debridement and bone excision grew 

Acinetobacter and E. faecalis


CAD S/P CABG


chronic CHF


DM


COPD


S/P AICD placement


S/P kidney transplant





Plan


patient going to OR tomorrow


continue Merrem for 4-6 weeks with weekly ESR, CRP, CBC, CMP with outpatient 

follow up with Podiatry


wound dressings and packing


recommendations per Dr. Rockwell

## 2018-02-22 PROCEDURE — 0QBN0ZZ EXCISION OF RIGHT METATARSAL, OPEN APPROACH: ICD-10-PCS | Performed by: GENERAL PRACTICE

## 2018-02-22 PROCEDURE — 0JBR0ZZ EXCISION OF LEFT FOOT SUBCUTANEOUS TISSUE AND FASCIA, OPEN APPROACH: ICD-10-PCS | Performed by: GENERAL PRACTICE

## 2018-02-22 PROCEDURE — 0HXMXZZ TRANSFER RIGHT FOOT SKIN, EXTERNAL APPROACH: ICD-10-PCS | Performed by: GENERAL PRACTICE

## 2018-02-22 RX ADMIN — SODIUM HYPOCHLORITE SCH: 2.5 SOLUTION TOPICAL at 10:00

## 2018-02-22 RX ADMIN — INSULIN DETEMIR SCH: 100 INJECTION, SOLUTION SUBCUTANEOUS at 22:14

## 2018-02-22 RX ADMIN — Medication PRN MG: at 19:56

## 2018-02-22 RX ADMIN — MORPHINE SULFATE PRN MG: 4 INJECTION, SOLUTION INTRAMUSCULAR; INTRAVENOUS at 21:05

## 2018-02-22 RX ADMIN — HYDROMORPHONE HYDROCHLORIDE PRN MG: 1 INJECTION, SOLUTION INTRAMUSCULAR; INTRAVENOUS; SUBCUTANEOUS at 15:44

## 2018-02-22 RX ADMIN — INSULIN HUMAN SCH: 100 INJECTION, SOLUTION PARENTERAL at 12:38

## 2018-02-22 RX ADMIN — Medication PRN MG: at 09:36

## 2018-02-22 RX ADMIN — MEROPENEM AND SODIUM CHLORIDE SCH MLS/HR: 1 INJECTION, SOLUTION INTRAVENOUS at 05:27

## 2018-02-22 RX ADMIN — POLYETHYLENE GLYCOL 3350 SCH: 17 POWDER, FOR SOLUTION ORAL at 10:00

## 2018-02-22 RX ADMIN — MORPHINE SULFATE PRN MG: 2 INJECTION, SOLUTION INTRAMUSCULAR; INTRAVENOUS at 06:18

## 2018-02-22 RX ADMIN — MEROPENEM AND SODIUM CHLORIDE SCH: 1 INJECTION, SOLUTION INTRAVENOUS at 14:00

## 2018-02-22 RX ADMIN — INSULIN HUMAN SCH: 100 INJECTION, SOLUTION PARENTERAL at 09:38

## 2018-02-22 RX ADMIN — INSULIN LISPRO SCH: 100 INJECTION, SUSPENSION SUBCUTANEOUS at 09:38

## 2018-02-22 RX ADMIN — INSULIN HUMAN SCH: 100 INJECTION, SOLUTION PARENTERAL at 22:14

## 2018-02-22 RX ADMIN — INSULIN LISPRO SCH UNIT: 100 INJECTION, SUSPENSION SUBCUTANEOUS at 17:10

## 2018-02-22 RX ADMIN — HYDROMORPHONE HYDROCHLORIDE PRN MG: 1 INJECTION, SOLUTION INTRAMUSCULAR; INTRAVENOUS; SUBCUTANEOUS at 15:27

## 2018-02-22 RX ADMIN — MEROPENEM AND SODIUM CHLORIDE SCH MLS/HR: 1 INJECTION, SOLUTION INTRAVENOUS at 21:29

## 2018-02-22 RX ADMIN — MORPHINE SULFATE PRN MG: 4 INJECTION, SOLUTION INTRAMUSCULAR; INTRAVENOUS at 17:22

## 2018-02-22 RX ADMIN — INSULIN HUMAN SCH UNITS: 100 INJECTION, SOLUTION PARENTERAL at 17:09

## 2018-02-22 NOTE — PN
DATE:  2018



SUBJECTIVE:  Patient has no complaints of any chest pain.  No shortness of

breath.  No headaches.



PHYSICAL EXAMINATION:

VITAL SIGNS:  Temperature is 97.6, pulse is 68, blood pressure 130/73,

respirations 12.

GENERAL:  The patient is lying in bed, flat, comfortable.

HEENT:  No oral lesion.  Anicteric sclerae.  Moist mucosa.

NECK:  No JVD, adenopathy, or thyromegaly.

CARDIOVASCULAR:  S1 and S2, regular.  No murmurs, rubs, or gallops.

LUNGS:  Clear to auscultation bilaterally.  No wheeze, rales, or rhonchi.

ABDOMEN:  Bowel sounds are positive, soft, nontender and nondistended.

EXTREMITIES:  No cyanosis, clubbing or edema.



LABORATORY DATA:  White count of 10.4, hemoglobin 11.3, creatinine is 0.7.



ASSESSMENT

1.  Status post debridement of right heel and left forefoot.

2.  Status post revision of left metatarsal amputation.

3.  Diabetes type 2.

4.  Peripheral arterial disease.

5.  Status post  donor renal transplant.

6.  Dyslipidemia.

7.  Coronary artery disease, status post coronary artery bypass graft.

8.  Pacemaker.

9.  Automatic implantable cardioverter-defibrillator.



PLAN:  The patient is currently comfortable.  He had his procedure done in

the OR today.  The patient is on his immunosuppressive medications with

mycophenolate 750 mg b.i.d., prednisone 5 mg daily, and tacrolimus.  He is

on morphine for pain.  He is going to continue with Zofran as needed.  He

is on amlodipine for hypertension.  The patient is on Pepcid daily.  He is

going to be on Lipitor for dyslipidemia.  The patient is on IV fluids with

lactated Ringer's.  He is on Lexapro for his anxiety.  He has a blood work

ordered for tomorrow.





__________________________________________

Andrea Calderon MD



DD:  2018 17:30:39

DT:  2018 18:06:12

Job # [id number]

## 2018-02-22 NOTE — CP.PCM.PN
Subjective





- Date & Time of Evaluation


Date of Evaluation: 02/22/18


Time of Evaluation: 11:45





- Subjective


Subjective: 





For more surgery today on his left foot stump, no fevers, no nausea or diarrhea.





Objective





- Vital Signs/Intake and Output


Vital Signs (last 24 hours): 


 











Temp Pulse Resp BP Pulse Ox


 


 97.9 F   70   21   109/65   96 


 


 02/21/18 16:00  02/21/18 17:32  02/21/18 16:00  02/21/18 17:32  02/21/18 16:00








Intake and Output: 


 











 02/22/18 02/22/18





 06:59 18:59


 


Intake Total 1860 


 


Output Total 2 


 


Balance 1858 














- Medications


Medications: 


 Current Medications





Acetaminophen (Tylenol 325mg Tab)  650 mg PO Q6 PRN


   PRN Reason: Pain, Mild (1-3)


   Last Admin: 02/16/18 18:43 Dose:  650 mg


Amlodipine Besylate ([facility]asc)  [facility] [place]


   Last Admin: 02/21/18 09:27 Dose:  10 mg


Aspirin (Ecotrin)  81 mg PO 0800 [facility]


   Last Admin: 02/21/18 09:16 Dose:  81 mg


Atorvastatin Calcium (Lipitor)  40 mg PO DIN [facility]


   Last Admin: 02/21/18 17:25 Dose:  40 mg


Chlorpromazine (Thorazine)  25 mg PO Q6H PRN; Protocol


   PRN Reason: Hiccups


Docusate Sodium (Colace)  100 mg PO BID [facility]


   Last Admin: 02/21/18 17:24 Dose:  100 mg


Escitalopram Oxalate (Lexapro)  5 mg PO [facility]


   Last Admin: 02/21/18 09:16 Dose:  5 mg


Famotidine (Pepcid)  20 mg PO [facility]


   Last Admin: 02/21/18 09:17 Dose:  20 mg


Heparin Sodium (Porcine) (Heparin)  5,000 units SC Q8 [facility]


   PRN Reason: Protocol


   Last Admin: 02/21/18 14:35 Dose:  5,000 units


Meropenem/Sodium Chloride (Meropenem 1g/Ns 100ml Ivpb)  1 gm in 100 mls @ 100 

mls/hr IVPB Q8 [facility]


   PRN Reason: Protocol


   Stop: 03/14/18 06:01


   Last Admin: 02/22/18 05:27 Dose:  100 mls/hr


Lactated Ringer's (Lactated Ringer's)  1,000 mls @ 100 mls/hr IV .Q10H [facility]


   Last Admin: 02/22/18 05:26 Dose:  100 mls/hr


Insulin Detemir (Levemir)  12 unit SC HS [facility]


   Last Admin: 02/21/18 22:26 Dose:  Not Given


Insulin Human Regular (Humulin R Med)  0 units SC ACHS [facility]


   PRN Reason: Protocol


   Last Admin: 02/21/18 22:25 Dose:  Not Given


Insulin Lispro Protam/Lispro Human (Humalog Mix 75/25)  8 units SC ACBD [facility]


   Last Admin: 02/21/18 09:28 Dose:  8 unit


Metoprolol Tartrate (Lopressor)  50 mg PO 0800,1800 [facility]


   Last Admin: 02/21/18 17:32 Dose:  50 mg


Morphine Sulfate (Morphine)  2 mg IVP Q6H PRN


   PRN Reason: Pain, severe (8-10)


   Last Admin: 02/22/18 06:18 Dose:  2 mg


Mycophenolate Mofetil (Cellcept Cap)  750 mg PO BID [facility]


   Last Admin: 02/21/18 17:24 Dose:  750 mg


Ondansetron HCl (Zofran Inj)  4 mg IVP Q6 PRN


   PRN Reason: Nausea/Vomiting


   Last Admin: 02/21/18 08:03 Dose:  4 mg


Oxycodone HCl (Oxycodone Immediate Release Tab)  5 mg PO Q4H PRN


   PRN Reason: Pain, moderate (4-7)


   Last Admin: 02/21/18 17:25 Dose:  5 mg


Polyethylene Glycol (Miralax)  17 gm PO BID [facility]


   Last Admin: 02/21/18 17:26 Dose:  Not Given


Prednisone (Prednisone Tab)  5 mg PO 0800 [facility]


   Last Admin: 02/21/18 09:16 Dose:  5 mg


Sodium Hypochlorite (Dakins Solution 0.25%)  0 ml TOP DAILY [facility]


   Last Admin: 02/21/18 10:00 Dose:  Not Given


Tacrolimus (Prograf Cap)  0.5 mg PO HS [facility]


   Last Admin: 02/21/18 21:37 Dose:  0.5 mg


Tacrolimus (Prograf Cap)  2 mg PO BID [facility]


   Last Admin: 02/21/18 17:27 Dose:  2 mg











- Labs


Labs: 


 





 02/20/18 06:15 





 02/20/18 06:00 





 











PT  13.5 SECONDS (9.4-12.5)  H  02/12/18  07:00    


 


INR  1.17  (0.93-1.08)  H  02/12/18  07:00    


 


APTT  30.6 Seconds (25.1-36.5)   02/12/18  07:00    














- Constitutional


Appears: Non-toxic, Chronically Ill





- Head Exam


Head Exam: NORMAL INSPECTION





- ENT Exam


ENT Exam: Mucous Membranes Moist





- Neck Exam


Neck Exam: absent: Meningismus





- Respiratory Exam


Respiratory Exam: Decreased Breath Sounds





- Cardiovascular Exam


Cardiovascular Exam: +S1, +S2





- GI/Abdominal Exam


GI & Abdominal Exam: Soft.  absent: Tenderness





Assessment and Plan





- Assessment and Plan (Free Text)


Plan: 








Assessment


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 

amputation 2 months ago, grew Enterobacter


S/P Left 5th metatarsal osteomyelitis S/P debridement and bone excision grew 

Acinetobacter and E. faecalis


CAD S/P CABG


chronic CHF


DM


COPD


S/P AICD placement


S/P kidney transplant





Plan


continue Merrem for 4-6 weeks with weekly ESR, CRP, CBC, CMP with outpatient 

follow up with Podiatry - follow up results of the surgery to be done today

## 2018-02-22 NOTE — PCM.SURG1
Surgeon's Initial Post Op Note





- Surgeon's Notes


Surgeon: Dr. Prescott


Assistant: Deanna Holt PGY2


Type of Anesthesia: IV Sedation


Anesthesia Administered By: Dr. Rodríguez


Pre-Operative Diagnosis: Right heel chronic wound, left foot forefoot chronic 

wound


Operative Findings: right heel wound with necrotic tissue involving the 

calcaneous.  left forefoot wound with healthy granulation tissue


Post-Operative Diagnosis: same


Operation Performed: right heel wound debridement and complex primary closure.  

left fore foot wound debridement and placement of wound vac


Specimen/Specimens Removed: none


Estimated Blood Loss: EBL {In ML}: 5


Blood Products Given: N/A


Drains Used: Wound Vac


Post-Op Condition: Fair


Date of Surgery/Procedure: 02/22/18


Time of Surgery/Procedure: 14:00

## 2018-02-23 LAB
BASOPHILS # BLD AUTO: 0.01 K/MM3 (ref 0–2)
BASOPHILS NFR BLD: 0.1 % (ref 0–3)
BUN SERPL-MCNC: 18 MG/DL (ref 7–21)
CALCIUM SERPL-MCNC: 9.2 MG/DL (ref 8.4–10.5)
EOSINOPHIL # BLD: 0.5 10*3/UL (ref 0–0.7)
EOSINOPHIL NFR BLD: 4.6 % (ref 1.5–5)
ERYTHROCYTE [DISTWIDTH] IN BLOOD BY AUTOMATED COUNT: 15.3 % (ref 11.5–14.5)
GFR NON-AFRICAN AMERICAN: > 60
GRANULOCYTES # BLD: 7.79 10*3/UL (ref 1.4–6.5)
GRANULOCYTES NFR BLD: 71 % (ref 50–68)
HGB BLD-MCNC: 11.4 G/DL (ref 14–18)
LYMPHOCYTES # BLD: 1.2 10*3/UL (ref 1.2–3.4)
LYMPHOCYTES NFR BLD AUTO: 11.3 % (ref 22–35)
MCH RBC QN AUTO: 27.5 PG (ref 25–35)
MCHC RBC AUTO-ENTMCNC: 31.8 G/DL (ref 31–37)
MCV RBC AUTO: 86.7 FL (ref 80–105)
MONOCYTES # BLD AUTO: 1.4 10*3/UL (ref 0.1–0.6)
MONOCYTES NFR BLD: 13 % (ref 1–6)
PLATELET # BLD: 252 10^3/UL (ref 120–450)
PMV BLD AUTO: 9.4 FL (ref 7–11)
RBC # BLD AUTO: 4.14 10^6/UL (ref 3.5–6.1)
WBC # BLD AUTO: 11 10^3/UL (ref 4.5–11)

## 2018-02-23 RX ADMIN — MEROPENEM AND SODIUM CHLORIDE SCH MLS/HR: 1 INJECTION, SOLUTION INTRAVENOUS at 21:12

## 2018-02-23 RX ADMIN — MORPHINE SULFATE PRN MG: 4 INJECTION, SOLUTION INTRAMUSCULAR; INTRAVENOUS at 01:08

## 2018-02-23 RX ADMIN — INSULIN LISPRO SCH: 100 INJECTION, SUSPENSION SUBCUTANEOUS at 08:00

## 2018-02-23 RX ADMIN — INSULIN DETEMIR SCH UNIT: 100 INJECTION, SOLUTION SUBCUTANEOUS at 23:30

## 2018-02-23 RX ADMIN — POLYETHYLENE GLYCOL 3350 SCH: 17 POWDER, FOR SOLUTION ORAL at 18:57

## 2018-02-23 RX ADMIN — MORPHINE SULFATE PRN MG: 4 INJECTION, SOLUTION INTRAMUSCULAR; INTRAVENOUS at 15:34

## 2018-02-23 RX ADMIN — Medication PRN MG: at 10:52

## 2018-02-23 RX ADMIN — INSULIN LISPRO SCH: 100 INJECTION, SUSPENSION SUBCUTANEOUS at 17:00

## 2018-02-23 RX ADMIN — MORPHINE SULFATE PRN MG: 4 INJECTION, SOLUTION INTRAMUSCULAR; INTRAVENOUS at 05:40

## 2018-02-23 RX ADMIN — MEROPENEM AND SODIUM CHLORIDE SCH MLS/HR: 1 INJECTION, SOLUTION INTRAVENOUS at 05:41

## 2018-02-23 RX ADMIN — POLYETHYLENE GLYCOL 3350 SCH: 17 POWDER, FOR SOLUTION ORAL at 10:43

## 2018-02-23 RX ADMIN — INSULIN HUMAN SCH UNITS: 100 INJECTION, SOLUTION PARENTERAL at 12:02

## 2018-02-23 RX ADMIN — MORPHINE SULFATE PRN MG: 4 INJECTION, SOLUTION INTRAMUSCULAR; INTRAVENOUS at 09:23

## 2018-02-23 RX ADMIN — MEROPENEM AND SODIUM CHLORIDE SCH MLS/HR: 1 INJECTION, SOLUTION INTRAVENOUS at 15:33

## 2018-02-23 RX ADMIN — INSULIN HUMAN SCH: 100 INJECTION, SOLUTION PARENTERAL at 22:06

## 2018-02-23 RX ADMIN — MORPHINE SULFATE PRN MG: 4 INJECTION, SOLUTION INTRAMUSCULAR; INTRAVENOUS at 19:27

## 2018-02-23 RX ADMIN — INSULIN HUMAN SCH UNITS: 100 INJECTION, SOLUTION PARENTERAL at 17:02

## 2018-02-23 RX ADMIN — INSULIN HUMAN SCH: 100 INJECTION, SOLUTION PARENTERAL at 08:00

## 2018-02-23 NOTE — CP.PCM.PN
Subjective





- Date & Time of Evaluation


Date of Evaluation: 02/23/18


Time of Evaluation: 00:58





- Subjective


Subjective: 





S:Requests for some medication for hiccough.


   Seen by bedside.


   States that he gets this hiccoughs ever since he has had


   quadruple bypass surgery in 2010-11.


   He has had multiple surgeries.


   Each time after receiving anesthesia , he gets hiccoughs.


   Has no other complaints.


   Pertinent medical record was reviewed.





O: 


Last Vital Signs





 3





Temp  97.6 F   02/22/18 16:14


 


Pulse  68   02/22/18 17:15


 


Resp  12   02/22/18 16:14


 


BP  130/73   02/22/18 17:15


 


Pulse Ox  96   02/22/18 16:14








    Awake, alert.


    Not in distress.


    LUNGS: Normal breathing pattern.


    Surgical site of right foot- dressing clean and dry.


    Can wiggle toes.





A:Hiccough.





P:Thorazine 50 mg PO x1.





Objective





- Vital Signs/Intake and Output


Vital Signs (last 24 hours): 


 











Temp Pulse Resp BP Pulse Ox


 


 97.6 F   68   12   130/73   96 


 


 02/22/18 16:14  02/22/18 17:15  02/22/18 16:14  02/22/18 17:15  02/22/18 16:14








Intake and Output: 


 











 02/22/18 02/23/18





 18:59 06:59


 


Intake Total 1000 


 


Balance 1000 














- Medications


Medications: 


 Current Medications





Acetaminophen (Tylenol 325mg Tab)  650 mg PO Q6 PRN


   PRN Reason: Pain, Mild (1-3)


   Last Admin: 02/16/18 18:43 Dose:  650 mg


Amlodipine Besylate (Norvasc)  10 mg PO QAM [facility]


   Last Admin: 02/22/18 09:37 Dose:  10 mg


Aspirin (Ecotrin)  81 mg PO 0800 [facility]


   Last Admin: 02/21/18 09:16 Dose:  81 mg


Atorvastatin Calcium (Lipitor)  40 mg PO DIN [facility]


   Last Admin: 02/22/18 17:12 Dose:  40 mg


Chlorpromazine (Thorazine)  50 mg PO [facility]


   PRN Reason: Protocol


   Stop: 02/23/18 00:58


Docusate Sodium (Colace)  100 mg PO BID [facility]


   Last Admin: 02/22/18 17:12 Dose:  100 mg


Escitalopram Oxalate (Lexapro)  5 mg PO QAM [facility]


   Last Admin: 02/22/18 09:36 Dose:  5 mg


Famotidine (Pepcid)  20 mg PO QAM [facility]


   Last Admin: 02/22/18 09:36 Dose:  20 mg


Heparin Sodium (Porcine) (Heparin)  5,000 units SC Q8 [facility]


   PRN Reason: Protocol


   Last Admin: 02/22/18 21:28 Dose:  5,000 units


Meropenem/Sodium Chloride (Meropenem 1g/Ns 100ml Ivpb)  1 gm in 100 mls @ 100 

mls/hr IVPB Q8 [facility]


   PRN Reason: Protocol


   Stop: 03/14/18 06:01


   Last Admin: 02/22/18 21:29 Dose:  100 mls/hr


Insulin Detemir (Levemir)  12 unit SC HS [facility]


   Last Admin: 02/22/18 22:14 Dose:  Not Given


Insulin Human Regular (Humulin R Med)  0 units SC ACHS [facility]


   PRN Reason: Protocol


   Last Admin: 02/22/18 22:14 Dose:  Not Given


Insulin Lispro Protam/Lispro Human (Humalog Mix 75/25)  8 units SC ACBD [facility]


   Last Admin: 02/22/18 17:10 Dose:  8 unit


Metoprolol Tartrate (Lopressor)  50 mg PO 0800,1800 [facility]


   Last Admin: 02/22/18 17:15 Dose:  50 mg


Morphine Sulfate (Morphine)  4 mg IVP Q4H PRN


   PRN Reason: Pain, severe (8-10)


   Last Admin: 02/22/18 21:05 Dose:  4 mg


Mycophenolate Mofetil (Cellcept Cap)  750 mg PO BID [facility]


   Last Admin: 02/22/18 17:11 Dose:  750 mg


Ondansetron HCl (Zofran Inj)  4 mg IVP Q6 PRN


   PRN Reason: Nausea/Vomiting


   Last Admin: 02/21/18 08:03 Dose:  4 mg


Oxycodone HCl (Oxycodone Immediate Release Tab)  5 mg PO Q4H PRN


   PRN Reason: Pain, moderate (4-7)


   Last Admin: 02/22/18 19:56 Dose:  5 mg


Polyethylene Glycol (Miralax)  17 gm PO BID [facility]


   Last Admin: 02/22/18 10:00 Dose:  Not Given


Prednisone (Prednisone Tab)  5 mg PO 0800 [facility]


   Last Admin: 02/22/18 09:36 Dose:  5 mg


Sodium Hypochlorite (Dakins Solution 0.25%)  0 ml TOP DAILY [facility]


   Last Admin: 02/22/18 10:00 Dose:  Not Given


Tacrolimus (Prograf Cap)  0.5 mg PO HS [facility]


   Last Admin: 02/22/18 21:30 Dose:  0.5 mg


Tacrolimus (Prograf Cap)  2 mg PO BID [facility]


   Last Admin: 02/22/18 17:11 Dose:  2 mg











- Labs


Labs: 


 





 02/20/18 06:15 





 02/20/18 06:00 





 











PT  13.5 SECONDS (9.4-12.5)  H  02/12/18  07:00    


 


INR  1.17  (0.93-1.08)  H  02/12/18  07:00    


 


APTT  30.6 Seconds (25.1-36.5)   02/12/18  07:00

## 2018-02-23 NOTE — CP.PCM.PN
Subjective





- Date & Time of Evaluation


Date of Evaluation: 02/23/18


Time of Evaluation: 08:05





- Subjective


Subjective: 


Surgery progress note for Dr. Prescott


Patient seen and examined at bedside.  Was seen by Dr. Almanzar overnight for 

hiccups, was given a one-time dose of Thorazine 50.  Patient still having 

hiccups this am, and still complaining of right foot pain more than left.  

Otherwise, patient denies nausea, vomiting and diarrhea.  Patient tolerated OR 

yesterday well.





Objective





- Vital Signs/Intake and Output


Vital Signs (last 24 hours): 


 











Temp Pulse Resp BP Pulse Ox


 


 97.6 F   68   12   130/73   96 


 


 02/22/18 16:14  02/22/18 17:15  02/22/18 16:14  02/22/18 17:15  02/22/18 16:14








Intake and Output: 


 











 02/23/18 02/23/18





 06:59 18:59


 


Intake Total 240 


 


Output Total 400 


 


Balance -160 














- Medications


Medications: 


 Current Medications





Acetaminophen (Tylenol 325mg Tab)  650 mg PO Q6 PRN


   PRN Reason: Pain, Mild (1-3)


   Last Admin: 02/16/18 18:43 Dose:  650 mg


Amlodipine Besylate (Norvasc)  10 mg PO QAM [facility]


   Last Admin: 02/22/18 09:37 Dose:  10 mg


Aspirin (Ecotrin)  81 mg PO 0800 [facility]


   Last Admin: 02/21/18 09:16 Dose:  81 mg


Atorvastatin Calcium (Lipitor)  40 mg PO DIN [facility]


   Last Admin: 02/22/18 17:12 Dose:  40 mg


Docusate Sodium (Colace)  100 mg PO BID [facility]


   Last Admin: 02/22/18 17:12 Dose:  100 mg


Escitalopram Oxalate (Lexapro)  5 mg PO QAM [facility]


   Last Admin: 02/22/18 09:36 Dose:  5 mg


Famotidine (Pepcid)  20 mg PO QAM [facility]


   Last Admin: 02/22/18 09:36 Dose:  20 mg


Heparin Sodium (Porcine) (Heparin)  5,000 units SC Q8 [facility]


   PRN Reason: Protocol


   Last Admin: 02/23/18 05:42 Dose:  5,000 units


Meropenem/Sodium Chloride (Meropenem 1g/Ns 100ml Ivpb)  1 gm in 100 mls @ 100 

mls/hr IVPB Q8 [facility]


   PRN Reason: Protocol


   Stop: 03/14/18 06:01


   Last Admin: 02/23/18 05:41 Dose:  100 mls/hr


Insulin Detemir (Levemir)  12 unit SC HS [facility]


   Last Admin: 02/22/18 22:14 Dose:  Not Given


Insulin Human Regular (Humulin R Med)  0 units SC ACHS [facility]


   PRN Reason: Protocol


   Last Admin: 02/22/18 22:14 Dose:  Not Given


Insulin Lispro Protam/Lispro Human (Humalog Mix 75/25)  8 units SC ACBD [facility]


   Last Admin: 02/22/18 17:10 Dose:  8 unit


Metoprolol Tartrate (Lopressor)  50 mg PO 0800,1800 [facility]


   Last Admin: 02/22/18 17:15 Dose:  50 mg


Morphine Sulfate (Morphine)  4 mg IVP Q4H PRN


   PRN Reason: Pain, severe (8-10)


   Last Admin: 02/23/18 05:40 Dose:  4 mg


Mycophenolate Mofetil (Cellcept Cap)  750 mg PO BID [facility]


   Last Admin: 02/22/18 17:11 Dose:  750 mg


Ondansetron HCl (Zofran Inj)  4 mg IVP Q6 PRN


   PRN Reason: Nausea/Vomiting


   Last Admin: 02/23/18 02:39 Dose:  4 mg


Oxycodone HCl (Oxycodone Immediate Release Tab)  5 mg PO Q4H PRN


   PRN Reason: Pain, moderate (4-7)


   Last Admin: 02/22/18 19:56 Dose:  5 mg


Polyethylene Glycol (Miralax)  17 gm PO BID [facility]


   Last Admin: 02/22/18 10:00 Dose:  Not Given


Prednisone (Prednisone Tab)  5 mg PO 0800 [facility]


   Last Admin: 02/22/18 09:36 Dose:  5 mg


Sodium Hypochlorite (Dakins Solution 0.25%)  0 ml TOP DAILY [facility]


   Last Admin: 02/22/18 10:00 Dose:  Not Given


Tacrolimus (Prograf Cap)  0.5 mg PO [facility]


   Last Admin: 02/22/18 21:30 Dose:  0.5 mg


Tacrolimus (Prograf Cap)  2 mg PO BID [facility]


   Last Admin: 02/22/18 17:11 Dose:  2 mg











- Labs


Labs: 


 





 02/23/18 06:30 





 02/23/18 06:30 





 











PT  13.5 SECONDS (9.4-12.5)  H  02/12/18  07:00    


 


INR  1.17  (0.93-1.08)  H  02/12/18  07:00    


 


APTT  30.6 Seconds (25.1-36.5)   02/12/18  07:00    














- Constitutional


Appears: Well





- Head Exam


Head Exam: ATRAUMATIC, NORMAL INSPECTION, NORMOCEPHALIC





- Eye Exam


Eye Exam: EOMI, Normal appearance, PERRL


Pupil Exam: NORMAL ACCOMODATION, PERRL





- ENT Exam


ENT Exam: Mucous Membranes Moist, Normal Exam





- Neck Exam


Neck Exam: Full ROM, Normal Inspection.  absent: Lymphadenopathy





- Respiratory Exam


Respiratory Exam: Clear to Ausculation Bilateral, NORMAL BREATHING PATTERN





- Cardiovascular Exam


Cardiovascular Exam: REGULAR RHYTHM, +S1, +S2.  absent: Murmur





- GI/Abdominal Exam


GI & Abdominal Exam: Soft, Normal Bowel Sounds.  absent: Tenderness





- Extremities Exam


Extremities Exam: Full ROM, Normal Capillary Refill, Normal Inspection.  absent

: Joint Swelling, Pedal Edema





- Back Exam


Back Exam: NORMAL INSPECTION





- Neurological Exam


Neurological Exam: Alert, Awake, CN II-XII Intact, Normal Gait, Oriented x3





- Psychiatric Exam


Psychiatric exam: Normal Affect, Normal Mood





- Skin


Skin Exam: Dry, Intact, Normal Color, Warm





Assessment and Plan





- Assessment and Plan (Free Text)


Assessment: 


Assessment


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 

amputation 2 months ago, grew Enterobacter


S/P Left 5th metatarsal osteomyelitis S/P debridement and bone excision grew 

Acinetobacter and E. faecalis


CAD S/P CABG


chronic CHF


DM


COPD


S/P AICD placement


S/P kidney transplant





Plan


continue Merrem for 4-6 weeks with weekly ESR, CRP, CBC, CMP with outpatient 

follow up with Podiatry


wound dressings and packing


wound vac care


recommendations per Dr. Rockwell

## 2018-02-23 NOTE — CP.PCM.PN
Subjective





- Date & Time of Evaluation


Date of Evaluation: 02/23/18


Time of Evaluation: 11:20





- Subjective


Subjective: 





Had surgery yesterday, no fevers, less pain in the foot.





Objective





- Vital Signs/Intake and Output


Vital Signs (last 24 hours): 


 











Temp Pulse Resp BP Pulse Ox


 


 98.6 F   79   18   127/68   98 


 


 02/23/18 07:00  02/23/18 10:43  02/23/18 07:00  02/23/18 10:43  02/23/18 07:00








Intake and Output: 


 











 02/23/18 02/23/18





 06:59 18:59


 


Intake Total 240 


 


Output Total 400 


 


Balance -160 














- Medications


Medications: 


 Current Medications





Acetaminophen (Tylenol 325mg Tab)  650 mg PO Q6 PRN


   PRN Reason: Pain, Mild (1-3)


   Last Admin: 02/16/18 18:43 Dose:  650 mg


Amlodipine Besylate (Norvasc)  10 mg PO QAM [facility]


   Last Admin: 02/23/18 10:41 Dose:  10 mg


Aspirin (Ecotrin)  81 mg PO 0800 [facility]


   Last Admin: 02/23/18 10:40 Dose:  81 mg


Atorvastatin Calcium (Lipitor)  40 mg PO DIN [facility]


   Last Admin: 02/22/18 17:12 Dose:  40 mg


Docusate Sodium (Colace)  100 mg PO BID [facility]


   Last Admin: 02/23/18 10:39 Dose:  100 mg


Escitalopram Oxalate (Lexapro)  5 mg PO QAM [facility]


   Last Admin: 02/23/18 10:39 Dose:  5 mg


[facility]id)  [facility]


   Last Admin: 02/23/18 10:40 Dose:  20 mg


Heparin Sodium (Porcine) (Heparin)  5,000 units SC Q8 [facility]


   PRN Reason: Protocol


   Last Admin: 02/23/18 05:42 Dose:  5,000 units


Meropenem/Sodium Chloride (Meropenem 1g/Ns 100ml Ivpb)  1 gm in 100 mls @ 100 

mls/hr IVPB Q8 [facility]


   PRN Reason: Protocol


   Stop: 03/14/18 06:01


   Last Admin: 02/23/18 05:41 Dose:  100 mls/hr


Insulin Detemir (Levemir)  12 unit SC [facility]


   Last Admin: 02/22/18 22:14 Dose:  Not Given


Insulin Human Regular (Humulin R Med)  0 units SC ACHS [facility]


   PRN Reason: Protocol


   Last Admin: 02/23/18 08:00 Dose:  Not Given


Insulin Lispro Protam/Lispro Human (Humalog Mix 75/25)  8 units SC ACBD [facility]


   Last Admin: 02/23/18 08:00 Dose:  Not Given


Metoprolol Tartrate (Lopressor)  50 mg PO 0800,1800 [facility]


   Last Admin: 02/23/18 10:43 Dose:  50 mg


Morphine Sulfate (Morphine)  4 mg IVP Q4H PRN


   PRN Reason: Pain, severe (8-10)


   Last Admin: 02/23/18 09:23 Dose:  4 mg


Mycophenolate Mofetil (Cellcept Cap)  750 mg PO BID [facility]


   Last Admin: 02/23/18 10:39 Dose:  750 mg


Ondansetron HCl (Zofran Inj)  4 mg IVP Q6 PRN


   PRN Reason: Nausea/Vomiting


   Last Admin: 02/23/18 02:39 Dose:  4 mg


Oxycodone HCl (Oxycodone Immediate Release Tab)  5 mg PO Q4H PRN


   PRN Reason: Pain, moderate (4-7)


   Last Admin: 02/23/18 10:52 Dose:  5 mg


Polyethylene Glycol (Miralax)  17 gm PO BID [facility]


   Last Admin: 02/23/18 10:43 Dose:  Not Given


Prednisone (Prednisone Tab)  5 mg PO 0800 [facility]


   Last Admin: 02/23/18 10:41 Dose:  5 mg


Sodium Hypochlorite (Dakins Solution 0.25%)  0 ml TOP DAILY [facility]


   Last Admin: 02/22/18 10:00 Dose:  Not Given


Tacrolimus (Prograf Cap)  0.5 mg PO HS [facility]


   Last Admin: 02/22/18 21:30 Dose:  0.5 mg


Tacrolimus (Prograf Cap)  2 mg PO BID [facility]


   Last Admin: 02/23/18 10:52 Dose:  2 mg











- Labs


Labs: 


 





 02/23/18 06:30 





 02/23/18 06:30 





 











PT  13.5 SECONDS (9.4-12.5)  H  02/12/18  07:00    


 


INR  1.17  (0.93-1.08)  H  02/12/18  07:00    


 


APTT  30.6 Seconds (25.1-36.5)   02/12/18  07:00    














- Constitutional


Appears: Non-toxic, Chronically Ill





- Head Exam


Head Exam: NORMAL INSPECTION





- ENT Exam


ENT Exam: Mucous Membranes Moist





- Neck Exam


Neck Exam: absent: Meningismus





- Respiratory Exam


Respiratory Exam: Decreased Breath Sounds





- Cardiovascular Exam


Cardiovascular Exam: +S1, +S2





- GI/Abdominal Exam


GI & Abdominal Exam: Soft.  absent: Tenderness





- Extremities Exam


Additional comments: 





left foot with dressings in place





Assessment and Plan





- Assessment and Plan (Free Text)


Plan: 





Assessment


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 

closure; S/P transmetatarsal amputation 2 months ago, grew Enterobacter


S/P Left 5th metatarsal osteomyelitis S/P debridement and bone excision grew 

Acinetobacter and E. faecalis


CAD S/P CABG


chronic CHF


DM


COPD


S/P AICD placement


S/P kidney transplant





Plan


continue Merrem for 4-6 weeks with weekly ESR, CRP, CBC, CMP with outpatient 

follow up with Podiatry

## 2018-02-24 RX ADMIN — INSULIN LISPRO SCH UNIT: 100 INJECTION, SUSPENSION SUBCUTANEOUS at 16:50

## 2018-02-24 RX ADMIN — INSULIN LISPRO SCH: 100 INJECTION, SUSPENSION SUBCUTANEOUS at 07:40

## 2018-02-24 RX ADMIN — MORPHINE SULFATE PRN MG: 4 INJECTION, SOLUTION INTRAMUSCULAR; INTRAVENOUS at 20:12

## 2018-02-24 RX ADMIN — MORPHINE SULFATE PRN MG: 4 INJECTION, SOLUTION INTRAMUSCULAR; INTRAVENOUS at 05:48

## 2018-02-24 RX ADMIN — POLYETHYLENE GLYCOL 3350 SCH GM: 17 POWDER, FOR SOLUTION ORAL at 09:01

## 2018-02-24 RX ADMIN — SODIUM HYPOCHLORITE SCH APPLIC: 2.5 SOLUTION TOPICAL at 09:07

## 2018-02-24 RX ADMIN — INSULIN DETEMIR SCH UNIT: 100 INJECTION, SOLUTION SUBCUTANEOUS at 21:26

## 2018-02-24 RX ADMIN — POLYETHYLENE GLYCOL 3350 SCH: 17 POWDER, FOR SOLUTION ORAL at 18:27

## 2018-02-24 RX ADMIN — INSULIN LISPRO SCH UNIT: 100 INJECTION, SUSPENSION SUBCUTANEOUS at 11:43

## 2018-02-24 RX ADMIN — MEROPENEM AND SODIUM CHLORIDE SCH MLS/HR: 1 INJECTION, SOLUTION INTRAVENOUS at 21:25

## 2018-02-24 RX ADMIN — INSULIN HUMAN SCH UNITS: 100 INJECTION, SOLUTION PARENTERAL at 16:49

## 2018-02-24 RX ADMIN — Medication PRN MG: at 08:26

## 2018-02-24 RX ADMIN — INSULIN HUMAN SCH: 100 INJECTION, SOLUTION PARENTERAL at 07:40

## 2018-02-24 RX ADMIN — MORPHINE SULFATE PRN MG: 4 INJECTION, SOLUTION INTRAMUSCULAR; INTRAVENOUS at 15:01

## 2018-02-24 RX ADMIN — INSULIN HUMAN SCH: 100 INJECTION, SOLUTION PARENTERAL at 22:49

## 2018-02-24 RX ADMIN — MEROPENEM AND SODIUM CHLORIDE SCH MLS/HR: 1 INJECTION, SOLUTION INTRAVENOUS at 05:35

## 2018-02-24 RX ADMIN — INSULIN HUMAN SCH UNITS: 100 INJECTION, SOLUTION PARENTERAL at 11:42

## 2018-02-24 RX ADMIN — MEROPENEM AND SODIUM CHLORIDE SCH MLS/HR: 1 INJECTION, SOLUTION INTRAVENOUS at 14:55

## 2018-02-24 NOTE — CP.PCM.PN
Subjective





- Date & Time of Evaluation


Date of Evaluation: 02/24/18


Time of Evaluation: 05:57





- Subjective


Subjective: 


S:Hiccough.


    No other complaints.


    Pertinent medical record was reviewed.





O:


Last Vital Signs





 3





Temp  98.2 F   02/23/18 17:13


 


Pulse  75   02/23/18 17:13


 


Resp  20   02/23/18 17:13


 


BP  130/74   02/23/18 17:13


 


Pulse Ox  99   02/23/18 17:13








   Awake, alert,not in distress.


   LUNGS: Normal breathing pattern.





A:Hiccough.





P:Thorazine 50 mg PO X 1





Objective





- Vital Signs/Intake and Output


Vital Signs (last 24 hours): 


 











Temp Pulse Resp BP Pulse Ox


 


 98.2 F   75   20   130/74   99 


 


 02/23/18 17:13  02/23/18 17:13  02/23/18 17:13  02/23/18 17:13  02/23/18 17:13








Intake and Output: 


 











 02/23/18 02/24/18





 18:59 06:59


 


Intake Total 240 300


 


Balance 240 300














- Medications


Medications: 


 Current Medications





Acetaminophen (Tylenol 325mg Tab)  650 mg PO Q6 PRN


   PRN Reason: Pain, Mild (1-3)


   Last Admin: 02/16/18 18:43 Dose:  650 mg


Amlodipine Besylate (Norvasc)  10 mg PO QAM [facility]


   Last Admin: 02/23/18 10:41 Dose:  10 mg


Aspirin (Ecotrin)  81 mg PO 0800 [facility]


   Last Admin: 02/23/18 10:40 Dose:  81 mg


Atorvastatin Calcium (Lipitor)  40 mg PO DIN [facility]


   Last Admin: 02/23/18 17:02 Dose:  40 mg


Chlorpromazine (Thorazine)  50 mg PO STAT [facility]


   PRN Reason: Protocol


   Stop: 02/24/18 05:57


Docusate Sodium (Colace)  100 mg PO BID [facility]


   Last Admin: 02/23/18 18:56 Dose:  Not Given


Escitalopram Oxalate (Lexapro)  5 mg PO QAM [facility]


   Last Admin: 02/23/18 10:39 Dose:  5 mg


Famotidine (Pepcid)  20 mg PO QAM [facility]


   Last Admin: 02/23/18 10:40 Dose:  20 mg


Heparin Sodium (Porcine) (Heparin)  5,000 units SC Q8 [facility]


   PRN Reason: Protocol


   Last Admin: 02/24/18 05:36 Dose:  5,000 units


Meropenem/Sodium Chloride (Meropenem 1g/Ns 100ml Ivpb)  1 gm in 100 mls @ 100 

mls/hr IVPB Q8 [facility]


   PRN Reason: Protocol


   Stop: 03/14/18 06:01


   Last Admin: 02/24/18 05:35 Dose:  100 mls/hr


Insulin Detemir (Levemir)  12 unit SC HS [facility]


   Last Admin: 02/23/18 23:30 Dose:  12 unit


Insulin Human Regular (Humulin R Med)  0 units SC ACHS [facility]


   PRN Reason: Protocol


   Last Admin: 02/23/18 22:06 Dose:  Not Given


Insulin Lispro Protam/Lispro Human (Humalog Mix 75/25)  8 units SC ACBD [facility]


   Last Admin: 02/23/18 17:00 Dose:  Not Given


Metoprolol Tartrate (Lopressor)  50 mg PO 0800,1800 [facility]


   Last Admin: 02/23/18 10:43 Dose:  50 mg


Morphine Sulfate (Morphine)  4 mg IVP Q4H PRN


   PRN Reason: Pain, severe (8-10)


   Last Admin: 02/24/18 05:48 Dose:  4 mg


Mycophenolate Mofetil (Cellcept Cap)  750 mg PO BID [facility]


   Last Admin: 02/23/18 19:22 Dose:  750 mg


Ondansetron HCl (Zofran Inj)  4 mg IVP Q6 PRN


   PRN Reason: Nausea/Vomiting


   Last Admin: 02/23/18 17:13 Dose:  4 mg


Oxycodone HCl (Oxycodone Immediate Release Tab)  5 mg PO Q4H PRN


   PRN Reason: Pain, moderate (4-7)


   Last Admin: 02/23/18 10:52 Dose:  5 mg


Polyethylene Glycol (Miralax)  17 gm PO BID [facility]


   Last Admin: 02/23/18 18:57 Dose:  Not Given


Prednisone (Prednisone Tab)  5 mg PO 0800 [facility]


   Last Admin: 02/23/18 10:41 Dose:  5 mg


Sodium Hypochlorite (Dakins Solution 0.25%)  0 ml TOP DAILY [facility]


   Last Admin: 02/22/18 10:00 Dose:  Not Given


Tacrolimus (Prograf Cap)  0.5 mg PO [facility]


   Last Admin: 02/23/18 21:12 Dose:  0.5 mg


Tacrolimus (Prograf Cap)  2 mg PO BID [facility]


   Last Admin: 02/23/18 19:27 Dose:  2 mg











- Labs


Labs: 


 





 02/23/18 06:30 





 02/23/18 06:30 





 











PT  13.5 SECONDS (9.4-12.5)  H  02/12/18  07:00    


 


INR  1.17  (0.93-1.08)  H  02/12/18  07:00    


 


APTT  30.6 Seconds (25.1-36.5)   02/12/18  07:00

## 2018-02-24 NOTE — CP.PCM.PN
Subjective





- Date & Time of Evaluation


Date of Evaluation: 02/24/18


Time of Evaluation: 12:40





- Subjective


Subjective: 





Comfortable, no fevers. Not in distress.





Objective





- Vital Signs/Intake and Output


Vital Signs (last 24 hours): 


 











Temp Pulse Resp BP Pulse Ox


 


 98.7 F   80   20   140/80   99 


 


 02/24/18 07:00  02/24/18 08:27  02/24/18 07:00  02/24/18 09:02  02/24/18 07:00








Intake and Output: 


 











 02/24/18 02/24/18





 06:59 18:59


 


Intake Total 300 


 


Balance 300 














- Medications


Medications: 


 Current Medications





Acetaminophen (Tylenol 325mg Tab)  650 mg PO Q6 PRN


   PRN Reason: Pain, Mild (1-3)


   Last Admin: 02/16/18 18:43 Dose:  650 mg


Amlodipine Besylate (Norvasc)  10 mg PO QAM [facility]


   Last Admin: 02/24/18 09:02 Dose:  10 mg


Aspirin (Ecotrin)  81 mg PO 0800 [facility]


   Last Admin: 02/24/18 09:02 Dose:  81 mg


Atorvastatin Calcium (Lipitor)  40 mg PO DIN [facility]


   Last Admin: 02/23/18 17:02 Dose:  40 mg


Docusate Sodium (Colace)  100 mg PO BID [facility]


   Last Admin: 02/24/18 09:02 Dose:  100 mg


Escitalopram Oxalate (Lexapro)  5 mg PO QAM [facility]


   Last Admin: 02/24/18 09:02 Dose:  5 mg


Famotidine (Pepcid)  20 mg PO QAM [facility]


   Last Admin: 02/24/18 09:02 Dose:  20 mg


Heparin Sodium (Porcine) (Heparin)  5,000 units SC Q8 [facility]


   PRN Reason: Protocol


   Last Admin: 02/24/18 05:36 Dose:  5,000 units


Meropenem/Sodium Chloride (Meropenem 1g/Ns 100ml Ivpb)  1 gm in 100 mls @ 100 

mls/hr IVPB Q8 [facility]


   PRN Reason: Protocol


   Stop: 03/14/18 06:01


   Last Admin: 02/24/18 05:35 Dose:  100 mls/hr


Insulin Detemir (Levemir)  12 unit SC HS [facility]


   Last Admin: 02/23/18 23:30 Dose:  12 unit


Insulin Human Regular (Humulin R Med)  0 units SC ACHS [facility]


   PRN Reason: Protocol


   Last Admin: 02/24/18 11:42 Dose:  7 units


Insulin Lispro Protam/Lispro Human (Humalog Mix 75/25)  8 units SC ACBD [facility]


   Last Admin: 02/24/18 11:43 Dose:  8 unit


Metoprolol Tartrate (Lopressor)  50 mg PO 0800,1800 [facility]


   Last Admin: 02/24/18 08:27 Dose:  50 mg


Morphine Sulfate (Morphine)  4 mg IVP Q4H PRN


   PRN Reason: Pain, severe (8-10)


   Last Admin: 02/24/18 05:48 Dose:  4 mg


Mycophenolate Mofetil (Cellcept Cap)  750 mg PO BID [facility]


   Last Admin: 02/24/18 09:05 Dose:  750 mg


Ondansetron HCl (Zofran Inj)  4 mg IVP Q6 PRN


   PRN Reason: Nausea/Vomiting


   Last Admin: 02/23/18 17:13 Dose:  4 mg


Oxycodone HCl (Oxycodone Immediate Release Tab)  5 mg PO Q4H PRN


   PRN Reason: Pain, moderate (4-7)


   Last Admin: 02/24/18 08:26 Dose:  5 mg


Polyethylene Glycol (Miralax)  17 gm PO BID [facility]


   Last Admin: 02/24/18 09:01 Dose:  17 gm


Prednisone (Prednisone Tab)  5 mg PO 0800 [facility]


   Last Admin: 02/24/18 08:26 Dose:  5 mg


Sodium Hypochlorite (Dakins Solution 0.25%)  0 ml TOP DAILY [facility]


   Last Admin: 02/24/18 09:07 Dose:  1 applic


Tacrolimus (Prograf Cap)  0.5 mg PO HS [facility]


   Last Admin: 02/23/18 21:12 Dose:  0.5 mg


Tacrolimus (Prograf Cap)  2 mg PO BID [facility]


   Last Admin: 02/24/18 09:05 Dose:  2 mg











- Labs


Labs: 


 





 02/23/18 06:30 





 02/23/18 06:30 





 











PT  13.5 SECONDS (9.4-12.5)  H  02/12/18  07:00    


 


INR  1.17  (0.93-1.08)  H  02/12/18  07:00    


 


APTT  30.6 Seconds (25.1-36.5)   02/12/18  07:00    














- Constitutional


Appears: Chronically Ill





- Head Exam


Head Exam: NORMAL INSPECTION





- Neck Exam


Neck Exam: absent: Meningismus





- Respiratory Exam


Respiratory Exam: Decreased Breath Sounds





- Cardiovascular Exam


Cardiovascular Exam: +S1, +S2





- GI/Abdominal Exam


GI & Abdominal Exam: Soft.  absent: Tenderness





Assessment and Plan





- Assessment and Plan (Free Text)


Plan: 





Assessment


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 

closure; S/P transmetatarsal amputation 2 months ago, grew Enterobacter


S/P Left 5th metatarsal osteomyelitis S/P debridement and bone excision grew 

Acinetobacter and E. faecalis


CAD S/P CABG


chronic CHF


DM


COPD


S/P AICD placement


S/P kidney transplant





Plan


continue Merrem for 4-6 weeks with weekly ESR, CRP, CBC, CMP with outpatient 

follow up with Podiatry

## 2018-02-24 NOTE — CP.PCM.PN
Subjective





- Date & Time of Evaluation


Date of Evaluation: 02/24/18


Time of Evaluation: 07:00





- Subjective


Subjective: 





Patient seen and examined at bedside. No adverse events overnight. Patient 

complains of persistent pain but states it is currently controlled on pain 

regimen. Wound vac is functioning well on the left foot with no leak.





Objective





- Vital Signs/Intake and Output


Vital Signs (last 24 hours): 


 











Temp Pulse Resp BP Pulse Ox


 


 98.2 F   75   20   130/74   99 


 


 02/23/18 17:13  02/23/18 17:13  02/23/18 17:13  02/23/18 17:13  02/23/18 17:13








Intake and Output: 


 











 02/23/18 02/24/18





 18:59 06:59


 


Intake Total 240 


 


Balance 240 














- Medications


Medications: 


 Current Medications





Acetaminophen (Tylenol 325mg Tab)  650 mg PO Q6 PRN


   PRN Reason: Pain, Mild (1-3)


   Last Admin: 02/16/18 18:43 Dose:  650 mg


[facility]  [facility]


   Last Admin: 02/23/18 10:41 Dose:  10 mg


Aspirin (Ecotrin)  81 mg PO 0800 [facility]


   Last Admin: 02/23/18 10:40 Dose:  81 mg


Atorvastatin Calcium (Lipitor)  40 mg PO DIN [facility]


   Last Admin: 02/23/18 17:02 Dose:  40 mg


Docusate Sodium (Colace)  100 mg PO BID [facility]


   Last Admin: 02/23/18 18:56 Dose:  Not Given


Escitalopram Oxalate (Lexapro)  [facility]


   Last Admin: 02/23/18 10:39 Dose:  5 mg


Famotidine (Pepcid)  [facility]


   Last Admin: 02/23/18 10:40 Dose:  20 mg


Heparin Sodium (Porcine) (Heparin)  5,000 units SC Q8 [facility]


   PRN Reason: Protocol


   Last Admin: 02/23/18 21:12 Dose:  5,000 units


Meropenem/Sodium Chloride (Meropenem 1g/Ns 100ml Ivpb)  1 gm in 100 mls @ 100 

mls/hr IVPB Q8 [facility]


   PRN Reason: Protocol


   Stop: 03/14/18 06:01


   Last Admin: 02/23/18 21:12 Dose:  100 mls/hr


Insulin Detemir (Levemir)  12 unit SC [facility]


   Last Admin: 02/22/18 22:14 Dose:  Not Given


Insulin Human Regular (Humulin R Med)  0 units SC ACHS [facility]


   PRN Reason: Protocol


   Last Admin: 02/23/18 22:06 Dose:  Not Given


Insulin Lispro Protam/Lispro Human (Humalog Mix 75/25)  8 units SC ACBD [facility]


   Last Admin: 02/23/18 17:00 Dose:  Not Given


Metoprolol Tartrate (Lopressor)  50 mg PO 0800,1800 [facility]


   Last Admin: 02/23/18 10:43 Dose:  50 mg


Morphine Sulfate (Morphine)  4 mg IVP Q4H PRN


   PRN Reason: Pain, severe (8-10)


   Last Admin: 02/23/18 19:27 Dose:  4 mg


Mycophenolate Mofetil (Cellcept Cap)  750 mg PO BID [facility]


   Last Admin: 02/23/18 19:22 Dose:  750 mg


Ondansetron HCl (Zofran Inj)  4 mg IVP Q6 PRN


   PRN Reason: Nausea/Vomiting


   Last Admin: 02/23/18 17:13 Dose:  4 mg


Oxycodone HCl (Oxycodone Immediate Release Tab)  5 mg PO Q4H PRN


   PRN Reason: Pain, moderate (4-7)


   Last Admin: 02/23/18 10:52 Dose:  5 mg


Polyethylene Glycol (Miralax)  17 gm PO BID [facility]


   Last Admin: 02/23/18 18:57 Dose:  Not Given


Prednisone (Prednisone Tab)  5 mg PO 0800 [facility]


   Last Admin: 02/23/18 10:41 Dose:  5 mg


Sodium Hypochlorite (Dakins Solution 0.25%)  0 ml TOP DAILY [facility]


   Last Admin: 02/22/18 10:00 Dose:  Not Given


Tacrolimus (Prograf Cap)  0.5 mg PO [facility]


   Last Admin: 02/23/18 21:12 Dose:  0.5 mg


Tacrolimus (Prograf Cap)  2 mg PO BID [facility]


   Last Admin: 02/23/18 19:27 Dose:  2 mg











- Labs


Labs: 


 





 02/23/18 06:30 





 02/23/18 06:30 





 











PT  13.5 SECONDS (9.4-12.5)  H  02/12/18  07:00    


 


INR  1.17  (0.93-1.08)  H  02/12/18  07:00    


 


APTT  30.6 Seconds (25.1-36.5)   02/12/18  07:00    














- Constitutional


Appears: Non-toxic, No Acute Distress





- Head Exam


Head Exam: ATRAUMATIC, NORMOCEPHALIC





- Eye Exam


Eye Exam: Normal appearance.  absent: Conjunctival injection, Scleral icterus





- ENT Exam


ENT Exam: Mucous Membranes Moist, Normal Oropharynx





- Respiratory Exam


Respiratory Exam: NORMAL BREATHING PATTERN.  absent: Accessory Muscle Use, 

Respiratory Distress





- Cardiovascular Exam


Cardiovascular Exam: RRR





- GI/Abdominal Exam


GI & Abdominal Exam: absent: Distended





- Extremities Exam


Extremities Exam: absent: Calf Tenderness, Pedal Edema


Additional comments: 





left foot with functioning wound vac in place, no leak, minimal drainage, no 

surrounding erythema


Right foot heel wound loosely approximated with sutures, posterior skin edge 

moderately dusky but warm to the touch. No purulent drainage or bleeding from 

the inside of the wound. 





- Neurological Exam


Neurological Exam: Alert, Awake, Oriented x3





- Psychiatric Exam


Psychiatric exam: Normal Affect, Normal Mood





- Skin


Skin Exam: Dry, Intact (except as noted above), Normal Color, Warm





Assessment and Plan





- Assessment and Plan (Free Text)


Assessment: 





57M with PVD and chronic right heel wound and left forefoot wound s/p multiple 

debridments and POD#2 s/p primary closure of the right heel and wound vac 

placement of wound vac on the left forefoot wound





Plan:


-Continue BID packing changes with dakins solution on the right heel wound


-Q2day wound vac changes on the left foot. Leave on continuous 125mmHg suction


-Complete bed rest, keep feet elevated off the bed just enough to prevent the 

heel from resting on the bed


-PRN pain medication


-ABX per ID





Seen and examined by Dr. Kenyon Holt, PGY2


[phone] Surgery pager

## 2018-02-25 RX ADMIN — POLYETHYLENE GLYCOL 3350 SCH: 17 POWDER, FOR SOLUTION ORAL at 11:26

## 2018-02-25 RX ADMIN — INSULIN LISPRO SCH UNIT: 100 INJECTION, SUSPENSION SUBCUTANEOUS at 17:07

## 2018-02-25 RX ADMIN — SODIUM HYPOCHLORITE SCH APPLIC: 2.5 SOLUTION TOPICAL at 09:51

## 2018-02-25 RX ADMIN — INSULIN HUMAN SCH: 100 INJECTION, SOLUTION PARENTERAL at 17:09

## 2018-02-25 RX ADMIN — MORPHINE SULFATE PRN MG: 4 INJECTION, SOLUTION INTRAMUSCULAR; INTRAVENOUS at 20:16

## 2018-02-25 RX ADMIN — INSULIN HUMAN SCH: 100 INJECTION, SOLUTION PARENTERAL at 21:07

## 2018-02-25 RX ADMIN — MORPHINE SULFATE PRN MG: 4 INJECTION, SOLUTION INTRAMUSCULAR; INTRAVENOUS at 11:34

## 2018-02-25 RX ADMIN — MEROPENEM AND SODIUM CHLORIDE SCH MLS/HR: 1 INJECTION, SOLUTION INTRAVENOUS at 21:05

## 2018-02-25 RX ADMIN — INSULIN HUMAN SCH UNITS: 100 INJECTION, SOLUTION PARENTERAL at 11:33

## 2018-02-25 RX ADMIN — MEROPENEM AND SODIUM CHLORIDE SCH MLS/HR: 1 INJECTION, SOLUTION INTRAVENOUS at 14:43

## 2018-02-25 RX ADMIN — INSULIN LISPRO SCH UNIT: 100 INJECTION, SUSPENSION SUBCUTANEOUS at 08:17

## 2018-02-25 RX ADMIN — MEROPENEM AND SODIUM CHLORIDE SCH MLS/HR: 1 INJECTION, SOLUTION INTRAVENOUS at 06:32

## 2018-02-25 RX ADMIN — Medication PRN MG: at 12:59

## 2018-02-25 RX ADMIN — INSULIN DETEMIR SCH UNIT: 100 INJECTION, SOLUTION SUBCUTANEOUS at 21:05

## 2018-02-25 RX ADMIN — INSULIN HUMAN SCH UNITS: 100 INJECTION, SOLUTION PARENTERAL at 08:16

## 2018-02-25 RX ADMIN — POLYETHYLENE GLYCOL 3350 SCH: 17 POWDER, FOR SOLUTION ORAL at 17:12

## 2018-02-25 NOTE — CP.PCM.PN
Subjective





- Date & Time of Evaluation


Date of Evaluation: 02/25/18


Time of Evaluation: 13:10





- Subjective


Subjective: 


Resting comfortably in bed, no fevers, not in distress.





Objective





- Vital Signs/Intake and Output


Vital Signs (last 24 hours): 


 











Temp Pulse Resp BP Pulse Ox


 


 98.6 F   78   20   125/80   97 


 


 02/25/18 08:12  02/25/18 08:18  02/25/18 08:12  02/25/18 09:50  02/25/18 08:12








Intake and Output: 


 











 02/25/18 02/25/18





 06:59 18:59


 


Intake Total 180 


 


Balance 180 














- Medications


Medications: 


 Current Medications





Acetaminophen (Tylenol 325mg Tab)  650 mg PO Q6 PRN


   PRN Reason: Pain, Mild (1-3)


   Last Admin: 02/16/18 18:43 Dose:  650 mg


Amlodipine Besylate (Norvasc)  10 mg PO QAM [facility]


   Last Admin: 02/25/18 09:50 Dose:  10 mg


Aspirin (Ecotrin)  81 mg PO 0800 [facility]


   Last Admin: 02/25/18 09:50 Dose:  81 mg


Atorvastatin Calcium (Lipitor)  40 mg PO DIN [facility]


   Last Admin: 02/24/18 16:55 Dose:  40 mg


Docusate Sodium (Colace)  100 mg PO BID [facility]


   Last Admin: 02/25/18 09:50 Dose:  100 mg


Escitalopram Oxalate (Lexapro)  5 mg PO QAM [facility]


   Last Admin: 02/24/18 09:02 Dose:  5 mg


[facility] ([facility]id)  [facility]


   Last Admin: 02/25/18 09:50 Dose:  20 mg


Heparin Sodium (Porcine) (Heparin)  5,000 units SC Q8 [facility]


   PRN Reason: Protocol


   Last Admin: 02/25/18 06:32 Dose:  5,000 units


Meropenem/Sodium Chloride (Meropenem 1g/Ns 100ml Ivpb)  1 gm in 100 mls @ 100 

mls/hr IVPB Q8 [facility]


   PRN Reason: Protocol


   Stop: 03/14/18 06:01


   Last Admin: 02/25/18 06:32 Dose:  100 mls/hr


Insulin Detemir (Levemir)  12 unit SC HS [facility]


   Last Admin: 02/24/18 21:26 Dose:  12 unit


Insulin Human Regular (Humulin R Med)  0 units SC ACHS [facility]


   PRN Reason: Protocol


   Last Admin: 02/25/18 11:33 Dose:  1 units


Insulin Lispro Protam/Lispro Human (Humalog Mix 75/25)  8 units SC ACBD [facility]


   Last Admin: 02/25/18 08:17 Dose:  8 unit


Metoprolol Tartrate (Lopressor)  50 mg PO 0800,1800 [facility]


   Last Admin: 02/25/18 08:18 Dose:  50 mg


Morphine Sulfate (Morphine)  4 mg IVP Q4H PRN


   PRN Reason: Pain, severe (8-10)


   Last Admin: 02/25/18 11:34 Dose:  4 mg


Mycophenolate Mofetil (Cellcept Cap)  750 mg PO BID [facility]


   Last Admin: 02/25/18 09:50 Dose:  750 mg


Ondansetron HCl (Zofran Inj)  4 mg IVP Q6 PRN


   PRN Reason: Nausea/Vomiting


   Last Admin: 02/24/18 21:26 Dose:  4 mg


Oxycodone HCl (Oxycodone Immediate Release Tab)  5 mg PO Q4H PRN


   PRN Reason: Pain, moderate (4-7)


   Last Admin: 02/24/18 08:26 Dose:  5 mg


Polyethylene Glycol (Miralax)  17 gm PO BID [facility]


   Last Admin: 02/25/18 11:26 Dose:  Not Given


Prednisone (Prednisone Tab)  5 mg PO 0800 [facility]


   Last Admin: 02/25/18 08:18 Dose:  5 mg


Sodium Hypochlorite (Dakins Solution 0.25%)  0 ml TOP DAILY [facility]


   Last Admin: 02/25/18 09:51 Dose:  1 applic


Tacrolimus (Prograf Cap)  0.5 mg PO HS [facility]


   Last Admin: 02/24/18 21:25 Dose:  0.5 mg


Tacrolimus (Prograf Cap)  2 mg PO BID [facility]


   Last Admin: 02/25/18 09:50 Dose:  2 mg











- Labs


Labs: 


 





 02/23/18 06:30 





 02/23/18 06:30 





 











PT  13.5 SECONDS (9.4-12.5)  H  02/12/18  07:00    


 


INR  1.17  (0.93-1.08)  H  02/12/18  07:00    


 


APTT  30.6 Seconds (25.1-36.5)   02/12/18  07:00    














- Constitutional


Appears: Chronically Ill





- Head Exam


Head Exam: NORMAL INSPECTION





- ENT Exam


ENT Exam: Mucous Membranes Moist





- Neck Exam


Neck Exam: absent: Meningismus





- Respiratory Exam


Respiratory Exam: Decreased Breath Sounds





- Cardiovascular Exam


Cardiovascular Exam: +S1, +S2





- GI/Abdominal Exam


GI & Abdominal Exam: Soft.  absent: Tenderness





- Extremities Exam


Additional comments: 





left foot with dressings in place





Assessment and Plan





- Assessment and Plan (Free Text)


Plan: 





Assessment


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 

closure; S/P transmetatarsal amputation 2 months ago, grew Enterobacter


S/P Left 5th metatarsal osteomyelitis S/P debridement and bone excision grew 

Acinetobacter and E. faecalis


CAD S/P CABG


chronic CHF


DM


COPD


S/P AICD placement


S/P kidney transplant





Plan


continue Merrem for 4-6 weeks with weekly ESR, CRP, CBC, CMP with outpatient 

follow up with Podiatry 


may need further surgery for the right heel

## 2018-02-25 NOTE — CP.PCM.PN
Subjective





- Date & Time of Evaluation


Date of Evaluation: 02/25/18


Time of Evaluation: 13:00





- Subjective


Subjective: 





DATE:  02/25/2018





FOLLOWUP NOTE





SUBJECTIVE:  He is comfortable in bed, in no acute distress.  No fever, no


cough with expectoration.  No pain.


No events overnight. 





PHYSICAL EXAMINATION:


GENERAL:  Comfortable in bed, in no acute distress.


VITAL SIGNS:  reviewed. 


HEENT:  Pallor positive.


NECK:  No lymphadenopathy.


CHEST:  Air entry present equal and bilaterally.  No added sounds.


CARDIOVASCULAR:  S1 and S2 normal.  No murmur.  No gallop.


ABDOMEN:  Soft and nontender.  No hepatosplenomegaly.


EXTREMITIES:  In dressing.


CENTRAL NERVOUS SYSTEM:  Alert and oriented x3.  No focal sensory or motor


deficits.





LABORATORY DATA:  reviewed.





MEDICATIONS:  Tylenol 650 q. 6 hours p.r.n., Norvasc 10 mg daily,aspirin 81


mg daily, Lipitor 40 mg daily, Colace 100 mg p.o. b.i.d., Lexapro 5 mg p.o.


q.a.m., Pepcid 20 mg daily, heparin 5000 subcu q. 8, insulin, meropenem,


Prograf 2 mg b.i.d., prednisone 5 mg q. 8 hours.





ASSESSMENT:


1.  Status post debridement of the right heel and left foot.


2.  Status post revision of metatarsal amputation.


3.  Diabetes mellitus type 2.


4.  Peripheral arterial disease.


5.  Dyslipidemia.


6.  Coronary artery disease.


7.  Leukocytosis.


8.  Chronic anemia.





PLAN:  He is currently on IV antibiotics, meropenem.  ID following. 


Recommendation, 4 to 6 weeks of IV meropenem.  He is status post renal


transplant on immunosuppressive agents, Prograf, prednisone and CellCept. 


Continue antidepressant Lexapro, aspirin and continue cardiac medication,


Norvasc 10 mg daily.  Continue DVT prophylaxis with heparin q. 8 hours. 


Pain controlled with oxycodone 5 mg q. 4 hours p.r.n.  


Blood counts stable. 





__________________________________________


Yoly Smith MD





Objective





- Vital Signs/Intake and Output


Vital Signs (last 24 hours): 


 











Temp Pulse Resp BP Pulse Ox


 


 97.8 F   73   20   123/74   96 


 


 02/25/18 16:00  02/25/18 17:12  02/25/18 16:00  02/25/18 17:12  02/25/18 16:00








Intake and Output: 


 











 02/25/18 02/25/18





 06:59 18:59


 


Intake Total 180 420


 


Output Total  900


 


Balance 180 -480














- Medications


Medications: 


 Current Medications





Acetaminophen (Tylenol 325mg Tab)  650 mg PO Q6 PRN


   PRN Reason: Pain, Mild (1-3)


   Last Admin: 02/16/18 18:43 Dose:  650 mg


Amlodipine Besylate (Norvasc)  10 mg PO QAM [facility]


   Last Admin: 02/25/18 09:50 Dose:  10 mg


Aspirin (Ecotrin)  81 mg PO 0800 [facility]


   Last Admin: 02/25/18 09:50 Dose:  81 mg


Atorvastatin Calcium (Lipitor)  40 mg PO DIN [facility]


   Last Admin: 02/25/18 17:06 Dose:  40 mg


Docusate Sodium (Colace)  100 mg PO BID [facility]


   Last Admin: 02/25/18 17:08 Dose:  100 mg


Escitalopram Oxalate (Lexapro)  5 mg PO QAM [facility]


   Last Admin: 02/25/18 12:08 Dose:  5 mg


Famotidine (Pepcid)  20 mg PO QAM [facility]


   Last Admin: 02/25/18 09:50 Dose:  20 mg


Heparin Sodium (Porcine) (Heparin)  5,000 units SC Q8 [facility]


   PRN Reason: Protocol


   Last Admin: 02/25/18 15:07 Dose:  5,000 units


Meropenem/Sodium Chloride (Meropenem 1g/Ns 100ml Ivpb)  1 gm in 100 mls @ 100 

mls/hr IVPB Q8 [facility]


   PRN Reason: Protocol


   Stop: 03/14/18 06:01


   Last Admin: 02/25/18 14:43 Dose:  100 mls/hr


Insulin Detemir (Levemir)  12 unit SC HS [facility]


   Last Admin: 02/24/18 21:26 Dose:  12 unit


Insulin Human Regular (Humulin R Med)  0 units SC ACHS [facility]


   PRN Reason: Protocol


   Last Admin: 02/25/18 17:09 Dose:  Not Given


Insulin Lispro Protam/Lispro Human (Humalog Mix 75/25)  8 units SC ACBD [facility]


   Last Admin: 02/25/18 17:07 Dose:  8 unit


Metoprolol Tartrate (Lopressor)  50 mg PO 0800,1800 [facility]


   Last Admin: 02/25/18 17:12 Dose:  50 mg


Morphine Sulfate (Morphine)  4 mg IVP Q4H PRN


   PRN Reason: Pain, severe (8-10)


   Last Admin: 02/25/18 11:34 Dose:  4 mg


Mycophenolate Mofetil (Cellcept Cap)  750 mg PO BID [facility]


   Last Admin: 02/25/18 17:06 Dose:  750 mg


Ondansetron HCl (Zofran Inj)  4 mg IVP Q6 PRN


   PRN Reason: Nausea/Vomiting


   Last Admin: 02/25/18 13:00 Dose:  4 mg


Oxycodone HCl (Oxycodone Immediate Release Tab)  5 mg PO Q4H PRN


   PRN Reason: Pain, moderate (4-7)


   Last Admin: 02/25/18 12:59 Dose:  5 mg


Polyethylene Glycol (Miralax)  17 gm PO BID [facility]


   Last Admin: 02/25/18 17:12 Dose:  Not Given


Prednisone (Prednisone Tab)  5 mg PO 0800 [facility]


   Last Admin: 02/25/18 08:18 Dose:  5 mg


Sodium Hypochlorite (Dakins Solution 0.25%)  0 ml TOP DAILY [facility]


   Last Admin: 02/25/18 09:51 Dose:  1 applic


Tacrolimus (Prograf Cap)  0.5 mg PO HS [facility]


   Last Admin: 02/24/18 21:25 Dose:  0.5 mg


Tacrolimus (Prograf Cap)  2 mg PO BID [facility]


   Last Admin: 02/25/18 17:06 Dose:  2 mg











- Labs


Labs: 


 





 02/23/18 06:30 





 02/23/18 06:30 





 











PT  13.5 SECONDS (9.4-12.5)  H  02/12/18  07:00    


 


INR  1.17  (0.93-1.08)  H  02/12/18  07:00    


 


APTT  30.6 Seconds (25.1-36.5)   02/12/18  07:00

## 2018-02-25 NOTE — CP.PCM.PN
Subjective





- Date & Time of Evaluation


Date of Evaluation: 02/25/18


Time of Evaluation: 09:52





- Subjective


Subjective: 





Surgery: Dr. Prescott 





Patient doing well. Complains of hiccups. Tolerating diet. Denies f/c. States 

he has been NWB to the E. 





Objective





- Vital Signs/Intake and Output


Vital Signs (last 24 hours): 


 











Temp Pulse Resp BP Pulse Ox


 


 98.6 F   78   20   125/80   97 


 


 02/25/18 08:12  02/25/18 08:18  02/25/18 08:12  02/25/18 09:50  02/25/18 08:12








Intake and Output: 


 











 02/25/18 02/25/18





 06:59 18:59


 


Intake Total 180 


 


Balance 180 














- Medications


Medications: 


 Current Medications





Acetaminophen (Tylenol 325mg Tab)  650 mg PO Q6 PRN


   PRN Reason: Pain, Mild (1-3)


   Last Admin: 02/16/18 18:43 Dose:  650 mg


Amlodipine Besylate (Norvasc)  [facility]


   Last Admin: 02/25/18 09:50 Dose:  10 mg


Aspirin (Ecotrin)  81 mg PO 0800 [facility]


   Last Admin: 02/25/18 09:50 Dose:  81 mg


Atorvastatin Calcium (Lipitor)  40 mg PO DIN [facility]


   Last Admin: 02/24/18 16:55 Dose:  40 mg


Docusate Sodium (Colace)  100 mg PO BID [facility]


   Last Admin: 02/25/18 09:50 Dose:  100 mg


Escitalopram Oxalate (Lexapro)  5 mg PO QAWW [facility]


   Last Admin: 02/24/18 09:02 Dose:  5 mg


Famotidine (Pepcid)  20 mg PO [facility]


   Last Admin: 02/25/18 09:50 Dose:  20 mg


Heparin Sodium (Porcine) (Heparin)  5,000 units SC Q8 [facility]


   PRN Reason: Protocol


   Last Admin: 02/25/18 06:32 Dose:  5,000 units


Meropenem/Sodium Chloride (Meropenem 1g/Ns 100ml Ivpb)  1 gm in 100 mls @ 100 

mls/hr IVPB Q8 [facility]


   PRN Reason: Protocol


   Stop: 03/14/18 06:01


   Last Admin: 02/25/18 06:32 Dose:  100 mls/hr


Insulin Detemir (Levemir)  12 unit SC [facility]


   Last Admin: 02/24/18 21:26 Dose:  12 unit


Insulin Human Regular (Humulin R Med)  0 units SC ACHS [facility]


   PRN Reason: Protocol


   Last Admin: 02/25/18 08:16 Dose:  1 units


Insulin Lispro Protam/Lispro Human (Humalog Mix 75/25)  8 units SC ACBD [facility]


   Last Admin: 02/25/18 08:17 Dose:  8 unit


Metoprolol Tartrate (Lopressor)  50 mg PO 0800,1800 [facility]


   Last Admin: 02/25/18 08:18 Dose:  50 mg


Morphine Sulfate (Morphine)  4 mg IVP Q4H PRN


   PRN Reason: Pain, severe (8-10)


   Last Admin: 02/24/18 20:12 Dose:  4 mg


Mycophenolate Mofetil (Cellcept Cap)  750 mg PO BID [facility]


   Last Admin: 02/25/18 09:50 Dose:  750 mg


Ondansetron HCl (Zofran Inj)  4 mg IVP Q6 PRN


   PRN Reason: Nausea/Vomiting


   Last Admin: 02/24/18 21:26 Dose:  4 mg


Oxycodone HCl (Oxycodone Immediate Release Tab)  5 mg PO Q4H PRN


   PRN Reason: Pain, moderate (4-7)


   Last Admin: 02/24/18 08:26 Dose:  5 mg


Polyethylene Glycol (Miralax)  17 gm PO BID [facility]


   Last Admin: 02/24/18 09:01 Dose:  17 gm


Prednisone (Prednisone Tab)  5 mg PO 0800 [facility]


   Last Admin: 02/25/18 08:18 Dose:  5 mg


Sodium Hypochlorite (Dakins Solution 0.25%)  0 ml TOP DAILY [facility]


   Last Admin: 02/25/18 09:51 Dose:  1 applic


Tacrolimus (Prograf Cap)  0.5 mg PO [facility]


   Last Admin: 02/24/18 21:25 Dose:  0.5 mg


Tacrolimus (Prograf Cap)  2 mg PO BID [facility]


   Last Admin: 02/25/18 09:50 Dose:  2 mg











- Labs


Labs: 


 





 02/23/18 06:30 





 02/23/18 06:30 





 











PT  13.5 SECONDS (9.4-12.5)  H  02/12/18  07:00    


 


INR  1.17  (0.93-1.08)  H  02/12/18  07:00    


 


APTT  30.6 Seconds (25.1-36.5)   02/12/18  07:00    














- Constitutional


Appears: Non-toxic, No Acute Distress





- Head Exam


Head Exam: ATRAUMATIC, NORMOCEPHALIC





- Eye Exam


Eye Exam: EOMI, Normal appearance





- ENT Exam


ENT Exam: Mucous Membranes Moist





- Respiratory Exam


Respiratory Exam: NORMAL BREATHING PATTERN.  absent: Respiratory Distress





- Cardiovascular Exam


Cardiovascular Exam: REGULAR RHYTHM.  absent: Tachycardia





- Extremities Exam


Additional comments: 





LLE TMA wound with pink healthy granulation tissue, wound vac replaced 


RLE heal wound with necrotic edges and dusky heal, cool to touch. Packing with 

Dakins replaced 





Assessment and Plan





- Assessment and Plan (Free Text)


Assessment: 





56 y/o male w/ poor healing right heal wounds and improved healing left TMA 

wound s/p multiple debridements, wound vac placement and IR revascularization 


Plan: 





-cont wound vac to LLE 


-Right heal with worsening necrotic tissue 


-may need further surgical intervention, possibly Monday


-cont Wound vac changes Q3D


-dressing changes to RLE wound BID with drenched Dakins plain packing 


-further recs per Kenyon Goel PGY3

## 2018-02-25 NOTE — PN
DATE:  02/24/2018



FOLLOWUP NOTE



SUBJECTIVE:  He is comfortable in bed, in no acute distress.  No fever, no

cough with expectoration.  No pain.



PHYSICAL EXAMINATION:

GENERAL:  Comfortable in bed, in no acute distress.

VITAL SIGNS:  Temperature is 98.7, heart rate is 70 per minute, blood

pressure 106/53, respiratory rate 18 per minute and oxygen saturation 98%

on room air.

HEENT:  Pallor positive.

NECK:  No lymphadenopathy.

CHEST:  Air entry present equal and bilaterally.  No added sounds.

CARDIOVASCULAR:  S1 and S2 normal.  No murmur.  No gallop.

ABDOMEN:  Soft and nontender.  No hepatosplenomegaly.

EXTREMITIES:  In dressing.

CENTRAL NERVOUS SYSTEM:  Alert and oriented x3.  No focal sensory or motor

deficits.



LABORATORY DATA:  White count 11,000, hemoglobin 11.4, hematocrit 35.9,

platelet 252, glucose 206.  Coagulation is normal.



MEDICATIONS:  Tylenol 650 q. 6 hours p.r.n., Norvasc 10 mg daily,aspirin 81

mg daily, Lipitor 40 mg daily, Colace 100 mg p.o. b.i.d., Lexapro 5 mg p.o.

q.a.m., Pepcid 20 mg daily, heparin 5000 subcu q. 8, insulin, meropenem,

Prograf 2 mg b.i.d., prednisone 5 mg q. 8 hours.



ASSESSMENT:

1.  Status post debridement of the right heel and left foot.

2.  Status post revision of metatarsal amputation.

3.  Diabetes mellitus type 2.

4.  Peripheral arterial disease.

5.  Dyslipidemia.

6.  Coronary artery disease.

7.  Leukocytosis.

8.  Chronic anemia.



PLAN:  He is currently on IV antibiotics, meropenem.  ID following. 

Recommendation, 4 to 6 weeks of IV meropenem.  He is status post renal

transplant on immunosuppressive agents, Prograf, prednisone and CellCept. 

Continue antidepressant Lexapro, aspirin and continue cardiac medication,

Norvasc 10 mg daily.  Continue DVT prophylaxis with heparin q. 8 hours. 

Pain controlled with oxycodone 5 mg q. 4 hours p.r.n.  Continue _____.





__________________________________________

Yoly Smith MD



DD:  02/24/2018 23:18:50

DT:  02/25/2018 0:30:40

[facility] # [id number]





LUCY

## 2018-02-26 RX ADMIN — INSULIN LISPRO SCH UNIT: 100 INJECTION, SUSPENSION SUBCUTANEOUS at 17:13

## 2018-02-26 RX ADMIN — MEROPENEM AND SODIUM CHLORIDE SCH MLS/HR: 1 INJECTION, SOLUTION INTRAVENOUS at 21:37

## 2018-02-26 RX ADMIN — MORPHINE SULFATE PRN MG: 4 INJECTION, SOLUTION INTRAMUSCULAR; INTRAVENOUS at 14:52

## 2018-02-26 RX ADMIN — MORPHINE SULFATE PRN MG: 4 INJECTION, SOLUTION INTRAMUSCULAR; INTRAVENOUS at 22:53

## 2018-02-26 RX ADMIN — INSULIN LISPRO SCH UNIT: 100 INJECTION, SUSPENSION SUBCUTANEOUS at 08:25

## 2018-02-26 RX ADMIN — MORPHINE SULFATE PRN MG: 4 INJECTION, SOLUTION INTRAMUSCULAR; INTRAVENOUS at 18:46

## 2018-02-26 RX ADMIN — MEROPENEM AND SODIUM CHLORIDE SCH MLS/HR: 1 INJECTION, SOLUTION INTRAVENOUS at 13:12

## 2018-02-26 RX ADMIN — MORPHINE SULFATE PRN MG: 4 INJECTION, SOLUTION INTRAMUSCULAR; INTRAVENOUS at 10:24

## 2018-02-26 RX ADMIN — INSULIN HUMAN SCH: 100 INJECTION, SOLUTION PARENTERAL at 11:56

## 2018-02-26 RX ADMIN — INSULIN HUMAN SCH: 100 INJECTION, SOLUTION PARENTERAL at 21:36

## 2018-02-26 RX ADMIN — INSULIN DETEMIR SCH UNIT: 100 INJECTION, SOLUTION SUBCUTANEOUS at 21:36

## 2018-02-26 RX ADMIN — INSULIN HUMAN SCH UNITS: 100 INJECTION, SOLUTION PARENTERAL at 17:12

## 2018-02-26 RX ADMIN — LIDOCAINE HYDROCHLORIDE PRN ML: 20 SOLUTION ORAL; TOPICAL at 16:49

## 2018-02-26 RX ADMIN — POLYETHYLENE GLYCOL 3350 SCH: 17 POWDER, FOR SOLUTION ORAL at 10:43

## 2018-02-26 RX ADMIN — MORPHINE SULFATE PRN MG: 4 INJECTION, SOLUTION INTRAMUSCULAR; INTRAVENOUS at 05:45

## 2018-02-26 RX ADMIN — INSULIN HUMAN SCH: 100 INJECTION, SOLUTION PARENTERAL at 08:08

## 2018-02-26 RX ADMIN — Medication PRN MG: at 13:18

## 2018-02-26 RX ADMIN — MEROPENEM AND SODIUM CHLORIDE SCH MLS/HR: 1 INJECTION, SOLUTION INTRAVENOUS at 05:15

## 2018-02-26 RX ADMIN — POLYETHYLENE GLYCOL 3350 SCH: 17 POWDER, FOR SOLUTION ORAL at 17:14

## 2018-02-26 RX ADMIN — SODIUM HYPOCHLORITE SCH: 2.5 SOLUTION TOPICAL at 10:20

## 2018-02-26 NOTE — PN
DATE:



SUBJECTIVE:  Patient has no complaints of any chest pain.  No shortness of

breath.  No headaches or dizziness.



PHYSICAL EXAMINATION

VITAL SIGNS:  Temperature is 98.2, pulse of 68, blood pressure 108/68,

respirations 18.

GENERAL:  The patient is lying in bed, flat, comfortable.

HEENT:  No oral lesion.  Anicteric sclerae.  Moist mucosa.

NECK:  No JVD, adenopathy, or thyromegaly.

CARDIOVASCULAR:  S1 and S2, regular.  No murmurs, rubs, or gallops.

LUNGS:  Clear to auscultation bilaterally.  No wheeze, rales, or rhonchi.

ABDOMEN:  Bowel sounds are positive.  Soft, nontender and nondistended.

EXTREMITIES:  No cyanosis, clubbing or edema.



ASSESSMENT

1.  Status post debridement of right heel and left forefoot.

2.  Status post revision of left metatarsal amputation.

3.  Diabetes type 2.

4.  Peripheral arterial disease.

5.  Status post  donor renal transplant.

6.  Dyslipidemia.

7.  Coronary artery disease, status post coronary artery bypass graft.

8.  Pacemaker.

9.  Automatic implantable cardioverter-defibrillator.



PLAN:  The patient is currently comfortable.  He is being followed by

Infectious Disease.  The patient is going to continue with meropenem for IV

antibiotics for four to six weeks.  The patient will need continued rehab

to help him with ambulation.  He is on heparin for DVT prophylaxis.  The

patient is on aspirin daily.  The patient is on Lexapro for anxiety.  He is

getting morphine for pain.  He is also on Percocet for pain.  He is on his

immunosuppressive medications with mycophenolate 750 mg b.i.d.  He is on

prednisone 5 mg daily.  He is receiving tacrolimus 2 mg in the morning and

2.5 mg in the evening.  He is on a heart-healthy diet.





__________________________________________

Andrea Calderon MD



DD:  2018 6:06:59

DT:  2018 6:31:57

Job # [id number]

## 2018-02-26 NOTE — CP.PCM.PN
Subjective





- Date & Time of Evaluation


Date of Evaluation: 02/26/18


Time of Evaluation: 11:15





- Subjective


Subjective: 





Comfortable less pain in the feet, no fevers, not in distress.





Objective





- Vital Signs/Intake and Output


Vital Signs (last 24 hours): 


 











Temp Pulse Resp BP Pulse Ox


 


 98.3 F   78   18   128/64   100 


 


 02/26/18 08:10  02/26/18 08:10  02/26/18 08:10  02/26/18 08:10  02/26/18 08:10








Intake and Output: 


 











 02/26/18 02/26/18





 06:59 18:59


 


Intake Total 920 


 


Output Total 900 


 


Balance 20 














- Medications


Medications: 


 Current Medications





Acetaminophen (Tylenol 325mg Tab)  650 mg PO Q6 PRN


   PRN Reason: Pain, Mild (1-3)


   Last Admin: 02/16/18 18:43 Dose:  650 mg


[facility])  [facility]


   Last Admin: 02/25/18 09:50 Dose:  10 mg


Aspirin (Ecotrin)  81 mg PO 0800 [facility]


   Last Admin: 02/26/18 08:24 Dose:  81 mg


Atorvastatin Calcium (Lipitor)  40 mg PO DIN [facility]


   Last Admin: 02/25/18 17:06 Dose:  40 mg


Chlorpromazine (Thorazine)  50 mg PO Q6 PRN; Protocol


   PRN Reason: Hiccups


   Last Admin: 02/26/18 09:41 Dose:  50 mg


Docusate Sodium (Colace)  100 mg PO BID [facility]


   Last Admin: 02/25/18 17:08 Dose:  100 mg


Escitalopram Oxalate (Lexapro)  5 mg PO [facility]


   Last Admin: 02/25/18 12:08 Dose:  5 mg


Famotidine (Pepcid)  20 mg PO [facility]


   Last Admin: 02/25/18 09:50 Dose:  20 mg


Heparin Sodium (Porcine) (Heparin)  5,000 units SC Q8 [facility]


   PRN Reason: Protocol


   Last Admin: 02/26/18 05:16 Dose:  5,000 units


Meropenem/Sodium Chloride (Meropenem 1g/Ns 100ml Ivpb)  1 gm in 100 mls @ 100 

mls/hr IVPB Q8 [facility]


   PRN Reason: Protocol


   Stop: 03/14/18 06:01


   Last Admin: 02/26/18 05:15 Dose:  100 mls/hr


Insulin Detemir (Levemir)  12 unit SC HS [facility]


   Last Admin: 02/25/18 21:05 Dose:  12 unit


Insulin Human Regular (Humulin R Med)  0 units SC ACHS [facility]


   PRN Reason: Protocol


   Last Admin: 02/26/18 08:08 Dose:  Not Given


Insulin Lispro Protam/Lispro Human (Humalog Mix 75/25)  8 units SC ACBD [facility]


   Last Admin: 02/26/18 08:25 Dose:  8 unit


Metoprolol Tartrate (Lopressor)  50 mg PO 0800,1800 [facility]


   Last Admin: 02/26/18 08:25 Dose:  50 mg


Morphine Sulfate (Morphine)  4 mg IVP Q4H PRN


   PRN Reason: Pain, severe (8-10)


   Last Admin: 02/26/18 05:45 Dose:  4 mg


Mycophenolate Mofetil (Cellcept Cap)  750 mg PO BID [facility]


   Last Admin: 02/25/18 17:06 Dose:  750 mg


Ondansetron HCl (Zofran Inj)  4 mg IVP Q6 PRN


   PRN Reason: Nausea/Vomiting


   Last Admin: 02/26/18 05:22 Dose:  4 mg


Oxycodone HCl (Oxycodone Immediate Release Tab)  5 mg PO Q4H PRN


   PRN Reason: Pain, moderate (4-7)


   Last Admin: 02/25/18 12:59 Dose:  5 mg


Polyethylene Glycol (Miralax)  17 gm PO BID [facility]


   Last Admin: 02/25/18 17:12 Dose:  Not Given


Prednisone (Prednisone Tab)  5 mg PO 0800 [facility]


   Last Admin: 02/25/18 08:18 Dose:  5 mg


Sodium Hypochlorite (Dakins Solution 0.25%)  0 ml TOP DAILY [facility]


   Last Admin: 02/25/18 09:51 Dose:  1 applic


Tacrolimus (Prograf Cap)  0.5 mg PO [facility]


   Last Admin: 02/25/18 21:04 Dose:  0.5 mg


Tacrolimus (Prograf Cap)  2 mg PO BID [facility]


   Last Admin: 02/25/18 17:06 Dose:  2 mg











- Labs


Labs: 


 





 02/23/18 06:30 





 02/23/18 06:30 





 











PT  13.5 SECONDS (9.4-12.5)  H  02/12/18  07:00    


 


INR  1.17  (0.93-1.08)  H  02/12/18  07:00    


 


APTT  30.6 Seconds (25.1-36.5)   02/12/18  07:00    














- Constitutional


Appears: Chronically Ill





- Head Exam


Head Exam: NORMAL INSPECTION





- ENT Exam


ENT Exam: Mucous Membranes Moist





- Neck Exam


Neck Exam: absent: Meningismus





- Respiratory Exam


Respiratory Exam: Decreased Breath Sounds





- Cardiovascular Exam


Cardiovascular Exam: +S1, +S2





- GI/Abdominal Exam


GI & Abdominal Exam: Soft.  absent: Tenderness





Assessment and Plan





- Assessment and Plan (Free Text)


Plan: 





Assessment


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 

closure; S/P transmetatarsal amputation 2 months ago, grew Enterobacter S/P 

wound vacuum placement


right heel ulcer S/P debridement


S/P Left 5th metatarsal osteomyelitis S/P debridement and bone excision grew 

Acinetobacter and E. faecalis


CAD S/P CABG


chronic CHF


DM


COPD


S/P AICD placement


S/P kidney transplant





Plan


continue Merrem for 4-6 weeks with weekly ESR, CRP, CBC, CMP with outpatient 

follow up with Podiatry

## 2018-02-26 NOTE — CP.PCM.PN
Subjective





- Date & Time of Evaluation


Date of Evaluation: 02/26/18


Time of Evaluation: 12:55





- Subjective


Subjective: 


General surgery: Dr. Prescott


Patient seen and examined at bedside.  Patient denies any new complaints at 

this time, but does admit to the same pain in his right heel and hiccups.  

Patient does state that his right heel pain has improved after wound vac 

placement.





Objective





- Vital Signs/Intake and Output


Vital Signs (last 24 hours): 


 











Temp Pulse Resp BP Pulse Ox


 


 98.3 F   78   18   120/60   100 


 


 02/26/18 08:10  02/26/18 08:10  02/26/18 08:10  02/26/18 10:42  02/26/18 08:10








Intake and Output: 


 











 02/26/18 02/26/18





 06:59 18:59


 


Intake Total 920 


 


Output Total 900 


 


Balance 20 














- Medications


Medications: 


 Current Medications





Acetaminophen (Tylenol 325mg Tab)  650 mg PO Q6 PRN


   PRN Reason: Pain, Mild (1-3)


   Last Admin: 02/16/18 18:43 Dose:  650 mg


Amlodipine Besylate (Norvasc)  10 mg PO QAM [facility]


   Last Admin: 02/26/18 10:42 Dose:  10 mg


Aspirin (Ecotrin)  81 mg PO 0800 [facility]


   Last Admin: 02/26/18 08:24 Dose:  81 mg


Atorvastatin Calcium (Lipitor)  40 mg PO DIN [facility]


   Last Admin: 02/25/18 17:06 Dose:  40 mg


Chlorpromazine (Thorazine)  50 mg PO Q6 PRN; Protocol


   PRN Reason: Hiccups


   Last Admin: 02/26/18 09:41 Dose:  50 mg


Docusate Sodium (Colace)  100 mg PO BID [facility]


   Last Admin: 02/26/18 10:44 Dose:  Not Given


[facility]ram [facility] ([facility]apro)  [facility]


   Last Admin: 02/26/18 10:43 Dose:  5 mg


Famotidine (Pepcid)  [facility]


   Last Admin: 02/26/18 10:42 Dose:  20 mg


Heparin Sodium (Porcine) (Heparin)  5,000 units SC Q8 [facility]


   PRN Reason: Protocol


   Last Admin: 02/26/18 05:16 Dose:  5,000 units


Meropenem/Sodium Chloride (Meropenem 1g/Ns 100ml Ivpb)  1 gm in 100 mls @ 100 

mls/hr IVPB Q8 [facility]


   PRN Reason: Protocol


   Stop: 03/14/18 06:01


   Last Admin: 02/26/18 05:15 Dose:  100 mls/hr


Insulin Detemir (Levemir)  12 unit SC HS [facility]


   Last Admin: 02/25/18 21:05 Dose:  12 unit


Insulin Human Regular (Humulin R Med)  0 units SC ACHS [facility]


   PRN Reason: Protocol


   Last Admin: 02/26/18 11:56 Dose:  Not Given


Insulin Lispro Protam/Lispro Human (Humalog Mix 75/25)  8 units SC ACBD [facility]


   Last Admin: 02/26/18 08:25 Dose:  8 unit


Metoprolol Tartrate (Lopressor)  50 mg PO 0800,1800 [facility]


   Last Admin: 02/26/18 08:25 Dose:  50 mg


Morphine Sulfate (Morphine)  4 mg IVP Q4H PRN


   PRN Reason: Pain, severe (8-10)


   Last Admin: 02/26/18 10:24 Dose:  4 mg


Mycophenolate Mofetil (Cellcept Cap)  750 mg PO BID [facility]


   Last Admin: 02/26/18 10:42 Dose:  750 mg


Ondansetron HCl (Zofran Inj)  4 mg IVP Q6 PRN


   PRN Reason: Nausea/Vomiting


   Last Admin: 02/26/18 05:22 Dose:  4 mg


Oxycodone HCl (Oxycodone Immediate Release Tab)  5 mg PO Q4H PRN


   PRN Reason: Pain, moderate (4-7)


   Last Admin: 02/25/18 12:59 Dose:  5 mg


Polyethylene Glycol (Miralax)  17 gm PO BID [facility]


   Last Admin: 02/26/18 10:43 Dose:  Not Given


Prednisone (Prednisone Tab)  5 mg PO 0800 [facility]


   Last Admin: 02/26/18 10:43 Dose:  5 mg


Sodium Hypochlorite (Dakins Solution 0.25%)  0 ml TOP DAILY [facility]


   Last Admin: 02/26/18 10:20 Dose:  Not Given


Tacrolimus (Prograf Cap)  0.5 mg PO [facility]


   Last Admin: 02/25/18 21:04 Dose:  0.5 mg


Tacrolimus (Prograf Cap)  2 mg PO BID [facility]


   Last Admin: 02/26/18 10:42 Dose:  2 mg











- Labs


Labs: 


 





 02/23/18 06:30 





 02/23/18 06:30 





 











PT  13.5 SECONDS (9.4-12.5)  H  02/12/18  07:00    


 


INR  1.17  (0.93-1.08)  H  02/12/18  07:00    


 


APTT  30.6 Seconds (25.1-36.5)   02/12/18  07:00    














- Constitutional


Appears: Well





- Head Exam


Head Exam: ATRAUMATIC, NORMAL INSPECTION, NORMOCEPHALIC





- Eye Exam


Eye Exam: EOMI, Normal appearance, PERRL


Pupil Exam: NORMAL ACCOMODATION, PERRL





- ENT Exam


ENT Exam: Mucous Membranes Moist, Normal Exam





- Neck Exam


Neck Exam: Full ROM, Normal Inspection.  absent: Lymphadenopathy





- Respiratory Exam


Respiratory Exam: Clear to Ausculation Bilateral, NORMAL BREATHING PATTERN





- Cardiovascular Exam


Cardiovascular Exam: REGULAR RHYTHM, +S1, +S2.  absent: Murmur





- GI/Abdominal Exam


GI & Abdominal Exam: Soft, Normal Bowel Sounds.  absent: Tenderness





- Extremities Exam


Extremities Exam: Full ROM, Normal Capillary Refill, Normal Inspection.  absent

: Joint Swelling, Pedal Edema





- Back Exam


Back Exam: NORMAL INSPECTION





- Neurological Exam


Neurological Exam: Alert, Awake, CN II-XII Intact, Normal Gait, Oriented x3





- Psychiatric Exam


Psychiatric exam: Normal Affect, Normal Mood





- Skin


Skin Exam: Dry, Intact, Normal Color, Warm





Assessment and Plan





- Assessment and Plan (Free Text)


Assessment: 


58 y/o male w/ poor healing right heal wounds and improved healing left TMA 

wound s/p multiple debridements, wound vac placement and IR revascularization


Plan: 


-Patient now has bilateral lower extremity wound vacs.  Will change wound vacs 

tomorrow, Tues 02/27, at bedside


-Cont Wound vac changes Q3D


-Continue inpatient care to monitor wounds closely


-PRN pain medications


-Strict bed rest


-Further recs per [place]

## 2018-02-27 VITALS — RESPIRATION RATE: 20 BRPM

## 2018-02-27 RX ADMIN — MORPHINE SULFATE PRN MG: 4 INJECTION, SOLUTION INTRAMUSCULAR; INTRAVENOUS at 22:13

## 2018-02-27 RX ADMIN — INSULIN HUMAN SCH: 100 INJECTION, SOLUTION PARENTERAL at 17:03

## 2018-02-27 RX ADMIN — INSULIN HUMAN SCH: 100 INJECTION, SOLUTION PARENTERAL at 08:45

## 2018-02-27 RX ADMIN — MEROPENEM AND SODIUM CHLORIDE SCH MLS/HR: 1 INJECTION, SOLUTION INTRAVENOUS at 05:54

## 2018-02-27 RX ADMIN — POLYETHYLENE GLYCOL 3350 SCH: 17 POWDER, FOR SOLUTION ORAL at 17:43

## 2018-02-27 RX ADMIN — INSULIN DETEMIR SCH UNIT: 100 INJECTION, SOLUTION SUBCUTANEOUS at 21:55

## 2018-02-27 RX ADMIN — INSULIN HUMAN SCH: 100 INJECTION, SOLUTION PARENTERAL at 22:02

## 2018-02-27 RX ADMIN — POLYETHYLENE GLYCOL 3350 SCH GM: 17 POWDER, FOR SOLUTION ORAL at 09:53

## 2018-02-27 RX ADMIN — MORPHINE SULFATE PRN MG: 4 INJECTION, SOLUTION INTRAMUSCULAR; INTRAVENOUS at 06:07

## 2018-02-27 RX ADMIN — MEROPENEM AND SODIUM CHLORIDE SCH MLS/HR: 1 INJECTION, SOLUTION INTRAVENOUS at 21:51

## 2018-02-27 RX ADMIN — MORPHINE SULFATE PRN MG: 4 INJECTION, SOLUTION INTRAMUSCULAR; INTRAVENOUS at 17:42

## 2018-02-27 RX ADMIN — INSULIN LISPRO SCH: 100 INJECTION, SUSPENSION SUBCUTANEOUS at 08:20

## 2018-02-27 RX ADMIN — SODIUM HYPOCHLORITE SCH: 2.5 SOLUTION TOPICAL at 15:50

## 2018-02-27 RX ADMIN — MEROPENEM AND SODIUM CHLORIDE SCH MLS/HR: 1 INJECTION, SOLUTION INTRAVENOUS at 14:48

## 2018-02-27 RX ADMIN — INSULIN HUMAN SCH: 100 INJECTION, SOLUTION PARENTERAL at 11:40

## 2018-02-27 RX ADMIN — MORPHINE SULFATE PRN MG: 4 INJECTION, SOLUTION INTRAMUSCULAR; INTRAVENOUS at 11:54

## 2018-02-27 NOTE — CP.PCM.PN
Subjective





- Date & Time of Evaluation


Date of Evaluation: 02/27/18


Time of Evaluation: 11:40





- Subjective


Subjective: 





Comfortable, no fevers, less pain in the right foot.





Objective





- Vital Signs/Intake and Output


Vital Signs (last 24 hours): 


 











Temp Pulse Resp BP Pulse Ox


 


 98.3 F   78   18   120/60   100 


 


 02/26/18 08:10  02/26/18 08:10  02/26/18 08:10  02/26/18 10:42  02/26/18 08:10








Intake and Output: 


 











 02/27/18 02/27/18





 06:59 18:59


 


Intake Total 1240 


 


Output Total 600 


 


Balance 640 














- Medications


Medications: 


 Current Medications





Acetaminophen (Tylenol 325mg Tab)  650 mg PO Q6 PRN


   PRN Reason: Pain, Mild (1-3)


   Last Admin: 02/16/18 18:43 Dose:  650 mg


Al Hydrox/Mg Hydrox/Simethicone (Maalox Plus 30 Ml)  30 ml PO DAILY PRN


   PRN Reason: Indigestion / Heartburn


   Last Admin: 02/26/18 16:49 Dose:  30 ml


[facility] ([facility]asc)  [facility]


   Last Admin: 02/26/18 10:42 Dose:  10 mg


Aspirin (Ecotrin)  81 mg PO 0800 [facility]


   Last Admin: 02/27/18 08:15 Dose:  81 mg


Atorvastatin Calcium (Lipitor)  40 mg PO DIN [facility]


   Last Admin: 02/26/18 17:14 Dose:  40 mg


Chlorpromazine (Thorazine)  50 mg PO Q6 PRN; Protocol


   PRN Reason: Hiccups


   Last Admin: 02/26/18 16:49 Dose:  50 mg


Docusate Sodium (Colace)  100 mg PO BID [facility]


   Last Admin: 02/26/18 17:15 Dose:  Not Given


Escitalopram Oxalate (Lexapro)  [facility]


   Last Admin: 02/26/18 10:43 Dose:  5 mg


Famotidine (Pepcid)  [facility]


   Last Admin: 02/26/18 10:42 Dose:  20 mg


Heparin Sodium (Porcine) (Heparin)  5,000 units SC Q8 [facility]


   PRN Reason: Protocol


   Last Admin: 02/27/18 05:55 Dose:  5,000 units


Meropenem/Sodium Chloride (Meropenem 1g/Ns 100ml Ivpb)  1 gm in 100 mls @ 100 

mls/hr IVPB Q8 [facility]


   PRN Reason: Protocol


   Stop: 03/14/18 06:01


   Last Admin: 02/27/18 05:54 Dose:  100 mls/hr


Insulin Detemir (Levemir)  12 unit SC HS [facility]


   Last Admin: 02/26/18 21:36 Dose:  12 unit


Insulin Human Regular (Humulin R Med)  0 units SC ACHS [facility]


   PRN Reason: Protocol


   Last Admin: 02/27/18 08:45 Dose:  Not Given


Insulin Lispro Protam/Lispro Human (Humalog Mix 75/25)  8 units SC ACBD [facility]


   Last Admin: 02/27/18 08:20 Dose:  Not Given


Metoprolol Tartrate (Lopressor)  50 mg PO 0800,1800 [facility]


   Last Admin: 02/27/18 08:16 Dose:  50 mg


Morphine Sulfate (Morphine)  4 mg IVP Q4H PRN


   PRN Reason: Pain, severe (8-10)


   Last Admin: 02/27/18 06:07 Dose:  4 mg


Mycophenolate Mofetil (Cellcept Cap)  750 mg PO BID [facility]


   Last Admin: 02/26/18 17:14 Dose:  750 mg


Ondansetron HCl (Zofran Inj)  4 mg IVP Q6 PRN


   PRN Reason: Nausea/Vomiting


   Last Admin: 02/27/18 08:17 Dose:  4 mg


Oxycodone HCl (Oxycodone Immediate Release Tab)  5 mg PO Q4H PRN


   PRN Reason: Pain, moderate (4-7)


   Last Admin: 02/26/18 13:18 Dose:  5 mg


Polyethylene Glycol (Miralax)  17 gm PO BID [facility]


   Last Admin: 02/26/18 17:14 Dose:  Not Given


Prednisone (Prednisone Tab)  5 mg PO 0800 [facility]


   Last Admin: 02/27/18 08:17 Dose:  5 mg


Sodium Hypochlorite (Dakins Solution 0.25%)  0 ml TOP DAILY [facility]


   Last Admin: 02/26/18 10:20 Dose:  Not Given


Tacrolimus (Prograf Cap)  0.5 mg PO [facility]


   Last Admin: 02/26/18 21:37 Dose:  0.5 mg


Tacrolimus (Prograf Cap)  2 mg PO BID [facility]


   Last Admin: 02/26/18 17:14 Dose:  2 mg











- Labs


Labs: 


 





 02/23/18 06:30 





 02/23/18 06:30 





 











PT  13.5 SECONDS (9.4-12.5)  H  02/12/18  07:00    


 


INR  1.17  (0.93-1.08)  H  02/12/18  07:00    


 


APTT  30.6 Seconds (25.1-36.5)   02/12/18  07:00    














- Constitutional


Appears: Chronically Ill





- Head Exam


Head Exam: NORMAL INSPECTION





- ENT Exam


ENT Exam: Mucous Membranes Moist





- Neck Exam


Neck Exam: absent: Meningismus





- Respiratory Exam


Respiratory Exam: Decreased Breath Sounds





- Cardiovascular Exam


Cardiovascular Exam: +S1, +S2





- GI/Abdominal Exam


GI & Abdominal Exam: Soft.  absent: Tenderness





- Extremities Exam


Additional comments: 





right foot and left foot with wound vacuum in place





Assessment and Plan





- Assessment and Plan (Free Text)


Plan: 





Assessment


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 

closure; S/P transmetatarsal amputation 2 months ago, grew Enterobacter S/P 

wound vacuum placement


right heel ulcer S/P debridement


S/P Left 5th metatarsal osteomyelitis S/P debridement and bone excision grew 

Acinetobacter and E. faecalis


CAD S/P CABG


chronic CHF


DM


COPD


S/P AICD placement


S/P kidney transplant





Plan


continue Merrem for 4-6 weeks with weekly ESR, CRP, CBC, CMP with outpatient 

follow up with Podiatry

## 2018-02-27 NOTE — CP.PCM.PN
Subjective





- Date & Time of Evaluation


Date of Evaluation: 02/27/18


Time of Evaluation: 07:10





- Subjective


Subjective: 





Patient seen and examined at bedside this AM. Patient reports persistent pain 

in the right heel but denies fevers. Denies ambulating on the heel





Objective





- Vital Signs/Intake and Output


Vital Signs (last 24 hours): 


 











Temp Pulse Resp BP Pulse Ox


 


 98.3 F   78   18   120/60   100 


 


 02/26/18 08:10  02/26/18 08:10  02/26/18 08:10  02/26/18 10:42  02/26/18 08:10








Intake and Output: 


 











 02/27/18 02/27/18





 06:59 18:59


 


Intake Total 1240 


 


Output Total 600 


 


Balance 640 














- Medications


Medications: 


 Current Medications





Acetaminophen (Tylenol 325mg Tab)  650 mg PO Q6 PRN


   PRN Reason: Pain, Mild (1-3)


   Last Admin: 02/16/18 18:43 Dose:  650 mg


Al Hydrox/Mg Hydrox/Simethicone (Maalox Plus 30 Ml)  30 ml PO DAILY PRN


   PRN Reason: Indigestion / Heartburn


   Last Admin: 02/26/18 16:49 Dose:  30 ml


Amlodipine [facility]ylate (Norvasc)  [facility]


   Last Admin: 02/26/18 10:42 Dose:  10 mg


Aspirin (Ecotrin)  81 mg PO 0800 [facility]


   Last Admin: 02/26/18 08:24 Dose:  81 mg


Atorvastatin Calcium (Lipitor)  40 mg PO DIN [facility]


   Last Admin: 02/26/18 17:14 Dose:  40 mg


Chlorpromazine (Thorazine)  50 mg PO Q6 PRN; Protocol


   PRN Reason: Hiccups


   Last Admin: 02/26/18 16:49 Dose:  50 mg


Docusate Sodium (Colace)  100 mg PO BID [facility]


   Last Admin: 02/26/18 17:15 Dose:  Not Given


Escitalopram Oxalate (Lexapro)  [facility]


   Last Admin: 02/26/18 10:43 Dose:  5 mg


Famotidine (Pepcid)  20 mg PO QAM [facility]


   Last Admin: 02/26/18 10:42 Dose:  20 mg


Heparin Sodium (Porcine) (Heparin)  5,000 units SC Q8 [facility]


   PRN Reason: Protocol


   Last Admin: 02/27/18 05:55 Dose:  5,000 units


Meropenem/Sodium Chloride (Meropenem 1g/Ns 100ml Ivpb)  1 gm in 100 mls @ 100 

mls/hr IVPB Q8 [facility]


   PRN Reason: Protocol


   Stop: 03/14/18 06:01


   Last Admin: 02/27/18 05:54 Dose:  100 mls/hr


Insulin Detemir (Levemir)  12 unit SC HS [facility]


   Last Admin: 02/26/18 21:36 Dose:  12 unit


Insulin Human Regular (Humulin R Med)  0 units SC ACHS [facility]


   PRN Reason: Protocol


   Last Admin: 02/26/18 21:36 Dose:  Not Given


Insulin Lispro Protam/Lispro Human (Humalog Mix 75/25)  8 units SC ACBD [facility]


   Last Admin: 02/26/18 17:13 Dose:  8 unit


Metoprolol Tartrate (Lopressor)  50 mg PO 0800,1800 [facility]


   Last Admin: 02/26/18 17:14 Dose:  50 mg


Morphine Sulfate (Morphine)  4 mg IVP Q4H PRN


   PRN Reason: Pain, severe (8-10)


   Last Admin: 02/27/18 06:07 Dose:  4 mg


Mycophenolate Mofetil (Cellcept Cap)  750 mg PO BID [facility]


   Last Admin: 02/26/18 17:14 Dose:  750 mg


Ondansetron HCl (Zofran Inj)  4 mg IVP Q6 PRN


   PRN Reason: Nausea/Vomiting


   Last Admin: 02/26/18 13:47 Dose:  4 mg


Oxycodone HCl (Oxycodone Immediate Release Tab)  5 mg PO Q4H PRN


   PRN Reason: Pain, moderate (4-7)


   Last Admin: 02/26/18 13:18 Dose:  5 mg


Polyethylene Glycol (Miralax)  17 gm PO BID [facility]


   Last Admin: 02/26/18 17:14 Dose:  Not Given


Prednisone (Prednisone Tab)  5 mg PO 0800 [facility]


   Last Admin: 02/26/18 10:43 Dose:  5 mg


Sodium Hypochlorite (Dakins Solution 0.25%)  0 ml TOP DAILY [facility]


   Last Admin: 02/26/18 10:20 Dose:  Not Given


Tacrolimus (Prograf Cap)  0.5 mg PO HS [facility]


   Last Admin: 02/26/18 21:37 Dose:  0.5 mg


Tacrolimus (Prograf Cap)  2 mg PO BID [facility]


   Last Admin: 02/26/18 17:14 Dose:  2 mg











- Labs


Labs: 


 





 02/23/18 06:30 





 02/23/18 06:30 





 











PT  13.5 SECONDS (9.4-12.5)  H  02/12/18  07:00    


 


INR  1.17  (0.93-1.08)  H  02/12/18  07:00    


 


APTT  30.6 Seconds (25.1-36.5)   02/12/18  07:00    














- Constitutional


Appears: Well, Non-toxic, No Acute Distress





- Head Exam


Head Exam: ATRAUMATIC, NORMOCEPHALIC





- Eye Exam


Eye Exam: Normal appearance.  absent: Conjunctival injection, Scleral icterus





- ENT Exam


ENT Exam: Mucous Membranes Moist, Normal Oropharynx





- Respiratory Exam


Respiratory Exam: NORMAL BREATHING PATTERN.  absent: Accessory Muscle Use, 

Respiratory Distress





- GI/Abdominal Exam


GI & Abdominal Exam: absent: Distended





- Extremities Exam


Extremities Exam: absent: Calf Tenderness, Pedal Edema


Additional comments: 





right heel wound with wound vac in place with improved color and warmth. left 

forefoot with wound vac in place functioning well





- Neurological Exam


Neurological Exam: Alert, Awake, Oriented x3





- Psychiatric Exam


Psychiatric exam: Normal Affect, Normal Mood





- Skin


Skin Exam: Dry, Intact (except as noted above), Normal Color, Warm





Assessment and Plan





- Assessment and Plan (Free Text)


Assessment: 





57M with PVD and chronic right heel wound and left forefoot wound s/p multiple 

debridements and wound vac placement





Plan:


Continue continuous wound vac treatment to BL foot wounds


Will change wound vac with DR. Prescott at bedside this AM


Continue current pain regimen


Continue antibiotics per ID


Patient requires continued inpatient observation for a very tenuous right heel 

wound closure and monitoring for necrosis and possible further surgical 

intervention





Discussed with Dr. Prescott, further recs per him


Deanna Holt, PGY2

## 2018-02-28 VITALS — TEMPERATURE: 97.4 F | OXYGEN SATURATION: 99 %

## 2018-02-28 VITALS — SYSTOLIC BLOOD PRESSURE: 133 MMHG | HEART RATE: 75 BPM | DIASTOLIC BLOOD PRESSURE: 80 MMHG

## 2018-02-28 RX ADMIN — LIDOCAINE HYDROCHLORIDE PRN ML: 20 SOLUTION ORAL; TOPICAL at 11:18

## 2018-02-28 RX ADMIN — MORPHINE SULFATE PRN MG: 4 INJECTION, SOLUTION INTRAMUSCULAR; INTRAVENOUS at 13:07

## 2018-02-28 RX ADMIN — MORPHINE SULFATE PRN MG: 4 INJECTION, SOLUTION INTRAMUSCULAR; INTRAVENOUS at 08:05

## 2018-02-28 RX ADMIN — POLYETHYLENE GLYCOL 3350 SCH: 17 POWDER, FOR SOLUTION ORAL at 09:14

## 2018-02-28 RX ADMIN — INSULIN HUMAN SCH: 100 INJECTION, SOLUTION PARENTERAL at 08:00

## 2018-02-28 RX ADMIN — INSULIN LISPRO SCH UNIT: 100 INJECTION, SUSPENSION SUBCUTANEOUS at 08:13

## 2018-02-28 RX ADMIN — MORPHINE SULFATE PRN MG: 4 INJECTION, SOLUTION INTRAMUSCULAR; INTRAVENOUS at 02:39

## 2018-02-28 RX ADMIN — MEROPENEM AND SODIUM CHLORIDE SCH MLS/HR: 1 INJECTION, SOLUTION INTRAVENOUS at 06:15

## 2018-02-28 NOTE — PN
DATE:



SUBJECTIVE:  The patient has no complaints of any chest pain.  No shortness

of breath.  No headaches or dizziness.



PHYSICAL EXAMINATION

VITAL SIGNS:  Temperature is 98.1, pulse is 78, blood pressure is 115/71,

respirations 20.

GENERAL:  The patient is lying in bed, flat, comfortable.

HEENT:  No oral lesion.  Anicteric sclerae.  Moist mucosa.

NECK:  No JVD, adenopathy, or thyromegaly.

CARDIOVASCULAR:  S1 and S2, regular.  No murmurs, rubs, or gallops.

LUNGS:  Clear to auscultation bilaterally.  No wheeze, rales, or rhonchi.

ABDOMEN:  Bowel sounds are positive, soft, nontender and nondistended.

EXTREMITIES:  Bilateral wound VAC in his feet.



LABORATORY DATA:  White count of 11, hemoglobin 11.4, creatinine 0.8.



ASSESSMENT:

1.  Status post debridement of right heel and left forefoot with wound

vacuum-assisted closure in place.

2.  Status post revision of left metatarsal amputation.

3.  Diabetes type 2.

4.  Peripheral arterial disease.

5.  Status post  donor of renal transplant.

6.  Dyslipidemia.

7.  Coronary artery disease, status post coronary artery bypass graft.

8.  Automatic implantable cardioverter defibrillator.

9.  Pacemaker.



PLAN:  The patient is currently comfortable.  He has a wound VAC that is in

place.  He is scheduled to go to [facility] for rehab.  The patient is being

followed by Dr. Valerio.  I appreciate Dr. Waterman if you appreciate his

input.  The patient is going to need four to six weeks of IV antibiotics. 

He has been on IV antibiotics for two weeks so far.  He is getting

meropenem, this will be continued.  He is on his mycophenolate 750 mg

b.i.d.  He is on his tacrolimus.  The patient wants to continue the Lipitor

for dyslipidemia and he is on Lexapro.  He is on prednisone for his

transplant.  The patient is on Thorazine.  He is on Zofran because of his

nausea.  The patient is on heart-healthy diet.













__________________________________________

Andrea Calderon MD





DD:  2018 8:47:25

DT:  2018 9:26:05

[facility] # [id number]

## 2018-02-28 NOTE — CP.PCM.DIS
Provider





- Provider


Date of Admission: 


02/12/18 12:28





Attending physician: 


Yovany Prescott MD





Primary care physician: 


Andrea Calderon MD





Consults: 


Dr. Waterman, ID


Dr. Balderas, 


Time Spent in preparation of Discharge (in minutes): 45





Diagnosis





- Discharge Diagnosis


(1) Peripheral vascular disease


Status: Chronic   Priority: High   





(2) Chronic wound of extremity


Status: Chronic   Priority: High   





(3) Hypertension


Status: Chronic   Priority: Low   





(4) Diabetes mellitus


Status: Chronic   Priority: Low   





(5) Hyperlipidemia


Status: Chronic   Priority: Low   





(6) Depression


Status: Chronic   Priority: Low   





(7) GERD (gastroesophageal reflux disease)


Status: Chronic   Priority: Low   





(8) Renal transplant, status post


Status: Chronic   Priority: Low   





Hospital Course





- Lab Results


Lab Results: 


 Micro Results





02/12/18 11:44   Foot - Left   Gram Stain - Final


02/12/18 11:44   Foot - Left   Wound Culture - Final


                            Pseudomonas Aeruginosa


02/12/18 11:44   Bone   Tissue Culture - Final


                            Pseudomonas Aeruginosa





 Most Recent Lab Values











WBC  11.0 10^3/ul (4.5-11.0)   02/23/18  06:30    


 


RBC  4.14 10^6/uL (3.5-6.1)   02/23/18  06:30    


 


Hgb  11.4 g/dL (14.0-18.0)  L  02/23/18  06:30    


 


Hct  35.9 % (42.0-52.0)  L  02/23/18  06:30    


 


MCV  86.7 fl (80.0-105.0)   02/23/18  06:30    


 


MCH  27.5 pg (25.0-35.0)   02/23/18  06:30    


 


MCHC  31.8 g/dl (31.0-37.0)   02/23/18  06:30    


 


RDW  15.3 % (11.5-14.5)  H  02/23/18  06:30    


 


Plt Count  252 10^3/uL (120.0-450.0)   02/23/18  06:30    


 


MPV  9.4 fl (7.0-11.0)   02/23/18  06:30    


 


Gran %  71.0 % (50.0-68.0)  H  02/23/18  06:30    


 


Lymph % (Auto)  11.3 % (22.0-35.0)  L  02/23/18  06:30    


 


Mono % (Auto)  13.0 % (1.0-6.0)  H  02/23/18  06:30    


 


Eos % (Auto)  4.6 % (1.5-5.0)   02/23/18  06:30    


 


Baso % (Auto)  0.1 % (0.0-3.0)   02/23/18  06:30    


 


Gran #  7.79  (1.4-6.5)  H  02/23/18  06:30    


 


Lymph # (Auto)  1.2  (1.2-3.4)   02/23/18  06:30    


 


Mono # (Auto)  1.4  (0.1-0.6)  H  02/23/18  06:30    


 


Eos # (Auto)  0.5  (0.0-0.7)   02/23/18  06:30    


 


Baso # (Auto)  0.01 K/mm3 (0.0-2.0)   02/23/18  06:30    


 


ESR  78 mm/hr (0.00-15.0)  H  02/27/18  11:30    


 


PT  13.5 SECONDS (9.4-12.5)  H  02/12/18  07:00    


 


INR  1.17  (0.93-1.08)  H  02/12/18  07:00    


 


APTT  30.6 Seconds (25.1-36.5)   02/12/18  07:00    


 


Sodium  131 mmol/L (132-148)  L  02/23/18  06:30    


 


Potassium  5.0 mmol/L (3.6-5.0)   02/23/18  06:30    


 


Chloride  92 mmol/L ()  L  02/23/18  06:30    


 


Carbon Dioxide  30 mmol/L (21-33)   02/23/18  06:30    


 


Anion Gap  15  (10-20)   02/23/18  06:30    


 


BUN  18 mg/dL (7-21)   02/23/18  06:30    


 


Creatinine  0.8 mg/dl (0.8-1.5)   02/23/18  06:30    


 


Est GFR ( Amer)  > 60   02/23/18  06:30    


 


Est GFR (Non-Af Amer)  > 60   02/23/18  06:30    


 


POC Glucose (mg/dL)  132 mg/dL ()  H  02/28/18  07:15    


 


Random Glucose  206 mg/dL ()  H  02/23/18  06:30    


 


Calcium  9.2 mg/dL (8.4-10.5)   02/23/18  06:30    


 


Total Bilirubin  0.6 mg/dL (0.2-1.3)   02/17/18  11:00    


 


AST  24 U/L (17-59)   02/17/18  11:00    


 


ALT  28 U/L (7-56)   02/17/18  11:00    


 


Alkaline Phosphatase  106 U/L ()   02/17/18  11:00    


 


C-React Prot High Sens  > 15.00 mg/L (1.00-3.00)  H  02/27/18  11:30    


 


Total Protein  6.4 g/dL (5.8-8.3)   02/17/18  11:00    


 


Albumin  3.2 g/dL (3.0-4.8)   02/17/18  11:00    


 


Globulin  3.1 gm/dL  02/17/18  11:00    


 


Albumin/Globulin Ratio  1.0  (1.1-1.8)  L  02/17/18  11:00    














- Hospital Course


Hospital Course: 





57M with PMH of PVD with chronic left forefoot wound s/p prior TMA with 

podiatry who was admitted from same day surgery s/p debridement of left TMA 

wound and right heel wound.





Discharge Exam





- Head Exam


Head Exam: NORMAL INSPECTION





Discharge Plan





- Follow Up Plan


Condition: GOOD


Disposition: REHAB FACILITY/REHAB UNIT


Instructions:  Peripheral Artery Disease and Claudication, Negative Pressure 

Wound Therapy


Additional Instructions: 


Wound care: Place dakins soaked gauze packing at the base of each wound and 

then place wound vac foam on top of the gauze. Apply wound vac to continuous 

125mmHg suction attached to both wounds. Leave wound vac to suction for 2 days. 

On the third day, remove wound vac and place a wet to dry dressing with dakins 

soaked gauze packing. On the fourth day, reapply the wound vac with the dakin's 

soaked gauze underneath the sponge to continuous suction for 2 days and then 

repeat with the wet-to-dry on the third day.


Follow up with Dr. Prescott in his office in 1 week from discharge--call to 

set up appointment. See him earlier if there is any concern


Follow up with Dr. Balderas in the next 1-2 weeks


Continue IV antibiotics


Strict bedrest until you follow up with Dr. Prescott in one week


Offloading boots for BL lower extremities to prevent pressure ulcers in bed


Call Dr. Prescott or come to ER for temp >100.4 despite tylenol, severe 

increase in pain, shortness of breath, chest pain, or any other concerning 

symptoms





Referrals: 


Andrea Calderon MD [Primary Care Provider] -

## 2018-03-01 NOTE — OP
PROCEDURE DATE:  02/22/2018



PREOPERATIVE DIAGNOSES:

1.  Bilateral lower extremity ischemia, open wound of the left forefoot

after amputation of the forefoot.

2.  Nonhealing ulcer on the right heel.



PROCEDURE:

1.  Debridement of the left forefoot wound of devitalized tissues of skin,

fat, and fascia.

2.  Debridement of the right heel wound of the devitalized fat, skin,

fascia, and bone.

3.  Advancement flap closure of the right heel wound.



SURGEON:  Yovany Prescott MD



ASSISTANT:  Dr. Holt.



TYPE OF ANESTHESIA:  General endotracheal anesthesia.



ANESTHESIA ADMINISTERED BY:  Justice Rodríguez MD



ESTIMATED BLOOD LOSS:  Minimal.



SPECIMEN:  Debrided tissues.



INDICATION:  Patient is a 57-year-old male with history of severe

peripheral arterial disease with history of forefoot amputation of the left

and an ulcer on the right.  Both wounds were previously explored and

debrided.  Patient's left foot appears to be improving significantly, still

need some further debridement, and the right foot heel ulcer appears to not

do as well.  Patient underwent angiography and balloon angioplasty as well

as atherectomy of the right lower extremity.  Now significantly improved

flow into the foot and was scheduled for further debridement.



DESCRIPTION OF PROCEDURE:  First, standard time-out procedure took place

and everybody in the room agreed as to the patient identity, diagnosis and

the procedure to be performed.



Using a knife and a forceps, the area of the left foot with devitalized

tissues was carefully cleaned and debrided.  All the devitalized tissues of

skin, fat, and underlying fascia were also debrided fully.  _____

irrigation and debridement was also employed in order to clean up minute

portions of the devitalized tissue.  Once this was done, my attention was

turned to the right heel wound.  The edges of the skin and underlying fat

and fascia were carefully taken down and debrided.  Then the calcaneus bone

which was protruding was carefully trimmed from the devitalized tissue and

cleaned.  I then proceeded with raising flap posteriorly by elevating the

skin laterally for about 3 inches and forward to the plantar area for also

about 3 inches.  After that extensive raising of the flap, I was able to

reapproximate the skin to almost normal contact and 2-0 nylon stitches were

used in order to proceed with that.  Once this was completed, the wound

which was measuring  about 7 x 5 cm was finally closed, and an iodoform

gauze was placed on top of that wound.  Both wounds were now covered with

sterile dressings and wrapped with Kerlix.  Patient was awakened and

transferred to the recovery room for further observation.





__________________________________________

Yovany Prescott MD



DD:  02/28/2018 14:55:03

DT:  02/28/2018 20:54:59

[facility] # [id number]

## 2018-04-04 ENCOUNTER — HOSPITAL ENCOUNTER (INPATIENT)
Dept: HOSPITAL 42 - ED | Age: 58
LOS: 7 days | Discharge: NURSING, REHAB OR LONG-TERM CARE | DRG: 239 | End: 2018-04-11
Attending: GENERAL PRACTICE | Admitting: GENERAL PRACTICE
Payer: MEDICARE

## 2018-04-04 VITALS — BODY MASS INDEX: 22.8 KG/M2

## 2018-04-04 DIAGNOSIS — I25.2: ICD-10-CM
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
## 2018-04-04 DIAGNOSIS — I45.9: ICD-10-CM

## 2018-04-04 DIAGNOSIS — M86.9: ICD-10-CM

## 2018-04-04 DIAGNOSIS — F17.200: ICD-10-CM

## 2018-04-04 DIAGNOSIS — Z79.899: ICD-10-CM

## 2018-04-04 DIAGNOSIS — E11.69: ICD-10-CM

## 2018-04-04 DIAGNOSIS — I50.9: ICD-10-CM

## 2018-04-04 DIAGNOSIS — E78.5: ICD-10-CM

## 2018-04-04 DIAGNOSIS — E78.00: ICD-10-CM

## 2018-04-04 DIAGNOSIS — Z95.810: ICD-10-CM

## 2018-04-04 DIAGNOSIS — D64.9: ICD-10-CM

## 2018-04-04 DIAGNOSIS — I25.10: ICD-10-CM

## 2018-04-04 DIAGNOSIS — Z95.0: ICD-10-CM

## 2018-04-04 DIAGNOSIS — L03.115: ICD-10-CM

## 2018-04-04 DIAGNOSIS — E11.65: ICD-10-CM

## 2018-04-04 DIAGNOSIS — K21.9: ICD-10-CM

## 2018-04-04 DIAGNOSIS — Z94.0: ICD-10-CM

## 2018-04-04 DIAGNOSIS — B96.5: ICD-10-CM

## 2018-04-04 DIAGNOSIS — H35.30: ICD-10-CM

## 2018-04-04 DIAGNOSIS — Z95.1: ICD-10-CM

## 2018-04-04 DIAGNOSIS — G89.29: ICD-10-CM

## 2018-04-04 DIAGNOSIS — J18.9: ICD-10-CM

## 2018-04-04 DIAGNOSIS — E11.22: ICD-10-CM

## 2018-04-04 DIAGNOSIS — I13.2: ICD-10-CM

## 2018-04-04 DIAGNOSIS — N18.6: ICD-10-CM

## 2018-04-04 DIAGNOSIS — E11.319: ICD-10-CM

## 2018-04-04 DIAGNOSIS — J44.0: ICD-10-CM

## 2018-04-04 DIAGNOSIS — E11.52: Primary | ICD-10-CM

## 2018-04-04 LAB
ALBUMIN SERPL-MCNC: 3.7 G/DL (ref 3–4.8)
ALBUMIN/GLOB SERPL: 1 {RATIO} (ref 1.1–1.8)
ALT SERPL-CCNC: 30 U/L (ref 7–56)
APTT BLD: 29.9 SECONDS (ref 25.1–36.5)
AST SERPL-CCNC: 20 U/L (ref 17–59)
BASOPHILS # BLD AUTO: 0.02 K/MM3 (ref 0–2)
BASOPHILS NFR BLD: 0.1 % (ref 0–3)
BUN SERPL-MCNC: 14 MG/DL (ref 7–21)
CALCIUM SERPL-MCNC: 10.1 MG/DL (ref 8.4–10.5)
EOSINOPHIL # BLD: 0.3 10*3/UL (ref 0–0.7)
EOSINOPHIL NFR BLD: 2.4 % (ref 1.5–5)
ERYTHROCYTE [DISTWIDTH] IN BLOOD BY AUTOMATED COUNT: 15.6 % (ref 11.5–14.5)
GFR NON-AFRICAN AMERICAN: > 60
GRANULOCYTES # BLD: 11.04 10*3/UL (ref 1.4–6.5)
GRANULOCYTES NFR BLD: 77.5 % (ref 50–68)
HGB BLD-MCNC: 11.4 G/DL (ref 14–18)
INR PPP: 1.19 (ref 0.93–1.08)
LYMPHOCYTES # BLD: 1.5 10*3/UL (ref 1.2–3.4)
LYMPHOCYTES NFR BLD AUTO: 10.5 % (ref 22–35)
MCH RBC QN AUTO: 27.8 PG (ref 25–35)
MCHC RBC AUTO-ENTMCNC: 33.4 G/DL (ref 31–37)
MCV RBC AUTO: 83.2 FL (ref 80–105)
MONOCYTES # BLD AUTO: 1.4 10*3/UL (ref 0.1–0.6)
MONOCYTES NFR BLD: 9.5 % (ref 1–6)
PLATELET # BLD: 319 10^3/UL (ref 120–450)
PMV BLD AUTO: 9 FL (ref 7–11)
PROTHROMBIN TIME: 13.6 SECONDS (ref 9.4–12.5)
RBC # BLD AUTO: 4.1 10^6/UL (ref 3.5–6.1)
WBC # BLD AUTO: 14.3 10^3/UL (ref 4.5–11)

## 2018-04-04 RX ADMIN — POLYETHYLENE GLYCOL 3350 SCH: 17 POWDER, FOR SOLUTION ORAL at 18:44

## 2018-04-04 RX ADMIN — INSULIN HUMAN SCH: 100 INJECTION, SOLUTION PARENTERAL at 17:21

## 2018-04-04 RX ADMIN — INSULIN LISPRO SCH: 100 INJECTION, SUSPENSION SUBCUTANEOUS at 18:43

## 2018-04-04 RX ADMIN — MEROPENEM SCH MLS/HR: 1 INJECTION INTRAVENOUS at 21:58

## 2018-04-04 RX ADMIN — SODIUM HYPOCHLORITE SCH: 2.5 SOLUTION TOPICAL at 18:45

## 2018-04-04 RX ADMIN — HYDROMORPHONE HYDROCHLORIDE PRN MG: 1 INJECTION, SOLUTION INTRAMUSCULAR; INTRAVENOUS; SUBCUTANEOUS at 22:27

## 2018-04-04 RX ADMIN — OXYCODONE HYDROCHLORIDE PRN MG: 10 TABLET ORAL at 20:35

## 2018-04-04 RX ADMIN — VANCOMYCIN HYDROCHLORIDE SCH: 1 INJECTION, POWDER, LYOPHILIZED, FOR SOLUTION INTRAVENOUS at 17:13

## 2018-04-04 RX ADMIN — INSULIN HUMAN SCH: 100 INJECTION, SOLUTION PARENTERAL at 22:00

## 2018-04-04 NOTE — ED PDOC
Arrival/HPI





- General


Chief Complaint: Lower Extremity Problem/Injury


Time Seen by Provider: 04/04/18 14:30


Historian: Patient





- History of Present Illness


Narrative History of Present Illness (Text): 





04/04/18 16:06


58yo male with Past medical history  of Diabetes, PVD and osteomylitis of his 

foot referred to Emergency department by Dr. Roberts for admission. Pt states 

he was scheduled for BKA of his right leg, but started having sever pain on the 

leg last night. He spoke with his surgeon and was referred to  for possible 

emergency surgery. He notes that he took Oxycodone this morning without 

relieve. Reports worsening discharge from the wound on the right heel. He 

denies fever, chills, nausea, vomiting, any other complaint.





Past Medical History





- Provider Review


Nursing Documentation Reviewed: Yes





- Past History


Past History: No Previous





- Infectious Disease


Hx of Infectious Diseases: None





- Tetanus Immunization


Tetanus Immunization: Unknown





- Cardiac


Hx Cardiac Disorders: Yes (cad)


Hx Congestive Heart Failure: Yes


Hx Hypertension: Yes





- Pulmonary


Hx Chronic Obstructive Pulmonary Disease (COPD): Yes





- Neurological


Hx Neurological Disorder: No





- HEENT


Hx HEENT Disorder: Yes


Hx Macular Degeneration: Yes


Other/Comment: laser sx for macular degeneration 5 yrs ago both eyes





- Renal


Hx Renal Failure: Yes





- Endocrine/Metabolic


Hx Diabetes Mellitus Type 2: Yes





- Hematological/Oncological


Hx Blood Transfusions: No


Hx Blood Transfusion Reaction: No





- Integumentary


Hx Dermatological Disorder: Yes


Other/Comment: LEFT BIG TOE POST AMPUTATION-GANGRENE TO AREA AMPUTATED.[id number] 

second sx all toes amputated left ft,





- Musculoskeletal/Rheumatological


Hx Falls: No





- Gastrointestinal


Hx Gastrointestinal Disorders: Yes (CONSTIPATION,INCISIONAL HERNIA)


Hx Gastroesophageal Reflux: Yes





- Genitourinary/Gynecological


Hx Genitourinary Disorders: Yes (KIDNEY TRANSPLANT)


Other/Comment: pt voids well





- Psychiatric


Hx Emotional Abuse: No


Hx Physical Abuse: No


Hx Substance Use: No





- Surgical History


Hx Amputation: Yes (all toes left ft 2 sx's)


Hx Cardiac Catheterization: Yes


Other/Comment: left shoulder rotator cuff arthroscopic sx, left arm shunt 

capped off





- Anesthesia


Hx Anesthesia Reactions: No


Hx Malignant Hyperthermia: No





- Suicidal Assessment


Feels Threatened In Home Enviroment: No





Family/Social History





- Physician Review


Nursing Documentation Reviewed: Yes


Family/Social History: Unknown Family HX


Smoking Status: Former Smoker


Hx Alcohol Use: No


Hx Substance Use: No


Hx Substance Use Treatment: No





Allergies/Home Meds


Allergies/Adverse Reactions: 


Allergies





No Known Allergies Allergy (Verified 02/08/18 12:52)


 








Home Medications: 


 Home Meds











 Medication  Instructions  Recorded  Confirmed


 


Atorvastatin Calcium [Lipitor] 40 mg PO DIN 10/30/14 04/04/18


 


Escitalopram [Lexapro] 5 mg PO QAM 10/30/14 04/04/18


 


Mycophenolate Sodium [Myfortic] 360 mg PO BID 10/30/14 04/04/18


 


Insulin Aspart, Recombinant 0 unit SQ ACTID 01/10/18 04/04/18





[Novolog]   


 


Insulin Detemir [Levemir] 0 unit SC ACHS 02/01/18 04/04/18


 


Sulfamethoxazole/Trimethoprim 1 each PO DAILY 02/01/18 04/04/18





[Bactrim 400-80 mg Tablet]   


 


Tacrolimus [Prograf Cap] 0.5 mg PO HS 02/01/18 04/04/18


 


Tacrolimus [Prograf Cap] 2 mg PO BID 02/01/18 04/04/18


 


Famotidine [Pepcid] 20 mg PO QAM 02/08/18 04/04/18


 


amLODIPine [Norvasc] 10 mg PO QAM 02/08/18 04/04/18














Review of Systems





- Physician Review


All systems were reviewed & negative as marked: Yes





- Review of Systems


Constitutional: Normal


Eyes: Normal


ENT: Normal


Respiratory: Normal


Cardiovascular: Normal


Gastrointestinal: Normal


Genitourinary Male: Normal


Musculoskeletal: Arthralgias (right leg)


Skin: Normal


Neurological: Normal


Endocrine: Normal


Hemo/Lymphatic: Normal


Psychiatric: Normal





Physical Exam


Vital Signs Reviewed: Yes


Vital Signs











  Temp Pulse Resp BP Pulse Ox


 


 04/04/18 17:11   99 H  18  156/70 H  95


 


 04/04/18 14:22  97.9 F  99 H  18  171/96 H  100











Temperature: Afebrile


Blood Pressure: Normal


Pulse: Regular


Respiratory Rate: Normal


Appearance: Positive for: Well-Appearing, Non-Toxic, Comfortable


Pain Distress: None


Mental Status: Positive for: Alert and Oriented X 3





- Systems Exam


Head: Present: Atraumatic, Normocephalic


Pupils: Present: PERRL


Extroacular Muscles: Present: EOMI


Conjunctiva: Present: Normal


Mouth: Present: Moist Mucous Membranes


Neck: Present: Normal Range of Motion


Respiratory/Chest: Present: Clear to Auscultation, Good Air Exchange.  No: 

Respiratory Distress, Accessory Muscle Use


Cardiovascular: Present: Regular Rate and Rhythm, Normal S1, S2.  No: Murmurs


Abdomen: No: Tenderness, Distention, Peritoneal Signs


Back: Present: Normal Inspection


Upper Extremity: Present: Normal Inspection.  No: Cyanosis, Edema


Lower Extremity: Present: Normal Inspection, Normal ROM, Other (Wound on lantar 

left foot noted with clean margin with area on the inferior aspec that appears 

dark. Greenish discharge noted on the dressing. Left great toe ampuation noted)

.  No: Edema, NORMAL PULSES


Neurological: Present: GCS=15, CN II-XII Intact, Speech Normal


Skin: Present: Warm, Dry, Normal Color.  No: Rashes


Psychiatric: Present: Alert, Oriented x 3, Normal Insight, Normal Concentration





Medical Decision Making


ED Course and Treatment: 





04/05/18 01:28


PT in Emergency department for stated history. He was physically in pain on 

arrival in Emergency department and his pain was managed in Emergency 

department with medication. He had leukocytosis . Vancomycin and Zosyn was 

given in Emergency department.





Case was DW Dr. dorsey nd he requested that pt be admitted to the surgical 

service. 





Case was DW  Dr. Valerio kristine is covering for Dr. Espinoza, pt's surgeon and 

he accepted pt for admission. Pt was seen at Emergency department bedside by 

the Surgical resident, Armando.














- Lab Interpretations


Lab Results: 








 04/04/18 15:00 





 04/04/18 15:00 





 Lab Results





04/04/18 15:15: Blood Type O POSITIVE, Antibody Screen Negative, BBK History 

Checked Patient has bt


04/04/18 15:00: ESR 73 H


04/04/18 15:00: Sodium 130 L, Potassium 4.0, Chloride 92 L, Carbon Dioxide 28, 

Anion Gap 14, BUN 14, Creatinine 0.9, Est GFR (African Amer) > 60, Est GFR (Non-

Af Amer) > 60, Random Glucose 140 H, Calcium 10.1, Total Bilirubin 0.6, AST 20, 

ALT 30, Alkaline Phosphatase 161 H D, Total Protein 7.4, Albumin 3.7, Globulin 

3.7, Albumin/Globulin Ratio 1.0 L


04/04/18 15:00: PT 13.6 H, INR 1.19 H, APTT 29.9


04/04/18 15:00: WBC 14.3 H D, RBC 4.10, Hgb 11.4 L, Hct 34.1 L, MCV 83.2  D, 

MCH 27.8, MCHC 33.4, RDW 15.6 H, Plt Count 319, MPV 9.0, Gran % 77.5 H, Lymph % 

(Auto) 10.5 L, Mono % (Auto) 9.5 H, Eos % (Auto) 2.4, Baso % (Auto) 0.1, Gran # 

11.04 H, Lymph # (Auto) 1.5, Mono # (Auto) 1.4 H, Eos # (Auto) 0.3, Baso # (Auto

) 0.02











- RAD Interpretation


Radiology Orders: 








04/04/18 14:44


CHEST PORTABLE [RAD] Stat 





04/04/18 16:09


ANKLE LEFT 3 VIEWS ROUTINE [RAD] Stat 


ANKLE RIGHT 3 VIEWS ROUTINE [RAD] Stat 














- Medication Orders


Current Medication Orders: 








Acetaminophen (Tylenol 325mg Tab)  650 mg PO Q6 PRN


   PRN Reason: Pain, Mild (1-3)


Al Hydrox/Mg Hydrox/Simethicone (Maalox Plus 30 Ml)  30 ml PO DAILY PRN


   PRN Reason: Indigestion / Heartburn


Amlodipine Besylate (Norvasc)  10 mg PO QAM [facility]


Aspirin (Ecotrin)  81 mg PO 0800 [facility]


Atorvastatin Calcium (Lipitor)  40 mg PO DIN [facility]


   Last Admin: 04/04/18 18:49  Dose: 40 mg





Chlorpromazine (Thorazine)  50 mg PO Q6 PRN; Protocol


   PRN Reason: Hiccups


Docusate Sodium (Colace)  100 mg PO DAILY [facility]


Escitalopram Oxalate (Lexapro)  5 mg PO QAM [facility]


Famotidine (Pepcid)  20 mg PO QAM [facility]


Heparin Sodium (Porcine) (Heparin)  5,000 units SC Q12 [facility]


   PRN Reason: Protocol


   Last Admin: 04/04/18 22:19  Dose: 5,000 units


   Comments: verified with surgical: procedure now scheduled 5-5:30 pm 4/5





Subcutaneous Administrations


 Document     04/04/18 22:19  TONY  (Rec: 04/04/18 22:19  [facility]-[id number])


     Injection Site


      MAR Injection Site                         Right Abdomen


     Charges for Administration


      # of Subcutaneous Administrations          1





Hydromorphone HCl (Dilaudid)  0.5 mg IVP Q4H PRN


   PRN Reason: Pain, moderate (4-7)


   Last Admin: 04/04/18 22:27  Dose: 0.5 mg





MAR Pain Assessment


 Document     04/04/18 22:27  [facility]  (Rec: 04/04/18 22:28  [facility]-[id number])


     Pain Reassessment


      Is this a pain reassessment?               Yes


     Sleep


      Is patient sleeping during reassessment?   No


     Presence of Pain


      Presence of Pain                           Yes


     Pain Scale Used


      Pain Scale Used                            Numeric


     Location


      Left, Right or Bilateral                   Right


      Pain Location Body Site                    Leg


                                                 Foot


     Description


      Description                                Throbbing


      Intensity of Pain at present               9


      Acceptable Level of Pain                   3


      Pain Behavior                              Moaning


                                                 Grasping Site


      Alleviating Factors/Management             Medication


       Techniques                                


      Alleviating Factors                        Medication


IVP Administration


 Document     04/04/18 22:27  [facility]  (Rec: 04/04/18 22:28  [facility]-[id number])


     Charges for Administration


      # of IVP Administrations                   1





Meropenem (Merrem Iv 1 Gm Premix)  50 mls @ 100 mls/hr IVPB Q8 [facility]


   PRN Reason: Protocol


   Stop: 04/11/18 22:01


   Last Admin: 04/04/18 21:58  Dose: 100 mls/hr





eMAR Start Stop


 Document     04/04/18 21:58  [facility]  (Rec: 04/04/18 21:58  [facility]-[id number])


     Intravenous Solution


      Start Date                                 04/04/18


      Start Time                                 21:58





Vancomycin HCl (Vancomycin 1gm)  1 gm in 250 mls @ 167 mls/hr IVPB Q12H [facility]


   PRN Reason: Protocol


   Last Admin: 04/04/18 17:13  Dose:  





Insulin Human Regular (Humulin R Low)  0 units SC ACHS [facility]


   PRN Reason: Protocol


   Last Admin: 04/04/18 22:00 Dose:  Not Given


   Non-Admin Reason: Blood Sugar Parameter





MAR Blood Glucose


 Document     04/04/18 22:00  [facility]  (Rec: 04/04/18 22:00  [facility]-[id number])


     Blood Glucose


      Finger Stick Blood Glucose ()        136





Insulin Lispro Protam/Lispro Human (Humalog Mix 75/25)  8 units SC ACBD [facility]


   Last Admin: 04/04/18 18:43 Dose:  Not Given


   Non-Admin Reason: Blood Sugar Parameter





MAR Blood Glucose


 Document     04/04/18 18:43  ANTOALL  (Rec: 04/04/18 18:44  ANTOALL  [facility]-[id number]

)


     Blood Glucose


      Finger Stick Blood Glucose ()        119





Metoprolol Tartrate (Lopressor)  50 mg PO 0800,1800 [facility]


   Last Admin: 04/04/18 18:49  Dose: 50 mg





MAR Pulse and Blood Pressure


 Document     04/04/18 18:49  ANT[facility]ALL  (Rec: 04/04/18 18:49  ANTOALL  [facility]-5RWOW1

)


     Pulse


      Pulse Rate (60-90 beats/min)               91


     Blood Pressure


      Blood Pressure (100//90 mm Hg)       135/85





Non-Formulary Medication (Hydrocodone Bitartrate [Hysingla Er])  20 mg PO DAILY 

[facility]


Non-Formulary Medication (Mycophenolate Sodium [Myfortic])  360 mg PO BID [facility]


   Last Admin: 04/04/18 18:50  Dose:  





Oxycodone HCl (Oxycodone Immediate Release Tab)  10 mg PO Q6H PRN


   PRN Reason: Pain, moderate (4-7)


   Last Admin: 04/04/18 20:35  Dose: 10 mg





MAR Pain Assessment


 Document     04/04/18 20:35  MAD  (Rec: 04/04/18 20:36  MAD  [facility]-[id number])


     Pain Reassessment


      Is this a pain reassessment?               Yes


     Sleep


      Is patient sleeping during reassessment?   No


     Presence of Pain


      Presence of Pain                           Yes


     Pain Scale Used


      Pain Scale Used                            Numeric


     Location


      Left, Right or Bilateral                   Right


      Pain Location Body Site                    Foot


     Description


      Description                                Burning


      Intensity of Pain at present               7


      Acceptable Level of Pain                   3


      Pain Behavior                              Moaning


      Alleviating Factors/Management             Medication


       Techniques                                


      Alleviating Factors                        Medication


Re-Assess: MAR Pain Assessment


 Document     04/04/18 21:35  MAD  (Rec: 04/04/18 22:00  MAD  [facility]-[id number])


     Pain Reassessment


      Is this a pain reassessment?               Yes


     Sleep


      Is patient sleeping during reassessment?   No


     Presence of Pain


      Presence of Pain                           Yes


     Pain Scale Used


      Pain Scale Used                            Numeric


     Location


      Left, Right or Bilateral                   Right


      Pain Location Body Site                    Leg


                                                 Foot


     Description


      Description                                Burning


      Intensity of Pain at present               3


      Acceptable Level of Pain                   3


      Alleviating Factors                        Medication





Polyethylene Glycol (Miralax)  17 gm PO BID [facility]


   Last Admin: 04/04/18 18:44 Dose:  Not Given


   Non-Admin Reason: Patient Refused





Prednisone (Prednisone Tab)  5 mg PO 0800 [facility]


Sodium Hypochlorite (Dakins Solution 0.25%)  0 ml TOP DAILY [facility]


   Last Admin: 04/04/18 18:45 Dose:  Not Given


   Non-Admin Reason: Agitation





Tacrolimus (Prograf Cap)  0.5 mg PO HS [facility]


   Last Admin: 04/04/18 21:59  Dose: 0.5 mg





Tacrolimus (Prograf Cap)  2 mg PO BID [facility]


   Last Admin: 04/04/18 18:49  Dose: 2 mg





Tramadol HCl (Ultram)  50 mg PO Q4 PRN


   PRN Reason: Pain, moderate (4-7)


Trimethoprim/Sulfamethoxazole (Bactrim Ss Tab)  1 tab PO DAILY [facility]


   PRN Reason: Protocol





Discontinued Medications





Hydromorphone HCl (Dilaudid)  1 mg IVP STAT STA


   Stop: 04/04/18 14:46


   Last Admin: 04/04/18 15:02  Dose: 1 mg





MAR Pain Assessment


 Document     04/04/18 15:02  SRE  (Rec: 04/04/18 15:03  SRE  [id number])


     Pain Reassessment


      Is this a pain reassessment?               Yes


     Sleep


      Is patient sleeping during reassessment?   No


     Presence of Pain


      Presence of Pain                           Yes


     Pain Scale Used


      Pain Scale Used                            Numeric


     Location


      Left, Right or Bilateral                   Right


      Pain Location Body Site                    Foot


     Description


      Description                                Constant


IVP Administration


 Document     04/04/18 15:02  SRE  (Rec: 04/04/18 15:03  SRE  [id number])


     Charges for Administration


      # of IVP Administrations                   1


Re-Assess: MAR Pain Assessment


 Document     04/04/18 16:02  SRE  (Rec: 04/04/18 16:51  SRE  [id number])


     Pain Reassessment


      Is this a pain reassessment?               Yes


     Sleep


      Is patient sleeping during reassessment?   No


     Presence of Pain


      Presence of Pain                           Yes


     Pain Scale Used


      Pain Scale Used                            Numeric


     Location


      Left, Right or Bilateral                   Right


      Pain Location Body Site                    Foot


     Description


      Description                                Constant





Hydromorphone HCl (Dilaudid)  1 mg IVP STAT STA


   Stop: 04/04/18 17:18


   Last Admin: 04/04/18 17:27  Dose: 1 mg





MAR Pain Assessment


 Document     04/04/18 17:27  SRE  (Rec: 04/04/18 17:28  SRE  [id number])


     Pain Reassessment


      Is this a pain reassessment?               Yes


     Sleep


      Is patient sleeping during reassessment?   No


     Presence of Pain


      Presence of Pain                           Yes


     Pain Scale Used


      Pain Scale Used                            Numeric


     Location


      Left, Right or Bilateral                   Right


      Pain Location Body Site                    Foot


IVP Administration


 Document     04/04/18 17:27  SRE  (Rec: 04/04/18 17:28  SRE  [id number])


     Charges for Administration


      # of IVP Administrations                   1


Re-Assess: MAR Pain Assessment


 Document     04/04/18 18:27  ANTOALL  (Rec: 04/04/18 18:57  ANTOALL  [facility]-5RWOW1

)


     Pain Reassessment


      Is this a pain reassessment?               Yes


     Sleep


      Is patient sleeping during reassessment?   No


     Presence of Pain


      Presence of Pain                           No





Sodium Chloride (Sodium Chloride 0.9%)  1,000 mls @ 999 mls/hr IV .Q1H1M STA


   Stop: 04/04/18 16:46


   Last Admin: 04/04/18 16:03  Dose: 999 mls/hr





eMAR Start Stop


 Document     04/04/18 16:03  SRE  (Rec: 04/04/18 16:04  SRE  [id number])


     Intravenous Solution


      Start Date                                 04/04/18


      Start Time                                 16:00


      End Date                                   04/04/18


      End time                                   17:00


      Total Infusion Time                        60





Vancomycin HCl (Vancomycin 1gm)  1 gm in 250 mls @ 167 mls/hr IVPB STAT STA


   PRN Reason: Protocol


   Stop: 04/04/18 17:17


   Last Admin: 04/04/18 16:50  Dose: 167 mls/hr





eMAR Start Stop


 Document     04/04/18 16:50  SRE  (Rec: 04/04/18 16:51  SRE  [id number])


     Intravenous Solution


      Start Date                                 04/04/18


      Start Time                                 16:50


      End Date                                   04/04/18


      End time                                   18:20


      Total Infusion Time                        90





Piperacillin Sod/Tazobactam Sod (Zosyn 3.375 In Ns 100ml)  100 mls @ 200 mls/hr 

IVPB STAT STA


   PRN Reason: Protocol


   Stop: 04/04/18 16:17


   Last Admin: 04/04/18 16:07  Dose: 200 mls/hr





eMAR Start Stop


 Document     04/04/18 16:07  SRE  (Rec: 04/04/18 16:07  SRE  [id number])


     Intravenous Solution


      Start Date                                 04/04/18


      Start Time                                 16:07


      End Date                                   04/04/18


      End time                                   17:00


      Total Infusion Time                        53














Disposition/Present on Arrival





- Present on Arrival


Any Indicators Present on Arrival: No


History of DVT/PE: No


History of Uncontrolled Diabetes: Yes


Urinary Catheter: No


History of Decub. Ulcer: No


History Surgical Site Infection Following: None





- Disposition


Have Diagnosis and Disposition been Completed?: Yes


Diagnosis: 


 Foot infection, Cellulitis





Disposition: HOSPITALIZED


Disposition Time: 16:05


Patient Problems: 


 Current Active Problems











Problem Status Onset


 


Cellulitis Acute  


 


Foot infection Acute  











Condition: STABLE

## 2018-04-04 NOTE — RAD
HISTORY:

admission  



COMPARISON:

02/16/2018 



FINDINGS:



LUNGS:

No active pulmonary disease.



PLEURA:

No significant pleural effusion identified, no pneumothorax apparent.



CARDIOVASCULAR:

Normal.



OSSEOUS STRUCTURES:

Sternal wires



VISUALIZED UPPER ABDOMEN:

Normal.



OTHER FINDINGS:

Pacemaker



IMPRESSION:

No active disease.

## 2018-04-05 LAB
ALBUMIN SERPL-MCNC: 3.4 G/DL (ref 3–4.8)
ALBUMIN/GLOB SERPL: 1 {RATIO} (ref 1.1–1.8)
ALT SERPL-CCNC: 28 U/L (ref 7–56)
APTT BLD: 35.3 SECONDS (ref 25.1–36.5)
AST SERPL-CCNC: 18 U/L (ref 17–59)
BASOPHILS # BLD AUTO: 0.02 K/MM3 (ref 0–2)
BASOPHILS NFR BLD: 0.2 % (ref 0–3)
BUN SERPL-MCNC: 11 MG/DL (ref 7–21)
CALCIUM SERPL-MCNC: 9.4 MG/DL (ref 8.4–10.5)
EOSINOPHIL # BLD: 0.4 10*3/UL (ref 0–0.7)
EOSINOPHIL NFR BLD: 4.5 % (ref 1.5–5)
ERYTHROCYTE [DISTWIDTH] IN BLOOD BY AUTOMATED COUNT: 16 % (ref 11.5–14.5)
GFR NON-AFRICAN AMERICAN: > 60
GRANULOCYTES # BLD: 6.78 10*3/UL (ref 1.4–6.5)
GRANULOCYTES NFR BLD: 72.6 % (ref 50–68)
HGB BLD-MCNC: 11 G/DL (ref 14–18)
INR PPP: 1.14 (ref 0.93–1.08)
LYMPHOCYTES # BLD: 1 10*3/UL (ref 1.2–3.4)
LYMPHOCYTES NFR BLD AUTO: 11.1 % (ref 22–35)
MCH RBC QN AUTO: 27.4 PG (ref 25–35)
MCHC RBC AUTO-ENTMCNC: 32.4 G/DL (ref 31–37)
MCV RBC AUTO: 84.8 FL (ref 80–105)
MONOCYTES # BLD AUTO: 1.1 10*3/UL (ref 0.1–0.6)
MONOCYTES NFR BLD: 11.6 % (ref 1–6)
PLATELET # BLD: 312 10^3/UL (ref 120–450)
PMV BLD AUTO: 9.2 FL (ref 7–11)
PROTHROMBIN TIME: 13.1 SECONDS (ref 9.4–12.5)
RBC # BLD AUTO: 4.01 10^6/UL (ref 3.5–6.1)
WBC # BLD AUTO: 9.3 10^3/UL (ref 4.5–11)

## 2018-04-05 PROCEDURE — 0Y6H0Z1 DETACHMENT AT RIGHT LOWER LEG, HIGH, OPEN APPROACH: ICD-10-PCS | Performed by: GENERAL PRACTICE

## 2018-04-05 RX ADMIN — INSULIN HUMAN SCH: 100 INJECTION, SOLUTION PARENTERAL at 12:11

## 2018-04-05 RX ADMIN — MEROPENEM SCH MLS/HR: 1 INJECTION INTRAVENOUS at 05:37

## 2018-04-05 RX ADMIN — MEROPENEM SCH MLS/HR: 1 INJECTION INTRAVENOUS at 14:17

## 2018-04-05 RX ADMIN — HYDROMORPHONE HYDROCHLORIDE PRN MG: 1 INJECTION, SOLUTION INTRAMUSCULAR; INTRAVENOUS; SUBCUTANEOUS at 08:28

## 2018-04-05 RX ADMIN — INSULIN LISPRO SCH: 100 INJECTION, SUSPENSION SUBCUTANEOUS at 08:28

## 2018-04-05 RX ADMIN — INSULIN HUMAN SCH: 100 INJECTION, SOLUTION PARENTERAL at 09:43

## 2018-04-05 RX ADMIN — SODIUM HYPOCHLORITE SCH APPLIC: 2.5 SOLUTION TOPICAL at 14:19

## 2018-04-05 RX ADMIN — SULFAMETHOXAZOLE AND TRIMETHOPRIM SCH: 400; 80 TABLET ORAL at 09:42

## 2018-04-05 RX ADMIN — POLYETHYLENE GLYCOL 3350 SCH: 17 POWDER, FOR SOLUTION ORAL at 10:11

## 2018-04-05 RX ADMIN — INSULIN LISPRO SCH: 100 INJECTION, SUSPENSION SUBCUTANEOUS at 17:54

## 2018-04-05 RX ADMIN — HYDROMORPHONE HYDROCHLORIDE PRN MG: 1 INJECTION, SOLUTION INTRAMUSCULAR; INTRAVENOUS; SUBCUTANEOUS at 12:10

## 2018-04-05 RX ADMIN — INSULIN HUMAN SCH: 100 INJECTION, SOLUTION PARENTERAL at 17:54

## 2018-04-05 RX ADMIN — VANCOMYCIN HYDROCHLORIDE SCH MLS/HR: 1 INJECTION, POWDER, LYOPHILIZED, FOR SOLUTION INTRAVENOUS at 05:36

## 2018-04-05 RX ADMIN — HYDROMORPHONE HYDROCHLORIDE PRN MG: 1 INJECTION, SOLUTION INTRAMUSCULAR; INTRAVENOUS; SUBCUTANEOUS at 23:45

## 2018-04-05 NOTE — PCM.[email or web address]
Surgeon's Initial Post Op Note





- Surgeon's Notes


Surgeon: Kenyon


Assistant: Ty PGY3


Type of Anesthesia: General Endo


Pre-Operative Diagnosis: R foot gangrene


Operative Findings: Necrotic heel R foot


Post-Operative Diagnosis: same


Operation Performed: R BKA


Specimen/Specimens Removed: R leg


Estimated Blood Loss: EBL {In ML}: 15


Blood Products Given: N/A


Drains Used: No Drains


Post-Op Condition: Good


Date of Surgery/Procedure: 04/05/18


Time of Surgery/Procedure: 20:07

## 2018-04-05 NOTE — CARD
--------------- APPROVED REPORT --------------





EKG Measurement

Heart [id number]

[id number]

[id number]

[id number]

[id number]



<Conclusion>

Atrai sensed,  ventricular paced rhythm

 occasional premature ventricular complexes 



Abnormal ECG

## 2018-04-05 NOTE — CON
DATE:  04/05/2018



REASON FOR CONSULTATION:  Preoperative evaluation for right BKA.



HISTORY OF PRESENT ILLNESS:  This is a 57-year-old man known to our

practice, admitted with severe pain in his right foot/heel with necrotic heel,

who is scheduled for a right BKA later today.  He has a long history of PAD

and vascular interventions with a prior left TM amputation.  He has been at [facility] with progressive right heel and foot discomfort.  He denies chest

pain, shortness of breath, orthopnea, PND, syncope, presyncope,

lightheadedness, dizziness, or vertigo.  There was no fever, chills, cough,

sputum production, or hemoptysis.  No abdominal pain, nausea, vomiting,

diarrhea, constipation, or melena.



PAST MEDICAL HISTORY:  Notable for coronary artery disease as well.  He has

remote myocardial infarction, LV dysfunction, coronary artery bypass

surgery.  He has a permanent pacemaker, ejection fraction about 35%.  There

was a history of diabetes, chronic kidney disease, renal transplantation,

COPD, cigarette smoking, but no history of rheumatic fever, stroke, TIA, or

gout.



MEDICATIONS:  At the time of admission include Bactrim, Colace, aspirin,

polyethylene glycol, insulin, Hysingla, Levemir, Lexapro, Lipitor,

Lopressor, Maalox, Myfortic, Norvasc, NovoLog, Pepcid, Prograf, Thorazine,

Ultram, oxycodone, and prednisone.



ALLERGIES:  THERE ARE NO MEDICATION ALLERGIES REPORTED.



FAMILY HISTORY:  Positive for heart disease.



SOCIAL HISTORY:  He smokes.  Denies alcohol intake.



REVIEW OF SYSTEMS:  A 10-point review of systems is otherwise unremarkable

except as noted above.



PHYSICAL EXAMINATION:

GENERAL:  He is a well-developed male, in no acute distress.

VITAL SIGNS:  Notable for a paced rhythm at 80 beats per minute.  He is

afebrile.  Blood pressure 130/85, respirations 18 to 20, O2 saturation 95%

to 100% on room air.

HEENT:  Reveals no neck vein distention, thyromegaly, or carotid bruits. 

Mucous membranes moist.  Conjunctivae are pink.

NECK:  Supple.

LUNGS:  Lung fields clear.

HEART:  Revealed normal first and second heart sounds.  There is a soft

systolic murmur along the left sternal border.

ABDOMEN:  Soft, bowel sounds are present.  No mass, organomegaly,

tenderness, rebound, guarding.  No CVA tenderness, no palpable abdominal

aortic aneurysm.

EXTREMITIES:  Revealed ischemic feet with bandages bilaterally, left TMA

NEUROLOGIC:  He is awake, alert, and oriented.

SKIN:  Warm and dry.

PSYCHIATRIC:  Normal as to mood and affect.



LABORATORY AND IMAGING:  An EKG demonstrates a V-paced rhythm with A

sensing.  No change from a previous EKG.  Chest x-ray reveals no active

disease.  White count 14,300, repeat 9300; hemoglobin 11; hematocrit 34;

platelet counts are normal.  PT, INR, and PTT unremarkable.  Electrolytes

unremarkable.  Repeat sodium 136, repeat chloride 98, BUN of 11, creatinine

0.8, blood sugars in the 115 to 149 range.  LFTs are unremarkable.



IMPRESSION:  Ady Mcqueen is a 57-year-old diabetic with severe peripheral

vascular disease, known cardiac disease including bypass surgery, left

ventricular dysfunction, and permanent pacemaker, admitted for an elective

below-knee amputation of the right leg.  He is a chronic smoker with

chronic obstructive pulmonary disease as well.  He has a chronic pain,

which is worsening and a necrotic right heel.



He should be considered mildly increased cardiac risk because of underlying

cardiac disease.  There is no recent chest pain and he appears to be stable

at this time and ready to proceed with the elective amputation.



We will follow along with you while he is in the hospital.  Do not hesitate

to call if there are any questions.







__________________________________________

Dayton Odom MD



DD:  04/05/2018 10:46:29

DT:  04/05/2018 14:57:25

Job # [id number]

LUCY

## 2018-04-05 NOTE — RAD
PROCEDURE:  Left Ankle Radiographs.



HISTORY:

gangrene r/o gas  



COMPARISON:

None



FINDINGS:



BONES:

Interval amputation the tarsal levels.  No gross periosteal reaction 

here seen. Short segmental arterial vascular calcifications border 

amputation site. 



JOINTS:

Ankle arthrosis. Ankle mortise maintained. Talar dome intact



SOFT TISSUES:

Lucency plantar it-forefoot level.  No subjacent osseous periosteal 

reaction here 



OTHER FINDINGS:

None.



IMPRESSION:

Interval additional amputation - metatarsal levels



Plantar soft tissue lucency compatible with ulcer. No regional 

periosteal reaction or cortical interruption here (non surgical) 

suggest osteomyelitis.

## 2018-04-05 NOTE — CP.PCM.CON
History of Present Illness





- History of Present Illness


History of Present Illness: 


57 year old male with PMH of CAD S/P CABG, chronic CHF, DM, COPD, S/P AICD 

placement, S/P kidney transplant, S/P Left 5th metatarsal osteomyelitis S/P 

debridement and bone excision, grew Acinetobacter and E. faecalis, S/P left TMA 

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 

came in [place] [facility] complaining of worsening pain of the right foot with greenish 

discharge. He has been seeing surgery for this but the wound has not been 

healing. He had completed 4-6 weeks of antibiotics recently. He denies fever or 

chills, no nausea or vomiting, no chest pain, no SOB, no no headache or 

dizziness, no abdominal pain, no diarrhea, no dysuria. Infectious diseases 

consult is requested to further evaluate and manage.





Review of Systems





- Review of Systems


All systems: reviewed and no additional remarkable complaints except (as per HPI

)





Past Patient History





- Infectious Disease


Hx of Infectious Diseases: None





- Tetanus Immunizations


Tetanus Immunization: Unknown





- Past Social History


Smoking Status: Former Smoker





- CARDIAC


Hx Cardiac Disorders: Yes (cad)


Hx Congestive Heart Failure: Yes


Hx Hypercholesterolemia: Yes


Hx Hypertension: Yes





- PULMONARY


Hx Chronic Obstructive Pulmonary Disease (COPD): Yes





- NEUROLOGICAL


Hx Neurological Disorder: No





- HEENT


Hx HEENT Problems: Yes


Hx Macular Degeneration: Yes


Other/Comment: laser sx for macular degeneration 5 yrs ago both eyes





- RENAL


Hx Renal Failure: Yes





- ENDOCRINE/METABOLIC


Hx Diabetes Mellitus Type 2: Yes





- HEMATOLOGICAL/ONCOLOGICAL


Hx Blood Transfusions: No


Hx Blood Transfusion Reaction: No





- INTEGUMENTARY


Hx Dermatological Problems: Yes


Other/Comment: LEFT BIG TOE POST AMPUTATION-GANGRENE TO AREA AMPUTATED.[id number] 

second sx all toes amputated left ft,





- MUSCULOSKELETAL/RHEUMATOLOGICAL


Hx Falls: No





- GASTROINTESTINAL


Hx Gastrointestinal Disorders: Yes (CONSTIPATION,INCISIONAL HERNIA)


Hx Gastroesophageal Reflux: Yes





- GENITOURINARY/GYNECOLOGICAL


Hx Genitourinary Disorders: Yes (KIDNEY TRANSPLANT)


Other/Comment: pt voids well





- PSYCHIATRIC


Hx Emotional Abuse: No


Hx Physical Abuse: No


Hx Substance Use: No





- SURGICAL HISTORY


Hx Surgeries: Yes





- ANESTHESIA


Hx Anesthesia Reactions: No


Hx Malignant Hyperthermia: No





Meds


Allergies/Adverse Reactions: 


 Allergies











Allergy/AdvReac Type Severity Reaction Status Date / Time


 


No Known Allergies Allergy   Verified 02/08/18 12:52














- Medications


Medications: 


 Current Medications





Acetaminophen (Tylenol 325mg Tab)  650 mg PO Q6 PRN


   PRN Reason: Pain, Mild (1-3)


Al Hydrox/Mg Hydrox/Simethicone (Maalox Plus 30 Ml)  30 ml PO DAILY PRN


   PRN Reason: Indigestion / Heartburn


Amlodipine Besylate (Norvasc)  10 mg PO QAM [facility]


Aspirin (Ecotrin)  81 mg PO 0800 [facility]


Atorvastatin Calcium (Lipitor)  40 mg PO DIN [facility]


Chlorpromazine (Thorazine)  50 mg PO Q6 PRN; Protocol


   PRN Reason: Hiccups


Docusate Sodium (Colace)  100 mg PO DAILY [facility]


Escitalopram Oxalate (Lexapro)  5 mg PO QAM [facility]


Famotidine (Pepcid)  20 mg PO QAM [facility]


Heparin Sodium (Porcine) (Heparin)  5,000 units SC Q12 [facility]


   PRN Reason: Protocol


Vancomycin HCl (Vancomycin 1gm)  1 gm in 250 mls @ 167 mls/hr IVPB [facility]


   PRN Reason: Protocol


   Stop: 04/04/18 17:17


   Last Admin: 04/04/18 16:50 Dose:  167 mls/hr


Insulin Human Regular (Humulin R Low)  0 units SC ACHS [facility]


   PRN Reason: Protocol


Insulin Lispro Protam/Lispro Human (Humalog Mix 75/25)  8 units SC ACBD [facility]


Metoprolol Tartrate (Lopressor)  50 mg PO 0800,1800 [facility]


Non-Formulary Medication (Hydrocodone Bitartrate [Hysingla Er])  20 mg PO DAILY 

[facility]


Non-Formulary Medication (Mycophenolate Sodium [Myfortic])  360 mg PO BID [facility]


Oxycodone HCl (Oxycodone Immediate Release Tab)  10 mg PO Q6H PRN


   PRN Reason: Pain, moderate (4-7)


Polyethylene Glycol (Miralax)  17 gm PO BID [facility]


Prednisone (Prednisone Tab)  5 mg PO 0800 [facility]


Tacrolimus (Prograf Cap)  0.5 mg PO HS [facility]


Tacrolimus (Prograf Cap)  2 mg PO BID [facility]


Tramadol HCl (Ultram)  50 mg PO Q4 PRN


   PRN Reason: Pain, moderate (4-7)


Trimethoprim/Sulfamethoxazole (Bactrim Ss Tab)  1 tab PO DAILY [facility]


   PRN Reason: Protocol











Physical Exam





- Constitutional


Appears: Chronically Ill





- Head Exam


Head Exam: NORMAL INSPECTION





- ENT Exam


ENT Exam: Mucous Membranes Moist





- Neck Exam


Neck exam: Negative for: Meningismus





- Respiratory Exam


Respiratory Exam: Decreased Breath Sounds





- Cardiovascular Exam


Cardiovascular Exam: +S1, +S2





- GI/Abdominal Exam


GI & Abdominal Exam: Soft.  absent: Tenderness





- Extremities Exam


Additional comments: 





both lower extremities with dressings in place





Results





- Vital Signs


Recent Vital Signs: 


 Last Vital Signs











Temp  97.9 F   04/04/18 14:22


 


Pulse  99 H  04/04/18 14:22


 


Resp  18   04/04/18 14:22


 


BP  171/96 H  04/04/18 14:22


 


Pulse Ox  100   04/04/18 14:22














- Labs


Result Diagrams: 


 04/05/18 06:00





 04/05/18 06:00





Assessment & Plan





- Assessment and Plan (Free Text)


Plan: 





Assessment


right foot necrosis and gangrene, severe skin and skin structure infection 


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 

debridement and closure; S/P transmetatarsal amputation previously, grew 

Enterobacter S/P wound vacuum placement


right heel ulcer S/P debridement


S/P Left 5th metatarsal osteomyelitis S/P debridement and bone excision grew 

Acinetobacter and E. faecalis


CAD S/P CABG


chronic CHF


DM


COPD


S/P AICD placement


S/P kidney transplant





Plan


started Vancomycin and Merrem - patient is planned for BKA - will follow up OR 

results


will monitor clinically

## 2018-04-05 NOTE — RAD
PROCEDURE:  Right Ankle Radiographs.



HISTORY:

gangrene r/o gas  



COMPARISON:

None



FINDINGS:



BONES:

Minimal tibial plafond subchondral sclerosis. No fracture. No 

osteochondral lesion 



JOINTS:

Mild medial an lateral ankle joint arthrosis



SOFT TISSUES:

Atherosclerotic vascular calcifications  present. . .  No gas-forming 

cellulitis appreciated lower leg mild subcutaneous reticulated edema. 

Mottled soft tissue density bordering the heel on oblique views.  

Although there is some relative radiolucency over the posterior 

calcaneus here no gross cortical interruption or periosteal reaction 

appreciated. 



OTHER FINDINGS:

None.



IMPRESSION:

No gas-forming cellulitis. Ulcerations plantar/heel aspect posterior 

hindfoot.  Given the proximity of the apparent ulcer with the bone 

consider MRI for further evaluation.  No gross cortical destruction 

or periosteal reaction seen. 



Atherosclerotic vascular calcifications  present. .



Minimal ankle arthrosis

## 2018-04-06 LAB
ALBUMIN SERPL-MCNC: 3.5 G/DL (ref 3–4.8)
ALBUMIN/GLOB SERPL: 1 {RATIO} (ref 1.1–1.8)
ALT SERPL-CCNC: 23 U/L (ref 7–56)
AST SERPL-CCNC: 27 U/L (ref 17–59)
BASOPHILS # BLD AUTO: 0.02 K/MM3 (ref 0–2)
BASOPHILS NFR BLD: 0.2 % (ref 0–3)
BUN SERPL-MCNC: 11 MG/DL (ref 7–21)
CALCIUM SERPL-MCNC: 9.4 MG/DL (ref 8.4–10.5)
EOSINOPHIL # BLD: 0.1 10*3/UL (ref 0–0.7)
EOSINOPHIL NFR BLD: 1.1 % (ref 1.5–5)
ERYTHROCYTE [DISTWIDTH] IN BLOOD BY AUTOMATED COUNT: 15.9 % (ref 11.5–14.5)
GFR NON-AFRICAN AMERICAN: > 60
GRANULOCYTES # BLD: 10.5 10*3/UL (ref 1.4–6.5)
GRANULOCYTES NFR BLD: 86.4 % (ref 50–68)
HGB BLD-MCNC: 11.4 G/DL (ref 14–18)
LYMPHOCYTES # BLD: 0.9 10*3/UL (ref 1.2–3.4)
LYMPHOCYTES NFR BLD AUTO: 7.3 % (ref 22–35)
MCH RBC QN AUTO: 27.7 PG (ref 25–35)
MCHC RBC AUTO-ENTMCNC: 32.9 G/DL (ref 31–37)
MCV RBC AUTO: 84.2 FL (ref 80–105)
MONOCYTES # BLD AUTO: 0.6 10*3/UL (ref 0.1–0.6)
MONOCYTES NFR BLD: 5 % (ref 1–6)
PLATELET # BLD: 305 10^3/UL (ref 120–450)
PMV BLD AUTO: 9.1 FL (ref 7–11)
RBC # BLD AUTO: 4.11 10^6/UL (ref 3.5–6.1)
WBC # BLD AUTO: 12.2 10^3/UL (ref 4.5–11)

## 2018-04-06 RX ADMIN — MEROPENEM SCH MLS/HR: 1 INJECTION INTRAVENOUS at 21:42

## 2018-04-06 RX ADMIN — INSULIN HUMAN SCH UNITS: 100 INJECTION, SOLUTION PARENTERAL at 08:00

## 2018-04-06 RX ADMIN — INSULIN LISPRO SCH: 100 INJECTION, SUSPENSION SUBCUTANEOUS at 08:35

## 2018-04-06 RX ADMIN — SULFAMETHOXAZOLE AND TRIMETHOPRIM SCH TAB: 400; 80 TABLET ORAL at 09:11

## 2018-04-06 RX ADMIN — HYDROMORPHONE HYDROCHLORIDE PRN MG: 1 INJECTION, SOLUTION INTRAMUSCULAR; INTRAVENOUS; SUBCUTANEOUS at 14:12

## 2018-04-06 RX ADMIN — POLYETHYLENE GLYCOL 3350 SCH GM: 17 POWDER, FOR SOLUTION ORAL at 09:09

## 2018-04-06 RX ADMIN — MEROPENEM SCH MLS/HR: 1 INJECTION INTRAVENOUS at 16:12

## 2018-04-06 RX ADMIN — INSULIN HUMAN SCH: 100 INJECTION, SOLUTION PARENTERAL at 22:02

## 2018-04-06 RX ADMIN — SODIUM HYPOCHLORITE SCH APPLIC: 2.5 SOLUTION TOPICAL at 11:52

## 2018-04-06 RX ADMIN — INSULIN HUMAN SCH UNITS: 100 INJECTION, SOLUTION PARENTERAL at 11:53

## 2018-04-06 RX ADMIN — LIDOCAINE HYDROCHLORIDE PRN ML: 20 SOLUTION ORAL; TOPICAL at 18:34

## 2018-04-06 RX ADMIN — Medication PRN MLS/HR: at 15:11

## 2018-04-06 RX ADMIN — INSULIN HUMAN SCH UNITS: 100 INJECTION, SOLUTION PARENTERAL at 17:00

## 2018-04-06 RX ADMIN — OXYCODONE HYDROCHLORIDE PRN MG: 10 TABLET ORAL at 22:13

## 2018-04-06 RX ADMIN — INSULIN LISPRO SCH UNIT: 100 INJECTION, SUSPENSION SUBCUTANEOUS at 17:00

## 2018-04-06 RX ADMIN — VANCOMYCIN HYDROCHLORIDE SCH MLS/HR: 1 INJECTION, POWDER, LYOPHILIZED, FOR SOLUTION INTRAVENOUS at 17:40

## 2018-04-06 RX ADMIN — HYDROMORPHONE HYDROCHLORIDE PRN MG: 1 INJECTION, SOLUTION INTRAMUSCULAR; INTRAVENOUS; SUBCUTANEOUS at 03:07

## 2018-04-06 RX ADMIN — POLYETHYLENE GLYCOL 3350 SCH GM: 17 POWDER, FOR SOLUTION ORAL at 17:40

## 2018-04-06 RX ADMIN — HYDROMORPHONE HYDROCHLORIDE PRN MG: 1 INJECTION, SOLUTION INTRAMUSCULAR; INTRAVENOUS; SUBCUTANEOUS at 10:27

## 2018-04-06 RX ADMIN — HYDROMORPHONE HYDROCHLORIDE PRN MG: 1 INJECTION, SOLUTION INTRAMUSCULAR; INTRAVENOUS; SUBCUTANEOUS at 07:12

## 2018-04-06 NOTE — CON
DATE:



CHIEF COMPLAINT AND HISTORY OF PRESENT ILLNESS:  This is a 57-year-old male

who has come into the hospital with complaints of severe right heel and

foot pain.  The patient has been getting wound care.  He has slowly

worsened.  He _____ process of the heel and states that the pain has been

significant.  He has been also complaining of discharge.  He has a

difficult time in walking.  He has made a decision about having an

amputation.  I have been asked to follow the patient for his kidney

transplant as well as other medical issues.  The patient has no complaints

of any chest pain.  He states that pain is 8 to 9 out of 10.  He has no

headaches or dizziness.  No nausea.  No vomiting.  No dysuria or frequency.

No nocturia.  He had undergone a right BKA today.



ALLERGIES:  NO KNOWN DRUG ALLERGIES.



MEDICATIONS:  Home medications have been reviewed on the [facility].



PAST MEDICAL HISTORY:

1.   donor kidney transplant.

2.  Coronary artery disease, status post CABG.

3.  COPD.

4.  Diabetes type 2.

5.  Peripheral arterial disease.

6.  Dyslipidemia.

7.  Hypertension.

8.  Pacemaker.

9.  Diabetic retinopathy.

10.  Macular degeneration.

11.  Left TMA.



PAST SURGICAL HISTORY:

1.  Renal transplant.

2.  Left TMA of the foot.



SOCIAL HISTORY:  He denies smoking, drinking.



FAMILY HISTORY:  Noncontributory.



PHYSICAL EXAMINATION:

VITAL SIGNS:  He has a temperature of 97.6, pulse is 77, blood pressure is

121/70, respirations 20.

GENERAL:  The patient lying in bed, uncomfortable, and in no acute

distress.

HEENT:  Atraumatic and normocephalic.  Anicteric sclerae.  Moist mucosa.

Pink conjunctivae.  No oral lesions.

NECK:  No JVD, anterior and posterior adenopathy, thyromegaly, or bruits.

CARDIOVASCULAR:  S1 and S2 regular.  No murmur, rubs, or gallop.

LUNGS:  Clear to auscultation bilaterally.  No wheezes, rales, or rhonchi.

ABDOMEN:  Bowel sounds are positive.  Soft, nontender and nondistended.  No

hepatosplenomegaly. No rebound and no guarding

EXTREMITIES:  He has necrotic area of the right heel.  He has a left toe

amputation.  Right heel with black necrotic dry gangrene.  There is also

green exudate that is coming out of the heel.

NEUROLOGIC:  No facial asymmetry.  Tongue is midline.  No uvula deviation. 

Power is 5/5 upper extremity and lower extremity.  Sensation intact in

upper extremity and lower extremity.

PSYCHIATRIC:  He is awake, alert and oriented x3.  No anxiety or

depression.  He has normal affect.

GENITOURINARY:  No CVA tenderness.

VASCULAR:  2+ pulses in the carotid pulses and pedal pulses.

SKIN:  No erythema or nodules

SPINE:  Shows normal curvature.



LABORATORY DATA:  Labs have been reviewed.  White count 14.3, hemoglobin

11.4.  Chemistry showed a sodium 136, potassium is 4.3, creatinine is 0.8.



Chest x-ray done shows no active disease.



Left ankle x-ray shows plantar soft tissue lucency comparable with an

ulcer, no regional reaction.



EKG shows atrial sinus and ventricular pacemaker.



ASSESSMENT:

1.  Right heel dry gangrene.

2.  Peripheral arterial disease.

3.   donor kidney transplant.

4.  Diabetes type 2.

5.  History of left transmetatarsal amputation.

6.  Dyslipidemia.

7.  Hypertension.

8.  Coronary artery disease, status post coronary artery bypass graft.

9.  Automatic implantable cardioverter-defibrillator.

10.  Pacemaker.

11.  Sepsis.



PLAN:  The patient is currently comfortable.  He is continuing on his

Prograf, prednisone and Myfortic.  His wife is going to be bringing his

medications.  The patient is going to continue with Lipitor for

dyslipidemia.  He is going to be on atorvastatin.  He is on meropenem for

antibiotics.  The patient is on MiraLax for constipation.  He is going to

continue with IV fluids for hydration.  The patient is being followed by

Cardiology, Podiatry and ID.



Thank you for allowing me to participate in the care of this patient.







__________________________________________

Andrea Calderon MD



DD:  2018 20:33:31

DT:  2018 3:44:56

Job # [id number]

## 2018-04-06 NOTE — CP.PCM.PN
Subjective





- Date & Time of Evaluation


Date of Evaluation: 04/06/18


Time of Evaluation: 08:13





- Subjective


Subjective: 





Surgery: Dr. Prescott 





Patient reports pain to the right BKA surgical site. He states he was not 

getting his pain medication as frequently last night. He report some nausea 

which he attributes to anesthesia. He reports tolerating liquids. 





Objective





- Vital Signs/Intake and Output


Vital Signs (last 24 hours): 


 











Temp Pulse Resp BP Pulse Ox


 


 98 F   80   26 H  116/60   94 L


 


 04/05/18 22:00  04/05/18 22:00  04/05/18 22:00  04/05/18 22:00  04/05/18 22:00








Intake and Output: 


 











 04/06/18 04/06/18





 06:59 18:59


 


Intake Total 120 


 


Output Total 200 


 


Balance -80 














- Medications


Medications: 


 Current Medications





Acetaminophen (Tylenol 325mg Tab)  650 mg PO Q6 PRN


   PRN Reason: Pain, Mild (1-3)


Al Hydrox/Mg Hydrox/Simethicone (Maalox Plus 30 Ml)  30 ml PO DAILY PRN


   PRN Reason: Indigestion / Heartburn


Amlodipine Besylate (Norvasc)  10 mg PO QAM [facility]


   Last Admin: 04/05/18 08:30 Dose:  10 mg


Amlodipine Besylate (Norvasc)  10 mg PO DAILY [facility]


Aspirin (Ecotrin)  81 mg PO 0800 [facility]


Atorvastatin Calcium (Lipitor)  40 mg PO DIN [facility]


   Last Admin: 04/04/18 18:49 Dose:  40 mg


Chlorpromazine (Thorazine)  50 mg PO Q6 PRN; Protocol


   PRN Reason: Hiccups


Docusate Sodium (Colace)  100 mg PO DAILY [facility]


   Last Admin: 04/05/18 09:42 Dose:  Not Given


Escitalopram Oxalate (Lexapro)  5 mg PO QAM [facility]


   Last Admin: 04/05/18 09:43 Dose:  Not Given


Famotidine (Pepcid)  20 mg PO QAM [facility]


   Last Admin: 04/06/18 05:11 Dose:  20 mg


Heparin Sodium (Porcine) (Heparin)  5,000 units SC Q12 [facility]


   PRN Reason: Protocol


   Last Admin: 04/04/18 22:19 Dose:  5,000 units


Hydromorphone HCl (Dilaudid)  0.5 mg IVP Q4H PRN


   PRN Reason: Pain, moderate (4-7)


   Last Admin: 04/05/18 12:10 Dose:  0.5 mg


Hydromorphone HCl (Dilaudid)  1.5 mg IVP Q3 PRN


   PRN Reason: Pain, severe (8-10)


   Last Admin: 04/06/18 07:12 Dose:  1.5 mg


Meropenem (Merrem Iv 1 Gm Premix)  50 mls @ 100 mls/hr IVPB Q8 [facility]


   PRN Reason: Protocol


   Stop: 04/11/18 22:01


   Last Admin: 04/05/18 14:17 Dose:  100 mls/hr


Vancomycin HCl (Vancomycin 1gm)  1 gm in 250 mls @ 167 mls/hr IVPB Q12H [facility]


   PRN Reason: Protocol


   Last Admin: 04/05/18 05:36 Dose:  167 mls/hr


Insulin Human Regular (Humulin R Low)  0 units SC ACHS [facility]


   PRN Reason: Protocol


   Last Admin: 04/05/18 17:54 Dose:  Not Given


Insulin Lispro Protam/Lispro Human (Humalog Mix 75/25)  8 units SC ACBD [facility]


   Last Admin: 04/05/18 17:54 Dose:  Not Given


Metoprolol Tartrate (Lopressor)  50 mg PO 0800,1800 [facility]


   Last Admin: 04/05/18 08:28 Dose:  50 mg


Non-Formulary Medication (Hydrocodone Bitartrate [Hysingla Er])  20 mg PO DAILY 

[facility]


   Last Admin: 04/05/18 09:43 Dose:  Not Given


Mycophenolate Sodium [Myfortic] 180mg ( Home Med)  720 mg PO BID [facility]


Ondansetron HCl (Zofran Inj)  4 mg IVP Q4H PRN


   PRN Reason: Nausea/Vomiting


   Last Admin: 04/06/18 05:11 Dose:  4 mg


Oxycodone HCl (Oxycodone Immediate Release Tab)  10 mg PO Q6H PRN


   PRN Reason: Pain, moderate (4-7)


   Last Admin: 04/04/18 20:35 Dose:  10 mg


Polyethylene Glycol (Miralax)  17 gm PO BID [facility]


   Last Admin: 04/05/18 10:11 Dose:  Not Given


Prednisone (Prednisone Tab)  5 mg PO 0800 [facility]


   Last Admin: 04/05/18 08:28 Dose:  5 mg


Pregabalin (Lyrica)  100 mg PO BID [facility]


   Last Admin: 04/05/18 23:46 Dose:  100 mg


Sodium Hypochlorite (Dakins Solution 0.25%)  0 ml TOP DAILY [facility]


   Last Admin: 04/05/18 14:19 Dose:  1 applic


Tacrolimus (Prograf Cap)  0.5 mg PO HS [facility]


   Last Admin: 04/04/18 21:59 Dose:  0.5 mg


Tacrolimus (Prograf Cap)  2 mg PO BID [facility]


   Last Admin: 04/05/18 09:43 Dose:  2 mg


Tramadol HCl (Ultram)  50 mg PO Q4 PRN


   PRN Reason: Pain, moderate (4-7)


Trimethoprim/Sulfamethoxazole (Bactrim Ss Tab)  1 tab PO DAILY [facility]


   PRN Reason: Protocol


   Last Admin: 04/05/18 09:42 Dose:  Not Given











- Labs


Labs: 


 





 04/06/18 07:00 





 04/06/18 06:30 





 











PT  13.1 SECONDS (9.4-12.5)  H  04/05/18  06:00    


 


INR  1.14  (0.93-1.08)  H  04/05/18  06:00    


 


APTT  35.3 Seconds (25.1-36.5)   04/05/18  06:00    














- Constitutional


Appears: Non-toxic, No Acute Distress, Chronically Ill





- Head Exam


Head Exam: ATRAUMATIC, NORMOCEPHALIC





- Eye Exam


Eye Exam: EOMI, Normal appearance





- Respiratory Exam


Respiratory Exam: NORMAL BREATHING PATTERN.  absent: Respiratory Distress





- Cardiovascular Exam


Cardiovascular Exam: REGULAR RHYTHM.  absent: Tachycardia





- GI/Abdominal Exam


GI & Abdominal Exam: Soft.  absent: Distended, Guarding, Tenderness





- Extremities Exam


Additional comments: 





right BKA with knee immobilizer in place. ACE wrap clean and dry. 





Left heal dressing CDI 





- Psychiatric Exam


Psychiatric exam: Anxious





Assessment and Plan





- Assessment and Plan (Free Text)


Assessment: 





58 y/o male s/p right BKA POD1 for dry gangrene


Plan: 





-keep knee immobilizer in place 


-will change dressing Saturday 


-cont pain medication, will consider PCA if pain remains an issue 


-cont reg diet


-zofran for nausea


-PT 


-prelim wound culture with gram neg bacteria, will f/u final result


-cont abx 


-d/w Dr. Kenyon [facility] PGY3

## 2018-04-06 NOTE — OP
PROCEDURE DATE:  04/05/2018



PREOPERATIVE DIAGNOSES:  Right foot gangrene and ischemia.



POSTOPERATIVE DIAGNOSES:  Right foot gangrene and ischemia.



PROCEDURE PERFORMED:  Right below-the-knee amputation.



SURGEON:  Yovany Prescott MD



ASSISTANT:  Dr. Reed.



ANESTHESIOLOGIST:  Dr. Rodríguez.



ANESTHESIA:  General endotracheal anesthesia.



ESTIMATED BLOOD LOSS:  Minimal.



SPECIMEN:  Right foot and lower portion of the calf.



INDICATION:  Patient is a 57-year-old male with history of severe

peripheral arterial disease.  Patient was treated with multiple

Interventional Radiology procedures; however, after recent gangrene of the

heel on the right foot, the patient never was able to recover from it and

was not able to heal the wound debridement.  Patient now comes in with pain

and cold foot with gangrene.  Patient was scheduled for the below-the-knee

amputation.  I have discussed this with the patient and recommended to have

above-the-knee amputation as I feel that the below-the-knee amputation

possibly could not heal; however, patient was insistent to proceed with the

below-the-knee amputation first, and if it did not work, then he would like

to proceed with further amputation.



DESCRIPTION OF PROCEDURE:  The patient was brought to the operating room,

placed on the operating table in supine position.  The patient was

connected to the EKG, blood pressure, and pulse oximetry monitors.  The

patient then underwent general endotracheal anesthesia and was prepped and

draped in usual sterile fashion.



First, standard time-out procedure took place, and everybody in the room

agreed as to the patient's identity, diagnosis, and the procedure to be

performed.



The patient's risks explained, and surgical plan for the procedure and

postoperative course was also presented.



First, using #15 blade, an incision was made along the lines previously

marked for the amputation below the knee.  The amputation was marked to be

out about five fingerbreadths below the tibial tuberosity.  The incision

was carried through the skin and fat to down to the fascia.  The above

gastrocnemius and gastrocs muscles were transected down to the deep layers

of the muscle.  All the vessels and nerves were carefully ligated as they

were encountered.  Once all the muscles and vessels and nerves were

divided, the fibula was cut about half an inch above the level of the

tibia, which was also cut with reciprocating saw.  The edges were smoothed

with filer, and the wound was copiously irrigated.  Once this was done, the

flap was brought over the tip of the bone, and the fascia was closed using

2-0 Vicryl in a running interrupted fashion.  The skin was reapproximated

using 3-0 Vicryl, and the skin was then closed completely with surgical

staples.  A Xeroform gauze was applied to the wound.  4 x 4s and Kerlix

were applied on top of that, and the foot itself was wrapped with Ace

bandage.  The patient tolerated the procedure well, and there were no

complications.  Patient was awakened, extubated, and transferred to the

recovery room for further observation.





__________________________________________

Yovany Prescott MD



DD:  04/05/2018 20:12:47

DT:  04/05/2018 22:19:14

Job # [id number]

## 2018-04-06 NOTE — CP.PCM.PN
Subjective





- Date & Time of Evaluation


Date of Evaluation: 04/06/18


Time of Evaluation: 09:20





- Subjective


Subjective: 





Patient underwent right BKA yesterday, still in pain, no fevers, no nausea.





Objective





- Vital Signs/Intake and Output


Vital Signs (last 24 hours): 


 











Temp Pulse Resp BP Pulse Ox


 


 98 F   80   26 H  116/60   94 L


 


 04/05/18 22:00  04/05/18 22:00  04/05/18 22:00  04/05/18 22:00  04/05/18 22:00








Intake and Output: 


 











 04/06/18 04/06/18





 06:59 18:59


 


Intake Total 120 


 


Output Total 200 


 


Balance -80 














- Medications


Medications: 


 Current Medications





Acetaminophen (Tylenol 325mg Tab)  650 mg PO Q6 PRN


   PRN Reason: Pain, Mild (1-3)


Al Hydrox/Mg Hydrox/Simethicone (Maalox Plus 30 Ml)  30 ml PO DAILY PRN


   PRN Reason: Indigestion / Heartburn


[facility]odipine [facility] ([facility])  [facility]


   Last Admin: 04/05/18 08:30 Dose:  10 mg


Amlodipine Besylate (Norvasc)  10 mg PO DAILY [facility]


Aspirin (Ecotrin)  81 mg PO 0800 [facility]


Atorvastatin Calcium (Lipitor)  40 mg PO DIN [facility]


   Last Admin: 04/04/18 18:49 Dose:  40 mg


Chlorpromazine (Thorazine)  50 mg PO Q6 PRN; Protocol


   PRN Reason: Hiccups


Docusate Sodium (Colace)  100 mg PO DAILY [facility]


   Last Admin: 04/05/18 09:42 Dose:  Not Given


Escitalopram Oxalate (Lexapro)  [facility]


   Last Admin: 04/05/18 09:43 Dose:  Not Given


Famotidine (Pepcid)  [facility]


   Last Admin: 04/06/18 05:11 Dose:  20 mg


Heparin Sodium (Porcine) (Heparin)  5,000 units SC Q12 [facility]


   PRN Reason: Protocol


   Last Admin: 04/04/18 22:19 Dose:  5,000 units


Hydromorphone HCl (Dilaudid)  0.5 mg IVP Q4H PRN


   PRN Reason: Pain, moderate (4-7)


   Last Admin: 04/05/18 12:10 Dose:  0.5 mg


Hydromorphone HCl (Dilaudid)  1.5 mg IVP Q3 PRN


   PRN Reason: Pain, severe (8-10)


   Last Admin: 04/06/18 07:12 Dose:  1.5 mg


Meropenem (Merrem Iv 1 Gm Premix)  50 mls @ 100 mls/hr IVPB Q8 [facility]


   PRN Reason: Protocol


   Stop: 04/11/18 22:01


   Last Admin: 04/05/18 14:17 Dose:  100 mls/hr


Vancomycin HCl (Vancomycin 1gm)  1 gm in 250 mls @ 167 mls/hr IVPB Q12H [facility]


   PRN Reason: Protocol


   Last Admin: 04/05/18 05:36 Dose:  167 mls/hr


Insulin Human Regular (Humulin R Low)  0 units SC ACHS [facility]


   PRN Reason: Protocol


   Last Admin: 04/05/18 17:54 Dose:  Not Given


Insulin Lispro Protam/Lispro Human (Humalog Mix 75/25)  8 units SC ACBD [facility]


   Last Admin: 04/05/18 17:54 Dose:  Not Given


Metoprolol Tartrate (Lopressor)  50 mg PO 0800,1800 [facility]


   Last Admin: 04/05/18 08:28 Dose:  50 mg


Non-Formulary Medication (Hydrocodone Bitartrate [Hysingla Er])  20 mg PO DAILY 

[facility]


   Last Admin: 04/05/18 09:43 Dose:  Not Given


Mycophenolate Sodium [Myfortic] 180mg ( Home Med)  720 mg PO BID [facility]


Ondansetron HCl (Zofran Inj)  4 mg IVP Q4H PRN


   PRN Reason: Nausea/Vomiting


   Last Admin: 04/06/18 05:11 Dose:  4 mg


Oxycodone HCl (Oxycodone Immediate Release Tab)  10 mg PO Q6H PRN


   PRN Reason: Pain, moderate (4-7)


   Last Admin: 04/04/18 20:35 Dose:  10 mg


Polyethylene Glycol (Miralax)  17 gm PO BID [facility]


   Last Admin: 04/05/18 10:11 Dose:  Not Given


Prednisone (Prednisone Tab)  5 mg PO 0800 [facility]


   Last Admin: 04/05/18 08:28 Dose:  5 mg


Pregabalin (Lyrica)  100 mg PO BID [facility]


   Last Admin: 04/05/18 23:46 Dose:  100 mg


Sodium Hypochlorite (Dakins Solution 0.25%)  0 ml TOP DAILY [facility]


   Last Admin: 04/05/18 14:19 Dose:  1 applic


Tacrolimus (Prograf Cap)  0.5 mg PO HS [facility]


   Last Admin: 04/04/18 21:59 Dose:  0.5 mg


Tacrolimus (Prograf Cap)  2 mg PO BID [facility]


   Last Admin: 04/05/18 09:43 Dose:  2 mg


Tramadol HCl (Ultram)  50 mg PO Q4 PRN


   PRN Reason: Pain, moderate (4-7)


Trimethoprim/Sulfamethoxazole (Bactrim Ss Tab)  1 tab PO DAILY [facility]


   PRN Reason: Protocol


   Last Admin: 04/05/18 09:42 Dose:  Not Given











- Labs


Labs: 


 





 04/06/18 07:00 





 04/06/18 06:30 





 











PT  13.1 SECONDS (9.4-12.5)  H  04/05/18  06:00    


 


INR  1.14  (0.93-1.08)  H  04/05/18  06:00    


 


APTT  35.3 Seconds (25.1-36.5)   04/05/18  06:00    














- Constitutional


Appears: Chronically Ill, Other (in pain)





- Head Exam


Head Exam: NORMAL INSPECTION





- Neck Exam


Neck Exam: absent: Meningismus





- Respiratory Exam


Respiratory Exam: Decreased Breath Sounds.  absent: Rales





- Cardiovascular Exam


Cardiovascular Exam: +S1, +S2





- GI/Abdominal Exam


GI & Abdominal Exam: Soft.  absent: Tenderness





- Extremities Exam


Additional comments: 





right lower extremity with dressings and immobilizer in place; left TMA stump 

with dry dressings in place





Assessment and Plan





- Assessment and Plan (Free Text)


Plan: 





Assessment


right foot necrosis and gangrene, severe skin and skin structure infection S/P 

BKA POD #1 


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 

debridement and closure; S/P transmetatarsal amputation previously, grew 

Enterobacter S/P wound vacuum placement


right heel ulcer S/P debridement


S/P Left 5th metatarsal osteomyelitis S/P debridement and bone excision grew 

Acinetobacter and E. faecalis


CAD S/P CABG


chronic CHF


DM


COPD


S/P AICD placement


S/P kidney transplant





Plan


on Vancomycin and Merrem - now that the BKA has been done, will keep 

antibiotics for 24-48 hours and then d/c


will continue to monitor clinically

## 2018-04-06 NOTE — CP.PCM.PN
Subjective





- Date & Time of Evaluation


Date of Evaluation: 04/06/18


Time of Evaluation: 07:00





- Subjective


Subjective: 


Stable on 5R. S/P right BKA. Doing OK. o CP or SOB. + Op pain.





V/S noted.





PE:





Lungs: clear


Cor.: S1S2


Abd.: soft


Ext: Right leg bandaged. Left foot bandaged


Neuro.: alert





I/O= 370/1650





Labs: BMP pending, H/H 11/34, WBC= 12,200








Objective





- Vital Signs/Intake and Output


Vital Signs (last 24 hours): 


 











Temp Pulse Resp BP Pulse Ox


 


 98 F   80   26 H  116/60   94 L


 


 04/05/18 22:00  04/05/18 22:00  04/05/18 22:00  04/05/18 22:00  04/05/18 22:00








Intake and Output: 


 











 04/06/18 04/06/18





 06:59 18:59


 


Intake Total 120 


 


Output Total 200 


 


Balance -80 














- Medications


Medications: 


 Current Medications





Acetaminophen (Tylenol 325mg Tab)  650 mg PO Q6 PRN


   PRN Reason: Pain, Mild (1-3)


Al Hydrox/Mg Hydrox/Simethicone (Maalox Plus 30 Ml)  30 ml PO DAILY PRN


   PRN Reason: Indigestion / Heartburn


Amlodipine Besylate (Norvasc)  10 mg PO QAM [facility]


   Last Admin: 04/05/18 08:30 Dose:  10 mg


Aspirin (Ecotrin)  81 mg PO 0800 [facility]


Atorvastatin Calcium (Lipitor)  40 mg PO DIN [facility]


   Last Admin: 04/04/18 18:49 Dose:  40 mg


Chlorpromazine (Thorazine)  50 mg PO Q6 PRN; Protocol


   PRN Reason: Hiccups


Docusate Sodium (Colace)  100 mg PO DAILY [facility]


   Last Admin: 04/05/18 09:42 Dose:  Not Given


Escitalopram Oxalate (Lexapro)  5 mg PO QAM [facility]


   Last Admin: 04/05/18 09:43 Dose:  Not Given


Famotidine (Pepcid)  20 mg PO QAM [facility]


   Last Admin: 04/06/18 05:11 Dose:  20 mg


Heparin Sodium (Porcine) (Heparin)  5,000 units SC Q12 [facility]


   PRN Reason: Protocol


   Last Admin: 04/04/18 22:19 Dose:  5,000 units


Hydromorphone HCl (Dilaudid)  0.5 mg IVP Q4H PRN


   PRN Reason: Pain, moderate (4-7)


   Last Admin: 04/05/18 12:10 Dose:  0.5 mg


Hydromorphone HCl (Dilaudid)  1.5 mg IVP Q3 PRN


   PRN Reason: Pain, severe (8-10)


   Last Admin: 04/06/18 07:12 Dose:  1.5 mg


Meropenem (Merrem Iv 1 Gm Premix)  50 mls @ 100 mls/hr IVPB Q8 [facility]


   PRN Reason: Protocol


   Stop: 04/11/18 22:01


   Last Admin: 04/05/18 14:17 Dose:  100 mls/hr


Vancomycin HCl (Vancomycin 1gm)  1 gm in 250 mls @ 167 mls/hr IVPB Q12H [facility]


   PRN Reason: Protocol


   Last Admin: 04/05/18 05:36 Dose:  167 mls/hr


Dextrose/Sodium Chloride (Dextrose 5%/0.9% Ns 1000 Ml)  500 mls @ 40 mls/hr IV 

[id number] [facility]


   Last Admin: 04/05/18 09:55 Dose:  40 mls/hr


Insulin Human Regular (Humulin R Low)  0 units SC ACHS [facility]


   PRN Reason: Protocol


   Last Admin: 04/05/18 17:54 Dose:  Not Given


Insulin Lispro Protam/Lispro Human (Humalog Mix 75/25)  8 units SC ACBD [facility]


   Last Admin: 04/05/18 17:54 Dose:  Not Given


Metoprolol Tartrate (Lopressor)  50 mg PO 0800,1800 [facility]


   Last Admin: 04/05/18 08:28 Dose:  50 mg


Non-Formulary Medication (Hydrocodone Bitartrate [Hysingla Er])  20 mg PO DAILY 

[facility]


   Last Admin: 04/05/18 09:43 Dose:  Not Given


Mycophenolate Sodium [Myfortic] 180mg ( Home Med)  720 mg PO BID [facility]


Ondansetron HCl (Zofran Inj)  4 mg IVP Q4H PRN


   PRN Reason: Nausea/Vomiting


   Last Admin: 04/06/18 05:11 Dose:  4 mg


Oxycodone HCl (Oxycodone Immediate Release Tab)  10 mg PO Q6H PRN


   PRN Reason: Pain, moderate (4-7)


   Last Admin: 04/04/18 20:35 Dose:  10 mg


Polyethylene Glycol (Miralax)  17 gm PO BID [facility]


   Last Admin: 04/05/18 10:11 Dose:  Not Given


Prednisone (Prednisone Tab)  5 mg PO 0800 [facility]


   Last Admin: 04/05/18 08:28 Dose:  5 mg


Pregabalin (Lyrica)  100 mg PO BID [facility]


   Last Admin: 04/05/18 23:46 Dose:  100 mg


Sodium Hypochlorite (Dakins Solution 0.25%)  0 ml TOP DAILY [facility]


   Last Admin: 04/05/18 14:19 Dose:  1 applic


Tacrolimus (Prograf Cap)  0.5 mg PO HS [facility]


   Last Admin: 04/04/18 21:59 Dose:  0.5 mg


Tacrolimus (Prograf Cap)  2 mg PO BID [facility]


   Last Admin: 04/05/18 09:43 Dose:  2 mg


Tramadol HCl (Ultram)  50 mg PO Q4 PRN


   PRN Reason: Pain, moderate (4-7)


Trimethoprim/Sulfamethoxazole (Bactrim Ss Tab)  1 tab PO DAILY [facility]


   PRN Reason: Protocol


   Last Admin: 04/05/18 09:42 Dose:  Not Given











- Labs


Labs: 


 





 04/06/18 07:00 





 04/06/18 06:30 





 











PT  13.1 SECONDS (9.4-12.5)  H  04/05/18  06:00    


 


INR  1.14  (0.93-1.08)  H  04/05/18  06:00    


 


APTT  35.3 Seconds (25.1-36.5)   04/05/18  06:00    














Assessment and Plan





- Assessment and Plan (Free Text)


Assessment: 





S/P right BKA for necrotic heel and severe foot/heel pain


PAD/PVI/left TMA


CAD/MI/LVD/CABG


PPM


Diabetes


CKD/Renal transplant


COPD/Smoker








Plan:





Resume pre-op meds


As per Surgery, Dr. Yumiko Shaikh AM BMP

## 2018-04-06 NOTE — PN
DATE:  2018



SUBJECTIVE:  The patient has no complaints of any chest pain, no shortness

of breath.  He says his pain is controlled.  He has no headaches or

dizziness.



PHYSICAL EXAMINATION

VITAL SIGNS:  Temperature is 98, pulse of 80, blood pressure is 116/60,

respirations 20, O2 saturation 94%.

GENERAL:  The patient is lying in bed, flat, comfortable.

HEENT:  No oral lesion.  Anicteric sclerae.  Moist mucosa.

NECK:  No JVD, adenopathy, or thyromegaly.

CARDIOVASCULAR:  S1 and S2, regular.  No murmurs, rubs, or gallops.

LUNGS:  Clear to auscultation bilaterally.  No wheeze, rales, or rhonchi.

ABDOMEN:  Bowel sounds are positive, soft, nontender and nondistended.

EXTREMITIES:  No cyanosis, clubbing or edema.



ASSESSMENT:

1.  Right foot gangrene, status post below-knee amputation.

2.  Diabetes type 2.

3.  Status post  donor kidney transplant.

4.  Peripheral arterial disease.

5.  Dyslipidemia.

6.  Coronary artery disease, status post coronary artery bypass graft.

7.  Automated implantable cardioverter defibrillator/pacemaker.



PLAN:  The patient is currently comfortable with the creatinine yesterday

was stable.  The patient is on Colace for constipation.  He is going to be

on Dilaudid for pain.  The patient is on aspirin for his coronary artery

disease.  He is on insulin for his diabetes.  He is on Lipitor for his

dyslipidemia.  The patient is receiving Myfortic, prednisone and Prograf

for his immunosuppression for his kidney transplant.  The patient is on

Ultram.  _____ he is tolerating his diet.  He is going to have repeat blood

work done.  Currently, he is comfortable.







__________________________________________

Andrea Calderon MD



DD:  2018 6:20:10

DT:  2018 9:33:47

Job # [id number]

## 2018-04-07 RX ADMIN — POLYETHYLENE GLYCOL 3350 SCH GM: 17 POWDER, FOR SOLUTION ORAL at 17:13

## 2018-04-07 RX ADMIN — MEROPENEM SCH MLS/HR: 1 INJECTION INTRAVENOUS at 05:24

## 2018-04-07 RX ADMIN — INSULIN HUMAN SCH: 100 INJECTION, SOLUTION PARENTERAL at 08:44

## 2018-04-07 RX ADMIN — VANCOMYCIN HYDROCHLORIDE SCH MLS/HR: 1 INJECTION, POWDER, LYOPHILIZED, FOR SOLUTION INTRAVENOUS at 17:13

## 2018-04-07 RX ADMIN — POLYETHYLENE GLYCOL 3350 SCH GM: 17 POWDER, FOR SOLUTION ORAL at 09:36

## 2018-04-07 RX ADMIN — MEROPENEM SCH MLS/HR: 1 INJECTION INTRAVENOUS at 21:07

## 2018-04-07 RX ADMIN — MEROPENEM SCH MLS/HR: 1 INJECTION INTRAVENOUS at 13:30

## 2018-04-07 RX ADMIN — INSULIN HUMAN SCH: 100 INJECTION, SOLUTION PARENTERAL at 13:15

## 2018-04-07 RX ADMIN — OXYCODONE HYDROCHLORIDE PRN MG: 10 TABLET ORAL at 09:39

## 2018-04-07 RX ADMIN — INSULIN HUMAN SCH: 100 INJECTION, SOLUTION PARENTERAL at 21:16

## 2018-04-07 RX ADMIN — SODIUM HYPOCHLORITE SCH APPLIC: 2.5 SOLUTION TOPICAL at 17:51

## 2018-04-07 RX ADMIN — INSULIN LISPRO SCH UNIT: 100 INJECTION, SUSPENSION SUBCUTANEOUS at 08:43

## 2018-04-07 RX ADMIN — INSULIN LISPRO SCH UNIT: 100 INJECTION, SUSPENSION SUBCUTANEOUS at 17:10

## 2018-04-07 RX ADMIN — INSULIN HUMAN SCH UNITS: 100 INJECTION, SOLUTION PARENTERAL at 17:51

## 2018-04-07 RX ADMIN — VANCOMYCIN HYDROCHLORIDE SCH MLS/HR: 1 INJECTION, POWDER, LYOPHILIZED, FOR SOLUTION INTRAVENOUS at 05:24

## 2018-04-07 NOTE — CP.PCM.PN
Subjective





- Date & Time of Evaluation


Date of Evaluation: 04/07/18


Time of Evaluation: 11:25





- Subjective


Subjective: 





No fevers, still with pain in the right BKA stump but is less, no diarrhea, no 

nausea.





Objective





- Vital Signs/Intake and Output


Vital Signs (last 24 hours): 


 











Temp Pulse Resp BP Pulse Ox


 


 97.3 F L  84   18   141/90   99 


 


 04/06/18 14:00  04/06/18 17:46  04/06/18 14:00  04/06/18 17:46  04/06/18 14:00








Intake and Output: 


 











 04/07/18 04/07/18





 06:59 18:59


 


Intake Total 120 


 


Balance 120 














- Medications


Medications: 


 Current Medications





Acetaminophen (Tylenol 325mg Tab)  650 mg PO Q6 PRN


   PRN Reason: Pain, Mild (1-3)


Al Hydrox/Mg Hydrox/Simethicone (Maalox Plus 30 Ml)  30 ml PO DAILY PRN


   PRN Reason: Indigestion / Heartburn


   Last Admin: 04/06/18 18:34 Dose:  30 ml


Amlodipine Besylate (Norvasc)  10 mg PO DAILY [facility]


   Last Admin: 04/06/18 09:15 Dose:  Not Given


Aspirin (Ecotrin)  81 mg PO 0800 [facility]


   Last Admin: 04/06/18 09:11 Dose:  81 mg


Atorvastatin Calcium (Lipitor)  40 mg PO DIN [facility]


   Last Admin: 04/04/18 18:49 Dose:  40 mg


Chlorpromazine (Thorazine)  50 mg PO Q6 PRN; Protocol


   PRN Reason: Hiccups


Docusate Sodium (Colace)  100 mg PO DAILY [facility]


   Last Admin: 04/06/18 09:11 Dose:  100 mg


Escitalopram Oxalate (Lexapro)  5 mg PO QAM [facility]


   Last Admin: 04/06/18 09:11 Dose:  5 mg


Famotidine (Pepcid)  20 mg PO QAM [facility]


   Last Admin: 04/06/18 09:11 Dose:  20 mg


Heparin Sodium (Porcine) (Heparin)  5,000 units SC Q12 [facility]


   PRN Reason: Protocol


   Last Admin: 04/06/18 21:43 Dose:  5,000 units


Hydromorphone HCl (Dilaudid)  0.5 mg IVP Q4H PRN


   PRN Reason: Pain, moderate (4-7)


   Last Admin: 04/06/18 14:12 Dose:  0.5 mg


Meropenem (Merrem Iv 1 Gm Premix)  50 mls @ 100 mls/hr IVPB Q8 [facility]


   PRN Reason: Protocol


   Stop: 04/11/18 22:01


   Last Admin: 04/07/18 05:24 Dose:  100 mls/hr


Vancomycin HCl (Vancomycin 1gm)  1 gm in 250 mls @ 167 mls/hr IVPB Q12H [facility]


   PRN Reason: Protocol


   Last Admin: 04/07/18 05:24 Dose:  167 mls/hr


Hydromorphone HCl (Dilaudid 0.2 Mg/Ml Pca)  30 mls @ 0 mls/hr IV PRN PRN; 

Protocol


   PRN Reason: PCA PER MD ORDER


   Last Admin: 04/06/18 15:11 Dose:  0.0001 mls/hr


Insulin Human Regular (Humulin R Low)  0 units SC ACHS [facility]


   PRN Reason: Protocol


   Last Admin: 04/06/18 22:02 Dose:  Not Given


Insulin Lispro Protam/Lispro Human (Humalog Mix 75/25)  8 units SC ACBD [facility]


   Last Admin: 04/06/18 17:00 Dose:  8 unit


Ketorolac Tromethamine (Toradol)  15 mg IVP Q6 [facility]


   Stop: 04/08/18 12:01


   Last Admin: 04/07/18 05:25 Dose:  15 mg


Metoprolol Tartrate (Lopressor)  50 mg PO 0800,1800 [facility]


   Last Admin: 04/06/18 17:46 Dose:  50 mg


Non-Formulary Medication (Hydrocodone Bitartrate [Hysingla Er])  20 mg PO DAILY 

[facility]


   Last Admin: 04/06/18 10:00 Dose:  Not Given


Mycophenolate Sodium [Myfortic] 180mg ( Home Med)  720 mg PO BID [facility]


   Last Admin: 04/06/18 17:42 Dose:  720 mg


Ondansetron HCl (Zofran Inj)  4 mg IVP Q4H PRN


   PRN Reason: Nausea/Vomiting


   Last Admin: 04/06/18 05:11 Dose:  4 mg


Oxycodone HCl (Oxycodone Immediate Release Tab)  10 mg PO Q6H PRN


   PRN Reason: Pain, moderate (4-7)


   Last Admin: 04/06/18 22:13 Dose:  10 mg


Polyethylene Glycol (Miralax)  17 gm PO BID [facility]


   Last Admin: 04/06/18 17:40 Dose:  17 gm


Prednisone (Prednisone Tab)  5 mg PO 0800 [facility]


   Last Admin: 04/06/18 09:12 Dose:  5 mg


Pregabalin (Lyrica)  100 mg PO BID [facility]


   Last Admin: 04/06/18 17:40 Dose:  100 mg


Sodium Hypochlorite (Dakins Solution 0.25%)  0 ml TOP DAILY [facility]


   Last Admin: 04/06/18 11:52 Dose:  1 applic


Tacrolimus (Prograf Cap)  0.5 mg PO HS [facility]


   Last Admin: 04/06/18 21:42 Dose:  0.5 mg


Tacrolimus (Prograf Cap)  2 mg PO BID [facility]


   Last Admin: 04/06/18 18:34 Dose:  2 mg


Tramadol HCl (Ultram)  50 mg PO Q4 PRN


   PRN Reason: Pain, moderate (4-7)











- Labs


Labs: 


 





 04/06/18 07:00 





 04/06/18 06:30 





 











PT  13.1 SECONDS (9.4-12.5)  H  04/05/18  06:00    


 


INR  1.14  (0.93-1.08)  H  04/05/18  06:00    


 


APTT  35.3 Seconds (25.1-36.5)   04/05/18  06:00    














- Constitutional


Appears: Chronically Ill





- Head Exam


Head Exam: NORMAL INSPECTION





- ENT Exam


ENT Exam: Mucous Membranes Moist





- Neck Exam


Neck Exam: absent: Meningismus





- Respiratory Exam


Respiratory Exam: Decreased Breath Sounds





- Cardiovascular Exam


Cardiovascular Exam: +S1, +S2





- GI/Abdominal Exam


GI & Abdominal Exam: Soft.  absent: Tenderness





- Extremities Exam


Additional comments: 





right BKA stump with dressings in place





Assessment and Plan





- Assessment and Plan (Free Text)


Plan: 





Assessment


right foot necrosis and gangrene, severe skin and skin structure infection S/P 

BKA POD #2 


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 

debridement and closure; S/P transmetatarsal amputation previously, grew 

Enterobacter S/P wound vacuum placement


right heel ulcer S/P debridement


S/P Left 5th metatarsal osteomyelitis S/P debridement and bone excision grew 

Acinetobacter and E. faecalis


CAD S/P CABG


chronic CHF


DM


COPD


S/P AICD placement


S/P kidney transplant





Plan


on Vancomycin and Merrem - now that the BKA has been done, will keep 

antibiotics until today and then d/c


will continue to monitor clinically

## 2018-04-07 NOTE — CP.PCM.PN
Subjective





- Date & Time of Evaluation


Date of Evaluation: 04/07/18


Time of Evaluation: 09:24





- Subjective


Subjective: 





Surgery: Dr. Prescott 





Patient doing better today with PCA. He is tolerating diet. Denies n/v/f/c. 





Objective





- Vital Signs/Intake and Output


Vital Signs (last 24 hours): 


 











Temp Pulse Resp BP Pulse Ox


 


 97 F L  70   18   123/60   96 


 


 04/07/18 08:08  04/07/18 08:08  04/07/18 08:08  04/07/18 08:08  04/07/18 08:08








Intake and Output: 


 











 04/07/18 04/07/18





 06:59 18:59


 


Intake Total 120 


 


Balance 120 














- Medications


Medications: 


 Current Medications





Acetaminophen (Tylenol 325mg Tab)  650 mg PO Q6 PRN


   PRN Reason: Pain, Mild (1-3)


Al Hydrox/Mg Hydrox/Simethicone (Maalox Plus 30 Ml)  30 ml PO DAILY PRN


   PRN Reason: Indigestion / Heartburn


   Last Admin: 04/06/18 18:34 Dose:  30 ml


Amlodipine Besylate (Norvasc)  10 mg PO DAILY [facility]


   Last Admin: 04/06/18 09:15 Dose:  Not Given


Aspirin (Ecotrin)  81 mg PO 0800 [facility]


   Last Admin: 04/06/18 09:11 Dose:  81 mg


Atorvastatin Calcium (Lipitor)  40 mg PO DIN [facility]


   Last Admin: 04/04/18 18:49 Dose:  40 mg


Chlorpromazine (Thorazine)  50 mg PO Q6 PRN; Protocol


   PRN Reason: Hiccups


Docusate Sodium (Colace)  100 mg PO DAILY [facility]


   Last Admin: 04/06/18 09:11 Dose:  100 mg


Escitalopram Oxalate (Lexapro)  5 mg PO QAM [facility]


   Last Admin: 04/06/18 09:11 Dose:  5 mg


Famotidine (Pepcid)  20 mg PO QAM [facility]


   Last Admin: 04/06/18 09:11 Dose:  20 mg


Heparin Sodium (Porcine) (Heparin)  5,000 units SC Q12 [facility]


   PRN Reason: Protocol


   Last Admin: 04/06/18 21:43 Dose:  5,000 units


Hydromorphone HCl (Dilaudid)  0.5 mg IVP Q4H PRN


   PRN Reason: Pain, moderate (4-7)


   Last Admin: 04/06/18 14:12 Dose:  0.5 mg


Meropenem (Merrem Iv 1 Gm Premix)  50 mls @ 100 mls/hr IVPB Q8 [facility]


   PRN Reason: Protocol


   Stop: 04/11/18 22:01


   Last Admin: 04/07/18 05:24 Dose:  100 mls/hr


Vancomycin HCl (Vancomycin 1gm)  1 gm in 250 mls @ 167 mls/hr IVPB Q12H [facility]


   PRN Reason: Protocol


   Last Admin: 04/07/18 05:24 Dose:  167 mls/hr


Hydromorphone HCl (Dilaudid 0.2 Mg/Ml Pca)  30 mls @ 0 mls/hr IV PRN PRN; 

Protocol


   PRN Reason: PCA PER MD ORDER


   Last Admin: 04/06/18 15:11 Dose:  0.0001 mls/hr


Insulin Human Regular (Humulin R Low)  0 units SC ACHS [facility]


   PRN Reason: Protocol


   Last Admin: 04/07/18 08:44 Dose:  Not Given


Insulin Lispro Protam/Lispro Human (Humalog Mix 75/25)  8 units SC ACBD [facility]


   Last Admin: 04/07/18 08:43 Dose:  8 unit


Ketorolac Tromethamine (Toradol)  15 mg IVP Q6 [facility]


   Stop: 04/08/18 12:01


   Last Admin: 04/07/18 05:25 Dose:  15 mg


Metoprolol Tartrate (Lopressor)  50 mg PO 0800,1800 [facility]


   Last Admin: 04/06/18 17:46 Dose:  50 mg


Non-Formulary Medication (Hydrocodone Bitartrate [Hysingla Er])  20 mg PO DAILY 

[facility]


   Last Admin: 04/06/18 10:00 Dose:  Not Given


Mycophenolate Sodium [Myfortic] 180mg ( Home Med)  720 mg PO BID [facility]


   Last Admin: 04/06/18 17:42 Dose:  720 mg


Ondansetron HCl (Zofran Inj)  4 mg IVP Q4H PRN


   PRN Reason: Nausea/Vomiting


   Last Admin: 04/06/18 05:11 Dose:  4 mg


Oxycodone HCl (Oxycodone Immediate Release Tab)  10 mg PO Q6H PRN


   PRN Reason: Pain, moderate (4-7)


   Last Admin: 04/06/18 22:13 Dose:  10 mg


Polyethylene Glycol (Miralax)  17 gm PO BID [facility]


   Last Admin: 04/06/18 17:40 Dose:  17 gm


Prednisone (Prednisone Tab)  5 mg PO 0800 [facility]


   Last Admin: 04/06/18 09:12 Dose:  5 mg


Pregabalin (Lyrica)  100 mg PO BID [facility]


   Last Admin: 04/06/18 17:40 Dose:  100 mg


Sodium Hypochlorite (Dakins Solution 0.25%)  0 ml TOP DAILY [facility]


   Last Admin: 04/06/18 11:52 Dose:  1 applic


Tacrolimus (Prograf Cap)  0.5 mg PO HS [facility]


   Last Admin: 04/06/18 21:42 Dose:  0.5 mg


Tacrolimus (Prograf Cap)  2 mg PO BID [facility]


   Last Admin: 04/06/18 18:34 Dose:  2 mg


Tramadol HCl (Ultram)  50 mg PO Q4 PRN


   PRN Reason: Pain, moderate (4-7)











- Labs


Labs: 


 





 04/06/18 07:00 





 04/06/18 06:30 





 











PT  13.1 SECONDS (9.4-12.5)  H  04/05/18  06:00    


 


INR  1.14  (0.93-1.08)  H  04/05/18  06:00    


 


APTT  35.3 Seconds (25.1-36.5)   04/05/18  06:00    














- Constitutional


Appears: Non-toxic, Chronically Ill





- Head Exam


Head Exam: ATRAUMATIC, NORMOCEPHALIC





- Eye Exam


Eye Exam: EOMI, Normal appearance





- ENT Exam


ENT Exam: Mucous Membranes Moist





- Respiratory Exam


Respiratory Exam: NORMAL BREATHING PATTERN.  absent: Respiratory Distress





- Cardiovascular Exam


Cardiovascular Exam: REGULAR RHYTHM.  absent: Tachycardia





- Extremities Exam


Additional comments: 





right BKA w/ dressing in place and knee





- Skin


Skin Exam: Dry, Warm





Assessment and Plan





- Assessment and Plan (Free Text)


Assessment: 





58 y/o male s/p right BKA POD2 and chronic left foot wound


Plan: 





-knee immobilizer to stay in place 


-will change dressing today 


-Dakins to left foot wound 


-cont abx, culture showed Pseudomonas sensitive to cipro and gent. Abx per ID 


-cont diet 


-will de-escalate pain medications as tolerated 


-further recs per Dr. Teodoro ORTEGAviky PGY3

## 2018-04-07 NOTE — PN
DATE:  04/07/2018



SUBJECTIVE:  The patient was seen sitting in bed on 5R.  He is complaining

of significant pain at the site of his below-the-knee amputation.  He

denies any chest pain or dyspnea.



CURRENT MEDICATIONS:  Include Dilaudid p.r.n., Ecotrin, subcutaneous

heparin, insulin, Lipitor 40 mg daily, Lexapro, metoprolol 50 mg b.i.d.,

Lyrica, meropenem, MiraLax, Myfortic, Norvasc 10 mg daily, codeine p.r.n.,

Pepcid, prednisone 5 mg daily, Prograf 2 mg b.i.d. and 0.5 mg at bedtime,

Thorazine p.r.n., Toradol p.r.n. and vancomycin.



OBJECTIVE:

GENERAL:  He is a middle-aged man, who appears somewhat uncomfortable

secondary to pain.

VITAL SIGNS:  His blood pressure is 120/60 with a pulse of 70, respirations

14.  He is afebrile.

HEENT:  No JVD.

CHEST:  Clear to auscultation and percussion.

HEART:  PMI normal position.  No pathological murmur or gallops noted.

ABDOMEN:  Soft, nontender, normoactive bowel sounds.

EXTREMITIES:  His right BKA wound is dressed.  Dressing is also present on

his left foot.



DIAGNOSTIC DATA:  No blood work pending from this morning.



IMPRESSION:

1.  Peripheral vascular disease, status post right below-the-knee

amputation.

2.  Coronary artery disease, status post prior bypass surgery.

3.  Renal insufficiency, status post renal transplant.

4.  Poorly-controlled diabetes.

5.  Rest of problems as noted.



RECOMMENDATIONS:  Intensified analgesic therapy is advised.  His current

cardiac medications will continue unchanged.  His cardiac status appears

stable.  He is stable from that standpoint to initiate the rehabilitation

services once surgically cleared.  The need for aggressive risk factor

control and diet-controlled diabetes was discussed with him.  We will

continue to follow and make further recommendations as appropriate.





__________________________________________

Oliver Bradford MD





DD:  04/07/2018 9:23:22

DT:  04/07/2018 9:26:55

Job # [id number]

## 2018-04-08 LAB
BASOPHILS # BLD AUTO: 0.01 K/MM3 (ref 0–2)
BASOPHILS NFR BLD: 0.1 % (ref 0–3)
BUN SERPL-MCNC: 16 MG/DL (ref 7–21)
CALCIUM SERPL-MCNC: 9.4 MG/DL (ref 8.4–10.5)
EOSINOPHIL # BLD: 0.3 10*3/UL (ref 0–0.7)
EOSINOPHIL NFR BLD: 2.6 % (ref 1.5–5)
ERYTHROCYTE [DISTWIDTH] IN BLOOD BY AUTOMATED COUNT: 16 % (ref 11.5–14.5)
GFR NON-AFRICAN AMERICAN: > 60
GRANULOCYTES # BLD: 7.55 10*3/UL (ref 1.4–6.5)
GRANULOCYTES NFR BLD: 74.8 % (ref 50–68)
HGB BLD-MCNC: 10.9 G/DL (ref 14–18)
LYMPHOCYTES # BLD: 1.2 10*3/UL (ref 1.2–3.4)
LYMPHOCYTES NFR BLD AUTO: 11.9 % (ref 22–35)
MCH RBC QN AUTO: 26.8 PG (ref 25–35)
MCHC RBC AUTO-ENTMCNC: 32 G/DL (ref 31–37)
MCV RBC AUTO: 84 FL (ref 80–105)
MONOCYTES # BLD AUTO: 1.1 10*3/UL (ref 0.1–0.6)
MONOCYTES NFR BLD: 10.6 % (ref 1–6)
PLATELET # BLD: 295 10^3/UL (ref 120–450)
PMV BLD AUTO: 9.2 FL (ref 7–11)
RBC # BLD AUTO: 4.06 10^6/UL (ref 3.5–6.1)
WBC # BLD AUTO: 10.1 10^3/UL (ref 4.5–11)

## 2018-04-08 RX ADMIN — INSULIN LISPRO SCH UNIT: 100 INJECTION, SUSPENSION SUBCUTANEOUS at 08:34

## 2018-04-08 RX ADMIN — SODIUM HYPOCHLORITE SCH APPLIC: 2.5 SOLUTION TOPICAL at 10:03

## 2018-04-08 RX ADMIN — VANCOMYCIN HYDROCHLORIDE SCH MLS/HR: 1 INJECTION, POWDER, LYOPHILIZED, FOR SOLUTION INTRAVENOUS at 05:42

## 2018-04-08 RX ADMIN — INSULIN HUMAN SCH: 100 INJECTION, SOLUTION PARENTERAL at 18:11

## 2018-04-08 RX ADMIN — POLYETHYLENE GLYCOL 3350 SCH GM: 17 POWDER, FOR SOLUTION ORAL at 17:22

## 2018-04-08 RX ADMIN — CIPROFLOXACIN SCH MLS/HR: 2 INJECTION, SOLUTION INTRAVENOUS at 21:33

## 2018-04-08 RX ADMIN — CIPROFLOXACIN SCH MLS/HR: 2 INJECTION, SOLUTION INTRAVENOUS at 10:01

## 2018-04-08 RX ADMIN — POLYETHYLENE GLYCOL 3350 SCH GM: 17 POWDER, FOR SOLUTION ORAL at 10:01

## 2018-04-08 RX ADMIN — INSULIN LISPRO SCH UNIT: 100 INJECTION, SUSPENSION SUBCUTANEOUS at 17:20

## 2018-04-08 RX ADMIN — Medication PRN MLS/HR: at 00:05

## 2018-04-08 RX ADMIN — MEROPENEM SCH MLS/HR: 1 INJECTION INTRAVENOUS at 05:43

## 2018-04-08 RX ADMIN — INSULIN HUMAN SCH: 100 INJECTION, SOLUTION PARENTERAL at 17:17

## 2018-04-08 RX ADMIN — INSULIN HUMAN SCH: 100 INJECTION, SOLUTION PARENTERAL at 22:39

## 2018-04-08 RX ADMIN — INSULIN HUMAN SCH UNITS: 100 INJECTION, SOLUTION PARENTERAL at 08:33

## 2018-04-08 NOTE — CP.PCM.PN
Subjective





- Date & Time of Evaluation


Date of Evaluation: 04/08/18


Time of Evaluation: 08:10





- Subjective


Subjective: 





General Surgery:  Dr Prescott





Pt S&E.  TIESHA.  Continues to have pain in RLE at site of BKA however it is 

improved with PCA.  No other complaints.  Denies f/c, sob, n/v,  Pt is keeping 

leg elevated and in knee immobilizer.  Dressing changed at bedside - pt 

tolerated well.





Objective





- Vital Signs/Intake and Output


Vital Signs (last 24 hours): 


 











Temp Pulse Resp BP Pulse Ox


 


 97.2 F L  69   20   120/76   98 


 


 04/07/18 21:42  04/07/18 21:42  04/07/18 21:42  04/07/18 21:42  04/07/18 21:42








Intake and Output: 


 











 04/08/18 04/08/18





 06:59 18:59


 


Intake Total 1010 


 


Output Total 1000 


 


Balance 10 














- Medications


Medications: 


 Current Medications





Acetaminophen (Tylenol 325mg Tab)  650 mg PO Q6 PRN


   PRN Reason: Pain, Mild (1-3)


Al Hydrox/Mg Hydrox/Simethicone (Maalox Plus 30 Ml)  30 ml PO DAILY PRN


   PRN Reason: Indigestion / Heartburn


   Last Admin: 04/06/18 18:34 Dose:  30 ml


Amlodipine Besylate (Norvasc)  10 mg PO DAILY [facility]


   Last Admin: 04/07/18 09:38 Dose:  10 mg


Aspirin (Ecotrin)  81 mg PO 0800 [facility]


   Last Admin: 04/07/18 09:38 Dose:  81 mg


Atorvastatin Calcium (Lipitor)  40 mg PO DIN [facility]


   Last Admin: 04/07/18 17:13 Dose:  40 mg


Chlorpromazine (Thorazine)  50 mg PO Q6 PRN; Protocol


   PRN Reason: Hiccups


Clopidogrel Bisulfate (Plavix)  75 mg PO DAILY [facility]


   Last Admin: 04/07/18 13:30 Dose:  75 mg


Docusate Sodium (Colace)  100 mg PO DAILY [facility]


   Last Admin: 04/07/18 09:35 Dose:  100 mg


Escitalopram Oxalate (Lexapro)  5 mg PO QAM [facility]


   Last Admin: 04/07/18 09:37 Dose:  5 mg


Famotidine (Pepcid)  20 mg PO QAM [facility]


   Last Admin: 04/07/18 09:38 Dose:  20 mg


Heparin Sodium (Porcine) (Heparin)  5,000 units SC Q12 [facility]


   PRN Reason: Protocol


   Last Admin: 04/07/18 21:07 Dose:  5,000 units


Hydromorphone HCl (Dilaudid)  0.5 mg IVP Q4H PRN


   PRN Reason: Pain, moderate (4-7)


   Last Admin: 04/06/18 14:12 Dose:  0.5 mg


Meropenem (Merrem Iv 1 Gm Premix)  50 mls @ 100 mls/hr IVPB Q8 [facility]


   PRN Reason: Protocol


   Stop: 04/11/18 22:01


   Last Admin: 04/08/18 05:43 Dose:  100 mls/hr


Vancomycin HCl (Vancomycin 1gm)  1 gm in 250 mls @ 167 mls/hr IVPB Q12H [facility]


   PRN Reason: Protocol


   Last Admin: 04/08/18 05:42 Dose:  167 mls/hr


Hydromorphone HCl (Dilaudid 0.2 Mg/Ml Pca)  30 mls @ 0 mls/hr IV PRN PRN; 

Protocol


   PRN Reason: PCA PER MD ORDER


   Last Admin: 04/08/18 00:05 Dose:  0.0001 mls/hr


Insulin Human Regular (Humulin R Low)  0 units SC ACHS [facility]


   PRN Reason: Protocol


   Last Admin: 04/07/18 21:16 Dose:  Not Given


Insulin Lispro Protam/Lispro Human (Humalog Mix 75/25)  8 units SC ACBD [facility]


   Last Admin: 04/07/18 17:10 Dose:  8 unit


Ketorolac Tromethamine (Toradol)  15 mg IVP Q6 [facility]


   Stop: 04/08/18 12:01


   Last Admin: 04/08/18 05:43 Dose:  15 mg


Metoprolol Tartrate (Lopressor)  50 mg PO 0800,1800 [facility]


   Last Admin: 04/07/18 09:37 Dose:  50 mg


Non-Formulary Medication (Hydrocodone Bitartrate [Hysingla Er])  20 mg PO DAILY 

[facility]


   Last Admin: 04/07/18 11:20 Dose:  Not Given


Mycophenolate Sodium [Myfortic] 180mg ( Home Med)  720 mg PO BID [facility]


   Last Admin: 04/07/18 17:11 Dose:  720 mg


Ondansetron HCl (Zofran Inj)  4 mg IVP Q4H PRN


   PRN Reason: Nausea/Vomiting


   Last Admin: 04/06/18 05:11 Dose:  4 mg


Oxycodone HCl (Oxycodone Immediate Release Tab)  10 mg PO Q6H PRN


   PRN Reason: Pain, moderate (4-7)


   Last Admin: 04/07/18 09:39 Dose:  10 mg


Polyethylene Glycol (Miralax)  17 gm PO BID [facility]


   Last Admin: 04/07/18 17:13 Dose:  17 gm


Prednisone (Prednisone Tab)  5 mg PO 0800 [facility]


   Last Admin: 04/07/18 09:36 Dose:  5 mg


Pregabalin (Lyrica)  100 mg PO BID [facility]


   Last Admin: 04/07/18 17:13 Dose:  100 mg


Sodium Hypochlorite (Dakins Solution 0.25%)  0 ml TOP DAILY [facility]


   Last Admin: 04/07/18 17:51 Dose:  1 applic


Tacrolimus (Prograf Cap)  0.5 mg PO HS [facility]


   Last Admin: 04/07/18 21:07 Dose:  0.5 mg


Tacrolimus (Prograf Cap)  2 mg PO BID [facility]


   Last Admin: 04/07/18 17:11 Dose:  2 mg


Tramadol HCl (Ultram)  50 mg PO Q4 PRN


   PRN Reason: Pain, moderate (4-7)











- Labs


Labs: 


 





 04/08/18 06:30 





 04/08/18 06:30 





 











PT  13.1 SECONDS (9.4-12.5)  H  04/05/18  06:00    


 


INR  1.14  (0.93-1.08)  H  04/05/18  06:00    


 


APTT  35.3 Seconds (25.1-36.5)   04/05/18  06:00    














- Constitutional


Appears: Non-toxic, No Acute Distress





- Eye Exam


Eye Exam: Normal appearance





- ENT Exam


ENT Exam: Mucous Membranes Moist





- Respiratory Exam


Respiratory Exam: absent: Accessory Muscle Use, Respiratory Distress





- Cardiovascular Exam


Cardiovascular Exam: REGULAR RHYTHM.  absent: Tachycardia





- GI/Abdominal Exam


GI & Abdominal Exam: Soft.  absent: Distended, Tenderness





- Extremities Exam


Additional comments: 





right BKa incision c/d/i - cleaned with betadyne and new dressing re-applied.  

In immobilizer





left LE improved, less exudate - cleaned with Dakinz solution and new dressing 

re-applied





- Neurological Exam


Neurological Exam: Alert, Awake, Oriented x3





- Psychiatric Exam


Psychiatric exam: Normal Affect, Normal Mood





- Skin


Skin Exam: Normal Color, Warm





Assessment and Plan





- Assessment and Plan (Free Text)


Assessment: 





57M POD#2 s/p right bka


Plan: 





will continue dressing changes BID at bedside


cont PCA for pain


knee immobilizer to stay in place 





will d/w Dr Kenyon Chong, PGY3

## 2018-04-08 NOTE — PN
DATE:  04/07/2018



HISTORY OF PRESENT ILLNESS:  Mr. Mcqueen is a 57-year-old male, underwent

below-knee amputation on the right foot for gangrene.  He is complaining of

severe pain on the right stump.  He is very restless in the bed.  He also

has diabetes mellitus uncontrolled and peripheral vascular disease. 

Anemia, blood count stable, and also has leukocytosis.



ALLERGIES:  NO KNOWN DRUG ALLERGIES.



PAST MEDICAL HISTORY:  Status post renal transplant from donated kidney,

COPD, diabetes mellitus type 2, peripheral arterial disease, pacemaker,

diabetic retinopathy, coronary artery disease.



PAST SURGICAL HISTORY:  Renal transplant, left TMA of the foot.



SOCIAL HISTORY:  No smoking.  No history of alcohol abuse.



FAMILY HISTORY:  Not contributory.



MEDICATIONS:  Tylenol 650 every 4 hours p.r.n., Norvasc 10 mg daily,

aspirin 81 mg, Lipitor 40 mg daily, ciprofloxacin every 12, Plavix 75 mg

daily, Colace 100 mg daily, Lexapro 5 mg daily, Pepcid 20 mg daily, heparin

5000 every 12, Dilaudid p.r.n., Dilaudid PCA, metoprolol, oxycodone,

MiraLax, tramadol.



LABORATORY DATA:  White count 12.2, hemoglobin 11.4, hematocrit 34.6,

platelet 305.  Sodium 134, potassium 4.8, creatinine 0.9.



PHYSICAL EXAMINATION:

GENERAL:  Mild distress due to pain in right leg.

VITAL SIGNS:  Temperature 97.5, heart rate 70 per minute, blood pressure

125/75, respiratory rate 20 per minute, pulse ox 98% room air.

HEENT:  Pallor positive.

NECK:  No lymphadenopathy.

CHEST:  Air entry present and equal bilaterally.  No added sounds.

CARDIOVASCULAR:  S1 and S2 normal.  No murmur.  No gallop.

ABDOMEN:  Soft, nontender.  No hepatosplenomegaly.

EXTREMITIES:  Right extremity in dressing of the stump.  Left leg, no

edema.



ASSESSMENT:

1.  Right heel gangrene, status post below-knee amputation.

2.  Coronary artery disease, diabetes mellitus type 2, hyperlipidemia,

hypertension, pacemaker, sepsis.

3.  Anemia, leukocytosis.



PLAN:  Currently on Dilaudid PCA, complaining of severe pain.  Continue

Lipitor.  He is currently on Lyrica also, 100 mg b.i.d., we will continue

that.  Continue anti-rejection medication Prograf 2.5 mg b.i.d., and

prednisone 5 mg daily.  We will continue IV antibiotics, ciprofloxacin. 

Renal dosing.  Aspirin and Plavix.





__________________________________________

Yoly Smith MD



DD:  04/08/2018 19:57:48

DT:  04/08/2018 20:03:32

Job # [id number]

## 2018-04-08 NOTE — CP.PCM.PN
Subjective





- Date & Time of Evaluation


Date of Evaluation: 04/08/18


Time of Evaluation: 11:05





- Subjective


Subjective: 





No fevers, not in distress, afebrile. Still with pain along the BKA site. No 

diarrhea.





Objective





- Vital Signs/Intake and Output


Vital Signs (last 24 hours): 


 











Temp Pulse Resp BP Pulse Ox


 


 98.4 F   77   20   124/71   98 


 


 04/08/18 06:00  04/08/18 06:00  04/08/18 06:00  04/08/18 06:00  04/08/18 06:00








Intake and Output: 


 











 04/08/18 04/08/18





 06:59 18:59


 


Intake Total 1010 


 


Output Total 1000 


 


Balance 10 














- Medications


Medications: 


 Current Medications





Acetaminophen (Tylenol 325mg Tab)  650 mg PO Q6 PRN


   PRN Reason: Pain, Mild (1-3)


Al Hydrox/Mg Hydrox/Simethicone (Maalox Plus 30 Ml)  30 ml PO DAILY PRN


   PRN Reason: Indigestion / Heartburn


   Last Admin: 04/06/18 18:34 Dose:  30 ml


Amlodipine Besylate (Norvasc)  10 mg PO DAILY [facility]


   Last Admin: 04/07/18 09:38 Dose:  10 mg


Aspirin (Ecotrin)  81 mg PO 0800 [facility]


   Last Admin: 04/07/18 09:38 Dose:  81 mg


Atorvastatin Calcium (Lipitor)  40 mg PO DIN [facility]


   Last Admin: 04/07/18 17:13 Dose:  40 mg


Chlorpromazine (Thorazine)  50 mg PO Q6 PRN; Protocol


   PRN Reason: Hiccups


Clopidogrel Bisulfate (Plavix)  75 mg PO DAILY [facility]


   Last Admin: 04/07/18 13:30 Dose:  75 mg


Docusate Sodium (Colace)  100 mg PO DAILY [facility]


   Last Admin: 04/07/18 09:35 Dose:  100 mg


Escitalopram Oxalate (Lexapro)  5 mg PO QAM [facility]


   Last Admin: 04/07/18 09:37 Dose:  5 mg


Famotidine (Pepcid)  20 mg PO QAM [facility]


   Last Admin: 04/07/18 09:38 Dose:  20 mg


Heparin Sodium (Porcine) (Heparin)  5,000 units SC Q12 [facility]


   PRN Reason: Protocol


   Last Admin: 04/07/18 21:07 Dose:  5,000 units


Hydromorphone HCl (Dilaudid)  0.5 mg IVP Q4H PRN


   PRN Reason: Pain, moderate (4-7)


   Last Admin: 04/06/18 14:12 Dose:  0.5 mg


Hydromorphone HCl (Dilaudid 0.2 Mg/Ml Pca)  30 mls @ 0 mls/hr IV PRN PRN; 

Protocol


   PRN Reason: PCA PER MD ORDER


   Last Admin: 04/08/18 00:05 Dose:  0.0001 mls/hr


Insulin Human Regular (Humulin R Low)  0 units SC ACHS [facility]


   PRN Reason: Protocol


   Last Admin: 04/07/18 21:16 Dose:  Not Given


Insulin Lispro Protam/Lispro Human (Humalog Mix 75/25)  8 units SC ACBD [facility]


   Last Admin: 04/07/18 17:10 Dose:  8 unit


Ketorolac Tromethamine (Toradol)  15 mg IVP Q6 [facility]


   Stop: 04/08/18 12:01


   Last Admin: 04/08/18 05:43 Dose:  15 mg


Metoprolol Tartrate (Lopressor)  50 mg PO 0800,1800 [facility]


   Last Admin: 04/07/18 09:37 Dose:  50 mg


Non-Formulary Medication (Hydrocodone Bitartrate [Hysingla Er])  20 mg PO DAILY 

[facility]


   Last Admin: 04/07/18 11:20 Dose:  Not Given


Mycophenolate Sodium [Myfortic] 180mg ( Home Med)  720 mg PO BID [facility]


   Last Admin: 04/07/18 17:11 Dose:  720 mg


Ondansetron HCl (Zofran Inj)  4 mg IVP Q4H PRN


   PRN Reason: Nausea/Vomiting


   Last Admin: 04/06/18 05:11 Dose:  4 mg


Oxycodone HCl (Oxycodone Immediate Release Tab)  10 mg PO Q6H PRN


   PRN Reason: Pain, moderate (4-7)


   Last Admin: 04/07/18 09:39 Dose:  10 mg


Polyethylene Glycol (Miralax)  17 gm PO BID [facility]


   Last Admin: 04/07/18 17:13 Dose:  17 gm


Prednisone (Prednisone Tab)  5 mg PO 0800 [facility]


   Last Admin: 04/07/18 09:36 Dose:  5 mg


Pregabalin (Lyrica)  100 mg PO BID [facility]


   Last Admin: 04/07/18 17:13 Dose:  100 mg


Sodium Hypochlorite (Dakins Solution 0.25%)  0 ml TOP DAILY [facility]


   Last Admin: 04/07/18 17:51 Dose:  1 applic


Tacrolimus (Prograf Cap)  0.5 mg PO HS [facility]


   Last Admin: 04/07/18 21:07 Dose:  0.5 mg


Tacrolimus (Prograf Cap)  2 mg PO BID [facility]


   Last Admin: 04/07/18 17:11 Dose:  2 mg


Tramadol HCl (Ultram)  50 mg PO Q4 PRN


   PRN Reason: Pain, moderate (4-7)











- Labs


Labs: 


 





 04/08/18 06:30 





 04/08/18 06:30 





 











PT  13.1 SECONDS (9.4-12.5)  H  04/05/18  06:00    


 


INR  1.14  (0.93-1.08)  H  04/05/18  06:00    


 


APTT  35.3 Seconds (25.1-36.5)   04/05/18  06:00    














- Constitutional


Appears: Non-toxic, Chronically Ill





- Head Exam


Head Exam: NORMAL INSPECTION





- ENT Exam


ENT Exam: Mucous Membranes Moist





- Neck Exam


Neck Exam: absent: Meningismus





- Respiratory Exam


Respiratory Exam: Decreased Breath Sounds.  absent: Rales





- Cardiovascular Exam


Cardiovascular Exam: +S1, +S2





- GI/Abdominal Exam


GI & Abdominal Exam: Soft.  absent: Tenderness





- Extremities Exam


Additional comments: 





right BKA stump with dressings in place





Assessment and Plan





- Assessment and Plan (Free Text)


Plan: 








Assessment


right foot necrosis and gangrene, severe skin and skin structure infection S/P 

BKA POD #3 


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 

debridement and closure; S/P transmetatarsal amputation previously, grew 

Enterobacter S/P wound vacuum placement


right heel ulcer S/P debridement


S/P Left 5th metatarsal osteomyelitis S/P debridement and bone excision grew 

Acinetobacter and E. faecalis


CAD S/P CABG


chronic CHF


DM


COPD


S/P AICD placement


S/P kidney transplant





Plan


afebrile, no leukocytosis - will d/c Vancomycin and Merrem and observe, trend 

WBC count


will continue to monitor clinically

## 2018-04-08 NOTE — PN
DATE:  04/08/2018



SUBJECTIVE:  He is comfortable in bed.  The back pain is better controlled

today.  He is still on Dilaudid PCA.  Oral intake is good.  No events

overnight.  No fever.  No chest pain.  No cough with expectoration.



REVIEW OF SYSTEMS  As per HPI.  Rest of 12-point review of systems reviewed

and negative.



MEDICATIONS:  Tylenol 650 every 6 hours p.r.n., Norvasc 10 mg daily,

aspirin 81 mg daily, Lipitor 40 mg daily, ciprofloxacin IV b.i.d., Lexapro

5 mg daily, Pepcid 20 mg daily, heparin 5000 every 12 hour, Dilaudid PCA,

metoprolol 50 mg b.i.d. Zofran 4 mg IV every 4 hour p.r.n., prednisone 5 mg

daily, Prograf 2 mg p.o. b.i.d., tramadol 50 mg every 4 hour p.r.n. for

pain.



LABORATORY DATA:  White count 10,000, hemoglobin 10.9, hematocrit 34,

platelet 295.  Sodium 134, potassium 4.8, creatinine 0.9, glucose 330.



PHYSICAL EXAMINATION:

GENERAL:  Comfortable in bed, in no acute distress.

VITAL SIGNS:  Temperature 97, blood pressure 120/74, heart rate is 89 per

minute, respiratory rate 20 per minute on room air.

HEENT:  Pallor positive.

NECK:  No lymphadenopathy.

CHEST:  Air entry present and equal bilaterally.   No added sounds.

CARDIOVASCULAR:  S1, S2 normal.  No murmur.  No gallop.

ABDOMEN:  Soft, nontender.  No hepatosplenomegaly.

EXTREMITIES:  No edema.  Right extremity in dressing, status post

below-knee amputation.

CNS:  Alert, oriented x3.  No focal sensory motor deficit.

SKIN:  No petechiae.  No rash.

SPINE:  Nontender.



ASSESSMENT:

1.  Right foot gangrene status post amputation.

2.  Coronary artery disease.

3.  Diabetes mellitus type 2.

4.  Hypertension.

5.  Defibrillator.

6.  Chronic anemia.

7.  Pain.



PLAN  Pain is currently controlled with Dilaudid PCA.  We will continue

that.  Continue antirejection medication with Prograf and 5 mg of

prednisone.  Also, he is on Neurontin 100 mg p.o. b.i.d.  He is also on IV

antibiotics, ciprofloxacin at renal dosing, we will continue that.  Labs

ordered for tomorrow.







__________________________________________

Yoly Smith MD



DD:  04/08/2018 20:01:25

DT:  04/08/2018 20:05:26

[facility] # [id number]

## 2018-04-09 LAB
ALBUMIN SERPL-MCNC: 3.2 G/DL (ref 3–4.8)
ALBUMIN/GLOB SERPL: 1 {RATIO} (ref 1.1–1.8)
ALT SERPL-CCNC: 31 U/L (ref 7–56)
AST SERPL-CCNC: 98 U/L (ref 17–59)
BASOPHILS # BLD AUTO: 0.01 K/MM3 (ref 0–2)
BASOPHILS NFR BLD: 0.1 % (ref 0–3)
BNP SERPL-MCNC: (no result) PG/ML (ref 0–450)
BUN SERPL-MCNC: 15 MG/DL (ref 7–21)
CALCIUM SERPL-MCNC: 9.6 MG/DL (ref 8.4–10.5)
EOSINOPHIL # BLD: 0.2 10*3/UL (ref 0–0.7)
EOSINOPHIL NFR BLD: 0.8 % (ref 1.5–5)
ERYTHROCYTE [DISTWIDTH] IN BLOOD BY AUTOMATED COUNT: 15.8 % (ref 11.5–14.5)
GFR NON-AFRICAN AMERICAN: > 60
GRANULOCYTES # BLD: 15.51 10*3/UL (ref 1.4–6.5)
GRANULOCYTES NFR BLD: 86.4 % (ref 50–68)
HGB BLD-MCNC: 12.3 G/DL (ref 14–18)
LYMPHOCYTES # BLD: 1.1 10*3/UL (ref 1.2–3.4)
LYMPHOCYTES NFR BLD AUTO: 5.9 % (ref 22–35)
MCH RBC QN AUTO: 27.6 PG (ref 25–35)
MCHC RBC AUTO-ENTMCNC: 33 G/DL (ref 31–37)
MCV RBC AUTO: 83.8 FL (ref 80–105)
MONOCYTES # BLD AUTO: 1.2 10*3/UL (ref 0.1–0.6)
MONOCYTES NFR BLD: 6.8 % (ref 1–6)
PLATELET # BLD: 339 10^3/UL (ref 120–450)
PMV BLD AUTO: 9.4 FL (ref 7–11)
RBC # BLD AUTO: 4.45 10^6/UL (ref 3.5–6.1)
WBC # BLD AUTO: 18 10^3/UL (ref 4.5–11)

## 2018-04-09 RX ADMIN — INSULIN LISPRO SCH UNIT: 100 INJECTION, SUSPENSION SUBCUTANEOUS at 17:11

## 2018-04-09 RX ADMIN — SODIUM HYPOCHLORITE SCH APPLIC: 2.5 SOLUTION TOPICAL at 10:25

## 2018-04-09 RX ADMIN — INSULIN HUMAN SCH UNITS: 100 INJECTION, SOLUTION PARENTERAL at 12:05

## 2018-04-09 RX ADMIN — MEROPENEM SCH MLS/HR: 1 INJECTION INTRAVENOUS at 21:03

## 2018-04-09 RX ADMIN — CIPROFLOXACIN SCH MLS/HR: 2 INJECTION, SOLUTION INTRAVENOUS at 10:19

## 2018-04-09 RX ADMIN — HYDROMORPHONE HYDROCHLORIDE PRN MG: 1 INJECTION, SOLUTION INTRAMUSCULAR; INTRAVENOUS; SUBCUTANEOUS at 21:08

## 2018-04-09 RX ADMIN — INSULIN HUMAN SCH UNITS: 100 INJECTION, SOLUTION PARENTERAL at 22:24

## 2018-04-09 RX ADMIN — CIPROFLOXACIN SCH MLS/HR: 2 INJECTION, SOLUTION INTRAVENOUS at 22:25

## 2018-04-09 RX ADMIN — POLYETHYLENE GLYCOL 3350 SCH GM: 17 POWDER, FOR SOLUTION ORAL at 18:26

## 2018-04-09 RX ADMIN — INSULIN LISPRO SCH UNIT: 100 INJECTION, SUSPENSION SUBCUTANEOUS at 08:31

## 2018-04-09 RX ADMIN — INSULIN HUMAN SCH: 100 INJECTION, SOLUTION PARENTERAL at 09:51

## 2018-04-09 RX ADMIN — Medication PRN MLS/HR: at 00:00

## 2018-04-09 RX ADMIN — INSULIN HUMAN SCH UNITS: 100 INJECTION, SOLUTION PARENTERAL at 08:31

## 2018-04-09 RX ADMIN — OXYCODONE HYDROCHLORIDE PRN MG: 10 TABLET ORAL at 23:58

## 2018-04-09 RX ADMIN — POLYETHYLENE GLYCOL 3350 SCH GM: 17 POWDER, FOR SOLUTION ORAL at 10:19

## 2018-04-09 RX ADMIN — INSULIN HUMAN SCH UNITS: 100 INJECTION, SOLUTION PARENTERAL at 17:05

## 2018-04-09 RX ADMIN — MEROPENEM SCH MLS/HR: 1 INJECTION INTRAVENOUS at 14:23

## 2018-04-09 NOTE — CP.PCM.PN
Subjective





- Date & Time of Evaluation


Date of Evaluation: 04/09/18


Time of Evaluation: 07:44





- Subjective


Subjective: 


Nurse calls and tells that patient has SOB,feels congested,pulse ox 87% on RA, 

90-91% on 4L/min by nasal canula, T 98*F, HR 97/min, 129/66,FSBS 230 mg %


 Patient was seen when he was in bed # 560-01.


 Complains of sob,nausea, sweating , denies chest pain,  palpitation.


 Medical record was reviewed.


 This 57 year old male was admitted with worsening right heal and right foot 

pain, discharge from right 


 heel, foot,[place] right foot, leucocytosis, anemia. 


 Has PMH of HTN, DM II ,CHF,HLD, PVD,COPD,CAD,AICD, CKD, b/l chronic foot wounds

,S/P left TMA,renal transplant, tobacco use.





Objective





- Vital Signs/Intake and Output


Vital Signs (last 24 hours): 


 











Temp Pulse Resp BP Pulse Ox


 


 97.5 F L  77   20   129/66   98 


 


 04/08/18 14:00  04/08/18 17:23  04/08/18 14:00  04/09/18 06:53  04/08/18 14:00








Intake and Output: 


 











 04/09/18 04/09/18





 06:59 18:59


 


Intake Total 450 


 


Output Total 2350 


 


Balance -1900 














- Medications


Medications: 


 Current Medications





Acetaminophen (Tylenol 325mg Tab)  650 mg PO Q6 PRN


   PRN Reason: Pain, Mild (1-3)


Al Hydrox/Mg Hydrox/Simethicone (Maalox Plus 30 Ml)  30 ml PO DAILY PRN


   PRN Reason: Indigestion / Heartburn


   Last Admin: 04/06/18 18:34 Dose:  30 ml


Amlodipine Besylate (Norvasc)  10 mg PO DAILY [facility]


   Last Admin: 04/08/18 10:03 Dose:  10 mg


Aspirin (Ecotrin)  81 mg PO 0800 [facility]


   Last Admin: 04/08/18 10:03 Dose:  81 mg


Atorvastatin Calcium (Lipitor)  40 mg PO DIN [facility]


   Last Admin: 04/08/18 17:22 Dose:  40 mg


Chlorpromazine (Thorazine)  50 mg PO Q6 PRN; Protocol


   PRN Reason: Hiccups


Clopidogrel Bisulfate (Plavix)  75 mg PO DAILY [facility]


   Last Admin: 04/08/18 10:01 Dose:  75 mg


Docusate Sodium (Colace)  100 mg PO DAILY [facility]


   Last Admin: 04/08/18 10:01 Dose:  100 mg


Escitalopram Oxalate (Lexapro)  5 mg PO QAM [facility]


   Last Admin: 04/08/18 10:01 Dose:  5 mg


Famotidine (Pepcid)  20 mg PO QAM [facility]


   Last Admin: 04/08/18 10:01 Dose:  20 mg


Heparin Sodium (Porcine) (Heparin)  5,000 units SC Q12 [facility]


   PRN Reason: Protocol


   Last Admin: 04/08/18 21:33 Dose:  5,000 units


Hydromorphone HCl (Dilaudid)  0.5 mg IVP Q4H PRN


   PRN Reason: Pain, moderate (4-7)


   Last Admin: 04/06/18 14:12 Dose:  0.5 mg


Hydromorphone HCl (Dilaudid 0.2 Mg/Ml Pca)  30 mls @ 0 mls/hr IV PRN PRN; 

Protocol


   PRN Reason: PCA PER MD ORDER


   Last Admin: 04/09/18 00:00 Dose:  0.0001 mls/hr


Ciprofloxacin (Cipro 400mg/200ml Dsw)  400 mg in 200 mls @ 133.333 mls/hr IVPB 

Q12 [facility]


   PRN Reason: Protocol


   Last Admin: 04/08/18 21:33 Dose:  133.333 mls/hr


Insulin Human Regular (Humulin R Low)  0 units SC ACHS [facility]


   PRN Reason: Protocol


   Last Admin: 04/08/18 22:39 Dose:  Not Given


Insulin Lispro Protam/Lispro Human (Humalog Mix 75/25)  8 units SC ACBD [facility]


   Last Admin: 04/08/18 17:20 Dose:  8 unit


Metoprolol Tartrate (Lopressor)  50 mg PO 0800,1800 [facility]


   Last Admin: 04/08/18 17:23 Dose:  50 mg


Non-Formulary Medication (Hydrocodone Bitartrate [Hysingla Er])  20 mg PO DAILY 

[facility]


   Last Admin: 04/08/18 10:03 Dose:  Not Given


Mycophenolate Sodium [Myfortic] 180mg ( Home Med)  720 mg PO BID [facility]


   Last Admin: 04/08/18 17:25 Dose:  720 mg


Ondansetron HCl (Zofran Inj)  4 mg IVP Q4H PRN


   PRN Reason: Nausea/Vomiting


   Last Admin: 04/09/18 06:31 Dose:  4 mg


Oxycodone HCl (Oxycodone Immediate Release Tab)  10 mg PO Q6H PRN


   PRN Reason: Pain, moderate (4-7)


   Last Admin: 04/07/18 09:39 Dose:  10 mg


Polyethylene Glycol (Miralax)  17 gm PO BID [facility]


   Last Admin: 04/08/18 17:22 Dose:  17 gm


Prednisone (Prednisone Tab)  5 mg PO 0800 [facility]


   Last Admin: 04/08/18 10:03 Dose:  5 mg


Pregabalin (Lyrica)  100 mg PO BID [facility]


   Last Admin: 04/08/18 17:22 Dose:  100 mg


Sodium Hypochlorite (Dakins Solution 0.25%)  0 ml TOP DAILY [facility]


   Last Admin: 04/08/18 10:03 Dose:  1 applic


Tacrolimus (Prograf Cap)  0.5 mg PO HS [facility]


   Last Admin: 04/08/18 21:33 Dose:  0.5 mg


Tacrolimus (Prograf Cap)  2 mg PO BID [facility]


   Last Admin: 04/08/18 17:22 Dose:  2 mg


Tramadol HCl (Ultram)  50 mg PO Q4 PRN


   PRN Reason: Pain, moderate (4-7)











- Labs


Labs: 


 





 04/09/18 06:20 





 











PT  13.1 SECONDS (9.4-12.5)  H  04/05/18  06:00    


 


INR  1.14  (0.93-1.08)  H  04/05/18  06:00    


 


APTT  35.3 Seconds (25.1-36.5)   04/05/18  06:00    














- Constitutional


Appears: Well, No Acute Distress





- Head Exam


Head Exam: ATRAUMATIC, NORMAL INSPECTION, NORMOCEPHALIC





- Eye Exam


Eye Exam: Normal appearance





- ENT Exam


ENT Exam: Normal External Ear Exam





- Neck Exam


Neck Exam: Normal Inspection





- Respiratory Exam


Respiratory Exam: Rales (Bilateral.), Wheezes (Bilateral.)





- Cardiovascular Exam


Cardiovascular Exam: REGULAR RHYTHM.  absent: JVD





- GI/Abdominal Exam


GI & Abdominal Exam: absent: Distended





- Rectal Exam


Rectal Exam: Deferred





-  Exam


Additional comments: 





Deferred.





- Extremities Exam


Extremities Exam: Normal Inspection





- Back Exam


Back Exam: NORMAL INSPECTION





- Neurological Exam


Neurological Exam: Alert, Awake, Oriented x3





- Psychiatric Exam


Psychiatric exam: Normal Affect, Normal Mood





- Skin


Skin Exam: Normal Color





Assessment and Plan





- Assessment and Plan (Free Text)


Assessment: 





Dyspnea.


Nausea.


Congestion in chest.


CHF.


HTN.


DM II.


CHF.


HLD.


PVD.


COPD.


CAD.






































Plan: 





BNP with AM labs.


EKG.--->Paced rhythm, PVC.


CXR.


Zofran 4 mg IV x 1.


Solumedrol 60 mg IV x 1.


Duoneb neb treatment x 1.


Lasix 40 mg IV x 1.

## 2018-04-09 NOTE — PN
DATE:  2018



SUBJECTIVE:  The patient has no complaints of any chest pain.  No shortness

of breath.  No headaches.  He says the pain is controlled with pain

medications.



PHYSICAL EXAMINATION:

VITAL SIGNS:  Temperature is 97.5, pulse of 77, blood pressure is 125/75,

respirations 20.

GENERAL:  The patient is lying in bed, flat, comfortable.

HEENT:  No oral lesion.  Anicteric sclerae.  Moist mucosa.

NECK:  No JVD, adenopathy, or thyromegaly.

CARDIOVASCULAR:  S1 and S2, regular.  No murmurs, rubs, or gallops.

LUNGS:  Clear to auscultation bilaterally.  No wheeze, rales, or rhonchi.

ABDOMEN:  Bowel sounds are positive, soft, nontender and nondistended.

EXTREMITIES:  No cyanosis, clubbing or edema.



LABORATORY DATA:  White count of 10.1, hemoglobin 10.9, creatinine 0.9.



ASSESSMENT:

1.  Right foot gangrene, status post below-knee amputation.

2.  Diabetes type 2.

3.  Status post  donor kidney transplant.

4.  Peripheral arterial disease.

5.  Dyslipidemia.

6.  Coronary artery disease, status post coronary artery bypass graft.

7.  Automated implantable cardioverter defibrillator/pacemaker.



PLAN:  The patient is currently comfortable.  He is on pain medications. 

He is going to continue his ciprofloxacin for antibiotics.  He is on

Dilaudid for pain.  The patient is on heparin for DVT prophylaxis.  He is

going to continue Lipitor for dyslipidemia.  He is on Lexapro for his

anxiety.  He is receiving his immunosuppressant medications with

prednisone, Prograf and Myfortic.  The patient is going to continue with

Tylenol as needed.  He is also on Plavix.  He will most likely need

subacute rehab.





__________________________________________

Andrea Calderon MD





DD:  2018 6:33:09

DT:  2018 7:26:32

Job # [id number]

## 2018-04-09 NOTE — CP.PCM.PN
Subjective





- Date & Time of Evaluation


Date of Evaluation: 04/09/18


Time of Evaluation: 09:35





- Subjective


Subjective: 





Patient's WBC count went up, continues to have pain in the right BKA site, no 

fevers.





Objective





- Vital Signs/Intake and Output


Vital Signs (last 24 hours): 


 











Temp Pulse Resp BP Pulse Ox


 


 98 F   80   16   129/66   92 L


 


 04/09/18 07:55  04/09/18 08:32  04/09/18 07:55  04/09/18 08:32  04/09/18 07:55








Intake and Output: 


 











 04/09/18 04/09/18





 06:59 18:59


 


Intake Total 450 


 


Output Total 2350 


 


Balance -1900 














- Medications


Medications: 


 Current Medications





Acetaminophen (Tylenol 325mg Tab)  650 mg PO Q6 PRN


   PRN Reason: Pain, Mild (1-3)


Al Hydrox/Mg Hydrox/Simethicone (Maalox Plus 30 Ml)  30 ml PO DAILY PRN


   PRN Reason: Indigestion / Heartburn


   Last Admin: 04/06/18 18:34 Dose:  30 ml


Amlodipine Besylate (Norvasc)  10 mg PO DAILY [facility]


   Last Admin: 04/08/18 10:03 Dose:  10 mg


Aspirin (Ecotrin)  81 mg PO 0800 [facility]


   Last Admin: 04/09/18 08:32 Dose:  81 mg


Atorvastatin Calcium (Lipitor)  40 mg PO DIN [facility]


   Last Admin: 04/08/18 17:22 Dose:  40 mg


Chlorpromazine (Thorazine)  50 mg PO Q6 PRN; Protocol


   PRN Reason: Hiccups


Clopidogrel Bisulfate (Plavix)  75 mg PO DAILY [facility]


   Last Admin: 04/08/18 10:01 Dose:  75 mg


Docusate Sodium (Colace)  100 mg PO DAILY [facility]


   Last Admin: 04/08/18 10:01 Dose:  100 mg


Escitalopram Oxalate (Lexapro)  5 mg PO QAM [facility]


   Last Admin: 04/08/18 10:01 Dose:  5 mg


Famotidine (Pepcid)  20 mg PO QAM [facility]


   Last Admin: 04/08/18 10:01 Dose:  20 mg


Heparin Sodium (Porcine) (Heparin)  5,000 units SC Q12 [facility]


   PRN Reason: Protocol


   Last Admin: 04/08/18 21:33 Dose:  5,000 units


Hydromorphone HCl (Dilaudid)  0.5 mg IVP Q4H PRN


   PRN Reason: Pain, moderate (4-7)


   Last Admin: 04/06/18 14:12 Dose:  0.5 mg


Hydromorphone HCl (Dilaudid 0.2 Mg/Ml Pca)  30 mls @ 0 mls/hr IV PRN PRN; 

Protocol


   PRN Reason: PCA PER MD ORDER


   Last Admin: 04/09/18 00:00 Dose:  0.0001 mls/hr


Ciprofloxacin (Cipro 400mg/200ml Dsw)  400 mg in 200 mls @ 133.333 mls/hr IVPB 

Q12 [facility]


   PRN Reason: Protocol


   Last Admin: 04/08/18 21:33 Dose:  133.333 mls/hr


Insulin Human Regular (Humulin R Low)  0 units SC ACHS [facility]


   PRN Reason: Protocol


   Last Admin: 04/09/18 08:31 Dose:  2 units


Insulin Lispro Protam/Lispro Human (Humalog Mix 75/25)  8 units SC ACBD [facility]


   Last Admin: 04/09/18 08:31 Dose:  8 unit


Metoprolol Tartrate (Lopressor)  50 mg PO 0800,1800 [facility]


   Last Admin: 04/09/18 08:32 Dose:  50 mg


Non-Formulary Medication (Hydrocodone Bitartrate [Hysingla Er])  20 mg PO DAILY 

[facility]


   Last Admin: 04/08/18 10:03 Dose:  Not Given


Mycophenolate Sodium [Myfortic] 180mg ( Home Med)  720 mg PO BID [facility]


   Last Admin: 04/08/18 17:25 Dose:  720 mg


Ondansetron HCl (Zofran Inj)  4 mg IVP Q4H PRN


   PRN Reason: Nausea/Vomiting


   Last Admin: 04/09/18 06:31 Dose:  4 mg


Oxycodone HCl (Oxycodone Immediate Release Tab)  10 mg PO Q6H PRN


   PRN Reason: Pain, moderate (4-7)


   Last Admin: 04/07/18 09:39 Dose:  10 mg


Polyethylene Glycol (Miralax)  17 gm PO BID [facility]


   Last Admin: 04/08/18 17:22 Dose:  17 gm


Prednisone (Prednisone Tab)  5 mg PO 0800 [facility]


   Last Admin: 04/09/18 08:32 Dose:  5 mg


Pregabalin (Lyrica)  100 mg PO BID [facility]


   Last Admin: 04/08/18 17:22 Dose:  100 mg


Sodium Hypochlorite (Dakins Solution 0.25%)  0 ml TOP DAILY [facility]


   Last Admin: 04/08/18 10:03 Dose:  1 applic


Tacrolimus (Prograf Cap)  0.5 mg PO HS [facility]


   Last Admin: 04/08/18 21:33 Dose:  0.5 mg


Tacrolimus (Prograf Cap)  2 mg PO BID [facility]


   Last Admin: 04/08/18 17:22 Dose:  2 mg


Tramadol HCl (Ultram)  50 mg PO Q4 PRN


   PRN Reason: Pain, moderate (4-7)











- Labs


Labs: 


 





 04/09/18 06:20 





 04/09/18 06:20 





 











PT  13.1 SECONDS (9.4-12.5)  H  04/05/18  06:00    


 


INR  1.14  (0.93-1.08)  H  04/05/18  06:00    


 


APTT  35.3 Seconds (25.1-36.5)   04/05/18  06:00    














- Constitutional


Appears: Chronically Ill





- Head Exam


Head Exam: NORMAL INSPECTION





- Neck Exam


Neck Exam: absent: Meningismus





- Respiratory Exam


Respiratory Exam: Decreased Breath Sounds





- Cardiovascular Exam


Cardiovascular Exam: +S1, +S2





- GI/Abdominal Exam


GI & Abdominal Exam: Soft.  absent: Tenderness





- Extremities Exam


Additional comments: 





right BKA stump with dressings in place





Assessment and Plan





- Assessment and Plan (Free Text)


Plan: 





Assessment


right foot necrosis and gangrene, severe skin and skin structure infection S/P 

BKA POD #3 


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 

debridement and closure; S/P transmetatarsal amputation previously, grew 

Enterobacter S/P wound vacuum placement


right heel ulcer S/P debridement


S/P Left 5th metatarsal osteomyelitis S/P debridement and bone excision grew 

Acinetobacter and E. faecalis


CAD S/P CABG


chronic CHF


DM


COPD


S/P pacemaker placement


S/P kidney transplant





Plan


reviewed CXR which shows new right sided infiltrate - will restart Merrem and 

check blood cx, PCT


patient started by Surgery on Ciprofloxacin for the infected left TMA stump - 

discussed with Dr. Bradshaw about the risk of the patient since he has prolonged 

QT on the ventricular pacemaker


will continue to monitor clinically

## 2018-04-09 NOTE — CARD
--------------- APPROVED REPORT --------------





EKG Measurement

Heart [id number]

AK [id number]

[id number]

[id number]

[id number]



<Conclusion>

Electronic ventricular pacemaker: 100 % V. Paced, A. Sensed

No change

## 2018-04-09 NOTE — RAD
HISTORY:

feeks congested , hypoxia, 87 % on RA  



COMPARISON:

04/04/2018 



FINDINGS:



LUNGS:

There is a new right-sided perihilar infiltrate suspicious for 

pneumonia



PLEURA:

No significant pleural effusion identified, no pneumothorax apparent.



CARDIOVASCULAR:

Normal.



OSSEOUS STRUCTURES:

No significant abnormalities.



VISUALIZED UPPER ABDOMEN:

Normal.



OTHER FINDINGS:

Dual lead pacemaker.  Sternal wires



IMPRESSION:

There is a new right-sided perihilar infiltrate suspicious for 

pneumonia

## 2018-04-10 LAB
BUN SERPL-MCNC: 18 MG/DL (ref 7–21)
CALCIUM SERPL-MCNC: 9.3 MG/DL (ref 8.4–10.5)
ERYTHROCYTE [DISTWIDTH] IN BLOOD BY AUTOMATED COUNT: 16.1 % (ref 11.5–14.5)
GFR NON-AFRICAN AMERICAN: > 60
HGB BLD-MCNC: 11.8 G/DL (ref 14–18)
MCH RBC QN AUTO: 27.8 PG (ref 25–35)
MCHC RBC AUTO-ENTMCNC: 33.1 G/DL (ref 31–37)
MCV RBC AUTO: 84 FL (ref 80–105)
PLATELET # BLD: 303 10^3/UL (ref 120–450)
PMV BLD AUTO: 9.4 FL (ref 7–11)
RBC # BLD AUTO: 4.24 10^6/UL (ref 3.5–6.1)
WBC # BLD AUTO: 15 10^3/UL (ref 4.5–11)

## 2018-04-10 RX ADMIN — MEROPENEM SCH MLS/HR: 1 INJECTION INTRAVENOUS at 15:36

## 2018-04-10 RX ADMIN — HYDROMORPHONE HYDROCHLORIDE PRN MG: 1 INJECTION, SOLUTION INTRAMUSCULAR; INTRAVENOUS; SUBCUTANEOUS at 01:20

## 2018-04-10 RX ADMIN — INSULIN LISPRO SCH U: 100 INJECTION, SUSPENSION SUBCUTANEOUS at 17:12

## 2018-04-10 RX ADMIN — INSULIN LISPRO SCH UNIT: 100 INJECTION, SUSPENSION SUBCUTANEOUS at 08:16

## 2018-04-10 RX ADMIN — CIPROFLOXACIN SCH MLS/HR: 2 INJECTION, SOLUTION INTRAVENOUS at 10:54

## 2018-04-10 RX ADMIN — INSULIN HUMAN SCH UNITS: 100 INJECTION, SOLUTION PARENTERAL at 17:11

## 2018-04-10 RX ADMIN — HYDROMORPHONE HYDROCHLORIDE PRN MG: 1 INJECTION, SOLUTION INTRAMUSCULAR; INTRAVENOUS; SUBCUTANEOUS at 05:51

## 2018-04-10 RX ADMIN — SODIUM HYPOCHLORITE SCH APPLIC: 2.5 SOLUTION TOPICAL at 10:55

## 2018-04-10 RX ADMIN — LIDOCAINE HYDROCHLORIDE PRN ML: 20 SOLUTION ORAL; TOPICAL at 06:17

## 2018-04-10 RX ADMIN — INSULIN HUMAN SCH: 100 INJECTION, SOLUTION PARENTERAL at 22:00

## 2018-04-10 RX ADMIN — OXYCODONE AND ACETAMINOPHEN PRN TAB: 10; 325 TABLET ORAL at 17:10

## 2018-04-10 RX ADMIN — POLYETHYLENE GLYCOL 3350 SCH GM: 17 POWDER, FOR SOLUTION ORAL at 19:00

## 2018-04-10 RX ADMIN — INSULIN HUMAN SCH UNITS: 100 INJECTION, SOLUTION PARENTERAL at 12:11

## 2018-04-10 RX ADMIN — INSULIN HUMAN SCH UNITS: 100 INJECTION, SOLUTION PARENTERAL at 08:16

## 2018-04-10 RX ADMIN — MEROPENEM SCH MLS/HR: 1 INJECTION INTRAVENOUS at 21:37

## 2018-04-10 RX ADMIN — OXYCODONE AND ACETAMINOPHEN PRN TAB: 10; 325 TABLET ORAL at 22:37

## 2018-04-10 RX ADMIN — POLYETHYLENE GLYCOL 3350 SCH GM: 17 POWDER, FOR SOLUTION ORAL at 10:56

## 2018-04-10 RX ADMIN — MEROPENEM SCH MLS/HR: 1 INJECTION INTRAVENOUS at 05:46

## 2018-04-10 NOTE — PN
DATE:



SUBJECTIVE:  The patient has no complaints of any chest pain.  No shortness

of breath.  He says the pain is better controlled compared to yesterday.



PHYSICAL EXAMINATION

VITAL SIGNS:  Temperature is 98, pulse of 81, blood pressure is 107/64,

respirations 20.

GENERAL:  The patient is lying in bed, flat, comfortable.

HEENT:  No oral lesion.  Anicteric sclerae.  Moist mucosa.

NECK:  No JVD, adenopathy, or thyromegaly.

CARDIOVASCULAR:  S1 and S2, regular.  No murmurs, rubs, or gallops.

LUNGS:  Clear to auscultation bilaterally.  No wheeze, rales, or rhonchi.

ABDOMEN:  Bowel sounds are positive.  Soft, nontender and nondistended.

EXTREMITIES:  No cyanosis, clubbing or edema.  In the right leg, there is a

dressing in place.



ASSESSMENT

1.  Right foot gangrene, status post below-knee amputation.

2.  Diabetes type 2.

3.  Status post  donor kidney transplant.

4.  Peripheral arterial disease.

5.  Dyslipidemia.

6.  Coronary artery disease, status post coronary artery bypass graft.

7.  Automated implantable cardioverter defibrillator/pacemaker.

8.  Hospital-acquired pneumonia.



PLAN:  The patient is currently on ciprofloxacin for antibiotics.  The

patient is on Dilaudid for pain.  He is going to continue with aspirin for

his coronary artery disease.  He is on insulin for his diabetes.  He is on

Lexapro.  The patient is on metoprolol for his coronary artery disease as

well.  He is on meropenem for antibiotics.  He had Pseudomonas that was

growing on the foot cultured.  I did speak to Dr. Waterman from ID yesterday. 

Patient is on Lyrica for his neuropathy.  He is on Norvasc for

hypertension.  He is on his immunosuppressive medications with Prograf,

mycophenolate and prednisone.  The patient is on Zofran as needed.  He is

on a heart-healthy diet.  I did review the note from the house doctor

yesterday and also, I did communicate with the house staff regarding the

shortness of breath that the patient had.  Patient is getting local wound

care.  He is going to go to acute re[facility] at [facility] once he is stable.  He

had a chest x-ray that showed new infiltrate that is probably the cause of

his shortness of breath.  He does not have a fever, probably from his

immunosuppressives.  He does have an elevated white count of 18.



__________________________________________

Andrea Calderon MD

DD:  04/10/2018 6:22:57

DT:  04/10/2018 8:24:05

Job # [id number]

## 2018-04-10 NOTE — CP.PCM.PN
Subjective





- Date & Time of Evaluation


Date of Evaluation: 04/10/18


Time of Evaluation: 11:25





- Subjective


Subjective: 





Patient is not short of breath at rest, no fevers, not in distress. Still with 

pain along the right BKA site. No diarrhea.





Objective





- Vital Signs/Intake and Output


Vital Signs (last 24 hours): 


 











Temp Pulse Resp BP Pulse Ox


 


 97.6 F   70   20   118/67   94 L


 


 04/10/18 07:54  04/10/18 08:17  04/10/18 07:54  04/10/18 08:17  04/10/18 07:54








Intake and Output: 


 











 04/10/18 04/10/18





 06:59 18:59


 


Intake Total 1000 


 


Output Total 380 


 


Balance 620 














- Medications


Medications: 


 Current Medications





Acetaminophen (Tylenol 325mg Tab)  650 mg PO Q6 PRN


   PRN Reason: Pain, Mild (1-3)


Al Hydrox/Mg Hydrox/Simethicone (Maalox Plus 30 Ml)  30 ml PO DAILY PRN


   PRN Reason: Indigestion / Heartburn


   Last Admin: 04/10/18 06:17 Dose:  30 ml


Amlodipine Besylate (Norvasc)  10 mg PO DAILY [facility]


   Last Admin: 04/09/18 10:20 Dose:  10 mg


Aspirin (Ecotrin)  81 mg PO 0800 [facility]


   Last Admin: 04/10/18 08:17 Dose:  81 mg


Atorvastatin Calcium (Lipitor)  40 mg PO DIN [facility]


   Last Admin: 04/09/18 17:10 Dose:  40 mg


Chlorpromazine (Thorazine)  50 mg PO Q6 PRN; Protocol


   PRN Reason: Hiccups


Clopidogrel Bisulfate (Plavix)  75 mg PO DAILY [facility]


   Last Admin: 04/09/18 10:15 Dose:  75 mg


Docusate Sodium (Colace)  100 mg PO DAILY [facility]


   Last Admin: 04/09/18 10:15 Dose:  100 mg


Escitalopram Oxalate (Lexapro)  5 mg PO QAM [facility]


   Last Admin: 04/09/18 10:14 Dose:  5 mg


Famotidine (Pepcid)  20 mg PO QAM [facility]


   Last Admin: 04/09/18 10:15 Dose:  20 mg


Fentanyl (Duragesic)  1 patch TD Q72H [facility]


   Last Admin: 04/09/18 10:13 Dose:  1 patch


Heparin Sodium (Porcine) (Heparin)  5,000 units SC Q12 [facility]


   PRN Reason: Protocol


   Last Admin: 04/09/18 21:03 Dose:  5,000 units


Ciprofloxacin (Cipro 400mg/200ml Dsw)  400 mg in 200 mls @ 133.333 mls/hr IVPB 

Q12 [facility]


   PRN Reason: Protocol


   Last Admin: 04/09/18 22:25 Dose:  133.333 mls/hr


Meropenem (Merrem Iv 1 Gm Premix)  50 mls @ 100 mls/hr IVPB Q8 [facility]


   PRN Reason: Protocol


   Last Admin: 04/10/18 05:46 Dose:  100 mls/hr


Insulin Human Regular (Humulin R Low)  0 units SC ACHS [facility]


   PRN Reason: Protocol


   Last Admin: 04/10/18 08:16 Dose:  3 units


Insulin Lispro Protam/Lispro Human (Humalog Mix 75/25)  12 units SC ACBD [facility]


Metoprolol Tartrate (Lopressor)  50 mg PO 0800,1800 [facility]


   Last Admin: 04/10/18 08:17 Dose:  50 mg


Non-Formulary Medication (Hydrocodone Bitartrate [Hysingla Er])  20 mg PO DAILY 

[facility]


   Last Admin: 04/09/18 19:50 Dose:  Not Given


Mycophenolate Sodium [Myfortic] 180mg ( Home Med)  720 mg PO BID [facility]


   Last Admin: 04/09/18 18:26 Dose:  720 mg


Ondansetron HCl (Zofran Inj)  4 mg IVP Q4H PRN


   PRN Reason: Nausea/Vomiting


   Last Admin: 04/09/18 06:31 Dose:  4 mg


Polyethylene Glycol (Miralax)  17 gm PO BID [facility]


   Last Admin: 04/09/18 18:26 Dose:  17 gm


Prednisone (Prednisone Tab)  5 mg PO 0800 [facility]


   Last Admin: 04/10/18 08:16 Dose:  5 mg


Pregabalin (Lyrica)  100 mg PO BID [facility]


   Last Admin: 04/09/18 18:25 Dose:  100 mg


Sodium Hypochlorite (Dakins Solution 0.25%)  0 ml TOP DAILY [facility]


   Last Admin: 04/09/18 10:25 Dose:  1 applic


Tacrolimus (Prograf Cap)  0.5 mg PO HS [facility]


   Last Admin: 04/09/18 21:06 Dose:  0.5 mg


Tacrolimus (Prograf Cap)  2 mg PO BID [facility]


   Last Admin: 04/09/18 18:25 Dose:  2 mg


Tramadol HCl (Ultram)  50 mg PO Q4 PRN


   PRN Reason: Pain, moderate (4-7)











- Labs


Labs: 


 





 04/10/18 06:45 





 04/10/18 06:45 





 











PT  13.1 SECONDS (9.4-12.5)  H  04/05/18  06:00    


 


INR  1.14  (0.93-1.08)  H  04/05/18  06:00    


 


APTT  35.3 Seconds (25.1-36.5)   04/05/18  06:00    














- Constitutional


Appears: Chronically Ill





- Head Exam


Head Exam: NORMAL INSPECTION





- ENT Exam


ENT Exam: Mucous Membranes Moist





- Neck Exam


Neck Exam: absent: Meningismus





- Respiratory Exam


Respiratory Exam: Decreased Breath Sounds





- Cardiovascular Exam


Cardiovascular Exam: +S1, +S2





- GI/Abdominal Exam


GI & Abdominal Exam: Soft.  absent: Tenderness





- Extremities Exam


Additional comments: 





right BKA stump with dressings in place, left TMA stump with dressings in place





Assessment and Plan





- Assessment and Plan (Free Text)


Plan: 





Assessment


right foot necrosis and gangrene, severe skin and skin structure infection S/P 

BKA POD #4 


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 

debridement and closure; S/P transmetatarsal amputation previously, grew 

Enterobacter S/P wound vacuum placement


right heel ulcer S/P debridement


S/P Left 5th metatarsal osteomyelitis S/P debridement and bone excision grew 

Acinetobacter and E. faecalis


CAD S/P CABG


chronic CHF


DM


COPD


S/P pacemaker placement


S/P kidney transplant





Plan


reviewed CXR which shows new right sided infiltrate - continue Merrem day 2 and 

follow up repeat blood cx; PCT is only 0.07


patient started by Surgery on Ciprofloxacin for the infected left TMA stump - 

discussed with Dr. Bradshaw about the risk of the patient since he has prolonged 

QT on the ventricular pacemaker - would be hesitant to use Ciprofloxacin and 

would hold it for now


will continue to monitor clinically

## 2018-04-10 NOTE — CP.PCM.PN
Subjective





- Date & Time of Evaluation


Date of Evaluation: 04/10/18


Time of Evaluation: 08:02





- Subjective


Subjective: 





Patient seen and examined at bedside this morning. Patient reports feeling 

better today.  Patient still reports some discomfort when placing lower 

extremities over the bed. Patient denies any fevers, chills, nausea, vomiting, 

or any other complaints.





Objective





- Vital Signs/Intake and Output


Vital Signs (last 24 hours): 


 











Temp Pulse Resp BP Pulse Ox


 


 97.6 F   70   20   118/67   94 L


 


 04/10/18 07:54  04/10/18 07:54  04/10/18 07:54  04/10/18 07:54  04/10/18 07:54








Intake and Output: 


 











 04/10/18 04/10/18





 06:59 18:59


 


Intake Total 1000 


 


Output Total 380 


 


Balance 620 














- Medications


Medications: 


 Current Medications





Acetaminophen (Tylenol 325mg Tab)  650 mg PO Q6 PRN


   PRN Reason: Pain, Mild (1-3)


Al Hydrox/Mg Hydrox/Simethicone (Maalox Plus 30 Ml)  30 ml PO DAILY PRN


   PRN Reason: Indigestion / Heartburn


   Last Admin: 04/10/18 06:17 Dose:  30 ml


Amlodipine Besylate (Norvasc)  10 mg PO DAILY [facility]ly [facility]


   Last Admin: 04/09/18 10:20 Dose:  10 mg


Aspirin (Ecotrin)  81 mg PO 0800 [facility]


   Last Admin: 04/09/18 08:32 Dose:  81 mg


Atorvastatin Calcium (Lipitor)  40 mg PO DIN [facility]


   Last Admin: 04/09/18 17:10 Dose:  40 mg


Chlorpromazine (Thorazine)  50 mg PO Q6 PRN; Protocol


   PRN Reason: Hiccups


Clopidogrel Bisulfate (Plavix)  75 mg PO DAILY [facility]


   Last Admin: 04/09/18 10:15 Dose:  75 mg


Docusate Sodium (Colace)  100 mg PO DAILY [facility]


   Last Admin: 04/09/18 10:15 Dose:  100 mg


Escitalopram Oxalate (Lexapro)  5 mg PO QAM [facility]


   Last Admin: 04/09/18 10:14 Dose:  5 mg


Famotidine (Pepcid)  20 mg PO QAM [facility]


   Last Admin: 04/09/18 10:15 Dose:  20 mg


Fentanyl (Duragesic)  1 patch TD Q72H [facility]


   Last Admin: 04/09/18 10:13 Dose:  1 patch


Heparin Sodium (Porcine) (Heparin)  5,000 units SC Q12 [facility]


   PRN Reason: Protocol


   Last Admin: 04/09/18 21:03 Dose:  5,000 units


Ciprofloxacin (Cipro 400mg/200ml Dsw)  400 mg in 200 mls @ 133.333 mls/hr IVPB 

Q12 [facility]N


   PRN Reason: Protocol


   Last Admin: 04/09/18 22:25 Dose:  133.333 mls/hr


Meropenem (Merrem Iv 1 Gm Premix)  50 mls @ 100 mls/hr IVPB Q8 [facility]


   PRN Reason: Protocol


   Last Admin: 04/10/18 05:46 Dose:  100 mls/hr


Insulin Human Regular (Humulin R Low)  0 units SC ACHS [facility]


   PRN Reason: Protocol


   Last Admin: 04/09/18 22:24 Dose:  1 units


Insulin Lispro Protam/Lispro Human (Humalog Mix 75/25)  8 units SC ACBD [facility]


   Last Admin: 04/09/18 17:11 Dose:  8 unit


Metoprolol Tartrate (Lopressor)  50 mg PO 0800,1800 [facility]


   Last Admin: 04/09/18 18:25 Dose:  50 mg


Non-Formulary Medication (Hydrocodone Bitartrate [Hysingla Er])  20 mg PO DAILY 

[facility]


   Last Admin: 04/09/18 19:50 Dose:  Not Given


Mycophenolate Sodium [Myfortic] 180mg ( Home Med)  720 mg PO BID [facility]


   Last Admin: 04/09/18 18:26 Dose:  720 mg


Ondansetron HCl (Zofran Inj)  4 mg IVP Q4H PRN


   PRN Reason: Nausea/Vomiting


   Last Admin: 04/09/18 06:31 Dose:  4 mg


Polyethylene Glycol (Miralax)  17 gm PO BID [facility]


   Last Admin: 04/09/18 18:26 Dose:  17 gm


Prednisone (Prednisone Tab)  5 mg PO 0800 [facility]


   Last Admin: 04/09/18 08:32 Dose:  5 mg


Pregabalin (Lyrica)  100 mg PO BID [facility]


   Last Admin: 04/09/18 18:25 Dose:  100 mg


Sodium Hypochlorite (Dakins Solution 0.25%)  0 ml TOP DAILY [facility]


   Last Admin: 04/09/18 10:25 Dose:  1 applic


Tacrolimus (Prograf Cap)  0.5 mg PO HS [facility]


   Last Admin: 04/09/18 21:06 Dose:  0.5 mg


Tacrolimus (Prograf Cap)  2 mg PO BID [facility]


   Last Admin: 04/09/18 18:25 Dose:  2 mg


Tramadol HCl (Ultram)  50 mg PO Q4 PRN


   PRN Reason: Pain, moderate (4-7)











- Labs


Labs: 


 





 04/10/18 06:45 





 04/10/18 06:45 





 











PT  13.1 SECONDS (9.4-12.5)  H  04/05/18  06:00    


 


INR  1.14  (0.93-1.08)  H  04/05/18  06:00    


 


APTT  35.3 Seconds (25.1-36.5)   04/05/18  06:00    














- Head Exam


Head Exam: ATRAUMATIC, NORMAL INSPECTION, NORMOCEPHALIC





- Eye Exam


Eye Exam: EOMI, Normal appearance





- ENT Exam


ENT Exam: Mucous Membranes Moist





- Respiratory Exam


Respiratory Exam: Clear to Ausculation Bilateral





- Cardiovascular Exam


Cardiovascular Exam: REGULAR RHYTHM, +S1, +S2





- GI/Abdominal Exam


GI & Abdominal Exam: Soft, Normal Bowel Sounds





- Extremities Exam


Additional comments: 





L foot. Dressing C/D/I. L TMA. necrotic tissue. Mild drainage. R BKA stump. C/D/

I. Staples in place.





- Psychiatric Exam


Psychiatric exam: Normal Affect, Normal Mood





- Skin


Skin Exam: Dry, Intact





Assessment and Plan





- Assessment and Plan (Free Text)


Assessment: 





57M POD#4 s/p right bka


Plan: 





-Continue dressing changes BID betadine to right BKA


-Dakins to left wound


-Pain management


-[facility].





Will discuss with

## 2018-04-11 VITALS — OXYGEN SATURATION: 97 % | TEMPERATURE: 97.5 F | RESPIRATION RATE: 22 BRPM

## 2018-04-11 VITALS — DIASTOLIC BLOOD PRESSURE: 67 MMHG | SYSTOLIC BLOOD PRESSURE: 124 MMHG

## 2018-04-11 VITALS — HEART RATE: 70 BPM

## 2018-04-11 LAB
BASOPHILS # BLD AUTO: 0.02 K/MM3 (ref 0–2)
BASOPHILS NFR BLD: 0.2 % (ref 0–3)
EOSINOPHIL # BLD: 0.2 10*3/UL (ref 0–0.7)
EOSINOPHIL NFR BLD: 2.1 % (ref 1.5–5)
ERYTHROCYTE [DISTWIDTH] IN BLOOD BY AUTOMATED COUNT: 16 % (ref 11.5–14.5)
GRANULOCYTES # BLD: 9.09 10*3/UL (ref 1.4–6.5)
GRANULOCYTES NFR BLD: 77.8 % (ref 50–68)
HGB BLD-MCNC: 11 G/DL (ref 14–18)
LYMPHOCYTES # BLD: 1.2 10*3/UL (ref 1.2–3.4)
LYMPHOCYTES NFR BLD AUTO: 10.4 % (ref 22–35)
MCH RBC QN AUTO: 27.5 PG (ref 25–35)
MCHC RBC AUTO-ENTMCNC: 32.5 G/DL (ref 31–37)
MCV RBC AUTO: 84.5 FL (ref 80–105)
MONOCYTES # BLD AUTO: 1.1 10*3/UL (ref 0.1–0.6)
MONOCYTES NFR BLD: 9.5 % (ref 1–6)
PLATELET # BLD: 299 10^3/UL (ref 120–450)
PMV BLD AUTO: 9.2 FL (ref 7–11)
RBC # BLD AUTO: 4 10^6/UL (ref 3.5–6.1)
WBC # BLD AUTO: 11.7 10^3/UL (ref 4.5–11)

## 2018-04-11 RX ADMIN — SODIUM HYPOCHLORITE SCH APPLIC: 2.5 SOLUTION TOPICAL at 10:00

## 2018-04-11 RX ADMIN — MEROPENEM SCH MLS/HR: 1 INJECTION INTRAVENOUS at 15:49

## 2018-04-11 RX ADMIN — INSULIN HUMAN SCH UNITS: 100 INJECTION, SOLUTION PARENTERAL at 08:28

## 2018-04-11 RX ADMIN — POLYETHYLENE GLYCOL 3350 SCH GM: 17 POWDER, FOR SOLUTION ORAL at 09:26

## 2018-04-11 RX ADMIN — MEROPENEM SCH MLS/HR: 1 INJECTION INTRAVENOUS at 05:07

## 2018-04-11 RX ADMIN — OXYCODONE HYDROCHLORIDE PRN MG: 15 TABLET ORAL at 09:26

## 2018-04-11 RX ADMIN — INSULIN HUMAN SCH UNITS: 100 INJECTION, SOLUTION PARENTERAL at 12:26

## 2018-04-11 RX ADMIN — OXYCODONE HYDROCHLORIDE PRN MG: 15 TABLET ORAL at 13:25

## 2018-04-11 RX ADMIN — INSULIN LISPRO SCH U: 100 INJECTION, SUSPENSION SUBCUTANEOUS at 08:27

## 2018-04-11 RX ADMIN — OXYCODONE AND ACETAMINOPHEN PRN TAB: 10; 325 TABLET ORAL at 05:07

## 2018-04-11 NOTE — PN
DATE:  04/11/2018



SUBJECTIVE:  The patient is seen sitting in a chair on 5R.  He is fairly

comfortable except for persistent pain at his BKA stump.  He denies any

chest pain or dyspnea.



CURRENT MEDICATIONS:  Remain Duragesic patch, Ecotrin, subcutaneous

heparin, insulin, Lexapro 5 mg daily, Lipitor 40 mg daily, metoprolol 50 mg

b.i.d., Lyrica, meropenem, Myfortic, Norvasc, oxycodone, Pepcid, Plavix,

prednisone and Prograf.



OBJECTIVE:

GENERAL:  He is a middle-aged man, appears comfortable at rest.

VITAL SIGNS:  His blood pressure is 124/64 with a pulse of 70, respirations

are 14.  He is afebrile.

HEENT:  No JVD.

CHEST:  A few scattered rhonchi.

HEART:  Systolic murmur present at left sternal border.

ABDOMEN:  Soft, nontender.  Normoactive bowel sounds.

EXTREMITIES:  Both lower extremities are dressed.  His BKA stump dressing

appears clean and dry.



IMPRESSION:

1.  Peripheral vessel disease, status post recent below-knee amputation.

2.  Coronary artery disease, status post remote bypass surgery, stable at

present.

3.  Conduction system disease, status post permanent pacemaker implant.

4.  Rest of problems as noted.



RECOMMENDATIONS:  His current medications should continue.  The need for

aggressive control of his diabetes was again discussed with him.  Continued

risk factor control is advised.  We will be happy to follow along as needed

and arrange for outpatient followup on discharge as well.





__________________________________________

Oliver Bradford MD





DD:  04/11/2018 10:58:29

DT:  04/11/2018 11:01:04

Job # [id number]

## 2018-04-11 NOTE — CP.PCM.DIS
Provider





- Provider


Date of Admission: 


04/04/18 16:12





Attending physician: 


Yovany Prescott MD





Primary care physician: 


Andrea Calderon MD





Time Spent in preparation of Discharge (in minutes): 45





Hospital Course





- Lab Results


Lab Results: 


 Micro Results





04/09/18 19:00   Sputum   Gram Stain - Final


04/09/18 19:00   Sputum   Sputum Culture - Final


04/09/18 12:30   Blood-Venous   Blood Culture - Preliminary


                            NO GROWTH AFTER 24 HOURS


04/09/18 12:10   Blood-Venous   Blood Culture - Preliminary


                            NO GROWTH AFTER 24 HOURS


04/04/18 16:15   Blood-Venous   Blood Culture - Final


                            NO GROWTH AFTER 5 DAYS


04/04/18 16:15   Blood-Venous   Gram Stain - Final


                            TEST NOT PERFORMED


04/04/18 18:05   Foot - Right   Gram Stain - Final


04/04/18 18:05   Foot - Right   Wound Culture - Final


                            Pseudomonas Aeruginosa





 Most Recent Lab Values











WBC  11.7 10^3/ul (4.5-11.0)  H D 04/11/18  10:40    


 


RBC  4.00 10^6/uL (3.5-6.1)   04/11/18  10:40    


 


Hgb  11.0 g/dL (14.0-18.0)  L  04/11/18  10:40    


 


Hct  33.8 % (42.0-52.0)  L  04/11/18  10:40    


 


MCV  84.5 fl (80.0-105.0)   04/11/18  10:40    


 


MCH  27.5 pg (25.0-35.0)   04/11/18  10:40    


 


MCHC  32.5 g/dl (31.0-37.0)   04/11/18  10:40    


 


RDW  16.0 % (11.5-14.5)  H  04/11/18  10:40    


 


Plt Count  299 10^3/uL (120.0-450.0)   04/11/18  10:40    


 


MPV  9.2 fl (7.0-11.0)   04/11/18  10:40    


 


Gran %  77.8 % (50.0-68.0)  H  04/11/18  10:40    


 


Lymph % (Auto)  10.4 % (22.0-35.0)  L  04/11/18  10:40    


 


Mono % (Auto)  9.5 % (1.0-6.0)  H  04/11/18  10:40    


 


Eos % (Auto)  2.1 % (1.5-5.0)   04/11/18  10:40    


 


Baso % (Auto)  0.2 % (0.0-3.0)   04/11/18  10:40    


 


Gran #  9.09  (1.4-6.5)  H  04/11/18  10:40    


 


Lymph # (Auto)  1.2  (1.2-3.4)   04/11/18  10:40    


 


Mono # (Auto)  1.1  (0.1-0.6)  H  04/11/18  10:40    


 


Eos # (Auto)  0.2  (0.0-0.7)   04/11/18  10:40    


 


Baso # (Auto)  0.02 K/mm3 (0.0-2.0)   04/11/18  10:40    


 


ESR  73 mm/hr (0.00-15.0)  H  04/04/18  15:00    


 


PT  13.1 SECONDS (9.4-12.5)  H  04/05/18  06:00    


 


INR  1.14  (0.93-1.08)  H  04/05/18  06:00    


 


APTT  35.3 Seconds (25.1-36.5)   04/05/18  06:00    


 


Sodium  135 mmol/L (132-148)   04/10/18  06:45    


 


Potassium  4.6 mmol/L (3.6-5.0)   04/10/18  06:45    


 


Chloride  95 mmol/L ()  L  04/10/18  06:45    


 


Carbon Dioxide  33 mmol/L (21-33)   04/10/18  06:45    


 


Anion Gap  12  (10-20)   04/10/18  06:45    


 


BUN  18 mg/dL (7-21)   04/10/18  06:45    


 


Creatinine  0.9 mg/dl (0.8-1.5)   04/10/18  06:45    


 


Est GFR ( Amer)  > 60   04/10/18  06:45    


 


Est GFR (Non-Af Amer)  > 60   04/10/18  06:45    


 


POC Glucose (mg/dL)  242 mg/dL ()  H  04/11/18  06:37    


 


Random Glucose  317 mg/dL ()  H* D 04/10/18  06:45    


 


Calcium  9.3 mg/dL (8.4-10.5)   04/10/18  06:45    


 


Total Bilirubin  0.5 mg/dL (0.2-1.3)   04/09/18  06:20    


 


AST  98 U/L (17-59)  H D 04/09/18  06:20    


 


ALT  31 U/L (7-56)   04/09/18  06:20    


 


Alkaline Phosphatase  131 U/L ()  H  04/09/18  06:20    


 


C-Reactive Protein  65.50 mg/L (0.0-9.9)  H  04/04/18  15:00    


 


NT-Pro-B Natriuret Pep  45899 pg/mL (0-450)  H  04/09/18  06:20    


 


Total Protein  6.5 g/dL (5.8-8.3)   04/09/18  06:20    


 


Albumin  3.2 g/dL (3.0-4.8)   04/09/18  06:20    


 


Globulin  3.3 gm/dL  04/09/18  06:20    


 


Albumin/Globulin Ratio  1.0  (1.1-1.8)  L  04/09/18  06:20    


 


Procalcitonin  0.07 NG/ML (0.19-0.49)  L  04/09/18  10:51    


 


Blood Type  O POSITIVE   04/04/18  15:15    


 


Antibody Screen  Negative   04/04/18  15:15    


 


BBK History Checked  Patient has bt   04/04/18  15:15    














- Hospital Course


Hospital Course: 





57 year old male with a past medical history of ESRD s/p renal transplant, 

hypertension, AICD, dm, who was admitted from [facility] with worsening 

severe right heal and foot pain.  Patient stated that he noticed bright green 

discharge on the dressing for the past pcouple of days.  Patient was 

subsequently taken to have a right below knee amputation.  Patient tolerated 

the operation with no complication.  Patient pain was managed while admitted to 

the hospital.  Hospital course was further complicated by wound culture growing 

Pseudomonas. Patient was started on Ciprofloxacin for the left TMA stump after 

surgery. Patient was found to have a prolonged QT and was switched to Merrem 

for antibiotic coverage.





Discharge Exam





- Head Exam


Head Exam: NORMAL INSPECTION





- Eye Exam


Eye Exam: Normal appearance


Pupil Exam: NORMAL ACCOMODATION





- Respiratory Exam


Respiratory Exam: UNREMARKABLE





- Cardiovascular Exam


Cardiovascular Exam: REGULAR RHYTHM





- Neurological Exam


Neurological exam: Alert, Oriented x3





- Psychiatric Exam


Psychiatric exam: Normal Affect, Normal Mood





- Skin


Skin Exam: Dry


Additional comments: 





right BKA stump with dressings





Discharge Plan





- Follow Up Plan


Condition: STABLE


Disposition: HOME/ ROUTINE


Instructions:  Cellulitis (Skin Infection), Adult (DC), Amputation, Below-the-

Knee (DC)


Additional Instructions: 


Patient being discharged to [facility].


Referrals: 


Andrea Calderon MD [Primary Care Provider] -

## 2018-04-11 NOTE — CP.PCM.PN
Subjective





- Date & Time of Evaluation


Date of Evaluation: 04/11/18


Time of Evaluation: 10:10





- Subjective


Subjective: 





Comfortable, no fevers, less pain in the right BKA stump, no diarrhea.





Objective





- Vital Signs/Intake and Output


Vital Signs (last 24 hours): 


 











Temp Pulse Resp BP Pulse Ox


 


 97.5 F L  70   22   124/67   97 


 


 04/11/18 06:00  04/11/18 08:27  04/11/18 06:00  04/11/18 09:27  04/11/18 06:00








Intake and Output: 


 











 04/11/18 04/11/18





 06:59 18:59


 


Intake Total 1100 


 


Output Total 1700 


 


Balance -600 














- Medications


Medications: 


 Current Medications





Acetaminophen (Tylenol 325mg Tab)  650 mg PO Q6 PRN


   PRN Reason: Pain, Mild (1-3)


Al Hydrox/Mg Hydrox/Simethicone (Maalox Plus 30 Ml)  30 ml PO DAILY PRN


   PRN Reason: Indigestion / Heartburn


   Last Admin: 04/10/18 06:17 Dose:  30 ml


Amlodipine Besylate (Norvasc)  10 mg PO DAILY [facility]


   Last Admin: 04/11/18 09:27 Dose:  10 mg


Aspirin (Ecotrin)  81 mg PO 0800 [facility]


   Last Admin: 04/11/18 08:26 Dose:  81 mg


Atorvastatin Calcium (Lipitor)  40 mg PO DIN [facility]


   Last Admin: 04/10/18 17:12 Dose:  40 mg


Chlorpromazine (Thorazine)  50 mg PO Q6 PRN; Protocol


   PRN Reason: Hiccups


Clopidogrel Bisulfate (Plavix)  75 mg PO DAILY [facility]


   Last Admin: 04/11/18 09:28 Dose:  75 mg


Docusate Sodium (Colace)  100 mg PO DAILY [facility]


   Last Admin: 04/11/18 09:27 Dose:  100 mg


Escitalopram Oxalate (Lexapro)  5 mg PO QAM [facility]


   Last Admin: 04/11/18 09:27 Dose:  5 mg


Famotidine (Pepcid)  20 mg PO QAM [facility]


   Last Admin: 04/11/18 09:27 Dose:  20 mg


Fentanyl (Duragesic)  1 patch TD Q72H [facility]


   Last Admin: 04/11/18 09:25 Dose:  1 patch


Heparin Sodium (Porcine) (Heparin)  5,000 units SC Q12 [facility]


   PRN Reason: Protocol


   Last Admin: 04/11/18 09:26 Dose:  5,000 units


Meropenem (Merrem Iv 1 Gm Premix)  50 mls @ 100 mls/hr IVPB Q8 [facility]


   PRN Reason: Protocol


   Last Admin: 04/11/18 05:07 Dose:  100 mls/hr


Insulin Human Regular (Humulin R Med)  0 units SC ACHS [facility]


   PRN Reason: Protocol


   Last Admin: 04/11/18 08:28 Dose:  1 units


Insulin Lispro Protam/Lispro Human (Humalog Mix 75/25)  12 units SC ACBD [facility]


   Last Admin: 04/11/18 08:27 Dose:  12 u


Metoprolol Tartrate (Lopressor)  50 mg PO 0800,1800 [facility]


   Last Admin: 04/11/18 08:27 Dose:  50 mg


Non-Formulary Medication (Hydrocodone Bitartrate [Hysingla Er])  20 mg PO DAILY 

[facility]


   Last Admin: 04/10/18 10:56 Dose:  Not Given


Mycophenolate Sodium [Myfortic] 180mg ( Home Med)  720 mg PO BID [facility]


   Last Admin: 04/11/18 09:26 Dose:  720 mg


Ondansetron HCl (Zofran Inj)  4 mg IVP Q4H PRN


   PRN Reason: Nausea/Vomiting


   Last Admin: 04/09/18 06:31 Dose:  4 mg


Oxycodone HCl (Oxycodone Immediate Release Tab)  15 mg PO Q4H PRN


   PRN Reason: Pain, moderate (4-7)


   Last Admin: 04/11/18 09:26 Dose:  15 mg


Polyethylene Glycol (Miralax)  17 gm PO BID [facility]


   Last Admin: 04/11/18 09:26 Dose:  17 gm


Prednisone (Prednisone Tab)  5 mg PO 0800 [facility]


   Last Admin: 04/10/18 08:16 Dose:  5 mg


Pregabalin (Lyrica)  100 mg PO BID [facility]


   Last Admin: 04/11/18 05:57 Dose:  100 mg


Sodium Hypochlorite (Dakins Solution 0.25%)  0 ml TOP DAILY [facility]


   Last Admin: 04/10/18 10:55 Dose:  1 applic


Tacrolimus (Prograf Cap)  0.5 mg PO HS [facility]


   Last Admin: 04/10/18 21:38 Dose:  0.5 mg


Tacrolimus (Prograf Cap)  2 mg PO BID [facility]


   Last Admin: 04/11/18 09:27 Dose:  2 mg











- Labs


Labs: 


 





 04/10/18 06:45 





 04/10/18 06:45 





 











PT  13.1 SECONDS (9.4-12.5)  H  04/05/18  06:00    


 


INR  1.14  (0.93-1.08)  H  04/05/18  06:00    


 


APTT  35.3 Seconds (25.1-36.5)   04/05/18  06:00    














- Constitutional


Appears: Non-toxic, Chronically Ill





- Head Exam


Head Exam: NORMAL INSPECTION





- ENT Exam


ENT Exam: Mucous Membranes Moist





- Neck Exam


Neck Exam: absent: Meningismus





- Respiratory Exam


Respiratory Exam: Decreased Breath Sounds





- Cardiovascular Exam


Cardiovascular Exam: +S1, +S2





- GI/Abdominal Exam


GI & Abdominal Exam: Soft.  absent: Tenderness





- Extremities Exam


Additional comments: 





right BKA stump with dressings in place





Assessment and Plan





- Assessment and Plan (Free Text)


Plan: 





Assessment


right foot necrosis and gangrene, severe skin and skin structure infection S/P 

BKA POD #5


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 

debridement and closure; S/P transmetatarsal amputation previously, grew 

Enterobacter S/P wound vacuum placement


right heel ulcer S/P debridement


S/P Left 5th metatarsal osteomyelitis S/P debridement and bone excision grew 

Acinetobacter and E. faecalis


CAD S/P CABG


chronic CHF


DM


COPD


S/P pacemaker placement


S/P kidney transplant





Plan


reviewed CXR which shows new right sided infiltrate - continue Merrem day 3 - 

repeat blood cx; PCT is only 0.07


patient started by Surgery on Ciprofloxacin for the infected left TMA stump - 

discussed with Dr. Bradshaw about the risk of the patient since he has prolonged 

QT on the ventricular pacemaker - would be hesitant to use Ciprofloxacin and 

would hold it for now - to discuss with Surgery if the left TMA site still has 

osteomyelitis or it is only skin and skin structure infection - would recommend 

Avycaz if patient is in need of antibiotics


will continue to monitor clinically

## 2018-04-11 NOTE — PN
DATE:



SUBJECTIVE:  This is a 57-year-old male who came to the hospital and had

right BKA.  He is currently doing better.  He developed hospital-acquired

pneumonia, was treated with IV antibiotics.  He is waiting to go to acute

rehab at [facility].  He has no complaints of any chest pain or shortness of

breath.  He has no headaches.  He states his pain is better.



PHYSICAL EXAMINATION

VITAL SIGNS:  Temperature is 97.4, pulse of 70, blood pressure is 105/64,

respirations 19.

GENERAL:  The patient is lying in bed, flat, comfortable.

HEENT:  No oral lesion.  Anicteric sclerae.  Moist mucosa.

NECK:  No JVD, adenopathy, or thyromegaly.

CARDIOVASCULAR:  S1 and S2, regular.  No murmurs, rubs, or gallops.

LUNGS:  Clear to auscultation bilaterally.  No wheeze, rales, or rhonchi.

ABDOMEN:  Bowel sounds are positive. Soft, nontender and nondistended.

EXTREMITIES:  No cyanosis, clubbing or edema.  On the right leg, there is a

dressing placed in the right BKA area.



ASSESSMENT:

1.  Right foot gangrene, status post below-knee amputation.

2.  Diabetes type 2.

3.  Status post  donor kidney transplant.

4.  Peripheral arterial disease.

5.  Dyslipidemia.

6.  Coronary artery disease, status post coronary artery bypass graft.

7.  Automated implantable cardioverter defibrillator.

8.  Hospital-acquired pneumonia.



PLAN:  The patient is currently comfortable.  He has blood cultures that

have been negative.  He is being followed by Infectious Disease.  The

patient is currently on fentanyl patch and Percocet for pain.  The

patient's Cipro has been discontinued.  The patient is on aspirin daily. 

He is going to continue heparin for DVT prophylaxis.  He is on Lexapro for

anxiety.  The patient is on Lipitor for dyslipidemia, he is going to

continue.  He is on his immunosuppressant medication with mycophenolate,

prednisone, and Prograf.  Patient is on a heart-healthy diet.  Patient will

be discharged and is cleared to go to acute rehab.





__________________________________________

Andrea Calderon MD



DD:  2018 6:22:30

DT:  2018 7:30:34

[facility] # [id number]

## 2018-12-16 ENCOUNTER — HOSPITAL ENCOUNTER (OUTPATIENT)
Dept: HOSPITAL 42 - ED | Age: 58
Setting detail: OBSERVATION
LOS: 1 days | Discharge: HOME | End: 2018-12-17
Attending: FAMILY MEDICINE | Admitting: FAMILY MEDICINE
Payer: MEDICARE

## 2018-12-16 VITALS — BODY MASS INDEX: 22.8 KG/M2

## 2018-12-16 DIAGNOSIS — Z95.810: ICD-10-CM

## 2018-12-16 DIAGNOSIS — Z95.1: ICD-10-CM

## 2018-12-16 DIAGNOSIS — I11.0: ICD-10-CM

## 2018-12-16 DIAGNOSIS — Z89.611: ICD-10-CM

## 2018-12-16 DIAGNOSIS — Z79.4: ICD-10-CM

## 2018-12-16 DIAGNOSIS — J44.9: ICD-10-CM

## 2018-12-16 DIAGNOSIS — I50.9: ICD-10-CM

## 2018-12-16 DIAGNOSIS — Z87.891: ICD-10-CM

## 2018-12-16 DIAGNOSIS — T38.3X1A: Primary | ICD-10-CM

## 2018-12-16 DIAGNOSIS — L02.214: ICD-10-CM

## 2018-12-16 DIAGNOSIS — Z94.0: ICD-10-CM

## 2018-12-16 DIAGNOSIS — E11.9: ICD-10-CM

## 2018-12-16 DIAGNOSIS — K21.9: ICD-10-CM

## 2018-12-16 LAB
BASOPHILS # BLD AUTO: 0 K/MM3 (ref 0–2)
BASOPHILS NFR BLD: 0 % (ref 0–3)
EOSINOPHIL # BLD: 0.4 10*3/UL (ref 0–0.7)
EOSINOPHIL NFR BLD: 5.6 % (ref 1.5–5)
ERYTHROCYTE [DISTWIDTH] IN BLOOD BY AUTOMATED COUNT: 14 % (ref 11.5–14.5)
GRANULOCYTES # BLD: 4 10*3/UL (ref 1.4–6.5)
GRANULOCYTES NFR BLD: 63.5 % (ref 50–68)
HGB BLD-MCNC: 15.1 G/DL (ref 14–18)
LYMPHOCYTES # BLD: 1.4 10*3/UL (ref 1.2–3.4)
LYMPHOCYTES NFR BLD AUTO: 22.6 % (ref 22–35)
MCH RBC QN AUTO: 32.3 PG (ref 25–35)
MCHC RBC AUTO-ENTMCNC: 34.7 G/DL (ref 31–37)
MCV RBC AUTO: 93.1 FL (ref 80–105)
MONOCYTES # BLD AUTO: 0.5 10*3/UL (ref 0.1–0.6)
MONOCYTES NFR BLD: 8.3 % (ref 1–6)
PLATELET # BLD: 179 10^3/UL (ref 120–450)
PMV BLD AUTO: 10.7 FL (ref 7–11)
RBC # BLD AUTO: 4.67 10^6/UL (ref 3.5–6.1)
WBC # BLD AUTO: 6.3 10^3/UL (ref 4.5–11)

## 2018-12-16 PROCEDURE — 99283 EMERGENCY DEPT VISIT LOW MDM: CPT

## 2018-12-16 PROCEDURE — 36415 COLL VENOUS BLD VENIPUNCTURE: CPT

## 2018-12-16 PROCEDURE — 96374 THER/PROPH/DIAG INJ IV PUSH: CPT

## 2018-12-16 PROCEDURE — 96372 THER/PROPH/DIAG INJ SC/IM: CPT

## 2018-12-16 PROCEDURE — 85025 COMPLETE CBC W/AUTO DIFF WBC: CPT

## 2018-12-16 PROCEDURE — 82948 REAGENT STRIP/BLOOD GLUCOSE: CPT

## 2018-12-16 PROCEDURE — 87070 CULTURE OTHR SPECIMN AEROBIC: CPT

## 2018-12-16 PROCEDURE — 85610 PROTHROMBIN TIME: CPT

## 2018-12-16 PROCEDURE — 83735 ASSAY OF MAGNESIUM: CPT

## 2018-12-16 PROCEDURE — 10060 I&D ABSCESS SIMPLE/SINGLE: CPT

## 2018-12-16 PROCEDURE — 85730 THROMBOPLASTIN TIME PARTIAL: CPT

## 2018-12-16 PROCEDURE — 80053 COMPREHEN METABOLIC PANEL: CPT

## 2018-12-17 VITALS
OXYGEN SATURATION: 97 % | DIASTOLIC BLOOD PRESSURE: 82 MMHG | SYSTOLIC BLOOD PRESSURE: 134 MMHG | TEMPERATURE: 98.6 F | HEART RATE: 82 BPM | RESPIRATION RATE: 20 BRPM

## 2018-12-17 LAB
ALBUMIN SERPL-MCNC: 4 G/DL (ref 3–4.8)
ALBUMIN/GLOB SERPL: 1.2 {RATIO} (ref 1.1–1.8)
ALT SERPL-CCNC: 71 U/L (ref 7–56)
APTT BLD: 27.7 SECONDS (ref 25.1–36.5)
AST SERPL-CCNC: 42 U/L (ref 17–59)
BASOPHILS # BLD AUTO: 0.01 K/MM3 (ref 0–2)
BASOPHILS NFR BLD: 0.1 % (ref 0–3)
BUN SERPL-MCNC: 31 MG/DL (ref 7–21)
CALCIUM SERPL-MCNC: 9.5 MG/DL (ref 8.4–10.5)
EOSINOPHIL # BLD: 0.4 10*3/UL (ref 0–0.7)
EOSINOPHIL NFR BLD: 5 % (ref 1.5–5)
ERYTHROCYTE [DISTWIDTH] IN BLOOD BY AUTOMATED COUNT: 14 % (ref 11.5–14.5)
GFR NON-AFRICAN AMERICAN: > 60
GRANULOCYTES # BLD: 4.49 10*3/UL (ref 1.4–6.5)
GRANULOCYTES NFR BLD: 62.1 % (ref 50–68)
HGB BLD-MCNC: 14.4 G/DL (ref 14–18)
INR PPP: 0.94
LYMPHOCYTES # BLD: 1.3 10*3/UL (ref 1.2–3.4)
LYMPHOCYTES NFR BLD AUTO: 18.4 % (ref 22–35)
MCH RBC QN AUTO: 31.8 PG (ref 25–35)
MCHC RBC AUTO-ENTMCNC: 34.2 G/DL (ref 31–37)
MCV RBC AUTO: 92.9 FL (ref 80–105)
MONOCYTES # BLD AUTO: 1 10*3/UL (ref 0.1–0.6)
MONOCYTES NFR BLD: 14.4 % (ref 1–6)
PLATELET # BLD: 139 10^3/UL (ref 120–450)
PMV BLD AUTO: 9.8 FL (ref 7–11)
PROTHROMBIN TIME: 10.8 SECONDS (ref 9.4–12.5)
RBC # BLD AUTO: 4.53 10^6/UL (ref 3.5–6.1)
WBC # BLD AUTO: 7.2 10^3/UL (ref 4.5–11)

## 2018-12-17 NOTE — DS
HISTORY OF PRESENT ILLNESS:  He came in for blood sugars being watched

 taking the wrong insulin and also has a right groin abscess, which

was I and D'd by Surgery.



PHYSICAL EXAMINATION:

VITAL SIGNS:  He has 98.6 temperature, 82 pulse, 134/82 blood pressure, 20

respiratory rate, and 96% O2 saturation on room air.

HEAD:  Atraumatic and normocephalic.

HEART:  Regular rate.

LUNGS:  Clear to auscultation.

ABDOMEN:  Soft.

EXTREMITIES:  No edema.



Right groin  opened up by Surgery.  He should be get discharged later

this afternoon, placed on antibiotics as per Surgery, and follow up as an

outpatient with his regular doctor.







__________________________________________

Grant Neal DO





DD:  12/17/2018 9:37:57

DT:  12/17/2018 15:51:42

Job # [id number]

MTDD

## 2018-12-17 NOTE — HP
DATE OF EXAM:  12/17/2018



HISTORY OF PRESENT ILLNESS:  I saw him in his room this morning.  I was

called to the emergency room last night.  He is 58-year-old white male who

presents to emergency room with actually taking NovoLog instead of Levemir

and worried about a decrease in his blood sugar and he wants to be  an

observation and while he is here he has got this right groin abscess which

will be taken care of by surgery.



PAST MEDICAL HISTORY:  Includes; diabetes, hypertension, COPD, laser

surgery for macular degeneration, he is status post kidney transplant, he

has got dermatological disorders, gastroesophageal reflux disease, kidney

transplant.



FAMILY HISTORY:  Unknown.



SOCIAL HISTORY:  Former smoker.  No alcohol.  No drugs.



ALLERGIES:  NO KNOWN DRUG ALLERGIES.



MEDICATIONS:  On lot of medications; Lipitor, Lexapro, Myfortic, Levemir,

, Novolin, Prograf, Pepcid, Colace and lots of pain medication.



REVIEW OF SYSTEMS:  He is alert, comfortable, no acute vision or hearing

changes.  No sore throat.  No shortness of breath or cough.  No chest pain

or abdominal pain, nausea, vomiting or constipation.  No problems

urinating.  No back pain and neck pain.  Does have a right groin abscess,

which will be worked on by surgery.



PHYSICAL EXAMINATION:

VITAL SIGNS:  He has 98.4 temperature, 80 pulse, 18 respiratory rate,

163/83 blood pressure and 98% O2 sat.

HEENT:  Head, atraumatic and normocephalic.  Pupils equal and reactive to

light.  Extraocular muscles are intact.  Throat is moist.

GENERAL:  Well appearing, nontoxic, alert and oriented x3.

NECK:  Supple.

HEART:  Regular rate.  Normal S1 and S2.

LUNGS:  Decreased breath sounds, but clear auscultation.  No wheezing.  No

rhonchi.  No rales.

ABDOMEN:  Soft and nontender.  Positive bowel sounds.  No guarding, no

rebound or CVA tenderness.

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

extremities.

NEUROLOGIC:  GCS is 15.  Cranial nerves II-XII grossly intact.

SKIN:  Erythematous circular rash of the chest, looks like fungus

infection.  He has got an abscess 3 x 3 cm to the right scrotal and

perineum.  Alert and oriented x3.



LABORATORY DATA:  He had multiple tests.  He has a 137 sodium, potassium

4.2, BUN 31, creatinine 0.9, GFR is greater than 60, sugar is 244, calcium

is 9.5, magnesium 1.8, total bili is 0.5, AST is 42, ALT is 71, alk phos

118 and total protein 7.3.  White count 7.2, hemoglobin 14.4, hematocrit

42.1 and platelets of 139.  INR is 0.94.



He is being admitted to watch his blood sugars, he did well.  He has also

had a surgery, discharged this afternoon.  Followup on the outpatient

with surgery and is primary care doctor.



This is a history and physical on Ady Tysh for taking the wrong insulin

and also groin abscess.







__________________________________________

Grant Neal DO





DD:  12/17/2018 9:37:01

DT:  12/17/2018 9:39:13

Job # [id number]



MTDD

## 2018-12-17 NOTE — CP.PCM.CON
History of Present Illness





- History of Present Illness


History of Present Illness: 


General surgery progress note for Dr. Prescott


   Consulted for Right groin abscess





Patient is a 58 yr old male with PMH DM, COPD and CHF presented to [facility] [place] ED after 

taking too much of his Novalog last night.  Upon presentation he was found to 

have a small tender abscess in his right groin and surgery was consulted for 

evaluation.  Patient states the area has been erythematous and painful for 

approximately 2-3 days.  He has been placing warm compresses on it and trying to

"pop" it himself but was unsuccessful. He states that this area is often rubbed 

by his prosthesis as he has a well healed AKA on the right side. He believes th

is was the nidus for infection.  He otherwise denies any fevers, chills, n/v, 

abdominal pain, stool changes, dysuria, and extremity pain/weakness.





PMH: DM, COPD, CHF


PSH: Right AKA, left 5th metatarsal amputation, CABG, AICD, kidney transplant


ALL: nkda











Review of Systems





- Review of Systems


All systems: reviewed and no additional remarkable complaints except (as per 

HPI)





Past Patient History





- Infectious Disease


Hx of Infectious Diseases: None





- Tetanus Immunizations


Tetanus Immunization: Unknown





- Past Social History


Smoking Status: Light Smoker < 10 Cigarettes Daily





- CARDIAC


Hx Hypertension: Yes





- PULMONARY


Hx Bronchitis: Yes


Hx Chronic Obstructive Pulmonary Disease (COPD): Yes





- NEUROLOGICAL


Hx Neurological Disorder: No





- HEENT


Hx HEENT Problems: Yes


Hx Macular Degeneration: Yes


Other/Comment: laser sx for macular degeneration 5 yrs ago both eyes





- RENAL


Hx Renal Failure: Yes (s/p transplant )


Other/Comment: s/p renal transplant





- ENDOCRINE/METABOLIC


Hx Endocrine Disorders: Yes


Hx Diabetes Mellitus Type 2: Yes





- HEMATOLOGICAL/ONCOLOGICAL


Hx Blood Disorders: No





- INTEGUMENTARY


Hx Dermatological Problems: Yes


Other/Comment: LEFT BIG TOE POST AMPUTATION-GANGRENE TO AREA AMPUTATED.[id number] 

second sx all toes amputated left ft,





- MUSCULOSKELETAL/RHEUMATOLOGICAL


Hx Musculoskeletal Disorders: Yes


Hx Falls: Yes





- GASTROINTESTINAL


Hx Gastrointestinal Disorders: Yes (CONSTIPATION,INCISIONAL HERNIA)


Hx Gastroesophageal Reflux: Yes


Other/Comment: h/o c diff





- GENITOURINARY/GYNECOLOGICAL


Hx Genitourinary Disorders: Yes (KIDNEY TRANSPLANT)


Other/Comment: kidney transplant





- PSYCHIATRIC


Hx Emotional Abuse: No


Hx Physical Abuse: No





- SURGICAL HISTORY


Hx Mastectomy: No





- ANESTHESIA


Hx Anesthesia Reactions: No


Hx Malignant Hyperthermia: No





Meds


Allergies/Adverse Reactions: 


                                    Allergies











Allergy/AdvReac Type Severity Reaction Status Date / Time


 


No Known Allergies Allergy   Verified 02/08/18 12:52














Physical Exam





- Constitutional


Appears: Well, Non-toxic, No Acute Distress





- Head Exam


Head Exam: ATRAUMATIC, NORMOCEPHALIC





- Eye Exam


Eye Exam: EOMI





- ENT Exam


ENT Exam: Mucous Membranes Moist





- Respiratory Exam


Respiratory Exam: NORMAL BREATHING PATTERN





- Cardiovascular Exam


Cardiovascular Exam: REGULAR RHYTHM





- GI/Abdominal Exam


GI & Abdominal Exam: Soft.  absent: Distended, Guarding, Tenderness





- Extremities Exam


Additional comments: 


Right groin abscess 2.5 cm x 2cm area of erythema tenderness and induration








- Neurological Exam


Neurological exam: Alert, Oriented x3





- Psychiatric Exam


Psychiatric exam: Normal Affect, Normal Mood





- Skin


Skin Exam: Erythema, Intact, Warm


Additional comments: 





Right groin abscess 2.5 cm x 2cm area of erythema tenderness and induration








Results





- Vital Signs


Recent Vital Signs: 


                                Last Vital Signs











Temp  98.6 F   12/17/18 05:00


 


Pulse  82   12/17/18 05:00


 


Resp  20   12/17/18 05:00


 


BP  134/82   12/17/18 05:00


 


Pulse Ox  97   12/17/18 05:00














- Labs


Result Diagrams: 


                                 12/17/18 08:00





                                 12/16/18 23:25


Labs: 


                         Laboratory Results - last 24 hr











  12/16/18 12/16/18 12/16/18





  23:24 23:25 23:25


 


WBC   6.3 


 


RBC   4.67 


 


Hgb   15.1  D 


 


Hct   43.5 


 


MCV   93.1  D 


 


MCH   32.3 


 


MCHC   34.7 


 


RDW   14.0 


 


Plt Count   179 


 


MPV   10.7 


 


Gran %   63.5 


 


Lymph % (Auto)   22.6 


 


Mono % (Auto)   8.3 H 


 


Eos % (Auto)   5.6 H 


 


Baso % (Auto)   0.0 


 


Gran #   4.00 


 


Lymph # (Auto)   1.4 


 


Mono # (Auto)   0.5 


 


Eos # (Auto)   0.4 


 


Baso # (Auto)   0.00 


 


Sodium    137


 


Potassium    4.2


 


Chloride    102


 


Carbon Dioxide    26


 


Anion Gap    13


 


BUN    31 H


 


Creatinine    0.9


 


Est GFR ( Amer)    > 60


 


Est GFR (Non-Af Amer)    > 60


 


POC Glucose (mg/dL)  225 H  


 


Random Glucose    265 H


 


Calcium    9.5


 


Magnesium    1.8


 


Total Bilirubin    0.5


 


AST    42


 


ALT    71 H


 


Alkaline Phosphatase    118


 


Total Protein    7.3


 


Albumin    4.0


 


Globulin    3.3


 


Albumin/Globulin Ratio    1.2














  12/16/18 12/17/18





  23:52 01:25


 


WBC  


 


RBC  


 


Hgb  


 


Hct  


 


MCV  


 


MCH  


 


MCHC  


 


RDW  


 


Plt Count  


 


MPV  


 


Gran %  


 


Lymph % (Auto)  


 


Mono % (Auto)  


 


Eos % (Auto)  


 


Baso % (Auto)  


 


Gran #  


 


Lymph # (Auto)  


 


Mono # (Auto)  


 


Eos # (Auto)  


 


Baso # (Auto)  


 


Sodium  


 


Potassium  


 


Chloride  


 


Carbon Dioxide  


 


Anion Gap  


 


BUN  


 


Creatinine  


 


Est GFR ( Amer)  


 


Est GFR (Non-Af Amer)  


 


POC Glucose (mg/dL)  154 H  64 L


 


Random Glucose  


 


Calcium  


 


Magnesium  


 


Total Bilirubin  


 


AST  


 


ALT  


 


Alkaline Phosphatase  


 


Total Protein  


 


Albumin  


 


Globulin  


 


Albumin/Globulin Ratio  














Assessment & Plan





- Assessment and Plan (Free Text)


Assessment: 


58 M with PMH DM presenting with Right groin abscess





Plan: 


Recommend ID consult as patient is kidney transplant recipient 


bedside I&D


Tylenol for pain control


DM control per Primary team


c/w diet as tolerated


discussed with Dr. Kenyon Shay, PGY 1





- Date & Time


Date: 12/17/18


Time: 02:15

## 2018-12-17 NOTE — PCM.PROC
Incision and Drainage





- Time


Time Performed: 08:45





- Time Out


Time Out: Side verified, Site verified, Patient ID confirmed, Sterile procedures

obs.





- Consent obtained


Consent obtained: Written





- Performed by


Performed by: Attending Physician





- Indications


Indications: Cutaneous abscess





- Contraindications


Contraindications: None





- Location


Location: Right, Inguina/Groin





- Dimensions


Dimensions Length cm: 2.5 cm


Dimensions width cm: 2 cm





- Anesthetic Technique


Anesthetic Technique: Local





- Anesthetic


Anesthetic: Lidocaine 1% w/epi





- Procedure


Procedure: Usual prep and drape, cm incision (2 cm x 1 cm cruciate), Overlying 

area fluctuance, # scalpel used (15), Explored for loculations, Irrigated, Pack

ed with sterile gauze





- Drained


Drained: ml pus (2)





- Post-procedure


Post procedure: Dressed





- Complications


Complications: None





- Patient tolerated procedure


Patient tolerated procedure: Well

## 2018-12-17 NOTE — ED PDOC
Arrival/HPI





<Lonnie Harris - Last Filed: 12/17/18 02:50>





- General


Historian: Patient





- History of Present Illness


Narrative History of Present Illness (Text): 


Ady Mcqueen is a 58 year old male, whose past medical history includes IDDM, 

who presents to the Emergency department after accidentally taking Novalog 

instead of Levemir tonight. Patient states he normally takes between 8-12 units 

of Novalog twice a day. Patient states he did not take his evening dose of 

Novalog because he forgot and notes he was supposed to given himself Levemir but

accidentally gave himself 38 units of Novalog at 22:30-22:45. Patient states his

fingerstick before the Novalog was 400 and when EMS took it on arrival, his 

fingerstick was 405. On arrival to Emergency department, patient fingerstick was

225. Patient also complaining of a painful, draining abscess to the crevice of 

his right scrotum/perineum area. Otherwise, patient denies any fever, chills, 

abdominal pain, urinary symptoms, or any other complaints. 


Symptom Onset: Gradual


Symptom Course: Unchanged


Activities at Onset: Light


Context: Home





<Leti Carolina PA-C - Last Filed: 12/17/18 16:42>





- General


Chief Complaint: Medical Clearance


Time Seen by Provider: 12/16/18 23:27





Past Medical History





- Provider Review


Nursing Documentation Reviewed: Yes





- Past History


Past History: No Previous





- Infectious Disease


Hx of Infectious Diseases: None





- Tetanus Immunization


Tetanus Immunization: Unknown





- Cardiac


Hx Hypertension: Yes





- Pulmonary


Hx Chronic Obstructive Pulmonary Disease (COPD): Yes





- Neurological


Hx Paralysis: No





- HEENT


Hx HEENT Disorder: Yes


Hx Macular Degeneration: Yes


Other/Comment: laser sx for macular degeneration 5 yrs ago both eyes





- Renal


Hx Renal Failure: Yes (s/p transplant )





- Endocrine/Metabolic


Hx Diabetes Mellitus Type 2: Yes





- Hematological/Oncological


Hx Blood Transfusions: No





- Integumentary


Hx Dermatological Disorder: Yes





- Musculoskeletal/Rheumatological


Hx Musculoskeletal Disorders: Yes





- Gastrointestinal


Hx Gastroesophageal Reflux: Yes





- Genitourinary/Gynecological


Hx Genitourinary Disorders: Yes (KIDNEY TRANSPLANT)


Other/Comment: pt voids well





- Psychiatric


Hx Emotional Abuse: No


Hx Physical Abuse: No


Hx Substance Use: No





- Surgical History


Hx Mastectomy: No





- Anesthesia


Hx Anesthesia Reactions: No


Hx Malignant Hyperthermia: No





- Suicidal Assessment


Feels Threatened In Home Enviroment: No





<Leti Carolina PA-C - Last Filed: 12/17/18 16:42>





Family/Social History





- Physician Review


Nursing Documentation Reviewed: Yes


Family/Social History: Unknown Family HX


Smoking Status: Former Smoker


Hx Alcohol Use: No


Hx Substance Use: No


Hx Substance Use Treatment: No





<Leti Carolina PA-C - Last Filed: 12/17/18 16:42>





Allergies/Home Meds





<Lonnie Harris - Last Filed: 12/17/18 02:50>





<Leti Carolina PA-C - Last Filed: 12/17/18 16:42>


Allergies/Adverse Reactions: 


Allergies





No Known Allergies Allergy (Verified 02/08/18 12:52)


   








Home Medications: 


                                    Home Meds











 Medication  Instructions  Recorded  Confirmed


 


Atorvastatin Calcium [Lipitor] 40 mg PO DIN 10/30/14 05/13/18


 


Escitalopram [Lexapro] 5 mg PO QAM 10/30/14 05/13/18


 


Mycophenolate Sodium [Myfortic] 360 mg PO BID 10/30/14 05/13/18


 


Insulin Aspart, Recombinant 0 unit SQ ACTID 01/10/18 05/13/18





[Novolog]   


 


Insulin Detemir [Levemir] 0 unit SC ACHS 02/01/18 05/13/18


 


Tacrolimus [Prograf Cap] 0.5 mg PO HS 02/01/18 05/13/18


 


Tacrolimus [Prograf Cap] 2 mg PO BID 02/01/18 05/13/18


 


Famotidine [Pepcid] 20 mg PO QAM 02/08/18 05/13/18


 


Docusate [Colace] 100 mg PO PRN 05/13/18 05/13/18














Review of Systems





- Physician Review


All systems were reviewed & negative as marked: Yes





- Review of Systems


Constitutional: Normal.  absent: Fevers


Eyes: Normal


ENT: Normal


Respiratory: Normal.  absent: SOB, Cough


Cardiovascular: Normal.  absent: Chest Pain


Gastrointestinal: Normal.  absent: Abdominal Pain, Diarrhea, Nausea, Vomiting


Genitourinary Male: Normal.  absent: Dysuria, Frequency, Hematuria, Urinary 

Output Changes


Musculoskeletal: Normal.  absent: Back Pain, Neck Pain


Skin: Abscess.  absent: Rash


Neurological: Normal.  absent: Headache, Dizziness


Endocrine: Normal


Hemo/Lymphatic: Normal


Psychiatric: Normal





<Leti Carolina PA-C - Last Filed: 12/17/18 16:42>





Physical Exam





Vital Signs











  Temp Pulse Resp BP Pulse Ox


 


 12/16/18 23:27  98.4 F  80  18  163/83 H  98














<Lonnie Harris - Last Filed: 12/17/18 02:50>


Vital Signs Reviewed: Yes





Vital Signs











  Temp Pulse Resp BP Pulse Ox


 


 12/16/18 23:27  98.4 F  80  18  163/83 H  98











Temperature: Afebrile


Blood Pressure: Hypertensive


Pulse: Regular


Respiratory Rate: Normal


Appearance: Positive for: Well-Appearing, Non-Toxic, Comfortable


Pain Distress: None


Mental Status: Positive for: Alert and Oriented X 3


Finger Stick Blood Glucose: 224





- Systems Exam


Head: Present: Atraumatic, Normocephalic


Pupils: Present: PERRL


Extroacular Muscles: Present: EOMI


Conjunctiva: Present: Normal


Mouth: Present: Moist Mucous Membranes


Neck: Present: Normal Range of Motion


Respiratory/Chest: Present: Clear to Auscultation, Good Air Exchange.  No: 

Respiratory Distress, Accessory Muscle Use


Cardiovascular: Present: Regular Rate and Rhythm, Normal S1, S2.  No: Murmurs


Abdomen: No: Tenderness, Distention, Peritoneal Signs


Back: Present: Normal Inspection


Upper Extremity: Present: Normal Inspection.  No: Cyanosis, Edema


Lower Extremity: Present: NORMAL PULSES, Normal ROM, Other (Right AKA).  No: 

Edema


Neurological: Present: GCS=15, CN II-XII Intact, Speech Normal


Skin: Present: Warm, Dry, Rashes (Erythematous circular rash to chest with 

central clearing), Normal Color, Abscess (Draining 7rem1at abscess to crevice of

right scrotum/perineum)


Psychiatric: Present: Alert, Oriented x 3, Normal Insight, Normal Concentration





<Leti Carolina PA-C - Last Filed: 12/17/18 16:42>





Medical Decision Making





- Lab Interpretations


Lab Results: 











                                 12/16/18 23:25 





                                 12/16/18 23:25 





                                   Lab Results





12/16/18 23:52: POC Glucose (mg/dL) 154 H


12/16/18 23:25: Sodium 137, Potassium 4.2, Chloride 102, Carbon Dioxide 26, 

Anion Gap 13, BUN 31 H, Creatinine 0.9, Est GFR (African Amer) > 60, Est GFR 

(Non-Af Amer) > 60, Random Glucose 265 H, Calcium 9.5, Magnesium 1.8, Total 

Bilirubin 0.5, AST 42, ALT 71 H, Alkaline Phosphatase 118, Total Protein 7.3, 

Albumin 4.0, Globulin 3.3, Albumin/Globulin Ratio 1.2


12/16/18 23:25: WBC 6.3, RBC 4.67, Hgb 15.1  D, Hct 43.5, MCV 93.1  D, MCH 32.3,

MCHC 34.7, RDW 14.0, Plt Count 179, MPV 10.7, Gran % 63.5, Lymph % (Auto) 22.6, 

Mono % (Auto) 8.3 H, Eos % (Auto) 5.6 H, Baso % (Auto) 0.0, Gran # 4.00, Lymph #

(Auto) 1.4, Mono # (Auto) 0.5, Eos # (Auto) 0.4, Baso # (Auto) 0.00


12/16/18 23:24: POC Glucose (mg/dL) 225 H











- Medication Orders


Current Medication Orders: 














Discontinued Medications





Acetaminophen (Tylenol 325mg Tab)  975 mg PO STAT STA


   Stop: 12/17/18 00:40


   Last Admin: 12/17/18 01:44  Dose: 975 mg





MAR Pain/Vitals


 Document     12/17/18 01:44  JOL  (Rec: 12/17/18 01:45  JOL  [id number])


     Pain Reassessment


      Is This A Pain ReAssessment?               No


     Sleep


      Is patient sleeping during reassessment?   No


     Presence of Pain


      Presence of Pain                           Yes


     Pain Scale Used


     Protocol:  PSCALES


      Pain Scale Used                            Numeric


     Location


      Intensity                                  6





Cephalexin Monohydrate (Keflex)  500 mg PO STAT STA; Protocol


   Stop: 12/17/18 00:39


   Last Admin: 12/17/18 01:45  Dose: 500 mg





Dextrose (Dextrose 50% Inj)  50 ml IVP STAT STA


   Stop: 12/17/18 01:27


   Last Admin: 12/17/18 01:46  Dose: 50 ml





IVP Administration


 Document     12/17/18 01:46  JOL  (Rec: 12/17/18 01:46  JAN  [id number])


     Charges for Administration


      # of IVP Administrations                   1





Trimethoprim/Sulfamethoxazole (Bactrim Ds Tab)  2 tab PO STAT STA; Protocol


   Stop: 12/17/18 00:39


   Last Admin: 12/17/18 01:44  Dose: 2 tab











<Lonnie Harris - Last Filed: 12/17/18 02:50>


ED Course and Treatment: 


Impression:


58 year old male presents after accidentally taking 38 units of Novalog tonight.

Also complaining of an abscess to crevice of right scrotum/perineum.





Plan:


-- Labs, wound cultures


-- Tylenol


-- Keflex


-- Bactrim


-- D50


-- Reassess and disposition





Prior Visits:


Notes and results from previous visits were reviewed. 





Progress Notes:


Initial . Labs drawn, patient given a meal tray to eat. 





0125 FS 64, patient remains AAO x 3, given 1 am of D50 IV and given po juice to 

drink. 





Case d/w Dr. Neal, agrees with plan for observation with consult to surgery in

the AM for evaluation of R groin abscess. 


Patient agrees with plan for further observation, was given an opportunity to 

ask any questions regarding his care. 





- Lab Interpretations


Lab Results: 











                                 12/16/18 23:25 





                                 12/16/18 23:25 





                                   Lab Results





12/16/18 23:52: POC Glucose (mg/dL) 154 H


12/16/18 23:25: Sodium 137, Potassium 4.2, Chloride 102, Carbon Dioxide 26, 

Anion Gap 13, BUN 31 H, Creatinine 0.9, Est GFR (African Amer) > 60, Est GFR 

(Non-Af Amer) > 60, Random Glucose 265 H, Calcium 9.5, Magnesium 1.8, Total 

Bilirubin 0.5, AST 42, ALT 71 H, Alkaline Phosphatase 118, Total Protein 7.3, 

Albumin 4.0, Globulin 3.3, Albumin/Globulin Ratio 1.2


12/16/18 23:25: WBC 6.3, RBC 4.67, Hgb 15.1  D, Hct 43.5, MCV 93.1  D, MCH 32.3,

MCHC 34.7, RDW 14.0, Plt Count 179, MPV 10.7, Gran % 63.5, Lymph % (Auto) 22.6, 

Mono % (Auto) 8.3 H, Eos % (Auto) 5.6 H, Baso % (Auto) 0.0, Gran # 4.00, Lymph #

(Auto) 1.4, Mono # (Auto) 0.5, Eos # (Auto) 0.4, Baso # (Auto) 0.00


12/16/18 23:24: POC Glucose (mg/dL) 225 H











- Medication Orders


Current Medication Orders: 














Discontinued Medications





Acetaminophen (Tylenol 325mg Tab)  975 mg PO STAT STA


   Stop: 12/17/18 00:40


Cephalexin Monohydrate (Keflex)  500 mg PO STAT STA; Protocol


   Stop: 12/17/18 00:39


Dextrose (Dextrose 50% Inj)  50 ml IVP STAT STA


   Stop: 12/17/18 01:27


Trimethoprim/Sulfamethoxazole (Bactrim Ds Tab)  2 tab PO STAT STA; Protocol


   Stop: 12/17/18 00:39











<Leti Carolina PA-C - Last Filed: 12/17/18 16:42>





- PA / NP / Resident Statement


MADELEINE has reviewed & agrees with the documentation as recorded.


MADELEINE has examined the patient and agrees with the treatment plan.





<Lonnie Harris - Last Filed: 12/17/18 02:50>





- PA / NP / Resident Statement


MADELEINE has reviewed & agrees with the documentation as recorded.





- Scribe Statement


The provider has reviewed the documentation as recorded by the Girma Lopes





Provider Scribe Attestation:


All medical record entries made by the [employer] were at my direction and 

personally dictated by me. I have reviewed the chart and agree that the record 

accurately reflects my personal performance of the history, physical exam, medi

robert decision making, and the department course for this patient. I have also 

personally directed, reviewed, and agree with the discharge instructions and 

disposition.








<Leti Carolina PA-C - Last Filed: 12/17/18 16:42>





Disposition/Present on Arrival





<Lonnie Harris - Last Filed: 12/17/18 02:50>





- Present on Arrival


Any Indicators Present on Arrival: Yes


History of DVT/PE: No


History of Uncontrolled Diabetes: Yes


Urinary Catheter: No


History of Decub. Ulcer: No


History Surgical Site Infection Following: None





- Disposition


Have Diagnosis and Disposition been Completed?: Yes


Disposition Time: 01:20


Patient Plan: Observation





<Leti Carolina PA-C - Last Filed: 12/17/18 16:42>





- Disposition


Diagnosis: 


 Abscess of groin, right, Overdose of insulin





Disposition: HOSPITALIZED


Patient Problems: 


                             Current Active Problems











Problem Status Onset


 


Abscess of groin, right Acute 


 


Overdose of insulin Acute 











Condition: STABLE

## 2019-02-05 ENCOUNTER — HOSPITAL ENCOUNTER (INPATIENT)
Dept: HOSPITAL 42 - ED | Age: 59
LOS: 7 days | Discharge: HOME HEALTH | DRG: 464 | End: 2019-02-12
Attending: INTERNAL MEDICINE | Admitting: INTERNAL MEDICINE
Payer: MEDICARE

## 2019-02-05 ENCOUNTER — HOSPITAL ENCOUNTER (OUTPATIENT)
Dept: HOSPITAL 42 - RAD | Age: 59
End: 2019-02-05
Payer: MEDICARE

## 2019-02-05 VITALS — BODY MASS INDEX: 23 KG/M2

## 2019-02-05 DIAGNOSIS — Z87.891: ICD-10-CM

## 2019-02-05 DIAGNOSIS — I25.10: ICD-10-CM
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
## 2019-02-05 DIAGNOSIS — Z94.0: ICD-10-CM

## 2019-02-05 DIAGNOSIS — Z95.1: ICD-10-CM

## 2019-02-05 DIAGNOSIS — E78.5: ICD-10-CM

## 2019-02-05 DIAGNOSIS — E11.42: ICD-10-CM

## 2019-02-05 DIAGNOSIS — I11.0: ICD-10-CM

## 2019-02-05 DIAGNOSIS — H35.30: ICD-10-CM

## 2019-02-05 DIAGNOSIS — T87.44: Primary | ICD-10-CM

## 2019-02-05 DIAGNOSIS — L03.116: ICD-10-CM

## 2019-02-05 DIAGNOSIS — I45.9: ICD-10-CM

## 2019-02-05 DIAGNOSIS — I50.22: ICD-10-CM

## 2019-02-05 DIAGNOSIS — J44.9: ICD-10-CM

## 2019-02-05 DIAGNOSIS — Z89.511: ICD-10-CM

## 2019-02-05 DIAGNOSIS — E11.52: ICD-10-CM

## 2019-02-05 DIAGNOSIS — E11.621: ICD-10-CM

## 2019-02-05 DIAGNOSIS — Z79.899: ICD-10-CM

## 2019-02-05 DIAGNOSIS — Z79.4: ICD-10-CM

## 2019-02-05 DIAGNOSIS — L97.529: ICD-10-CM

## 2019-02-05 DIAGNOSIS — Z95.0: ICD-10-CM

## 2019-02-05 DIAGNOSIS — B95.2: ICD-10-CM

## 2019-02-05 DIAGNOSIS — Z95.5: ICD-10-CM

## 2019-02-05 LAB
ALBUMIN SERPL-MCNC: 4 G/DL (ref 3–4.8)
ALBUMIN/GLOB SERPL: 1.4 {RATIO} (ref 1.1–1.8)
ALT SERPL-CCNC: 47 U/L (ref 7–56)
AST SERPL-CCNC: 26 U/L (ref 17–59)
BASOPHILS # BLD AUTO: 0.01 K/MM3 (ref 0–2)
BASOPHILS NFR BLD: 0.1 % (ref 0–3)
BUN SERPL-MCNC: 23 MG/DL (ref 7–21)
CALCIUM SERPL-MCNC: 9.4 MG/DL (ref 8.4–10.5)
EOSINOPHIL # BLD: 0.4 10*3/UL (ref 0–0.7)
EOSINOPHIL NFR BLD: 4.8 % (ref 1.5–5)
ERYTHROCYTE [DISTWIDTH] IN BLOOD BY AUTOMATED COUNT: 13.7 % (ref 11.5–14.5)
GFR NON-AFRICAN AMERICAN: > 60
HGB BLD-MCNC: 14.7 G/DL (ref 14–18)
LYMPHOCYTES # BLD: 1.7 10*3/UL (ref 1.2–3.4)
LYMPHOCYTES NFR BLD AUTO: 23.4 % (ref 22–35)
MCH RBC QN AUTO: 31.9 PG (ref 25–35)
MCHC RBC AUTO-ENTMCNC: 34.4 G/DL (ref 31–37)
MCV RBC AUTO: 92.6 FL (ref 80–105)
MONOCYTES # BLD AUTO: 0.8 10*3/UL (ref 0.1–0.6)
MONOCYTES NFR BLD: 11.1 % (ref 1–6)
PLATELET # BLD: 176 10^3/UL (ref 120–450)
PMV BLD AUTO: 10.1 FL (ref 7–11)
RBC # BLD AUTO: 4.61 10^6/UL (ref 3.5–6.1)
WBC # BLD AUTO: 7.3 10^3/UL (ref 4.5–11)

## 2019-02-05 RX ADMIN — INSULIN HUMAN SCH UNITS: 100 INJECTION, SOLUTION PARENTERAL at 23:09

## 2019-02-05 RX ADMIN — OXYCODONE HYDROCHLORIDE AND ACETAMINOPHEN PRN TAB: 5; 325 TABLET ORAL at 23:10

## 2019-02-05 RX ADMIN — VANCOMYCIN HYDROCHLORIDE SCH MLS/HR: 1 INJECTION, POWDER, LYOPHILIZED, FOR SOLUTION INTRAVENOUS at 22:14

## 2019-02-05 NOTE — ED PDOC
Arrival/HPI





- General


Chief Complaint: Lower Extremity Problem/Injury


Time Seen by Provider: 02/05/19 17:05


Historian: Patient





- History of Present Illness


Narrative History of Present Illness (Text): 





02/05/19 17:56


58yr old male with hx of DM and left foot ulceration presents today for 

admission for left foot infection. pt denies fever/chills. pt states he was seen

at wound center today and advised to come to er for admission for infection in 

left foot requiring IV abx and possible skin graft. pt c/o chronic pain in left 

leg. no cp or sob. no vomiting/diarrhea. pt states wound has been non healing 

and he has been on bactrim without improvement. pt states he now has erythema to

the plantar aspect of the foot.  





Past Medical History





- Provider Review


Nursing Documentation Reviewed: Yes





- Past History


Past History: No Previous





- Infectious Disease


Hx of Infectious Diseases: None





- Tetanus Immunization


Tetanus Immunization: Unknown





- Cardiac


Hx Hypertension: Yes





- Pulmonary


Hx Chronic Obstructive Pulmonary Disease (COPD): Yes





- Neurological


Hx Paralysis: No





- HEENT


Hx HEENT Disorder: Yes


Hx Macular Degeneration: Yes


Other/Comment: laser sx for macular degeneration 5 yrs ago both eyes





- Renal


Hx Renal Failure: Yes (s/p transplant )





- Endocrine/Metabolic


Hx Diabetes Mellitus Type 2: Yes





- Hematological/Oncological


Hx Blood Transfusions: No





- Integumentary


Hx Dermatological Disorder: Yes





- Musculoskeletal/Rheumatological


Hx Musculoskeletal Disorders: Yes





- Gastrointestinal


Hx Gastroesophageal Reflux: Yes





- Genitourinary/Gynecological


Hx Genitourinary Disorders: Yes (KIDNEY TRANSPLANT)


Other/Comment: pt voids well





- Psychiatric


Hx Emotional Abuse: No


Hx Physical Abuse: No


Hx Substance Use: No





- Surgical History


Hx Mastectomy: No





- Anesthesia


Hx Anesthesia Reactions: No


Hx Malignant Hyperthermia: No





- Suicidal Assessment


Feels Threatened In Home Enviroment: No





Family/Social History





- Physician Review


Nursing Documentation Reviewed: Yes


Family/Social History: Unknown Family HX


Smoking Status: Former Smoker


Hx Alcohol Use: No


Hx Substance Use: No


Hx Substance Use Treatment: No





Allergies/Home Meds


Allergies/Adverse Reactions: 


Allergies





No Known Allergies Allergy (Verified 02/08/18 12:52)


   








Home Medications: 


                                    Home Meds











 Medication  Instructions  Recorded  Confirmed


 


Atorvastatin Calcium [Lipitor] 40 mg PO DIN 10/30/14 05/13/18


 


Escitalopram [Lexapro] 5 mg PO QAM 10/30/14 05/13/18


 


Mycophenolate Sodium [Myfortic] 360 mg PO BID 10/30/14 05/13/18


 


Insulin Aspart, Recombinant 0 unit SQ ACTID 01/10/18 05/13/18





[Novolog]   


 


Insulin Detemir [Levemir] 0 unit SC ACHS 02/01/18 05/13/18


 


Tacrolimus [Prograf Cap] 0.5 mg PO HS 02/01/18 05/13/18


 


Tacrolimus [Prograf Cap] 2 mg PO BID 02/01/18 05/13/18


 


Famotidine [Pepcid] 20 mg PO QAM 02/08/18 05/13/18


 


Docusate [Colace] 100 mg PO PRN 05/13/18 05/13/18














Review of Systems





- Review of Systems


Constitutional: absent: Fatigue, Fevers


Respiratory: absent: SOB, Cough


Cardiovascular: absent: Chest Pain, Palpitations


Gastrointestinal: absent: Abdominal Pain, Nausea, Vomiting


Genitourinary Male: absent: Dysuria, Frequency, Hematuria


Musculoskeletal: Arthralgias.  absent: Back Pain, Neck Pain


Skin: Skin Lesions, Cellulitis


Neurological: absent: Headache, Dizziness


Psychiatric: absent: Anxiety, Depression





Physical Exam


Vital Signs Reviewed: Yes





Vital Signs











  Temp Pulse Resp BP Pulse Ox


 


 02/05/19 16:51  98.2 F  88  18  125/72  98











Temperature: Afebrile


Blood Pressure: Normal


Pulse: Regular


Respiratory Rate: Normal


Appearance: Positive for: Well-Appearing, Non-Toxic, Comfortable


Pain Distress: None


Mental Status: Positive for: Alert and Oriented X 3


Finger Stick Blood Glucose: 232





- Systems Exam


Head: Present: Atraumatic


Mouth: Present: Moist Mucous Membranes


Neck: Present: Normal Range of Motion


Respiratory/Chest: Present: Clear to Auscultation, Good Air Exchange.  No: 

Respiratory Distress, Accessory Muscle Use


Cardiovascular: Present: Regular Rate and Rhythm, Normal S1, S2.  No: Murmurs


Back: Present: Normal Inspection


Upper Extremity: Present: Normal ROM


Lower Extremity: Present: Other (left foot; + ulceration and erythema; + 

amputation of all toes. decreased sensation. no calf tenderness. no calf 

erythema or edema.)


Neurological: Present: GCS=15, Speech Normal


Skin: Present: Warm, Dry


Psychiatric: Present: Alert, Oriented x 3





Medical Decision Making


ED Course and Treatment: 





02/05/19 18:04


58yr old male sent in by dr. mcneill for diabetic foot ulcer





cbc wnl


cmp glucose 234


blood cultures pending








case discussed with dr. dorsey; accepts admission; will start patient on 

Cefepime for infection. pt had wound culture on 1/29 with sensitivities.








case discussed with dr. Yuliana muse; diabetic foot ulcer, cellulitis


admit med/surg





Disposition/Present on Arrival





- Present on Arrival


Any Indicators Present on Arrival: Yes


History of DVT/PE: No


History of Uncontrolled Diabetes: Yes


Urinary Catheter: No


History of Decub. Ulcer: No


History Surgical Site Infection Following: None





- Disposition


Have Diagnosis and Disposition been Completed?: Yes


Diagnosis: 


 Cellulitis, Foot ulcer





Disposition Time: 18:14


Patient Plan: Admission


Patient Problems: 


                             Current Active Problems











Problem Status Onset


 


Cellulitis Acute 


 


Foot ulcer Acute 











Condition: FAIR


Discharge Instructions (ExitCare):  Cellulitis (ED)


Forms:  [facility] (English)

## 2019-02-06 RX ADMIN — INSULIN HUMAN SCH UNITS: 100 INJECTION, SOLUTION PARENTERAL at 18:39

## 2019-02-06 RX ADMIN — INSULIN LISPRO SCH UNITS: 100 INJECTION, SOLUTION INTRAVENOUS; SUBCUTANEOUS at 18:38

## 2019-02-06 RX ADMIN — INSULIN HUMAN SCH UNITS: 100 INJECTION, SOLUTION PARENTERAL at 13:50

## 2019-02-06 RX ADMIN — MEROPENEM SCH MLS/HR: 1 INJECTION INTRAVENOUS at 05:52

## 2019-02-06 RX ADMIN — INSULIN LISPRO SCH UNITS: 100 INJECTION, SOLUTION INTRAVENOUS; SUBCUTANEOUS at 13:48

## 2019-02-06 RX ADMIN — INSULIN LISPRO SCH UNITS: 100 INJECTION, SOLUTION INTRAVENOUS; SUBCUTANEOUS at 09:59

## 2019-02-06 RX ADMIN — MEROPENEM SCH MLS/HR: 1 INJECTION INTRAVENOUS at 14:01

## 2019-02-06 RX ADMIN — OXYCODONE HYDROCHLORIDE AND ACETAMINOPHEN PRN TAB: 5; 325 TABLET ORAL at 20:15

## 2019-02-06 RX ADMIN — INSULIN DETEMIR SCH UNIT: 100 INJECTION, SOLUTION SUBCUTANEOUS at 22:18

## 2019-02-06 RX ADMIN — VANCOMYCIN HYDROCHLORIDE SCH MLS/HR: 1 INJECTION, POWDER, LYOPHILIZED, FOR SOLUTION INTRAVENOUS at 21:15

## 2019-02-06 RX ADMIN — OXYCODONE HYDROCHLORIDE AND ACETAMINOPHEN PRN TAB: 5; 325 TABLET ORAL at 06:29

## 2019-02-06 RX ADMIN — INSULIN HUMAN SCH UNITS: 100 INJECTION, SOLUTION PARENTERAL at 22:17

## 2019-02-06 RX ADMIN — MEROPENEM SCH MLS/HR: 1 INJECTION INTRAVENOUS at 22:21

## 2019-02-06 RX ADMIN — MEROPENEM SCH MLS/HR: 1 INJECTION INTRAVENOUS at 13:45

## 2019-02-06 RX ADMIN — INSULIN HUMAN SCH UNITS: 100 INJECTION, SOLUTION PARENTERAL at 10:05

## 2019-02-06 RX ADMIN — OXYCODONE HYDROCHLORIDE AND ACETAMINOPHEN PRN TAB: 5; 325 TABLET ORAL at 13:59

## 2019-02-06 RX ADMIN — VANCOMYCIN HYDROCHLORIDE SCH MLS/HR: 1 INJECTION, POWDER, LYOPHILIZED, FOR SOLUTION INTRAVENOUS at 10:01

## 2019-02-06 NOTE — RAD
Date of service: 



02/06/2019



PROCEDURE:  Left Foot Radiographs.



HISTORY:

 r/o osteomyelitis 



COMPARISON:

None.



FINDINGS:



BONES:

Status post transmetatarsal amputation.  No osseous erosion or 

periosteal reaction noted to suggest acute osteomyelitis.  However, 

if there clinical suspicion of osteomyelitis consider further 

evaluation with magnetic resonance imaging.  No acute fracture. 



JOINTS:

Normal. 



SOFT TISSUES:

Vascular calcifications 



OTHER FINDINGS:

None.



IMPRESSION:

Status post transmetatarsal amputation.  No plain radiographic 

evidence of acute osteomyelitis..

## 2019-02-06 NOTE — CP.PCM.HP
<June Marroquin - Last Filed: 19 17:30>





History of Present Illness





- History of Present Illness


History of Present Illness: 





57yo male PMHx CHF, DM, CAD s/p CABG, COPD, kidney transplant, pacemaker 

placement, R leg amputation, left TMA stump ulceration and osteomyelitis, and 

pseudomonas skin infection presents from podiatry clinic for left lower 

extremity TMA stump ulceration. Patient reports his wound opened up 3 days 

prior. He was sent to the hospital for further management and possible skin 

graft and IV abx. On complete ROS patient denied fever, chills, headache, 

dizziness, chest pain, palpitations, SOB, cough, abd pain, nausea, vomiting, 

bowel/bladder complaints, swelling in his LLE. Patient does complain of some 

pain states wound opened up 3 days ago. Patient states he went to see his 

podiatrist and was referred to the hospital for skin graft application after 

course of IV antibiotics. Denies chest pain, shortness of breath, nausea, 

vomiting, diarrhea, fever, chills, dysuria, numbness, tingling. 





PMHx: CHF, DM, CAD s/p CABG, COPD, kidney transplant, pacemaker placement, R leg

amputation, left TMA stump ulceration and osteomyelitis, and pseudomonas skin 

infection


PSurgHx: Pacemaker, Left foot TMA, Right leg amputation, Kidney transplant


SocHx: Former smoker, denies alcohol and illicit drug use


All: NKDA


Meds: Reviewed, as per MAR





Present on Admission





- Present on Admission


Any Indicators Present on Admission: No





Review of Systems





- Review of Systems


All systems: reviewed and no additional remarkable complaints except


Review of Systems: 





as per HPI





Past Patient History





- Infectious Disease


Hx of Infectious Diseases: None





- Tetanus Immunizations


Tetanus Immunization: Unknown





- Past Social History


Smoking Status: Former Smoker





- CARDIAC


Hx Hypertension: Yes





- PULMONARY


Hx Chronic Obstructive Pulmonary Disease (COPD): Yes





- NEUROLOGICAL


Hx Paralysis: No





- HEENT


Hx HEENT Problems: Yes


Hx Macular Degeneration: Yes


Other/Comment: laser sx for macular degeneration 5 yrs ago both eyes





- RENAL


Hx Renal Failure: Yes (s/p transplant )





- ENDOCRINE/METABOLIC


Hx Diabetes Mellitus Type 2: Yes





- HEMATOLOGICAL/ONCOLOGICAL


Hx Blood Transfusions: No





- INTEGUMENTARY


Hx Dermatological Problems: Yes





- MUSCULOSKELETAL/RHEUMATOLOGICAL


Hx Musculoskeletal Disorders: Yes





- GASTROINTESTINAL


Hx Gastroesophageal Reflux: Yes





- GENITOURINARY/GYNECOLOGICAL


Hx Genitourinary Disorders: Yes (KIDNEY TRANSPLANT)


Other/Comment: pt voids well





- PSYCHIATRIC


Hx Emotional Abuse: No


Hx Physical Abuse: No


Hx Substance Use: No





- SURGICAL HISTORY


Hx Mastectomy: No





- ANESTHESIA


Hx Anesthesia Reactions: No


Hx Malignant Hyperthermia: No





Meds


Allergies/Adverse Reactions: 


                                    Allergies











Allergy/AdvReac Type Severity Reaction Status Date / Time


 


No Known Allergies Allergy   Verified 19 13:13














Physical Exam





- Additional Findings


Additional findings: 





- Constitutional


Appears: Non-toxic, No Acute Distress





- Head Exam


Head Exam: ATRAUMATIC, NORMAL INSPECTION, NORMOCEPHALIC





- ENT Exam


ENT Exam: Mucous Membranes Moist





- Respiratory Exam


Respiratory Exam: Clear to Auscultation Bilateral, NORMAL BREATHING PATTERN





- Cardiovascular Exam


Cardiovascular Exam: RRR, +S1, +S2





- GI/Abdominal Exam


GI & Abdominal Exam: Normal Bowel Sounds, Soft.  absent: Tenderness





- Extremities Exam


Additional comments: 





Left TMA amputation, left foot bandaged. No draining 


Right left amputation





- Neurological Exam


Neurological exam: Alert, Oriented x3





- Psychiatric Exam


Psychiatric exam: Normal Affect, Normal Mood





- Skin


Skin Exam: Dry, Intact, Warm





Results





- Vital Signs


Recent Vital Signs: 





                                Last Vital Signs











Temp  98 F   19 10:00


 


Pulse  72   19 10:00


 


Resp  18   19 10:00


 


BP  114/61   19 10:49


 


Pulse Ox  97   19 10:00














- Labs


Result Diagrams: 


                                 19 18:10





                                 19 18:10


Labs: 





                         Laboratory Results - last 24 hr











  19





  17:05 18:10 18:10


 


WBC    7.3


 


RBC    4.61


 


Hgb    14.7


 


Hct    42.7


 


MCV    92.6


 


MCH    31.9


 


MCHC    34.4


 


RDW    13.7


 


Plt Count    176


 


MPV    10.1


 


Neut % (Auto)    60.6


 


Lymph % (Auto)    23.4


 


Mono % (Auto)    11.1 H


 


Eos % (Auto)    4.8


 


Baso % (Auto)    0.1


 


Lymph # (Auto)    1.7


 


Mono # (Auto)    0.8 H


 


Eos # (Auto)    0.4


 


Baso # (Auto)    0.01


 


Absolute Neuts (auto)    4.41


 


Sodium   138 


 


Potassium   4.2 


 


Chloride   102 


 


Carbon Dioxide   28 


 


Anion Gap   12 


 


BUN   23 H 


 


Creatinine   1.0 


 


Est GFR ( Amer)   > 60 


 


Est GFR (Non-Af Amer)   > 60 


 


POC Glucose (mg/dL)  232 H  


 


Random Glucose   234 H 


 


Calcium   9.4 


 


Total Bilirubin   0.4 


 


AST   26 


 


ALT   47 


 


Alkaline Phosphatase   92 


 


Total Protein   6.9 


 


Albumin   4.0 


 


Globulin   2.9 


 


Albumin/Globulin Ratio   1.4 














  19





  18:11 22:33 08:33


 


WBC   


 


RBC   


 


Hgb   


 


Hct   


 


MCV   


 


MCH   


 


MCHC   


 


RDW   


 


Plt Count   


 


MPV   


 


Neut % (Auto)   


 


Lymph % (Auto)   


 


Mono % (Auto)   


 


Eos % (Auto)   


 


Baso % (Auto)   


 


Lymph # (Auto)   


 


Mono # (Auto)   


 


Eos # (Auto)   


 


Baso # (Auto)   


 


Absolute Neuts (auto)   


 


Sodium   


 


Potassium   


 


Chloride   


 


Carbon Dioxide   


 


Anion Gap   


 


BUN   


 


Creatinine   


 


Est GFR ( Amer)   


 


Est GFR (Non-Af Amer)   


 


POC Glucose (mg/dL)  276 H  226 H  192 H


 


Random Glucose   


 


Calcium   


 


Total Bilirubin   


 


AST   


 


ALT   


 


Alkaline Phosphatase   


 


Total Protein   


 


Albumin   


 


Globulin   


 


Albumin/Globulin Ratio   














Assessment & Plan





- Assessment and Plan (Free Text)


Assessment: 





-Left foot stump diabetic ulcer


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


-Kidney transplant


-Right foot necrosis and gangrene, severe skin and skin structure infection S/P 

BKA


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 

closure


-CAD s/p CABG


-Chronic CHF


-DM


-COPD


-s/p pacemaker placement


Plan: 


Patient's vitals, blood work, and imaging noted. ID and podiatry on board. f/u 

foot x-ray. Continue Vanc and Merrem as per ID. f/u ID work up. Continue home 

medications s/p kidney transplant; patient encouraged to bring medications from 

home. Continue pain management and zofran for nausea. Maintain normotension with

lopressor and norvasc. Continue lipitor for HLD. Patient on RISS and accuchecks.

Patient to be taken to the OR likely 19 for graft application. Will 

continue to monitor closely.





Discussed with Dr. Yumiko Marroquin PGY3





<Andrea Calderon S - Last Filed: 19 18:33>





Results





- Vital Signs


Recent Vital Signs: 





                                Last Vital Signs











Temp  98 F   19 10:00


 


Pulse  69   19 15:08


 


Resp  18   19 15:08


 


BP  121/65   19 15:08


 


Pulse Ox  97   19 15:08














- Labs


Result Diagrams: 


                                 19 18:10





                                 19 18:10


Labs: 





                         Laboratory Results - last 24 hr











  19





  18:10 18:10 22:33


 


WBC   7.3 


 


RBC   4.61 


 


Hgb   14.7 


 


Hct   42.7 


 


MCV   92.6 


 


MCH   31.9 


 


MCHC   34.4 


 


RDW   13.7 


 


Plt Count   176 


 


MPV   10.1 


 


Neut % (Auto)   60.6 


 


Lymph % (Auto)   23.4 


 


Mono % (Auto)   11.1 H 


 


Eos % (Auto)   4.8 


 


Baso % (Auto)   0.1 


 


Lymph # (Auto)   1.7 


 


Mono # (Auto)   0.8 H 


 


Eos # (Auto)   0.4 


 


Baso # (Auto)   0.01 


 


Absolute Neuts (auto)   4.41 


 


Sodium  138  


 


Potassium  4.2  


 


Chloride  102  


 


Carbon Dioxide  28  


 


Anion Gap  12  


 


BUN  23 H  


 


Creatinine  1.0  


 


Est GFR ( Amer)  > 60  


 


Est GFR (Non-Af Amer)  > 60  


 


POC Glucose (mg/dL)    226 H


 


Random Glucose  234 H  


 


Calcium  9.4  


 


Total Bilirubin  0.4  


 


AST  26  


 


ALT  47  


 


Alkaline Phosphatase  92  


 


Total Protein  6.9  


 


Albumin  4.0  


 


Globulin  2.9  


 


Albumin/Globulin Ratio  1.4  














  19





  08:33 13:08 15:22


 


WBC   


 


RBC   


 


Hgb   


 


Hct   


 


MCV   


 


MCH   


 


MCHC   


 


RDW   


 


Plt Count   


 


MPV   


 


Neut % (Auto)   


 


Lymph % (Auto)   


 


Mono % (Auto)   


 


Eos % (Auto)   


 


Baso % (Auto)   


 


Lymph # (Auto)   


 


Mono # (Auto)   


 


Eos # (Auto)   


 


Baso # (Auto)   


 


Absolute Neuts (auto)   


 


Sodium   


 


Potassium   


 


Chloride   


 


Carbon Dioxide   


 


Anion Gap   


 


BUN   


 


Creatinine   


 


Est GFR ( Amer)   


 


Est GFR (Non-Af Amer)   


 


POC Glucose (mg/dL)  192 H  189 H  76


 


Random Glucose   


 


Calcium   


 


Total Bilirubin   


 


AST   


 


ALT   


 


Alkaline Phosphatase   


 


Total Protein   


 


Albumin   


 


Globulin   


 


Albumin/Globulin Ratio   














  19





  17:56


 


WBC 


 


RBC 


 


Hgb 


 


Hct 


 


MCV 


 


MCH 


 


MCHC 


 


RDW 


 


Plt Count 


 


MPV 


 


Neut % (Auto) 


 


Lymph % (Auto) 


 


Mono % (Auto) 


 


Eos % (Auto) 


 


Baso % (Auto) 


 


Lymph # (Auto) 


 


Mono # (Auto) 


 


Eos # (Auto) 


 


Baso # (Auto) 


 


Absolute Neuts (auto) 


 


Sodium 


 


Potassium 


 


Chloride 


 


Carbon Dioxide 


 


Anion Gap 


 


BUN 


 


Creatinine 


 


Est GFR ( Amer) 


 


Est GFR (Non-Af Amer) 


 


POC Glucose (mg/dL)  330 H


 


Random Glucose 


 


Calcium 


 


Total Bilirubin 


 


AST 


 


ALT 


 


Alkaline Phosphatase 


 


Total Protein 


 


Albumin 


 


Globulin 


 


Albumin/Globulin Ratio 














Assessment & Plan





- Assessment and Plan (Free Text)


Plan: 





Pt seen and examined by me. I have reviewed the note of the medical resident and

I agree with it. I have discussed the assessment and plan with the resident. I 

have reviewed the medications and the last labs.Pt with L foot ulcer. He will 

need IV Abx and Podiatry and ID evaluation. He has a hx of a  donor 

kidney transplant. He has CAD and is on ASA as well as PAD. He is on his 

transplant medications Prednisone, Mycophenalate, Tacrolimus. He has DM-2 and 

will be on Insulin. He was asked to bring his Myfortic to the hospital via his 

wife so that he can be on the same medications. Pt is on Lipitor for 

dyslipidemia. Will be on Zofran for nausea.

## 2019-02-06 NOTE — CP.PCM.CON
<Noe Lauren - Last Filed: 02/06/19 15:09>





History of Present Illness





- History of Present Illness


History of Present Illness: 





ID Consult Note





58 year old male with past medical history of CHF, DM, CAD s/p CABG, COPD, 

kidney transplant, pacemaker placement, right leg ampuation, left TMA stump 

ulceration and osteomyelitis, and pseudomonas skin infection presents from 

podiatry clinic for left lower extremity TMA stump ulceration. Patient states 

wound opened up 3 days ago. Patient states he went to see his podiatrist and was

referred to the hospital for skin graft application after course of IV 

antibiotics. Denies chest pain, shortness of breath, nausea, vomiting, diarrhea,

fever, chills, dysuria, numbness, tingling. 





Medical Hx: As above


Surgical Hx: Pacemaker, Left foot TMA, Right leg amputation, Kidney transplant


Social Hx: Former smoker, denies alcohol and illicit drug use


Allergies: NKDA


Medications: Reviewed, as per MAR





Review of Systems





- Review of Systems


Review of Systems: 





12 point ROS as per HPI, otherwise negative





Past Patient History





- Infectious Disease


Hx of Infectious Diseases: None





- Tetanus Immunizations


Tetanus Immunization: Unknown





- Past Social History


Smoking Status: Former Smoker





- CARDIAC


Hx Hypertension: Yes





- PULMONARY


Hx Chronic Obstructive Pulmonary Disease (COPD): Yes





- NEUROLOGICAL


Hx Paralysis: No





- HEENT


Hx HEENT Problems: Yes


Hx Macular Degeneration: Yes


Other/Comment: laser sx for macular degeneration 5 yrs ago both eyes





- RENAL


Hx Renal Failure: Yes (s/p transplant )





- ENDOCRINE/METABOLIC


Hx Diabetes Mellitus Type 2: Yes





- HEMATOLOGICAL/ONCOLOGICAL


Hx Blood Transfusions: No





- INTEGUMENTARY


Hx Dermatological Problems: Yes





- MUSCULOSKELETAL/RHEUMATOLOGICAL


Hx Musculoskeletal Disorders: Yes





- GASTROINTESTINAL


Hx Gastroesophageal Reflux: Yes





- GENITOURINARY/GYNECOLOGICAL


Hx Genitourinary Disorders: Yes (KIDNEY TRANSPLANT)


Other/Comment: pt voids well





- PSYCHIATRIC


Hx Emotional Abuse: No


Hx Physical Abuse: No


Hx Substance Use: No





- SURGICAL HISTORY


Hx Mastectomy: No





- ANESTHESIA


Hx Anesthesia Reactions: No


Hx Malignant Hyperthermia: No





Meds


Allergies/Adverse Reactions: 


                                    Allergies











Allergy/AdvReac Type Severity Reaction Status Date / Time


 


No Known Allergies Allergy   Verified 02/06/19 13:13














- Medications


Medications: 


                               Current Medications





Amlodipine Besylate (Norvasc)  10 mg PO DAILY [facility]


   Last Admin: 02/06/19 10:49 Dose:  10 mg


Aspirin (Ecotrin)  81 mg PO 0800 [facility]


   Last Admin: 02/06/19 10:00 Dose:  81 mg


Atorvastatin Calcium (Lipitor)  40 mg PO DIN [facility]


   Last Admin: 02/05/19 22:19 Dose:  40 mg


Docusate Sodium (Colace)  100 mg PO PRN [facility]


Escitalopram Oxalate (Lexapro)  5 mg PO QAM [facility]


   Last Admin: 02/06/19 10:50 Dose:  5 mg


Meropenem (Merrem Iv 1 Gm Premix)  1 gm in 50 mls @ 100 mls/hr IVPB Q8 [facility]; 

Protocol


   Last Admin: 02/06/19 14:01 Dose:  100 mls/hr


Vancomycin HCl (Vancomycin 1gm)  1 gm in 250 mls @ 167 mls/hr IVPB Q12H [facility]; 

Protocol


   Last Admin: 02/06/19 10:01 Dose:  167 mls/hr


Insulin Detemir (Levemir)  20 unit SC [facility]


Insulin Human Lispro (Humalog)  5 units SC AC [facility]


   Last Admin: 02/06/19 13:48 Dose:  5 units


Insulin Human Regular (Humulin R Med)  0 units SC ACHS [facility]; Protocol


   Last Admin: 02/06/19 13:50 Dose:  1 units


Metoprolol Tartrate (Lopressor)  50 mg PO 0800,1800 [facility]


   Last Admin: 02/06/19 10:00 Dose:  50 mg


Mycophenolate Mofetil (Cellcept Cap)  250 mg PO BID [facility]


   Last Admin: 02/06/19 10:03 Dose:  250 mg


Non-Formulary Medication (Mycophenolate Sodium [Myfortic])  360 mg PO BID [facility]


Ondansetron HCl (Zofran Inj)  4 mg IVP Q4 PRN


   PRN Reason: Nausea/Vomiting


   Last Admin: 02/06/19 09:42 Dose:  4 mg


Oxycodone/Acetaminophen (Percocet 5/325 Mg Tab)  1 tab PO Q6H PRN


   PRN Reason: Pain, moderate (4-7)


   Stop: 02/08/19 21:21


   Last Admin: 02/06/19 13:59 Dose:  1 tab


Prednisone (Prednisone Tab)  5 mg PO 0800 [facility]


   Last Admin: 02/06/19 10:04 Dose:  5 mg


Pregabalin (Lyrica)  100 mg PO BID [facility]


   Last Admin: 02/06/19 13:44 Dose:  Not Given


Tacrolimus (Prograf Cap)  2 mg PO BID [facility]


   Last Admin: 02/06/19 10:50 Dose:  2 mg











Physical Exam





- Constitutional


Appears: Non-toxic, No Acute Distress





- Head Exam


Head Exam: ATRAUMATIC, NORMAL INSPECTION, NORMOCEPHALIC





- ENT Exam


ENT Exam: Mucous Membranes Moist





- Respiratory Exam


Respiratory Exam: Clear to Auscultation Bilateral, NORMAL BREATHING PATTERN





- Cardiovascular Exam


Cardiovascular Exam: RRR, +S1, +S2





- GI/Abdominal Exam


GI & Abdominal Exam: Normal Bowel Sounds, Soft.  absent: Tenderness





- Extremities Exam


Additional comments: 





Left TMA amputation, left foot bandaged. No draining 


Right left amputation





- Neurological Exam


Neurological exam: Alert, Oriented x3





- Psychiatric Exam


Psychiatric exam: Normal Affect, Normal Mood





- Skin


Skin Exam: Dry, Intact, Warm





Results





- Vital Signs


Recent Vital Signs: 


                                Last Vital Signs











Temp  98 F   02/06/19 10:00


 


Pulse  76   02/06/19 12:00


 


Resp  18   02/06/19 12:00


 


BP  123/66   02/06/19 12:00


 


Pulse Ox  97   02/06/19 12:00














- Labs


Result Diagrams: 


                                 02/05/19 18:10





                                 02/05/19 18:10


Labs: 


                         Laboratory Results - last 24 hr











  02/05/19 02/05/19 02/05/19





  17:05 18:10 18:10


 


WBC    7.3


 


RBC    4.61


 


Hgb    14.7


 


Hct    42.7


 


MCV    92.6


 


MCH    31.9


 


MCHC    34.4


 


RDW    13.7


 


Plt Count    176


 


MPV    10.1


 


Neut % (Auto)    60.6


 


Lymph % (Auto)    23.4


 


Mono % (Auto)    11.1 H


 


Eos % (Auto)    4.8


 


Baso % (Auto)    0.1


 


Lymph # (Auto)    1.7


 


Mono # (Auto)    0.8 H


 


Eos # (Auto)    0.4


 


Baso # (Auto)    0.01


 


Absolute Neuts (auto)    4.41


 


Sodium   138 


 


Potassium   4.2 


 


Chloride   102 


 


Carbon Dioxide   28 


 


Anion Gap   12 


 


BUN   23 H 


 


Creatinine   1.0 


 


Est GFR ( Amer)   > 60 


 


Est GFR (Non-Af Amer)   > 60 


 


POC Glucose (mg/dL)  232 H  


 


Random Glucose   234 H 


 


Calcium   9.4 


 


Total Bilirubin   0.4 


 


AST   26 


 


ALT   47 


 


Alkaline Phosphatase   92 


 


Total Protein   6.9 


 


Albumin   4.0 


 


Globulin   2.9 


 


Albumin/Globulin Ratio   1.4 














  02/05/19 02/05/19 02/06/19





  18:11 22:33 08:33


 


WBC   


 


RBC   


 


Hgb   


 


Hct   


 


MCV   


 


MCH   


 


MCHC   


 


RDW   


 


Plt Count   


 


MPV   


 


Neut % (Auto)   


 


Lymph % (Auto)   


 


Mono % (Auto)   


 


Eos % (Auto)   


 


Baso % (Auto)   


 


Lymph # (Auto)   


 


Mono # (Auto)   


 


Eos # (Auto)   


 


Baso # (Auto)   


 


Absolute Neuts (auto)   


 


Sodium   


 


Potassium   


 


Chloride   


 


Carbon Dioxide   


 


Anion Gap   


 


BUN   


 


Creatinine   


 


Est GFR ( Amer)   


 


Est GFR (Non-Af Amer)   


 


POC Glucose (mg/dL)  276 H  226 H  192 H


 


Random Glucose   


 


Calcium   


 


Total Bilirubin   


 


AST   


 


ALT   


 


Alkaline Phosphatase   


 


Total Protein   


 


Albumin   


 


Globulin   


 


Albumin/Globulin Ratio   














  02/06/19





  13:08


 


WBC 


 


RBC 


 


Hgb 


 


Hct 


 


MCV 


 


MCH 


 


MCHC 


 


RDW 


 


Plt Count 


 


MPV 


 


Neut % (Auto) 


 


Lymph % (Auto) 


 


Mono % (Auto) 


 


Eos % (Auto) 


 


Baso % (Auto) 


 


Lymph # (Auto) 


 


Mono # (Auto) 


 


Eos # (Auto) 


 


Baso # (Auto) 


 


Absolute Neuts (auto) 


 


Sodium 


 


Potassium 


 


Chloride 


 


Carbon Dioxide 


 


Anion Gap 


 


BUN 


 


Creatinine 


 


Est GFR ( Amer) 


 


Est GFR (Non-Af Amer) 


 


POC Glucose (mg/dL)  189 H


 


Random Glucose 


 


Calcium 


 


Total Bilirubin 


 


AST 


 


ALT 


 


Alkaline Phosphatase 


 


Total Protein 


 


Albumin 


 


Globulin 


 


Albumin/Globulin Ratio 














Assessment & Plan





- Assessment and Plan (Free Text)


Plan: 





Left foot stump diabetic ulcer


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


Hx of right foot necrosis and gangrene, severe skin and skin structure infection

 S/P BKA


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 

and closure


S/P Left 5th metatarsal osteomyelitis S/P debridement and bone excision grew 

Acinetobacter and E. faecalis


Hx of CAD S/P CABG


Hx of CHF


Hx of DM


Hx of COPD


S/P pacemaker placement


S/P kidney transplant





Plan


Continue patient on Merrem and Vancomycin


Will order Foot x-ray 


Consider Foot MRI to rule out osteomyelitis 


Left foot culture shows gram negative rods


Continue to monitor closely





Leonidas, PGY-3





<Bo Waterman S - Last Filed: 02/06/19 15:43>





Meds





- Medications


Medications: 


                               Current Medications





Amlodipine Besylate (Norvasc)  10 mg PO DAILY [facility]


   Last Admin: 02/06/19 10:49 Dose:  10 mg


Aspirin (Ecotrin)  81 mg PO 0800 [facility]


   Last Admin: 02/06/19 10:00 Dose:  81 mg


Atorvastatin Calcium (Lipitor)  40 mg PO DIN [facility]


   Last Admin: 02/05/19 22:19 Dose:  40 mg


Docusate Sodium (Colace)  100 mg PO PRN [facility]


Escitalopram Oxalate (Lexapro)  5 mg PO QAM [facility]


   Last Admin: 02/06/19 10:50 Dose:  5 mg


Meropenem (Merrem Iv 1 Gm Premix)  1 gm in 50 mls @ 100 mls/hr IVPB Q8 [facility]; 

Protocol


   Last Admin: 02/06/19 14:01 Dose:  100 mls/hr


Vancomycin HCl (Vancomycin 1gm)  1 gm in 250 mls @ 167 mls/hr IVPB Q12H [facility]; P

rotocol


   Last Admin: 02/06/19 10:01 Dose:  167 mls/hr


Insulin Detemir (Levemir)  20 unit SC HS [facility]


Insulin Human Lispro (Humalog)  5 units SC AC [facility]


   Last Admin: 02/06/19 13:48 Dose:  5 units


Insulin Human Regular (Humulin R Med)  0 units SC ACHS [facility]; Protocol


   Last Admin: 02/06/19 13:50 Dose:  1 units


Metoprolol Tartrate (Lopressor)  50 mg PO 0800,1800 [facility]


   Last Admin: 02/06/19 10:00 Dose:  50 mg


Mycophenolate Mofetil (Cellcept Cap)  250 mg PO BID [facility]


   Last Admin: 02/06/19 10:03 Dose:  250 mg


Non-Formulary Medication (Mycophenolate Sodium [Myfortic])  360 mg PO BID [facility]


Ondansetron HCl (Zofran Inj)  4 mg IVP Q4 PRN


   PRN Reason: Nausea/Vomiting


   Last Admin: 02/06/19 09:42 Dose:  4 mg


Oxycodone/Acetaminophen (Percocet 5/325 Mg Tab)  1 tab PO Q6H PRN


   PRN Reason: Pain, moderate (4-7)


   Stop: 02/08/19 21:21


   Last Admin: 02/06/19 13:59 Dose:  1 tab


Prednisone (Prednisone Tab)  5 mg PO 0800 [facility]


   Last Admin: 02/06/19 10:04 Dose:  5 mg


Pregabalin (Lyrica)  100 mg PO BID [facility]


   Last Admin: 02/06/19 13:44 Dose:  Not Given


Tacrolimus (Prograf Cap)  2 mg PO BID [facility]


   Last Admin: 02/06/19 10:50 Dose:  2 mg











Results





- Vital Signs


Recent Vital Signs: 


                                Last Vital Signs











Temp  98 F   02/06/19 10:00


 


Pulse  69   02/06/19 15:08


 


Resp  18   02/06/19 15:08


 


BP  121/65   02/06/19 15:08


 


Pulse Ox  97   02/06/19 15:08














- Labs


Result Diagrams: 


                                 02/05/19 18:10





                                 02/05/19 18:10


Labs: 


                         Laboratory Results - last 24 hr











  02/05/19 02/05/19 02/05/19





  17:05 18:10 18:10


 


WBC    7.3


 


RBC    4.61


 


Hgb    14.7


 


Hct    42.7


 


MCV    92.6


 


MCH    31.9


 


MCHC    34.4


 


RDW    13.7


 


Plt Count    176


 


MPV    10.1


 


Neut % (Auto)    60.6


 


Lymph % (Auto)    23.4


 


Mono % (Auto)    11.1 H


 


Eos % (Auto)    4.8


 


Baso % (Auto)    0.1


 


Lymph # (Auto)    1.7


 


Mono # (Auto)    0.8 H


 


Eos # (Auto)    0.4


 


Baso # (Auto)    0.01


 


Absolute Neuts (auto)    4.41


 


Sodium   138 


 


Potassium   4.2 


 


Chloride   102 


 


Carbon Dioxide   28 


 


Anion Gap   12 


 


BUN   23 H 


 


Creatinine   1.0 


 


Est GFR ( Amer)   > 60 


 


Est GFR (Non-Af Amer)   > 60 


 


POC Glucose (mg/dL)  232 H  


 


Random Glucose   234 H 


 


Calcium   9.4 


 


Total Bilirubin   0.4 


 


AST   26 


 


ALT   47 


 


Alkaline Phosphatase   92 


 


Total Protein   6.9 


 


Albumin   4.0 


 


Globulin   2.9 


 


Albumin/Globulin Ratio   1.4 














  02/05/19 02/05/19 02/06/19





  18:11 22:33 08:33


 


WBC   


 


RBC   


 


Hgb   


 


Hct   


 


MCV   


 


MCH   


 


MCHC   


 


RDW   


 


Plt Count   


 


MPV   


 


Neut % (Auto)   


 


Lymph % (Auto)   


 


Mono % (Auto)   


 


Eos % (Auto)   


 


Baso % (Auto)   


 


Lymph # (Auto)   


 


Mono # (Auto)   


 


Eos # (Auto)   


 


Baso # (Auto)   


 


Absolute Neuts (auto)   


 


Sodium   


 


Potassium   


 


Chloride   


 


Carbon Dioxide   


 


Anion Gap   


 


BUN   


 


Creatinine   


 


Est GFR ( Amer)   


 


Est GFR (Non-Af Amer)   


 


POC Glucose (mg/dL)  276 H  226 H  192 H


 


Random Glucose   


 


Calcium   


 


Total Bilirubin   


 


AST   


 


ALT   


 


Alkaline Phosphatase   


 


Total Protein   


 


Albumin   


 


Globulin   


 


Albumin/Globulin Ratio   














  02/06/19 02/06/19





  13:08 15:22


 


WBC  


 


RBC  


 


Hgb  


 


Hct  


 


MCV  


 


MCH  


 


MCHC  


 


RDW  


 


Plt Count  


 


MPV  


 


Neut % (Auto)  


 


Lymph % (Auto)  


 


Mono % (Auto)  


 


Eos % (Auto)  


 


Baso % (Auto)  


 


Lymph # (Auto)  


 


Mono # (Auto)  


 


Eos # (Auto)  


 


Baso # (Auto)  


 


Absolute Neuts (auto)  


 


Sodium  


 


Potassium  


 


Chloride  


 


Carbon Dioxide  


 


Anion Gap  


 


BUN  


 


Creatinine  


 


Est GFR ( Amer)  


 


Est GFR (Non-Af Amer)  


 


POC Glucose (mg/dL)  189 H  76


 


Random Glucose  


 


Calcium  


 


Total Bilirubin  


 


AST  


 


ALT  


 


Alkaline Phosphatase  


 


Total Protein  


 


Albumin  


 


Globulin  


 


Albumin/Globulin Ratio  














Assessment & Plan





- Assessment and Plan (Free Text)


Plan: 





Infectious diseases Attending Physician Attestation


Patient seen and examined, discussed with medical resident. I have reviewed the 

patient's history of present illness, past medical, social, personal and family 

histories, pertinent physical exam findings, course so far in this hospital 

admission, pertinent laboratory and imaging results. I agree with the above 

findings, assessment and plan. In addition, started Vancomycin and Merrem for 

patient with left foot stump infected ulcer R/O osteomyelitis, with history of 

Pseudomonas and E.faecalis in bone cultures previously. Follow up cultures from 

foot, consider MRI and will discuss with Podiatry.

## 2019-02-06 NOTE — CP.PCM.CON
<Radha Roberson - Last Filed: 02/06/19 14:00>





History of Present Illness





- History of Present Illness


History of Present Illness: 


Podiatry consult note for Dr. Delvalle





58 yr old male with PMH DM, COPD and CHF was seen and evaluated in the ED due to

round to the left foot. Patient was seen in the wound care clinic yesterday and 

sent for admission for IV Abx. Patient has failed outpatient PO Abx at this 

time. Patient denies any other complaints at this time. Patient aware he will 

require further surgery to the left foot wound. Patient denies fever, nausea, 

vomiting, SOB, chest pain or urinary complaints. 








Review of Systems





- Review of Systems


All systems: reviewed and no additional remarkable complaints except


Review of Systems: 


As per HPI





Past Patient History





- Infectious Disease


Hx of Infectious Diseases: None





- Tetanus Immunizations


Tetanus Immunization: Unknown





- Past Social History


Smoking Status: Former Smoker





- CARDIAC


Hx Hypertension: Yes





- PULMONARY


Hx Chronic Obstructive Pulmonary Disease (COPD): Yes





- NEUROLOGICAL


Hx Paralysis: No





- HEENT


Hx HEENT Problems: Yes


Hx Macular Degeneration: Yes


Other/Comment: laser sx for macular degeneration 5 yrs ago both eyes





- RENAL


Hx Renal Failure: Yes (s/p transplant )





- ENDOCRINE/METABOLIC


Hx Diabetes Mellitus Type 2: Yes





- HEMATOLOGICAL/ONCOLOGICAL


Hx Blood Transfusions: No





- INTEGUMENTARY


Hx Dermatological Problems: Yes





- MUSCULOSKELETAL/RHEUMATOLOGICAL


Hx Musculoskeletal Disorders: Yes





- GASTROINTESTINAL


Hx Gastroesophageal Reflux: Yes





- GENITOURINARY/GYNECOLOGICAL


Hx Genitourinary Disorders: Yes (KIDNEY TRANSPLANT)


Other/Comment: pt voids well





- PSYCHIATRIC


Hx Emotional Abuse: No


Hx Physical Abuse: No


Hx Substance Use: No





- SURGICAL HISTORY


Hx Mastectomy: No





- ANESTHESIA


Hx Anesthesia Reactions: No


Hx Malignant Hyperthermia: No





Meds


Allergies/Adverse Reactions: 


                                    Allergies











Allergy/AdvReac Type Severity Reaction Status Date / Time


 


No Known Allergies Allergy   Verified 02/06/19 13:13














- Medications


Medications: 


                               Current Medications





Amlodipine Besylate (Norvasc)  10 mg PO DAILY [facility]


   Last Admin: 02/06/19 10:49 Dose:  10 mg


Aspirin (Ecotrin)  81 mg PO 0800 [facility]


   Last Admin: 02/06/19 10:00 Dose:  81 mg


Atorvastatin Calcium (Lipitor)  40 mg PO DIN [facility]


   Last Admin: 02/05/19 22:19 Dose:  40 mg


Docusate Sodium (Colace)  100 mg PO PRN [facility]


Escitalopram Oxalate (Lexapro)  5 mg PO QAM [facility]


   Last Admin: 02/06/19 10:50 Dose:  5 mg


Meropenem (Merrem Iv 1 Gm Premix)  1 gm in 50 mls @ 100 mls/hr IVPB Q8 [facility]; 

Protocol


   Last Admin: 02/06/19 13:45 Dose:  100 mls/hr


Vancomycin HCl (Vancomycin 1gm)  1 gm in 250 mls @ 167 mls/hr IVPB Q12H [facility]; 

Protocol


   Last Admin: 02/06/19 10:01 Dose:  167 mls/hr


Insulin Detemir (Levemir)  20 unit SC HS [facility]


Insulin Human Lispro (Humalog)  5 units SC AC [facility]


   Last Admin: 02/06/19 13:48 Dose:  5 units


Insulin Human Regular (Humulin R Med)  0 units SC ACHS [facility]; Protocol


   Last Admin: 02/06/19 13:50 Dose:  1 units


Metoprolol Tartrate (Lopressor)  50 mg PO 0800,1800 [facility]


   Last Admin: 02/06/19 10:00 Dose:  50 mg


Mycophenolate Mofetil (Cellcept Cap)  250 mg PO BID [facility]


   Last Admin: 02/06/19 10:03 Dose:  250 mg


Non-Formulary Medication (Mycophenolate Sodium [Myfortic])  360 mg PO BID [facility]


Ondansetron HCl (Zofran Inj)  4 mg IVP Q4 PRN


   PRN Reason: Nausea/Vomiting


   Last Admin: 02/06/19 09:42 Dose:  4 mg


Oxycodone/Acetaminophen (Percocet 5/325 Mg Tab)  1 tab PO Q6H PRN


   PRN Reason: Pain, moderate (4-7)


   Stop: 02/08/19 21:21


   Last Admin: 02/06/19 06:29 Dose:  1 tab


Prednisone (Prednisone Tab)  5 mg PO 0800 [facility]


   Last Admin: 02/06/19 10:04 Dose:  5 mg


Pregabalin (Lyrica)  100 mg PO BID [facility]


   Last Admin: 02/06/19 13:44 Dose:  Not Given


Tacrolimus (Prograf Cap)  2 mg PO BID [facility]


   Last Admin: 02/06/19 10:50 Dose:  2 mg











Physical Exam





- Constitutional


Appears: Well, Non-toxic, No Acute Distress





- Head Exam


Head Exam: ATRAUMATIC, NORMOCEPHALIC





- Extremities Exam


Additional comments: 


Left Lower Extremity





VASC: DP and PT palpable, TG warm to warm, no edema





DERM: wound to the submet 1 base, irregular in shape, positive drainage with 

fibrogranular wound bed, positive malodor, no tunneling or tracking, minimal 

xiomara wound erythema noted





NEURO: diminished sensation





ORTHO: TMA of the left foot 








- Neurological Exam


Neurological exam: Alert, Oriented x3





- Psychiatric Exam


Psychiatric exam: Normal Affect, Normal Mood





Results





- Vital Signs


Recent Vital Signs: 


                                Last Vital Signs











Temp  98 F   02/06/19 10:00


 


Pulse  72   02/06/19 10:00


 


Resp  18   02/06/19 10:00


 


BP  114/61   02/06/19 10:49


 


Pulse Ox  97   02/06/19 10:00














- Labs


Result Diagrams: 


                                 02/05/19 18:10





                                 02/05/19 18:10


Labs: 


                         Laboratory Results - last 24 hr











  02/05/19 02/05/19 02/05/19





  17:05 18:10 18:10


 


WBC    7.3


 


RBC    4.61


 


Hgb    14.7


 


Hct    42.7


 


MCV    92.6


 


MCH    31.9


 


MCHC    34.4


 


RDW    13.7


 


Plt Count    176


 


MPV    10.1


 


Neut % (Auto)    60.6


 


Lymph % (Auto)    23.4


 


Mono % (Auto)    11.1 H


 


Eos % (Auto)    4.8


 


Baso % (Auto)    0.1


 


Lymph # (Auto)    1.7


 


Mono # (Auto)    0.8 H


 


Eos # (Auto)    0.4


 


Baso # (Auto)    0.01


 


Absolute Neuts (auto)    4.41


 


Sodium   138 


 


Potassium   4.2 


 


Chloride   102 


 


Carbon Dioxide   28 


 


Anion Gap   12 


 


BUN   23 H 


 


Creatinine   1.0 


 


Est GFR ( Amer)   > 60 


 


Est GFR (Non-Af Amer)   > 60 


 


POC Glucose (mg/dL)  232 H  


 


Random Glucose   234 H 


 


Calcium   9.4 


 


Total Bilirubin   0.4 


 


AST   26 


 


ALT   47 


 


Alkaline Phosphatase   92 


 


Total Protein   6.9 


 


Albumin   4.0 


 


Globulin   2.9 


 


Albumin/Globulin Ratio   1.4 














  02/05/19 02/05/19 02/06/19





  18:11 22:33 08:33


 


WBC   


 


RBC   


 


Hgb   


 


Hct   


 


MCV   


 


MCH   


 


MCHC   


 


RDW   


 


Plt Count   


 


MPV   


 


Neut % (Auto)   


 


Lymph % (Auto)   


 


Mono % (Auto)   


 


Eos % (Auto)   


 


Baso % (Auto)   


 


Lymph # (Auto)   


 


Mono # (Auto)   


 


Eos # (Auto)   


 


Baso # (Auto)   


 


Absolute Neuts (auto)   


 


Sodium   


 


Potassium   


 


Chloride   


 


Carbon Dioxide   


 


Anion Gap   


 


BUN   


 


Creatinine   


 


Est GFR ( Amer)   


 


Est GFR (Non-Af Amer)   


 


POC Glucose (mg/dL)  276 H  226 H  192 H


 


Random Glucose   


 


Calcium   


 


Total Bilirubin   


 


AST   


 


ALT   


 


Alkaline Phosphatase   


 


Total Protein   


 


Albumin   


 


Globulin   


 


Albumin/Globulin Ratio   














  02/06/19





  13:08


 


WBC 


 


RBC 


 


Hgb 


 


Hct 


 


MCV 


 


MCH 


 


MCHC 


 


RDW 


 


Plt Count 


 


MPV 


 


Neut % (Auto) 


 


Lymph % (Auto) 


 


Mono % (Auto) 


 


Eos % (Auto) 


 


Baso % (Auto) 


 


Lymph # (Auto) 


 


Mono # (Auto) 


 


Eos # (Auto) 


 


Baso # (Auto) 


 


Absolute Neuts (auto) 


 


Sodium 


 


Potassium 


 


Chloride 


 


Carbon Dioxide 


 


Anion Gap 


 


BUN 


 


Creatinine 


 


Est GFR ( Amer) 


 


Est GFR (Non-Af Amer) 


 


POC Glucose (mg/dL)  189 H


 


Random Glucose 


 


Calcium 


 


Total Bilirubin 


 


AST 


 


ALT 


 


Alkaline Phosphatase 


 


Total Protein 


 


Albumin 


 


Globulin 


 


Albumin/Globulin Ratio 














Assessment & Plan





- Assessment and Plan (Free Text)


Assessment: 


59 y/o male patient admitted for IV Abx with possible graft application to the 

wound





Plan: 


Patient seen and evaluated with Dr. Delvalle


Plan discussed


Chart, labs and vitals were reviewed


Infectious disease consult, continue IV Abx


Patient to be taken to the OR likely Monday 2/11/19 for graft application


Podiatry will continue to follow patient


Thank you for the consult








- Date & Time


Date: 02/06/19


Time: 14:07





<Gerald Delvalle - Last Filed: 02/06/19 17:45>





Meds





- Medications


Medications: 


                               Current Medications





Amlodipine Besylate (Norvasc)  10 mg PO DAILY [facility]


   Last Admin: 02/06/19 10:49 Dose:  10 mg


Aspirin (Ecotrin)  81 mg PO 0800 [facility]


   Last Admin: 02/06/19 10:00 Dose:  81 mg


Atorvastatin Calcium (Lipitor)  40 mg PO DIN [facility]


   Last Admin: 02/05/19 22:19 Dose:  40 mg


Docusate Sodium (Colace)  100 mg PO PRN [facility]


Escitalopram Oxalate (Lexapro)  5 mg PO QAM [facility]


   Last Admin: 02/06/19 10:50 Dose:  5 mg


Meropenem (Merrem Iv 1 Gm Premix)  1 gm in 50 mls @ 100 mls/hr IVPB Q8 [facility]; 

Protocol


   Last Admin: 02/06/19 14:01 Dose:  100 mls/hr


Vancomycin HCl (Vancomycin 1gm)  1 gm in 250 mls @ 167 mls/hr IVPB Q12H [facility]; 

Protocol


   Last Admin: 02/06/19 10:01 Dose:  167 mls/hr


Insulin Detemir (Levemir)  20 unit SC HS [facility]


Insulin Human Lispro (Humalog)  5 units SC AC [facility]


   Last Admin: 02/06/19 13:48 Dose:  5 units


Insulin Human Regular (Humulin R Med)  0 units SC ACHS [facility]; Protocol


   Last Admin: 02/06/19 13:50 Dose:  1 units


Metoprolol Tartrate (Lopressor)  50 mg PO 0800,1800 [facility]


   Last Admin: 02/06/19 10:00 Dose:  50 mg


Mycophenolate Mofetil (Cellcept Cap)  250 mg PO BID [facility]


   Last Admin: 02/06/19 10:03 Dose:  250 mg


Non-Formulary Medication (Mycophenolate Sodium [Myfortic])  360 mg PO BID [facility]


Ondansetron HCl (Zofran Inj)  4 mg IVP Q4 PRN


   PRN Reason: Nausea/Vomiting


   Last Admin: 02/06/19 09:42 Dose:  4 mg


Oxycodone/Acetaminophen (Percocet 5/325 Mg Tab)  1 tab PO Q6H PRN


   PRN Reason: Pain, moderate (4-7)


   Stop: 02/08/19 21:21


   Last Admin: 02/06/19 13:59 Dose:  1 tab


Prednisone (Prednisone Tab)  5 mg PO 0800 [facility]


   Last Admin: 02/06/19 10:04 Dose:  5 mg


Pregabalin (Lyrica)  100 mg PO BID [facility]


   Last Admin: 02/06/19 13:44 Dose:  Not Given


Tacrolimus (Prograf Cap)  2 mg PO BID [facility]


   Last Admin: 02/06/19 10:50 Dose:  2 mg











Results





- Vital Signs


Recent Vital Signs: 


                                Last Vital Signs











Temp  98 F   02/06/19 10:00


 


Pulse  69   02/06/19 15:08


 


Resp  18   02/06/19 15:08


 


BP  121/65   02/06/19 15:08


 


Pulse Ox  97   02/06/19 15:08














- Labs


Result Diagrams: 


                                 02/05/19 18:10





                                 02/05/19 18:10


Labs: 


                         Laboratory Results - last 24 hr











  02/05/19 02/05/19 02/05/19





  18:10 18:10 18:11


 


WBC   7.3 


 


RBC   4.61 


 


Hgb   14.7 


 


Hct   42.7 


 


MCV   92.6 


 


MCH   31.9 


 


MCHC   34.4 


 


RDW   13.7 


 


Plt Count   176 


 


MPV   10.1 


 


Neut % (Auto)   60.6 


 


Lymph % (Auto)   23.4 


 


Mono % (Auto)   11.1 H 


 


Eos % (Auto)   4.8 


 


Baso % (Auto)   0.1 


 


Lymph # (Auto)   1.7 


 


Mono # (Auto)   0.8 H 


 


Eos # (Auto)   0.4 


 


Baso # (Auto)   0.01 


 


Absolute Neuts (auto)   4.41 


 


Sodium  138  


 


Potassium  4.2  


 


Chloride  102  


 


Carbon Dioxide  28  


 


Anion Gap  12  


 


BUN  23 H  


 


Creatinine  1.0  


 


Est GFR ( Amer)  > 60  


 


Est GFR (Non-Af Amer)  > 60  


 


POC Glucose (mg/dL)    276 H


 


Random Glucose  234 H  


 


Calcium  9.4  


 


Total Bilirubin  0.4  


 


AST  26  


 


ALT  47  


 


Alkaline Phosphatase  92  


 


Total Protein  6.9  


 


Albumin  4.0  


 


Globulin  2.9  


 


Albumin/Globulin Ratio  1.4  














  02/05/19 02/06/19 02/06/19





  22:33 08:33 13:08


 


WBC   


 


RBC   


 


Hgb   


 


Hct   


 


MCV   


 


MCH   


 


MCHC   


 


RDW   


 


Plt Count   


 


MPV   


 


Neut % (Auto)   


 


Lymph % (Auto)   


 


Mono % (Auto)   


 


Eos % (Auto)   


 


Baso % (Auto)   


 


Lymph # (Auto)   


 


Mono # (Auto)   


 


Eos # (Auto)   


 


Baso # (Auto)   


 


Absolute Neuts (auto)   


 


Sodium   


 


Potassium   


 


Chloride   


 


Carbon Dioxide   


 


Anion Gap   


 


BUN   


 


Creatinine   


 


Est GFR ( Amer)   


 


Est GFR (Non-Af Amer)   


 


POC Glucose (mg/dL)  226 H  192 H  189 H


 


Random Glucose   


 


Calcium   


 


Total Bilirubin   


 


AST   


 


ALT   


 


Alkaline Phosphatase   


 


Total Protein   


 


Albumin   


 


Globulin   


 


Albumin/Globulin Ratio   














  02/06/19





  15:22


 


WBC 


 


RBC 


 


Hgb 


 


Hct 


 


MCV 


 


MCH 


 


MCHC 


 


RDW 


 


Plt Count 


 


MPV 


 


Neut % (Auto) 


 


Lymph % (Auto) 


 


Mono % (Auto) 


 


Eos % (Auto) 


 


Baso % (Auto) 


 


Lymph # (Auto) 


 


Mono # (Auto) 


 


Eos # (Auto) 


 


Baso # (Auto) 


 


Absolute Neuts (auto) 


 


Sodium 


 


Potassium 


 


Chloride 


 


Carbon Dioxide 


 


Anion Gap 


 


BUN 


 


Creatinine 


 


Est GFR ( Amer) 


 


Est GFR (Non-Af Amer) 


 


POC Glucose (mg/dL)  76


 


Random Glucose 


 


Calcium 


 


Total Bilirubin 


 


AST 


 


ALT 


 


Alkaline Phosphatase 


 


Total Protein 


 


Albumin 


 


Globulin 


 


Albumin/Globulin Ratio 














Attending/Attestation





- Attestation


I have personally seen and examined this patient.: Yes


I have fully participated in the care of the patient.: Yes


I have reviewed all pertinent clinical information: Yes

## 2019-02-07 LAB
ALBUMIN SERPL-MCNC: 3.9 G/DL (ref 3–4.8)
ALBUMIN/GLOB SERPL: 1.3 {RATIO} (ref 1.1–1.8)
ALT SERPL-CCNC: 44 U/L (ref 7–56)
AST SERPL-CCNC: 23 U/L (ref 17–59)
BASOPHILS # BLD AUTO: 0.01 K/MM3 (ref 0–2)
BASOPHILS NFR BLD: 0.1 % (ref 0–3)
BUN SERPL-MCNC: 22 MG/DL (ref 7–21)
CALCIUM SERPL-MCNC: 9.1 MG/DL (ref 8.4–10.5)
EOSINOPHIL # BLD: 0.4 10*3/UL (ref 0–0.7)
EOSINOPHIL NFR BLD: 4.7 % (ref 1.5–5)
ERYTHROCYTE [DISTWIDTH] IN BLOOD BY AUTOMATED COUNT: 13.6 % (ref 11.5–14.5)
GFR NON-AFRICAN AMERICAN: > 60
HGB BLD-MCNC: 14.8 G/DL (ref 14–18)
LYMPHOCYTES # BLD: 1.7 10*3/UL (ref 1.2–3.4)
LYMPHOCYTES NFR BLD AUTO: 21.4 % (ref 22–35)
MCH RBC QN AUTO: 31.8 PG (ref 25–35)
MCHC RBC AUTO-ENTMCNC: 34.4 G/DL (ref 31–37)
MCV RBC AUTO: 92.3 FL (ref 80–105)
MONOCYTES # BLD AUTO: 0.9 10*3/UL (ref 0.1–0.6)
MONOCYTES NFR BLD: 11.8 % (ref 1–6)
PLATELET # BLD: 163 10^3/UL (ref 120–450)
PMV BLD AUTO: 9.9 FL (ref 7–11)
RBC # BLD AUTO: 4.66 10^6/UL (ref 3.5–6.1)
WBC # BLD AUTO: 7.7 10^3/UL (ref 4.5–11)

## 2019-02-07 RX ADMIN — OXYCODONE HYDROCHLORIDE AND ACETAMINOPHEN PRN TAB: 5; 325 TABLET ORAL at 22:51

## 2019-02-07 RX ADMIN — VANCOMYCIN HYDROCHLORIDE SCH MLS/HR: 1 INJECTION, POWDER, LYOPHILIZED, FOR SOLUTION INTRAVENOUS at 21:29

## 2019-02-07 RX ADMIN — INSULIN LISPRO SCH UNITS: 100 INJECTION, SOLUTION INTRAVENOUS; SUBCUTANEOUS at 12:43

## 2019-02-07 RX ADMIN — OXYCODONE HYDROCHLORIDE AND ACETAMINOPHEN PRN TAB: 5; 325 TABLET ORAL at 06:54

## 2019-02-07 RX ADMIN — INSULIN HUMAN SCH UNITS: 100 INJECTION, SOLUTION PARENTERAL at 12:43

## 2019-02-07 RX ADMIN — MEROPENEM SCH MLS/HR: 1 INJECTION INTRAVENOUS at 05:29

## 2019-02-07 RX ADMIN — MUPIROCIN SCH: 20 OINTMENT TOPICAL at 10:14

## 2019-02-07 RX ADMIN — INSULIN HUMAN SCH UNITS: 100 INJECTION, SOLUTION PARENTERAL at 17:26

## 2019-02-07 RX ADMIN — OXYCODONE HYDROCHLORIDE AND ACETAMINOPHEN PRN TAB: 5; 325 TABLET ORAL at 15:26

## 2019-02-07 RX ADMIN — INSULIN LISPRO SCH UNITS: 100 INJECTION, SOLUTION INTRAVENOUS; SUBCUTANEOUS at 17:27

## 2019-02-07 RX ADMIN — INSULIN HUMAN SCH UNITS: 100 INJECTION, SOLUTION PARENTERAL at 08:33

## 2019-02-07 RX ADMIN — MUPIROCIN SCH: 20 OINTMENT TOPICAL at 17:27

## 2019-02-07 RX ADMIN — INSULIN LISPRO SCH UNITS: 100 INJECTION, SOLUTION INTRAVENOUS; SUBCUTANEOUS at 08:33

## 2019-02-07 RX ADMIN — VANCOMYCIN HYDROCHLORIDE SCH MLS/HR: 1 INJECTION, POWDER, LYOPHILIZED, FOR SOLUTION INTRAVENOUS at 10:08

## 2019-02-07 RX ADMIN — MEROPENEM SCH MLS/HR: 1 INJECTION INTRAVENOUS at 22:52

## 2019-02-07 RX ADMIN — Medication SCH PKT: at 10:09

## 2019-02-07 RX ADMIN — MEROPENEM SCH MLS/HR: 1 INJECTION INTRAVENOUS at 13:06

## 2019-02-07 NOTE — CP.PCM.PN
<Noe Lauren - Last Filed: 02/07/19 12:39>





Subjective





- Date & Time of Evaluation


Date of Evaluation: 02/07/19


Time of Evaluation: 09:10





- Subjective


Subjective: 





ID Progress Note





Patient seen and examined. No acute events overnight. Intermittent neuropathy 

pain in left foot. No fevers. 





Objective





- Vital Signs/Intake and Output


Vital Signs (last 24 hours): 


                                        











Temp Pulse Resp BP Pulse Ox


 


 98.4 F   69   20   128/66   97 


 


 02/07/19 06:00  02/07/19 06:00  02/07/19 06:00  02/07/19 06:00  02/07/19 06:00











- Medications


Medications: 


                               Current Medications





Amlodipine Besylate (Norvasc)  10 mg PO DAILY [facility]


   Last Admin: 02/06/19 10:49 Dose:  10 mg


Aspirin (Ecotrin)  81 mg PO 0800 [facility]


   Last Admin: 02/07/19 08:33 Dose:  81 mg


Atorvastatin Calcium (Lipitor)  40 mg PO DIN [facility]


   Last Admin: 02/06/19 18:41 Dose:  40 mg


Docusate Sodium (Colace)  100 mg PO PRN [facility]


Escitalopram Oxalate (Lexapro)  5 mg PO QAM [facility]


   Last Admin: 02/06/19 10:50 Dose:  5 mg


Meropenem (Merrem Iv 1 Gm Premix)  1 gm in 50 mls @ 100 mls/hr IVPB Q8 [facility]; 

Protocol


   Last Admin: 02/07/19 05:29 Dose:  100 mls/hr


Vancomycin HCl (Vancomycin 1gm)  1 gm in 250 mls @ 167 mls/hr IVPB Q12H [facility]; 

Protocol


   Last Admin: 02/06/19 21:15 Dose:  167 mls/hr


Insulin Detemir (Levemir)  20 unit SC HS [facility]


   Last Admin: 02/06/19 22:18 Dose:  20 unit


Insulin Human Lispro (Humalog)  5 units SC AC [facility]


   Last Admin: 02/07/19 08:33 Dose:  5 units


Insulin Human Regular (Humulin R Med)  0 units SC ACHS [facility]; Protocol


   Last Admin: 02/07/19 08:33 Dose:  7 units


Metoprolol Tartrate (Lopressor)  50 mg PO 0800,1800 [facility]


   Last Admin: 02/07/19 08:33 Dose:  50 mg


Mupirocin (Bactroban Ointment)  0 gm TOP BID [facility]


Mycophenolate Mofetil (Cellcept Cap)  250 mg PO BID [facility]


   Last Admin: 02/06/19 18:41 Dose:  250 mg


Non-Formulary Medication (Mycophenolate Sodium [Myfortic])  360 mg PO BID [facility]


Ondansetron HCl (Zofran Inj)  4 mg IVP Q4 PRN


   PRN Reason: Nausea/Vomiting


   Last Admin: 02/06/19 09:42 Dose:  4 mg


Oxycodone/Acetaminophen (Percocet 5/325 Mg Tab)  1 tab PO Q6H PRN


   PRN Reason: Pain, moderate (4-7)


   Stop: 02/08/19 21:21


   Last Admin: 02/07/19 06:54 Dose:  1 tab


Prednisone (Prednisone Tab)  5 mg PO 0800 [facility]


   Last Admin: 02/07/19 08:33 Dose:  5 mg


Pregabalin (Lyrica)  100 mg PO BID [facility]


   Last Admin: 02/06/19 18:42 Dose:  Not Given


Psyllium Hydrophilic Mucilloid (Hydrocil Instant)  1 pkt PO DAILY [facility]


Tacrolimus (Prograf Cap)  2 mg PO BID [facility]


   Last Admin: 02/06/19 18:40 Dose:  2 mg











- Labs


Labs: 


                                        





                                 02/07/19 07:15 





                                 02/07/19 07:15 











- Constitutional


Appears: Non-toxic, No Acute Distress





- Head Exam


Head Exam: ATRAUMATIC, NORMAL INSPECTION, NORMOCEPHALIC





- Respiratory Exam


Respiratory Exam: Clear to Ausculation Bilateral, NORMAL BREATHING PATTERN





- Cardiovascular Exam


Cardiovascular Exam: RRR, +S1, +S2





- GI/Abdominal Exam


GI & Abdominal Exam: Soft, Normal Bowel Sounds.  absent: Tenderness





- Extremities Exam


Additional comments: 





Left TMA amputation, bandaged. No drainage or erythema noted.


Right leg amputation 





- Neurological Exam


Neurological Exam: Alert, Awake, Oriented x3





- Psychiatric Exam


Psychiatric exam: Normal Affect, Normal Mood





- Skin


Skin Exam: Intact, Normal Color, Warm





Assessment and Plan





- Assessment and Plan (Free Text)


Plan: 





Left foot stump diabetic ulcer


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


Hx of right foot necrosis and gangrene, severe skin and skin structure infection

 S/P BKA


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 

and closure


S/P Left 5th metatarsal osteomyelitis S/P debridement and bone excision grew 

Acinetobacter and E. faecalis


Hx of CAD S/P CABG


Hx of CHF


Hx of DM


Hx of COPD


S/P pacemaker placement


S/P kidney transplant





Plan


Continue patient on Merrem and Vancomycin


Foot x-ray negative for osteomyelitis 


Left foot culture shows gram negative rods


Patient for skin graft on 2/11/19 as per podiatry


Continue to monitor closely





Leonidas, PGY-3





<Christopher Watermand Gerald S - Last Filed: 02/07/19 18:17>





Objective





- Vital Signs/Intake and Output


Vital Signs (last 24 hours): 


                                        











Temp Pulse Resp BP Pulse Ox


 


 98.4 F   69   20   129/68   97 


 


 02/07/19 06:00  02/07/19 06:00  02/07/19 06:00  02/07/19 10:14  02/07/19 06:00











- Medications


Medications: 


                               Current Medications





Amlodipine Besylate (Norvasc)  10 mg PO DAILY [facility]


   Last Admin: 02/07/19 10:14 Dose:  10 mg


Aspirin (Ecotrin)  81 mg PO 0800 [facility]


   Last Admin: 02/07/19 08:33 Dose:  81 mg


Atorvastatin Calcium (Lipitor)  40 mg PO DIN [facility]


   Last Admin: 02/07/19 17:27 Dose:  40 mg


Docusate Sodium (Colace)  100 mg PO PRN [facility]


Escitalopram Oxalate (Lexapro)  5 mg PO QAM [facility]


   Last Admin: 02/07/19 10:09 Dose:  5 mg


Meropenem (Merrem Iv 1 Gm Premix)  1 gm in 50 mls @ 100 mls/hr IVPB Q8 [facility]; 

Protocol


   Last Admin: 02/07/19 13:06 Dose:  100 mls/hr


Vancomycin HCl (Vancomycin 1gm)  1 gm in 250 mls @ 167 mls/hr IVPB Q12H [facility]; 

Protocol


   Last Admin: 02/07/19 10:08 Dose:  167 mls/hr


Insulin Detemir (Levemir)  20 unit SC HS [facility]


   Last Admin: 02/06/19 22:18 Dose:  20 unit


Insulin Human Lispro (Humalog)  5 units SC AC [facility]


   Last Admin: 02/07/19 17:27 Dose:  5 units


Insulin Human Regular (Humulin R Med)  0 units SC ACHS [facility]; Protocol


   Last Admin: 02/07/19 17:26 Dose:  1 units


Metoprolol Tartrate (Lopressor)  50 mg PO 0800,1800 [facility]


   Last Admin: 02/07/19 17:27 Dose:  50 mg


Mupirocin (Bactroban Ointment)  0 gm TOP BID [facility]


   Last Admin: 02/07/19 10:14 Dose:  Not Given


Mycophenolate Mofetil (Cellcept Cap)  250 mg PO BID [facility]


   Last Admin: 02/07/19 17:27 Dose:  250 mg


Non-Formulary Medication (Mycophenolate Sodium [Myfortic])  360 mg PO BID [facility]


   Last Admin: 02/07/19 10:13 Dose:  Not Given


Ondansetron HCl (Zofran Inj)  4 mg IVP Q4 PRN


   PRN Reason: Nausea/Vomiting


   Last Admin: 02/06/19 09:42 Dose:  4 mg


Oxycodone/Acetaminophen (Percocet 5/325 Mg Tab)  1 tab PO Q6H PRN


   PRN Reason: Pain, moderate (4-7)


   Stop: 02/08/19 21:21


   Last Admin: 02/07/19 15:26 Dose:  1 tab


Prednisone (Prednisone Tab)  5 mg PO 0800 [facility]


   Last Admin: 02/07/19 08:33 Dose:  5 mg


Pregabalin (Lyrica)  100 mg PO BID [facility]


   Last Admin: 02/07/19 17:33 Dose:  Not Given


Psyllium Hydrophilic Mucilloid (Hydrocil Instant)  1 pkt PO DAILY [facility]


   Last Admin: 02/07/19 10:09 Dose:  1 pkt


Tacrolimus (Prograf Cap)  2 mg PO BID [facility]


   Last Admin: 02/07/19 17:27 Dose:  2 mg











- Labs


Labs: 


                                        





                                 02/07/19 07:15 





                                 02/07/19 07:15 











Assessment and Plan





- Assessment and Plan (Free Text)


Plan: 





Infectious diseases Attending Physician Attestation


Patient seen and examined, discussed with medical resident. I have reviewed the 

patient's history of present illness, past medical, social, personal and family 

histories, pertinent physical exam findings, course so far in this hospital 

admission, pertinent laboratory and imaging results. I agree with the above 

findings, assessment and plan. In addition, continue Vancomycin and Merrem for 

left foot skin and skin structure infection. Follow up wound cx. Follow up plans

 of Podiatry.

## 2019-02-07 NOTE — CP.PCM.PN
<KarolRadha coates - Last Filed: 02/07/19 08:40>





Subjective





- Date & Time of Evaluation


Date of Evaluation: 02/07/19


Time of Evaluation: 08:41





- Subjective


Subjective: 


Podiatry consult note for Dr. Delvalle





58 yr old male seen and evaluated for left foot wound, infected. Patient has 

failed outpatient PO Abx at this time. Patient denies any other complaints at 

this time. Patient to be scheduled for left foot wound graft application on 

Monday Feb 11, 2019. Patient denies fever, nausea, vomiting, SOB, chest pain or 

urinary complaints. 











Objective





- Vital Signs/Intake and Output


Vital Signs (last 24 hours): 


                                        











Temp Pulse Resp BP Pulse Ox


 


 98.4 F   69   20   128/66   97 


 


 02/07/19 06:00  02/07/19 06:00  02/07/19 06:00  02/07/19 06:00  02/07/19 06:00











- Medications


Medications: 


                               Current Medications





Amlodipine Besylate (Norvasc)  10 mg PO DAILY [facility]


   Last Admin: 02/06/19 10:49 Dose:  10 mg


Aspirin (Ecotrin)  81 mg PO 0800 [facility]


   Last Admin: 02/07/19 08:33 Dose:  81 mg


Atorvastatin Calcium (Lipitor)  40 mg PO DIN [facility]


   Last Admin: 02/06/19 18:41 Dose:  40 mg


Docusate Sodium (Colace)  100 mg PO PRN [facility]


Escitalopram Oxalate (Lexapro)  5 mg PO QAM [facility]


   Last Admin: 02/06/19 10:50 Dose:  5 mg


Meropenem (Merrem Iv 1 Gm Premix)  1 gm in 50 mls @ 100 mls/hr IVPB Q8 [facility]; 

Protocol


   Last Admin: 02/07/19 05:29 Dose:  100 mls/hr


Vancomycin HCl (Vancomycin 1gm)  1 gm in 250 mls @ 167 mls/hr IVPB Q12H [facility]; 

Protocol


   Last Admin: 02/06/19 21:15 Dose:  167 mls/hr


Insulin Detemir (Levemir)  20 unit SC HS [facility]


   Last Admin: 02/06/19 22:18 Dose:  20 unit


Insulin Human Lispro (Humalog)  5 units SC AC [facility]


   Last Admin: 02/07/19 08:33 Dose:  5 units


Insulin Human Regular (Humulin R Med)  0 units SC ACHS [facility]; Protocol


   Last Admin: 02/07/19 08:33 Dose:  7 units


Metoprolol Tartrate (Lopressor)  50 mg PO 0800,1800 [facility]


   Last Admin: 02/07/19 08:33 Dose:  50 mg


Mupirocin (Bactroban Ointment)  0 gm TOP BID [facility]


Mycophenolate Mofetil (Cellcept Cap)  250 mg PO BID [facility]


   Last Admin: 02/06/19 18:41 Dose:  250 mg


Non-Formulary Medication (Mycophenolate Sodium [Myfortic])  360 mg PO BID [facility]


Ondansetron HCl (Zofran Inj)  4 mg IVP Q4 PRN


   PRN Reason: Nausea/Vomiting


   Last Admin: 02/06/19 09:42 Dose:  4 mg


Oxycodone/Acetaminophen (Percocet 5/325 Mg Tab)  1 tab PO Q6H PRN


   PRN Reason: Pain, moderate (4-7)


   Stop: 02/08/19 21:21


   Last Admin: 02/07/19 06:54 Dose:  1 tab


Prednisone (Prednisone Tab)  5 mg PO 0800 [facility]


   Last Admin: 02/07/19 08:33 Dose:  5 mg


Pregabalin (Lyrica)  100 mg PO BID [facility]


   Last Admin: 02/06/19 18:42 Dose:  Not Given


Tacrolimus (Prograf Cap)  2 mg PO BID [facility]


   Last Admin: 02/06/19 18:40 Dose:  2 mg











- Labs


Labs: 


                                        





                                 02/07/19 07:15 





                                 02/07/19 07:15 











- Constitutional


Appears: Well, Non-toxic, No Acute Distress





- Head Exam


Head Exam: ATRAUMATIC, NORMOCEPHALIC





- Extremities Exam


Additional comments: 


Left Lower Extremity





VASC: DP and PT palpable, TG warm to warm, no edema





DERM: wound to the submet 1 base, irregular in shape, positive drainage with 

fibrogranular wound bed, positive malodor, no tunneling or tracking, minimal 

xiomara wound erythema noted





NEURO: diminished sensation





ORTHO: TMA of the left foot 











- Neurological Exam


Neurological Exam: Alert, Awake, Oriented x3





Assessment and Plan





- Assessment and Plan (Free Text)


Assessment: 


57 y/o male patient admitted for IV Abx with possible graft application to the 

wound








Plan: 


Patient seen and evaluated with Dr. Delvalle


Plan discussed


Chart, labs and vitals were reviewed


Infectious disease consult, continue IV Abx


Wound cleansed with saline, and dressed with xeroform, DSD; anterior leg wound 

dressed with hydrogel, optifoam


Ordered bactroban for wound


Patient to be taken to the OR Monday 2/11/19 for graft application to the left 

foot wound


Podiatry will continue to follow patient


Thank you for the consult











<Gerald Delvalle - Last Filed: 02/08/19 14:39>





Objective





- Vital Signs/Intake and Output


Vital Signs (last 24 hours): 


                                        











Temp Pulse Resp BP Pulse Ox


 


 98 F   77   18   143/78   97 


 


 02/08/19 06:00  02/08/19 08:42  02/08/19 06:00  02/08/19 10:41  02/08/19 06:00








Intake and Output: 


                                        











 02/08/19 02/08/19





 06:59 18:59


 


Intake Total 1600 


 


Output Total 1600 700


 


Balance 0 -700














- Medications


Medications: 


                               Current Medications





Amlodipine Besylate (Norvasc)  10 mg PO DAILY [facility]


   Last Admin: 02/08/19 10:41 Dose:  10 mg


Aspirin (Ecotrin)  81 mg PO 0800 [facility]


   Last Admin: 02/08/19 08:42 Dose:  81 mg


Atorvastatin Calcium (Lipitor)  40 mg PO DIN [facility]


   Last Admin: 02/07/19 17:27 Dose:  40 mg


Docusate Sodium (Colace)  100 mg PO PRN [facility]


Escitalopram Oxalate (Lexapro)  5 mg PO QAM [facility]


   Last Admin: 02/08/19 10:41 Dose:  5 mg


Meropenem (Merrem Iv 1 Gm Premix)  1 gm in 50 mls @ 100 mls/hr IVPB Q8 [facility]; 

Protocol


   Last Admin: 02/08/19 13:59 Dose:  100 mls/hr


Vancomycin HCl (Vancomycin 1gm)  1 gm in 250 mls @ 167 mls/hr IVPB Q12H [facility]; 

Protocol


   Last Admin: 02/08/19 08:42 Dose:  167 mls/hr


Insulin Detemir (Levemir)  20 unit SC HS [facility]


   Last Admin: 02/08/19 07:51 Dose:  Not Given


Insulin Human Lispro (Humalog)  5 units SC AC [facility]


   Last Admin: 02/08/19 12:30 Dose:  5 units


Insulin Human Regular (Humulin R Med)  0 units SC ACHS [facility]; Protocol


   Last Admin: 02/08/19 12:30 Dose:  5 units


Metoprolol Tartrate (Lopressor)  50 mg PO 0800,1800 [facility]


   Last Admin: 02/08/19 08:42 Dose:  50 mg


Mupirocin (Bactroban Ointment)  0 gm TOP BID [facility]


   Last Admin: 02/08/19 10:42 Dose:  Not Given


Mycophenolate Mofetil (Cellcept Cap)  250 mg PO BID [facility]


   Last Admin: 02/08/19 10:41 Dose:  250 mg


Mycophenolate Sodium [Myfortic] 360 Mg ( Home Med)  360 mg PO BID [facility]


   Last Admin: 02/08/19 10:41 Dose:  360 mg


Ondansetron HCl (Zofran Inj)  4 mg IVP Q4 PRN


   PRN Reason: Nausea/Vomiting


   Last Admin: 02/08/19 07:32 Dose:  4 mg


Oxycodone/Acetaminophen (Percocet 5/325 Mg Tab)  1 tab PO Q6H PRN


   PRN Reason: Pain, moderate (4-7)


   Stop: 02/08/19 21:21


   Last Admin: 02/08/19 07:31 Dose:  1 tab


Prednisone (Prednisone Tab)  5 mg PO 0800 [facility]


   Last Admin: 02/08/19 08:42 Dose:  5 mg


Pregabalin (Lyrica)  100 mg PO BID [facility]


   Last Admin: 02/08/19 10:42 Dose:  Not Given


Psyllium Hydrophilic Mucilloid (Hydrocil Instant)  1 pkt PO DAILY [facility]


   Last Admin: 02/08/19 10:41 Dose:  1 pkt


Tacrolimus (Prograf Cap)  2 mg PO BID [facility]


   Last Admin: 02/08/19 10:41 Dose:  2 mg











- Labs


Labs: 


                                        





                                 02/08/19 07:25 





                                 02/08/19 07:25 











Attending/Attestation





- Attestation


I have personally seen and examined this patient.: Yes


I have fully participated in the care of the patient.: Yes


I have reviewed all pertinent clinical information, including history, physical 

exam and plan: Yes

## 2019-02-07 NOTE — PN
DATE:  02/07/2019



SUBJECTIVE:  The patient has no complaints of any chest pain.  No shortness

of breath.  No headaches or dizziness.



PHYSICAL EXAMINATION:

VITAL SIGNS:  Temperature 98.7, pulse 75, blood pressure 129/62 and

respirations are 18.

GENERAL:  The patient is lying in bed, flat, comfortable.

HEENT:  No oral lesion.  Anicteric sclerae.  Moist mucosa.

NECK:  No JVD, adenopathy, or thyromegaly.

CARDIOVASCULAR:  S1 and S2, regular.  No murmurs, rubs, or gallops.

LUNGS:  Clear to auscultation bilaterally.  No wheeze, rales, or rhonchi.

ABDOMEN:  Bowel sounds are positive, soft, nontender and nondistended.

EXTREMITIES:  No cyanosis, clubbing or edema.



LABORATORY DATA:  Creatinine 0.9.



X-ray of the left foot shows no findings of acute osteomyelitis.



ASSESSMENT:

1.  Left foot ulcer.

2.  Status post _____ donor kidney transplant.

3.  Coronary artery disease, status post coronary artery bypass grafting.

4.  Diabetes type 2.

5.  Status post right below knee amputation.

6.  Status post left thrombotic microangiopathy.

7.  Peripheral arterial disease.

8.  Coronary artery disease.

9.  Diabetic neuropathy.

10.  Hypertension.



PLAN:  The patient is currently comfortable.  He is on his triple therapy

for his kidney.  He is receiving Cellcept, prednisone and Prograf.  He is

on Lipitor for his dyslipidemia.  He is on Lexapro for his anxiety.  He is

going to continue with Lopressor for his coronary artery disease.  The

patient is on aspirin daily as well.  The patient is on Lyrica for his

peripheral neuropathy secondary to diabetes.  The patient is on amlodipine

for his hypertension.  He is receiving Percocet for pain.  He is on

vancomycin for antibiotics.  He is on heart healthy diet.  The patient's

blood cultures are negative.  I did speak with Dr. Delvalle and he is

planning to continue with antibiotics and the local wound care.  The

patient will need surgery and he will wait 2-3 days until the wound is

improved and not infected.  So he can perform surgery and possible graft.







__________________________________________

Andrea Calderon MD





DD:  02/07/2019 8:18:46

DT:  02/07/2019 9:12:14

Job # [id number]

## 2019-02-08 LAB
ALBUMIN SERPL-MCNC: 3.8 G/DL (ref 3–4.8)
ALBUMIN/GLOB SERPL: 1.4 {RATIO} (ref 1.1–1.8)
ALT SERPL-CCNC: 37 U/L (ref 7–56)
AST SERPL-CCNC: 26 U/L (ref 17–59)
BASOPHILS # BLD AUTO: 0.01 K/MM3 (ref 0–2)
BASOPHILS NFR BLD: 0.2 % (ref 0–3)
BUN SERPL-MCNC: 18 MG/DL (ref 7–21)
CALCIUM SERPL-MCNC: 9 MG/DL (ref 8.4–10.5)
EOSINOPHIL # BLD: 0.4 10*3/UL (ref 0–0.7)
EOSINOPHIL NFR BLD: 6.7 % (ref 1.5–5)
ERYTHROCYTE [DISTWIDTH] IN BLOOD BY AUTOMATED COUNT: 13.5 % (ref 11.5–14.5)
GFR NON-AFRICAN AMERICAN: > 60
HGB BLD-MCNC: 14.7 G/DL (ref 14–18)
LYMPHOCYTES # BLD: 1.9 10*3/UL (ref 1.2–3.4)
LYMPHOCYTES NFR BLD AUTO: 29.1 % (ref 22–35)
MCH RBC QN AUTO: 31.5 PG (ref 25–35)
MCHC RBC AUTO-ENTMCNC: 34 G/DL (ref 31–37)
MCV RBC AUTO: 92.7 FL (ref 80–105)
MONOCYTES # BLD AUTO: 0.5 10*3/UL (ref 0.1–0.6)
MONOCYTES NFR BLD: 7.9 % (ref 1–6)
PLATELET # BLD: 165 10^3/UL (ref 120–450)
PMV BLD AUTO: 10.2 FL (ref 7–11)
RBC # BLD AUTO: 4.66 10^6/UL (ref 3.5–6.1)
WBC # BLD AUTO: 6.6 10^3/UL (ref 4.5–11)

## 2019-02-08 RX ADMIN — INSULIN LISPRO SCH UNITS: 100 INJECTION, SOLUTION INTRAVENOUS; SUBCUTANEOUS at 12:30

## 2019-02-08 RX ADMIN — MUPIROCIN SCH: 20 OINTMENT TOPICAL at 10:42

## 2019-02-08 RX ADMIN — Medication SCH PKT: at 10:41

## 2019-02-08 RX ADMIN — VANCOMYCIN HYDROCHLORIDE SCH MLS/HR: 1 INJECTION, POWDER, LYOPHILIZED, FOR SOLUTION INTRAVENOUS at 21:27

## 2019-02-08 RX ADMIN — OXYCODONE HYDROCHLORIDE AND ACETAMINOPHEN PRN TAB: 5; 325 TABLET ORAL at 07:31

## 2019-02-08 RX ADMIN — INSULIN DETEMIR SCH UNIT: 100 INJECTION, SOLUTION SUBCUTANEOUS at 22:49

## 2019-02-08 RX ADMIN — INSULIN DETEMIR SCH: 100 INJECTION, SOLUTION SUBCUTANEOUS at 07:51

## 2019-02-08 RX ADMIN — INSULIN HUMAN SCH UNITS: 100 INJECTION, SOLUTION PARENTERAL at 08:42

## 2019-02-08 RX ADMIN — MEROPENEM SCH MLS/HR: 1 INJECTION INTRAVENOUS at 22:49

## 2019-02-08 RX ADMIN — INSULIN HUMAN SCH: 100 INJECTION, SOLUTION PARENTERAL at 17:13

## 2019-02-08 RX ADMIN — INSULIN HUMAN SCH UNITS: 100 INJECTION, SOLUTION PARENTERAL at 12:30

## 2019-02-08 RX ADMIN — INSULIN LISPRO SCH UNITS: 100 INJECTION, SOLUTION INTRAVENOUS; SUBCUTANEOUS at 08:41

## 2019-02-08 RX ADMIN — OXYCODONE HYDROCHLORIDE AND ACETAMINOPHEN PRN TAB: 5; 325 TABLET ORAL at 17:38

## 2019-02-08 RX ADMIN — MUPIROCIN SCH: 20 OINTMENT TOPICAL at 17:38

## 2019-02-08 RX ADMIN — VANCOMYCIN HYDROCHLORIDE SCH MLS/HR: 1 INJECTION, POWDER, LYOPHILIZED, FOR SOLUTION INTRAVENOUS at 08:42

## 2019-02-08 RX ADMIN — INSULIN HUMAN SCH: 100 INJECTION, SOLUTION PARENTERAL at 07:52

## 2019-02-08 RX ADMIN — MEROPENEM SCH MLS/HR: 1 INJECTION INTRAVENOUS at 05:56

## 2019-02-08 RX ADMIN — MEROPENEM SCH MLS/HR: 1 INJECTION INTRAVENOUS at 13:59

## 2019-02-08 RX ADMIN — INSULIN LISPRO SCH: 100 INJECTION, SOLUTION INTRAVENOUS; SUBCUTANEOUS at 17:13

## 2019-02-08 NOTE — CP.PCM.PN
<KarolRadha coates - Last Filed: 02/08/19 13:07>





Subjective





- Date & Time of Evaluation


Date of Evaluation: 02/08/19


Time of Evaluation: 13:07





- Subjective


Subjective: 


Podiatry consult note for Dr. Delvalle





58 yr old male seen and evaluated for left foot wound, infected. Patient has 

failed outpatient PO Abx at this time. Patient denies any other complaints at 

this time. Patient to be scheduled for left foot wound graft application on 

Monday Feb 11, 2019. Patient denies fever, nausea, vomiting, SOB, chest pain or 

urinary complaints. 








Objective





- Vital Signs/Intake and Output


Vital Signs (last 24 hours): 


                                        











Temp Pulse Resp BP Pulse Ox


 


 98 F   77   18   143/78   97 


 


 02/08/19 06:00  02/08/19 08:42  02/08/19 06:00  02/08/19 10:41  02/08/19 06:00








Intake and Output: 


                                        











 02/08/19 02/08/19





 06:59 18:59


 


Intake Total 1600 


 


Output Total 1600 700


 


Balance 0 -700














- Medications


Medications: 


                               Current Medications





Amlodipine Besylate (Norvasc)  10 mg PO DAILY [facility]


   Last Admin: 02/08/19 10:41 Dose:  10 mg


Aspirin (Ecotrin)  81 mg PO 0800 [facility]


   Last Admin: 02/08/19 08:42 Dose:  81 mg


Atorvastatin Calcium (Lipitor)  40 mg PO DIN [facility]


   Last Admin: 02/07/19 17:27 Dose:  40 mg


Docusate Sodium (Colace)  100 mg PO PRN [facility]


Escitalopram Oxalate (Lexapro)  5 mg PO QAM [facility]


   Last Admin: 02/08/19 10:41 Dose:  5 mg


Meropenem (Merrem Iv 1 Gm Premix)  1 gm in 50 mls @ 100 mls/hr IVPB Q8 [facility]; Prot

ocol


   Last Admin: 02/08/19 05:56 Dose:  100 mls/hr


Vancomycin HCl (Vancomycin 1gm)  1 gm in 250 mls @ 167 mls/hr IVPB Q12H [facility]; 

Protocol


   Last Admin: 02/08/19 08:42 Dose:  167 mls/hr


Insulin Detemir (Levemir)  20 unit SC HS [facility]


   Last Admin: 02/08/19 07:51 Dose:  Not Given


Insulin Human Lispro (Humalog)  5 units SC AC [facility]


   Last Admin: 02/08/19 12:30 Dose:  5 units


Insulin Human Regular (Humulin R Med)  [facility] units [facility]; Protocol


   Last Admin: 02/08/19 12:30 Dose:  5 units


Metoprolol Tartrate (Lopressor)  50 mg PO 0800,1800 [facility]


   Last Admin: 02/08/19 08:42 Dose:  50 mg


Mupirocin (Bactroban Ointment)  0 gm TOP BID [facility]


   Last Admin: 02/08/19 10:42 Dose:  Not Given


Mycophenolate Mofetil (Cellcept Cap)  250 mg PO BID [facility]


   Last Admin: 02/08/19 10:41 Dose:  250 mg


Mycophenolate Sodium [Myfortic] 360 Mg ( Home Med)  360 mg PO BID [facility]


   Last Admin: 02/08/19 10:41 Dose:  360 mg


Ondansetron HCl (Zofran Inj)  4 mg IVP Q4 PRN


   PRN Reason: Nausea/Vomiting


   Last Admin: 02/08/19 07:32 Dose:  4 mg


Oxycodone/Acetaminophen (Percocet 5/325 Mg Tab)  1 tab PO Q6H PRN


   PRN Reason: Pain, moderate (4-7)


   Stop: 02/08/19 21:21


   Last Admin: 02/08/19 07:31 Dose:  1 tab


Prednisone (Prednisone Tab)  5 mg PO 0800 [facility]


   Last Admin: 02/08/19 08:42 Dose:  5 mg


Pregabalin (Lyrica)  100 mg PO BID [facility]


   Last Admin: 02/08/19 10:42 Dose:  Not Given


Psyllium Hydrophilic Mucilloid (Hydrocil Instant)  1 pkt PO DAILY [facility]


   Last Admin: 02/08/19 10:41 Dose:  1 pkt


Tacrolimus (Prograf Cap)  2 mg PO BID [facility]


   Last Admin: 02/08/19 10:41 Dose:  2 mg











- Labs


Labs: 


                                        





                                 02/08/19 07:25 





                                 02/08/19 07:25 











- Constitutional


Appears: Well, Non-toxic, No Acute Distress





- Head Exam


Head Exam: ATRAUMATIC, NORMOCEPHALIC





- Extremities Exam


Additional comments: 


Left Lower Extremity





VASC: DP and PT palpable, TG warm to warm, no edema





DERM: 3 cm X 3 cm X 1 cm wound to the submet 1 base, irregular in shape, 

positive drainage with fibrogranular wound bed, positive malodor, no tunneling 

or tracking, minimal xiomara wound erythema noted





NEURO: diminished sensation





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











- Neurological Exam


Neurological Exam: Alert, Awake, Oriented x3





- Psychiatric Exam


Psychiatric exam: Normal Affect, Normal Mood





Assessment and Plan





- Assessment and Plan (Free Text)


Assessment: 


59 y/o male patient admitted for IV Abx with possible graft application to the 

wound











Plan: 


Patient seen and evaluated with Dr. Delvalle


Plan discussed


Chart, labs and vitals were reviewed


Wound Cultures: Acinetobacter Baumannii, E. Faecalis, Corneybacterium species


Infectious disease consult, recommendations appreciated


Continue IV Abx


Wound cleansed with saline, and dressed with xeroform, DSD; anterior leg wound 

dressed with hydrogel, optifoam


Patient to be taken to the OR Monday 2/11/19 for graft application to the left 

foot wound


Please provide medical clearance for patient


Podiatry will continue to follow patient


Thank you for the consult








<Gerald Delvalle - Last Filed: 02/08/19 14:37>





Objective





- Vital Signs/Intake and Output


Vital Signs (last 24 hours): 


                                        











Temp Pulse Resp BP Pulse Ox


 


 98 F   77   18   143/78   97 


 


 02/08/19 06:00  02/08/19 08:42  02/08/19 06:00  02/08/19 10:41  02/08/19 06:00








Intake and Output: 


                                        











 02/08/19 02/08/19





 06:59 18:59


 


Intake Total 1600 


 


Output Total 1600 700


 


Balance 0 -700














- Medications


Medications: 


                               Current Medications





Amlodipine Besylate (Norvasc)  10 mg PO DAILY [facility]


   Last Admin: 02/08/19 10:41 Dose:  10 mg


Aspirin (Ecotrin)  81 mg PO 0800 [facility]


   Last Admin: 02/08/19 08:42 Dose:  81 mg


Atorvastatin Calcium (Lipitor)  40 mg PO DIN [facility]


   Last Admin: 02/07/19 17:27 Dose:  40 mg


Docusate Sodium (Colace)  100 mg PO PRN [facility]


Escitalopram Oxalate (Lexapro)  5 mg PO QAM [facility]


   Last Admin: 02/08/19 10:41 Dose:  5 mg


Meropenem (Merrem Iv 1 Gm Premix)  1 gm in 50 mls @ 100 mls/hr IVPB Q8 [facility]; 

Protocol


   Last Admin: 02/08/19 13:59 Dose:  100 mls/hr


Vancomycin HCl (Vancomycin 1gm)  1 gm in 250 mls @ 167 mls/hr IVPB Q12H [facility]; 

Protocol


   Last Admin: 02/08/19 08:42 Dose:  167 mls/hr


Insulin Detemir (Levemir)  20 unit SC HS [facility]


   Last Admin: 02/08/19 07:51 Dose:  Not Given


Insulin Human Lispro (Humalog)  5 units SC AC [facility]


   Last Admin: 02/08/19 12:30 Dose:  5 units


Insulin Human Regular (Humulin R Med)  0 units SC ACHS [facility]; Protocol


   Last Admin: 02/08/19 12:30 Dose:  5 units


Metoprolol Tartrate (Lopressor)  50 mg PO 0800,1800 [facility]


   Last Admin: 02/08/19 08:42 Dose:  50 mg


Mupirocin (Bactroban Ointment)  0 gm TOP BID [facility]


   Last Admin: 02/08/19 10:42 Dose:  Not Given


Mycophenolate Mofetil (Cellcept Cap)  250 mg PO BID [facility]


   Last Admin: 02/08/19 10:41 Dose:  250 mg


Mycophenolate Sodium [Myfortic] 360 Mg ( Home Med)  360 mg PO BID [facility]


   Last Admin: 02/08/19 10:41 Dose:  360 mg


Ondansetron HCl (Zofran Inj)  4 mg IVP Q4 PRN


   PRN Reason: Nausea/Vomiting


   Last Admin: 02/08/19 07:32 Dose:  4 mg


Oxycodone/Acetaminophen (Percocet 5/325 Mg Tab)  1 tab PO Q6H PRN


   PRN Reason: Pain, moderate (4-7)


   Stop: 02/08/19 21:21


   Last Admin: 02/08/19 07:31 Dose:  1 tab


Prednisone (Prednisone Tab)  5 mg PO 0800 [facility]


   Last Admin: 02/08/19 08:42 Dose:  5 mg


Pregabalin (Lyrica)  100 mg PO BID [facility]


   Last Admin: 02/08/19 10:42 Dose:  Not Given


Psyllium Hydrophilic Mucilloid (Hydrocil Instant)  1 pkt PO DAILY [facility]


   Last Admin: 02/08/19 10:41 Dose:  1 pkt


Tacrolimus (Prograf Cap)  2 mg PO BID [facility]


   Last Admin: 02/08/19 10:41 Dose:  2 mg











- Labs


Labs: 


                                        





                                 02/08/19 07:25 





                                 02/08/19 07:25 











Attending/Attestation





- Attestation


I have personally seen and examined this patient.: Yes


I have fully participated in the care of the patient.: Yes


I have reviewed all pertinent clinical information, including history, physical 

exam and plan: Yes

## 2019-02-08 NOTE — CP.PCM.PN
<Noe Lauren - Last Filed: 02/08/19 14:16>





Subjective





- Date & Time of Evaluation


Date of Evaluation: 02/08/19


Time of Evaluation: 09:30





- Subjective


Subjective: 





ID Progress Note





Patient seen and examined. No acute events. Resting comfortably, no fevers. 





Objective





- Vital Signs/Intake and Output


Vital Signs (last 24 hours): 


                                        











Temp Pulse Resp BP Pulse Ox


 


 98 F   77   18   143/78   97 


 


 02/08/19 06:00  02/08/19 08:42  02/08/19 06:00  02/08/19 10:41  02/08/19 06:00








Intake and Output: 


                                        











 02/08/19 02/08/19





 06:59 18:59


 


Intake Total 1600 


 


Output Total 1600 700


 


Balance 0 -700














- Medications


Medications: 


                               Current Medications





Amlodipine Besylate (Norvasc)  10 mg PO DAILY [facility]


   Last Admin: 02/08/19 10:41 Dose:  10 mg


Aspirin (Ecotrin)  81 mg PO 0800 [facility]


   Last Admin: 02/08/19 08:42 Dose:  81 mg


Atorvastatin Calcium (Lipitor)  40 mg PO DIN [facility]


   Last Admin: 02/07/19 17:27 Dose:  40 mg


Docusate Sodium (Colace)  100 mg PO PRN [facility]


Escitalopram Oxalate (Lexapro)  5 mg PO QAM [facility]


   Last Admin: 02/08/19 10:41 Dose:  5 mg


Meropenem (Merrem Iv 1 Gm Premix)  1 gm in 50 mls @ 100 mls/hr IVPB Q8 [facility]; 

Protocol


   Last Admin: 02/08/19 13:59 Dose:  100 mls/hr


Vancomycin HCl (Vancomycin 1gm)  1 gm in 250 mls @ 167 mls/hr IVPB Q12H [facility]; 

Protocol


   Last Admin: 02/08/19 08:42 Dose:  167 mls/hr


Insulin Detemir (Levemir)  20 unit SC HS [facility]


   Last Admin: 02/08/19 07:51 Dose:  Not Given


Insulin Human Lispro (Humalog)  5 units SC AC [facility]


   Last Admin: 02/08/19 12:30 Dose:  5 units


Insulin Human Regular (Humulin R Med)  0 units SC ACHS [facility]; Protocol


   Last Admin: 02/08/19 12:30 Dose:  5 units


Metoprolol Tartrate (Lopressor)  50 mg PO 0800,1800 [facility]


   Last Admin: 02/08/19 08:42 Dose:  50 mg


Mupirocin (Bactroban Ointment)  0 gm TOP BID [facility]


   Last Admin: 02/08/19 10:42 Dose:  Not Given


Mycophenolate Mofetil (Cellcept Cap)  250 mg PO BID [facility]


   Last Admin: 02/08/19 10:41 Dose:  250 mg


Mycophenolate Sodium [Myfortic] 360 Mg ( Home Med)  360 mg PO BID [facility]


   Last Admin: 02/08/19 10:41 Dose:  360 mg


Ondansetron HCl (Zofran Inj)  4 mg IVP Q4 PRN


   PRN Reason: Nausea/Vomiting


   Last Admin: 02/08/19 07:32 Dose:  4 mg


Oxycodone/Acetaminophen (Percocet 5/325 Mg Tab)  1 tab PO Q6H PRN


   PRN Reason: Pain, moderate (4-7)


   Stop: 02/08/19 21:21


   Last Admin: 02/08/19 07:31 Dose:  1 tab


Prednisone (Prednisone Tab)  5 mg PO 0800 [facility]


   Last Admin: 02/08/19 08:42 Dose:  5 mg


Pregabalin (Lyrica)  100 mg PO BID [facility]


   Last Admin: 02/08/19 10:42 Dose:  Not Given


Psyllium Hydrophilic Mucilloid (Hydrocil Instant)  1 pkt PO DAILY [facility]


   Last Admin: 02/08/19 10:41 Dose:  1 pkt


Tacrolimus (Prograf Cap)  2 mg PO BID [facility]


   Last Admin: 02/08/19 10:41 Dose:  2 mg











- Labs


Labs: 


                                        





                                 02/08/19 07:25 





                                 02/08/19 07:25 











- Constitutional


Appears: Non-toxic, No Acute Distress





- Head Exam


Head Exam: ATRAUMATIC, NORMAL INSPECTION, NORMOCEPHALIC





- Respiratory Exam


Respiratory Exam: Decreased Breath Sounds, NORMAL BREATHING PATTERN





- Cardiovascular Exam


Cardiovascular Exam: RRR, +S1, +S2





- GI/Abdominal Exam


GI & Abdominal Exam: Soft, Normal Bowel Sounds.  absent: Tenderness





- Extremities Exam


Additional comments: 





Left foot stump bandaged


Right leg amputation 





- Neurological Exam


Neurological Exam: Alert, Awake, Oriented x3





- Psychiatric Exam


Psychiatric exam: Normal Affect, Normal Mood





- Skin


Skin Exam: Dry, Intact, Warm





Assessment and Plan





- Assessment and Plan (Free Text)


Plan: 





Left foot stump diabetic ulcer


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


Hx of right foot necrosis and gangrene, severe skin and skin structure infection

 S/P BKA


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 

and closure


S/P Left 5th metatarsal osteomyelitis S/P debridement and bone excision grew 

Acinetobacter and E. faecalis


Hx of CAD S/P CABG


Hx of CHF


Hx of DM


Hx of COPD


S/P pacemaker placement


S/P kidney transplant





Plan


Continue on Merrem and Vancomycin


Foot x-ray negative for osteomyelitis 


Left foot culture shows gram negative rods, pending identification and 

sensitivity 


Patient for skin graft on 2/11/19 as per podiatry


Continue to monitor closely





Leonidas, PGY-3





<Bo Waterman S - Last Filed: 02/08/19 16:36>





Objective





- Vital Signs/Intake and Output


Vital Signs (last 24 hours): 


                                        











Temp Pulse Resp BP Pulse Ox


 


 98 F   77   18   143/78   97 


 


 02/08/19 06:00  02/08/19 08:42  02/08/19 06:00  02/08/19 10:41  02/08/19 06:00








Intake and Output: 


                                        











 02/08/19 02/08/19





 06:59 18:59


 


Intake Total 1600 


 


Output Total 1600 700


 


Balance 0 -700














- Medications


Medications: 


                               Current Medications





Amlodipine Besylate (Norvasc)  10 mg PO DAILY [facility]


   Last Admin: 02/08/19 10:41 Dose:  10 mg


Aspirin (Ecotrin)  81 mg PO 0800 [facility]


   Last Admin: 02/08/19 08:42 Dose:  81 mg


Atorvastatin Calcium (Lipitor)  40 mg PO DIN [facility]


   Last Admin: 02/07/19 17:27 Dose:  40 mg


Docusate Sodium (Colace)  100 mg PO PRN [facility]


Escitalopram Oxalate (Lexapro)  5 mg PO QAM [facility]


   Last Admin: 02/08/19 10:41 Dose:  5 mg


Meropenem (Merrem Iv 1 Gm Premix)  1 gm in 50 mls @ 100 mls/hr IVPB Q8 [facility]; 

Protocol


   Last Admin: 02/08/19 13:59 Dose:  100 mls/hr


Vancomycin HCl (Vancomycin 1gm)  1 gm in 250 mls @ 167 mls/hr IVPB Q12H [facility]; 

Protocol


   Last Admin: 02/08/19 08:42 Dose:  167 mls/hr


Insulin Detemir (Levemir)  20 unit SC HS [facility]


   Last Admin: 02/08/19 07:51 Dose:  Not Given


Insulin Human Lispro (Humalog)  5 units SC AC [facility]


   Last Admin: 02/08/19 12:30 Dose:  5 units


Insulin Human Regular (Humulin R Med)  0 units SC ACHS [facility]; Protocol


   Last Admin: 02/08/19 12:30 Dose:  5 units


Metoprolol Tartrate (Lopressor)  50 mg PO 0800,1800 [facility]


   Last Admin: 02/08/19 08:42 Dose:  50 mg


Mupirocin (Bactroban Ointment)  0 gm TOP BID [facility]


   Last Admin: 02/08/19 10:42 Dose:  Not Given


Mycophenolate Mofetil (Cellcept Cap)  250 mg PO BID [facility]


   Last Admin: 02/08/19 10:41 Dose:  250 mg


Mycophenolate Sodium [Myfortic] 360 Mg ( Home Med)  360 mg PO BID [facility]


   Last Admin: 02/08/19 10:41 Dose:  360 mg


Ondansetron HCl (Zofran Inj)  4 mg IVP Q4 PRN


   PRN Reason: Nausea/Vomiting


   Last Admin: 02/08/19 07:32 Dose:  4 mg


Oxycodone/Acetaminophen (Percocet 5/325 Mg Tab)  1 tab PO Q6H PRN


   PRN Reason: Pain, moderate (4-7)


   Stop: 02/08/19 21:21


   Last Admin: 02/08/19 07:31 Dose:  1 tab


Prednisone (Prednisone Tab)  5 mg PO 0800 [facility]


   Last Admin: 02/08/19 08:42 Dose:  5 mg


Pregabalin (Lyrica)  100 mg PO BID [facility]


   Last Admin: 02/08/19 10:42 Dose:  Not Given


Psyllium Hydrophilic Mucilloid (Hydrocil Instant)  1 pkt PO DAILY [facility]


   Last Admin: 02/08/19 10:41 Dose:  1 pkt


Tacrolimus (Prograf Cap)  2 mg PO BID [facility]


   Last Admin: 02/08/19 10:41 Dose:  2 mg











- Labs


Labs: 


                                        





                                 02/08/19 07:25 





                                 02/08/19 07:25 











Assessment and Plan





- Assessment and Plan (Free Text)


Plan: 





Infectious diseases Attending Physician Attestation


Patient seen and examined, discussed with medical resident. I have reviewed the 

patient's history of present illness, past medical, social, personal and family 

histories, pertinent physical exam findings, course so far in this hospital 

admission, pertinent laboratory and imaging results. I agree with the above f

indings, assessment and plan. In addition, continue IV Vancomycin and Merrem for

patient with left foot infected ulcers on the foot stump, grew MRSA and 

Pseudomonas previously. For skin grafting next week. Follow up final blood and 

wound cx results.

## 2019-02-08 NOTE — CARD
--------------- APPROVED REPORT --------------





Date of service: 02/08/2019



EKG Measurement

Heart [id number]

[id number]

[id number]

[id number]

[id number]



<Conclusion>

Electronic ventricular pacemaker

AV sequential pacer with 1:1 capture. Atrial sensed , ventricular 

paced

## 2019-02-08 NOTE — CP.PCM.PN
<June Marroquin - Last Filed: 02/08/19 14:36>





Subjective





- Date & Time of Evaluation


Date of Evaluation: 02/08/19


Time of Evaluation: 07:00





- Subjective


Subjective: 





[facility] Medicine progress note for Dr Calderon





Patient seen and examined at bedside. no acute events overnight as per nursing. 

Patient was afebrile overnight and denied any fever, chills, headache, 

dizziness, chest pain, palpitations, SOB, cough, abd pain, nausea, vomiting, 

bowel/bladder complaints, swelling in his leg. Patient is eager for his left 

foot wound graft application on Monday Feb 11, 2019. 





Objective





- Vital Signs/Intake and Output


Vital Signs (last 24 hours): 


                                        











Temp Pulse Resp BP Pulse Ox


 


 98 F   77   18   143/78   97 


 


 02/08/19 06:00  02/08/19 08:42  02/08/19 06:00  02/08/19 10:41  02/08/19 06:00








Intake and Output: 


                                        











 02/08/19 02/08/19





 06:59 18:59


 


Intake Total 1600 


 


Output Total 1600 700


 


Balance 0 -700














- Medications


Medications: 


                               Current Medications





Amlodipine Besylate (Norvasc)  10 mg PO DAILY [facility]


   Last Admin: 02/08/19 10:41 Dose:  10 mg


Aspirin (Ecotrin)  81 mg PO 0800 [facility]


   Last Admin: 02/08/19 08:42 Dose:  81 mg


Atorvastatin Calcium (Lipitor)  40 mg PO DIN [facility]


   Last Admin: 02/07/19 17:27 Dose:  40 mg


Docusate Sodium (Colace)  100 mg PO PRN [facility]


Escitalopram Oxalate (Lexapro)  5 mg PO QAM [facility]


   Last Admin: 02/08/19 10:41 Dose:  5 mg


Meropenem (Merrem Iv 1 Gm Premix)  1 gm in 50 mls @ 100 mls/hr IVPB Q8 [facility]; 

Protocol


   Last Admin: 02/08/19 05:56 Dose:  100 mls/hr


Vancomycin HCl (Vancomycin 1gm)  1 gm in 250 mls @ 167 mls/hr IVPB Q12H [facility]; 

Protocol


   Last Admin: 02/08/19 08:42 Dose:  167 mls/hr


Insulin Detemir (Levemir)  20 unit SC HS [facility]


   Last Admin: 02/08/19 07:51 Dose:  Not Given


Insulin Human Lispro (Humalog)  5 units SC AC [facility]


   Last Admin: 02/08/19 08:41 Dose:  5 units


Insulin Human Regular (Humulin R Med)  0 units SC ACHS [facility]; Protocol


   Last Admin: 02/08/19 08:42 Dose:  5 units


Metoprolol Tartrate (Lopressor)  50 mg PO 0800,1800 [facility]


   Last Admin: 02/08/19 08:42 Dose:  50 mg


Mupirocin (Bactroban Ointment)  0 gm TOP BID [facility]


   Last Admin: 02/08/19 10:42 Dose:  Not Given


Mycophenolate Mofetil (Cellcept Cap)  250 mg PO BID [facility]


   Last Admin: 02/08/19 10:41 Dose:  250 mg


Mycophenolate Sodium [Myfortic] 360 Mg ( Home Med)  360 mg PO BID [facility]


   Last Admin: 02/08/19 10:41 Dose:  360 mg


Ondansetron HCl (Zofran Inj)  4 mg IVP Q4 PRN


   PRN Reason: Nausea/Vomiting


   Last Admin: 02/08/19 07:32 Dose:  4 mg


Oxycodone/Acetaminophen (Percocet 5/325 Mg Tab)  1 tab PO Q6H PRN


   PRN Reason: Pain, moderate (4-7)


   Stop: 02/08/19 21:21


   Last Admin: 02/08/19 07:31 Dose:  1 tab


Prednisone (Prednisone Tab)  5 mg PO 0800 [facility]


   Last Admin: 02/08/19 08:42 Dose:  5 mg


Pregabalin (Lyrica)  100 mg PO BID [facility]


   Last Admin: 02/08/19 10:42 Dose:  Not Given


Psyllium Hydrophilic Mucilloid (Hydrocil Instant)  1 pkt PO DAILY [facility]


   Last Admin: 02/08/19 10:41 Dose:  1 pkt


Tacrolimus (Prograf Cap)  2 mg PO BID [facility]


   Last Admin: 02/08/19 10:41 Dose:  2 mg











- Labs


Labs: 


                                        





                                 02/08/19 07:25 





                                 02/08/19 07:25 











- Additional Findings


Additional findings: 





- Constitutional


Appears: Non-toxic, No Acute Distress





- Head Exam


Head Exam: ATRAUMATIC, NORMAL INSPECTION, NORMOCEPHALIC





- Respiratory Exam


Respiratory Exam: Decreased Breath Sounds, NORMAL BREATHING PATTERN





- Cardiovascular Exam


Cardiovascular Exam: RRR, +S1, +S2





- GI/Abdominal Exam


GI & Abdominal Exam: Soft, Normal Bowel Sounds.  absent: Tenderness





- Extremities Exam


Additional comments: 





Left foot stump bandaged


Right leg amputation 





- Neurological Exam


Neurological Exam: Alert, Awake, Oriented x3





- Psychiatric Exam


Psychiatric exam: Normal Affect, Normal Mood





- Skin


Skin Exam: Dry, Intact, Warm





Assessment and Plan





- Assessment and Plan (Free Text)


Assessment: 





-Left foot stump diabetic ulcer


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


-Kidney transplant


-Right foot necrosis and gangrene, severe skin and skin structure infection S/P 

BKA


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 

closure


-CAD s/p CABG


-Chronic CHF


-DM


-COPD


-s/p pacemaker placement


Plan: 





Patient's vitals, blood work, and imaging noted. ID and podiatry on board. Foot 

x-ray negative for osteomyelitis. Continue Vanc and Merrem as per ID. Left foot 

culture shows gram negative rods, pending identification and sensitivity. 

Continue home medications s/p kidney transplant; patient encouraged to bring 

medications from home. Continue pain management and zofran for nausea. Maintain 

normotension with lopressor and norvasc. Continue lipitor for HLD. Patient on 

RISS and accuchecks. Patient to be taken to the OR Monday 2/11/19 for skin graft

 application. Will continue to monitor closely.





Discussed with Dr. Yumiko Marroquin PGY3





<Andrea Calderon S - Last Filed: 02/08/19 16:51>





Objective





- Vital Signs/Intake and Output


Vital Signs (last 24 hours): 


                                        











Temp Pulse Resp BP Pulse Ox


 


 98 F   77   18   143/78   97 


 


 02/08/19 06:00  02/08/19 08:42  02/08/19 06:00  02/08/19 10:41  02/08/19 06:00








Intake and Output: 


                                        











 02/08/19 02/08/19





 06:59 18:59


 


Intake Total 1600 


 


Output Total 1600 700


 


Balance 0 -700














- Medications


Medications: 


                               Current Medications





Amlodipine Besylate (Norvasc)  10 mg PO DAILY [facility]


   Last Admin: 02/08/19 10:41 Dose:  10 mg


Aspirin (Ecotrin)  81 mg PO 0800 [facility]


   Last Admin: 02/08/19 08:42 Dose:  81 mg


Atorvastatin Calcium (Lipitor)  40 mg PO DIN [facility]


   Last Admin: 02/07/19 17:27 Dose:  40 mg


Docusate Sodium (Colace)  100 mg PO PRN [facility]


Escitalopram Oxalate (Lexapro)  5 mg PO QAM [facility]


   Last Admin: 02/08/19 10:41 Dose:  5 mg


Meropenem (Merrem Iv 1 Gm Premix)  1 gm in 50 mls @ 100 mls/hr IVPB Q8 [facility]; 

Protocol


   Last Admin: 02/08/19 13:59 Dose:  100 mls/hr


Vancomycin HCl (Vancomycin 1gm)  1 gm in 250 mls @ 167 mls/hr IVPB Q12H [facility]; 

Protocol


   Last Admin: 02/08/19 08:42 Dose:  167 mls/hr


Insulin Detemir (Levemir)  20 unit SC HS [facility]


   Last Admin: 02/08/19 07:51 Dose:  Not Given


Insulin Human Lispro (Humalog)  5 units SC AC [facility]


   Last Admin: 02/08/19 12:30 Dose:  5 units


Insulin Human Regular (Humulin R Med)  0 units SC ACHS [facility]; Protocol


   Last Admin: 02/08/19 12:30 Dose:  5 units


Metoprolol Tartrate (Lopressor)  50 mg PO 0800,1800 [facility]


   Last Admin: 02/08/19 08:42 Dose:  50 mg


Mupirocin (Bactroban Ointment)  0 gm TOP BID [facility]


   Last Admin: 02/08/19 10:42 Dose:  Not Given


Mycophenolate Mofetil (Cellcept Cap)  250 mg PO BID [facility]


   Last Admin: 02/08/19 10:41 Dose:  250 mg


Mycophenolate Sodium [Myfortic] 360 Mg ( Home Med)  360 mg PO BID [facility]


   Last Admin: 02/08/19 10:41 Dose:  360 mg


Ondansetron HCl (Zofran Inj)  4 mg IVP Q4 PRN


   PRN Reason: Nausea/Vomiting


   Last Admin: 02/08/19 07:32 Dose:  4 mg


Oxycodone/Acetaminophen (Percocet 5/325 Mg Tab)  1 tab PO Q6H PRN


   PRN Reason: Pain, moderate (4-7)


   Stop: 02/08/19 21:21


   Last Admin: 02/08/19 07:31 Dose:  1 tab


Prednisone (Prednisone Tab)  5 mg PO 0800 [facility]


   Last Admin: 02/08/19 08:42 Dose:  5 mg


Pregabalin (Lyrica)  100 mg PO BID [facility]


   Last Admin: 02/08/19 10:42 Dose:  Not Given


Psyllium Hydrophilic Mucilloid (Hydrocil Instant)  1 pkt PO DAILY [facility]


   Last Admin: 02/08/19 10:41 Dose:  1 pkt


Tacrolimus (Prograf Cap)  2 mg PO BID [facility]


   Last Admin: 02/08/19 10:41 Dose:  2 mg











- Labs


Labs: 


                                        





                                 02/08/19 07:25 





                                 02/08/19 07:25 











Assessment and Plan





- Assessment and Plan (Free Text)


Plan: 





Pt seen and examined by me. I have reviewed the note of the medical resident and

I agree with it. I have discussed the assessment and plan with the resident. I 

have reviewed the medications and the last labs.Pt with L foot ulcer. He is on 

Vanco and Merrem. He has PAD. He will need Abx and will be scheduled for surgery

by podiatry in a few days. He will get a flap to the wound. He is on his 

immunosuppresive medications. His pain is controlled. Eating well. He is on an 

ISS for his DM-2. COPD is controlled.

## 2019-02-09 LAB
ALBUMIN SERPL-MCNC: 3.8 G/DL (ref 3–4.8)
ALBUMIN/GLOB SERPL: 1.3 {RATIO} (ref 1.1–1.8)
ALT SERPL-CCNC: 34 U/L (ref 7–56)
AST SERPL-CCNC: 24 U/L (ref 17–59)
BASOPHILS # BLD AUTO: 0.01 K/MM3 (ref 0–2)
BASOPHILS NFR BLD: 0.1 % (ref 0–3)
BUN SERPL-MCNC: 19 MG/DL (ref 7–21)
CALCIUM SERPL-MCNC: 9 MG/DL (ref 8.4–10.5)
EOSINOPHIL # BLD: 0.4 10*3/UL (ref 0–0.7)
EOSINOPHIL NFR BLD: 5.1 % (ref 1.5–5)
ERYTHROCYTE [DISTWIDTH] IN BLOOD BY AUTOMATED COUNT: 13.3 % (ref 11.5–14.5)
GFR NON-AFRICAN AMERICAN: > 60
HGB BLD-MCNC: 15 G/DL (ref 14–18)
LYMPHOCYTES # BLD: 2.1 10*3/UL (ref 1.2–3.4)
LYMPHOCYTES NFR BLD AUTO: 28.8 % (ref 22–35)
MCH RBC QN AUTO: 31.3 PG (ref 25–35)
MCHC RBC AUTO-ENTMCNC: 33.9 G/DL (ref 31–37)
MCV RBC AUTO: 92.5 FL (ref 80–105)
MONOCYTES # BLD AUTO: 0.6 10*3/UL (ref 0.1–0.6)
MONOCYTES NFR BLD: 7.8 % (ref 1–6)
PLATELET # BLD: 178 10^3/UL (ref 120–450)
PMV BLD AUTO: 10.2 FL (ref 7–11)
RBC # BLD AUTO: 4.79 10^6/UL (ref 3.5–6.1)
WBC # BLD AUTO: 7.4 10^3/UL (ref 4.5–11)

## 2019-02-09 RX ADMIN — INSULIN LISPRO SCH UNITS: 100 INJECTION, SOLUTION INTRAVENOUS; SUBCUTANEOUS at 12:37

## 2019-02-09 RX ADMIN — MEROPENEM SCH MLS/HR: 1 INJECTION INTRAVENOUS at 23:25

## 2019-02-09 RX ADMIN — MUPIROCIN SCH APPLIC: 20 OINTMENT TOPICAL at 09:00

## 2019-02-09 RX ADMIN — VANCOMYCIN HYDROCHLORIDE SCH MLS/HR: 1 INJECTION, POWDER, LYOPHILIZED, FOR SOLUTION INTRAVENOUS at 08:08

## 2019-02-09 RX ADMIN — INSULIN LISPRO SCH UNITS: 100 INJECTION, SOLUTION INTRAVENOUS; SUBCUTANEOUS at 08:05

## 2019-02-09 RX ADMIN — INSULIN DETEMIR SCH UNIT: 100 INJECTION, SOLUTION SUBCUTANEOUS at 23:25

## 2019-02-09 RX ADMIN — INSULIN HUMAN SCH: 100 INJECTION, SOLUTION PARENTERAL at 22:57

## 2019-02-09 RX ADMIN — MEROPENEM SCH MLS/HR: 1 INJECTION INTRAVENOUS at 13:30

## 2019-02-09 RX ADMIN — INSULIN LISPRO SCH UNITS: 100 INJECTION, SOLUTION INTRAVENOUS; SUBCUTANEOUS at 17:45

## 2019-02-09 RX ADMIN — INSULIN HUMAN SCH UNITS: 100 INJECTION, SOLUTION PARENTERAL at 08:05

## 2019-02-09 RX ADMIN — INSULIN HUMAN SCH UNITS: 100 INJECTION, SOLUTION PARENTERAL at 17:47

## 2019-02-09 RX ADMIN — MEROPENEM SCH MLS/HR: 1 INJECTION INTRAVENOUS at 05:32

## 2019-02-09 RX ADMIN — INSULIN HUMAN SCH UNITS: 100 INJECTION, SOLUTION PARENTERAL at 12:36

## 2019-02-09 RX ADMIN — OXYCODONE HYDROCHLORIDE AND ACETAMINOPHEN PRN TAB: 5; 325 TABLET ORAL at 20:40

## 2019-02-09 RX ADMIN — Medication SCH PKT: at 10:50

## 2019-02-09 RX ADMIN — INSULIN DETEMIR SCH UNIT: 100 INJECTION, SOLUTION SUBCUTANEOUS at 23:26

## 2019-02-09 NOTE — CP.PCM.PN
Subjective





- Date & Time of Evaluation


Date of Evaluation: 02/09/19


Time of Evaluation: 05:03





- Subjective


Subjective: 





Patient was seen at  bed side.


He complained of left foot pain.


Has no other complaints now.


Medical record was reviewed.


This 58 year old  male  was admitted  with left footulceration,pain.


Has PMH of  DM,CHF,  CAD,  S/P Stent, CABG, renal transplant , S/P PPM, left leg

TMA.





Objective





- Vital Signs/Intake and Output


Vital Signs (last 24 hours): 


                                        











Temp Pulse Resp BP Pulse Ox


 


 97.8 F   69   20   115/67   95 


 


 02/08/19 22:00  02/08/19 22:00  02/08/19 22:00  02/08/19 22:00  02/08/19 22:00








Intake and Output: 


                                        











 02/08/19 02/09/19





 18:59 06:59


 


Intake Total  600


 


Output Total 700 600


 


Balance -700 0














- Medications


Medications: 


                               Current Medications





Amlodipine Besylate (Norvasc)  10 mg PO DAILY [facility]


   Last Admin: 02/08/19 10:41 Dose:  10 mg


Aspirin (Ecotrin)  81 mg PO 0800 [facility]


   Last Admin: 02/08/19 17:02 Dose:  Not Given


Atorvastatin Calcium (Lipitor)  40 mg PO DIN [facility]


   Last Admin: 02/08/19 17:37 Dose:  40 mg


Docusate Sodium (Colace)  100 mg PO PRN [facility]


Escitalopram Oxalate (Lexapro)  5 mg PO QAM [facility]


   Last Admin: 02/08/19 10:41 Dose:  5 mg


Meropenem (Merrem Iv 1 Gm Premix)  1 gm in 50 mls @ 100 mls/hr IVPB Q8 [facility]; 

Protocol


   Last Admin: 02/08/19 22:49 Dose:  100 mls/hr


Vancomycin HCl (Vancomycin 1gm)  1 gm in 250 mls @ 167 mls/hr IVPB Q12H [facility]; 

Protocol


   Last Admin: 02/08/19 21:27 Dose:  167 mls/hr


Insulin Detemir (Levemir)  20 unit SC HS [facility]


   Last Admin: 02/08/19 22:49 Dose:  20 unit


Insulin Human Lispro (Humalog)  5 units SC AC [facility]


   Last Admin: 02/08/19 17:13 Dose:  Not Given


Insulin Human Regular (Humulin R Med)  0 units SC ACHS [facility]; Protocol


   Last Admin: 02/08/19 17:13 Dose:  Not Given


Metoprolol Tartrate (Lopressor)  50 mg PO 0800,1800 [facility]


   Last Admin: 02/08/19 17:39 Dose:  50 mg


Mupirocin (Bactroban Ointment)  0 gm TOP BID [facility]


   Last Admin: 02/08/19 17:38 Dose:  Not Given


Mycophenolate Mofetil (Cellcept Cap)  250 mg PO BID [facility]


   Last Admin: 02/08/19 17:37 Dose:  250 mg


Mycophenolate Sodium [Myfortic] 360 Mg ( Home Med)  360 mg PO BID [facility]


   Last Admin: 02/08/19 17:38 Dose:  360 mg


Ondansetron HCl (Zofran Inj)  4 mg IVP Q4 PRN


   PRN Reason: Nausea/Vomiting


   Last Admin: 02/08/19 07:32 Dose:  4 mg


Prednisone (Prednisone Tab)  5 mg PO 0800 [facility]


   Last Admin: 02/08/19 08:42 Dose:  5 mg


Psyllium Hydrophilic Mucilloid (Hydrocil Instant)  1 pkt PO DAILY [facility]


   Last Admin: 02/08/19 10:41 Dose:  1 pkt


Tacrolimus (Prograf Cap)  2 mg PO BID [facility]


   Last Admin: 02/08/19 17:37 Dose:  2 mg











- Labs


Labs: 


                                        





                                 02/08/19 07:25 





                                 02/08/19 07:25 











- Constitutional


Appears: Well, No Acute Distress





- Head Exam


Head Exam: ATRAUMATIC, NORMAL INSPECTION, NORMOCEPHALIC





- Eye Exam


Eye Exam: Normal appearance





- ENT Exam


ENT Exam: Normal External Ear Exam





- Neck Exam


Neck Exam: Normal Inspection





- Respiratory Exam


Respiratory Exam: NORMAL BREATHING PATTERN





- Cardiovascular Exam


Cardiovascular Exam: absent: JVD





- GI/Abdominal Exam


GI & Abdominal Exam: absent: Distended





- Rectal Exam


Rectal Exam: Deferred





-  Exam


Additional comments: 





Deferred.





- Extremities Exam


Additional comments: 





Left foot TMA.





- Back Exam


Back Exam: NORMAL INSPECTION





- Neurological Exam


Neurological Exam: Alert, Awake





- Psychiatric Exam


Psychiatric exam: Normal Affect, Normal Mood





- Skin


Skin Exam: Normal Color





Assessment and Plan





- Assessment and Plan (Free Text)


Assessment: 





Left foot pain.


CHF.


DM.


COPD.


CAD


Plan: 





[id number] mg PO x 1 .


Continue present management.

## 2019-02-09 NOTE — CP.PCM.PN
<Radha Roberson - Last Filed: 02/09/19 11:49>





Subjective





- Date & Time of Evaluation


Date of Evaluation: 02/09/19


Time of Evaluation: 11:49





- Subjective


Subjective: 





Podiatry consult note for Dr. Delvalle





58 yr old male seen and evaluated for left foot wound, infected. Patient has 

failed outpatient PO Abx at this time. Patient denies any other complaints at 

this time. Patient to be scheduled for left foot wound graft application on 

Monday Feb 11, 2019. Patient denies fever, nausea, vomiting, SOB, chest pain or 

urinary complaints. 








Objective





- Vital Signs/Intake and Output


Vital Signs (last 24 hours): 


                                        











Temp Pulse Resp BP Pulse Ox


 


 98.1 F   72   20   124/62   95 


 


 02/09/19 06:00  02/09/19 06:00  02/09/19 06:00  02/09/19 10:53  02/09/19 06:00








Intake and Output: 


                                        











 02/09/19 02/09/19





 06:59 18:59


 


Intake Total 600 


 


Output Total 600 


 


Balance 0 














- Medications


Medications: 


                               Current Medications





Amlodipine Besylate (Norvasc)  10 mg PO DAILY [facility]


   Last Admin: 02/09/19 10:53 Dose:  10 mg


Aspirin (Ecotrin)  81 mg PO 0800 [facility]


   Last Admin: 02/09/19 10:55 Dose:  Not Given


Atorvastatin Calcium (Lipitor)  40 mg PO DIN [facility]


   Last Admin: 02/08/19 17:37 Dose:  40 mg


Docusate Sodium (Colace)  100 mg PO PRN [facility]


Escitalopram Oxalate (Lexapro)  5 mg PO QAM [facility]


   Last Admin: 02/09/19 10:51 Dose:  5 mg


Meropenem (Merrem Iv 1 Gm Premix)  1 gm in 50 mls @ 100 mls/hr IVPB Q8 [facility]; 

Protocol


   Last Admin: 02/09/19 05:32 Dose:  100 mls/hr


Vancomycin HCl (Vancomycin 1gm)  1 gm in 250 mls @ 167 mls/hr IVPB Q12H [facility]; 

Protocol


   Last Admin: 02/09/19 08:08 Dose:  167 mls/hr


Insulin Detemir (Levemir)  20 unit SC HS [facility]


   Last Admin: 02/08/19 22:49 Dose:  20 unit


Insulin Human Lispro (Humalog)  5 units SC AC [facility]


   Last Admin: 02/09/19 08:05 Dose:  5 units


Insulin Human Regular (Humulin R Med)  0 units SC ACHS [facility]; Protocol


   Last Admin: 02/09/19 08:05 Dose:  3 units


Metoprolol Tartrate (Lopressor)  50 mg PO 0800,1800 [facility]


   Last Admin: 02/09/19 08:06 Dose:  50 mg


Mupirocin (Bactroban Ointment)  0 gm TOP BID [facility]


   Last Admin: 02/09/19 09:00 Dose:  1 applic


Mycophenolate Mofetil (Cellcept Cap)  250 mg PO BID [facility]


   Last Admin: 02/09/19 10:51 Dose:  250 mg


Mycophenolate Sodium [Myfortic] 360 Mg ( Home Med)  360 mg PO BID [facility]


   Last Admin: 02/09/19 10:52 Dose:  360 mg


Ondansetron HCl (Zofran Inj)  4 mg IVP Q4 PRN


   PRN Reason: Nausea/Vomiting


   Last Admin: 02/09/19 08:22 Dose:  4 mg


Prednisone (Prednisone Tab)  5 mg PO 0800 [facility]


   Last Admin: 02/09/19 08:06 Dose:  5 mg


Psyllium Hydrophilic Mucilloid (Hydrocil Instant)  1 pkt PO DAILY [facility]


   Last Admin: 02/09/19 10:50 Dose:  1 pkt


Tacrolimus (Prograf Cap)  2 mg PO BID [facility]


   Last Admin: 02/09/19 10:49 Dose:  2 mg











- Labs


Labs: 


                                        





                                 02/09/19 07:00 





                                 02/09/19 07:00 





                                        











APTT  31.8 Seconds (26.9-38.3)   02/09/19  07:00    














- Constitutional


Appears: Well, Non-toxic, No Acute Distress





- Head Exam


Head Exam: ATRAUMATIC, NORMOCEPHALIC





- Extremities Exam


Additional comments: 


Left Lower Extremity





VASC: DP and PT palpable, TG warm to warm, no edema





DERM: 3 cm X 3 cm X 1 cm wound to the submet 1 base, irregular in shape, posit

nieves drainage with fibrogranular wound bed, negative malodor, no tunneling or 

tracking, minimal xiomara wound erythema noted, wound improving 





NEURO: diminished sensation





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











- Neurological Exam


Neurological Exam: Alert, Awake, Oriented x3





- Psychiatric Exam


Psychiatric exam: Normal Affect, Normal Mood





Assessment and Plan





- Assessment and Plan (Free Text)


Assessment: 


59 y/o male patient admitted for IV Abx with possible graft application to the 

wound





Plan: 


Patient seen and evaluated with Dr. Delvalle


Plan discussed


Chart, labs and vitals were reviewed


Wound Cultures: Acinetobacter Baumannii, E. Faecalis, Corneybacterium species


Infectious disease consult, recommendations appreciated


Continue IV Abx


Wound cleansed with saline, and dressed with xeroform, DSD; anterior leg wound 

healed and left open to air at this time


Patient to be taken to the OR Monday 2/11/19 for graft application to the left 

foot wound


Please provide medical clearance for patient


Podiatry will continue to follow patient


Thank you for the consult








<Gerald Delvalle - Last Filed: 02/11/19 09:27>





Objective





- Vital Signs/Intake and Output


Vital Signs (last 24 hours): 


                                        











Temp Pulse Resp BP Pulse Ox


 


 98 F   69   20   123/79   97 


 


 02/11/19 06:00  02/11/19 06:00  02/11/19 06:00  02/11/19 08:30  02/11/19 06:00








Intake and Output: 


                                        











 02/11/19 02/11/19





 06:59 18:59


 


Intake Total 660 


 


Output Total 1050 


 


Balance -390 














- Medications


Medications: 


                               Current Medications





Amlodipine Besylate (Norvasc)  10 mg PO DAILY [facility]


   Last Admin: 02/10/19 09:30 Dose:  10 mg


Aspirin (Ecotrin)  81 mg PO 0800 [facility]


   Last Admin: 02/09/19 10:55 Dose:  Not Given


Atorvastatin Calcium (Lipitor)  40 mg PO DIN [facility]


   Last Admin: 02/10/19 17:17 Dose:  40 mg


Docusate Sodium (Colace)  100 mg PO PRN [facility]


Escitalopram Oxalate (Lexapro)  5 mg PO QAM [facility]


   Last Admin: 02/10/19 09:21 Dose:  5 mg


Meropenem (Merrem Iv 1 Gm Premix)  1 gm in 50 mls @ 100 mls/hr IVPB Q8 [facility]; 

Protocol


   Last Admin: 02/11/19 05:30 Dose:  100 mls/hr


Vancomycin HCl (Vancomycin 1gm)  1 gm in 250 mls @ 167 mls/hr IVPB Q12H [facility]; 

Protocol


   Last Admin: 02/11/19 02:45 Dose:  167 mls/hr


Insulin Detemir (Levemir)  20 unit SC HS [facility]


   Last Admin: 02/10/19 21:39 Dose:  Not Given


Insulin Human Lispro (Humalog)  5 units SC AC [facility]


   Last Admin: 02/10/19 17:10 Dose:  Not Given


Insulin Human Regular (Humulin R Med)  0 units SC ACHS [facility]; Protocol


   Last Admin: 02/10/19 21:39 Dose:  Not Given


Metoprolol Tartrate (Lopressor)  50 mg PO 0800,1800 [facility]


   Last Admin: 02/11/19 08:30 Dose:  50 mg


Mupirocin (Bactroban Ointment)  0 gm TOP BID [facility]


   Last Admin: 02/10/19 17:16 Dose:  Not Given


Mycophenolate Mofetil (Cellcept Cap)  250 mg PO BID [facility]


   Last Admin: 02/10/19 17:16 Dose:  250 mg


Mycophenolate Sodium [Myfortic] 360 Mg ( Home Med)  360 mg PO BID [facility]


   Last Admin: 02/10/19 17:11 Dose:  360 mg


Ondansetron HCl (Zofran Inj)  4 mg IVP Q4 PRN


   PRN Reason: Nausea/Vomiting


   Last Admin: 02/10/19 06:35 Dose:  4 mg


Oxycodone/Acetaminophen (Percocet 5/325 Mg Tab)  1 tab PO Q6H PRN


   PRN Reason: Pain, severe (8-10)


   Stop: 02/12/19 20:26


   Last Admin: 02/10/19 12:06 Dose:  1 tab


Pantoprazole Sodium (Protonix Ec Tab)  40 mg PO 0630 [facility]


   Last Admin: 02/11/19 05:30 Dose:  40 mg


Prednisone (Prednisone Tab)  5 mg PO 0800 [facility]


   Last Admin: 02/10/19 07:47 Dose:  5 mg


Psyllium Hydrophilic Mucilloid (Hydrocil Instant)  1 pkt PO DAILY [facility]


   Last Admin: 02/10/19 09:20 Dose:  1 pkt


Tacrolimus (Prograf Cap)  2 mg PO DAILY [facility]


   Last Admin: 02/10/19 09:21 Dose:  2 mg


Tacrolimus (Prograf Cap)  0.5 mg PO HS [facility]


   Last Admin: 02/10/19 21:32 Dose:  0.5 mg


Tacrolimus (Prograf Cap)  2 mg PO HS [facility]


   Last Admin: 02/10/19 21:32 Dose:  2 mg











- Labs


Labs: 


                                        





                                 02/10/19 07:00 





                                 02/10/19 07:00 





                                        











APTT  31.8 Seconds (26.9-38.3)   02/09/19  07:00    














Attending/Attestation





- Attestation


I have personally seen and examined this patient.: Yes


I have fully participated in the care of the patient.: Yes


I have reviewed all pertinent clinical information, including history, physical 

exam and plan: Yes

## 2019-02-09 NOTE — RAD
Date of service: 



02/08/2019



HISTORY:

 pre-op 



COMPARISON:

05/13/2018



TECHNIQUE:

Chest PA and lateral



FINDINGS:



LUNGS:

No active pulmonary disease.



PLEURA:

No significant pleural effusion identified. No pneumothorax apparent.



CARDIOVASCULAR:

No aortic atherosclerotic calcification present.



Mild cardiomegaly no pulmonary vascular congestion. 



OSSEOUS STRUCTURES:

No significant abnormalities.



VISUALIZED UPPER ABDOMEN:

Normal.



OTHER FINDINGS:

Dual lead pacemaker



IMPRESSION:

No active disease.

## 2019-02-10 LAB
ALBUMIN SERPL-MCNC: 3.8 G/DL (ref 3–4.8)
ALBUMIN/GLOB SERPL: 1.3 {RATIO} (ref 1.1–1.8)
ALT SERPL-CCNC: 34 U/L (ref 7–56)
AST SERPL-CCNC: 24 U/L (ref 17–59)
BASOPHILS # BLD AUTO: 0.01 K/MM3 (ref 0–2)
BASOPHILS NFR BLD: 0.1 % (ref 0–3)
BUN SERPL-MCNC: 19 MG/DL (ref 7–21)
CALCIUM SERPL-MCNC: 8.8 MG/DL (ref 8.4–10.5)
EOSINOPHIL # BLD: 0.3 10*3/UL (ref 0–0.7)
EOSINOPHIL NFR BLD: 4.5 % (ref 1.5–5)
ERYTHROCYTE [DISTWIDTH] IN BLOOD BY AUTOMATED COUNT: 13.2 % (ref 11.5–14.5)
GFR NON-AFRICAN AMERICAN: > 60
HGB BLD-MCNC: 14.7 G/DL (ref 14–18)
LYMPHOCYTES # BLD: 2.4 10*3/UL (ref 1.2–3.4)
LYMPHOCYTES NFR BLD AUTO: 31.3 % (ref 22–35)
MCH RBC QN AUTO: 31.3 PG (ref 25–35)
MCHC RBC AUTO-ENTMCNC: 33.9 G/DL (ref 31–37)
MCV RBC AUTO: 92.3 FL (ref 80–105)
MONOCYTES # BLD AUTO: 0.5 10*3/UL (ref 0.1–0.6)
MONOCYTES NFR BLD: 6.9 % (ref 1–6)
PLATELET # BLD: 177 10^3/UL (ref 120–450)
PMV BLD AUTO: 10.2 FL (ref 7–11)
RBC # BLD AUTO: 4.7 10^6/UL (ref 3.5–6.1)
WBC # BLD AUTO: 7.5 10^3/UL (ref 4.5–11)

## 2019-02-10 RX ADMIN — VANCOMYCIN HYDROCHLORIDE SCH MLS/HR: 1 INJECTION, POWDER, LYOPHILIZED, FOR SOLUTION INTRAVENOUS at 09:19

## 2019-02-10 RX ADMIN — MEROPENEM SCH MLS/HR: 1 INJECTION INTRAVENOUS at 21:32

## 2019-02-10 RX ADMIN — VANCOMYCIN HYDROCHLORIDE SCH MLS/HR: 1 INJECTION, POWDER, LYOPHILIZED, FOR SOLUTION INTRAVENOUS at 01:08

## 2019-02-10 RX ADMIN — INSULIN HUMAN SCH: 100 INJECTION, SOLUTION PARENTERAL at 21:39

## 2019-02-10 RX ADMIN — INSULIN LISPRO SCH: 100 INJECTION, SOLUTION INTRAVENOUS; SUBCUTANEOUS at 17:10

## 2019-02-10 RX ADMIN — MUPIROCIN SCH: 20 OINTMENT TOPICAL at 11:33

## 2019-02-10 RX ADMIN — INSULIN HUMAN SCH UNITS: 100 INJECTION, SOLUTION PARENTERAL at 11:35

## 2019-02-10 RX ADMIN — OXYCODONE HYDROCHLORIDE AND ACETAMINOPHEN PRN TAB: 5; 325 TABLET ORAL at 12:06

## 2019-02-10 RX ADMIN — INSULIN HUMAN SCH UNITS: 100 INJECTION, SOLUTION PARENTERAL at 07:46

## 2019-02-10 RX ADMIN — MEROPENEM SCH MLS/HR: 1 INJECTION INTRAVENOUS at 13:12

## 2019-02-10 RX ADMIN — INSULIN HUMAN SCH UNITS: 100 INJECTION, SOLUTION PARENTERAL at 17:10

## 2019-02-10 RX ADMIN — Medication SCH PKT: at 09:20

## 2019-02-10 RX ADMIN — INSULIN LISPRO SCH UNITS: 100 INJECTION, SOLUTION INTRAVENOUS; SUBCUTANEOUS at 11:34

## 2019-02-10 RX ADMIN — INSULIN LISPRO SCH UNITS: 100 INJECTION, SOLUTION INTRAVENOUS; SUBCUTANEOUS at 07:47

## 2019-02-10 RX ADMIN — INSULIN HUMAN SCH UNITS: 100 INJECTION, SOLUTION PARENTERAL at 07:47

## 2019-02-10 RX ADMIN — INSULIN DETEMIR SCH: 100 INJECTION, SOLUTION SUBCUTANEOUS at 21:39

## 2019-02-10 RX ADMIN — MEROPENEM SCH MLS/HR: 1 INJECTION INTRAVENOUS at 05:51

## 2019-02-10 RX ADMIN — MUPIROCIN SCH: 20 OINTMENT TOPICAL at 17:16

## 2019-02-10 NOTE — CP.PCM.PN
<KarolRadha coates - Last Filed: 02/10/19 12:32>





Subjective





- Date & Time of Evaluation


Date of Evaluation: 02/10/19


Time of Evaluation: 12:32





- Subjective


Subjective: 


Podiatry consult note for Dr. Delvalle





58 yr old male seen and evaluated for left foot wound, infected. Patient was 

seen to be resting comfortably, and denies any other complaints at this time. 

Patient to be scheduled for left foot wound graft application on Monday Feb 11, 2019. Patient denies fever, nausea, vomiting, SOB, chest pain or urinary 

complaints. 








Objective





- Vital Signs/Intake and Output


Vital Signs (last 24 hours): 


                                        











Temp Pulse Resp BP Pulse Ox


 


 97.6 F   71   22   130/62   96 


 


 02/10/19 06:00  02/10/19 07:48  02/10/19 06:00  02/10/19 09:30  02/10/19 06:00











- Medications


Medications: 


                               Current Medications





Amlodipine Besylate (Norvasc)  10 mg PO DAILY [facility]


   Last Admin: 02/10/19 09:30 Dose:  10 mg


Aspirin (Ecotrin)  81 mg PO 0800 [facility]


   Last Admin: 02/09/19 10:55 Dose:  Not Given


Atorvastatin Calcium (Lipitor)  40 mg PO DIN [facility]


   Last Admin: 02/09/19 17:45 Dose:  40 mg


Docusate Sodium (Colace)  100 mg PO PRN [facility]


Escitalopram Oxalate (Lexapro)  5 mg PO QAM [facility]


   Last Admin: 02/10/19 09:21 Dose:  5 mg


Meropenem (Merrem Iv 1 Gm Premix)  1 gm in 50 mls @ 100 mls/hr IVPB Q8 [facility]; 

Protocol


   Last Admin: 02/10/19 05:51 Dose:  100 mls/hr


Vancomycin HCl (Vancomycin 1gm)  1 gm in 250 mls @ 167 mls/hr IVPB Q12H [facility]; 

Protocol


   Last Admin: 02/10/19 09:19 Dose:  167 mls/hr


Insulin Detemir (Levemir)  20 unit SC HS [facility]


   Last Admin: 02/09/19 23:26 Dose:  20 unit


Insulin Human Lispro (Humalog)  5 units SC AC [facility]


   Last Admin: 02/10/19 11:34 Dose:  5 units


Insulin Human Regular (Humulin R Med)  0 units SC ACHS [facility]; Protocol


   Last Admin: 02/10/19 11:35 Dose:  3 units


Metoprolol Tartrate (Lopressor)  50 mg PO 0800,1800 [facility]


   Last Admin: 02/10/19 07:48 Dose:  50 mg


Mupirocin (Bactroban Ointment)  0 gm TOP BID [facility]


   Last Admin: 02/10/19 11:33 Dose:  Not Given


Mycophenolate Mofetil (Cellcept Cap)  250 mg PO BID [facility]


   Last Admin: 02/10/19 09:20 Dose:  250 mg


Mycophenolate Sodium [Myfortic] 360 Mg ( Home Med)  360 mg PO BID [facility]


   Last Admin: 02/10/19 09:27 Dose:  360 mg


Ondansetron HCl (Zofran Inj)  4 mg IVP Q4 PRN


   PRN Reason: Nausea/Vomiting


   Last Admin: 02/10/19 06:35 Dose:  4 mg


Oxycodone/Acetaminophen (Percocet 5/325 Mg Tab)  1 tab PO Q6H PRN


   PRN Reason: Pain, severe (8-10)


   Stop: 02/12/19 20:26


   Last Admin: 02/10/19 12:06 Dose:  1 tab


Pantoprazole Sodium (Protonix Ec Tab)  40 mg PO 0630 [facility]


Prednisone (Prednisone Tab)  5 mg PO 0800 [facility]


   Last Admin: 02/10/19 07:47 Dose:  5 mg


Psyllium Hydrophilic Mucilloid (Hydrocil Instant)  1 pkt PO DAILY [facility]


   Last Admin: 02/10/19 09:20 Dose:  1 pkt


Tacrolimus (Prograf Cap)  2 mg PO DAILY [facility]


   Last Admin: 02/10/19 09:21 Dose:  2 mg


Tacrolimus (Prograf Cap)  0.5 mg PO HS [facility]


   Last Admin: 02/09/19 23:26 Dose:  0.5 mg


Tacrolimus (Prograf Cap)  2 mg PO [facility]











- Labs


Labs: 


                                        





                                 02/10/19 07:00 





                                 02/10/19 07:00 





                                        











APTT  31.8 Seconds (26.9-38.3)   02/09/19  07:00    














- Constitutional


Appears: Well, Non-toxic, No Acute Distress





- Head Exam


Head Exam: ATRAUMATIC, NORMOCEPHALIC





- Extremities Exam


Additional comments: 


Left Lower Extremity





VASC: DP and PT palpable, TG warm to warm, no edema





DERM: 3 cm X 3 cm X 1 cm wound to the submet 1 base, irregular in shape, 

positive drainage with fibrogranular wound bed, negative malodor, no tunneling 

or tracking, minimal xiomara wound erythema noted, wound improving 





NEURO: diminished sensation





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











- Neurological Exam


Neurological Exam: Alert, Awake, Oriented x3





- Psychiatric Exam


Psychiatric exam: Normal Affect, Normal Mood





Assessment and Plan





- Assessment and Plan (Free Text)


Assessment: 


59 y/o male patient admitted for IV Abx with graft application to the wound on 

2/11/19





Plan: 


Patient seen and evaluated with Dr. Mook Patel discussed


Chart, labs and vitals were reviewed


Wound Cultures: Acinetobacter Baumannii, E. Faecalis, Corneybacterium species


Infectious disease consult, recommendations appreciated


Continue IV Abx


Wound cleansed with saline, and dressed with xeroform, DSD; anterior leg wound 

healed and left open to air at this time


Patient to be taken to the OR Monday 2/11/19 for graft application to the left 

foot wound


Please provide medical clearance for patient


NPO Order placed for patient to  be NPO after midnight tonight.


Podiatry will continue to follow patient


Thank you for the consult











<Gerald Delvalle - Last Filed: 02/11/19 09:26>





Objective





- Vital Signs/Intake and Output


Vital Signs (last 24 hours): 


                                        











Temp Pulse Resp BP Pulse Ox


 


 98 F   69   20   123/79   97 


 


 02/11/19 06:00  02/11/19 06:00  02/11/19 06:00  02/11/19 08:30  02/11/19 06:00








Intake and Output: 


                                        











 02/11/19 02/11/19





 06:59 18:59


 


Intake Total 660 


 


Output Total 1050 


 


Balance -390 














- Medications


Medications: 


                               Current Medications





Amlodipine Besylate (Norvasc)  10 mg PO DAILY [facility]


   Last Admin: 02/10/19 09:30 Dose:  10 mg


Aspirin (Ecotrin)  81 mg PO 0800 [facility]


   Last Admin: 02/09/19 10:55 Dose:  Not Given


Atorvastatin Calcium (Lipitor)  40 mg PO DIN [facility]


   Last Admin: 02/10/19 17:17 Dose:  40 mg


Docusate Sodium (Colace)  100 mg PO PRN [facility]


Escitalopram Oxalate (Lexapro)  5 mg PO QAM [facility]


   Last Admin: 02/10/19 09:21 Dose:  5 mg


Meropenem (Merrem Iv 1 Gm Premix)  1 gm in 50 mls @ 100 mls/hr IVPB Q8 [facility]; 

Protocol


   Last Admin: 02/11/19 05:30 Dose:  100 mls/hr


Vancomycin HCl (Vancomycin 1gm)  1 gm in 250 mls @ 167 mls/hr IVPB Q12H [facility]; 

Protocol


   Last Admin: 02/11/19 02:45 Dose:  167 mls/hr


Insulin Detemir (Levemir)  20 unit SC HS [facility]


   Last Admin: 02/10/19 21:39 Dose:  Not Given


Insulin Human Lispro (Humalog)  5 units SC AC [facility]


   Last Admin: 02/10/19 17:10 Dose:  Not Given


Insulin Human Regular (Humulin R Med)  0 units SC ACHS [facility]; Protocol


   Last Admin: 02/10/19 21:39 Dose:  Not Given


Metoprolol Tartrate (Lopressor)  50 mg PO 0800,1800 [facility]


   Last Admin: 02/11/19 08:30 Dose:  50 mg


Mupirocin (Bactroban Ointment)  0 gm TOP BID [facility]


   Last Admin: 02/10/19 17:16 Dose:  Not Given


Mycophenolate Mofetil (Cellcept Cap)  250 mg PO BID [facility]


   Last Admin: 02/10/19 17:16 Dose:  250 mg


Mycophenolate Sodium [Myfortic] 360 Mg ( Home Med)  360 mg PO BID [facility]


   Last Admin: 02/10/19 17:11 Dose:  360 mg


Ondansetron HCl (Zofran Inj)  4 mg IVP Q4 PRN


   PRN Reason: Nausea/Vomiting


   Last Admin: 02/10/19 06:35 Dose:  4 mg


Oxycodone/Acetaminophen (Percocet 5/325 Mg Tab)  1 tab PO Q6H PRN


   PRN Reason: Pain, severe (8-10)


   Stop: 02/12/19 20:26


   Last Admin: 02/10/19 12:06 Dose:  1 tab


Pantoprazole Sodium (Protonix Ec Tab)  40 mg PO 0630 [facility]


   Last Admin: 02/11/19 05:30 Dose:  40 mg


Prednisone (Prednisone Tab)  5 mg PO 0800 [facility]


   Last Admin: 02/10/19 07:47 Dose:  5 mg


Psyllium Hydrophilic Mucilloid (Hydrocil Instant)  1 pkt PO DAILY [facility]


   Last Admin: 02/10/19 09:20 Dose:  1 pkt


Tacrolimus (Prograf Cap)  2 mg PO DAILY [facility]


   Last Admin: 02/10/19 09:21 Dose:  2 mg


Tacrolimus (Prograf Cap)  0.5 mg PO HS [facility]


   Last Admin: 02/10/19 21:32 Dose:  0.5 mg


Tacrolimus (Prograf Cap)  2 mg PO HS [facility]


   Last Admin: 02/10/19 21:32 Dose:  2 mg











- Labs


Labs: 


                                        





                                 02/10/19 07:00 





                                 02/10/19 07:00 





                                        











APTT  31.8 Seconds (26.9-38.3)   02/09/19  07:00    














Attending/Attestation





- Attestation


I have personally seen and examined this patient.: Yes


I have fully participated in the care of the patient.: Yes


I have reviewed all pertinent clinical information, including history, physical 

exam and plan: Yes

## 2019-02-10 NOTE — PN
DATE:  02/09/2019



SUBJECTIVE:  The patient is in bed, in no acute distress.



PHYSICAL EXAMINATION:

VITAL SIGNS:  Temperature is 98, blood pressure is 140/70, respiratory rate

20.

HEENT:  Unremarkable.

NECK:  Supple.

LUNGS:  Have decreased breath sounds.

HEART:  Normal S1, S2.

ABDOMEN:  Soft.



LABORATORY DATA:  White count of 7.4, hemoglobin of 15, platelets of 175. 

BUN of 19, creatinine of 0.8.  Microbiology reveals the blood cultures are

negative.  Wound cultures are pending.



ASSESSMENT AND PLAN:  A 58-year-old male with left foot stump diabetic

ulcer, history of right leg amputation, diabetic, congestive heart failure,

coronary artery disease, pacemaker, on vancomycin, meropenem.  Review of

orders reveals the vancomycin and meropenem are both active.  We will

follow with you.







__________________________________________

Duc Bee MD





DD:  02/09/2019 19:09:10

DT:  02/09/2019 19:10:49

Job # [id number]

## 2019-02-10 NOTE — PN
DATE:  2019



SUBJECTIVE:  The patient has no complaints of any chest pain.  No shortness

of breath.  No headaches.



PHYSICAL EXAMINATION:

VITAL SIGNS:  Temperature is 97.8, pulse is 71, blood pressure is 144/76,

respirations 20.

GENERAL:  The patient is lying in bed, flat, comfortable.

HEENT:  No oral lesion.  Anicteric sclerae.  Moist mucosa.

NECK:  No JVD, adenopathy, or thyromegaly.

CARDIOVASCULAR:  S1 and S2, regular.  No murmurs, rubs, or gallops.

LUNGS:  Clear to auscultation bilaterally.  No wheeze, rales, or rhonchi.

ABDOMEN:  Bowel sounds are positive, soft, nontender and nondistended.

EXTREMITIES:  No cyanosis, clubbing, or edema.



LABS:  White count of 7.4, hemoglobin 15, creatinine 0.8.



Chest x-ray shows no active disease.



ASSESSMENT:

1.  Left foot ulcer.

2.  Right below-knee amputation.

3.  Status post  donor kidney transplant.

4.  Coronary artery disease, status post coronary artery bypass grafting.

5.  Diabetes type 2.

6.  Chronic obstructive pulmonary disease.

7.  Pacemaker.

8.  Congestive heart failure secondary to systolic dysfunction, stable.



PLAN:  The patient is going to the OR in 2 days.  He is currently on

mycophenolate for his immunosuppression.  The patient is on aspirin, which

is on hold for surgery.  The patient is going to continue Levemir for his

diabetes.  He is on Lexapro for his anxiety.  He is on Lipitor for

dyslipidemia.  He is going to continue with his amlodipine for his

hypertension.  He is on tacrolimus for his immunosuppression.  The patient

is receiving Lipitor for dyslipidemia.  He is currently comfortable.  He is

being followed by ID and Podiatry.  I did speak to Podiatry with Dr. Delvalle

today.





__________________________________________

Andrea Calderon MD





DD:  2019 19:54:01

DT:  2019 21:48:16

Job # [id number]

## 2019-02-10 NOTE — CP.PCM.PN
Subjective





- Date & Time of Evaluation


Date of Evaluation: 02/10/19


Time of Evaluation: 11:05





- Subjective


Subjective: 





Comfortable in bed, no fevers, for skin grafting tomorrow.





Objective





- Vital Signs/Intake and Output


Vital Signs (last 24 hours): 


                                        











Temp Pulse Resp BP Pulse Ox


 


 97.6 F   71   22   130/62   96 


 


 02/10/19 06:00  02/10/19 07:48  02/10/19 06:00  02/10/19 09:30  02/10/19 06:00











- Medications


Medications: 


                               Current Medications





Amlodipine Besylate (Norvasc)  10 mg PO DAILY [facility]


   Last Admin: 02/10/19 09:30 Dose:  10 mg


Aspirin (Ecotrin)  81 mg PO 0800 [facility]


   Last Admin: 02/09/19 10:55 Dose:  Not Given


Atorvastatin Calcium (Lipitor)  40 mg PO DIN [facility]


   Last Admin: 02/09/19 17:45 Dose:  40 mg


Docusate Sodium (Colace)  100 mg PO PRN [facility]


Escitalopram Oxalate (Lexapro)  5 mg PO QAM [facility]


   Last Admin: 02/10/19 09:21 Dose:  5 mg


Meropenem (Merrem Iv 1 Gm Premix)  1 gm in 50 mls @ 100 mls/hr IVPB Q8 [facility]; 

Protocol


   Last Admin: 02/10/19 05:51 Dose:  100 mls/hr


Vancomycin HCl (Vancomycin 1gm)  1 gm in 250 mls @ 167 mls/hr IVPB Q12H [facility]; 

Protocol


   Last Admin: 02/10/19 09:19 Dose:  167 mls/hr


Insulin Detemir (Levemir)  20 unit SC HS [facility]


   Last Admin: 02/09/19 23:26 Dose:  20 unit


Insulin Human Lispro (Humalog)  5 units SC AC [facility]


   Last Admin: 02/10/19 07:47 Dose:  5 units


Insulin Human Regular (Humulin R Med)  0 units SC ACHS [facility]; Protocol


   Last Admin: 02/10/19 07:47 Dose:  3 units


Metoprolol Tartrate (Lopressor)  50 mg PO 0800,1800 [facility]


   Last Admin: 02/10/19 07:48 Dose:  50 mg


Mupirocin (Bactroban Ointment)  0 gm TOP BID [facility]


   Last Admin: 02/09/19 09:00 Dose:  1 applic


Mycophenolate Mofetil (Cellcept Cap)  250 mg PO BID [facility]


   Last Admin: 02/10/19 09:20 Dose:  250 mg


Mycophenolate Sodium [Myfortic] 360 Mg ( Home Med)  360 mg PO BID [facility]


   Last Admin: 02/10/19 09:27 Dose:  360 mg


Ondansetron HCl (Zofran Inj)  4 mg IVP Q4 PRN


   PRN Reason: Nausea/Vomiting


   Last Admin: 02/10/19 06:35 Dose:  4 mg


Oxycodone/Acetaminophen (Percocet 5/325 Mg Tab)  1 tab PO Q6H PRN


   PRN Reason: Pain, severe (8-10)


   Stop: 02/12/19 20:26


   Last Admin: 02/09/19 20:40 Dose:  1 tab


Prednisone (Prednisone Tab)  5 mg PO 0800 [facility]


   Last Admin: 02/10/19 07:47 Dose:  5 mg


Psyllium Hydrophilic Mucilloid (Hydrocil Instant)  1 pkt PO DAILY [facility]


   Last Admin: 02/10/19 09:20 Dose:  1 pkt


Tacrolimus (Prograf Cap)  2 mg PO DAILY [facility]


   Last Admin: 02/10/19 09:21 Dose:  2 mg


Tacrolimus (Prograf Cap)  0.5 mg PO HS [facility]


   Last Admin: 02/09/19 23:26 Dose:  0.5 mg


Tacrolimus (Prograf Cap)  2 mg PO [facility]











- Labs


Labs: 


                                        





                                 02/10/19 07:00 





                                 02/10/19 07:00 





                                        











APTT  31.8 Seconds (26.9-38.3)   02/09/19  07:00    














- Constitutional


Appears: Chronically Ill





- Head Exam


Head Exam: NORMAL INSPECTION





- Respiratory Exam


Respiratory Exam: Decreased Breath Sounds





- Cardiovascular Exam


Cardiovascular Exam: +S1, +S2





- GI/Abdominal Exam


GI & Abdominal Exam: Soft.  absent: Tenderness





- Extremities Exam


Additional comments: 





left foot with dressings in place





Assessment and Plan





- Assessment and Plan (Free Text)


Plan: 





Assessment


left foot infections of ulcers for skin grafting


history Right AKA S/P severe skin and skin structure infection with Pseudomonas


history of right foot necrosis and gangrene, severe skin and skin structure 

infection S/P BKA


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 

debridement and closure; S/P transmetatarsal amputation previously, grew 

Enterobacter S/P wound vacuum placement


right heel ulcer S/P debridement


S/P Left 5th metatarsal osteomyelitis S/P debridement and bone excision grew 

Acinetobacter and E. faecalis


CAD S/P CABG


chronic CHF


DM


COPD


S/P pacemaker placement


S/P kidney transplant





Plan


continue Merrem and Vancoymcin, previous cultures grew MRSA and Pseudomonas


will continue to monitor clinically 








Infectious diseases Attending Physician Attestation


Patient seen and examined, discussed with medical resident. I have

## 2019-02-10 NOTE — PN
DATE:  02/10/2019



SUBJECTIVE:  The patient has no complaints of any chest pain or shortness

of breath, no headaches.



OBJECTIVE:

VITAL SIGNS:  Temperature is 97.6, pulse of 71, blood pressure 130/62,

respirations 22.

GENERAL:  The patient is lying in bed, flat, comfortable.

HEENT:  No oral lesion.  Anicteric sclerae.  Moist mucosa.

NECK:  No JVD, adenopathy, or thyromegaly.

CARDIOVASCULAR:  S1 and S2, regular.  No murmurs, rubs, or gallops.

LUNGS:  Clear to auscultation bilaterally.  No wheeze, rales, or rhonchi.

ABDOMEN:  Bowel sounds are positive, soft, nontender and nondistended.

EXTREMITIES:  No cyanosis, clubbing or edema.



ASSESSMENT:

1.  Left foot ulcer.

2.  Right below knee amputation.

3.  Status post  donor kidney transplant.

4.  Coronary artery disease status post coronary artery bypass graft.

5.  Diabetes type 2.

6.  Chronic obstructive pulmonary disease.

7.  Pacemaker.

8.  Congestive heart failure secondary to systolic dysfunction, stable.



PLAN:  The patient is on Colace.  The patient is receiving aspirin daily,

he is going to continue with lisinopril for diabetes.  The patient is on

Levemir for his diabetes.  He is on Lexapro.  He is on Lipitor for

dyslipidemia.  The patient is receiving Percocet for pain.  He is on

prednisone and Prograf as well as mycophenolate for his immunosuppressive

medications for his transplant.  The patient is getting heart-healthy diet.

He is going to be n.p.o. after midnight today for a flap procedure by

podiatry tomorrow.  I will also get preop evaluation by Dr. Bradford. 

Medically, the patient is cleared for the procedure by me.







__________________________________________

Andrea Calderon MD





DD:  02/10/2019 10:31:49

DT:  02/10/2019 10:33:31

Job # [id number]

## 2019-02-11 VITALS — HEART RATE: 72 BPM | OXYGEN SATURATION: 98 %

## 2019-02-11 PROCEDURE — 0HRNXK4 REPLACEMENT OF LEFT FOOT SKIN WITH NONAUTOLOGOUS TISSUE SUBSTITUTE, PARTIAL THICKNESS, EXTERNAL APPROACH: ICD-10-PCS | Performed by: PODIATRIST

## 2019-02-11 PROCEDURE — 0JDR0ZZ EXTRACTION OF LEFT FOOT SUBCUTANEOUS TISSUE AND FASCIA, OPEN APPROACH: ICD-10-PCS | Performed by: PODIATRIST

## 2019-02-11 RX ADMIN — INSULIN HUMAN SCH: 100 INJECTION, SOLUTION PARENTERAL at 23:12

## 2019-02-11 RX ADMIN — MUPIROCIN SCH: 20 OINTMENT TOPICAL at 09:47

## 2019-02-11 RX ADMIN — MEROPENEM SCH: 1 INJECTION INTRAVENOUS at 14:00

## 2019-02-11 RX ADMIN — Medication SCH: at 09:33

## 2019-02-11 RX ADMIN — INSULIN HUMAN SCH: 100 INJECTION, SOLUTION PARENTERAL at 08:00

## 2019-02-11 RX ADMIN — MEROPENEM SCH MLS/HR: 1 INJECTION INTRAVENOUS at 05:30

## 2019-02-11 RX ADMIN — VANCOMYCIN HYDROCHLORIDE SCH MLS/HR: 1 INJECTION, POWDER, LYOPHILIZED, FOR SOLUTION INTRAVENOUS at 09:48

## 2019-02-11 RX ADMIN — INSULIN DETEMIR SCH UNIT: 100 INJECTION, SOLUTION SUBCUTANEOUS at 23:24

## 2019-02-11 RX ADMIN — INSULIN HUMAN SCH UNITS: 100 INJECTION, SOLUTION PARENTERAL at 18:40

## 2019-02-11 RX ADMIN — MEROPENEM SCH MLS/HR: 1 INJECTION INTRAVENOUS at 22:04

## 2019-02-11 RX ADMIN — INSULIN HUMAN SCH: 100 INJECTION, SOLUTION PARENTERAL at 12:00

## 2019-02-11 RX ADMIN — INSULIN LISPRO SCH: 100 INJECTION, SOLUTION INTRAVENOUS; SUBCUTANEOUS at 12:00

## 2019-02-11 RX ADMIN — INSULIN LISPRO SCH UNITS: 100 INJECTION, SOLUTION INTRAVENOUS; SUBCUTANEOUS at 18:40

## 2019-02-11 RX ADMIN — MUPIROCIN SCH: 20 OINTMENT TOPICAL at 18:41

## 2019-02-11 RX ADMIN — INSULIN LISPRO SCH: 100 INJECTION, SOLUTION INTRAVENOUS; SUBCUTANEOUS at 08:00

## 2019-02-11 RX ADMIN — PANTOPRAZOLE SODIUM SCH MG: 40 TABLET, DELAYED RELEASE ORAL at 05:30

## 2019-02-11 RX ADMIN — VANCOMYCIN HYDROCHLORIDE SCH MLS/HR: 1 INJECTION, POWDER, LYOPHILIZED, FOR SOLUTION INTRAVENOUS at 02:45

## 2019-02-11 NOTE — CP.PCM.PN
Subjective





- Date & Time of Evaluation


Date of Evaluation: 02/11/19


Time of Evaluation: 12:20





- Subjective


Subjective: 





Comfortable, no fevers, not in distress, for skin grafting today.





Objective





- Vital Signs/Intake and Output


Vital Signs (last 24 hours): 


                                        











Temp Pulse Resp BP Pulse Ox


 


 97.3 F L  70   18   133/78   95 


 


 02/10/19 14:00  02/10/19 17:16  02/10/19 14:00  02/10/19 17:16  02/10/19 14:00











- Medications


Medications: 


                               Current Medications





Amlodipine Besylate (Norvasc)  10 mg PO DAILY [facility]


   Last Admin: 02/10/19 09:30 Dose:  10 mg


Aspirin (Ecotrin)  81 mg PO 0800 [facility]


   Last Admin: 02/09/19 10:55 Dose:  Not Given


Atorvastatin Calcium (Lipitor)  40 mg PO DIN [facility]


   Last Admin: 02/10/19 17:17 Dose:  40 mg


Docusate Sodium (Colace)  100 mg PO PRN [facility]


Escitalopram Oxalate (Lexapro)  5 mg PO QAM [facility]


   Last Admin: 02/10/19 09:21 Dose:  5 mg


Meropenem (Merrem Iv 1 Gm Premix)  1 gm in 50 mls @ 100 mls/hr IVPB Q8 [facility]; 

Protocol


   Last Admin: 02/10/19 13:12 Dose:  100 mls/hr


Vancomycin HCl (Vancomycin 1gm)  1 gm in 250 mls @ 167 mls/hr IVPB Q12H [facility]; 

Protocol


   Last Admin: 02/10/19 09:19 Dose:  167 mls/hr


Insulin Detemir (Levemir)  20 unit SC HS [facility]


   Last Admin: 02/09/19 23:26 Dose:  20 unit


Insulin Human Lispro (Humalog)  5 units SC AC [facility]


   Last Admin: 02/10/19 17:10 Dose:  Not Given


Insulin Human Regular (Humulin R Med)  0 units SC ACHS [facility]; Protocol


   Last Admin: 02/10/19 17:10 Dose:  1 units


Metoprolol Tartrate (Lopressor)  50 mg PO 0800,1800 [facility]


   Last Admin: 02/10/19 17:16 Dose:  50 mg


Mupirocin (Bactroban Ointment)  0 gm TOP BID [facility]


   Last Admin: 02/10/19 17:16 Dose:  Not Given


Mycophenolate Mofetil (Cellcept Cap)  250 mg PO BID [facility]


   Last Admin: 02/10/19 17:16 Dose:  250 mg


Mycophenolate Sodium [Myfortic] 360 Mg ( Home Med)  360 mg PO BID [facility]


   Last Admin: 02/10/19 17:11 Dose:  360 mg


Ondansetron HCl (Zofran Inj)  4 mg IVP Q4 PRN


   PRN Reason: Nausea/Vomiting


   Last Admin: 02/10/19 06:35 Dose:  4 mg


Oxycodone/Acetaminophen (Percocet 5/325 Mg Tab)  1 tab PO Q6H PRN


   PRN Reason: Pain, severe (8-10)


   Stop: 02/12/19 20:26


   Last Admin: 02/10/19 12:06 Dose:  1 tab


Pantoprazole Sodium (Protonix Ec Tab)  40 mg PO 0630 [facility]


Prednisone (Prednisone Tab)  5 mg PO 0800 [facility]


   Last Admin: 02/10/19 07:47 Dose:  5 mg


Psyllium Hydrophilic Mucilloid (Hydrocil Instant)  1 pkt PO DAILY [facility]


   Last Admin: 02/10/19 09:20 Dose:  1 pkt


Tacrolimus (Prograf Cap)  2 mg PO DAILY [facility]


   Last Admin: 02/10/19 09:21 Dose:  2 mg


Tacrolimus (Prograf Cap)  0.5 mg PO HS [facility]


   Last Admin: 02/09/19 23:26 Dose:  0.5 mg


Tacrolimus (Prograf Cap)  2 mg PO [facility]











- Labs


Labs: 


                                        





                                 02/10/19 07:00 





                                 02/10/19 07:00 





                                        











APTT  31.8 Seconds (26.9-38.3)   02/09/19  07:00    














- Constitutional


Appears: Non-toxic, Chronically Ill





- Head Exam


Head Exam: NORMAL INSPECTION





- Respiratory Exam


Respiratory Exam: Decreased Breath Sounds





- Cardiovascular Exam


Cardiovascular Exam: +S1, +S2





- GI/Abdominal Exam


GI & Abdominal Exam: Soft.  absent: Tenderness





- Extremities Exam


Additional comments: 





left foot with dressings in place





Assessment and Plan





- Assessment and Plan (Free Text)


Plan: 








Assessment


left foot infections of ulcers for skin grafting


history Right AKA S/P severe skin and skin structure infection with Pseudomonas


history of right foot necrosis and gangrene, severe skin and skin structure 

infection S/P BKA


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 

debridement and closure; S/P transmetatarsal amputation previously, grew 

Enterobacter S/P wound vacuum placement


right heel ulcer S/P debridement


S/P Left 5th metatarsal osteomyelitis S/P debridement and bone excision grew 

Acinetobacter and E. faecalis


CAD S/P CABG


chronic CHF


DM


COPD


S/P pacemaker placement


S/P kidney transplant





Plan


continue Merrem and Vancoymcin, previous cultures grew MRSA and Pseudomonas


will continue to monitor clinically and follow up results of the skin grafting

## 2019-02-11 NOTE — PCM.SURG1
Surgeon's Initial Post Op Note





- Surgeon's Notes


Surgeon: Dr. Delvalle


Assistant: Dr. Radha Roberson, PGY


Type of Anesthesia: IV Sedation, Local


Anesthesia Administered By:  


Pre-Operative Diagnosis: Left foot non-healing wound


Operative Findings: see dictation.  I: 1:1 mixture of 5 cc 0.5% Marcaine and 1% 

Lidocaine.  M: Integra graft with staples


Post-Operative Diagnosis: same as above


Operation Performed: Left foot wound debridement with Misonix and then Integra 

graft application


Specimen/Specimens Removed: none


Estimated Blood Loss: EBL {In ML}: 1


Blood Products Given: N/A


Drains Used: No Drains


Post-Op Condition: Good


Date of Surgery/Procedure: 02/11/19


Time of Surgery/Procedure: 16:15

## 2019-02-11 NOTE — CP.PCM.APN
Subjective





- Date & Time of Evaluation


Date of Evaluation: 02/11/19


Time of Evaluation: 09:00





- Subjective


Subjective: 





Pt seen and examined at bedside. In no acute distress.





Objective





- Vital Signs/Intake and Output


Vital Signs (last 24 hours): 


                                        











Temp Pulse Resp BP Pulse Ox


 


 98 F   69   20   123/79   97 


 


 02/11/19 06:00  02/11/19 06:00  02/11/19 06:00  02/11/19 08:30  02/11/19 06:00








Intake and Output: 


                                        











 02/11/19 02/11/19





 06:59 18:59


 


Intake Total 660 


 


Output Total 1050 


 


Balance -390 














- Medications


Medications: 


                               Current Medications





Amlodipine Besylate (Norvasc)  10 mg PO DAILY [facility]


   Last Admin: 02/11/19 09:34 Dose:  Not Given


Aspirin (Ecotrin)  81 mg PO 0800 [facility]


   Last Admin: 02/09/19 10:55 Dose:  Not Given


Atorvastatin Calcium (Lipitor)  40 mg PO DIN [facility]


   Last Admin: 02/10/19 17:17 Dose:  40 mg


Docusate Sodium (Colace)  100 mg PO PRN [facility]


Escitalopram Oxalate (Lexapro)  5 mg PO QAM [facility]


   Last Admin: 02/11/19 09:33 Dose:  Not Given


Meropenem (Merrem Iv 1 Gm Premix)  1 gm in 50 mls @ 100 mls/hr IVPB Q8 [facility]; 

Protocol


   Last Admin: 02/11/19 05:30 Dose:  100 mls/hr


Vancomycin HCl (Vancomycin 1gm)  1 gm in 250 mls @ 167 mls/hr IVPB Q12H [facility]; 

Protocol


   Last Admin: 02/11/19 09:48 Dose:  167 mls/hr


Insulin Detemir (Levemir)  20 unit SC HS [facility]


   Last Admin: 02/10/19 21:39 Dose:  Not Given


Insulin Human Lispro (Humalog)  5 units SC AC [facility]


   Last Admin: 02/11/19 08:00 Dose:  Not Given


Insulin Human Regular (Humulin R Med)  0 units SC ACHS [facility]; Protocol


   Last Admin: 02/11/19 08:00 Dose:  Not Given


Metoprolol Tartrate (Lopressor)  50 mg PO 0800,1800 [facility]


   Last Admin: 02/11/19 08:30 Dose:  50 mg


Mupirocin (Bactroban Ointment)  0 gm TOP BID [facility]


   Last Admin: 02/11/19 09:47 Dose:  Not Given


Mycophenolate Mofetil (Cellcept Cap)  250 mg PO BID [facility]


   Last Admin: 02/10/19 17:16 Dose:  250 mg


Mycophenolate Sodium [Myfortic] 360 Mg ( Home Med)  360 mg PO BID [facility]


   Last Admin: 02/11/19 09:34 Dose:  Not Given


Ondansetron HCl (Zofran Inj)  4 mg IVP Q4 PRN


   PRN Reason: Nausea/Vomiting


   Last Admin: 02/10/19 06:35 Dose:  4 mg


Oxycodone/Acetaminophen (Percocet 5/325 Mg Tab)  1 tab PO Q6H PRN


   PRN Reason: Pain, severe (8-10)


   Stop: 02/12/19 20:26


   Last Admin: 02/10/19 12:06 Dose:  1 tab


Pantoprazole Sodium (Protonix Ec Tab)  40 mg PO 0630 [facility]


   Last Admin: 02/11/19 05:30 Dose:  40 mg


Prednisone (Prednisone Tab)  5 mg PO 0800 [facility]


   Last Admin: 02/11/19 09:34 Dose:  Not Given


Psyllium Hydrophilic Mucilloid (Hydrocil Instant)  1 pkt PO DAILY [facility]


   Last Admin: 02/11/19 09:33 Dose:  Not Given


Tacrolimus (Prograf Cap)  2 mg PO DAILY [facility]


   Last Admin: 02/11/19 09:34 Dose:  Not Given


Tacrolimus (Prograf Cap)  0.5 mg PO [facility]


   Last Admin: 02/10/19 21:32 Dose:  0.5 mg


Tacrolimus (Prograf Cap)  2 mg PO [facility]


   Last Admin: 02/10/19 21:32 Dose:  2 mg











- Labs


Labs: 


                                        





                                 02/10/19 07:00 





                                 02/10/19 07:00 





                                        











APTT  31.8 Seconds (26.9-38.3)   02/09/19  07:00    














- Constitutional


Appears: No Acute Distress





- Head Exam


Head Exam: ATRAUMATIC





- Neck Exam


Neck Exam: Full ROM





- Respiratory Exam


Respiratory Exam: Clear to Ausculation Bilateral, NORMAL BREATHING PATTERN





- Cardiovascular Exam


Cardiovascular Exam: REGULAR RHYTHM, +S1





- GI/Abdominal Exam


GI & Abdominal Exam: Soft, Normal Bowel Sounds





- Rectal Exam


Rectal Exam: Deferred





- Extremities Exam


Additional comments: 





L TMA, R AKA





- Neurological Exam


Neurological Exam: Alert, Awake, Oriented x3





Assessment and Plan





- Assessment and Plan (Free Text)


Assessment: 





pt is a 58 y.o. male with pmhx of CHF, DM, CAD s/p CABG, COPD, kidney 

transplant, pacemaker placement, R AKA, left TMA stump ulceration and 

osteomyelitis, and pseudomonas skin infection presents from podiatry clinic for 

left lower extremity TMA stump ulceration.








ITS Impressions





Foot X-Ray  02/06/19 15:10


IMPRESSION:


Status post transmetatarsal amputation.  No plain radiographic 


evidence of acute osteomyelitis..


 


 








Chest X-Ray  02/08/19 18:35


IMPRESSION:


No active disease. 


 


 











Plan: 





Possible skin graft to L foot wound today per Podiatry


On Merrem/Vanco per ID recs


ID, Cardio and Podiatry on consult


Meds per MAR


Will continue to follow

## 2019-02-11 NOTE — CON
DATE:  2019



REQUESTING PHYSICIAN:  Dr. Calderon



REASON FOR CONSULTATION:  Preoperative cardiac evaluation.



HISTORY:  This is a 58-year-old man well-known to me with a history of

peripheral vascular disease; coronary artery disease, status post prior

bypass surgery; diabetes; COPD; and prior renal transplant; who was

admitted with ulceration of his left transmetatarsal amputation stump.  A

plan is being made for skin graft surgery, and preoperative evaluation was

requested.  He denies any recent chest pain.  His activities are somewhat

limited because of his peripheral vascular disease.  He has undergone a

prior right above-the-knee amputation.  He has a history of permanent

pacemaker implant.  He has had osteomyelitis in the past.



CURRENT MEDICATIONS:  Include CellCept, Colace, Ecotrin, insulin, Lexapro,

Lipitor, metoprolol 50 mg b.i.d., meropenem, Myfortic, Norvasc, prednisone,

Prograf, Protonix, vancomycin.



ALLERGIES:  NONE.



SOCIAL HISTORY:  He is a former smoker.  He denies alcohol use.  He is

, lives with his wife.



FAMILY HISTORY:  Both parents are  from age-related illness.



REVIEW OF SYSTEMS:  Ten-point review of systems is otherwise unremarkable.



PHYSICAL EXAMINATION:

GENERAL:  He is a middle-aged man who appears comfortable at rest.

VITAL SIGNS:  Blood pressure is 122/80 with pulse of 80, respirations are

16.  He is afebrile.

HEENT:  Normocephalic, atraumatic.

NECK:  Supple.

CHEST:  Few scattered rhonchi heard.

HEART:  PMI normal position with a systolic murmur at lower left sternal

border.

ABDOMEN:  Soft, nontender, normoactive bowel sounds.

EXTREMITIES:  A right leg prosthesis is in place.  His left foot is

dressed.

PSYCHIATRIC:  Normal mood and affect.

NEUROLOGICAL:  Alert and oriented x3.  No gross motor or sensory deficits

appreciable.



DIAGNOSTIC DATA:  White count 7.5, hemoglobin and hematocrit of 14.7 and

43.9 with a platelet count of 177,000.  Potassium 4.3.  BUN and creatinine

of 19 and 0.8.



Electrocardiogram reveals atrial sensing and ventricular pacing.  Chest

x-ray reveals mildly increased cardiac silhouette with clear lung fields. 

The dual-chamber pacemaker system is in place.



IMPRESSION:

1.  Coronary artery disease, status post prior bypass surgery, appears

clinically stable at the present time.  Appears optimized to proceed with

skin graft surgery as planned.

2.  Severe peripheral vascular disease.

3.  History of diabetes.

4.  Conduction system disease with permanent pacemaker implant.

5.  Rest of problems as noted.



RECOMMENDATIONS:  From a cardiac standpoint, he is stable to proceed with

surgery as planned.  No specific cardiac precautions are needed.  Avoidance

of excessive volume infusion would be advised but this is unlikely in this

type of surgery I would assume.  We will continue to follow and make

further recommendations as appropriate.





__________________________________________

Oliver Bradford MD





DD:  2019 21:16:18

DT:  2019 21:19:32

Job # [id number]

## 2019-02-11 NOTE — CP.PCM.PN
<Isael Eduardo - Last Filed: 19 13:21>





Subjective





- Date & Time of Evaluation


Date of Evaluation: 19


Time of Evaluation: 07:00





- Subjective


Subjective: 





Medicine progress note for Dr Calderon:





Patient seen and examined at bedside.  No acute events overnight.  Patient is 

n.p.o. for left foot flap placement procedure today.  Denies any complaints.





12 point ROS performed and negative other than stated above.





Objective





- Vital Signs/Intake and Output


Vital Signs (last 24 hours): 


                                        











Temp Pulse Resp BP Pulse Ox


 


 98 F   69   20   123/79   97 


 


 19 06:00  19 06:00  19 06:00  19 08:30  19 06:00








Intake and Output: 


                                        











 19





 06:59 18:59


 


Intake Total 660 


 


Output Total 1050 


 


Balance -390 














- Medications


Medications: 


                               Current Medications





Amlodipine Besylate (Norvasc)  10 mg PO DAILY [facility]


   Last Admin: 19 09:34 Dose:  Not Given


Aspirin (Ecotrin)  81 mg PO 0800 [facility]


   Last Admin: 19 10:55 Dose:  Not Given


Atorvastatin Calcium (Lipitor)  40 mg PO DIN [facility]


   Last Admin: 02/10/19 17:17 Dose:  40 mg


Docusate Sodium (Colace)  100 mg PO PRN [facility]


Escitalopram Oxalate (Lexapro)  5 mg PO QAM [facility]


   Last Admin: 19 09:33 Dose:  Not Given


Meropenem (Merrem Iv 1 Gm Premix)  1 gm in 50 mls @ 100 mls/hr IVPB Q8 [facility]; 

Protocol


   Last Admin: 19 05:30 Dose:  100 mls/hr


Vancomycin HCl (Vancomycin 1gm)  1 gm in 250 mls @ 167 mls/hr IVPB Q12H [facility]; 

Protocol


   Last Admin: 19 09:48 Dose:  167 mls/hr


Insulin Detemir (Levemir)  20 unit SC HS [facility]


   Last Admin: 02/10/19 21:39 Dose:  Not Given


Insulin Human Lispro (Humalog)  5 units SC AC [facility]


   Last Admin: 19 08:00 Dose:  Not Given


Insulin Human Regular (Humulin R Med)  0 units SC [facility]; Protocol


   Last Admin: 19 08:00 Dose:  Not Given


Metoprolol Tartrate (Lopressor)  50 mg PO 0800,1800 [facility]


   Last Admin: 19 08:30 Dose:  50 mg


Mupirocin (Bactroban Ointment)  0 gm TOP BID [facility]


   Last Admin: 19 09:47 Dose:  Not Given


Mycophenolate Mofetil (Cellcept Cap)  250 mg PO BID [facility]


   Last Admin: 02/10/19 17:16 Dose:  250 mg


Mycophenolate Sodium [Myfortic] 360 Mg ( Home Med)  360 mg PO BID [facility]


   Last Admin: 19 09:34 Dose:  Not Given


Ondansetron HCl (Zofran Inj)  4 mg IVP Q4 PRN


   PRN Reason: Nausea/Vomiting


   Last Admin: 02/10/19 06:35 Dose:  4 mg


Oxycodone/Acetaminophen (Percocet 5/325 Mg Tab)  1 tab PO Q6H PRN


   PRN Reason: Pain, severe (8-10)


   Stop: 19 20:26


   Last Admin: 02/10/19 12:06 Dose:  1 tab


Pantoprazole Sodium (Protonix Ec Tab)  40 mg PO 0630 [facility]


   Last Admin: 19 05:30 Dose:  40 mg


Prednisone (Prednisone Tab)  5 mg PO 0800 [facility]


   Last Admin: 19 09:34 Dose:  Not Given


Psyllium Hydrophilic Mucilloid (Hydrocil Instant)  1 pkt PO DAILY [facility]


   Last Admin: 19 09:33 Dose:  Not Given


Tacrolimus (Prograf Cap)  2 mg PO DAILY [facility]


   Last Admin: 19 09:34 Dose:  Not Given


Tacrolimus (Prograf Cap)  0.5 mg PO HS [facility]


   Last Admin: 02/10/19 21:32 Dose:  0.5 mg


Tacrolimus (Prograf Cap)  2 mg PO [facility]


   Last Admin: 02/10/19 21:32 Dose:  2 mg











- Labs


Labs: 


                                        





                                 02/10/19 07:00 





                                 02/10/19 07:00 





                                        











APTT  31.8 Seconds (26.9-38.3)   19  07:00    














- Constitutional


Appears: No Acute Distress





- Head Exam


Head Exam: ATRAUMATIC, NORMOCEPHALIC





- Eye Exam


Eye Exam: EOMI





- ENT Exam


ENT Exam: Mucous Membranes Moist





- Respiratory Exam


Respiratory Exam: Clear to Ausculation Bilateral.  absent: Wheezes





- Cardiovascular Exam


Cardiovascular Exam: REGULAR RHYTHM, +S1, +S2





- GI/Abdominal Exam


GI & Abdominal Exam: Soft.  absent: Tenderness





- Extremities Exam


Extremities Exam: absent: Pedal Edema


Additional comments: 





Dressing is in place by podiatry.





- Neurological Exam


Neurological Exam: Alert, Awake





- Psychiatric Exam


Psychiatric exam: Normal Mood





- Skin


Skin Exam: Dry, Warm





Assessment and Plan





- Assessment and Plan (Free Text)


Assessment: 





1.  Left foot ulcer


2.  Right below-knee amputation


3.  Status post  donor


4.  CAD with CABG 


5.  Diabetes


6.  COPD


7.  Pacemaker


8.  CHF secondary to systolic dysfunction








Patient is currently on vancomycin and meropenem for his left foot ulcer as per 

infectious disease. Plan for today is flap procedure by podiatry for his left 

foot ulcer. Patient is on immunosuppressive therapy with tacrolimus, prednisone,

 and mycophenolate. Continue with Aspirin for his CAD. Follow-up cardiology 

recommendations.  Patient is currently receiving insulin for his diabetes.  He 

is currently receiving amlodipine and metoprolol for hypertension.  Patient is r

eceiving Lipitor for his hyperlipidemia. Patient is receiving Percocet for his 

pain. Continue with heart healthy diet. Continue to monitor for any changes. 





Case and plan was reviewed and discussed with Dr. Calderon





<Andrea Calderon S - Last Filed: 19 20:17>





Objective





- Vital Signs/Intake and Output


Vital Signs (last 24 hours): 


                                        











Temp Pulse Resp BP Pulse Ox


 


 98 F   80   18   128/64   97 


 


 19 16:15  19 16:15  19 16:15  19 18:40  19 16:15








Intake and Output: 


                                        











 19





 18:59 06:59


 


Intake Total 0 


 


Balance 0 














- Medications


Medications: 


                               Current Medications





Amlodipine Besylate (Norvasc)  10 mg PO DAILY [facility]


   Last Admin: 19 09:34 Dose:  Not Given


Aspirin (Ecotrin)  81 mg PO 0800 [facility]


   Last Admin: 19 10:55 Dose:  Not Given


Atorvastatin Calcium (Lipitor)  40 mg PO DIN [facility]


   Last Admin: 19 18:40 Dose:  40 mg


Docusate Sodium (Colace)  100 mg PO PRN [facility]


Escitalopram Oxalate (Lexapro)  5 mg PO QAM [facility]


   Last Admin: 19 09:33 Dose:  Not Given


Meropenem (Merrem Iv 1 Gm Premix)  1 gm in 50 mls @ 100 mls/hr IVPB Q8 [facility]; 

Protocol


   Last Admin: 19 14:00 Dose:  Not Given


Vancomycin HCl (Vancomycin 1gm)  1 gm in 250 mls @ 167 mls/hr IVPB Q12H [facility]; Pro

tocol


   Last Admin: 19 09:48 Dose:  167 mls/hr


Insulin Detemir (Levemir)  20 unit SC HS [facility]


   Last Admin: 02/10/19 21:39 Dose:  Not Given


Insulin Human Lispro (Humalog)  5 units SC AC [facility]


   Last Admin: 19 18:40 Dose:  5 units


Insulin Human Regular (Humulin R Med)  0 units SC [facility]S [facility]; Protocol


   Last Admin: 19 18:40 Dose:  5 units


Metoprolol Tartrate (Lopressor)  50 mg PO 0800,1800 [facility]


   Last Admin: 19 18:40 Dose:  50 mg


Mupirocin (Bactroban Ointment)  0 gm TOP BID [facility]


   Last Admin: 19 18:41 Dose:  Not Given


Mycophenolate Mofetil (Cellcept Cap)  250 mg PO BID [facility]


   Last Admin: 19 18:39 Dose:  250 mg


Mycophenolate Sodium [Myfortic] 360 Mg ( Home Med)  360 mg PO BID [facility]


   Last Admin: 19 18:45 Dose:  360 mg


Ondansetron HCl (Zofran Inj)  4 mg IVP Q4 PRN


   PRN Reason: Nausea/Vomiting


   Last Admin: 02/10/19 06:35 Dose:  4 mg


Oxycodone/Acetaminophen (Percocet 5/325 Mg Tab)  1 tab PO Q6H PRN


   PRN Reason: Pain, severe (8-10)


   Stop: 19 20:26


   Last Admin: 02/10/19 12:06 Dose:  1 tab


Pantoprazole Sodium (Protonix Ec Tab)  40 mg PO 0630 [facility]


   Last Admin: 19 05:30 Dose:  40 mg


Prednisone (Prednisone Tab)  5 mg PO 0800 [facility]


   Last Admin: 19 09:34 Dose:  Not Given


Psyllium Hydrophilic Mucilloid (Hydrocil Instant)  1 pkt PO DAILY [facility]


   Last Admin: 19 09:33 Dose:  Not Given


Tacrolimus (Prograf Cap)  2 mg PO DAILY [facility]


   Last Admin: 19 09:34 Dose:  Not Given


Tacrolimus (Prograf Cap)  0.5 mg PO HS [facility]


   Last Admin: 02/10/19 21:32 Dose:  0.5 mg


Tacrolimus (Prograf Cap)  2 mg PO HS [facility]


   Last Admin: 02/10/19 21:32 Dose:  2 mg











- Labs


Labs: 


                                        





                                 02/10/19 07:00 





                                 02/10/19 07:00 





                                        











APTT  31.8 Seconds (26.9-38.3)   19  07:00    














Assessment and Plan





- Assessment and Plan (Free Text)


Assessment: 





Pt seen and examined by me. I have reviewed the note of the medical resident and

 I agree with it. I have discussed the assessment and plan with the resident. I 

have reviewed the medications and the last labs.Pt with L foot ulcer and is 

going to the OR for flap by podiatry today. He s on his Meropenem for the foot 

ulcer. He is going to continue with the immunosuppresive medications. Pt will 

continue with ASA for CAD. Pt will continue with Lipitor for dyslipidemia. 

Percocet prn for pain. DM-2 controlled with Insulin.

## 2019-02-12 VITALS — DIASTOLIC BLOOD PRESSURE: 67 MMHG | SYSTOLIC BLOOD PRESSURE: 112 MMHG

## 2019-02-12 VITALS — TEMPERATURE: 98 F | RESPIRATION RATE: 20 BRPM

## 2019-02-12 RX ADMIN — VANCOMYCIN HYDROCHLORIDE SCH MLS/HR: 1 INJECTION, POWDER, LYOPHILIZED, FOR SOLUTION INTRAVENOUS at 06:14

## 2019-02-12 RX ADMIN — VANCOMYCIN HYDROCHLORIDE SCH: 1 INJECTION, POWDER, LYOPHILIZED, FOR SOLUTION INTRAVENOUS at 14:49

## 2019-02-12 RX ADMIN — INSULIN LISPRO SCH UNITS: 100 INJECTION, SOLUTION INTRAVENOUS; SUBCUTANEOUS at 08:40

## 2019-02-12 RX ADMIN — VANCOMYCIN HYDROCHLORIDE SCH MLS/HR: 1 INJECTION, POWDER, LYOPHILIZED, FOR SOLUTION INTRAVENOUS at 06:18

## 2019-02-12 RX ADMIN — INSULIN LISPRO SCH UNITS: 100 INJECTION, SOLUTION INTRAVENOUS; SUBCUTANEOUS at 11:54

## 2019-02-12 RX ADMIN — PANTOPRAZOLE SODIUM SCH MG: 40 TABLET, DELAYED RELEASE ORAL at 05:49

## 2019-02-12 RX ADMIN — MUPIROCIN SCH: 20 OINTMENT TOPICAL at 10:37

## 2019-02-12 RX ADMIN — OXYCODONE HYDROCHLORIDE AND ACETAMINOPHEN PRN TAB: 5; 325 TABLET ORAL at 11:53

## 2019-02-12 RX ADMIN — INSULIN HUMAN SCH UNITS: 100 INJECTION, SOLUTION PARENTERAL at 08:39

## 2019-02-12 RX ADMIN — Medication SCH PKT: at 10:29

## 2019-02-12 RX ADMIN — INSULIN HUMAN SCH UNITS: 100 INJECTION, SOLUTION PARENTERAL at 11:54

## 2019-02-12 RX ADMIN — MEROPENEM SCH MLS/HR: 1 INJECTION INTRAVENOUS at 05:49

## 2019-02-12 NOTE — CP.PCM.PN
Subjective





- Date & Time of Evaluation


Date of Evaluation: 02/12/19


Time of Evaluation: 11:05





- Subjective


Subjective: 





Comfortable in bed, no fevers, not in distress, skin grafting for left foot done

yesterday.





Objective





- Vital Signs/Intake and Output


Vital Signs (last 24 hours): 


                                        











Temp Pulse Resp BP Pulse Ox


 


 98 F   69   20   123/79   97 


 


 02/11/19 06:00  02/11/19 06:00  02/11/19 06:00  02/11/19 08:30  02/11/19 06:00








Intake and Output: 


                                        











 02/11/19 02/11/19





 06:59 18:59


 


Intake Total 660 


 


Output Total 1050 


 


Balance -390 














- Medications


Medications: 


                               Current Medications





Amlodipine Besylate (Norvasc)  10 mg PO DAILY [facility]


   Last Admin: 02/11/19 09:34 Dose:  Not Given


Aspirin (Ecotrin)  81 mg PO 0800 [facility]


   Last Admin: 02/09/19 10:55 Dose:  Not Given


Atorvastatin Calcium (Lipitor)  40 mg PO DIN [facility]


   Last Admin: 02/10/19 17:17 Dose:  40 mg


Docusate Sodium (Colace)  100 mg PO PRN [facility]


Escitalopram Oxalate (Lexapro)  5 mg PO QAM [facility]


   Last Admin: 02/11/19 09:33 Dose:  Not Given


Meropenem (Merrem Iv 1 Gm Premix)  1 gm in 50 mls @ 100 mls/hr IVPB Q8 [facility]; 

Protocol


   Last Admin: 02/11/19 05:30 Dose:  100 mls/hr


Vancomycin HCl (Vancomycin 1gm)  1 gm in 250 mls @ 167 mls/hr IVPB Q12H [facility]; 

Protocol


   Last Admin: 02/11/19 09:48 Dose:  167 mls/hr


Insulin Detemir (Levemir)  20 unit SC HS [facility]


   Last Admin: 02/10/19 21:39 Dose:  Not Given


Insulin Human Lispro (Humalog)  5 units SC AC [facility]


   Last Admin: 02/11/19 08:00 Dose:  Not Given


Insulin Human Regular (Humulin R Med)  0 units SC ACHS [facility]; Protocol


   Last Admin: 02/11/19 08:00 Dose:  Not Given


Metoprolol Tartrate (Lopressor)  50 mg PO 0800,1800 [facility]


   Last Admin: 02/11/19 08:30 Dose:  50 mg


Mupirocin (Bactroban Ointment)  0 gm TOP BID [facility]


   Last Admin: 02/11/19 09:47 Dose:  Not Given


Mycophenolate Mofetil (Cellcept Cap)  250 mg PO BID [facility]


   Last Admin: 02/10/19 17:16 Dose:  250 mg


Mycophenolate Sodium [Myfortic] 360 Mg ( Home Med)  360 mg PO BID [facility]


   Last Admin: 02/11/19 09:34 Dose:  Not Given


Ondansetron HCl (Zofran Inj)  4 mg IVP Q4 PRN


   PRN Reason: Nausea/Vomiting


   Last Admin: 02/10/19 06:35 Dose:  4 mg


Oxycodone/Acetaminophen (Percocet 5/325 Mg Tab)  1 tab PO Q6H PRN


   PRN Reason: Pain, severe (8-10)


   Stop: 02/12/19 20:26


   Last Admin: 02/10/19 12:06 Dose:  1 tab


Pantoprazole Sodium (Protonix Ec Tab)  40 mg PO 0630 [facility]


   Last Admin: 02/11/19 05:30 Dose:  40 mg


Prednisone (Prednisone Tab)  5 mg PO 0800 [facility]


   Last Admin: 02/11/19 09:34 Dose:  Not Given


Psyllium Hydrophilic Mucilloid (Hydrocil Instant)  1 pkt PO DAILY [facility]


   Last Admin: 02/11/19 09:33 Dose:  Not Given


Tacrolimus (Prograf Cap)  2 mg PO DAILY [facility]


   Last Admin: 02/11/19 09:34 Dose:  Not Given


Tacrolimus (Prograf Cap)  0.5 mg PO HS [facility]


   Last Admin: 02/10/19 21:32 Dose:  0.5 mg


Tacrolimus (Prograf Cap)  2 mg PO [facility]


   Last Admin: 02/10/19 21:32 Dose:  2 mg











- Labs


Labs: 


                                        





                                 02/10/19 07:00 





                                 02/10/19 07:00 





                                        











APTT  31.8 Seconds (26.9-38.3)   02/09/19  07:00    














- Constitutional


Appears: Chronically Ill





- Head Exam


Head Exam: NORMAL INSPECTION





- ENT Exam


ENT Exam: Mucous Membranes Moist





- Neck Exam


Neck Exam: absent: Meningismus





- Respiratory Exam


Respiratory Exam: absent: Rales





- Cardiovascular Exam


Cardiovascular Exam: +S1, +S2





- GI/Abdominal Exam


GI & Abdominal Exam: Soft.  absent: Tenderness





- Extremities Exam


Additional comments: 





left foot with dressings in place





Assessment and Plan





- Assessment and Plan (Free Text)


Plan: 


Assessment


left foot infections of ulcers S/P skin grafting


history Right AKA S/P severe skin and skin structure infection with Pseudomonas


history of right foot necrosis and gangrene, severe skin and skin structure 

infection S/P BKA


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 

debridement and closure; S/P transmetatarsal amputation previously, grew 

Enterobacter S/P wound vacuum placement


right heel ulcer S/P debridement


S/P Left 5th metatarsal osteomyelitis S/P debridement and bone excision grew 

Acinetobacter and E. faecalis


CAD S/P CABG


chronic CHF


DM


COPD


S/P pacemaker placement


S/P kidney transplant





Plan


had had 7 days Merrem and Vancoymcin, previous cultures grew MRSA and 

Pseudomonas - cultures obtain in the last 1-2 weeks have multiple organisms such

 as E. faecalis and Acinetobacter, which are multidrug resistant - use of 

antibiotics may be toxic to the kidneys, and these cultures are relatively 

superficial - discussed with patient that we are opting not to treat since the 

meds are toxic to the kidneys (ie. Colistin) and the cultures may be superficial

 - will need to follow up with Dr. Delvalle as an outpatient to see if the skin 

graft takes

## 2019-02-12 NOTE — CP.PCM.DIS
<Isael Eduardo - Last Filed: 19 12:09>





Provider





- Provider


Date of Admission: 


19 20:10





Attending physician: 


Andrea Calderon MD





Consults: 








19 20:07


Infectious Disease Consult Stat 


   Comment: 


   Consulting Provider: Duc Bee


   Consulting Physician: Duc Bee


   Reason for Consult: foot infection/ulcer


Physician Consult Stat 


   Comment: 


   Consulting Provider: Gerald Delvalle


   Consulting Physician: Gerald Delvalle


   Reason for Consult: ulcer/cellulitis





19 22:32


Inpatient NP Core Measures Referral Routine 


   Comment: 


   Physician Instructions: 


   Reason For Exam: EVALUATION


Transition In Care/Readmission Reduction Routine 


   Comment: 


   Physician Instructions: 


   Reason For Exam: EVALUATION





19 23:12


Diabetic Education Referral Routine 


   Comment: 


   Physician Instructions: 


   Reason For Exam: EVALUATION





19 23:14


Nursing Referral for Palliative Care Routine 


   Comment: 


   Physician Instructions: 


   Reason For Exam: EVALUATION





02/10/19 09:28


Consult [Physician Consult] Routine 


   Comment: 


   Consulting Provider: Oliver Bradford


   Consulting Physician: Oliver Bradford


   Reason for Consult: Pre-op











Time Spent in preparation of Discharge (in minutes): 50





Hospital Course





- Lab Results


Lab Results: 


                                  Micro Results





19 10:00   Leg - Left   Gram Stain - Final


19 10:00   Leg - Left   Wound Culture - Final


                            Acinetobacter Baumannii


19 19:00   Blood   Blood Culture - Final


                            NO GROWTH AFTER 5 DAYS


19 19:00   Blood   Gram Stain - Final


                            TEST NOT PERFORMED


19 18:10   Blood   Blood Culture - Final


                            NO GROWTH AFTER 5 DAYS


19 18:10   Blood   Gram Stain - Final


                            TEST NOT PERFORMED





                             Most Recent Lab Values











WBC  7.5 10^3/uL (4.5-11.0)   02/10/19  07:00    


 


RBC  4.70 10^6/uL (3.5-6.1)   02/10/19  07:00    


 


Hgb  14.7 g/dL (14.0-18.0)   02/10/19  07:00    


 


Hct  43.4 % (42.0-52.0)   02/10/19  07:00    


 


MCV  92.3 fl (80.0-105.0)   02/10/19  07:00    


 


MCH  31.3 pg (25.0-35.0)   02/10/19  07:00    


 


MCHC  33.9 g/dl (31.0-37.0)   02/10/19  07:00    


 


RDW  13.2 % (11.5-14.5)   02/10/19  07:00    


 


Plt Count  177 10^3/uL (120.0-450.0)   02/10/19  07:00    


 


MPV  10.2 fl (7.0-11.0)   02/10/19  07:00    


 


Neut % (Auto)  57.2 % (50.0-68.0)   02/10/19  07:00    


 


Lymph % (Auto)  31.3 % (22.0-35.0)   02/10/19  07:00    


 


Mono % (Auto)  6.9 % (1.0-6.0)  H  02/10/19  07:00    


 


Eos % (Auto)  4.5 % (1.5-5.0)   02/10/19  07:00    


 


Baso % (Auto)  0.1 % (0.0-3.0)   02/10/19  07:00    


 


Lymph # (Auto)  2.4  (1.2-3.4)   02/10/19  07:00    


 


Mono # (Auto)  0.5  (0.1-0.6)   02/10/19  07:00    


 


Eos # (Auto)  0.3  (0.0-0.7)   02/10/19  07:00    


 


Baso # (Auto)  0.01 K/mm3 (0.0-2.0)   02/10/19  07:00    


 


Absolute Neuts (auto)  4.30  (1.4-6.5)   02/10/19  07:00    


 


APTT  31.8 Seconds (26.9-38.3)   19  07:00    


 


Sodium  135 mmol/L (132-148)   02/10/19  07:00    


 


Potassium  4.3 mmol/L (3.6-5.0)   02/10/19  07:00    


 


Chloride  101 mmol/L ()   02/10/19  07:00    


 


Carbon Dioxide  27 mmol/L (21-33)   02/10/19  07:00    


 


Anion Gap  11  (10-20)   02/10/19  07:00    


 


BUN  19 mg/dL (7-21)   02/10/19  07:00    


 


Creatinine  0.8 mg/dl (0.8-1.5)   02/10/19  07:00    


 


Est GFR ( Amer)  > 60   02/10/19  07:00    


 


Est GFR (Non-Af Amer)  > 60   02/10/19  07:00    


 


POC Glucose (mg/dL)  299 mg/dL ()  H  19  11:12    


 


Random Glucose  252 mg/dL ()  H  02/10/19  07:00    


 


Calcium  8.8 mg/dL (8.4-10.5)   02/10/19  07:00    


 


Phosphorus  3.5 mg/dL (2.5-4.5)   19  07:15    


 


Magnesium  1.8 mg/dL (1.7-2.2)   19  07:15    


 


Total Bilirubin  0.5 mg/dL (0.2-1.3)   02/10/19  07:00    


 


AST  24 U/L (17-59)   02/10/19  07:00    


 


ALT  34 U/L (7-56)   02/10/19  07:00    


 


Alkaline Phosphatase  91 U/L ()   02/10/19  07:00    


 


Total Protein  6.7 g/dL (5.8-8.3)   02/10/19  07:00    


 


Albumin  3.8 g/dL (3.0-4.8)   02/10/19  07:00    


 


Globulin  2.9 gm/dL  02/10/19  07:00    


 


Albumin/Globulin Ratio  1.3  (1.1-1.8)   02/10/19  07:00    














- Hospital Course


Hospital Course: 





58 year old male with past medical history of CHF, DM, CAD s/p CABG, COPD, 

kidney transplant, pacemaker placement, right leg amputation, left TMA stump 

ulceration presented from podiatry clinic for LLE TMA stump ulceration. In the 

ED basic lab work was performed.  Foot x-ray was done which showed no radiog

raphic evidence of osteomyelitis.  Patient was brought to Med/Surg floor for 

observation.  Podiatry and infectious disease were both consulted.  Patient was 

started on vancomycin and meropenem as per infectious disease.  Patient was 

continued on his home immunosuppressive immunosuppressive therapy with 

tacrolimus, prednisone, and mycophenolate.  Cardiology was consulted for preop 

clearance.  Patient was cleared and went for a left lower extremity TMA stump 

debridement followed by graft placement.  Today the patient states that he is 

feeling better and denies any pain of his left lower extremity.  As per 

infectious disease patient completed 7 days of antibiotics and can monitor off 

of antibiotics at this time,  until cultures and sensitivity from the OR return.

 As per podiatry patient to follow-up as an outpatient on 2019 for 

wound management.  Patient states that he has all his medications at home and 

does not need any refills.








1.  Left foot ulcer


2.  Right below-knee amputation


3.  Status post  donor


4.  CAD with CABG 


5.  Diabetes


6.  COPD


7.  Pacemaker


8.  CHF secondary to systolic dysfunction











Discharge Exam





- Head Exam


Head Exam: NORMAL INSPECTION





- Eye Exam


Eye Exam: EOMI, PERRL





- Respiratory Exam


Respiratory Exam: Clear to PA & Lateral.  absent: Rales, Rhonchi, Wheezes





- Cardiovascular Exam


Cardiovascular Exam: RRR, +S1, +S2





- GI/Abdominal Exam


GI & Abdominal Exam: Normal Bowel Sounds, Soft.  absent: Tenderness





- Extremities Exam


Additional comments: 





LLE dressing in place by podiatry 





- Neurological Exam


Neurological exam: Alert, Oriented x3





- Psychiatric Exam


Psychiatric exam: Normal Mood





- Skin


Skin Exam: Dry, Warm





Discharge Plan





- Follow Up Plan


Condition: IMPROVED


Disposition: HOME/ ROUTINE


Instructions:  Diabetic Foot Ulcer (DC), Foot Care for Diabetics, Skin Graft 

(DC), Cellulitis (DC)


Additional Instructions: 


Outpatient wound care on 2019 for wound management.


If any of your symptoms recur come back to the ED


Follow-up with the podiatrist within 1 week, f/u OR cultures


Follow-up with your PMD within 3 days








<Andrea Calderon - Last Filed: 19 14:19>





Provider





- Provider


Date of Admission: 


19 20:10





Attending physician: 


Andrea Calderon MD





Consults: 








19 20:07


Infectious Disease Consult Stat 


   Comment: 


   Consulting Provider: Duc Bee


   Consulting Physician: Duc Bee


   Reason for Consult: foot infection/ulcer


Physician Consult Stat 


   Comment: 


   Consulting Provider: Gerald Delvalle


   Consulting Physician: Gerald Delvalle


   Reason for Consult: ulcer/cellulitis





19 22:32


Inpatient NP Core Measures Referral Routine 


   Comment: 


   Physician Instructions: 


   Reason For Exam: EVALUATION


Transition In Care/Readmission Reduction Routine 


   Comment: 


   Physician Instructions: 


   Reason For Exam: EVALUATION





19 23:12


Diabetic Education Referral Routine 


   Comment: 


   Physician Instructions: 


   Reason For Exam: EVALUATION





19 23:14


Nursing Referral for Palliative Care Routine 


   Comment: 


   Physician Instructions: 


   Reason For Exam: EVALUATION





02/10/19 09:28


Consult [Physician Consult] Routine 


   Comment: 


   Consulting Provider: Oliver Bradford


   Consulting Physician: Oliver Bradford


   Reason for Consult: Pre-op














Hospital Course





- Lab Results


Lab Results: 


                                  Micro Results





19 10:00   Leg - Left   Gram Stain - Final


19 10:00   Leg - Left   Wound Culture - Final


                            Acinetobacter Baumannii


19 19:00   Blood   Blood Culture - Final


                            NO GROWTH AFTER 5 DAYS


19 19:00   Blood   Gram Stain - Final


                            TEST NOT PERFORMED


19 18:10   Blood   Blood Culture - Final


                            NO GROWTH AFTER 5 DAYS


19 18:10   Blood   Gram Stain - Final


                            TEST NOT PERFORMED





                             Most Recent Lab Values











WBC  7.5 10^3/uL (4.5-11.0)   02/10/19  07:00    


 


RBC  4.70 10^6/uL (3.5-6.1)   02/10/19  07:00    


 


Hgb  14.7 g/dL (14.0-18.0)   02/10/19  07:00    


 


Hct  43.4 % (42.0-52.0)   02/10/19  07:00    


 


MCV  92.3 fl (80.0-105.0)   02/10/19  07:00    


 


MCH  31.3 pg (25.0-35.0)   02/10/19  07:00    


 


MCHC  33.9 g/dl (31.0-37.0)   02/10/19  07:00    


 


RDW  13.2 % (11.5-14.5)   02/10/19  07:00    


 


Plt Count  177 10^3/uL (120.0-450.0)   02/10/19  07:00    


 


MPV  10.2 fl (7.0-11.0)   02/10/19  07:00    


 


Neut % (Auto)  57.2 % (50.0-68.0)   02/10/19  07:00    


 


Lymph % (Auto)  31.3 % (22.0-35.0)   02/10/19  07:00    


 


Mono % (Auto)  6.9 % (1.0-6.0)  H  02/10/19  07:00    


 


Eos % (Auto)  4.5 % (1.5-5.0)   02/10/19  07:00    


 


Baso % (Auto)  0.1 % (0.0-3.0)   02/10/19  07:00    


 


Lymph # (Auto)  2.4  (1.2-3.4)   02/10/19  07:00    


 


Mono # (Auto)  0.5  (0.1-0.6)   02/10/19  07:00    


 


Eos # (Auto)  0.3  (0.0-0.7)   02/10/19  07:00    


 


Baso # (Auto)  0.01 K/mm3 (0.0-2.0)   02/10/19  07:00    


 


Absolute Neuts (auto)  4.30  (1.4-6.5)   02/10/19  07:00    


 


APTT  31.8 Seconds (26.9-38.3)   19  07:00    


 


Sodium  135 mmol/L (132-148)   02/10/19  07:00    


 


Potassium  4.3 mmol/L (3.6-5.0)   02/10/19  07:00    


 


Chloride  101 mmol/L ()   02/10/19  07:00    


 


Carbon Dioxide  27 mmol/L (21-33)   02/10/19  07:00    


 


Anion Gap  11  (10-20)   02/10/19  07:00    


 


BUN  19 mg/dL (7-21)   02/10/19  07:00    


 


Creatinine  0.8 mg/dl (0.8-1.5)   02/10/19  07:00    


 


Est GFR ( Amer)  > 60   02/10/19  07:00    


 


Est GFR (Non-Af Amer)  > 60   02/10/19  07:00    


 


POC Glucose (mg/dL)  299 mg/dL ()  H  19  11:12    


 


Random Glucose  252 mg/dL ()  H  02/10/19  07:00    


 


Calcium  8.8 mg/dL (8.4-10.5)   02/10/19  07:00    


 


Phosphorus  3.5 mg/dL (2.5-4.5)   19  07:15    


 


Magnesium  1.8 mg/dL (1.7-2.2)   19  07:15    


 


Total Bilirubin  0.5 mg/dL (0.2-1.3)   02/10/19  07:00    


 


AST  24 U/L (17-59)   02/10/19  07:00    


 


ALT  34 U/L (7-56)   02/10/19  07:00    


 


Alkaline Phosphatase  91 U/L ()   02/10/19  07:00    


 


Total Protein  6.7 g/dL (5.8-8.3)   02/10/19  07:00    


 


Albumin  3.8 g/dL (3.0-4.8)   02/10/19  07:00    


 


Globulin  2.9 gm/dL  02/10/19  07:00    


 


Albumin/Globulin Ratio  1.3  (1.1-1.8)   02/10/19  07:00    














- Hospital Course


Hospital Course: 





Patient was seen and examined by me. I have reviewed the note of the medical 

resident and have gone over the plan of care. I agree with the note. I have 

reviewed the medications and the last labs. Pt with L foot ulcer and had a flap 

done yesterday. He has not significant complications. No Osteo was seen. on MRI.

He has completed 7 days of Abx and does not require more. Pt will be discharge 

and get f/u as outpt. The pt does not wish to stay on TCU for monitoring of his 

wound or for rehab. He states he has spent too much time already over the last 

year and prefers to go home. He will continue his other medications including 

his immunosuppresive medications.

## 2019-02-12 NOTE — CP.PCM.PN
<Radha Roberson - Last Filed: 02/12/19 12:16>





Subjective





- Date & Time of Evaluation


Date of Evaluation: 02/12/19


Time of Evaluation: 12:16





- Subjective


Subjective: 


Podiatry consult note for Dr. Delvalle





58 year old male seen and evaluated POD1 left foot wound debridement and graft 

application. Patient was seen to be resting comfortably, and denies any other 

complaints at this time. Patient aware he is stable for discharge, and will 

follow up at the wound care center on Tuesday next week. Patient denies fever, 

nausea, vomiting, SOB, chest pain or urinary complaints. 








Objective





- Vital Signs/Intake and Output


Vital Signs (last 24 hours): 


                                        











Temp Pulse Resp BP Pulse Ox


 


 98 F   72   20   112/67   98 


 


 02/12/19 06:00  02/12/19 08:39  02/12/19 06:00  02/12/19 10:25  02/12/19 06:00








Intake and Output: 


                                        











 02/12/19 02/12/19





 06:59 18:59


 


Intake Total 480 


 


Balance 480 














- Medications


Medications: 


                               Current Medications





Amlodipine Besylate (Norvasc)  10 mg PO DAILY [facility]


   Last Admin: 02/12/19 10:25 Dose:  10 mg


Aspirin (Ecotrin)  81 mg PO 0800 [facility]


   Last Admin: 02/09/19 10:55 Dose:  Not Given


Atorvastatin Calcium (Lipitor)  40 mg PO DIN [facility]


   Last Admin: 02/11/19 18:40 Dose:  40 mg


Docusate Sodium (Colace)  100 mg PO PRN [facility]


Escitalopram Oxalate (Lexapro)  5 mg PO QAM [facility]


   Last Admin: 02/12/19 10:25 Dose:  5 mg


Meropenem (Merrem Iv 1 Gm Premix)  1 gm in 50 mls @ 100 mls/hr IVPB Q8 [facility]; 

Protocol


   Last Admin: 02/12/19 05:49 Dose:  100 mls/hr


Vancomycin HCl (Vancomycin 1gm)  1 gm in 250 mls @ 167 mls/hr IVPB Q12H [facility]; 

Protocol


   Last Admin: 02/12/19 06:18 Dose:  167 mls/hr


Insulin Detemir (Levemir)  20 unit SC HS [facility]


   Last Admin: 02/11/19 23:24 Dose:  20 unit


Insulin Human Lispro (Humalog)  5 units SC AC [facility]


   Last Admin: 02/12/19 11:54 Dose:  5 units


Insulin Human Regular (Humulin R Med)  0 units SC ACHS [facility]; Protocol


   Last Admin: 02/12/19 11:54 Dose:  5 units


Metoprolol Tartrate (Lopressor)  50 mg PO 0800,1800 [facility]


   Last Admin: 02/12/19 08:39 Dose:  50 mg


Mupirocin (Bactroban Ointment)  0 gm TOP BID [facility]


   Last Admin: 02/12/19 10:37 Dose:  Not Given


Mycophenolate Mofetil (Cellcept Cap)  250 mg PO BID [facility]


   Last Admin: 02/12/19 10:25 Dose:  250 mg


Mycophenolate Sodium [Myfortic] 360 Mg ( Home Med)  360 mg PO BID [facility]


   Last Admin: 02/12/19 10:33 Dose:  360 mg


Ondansetron HCl (Zofran Inj)  4 mg IVP Q4 PRN


   PRN Reason: Nausea/Vomiting


   Last Admin: 02/10/19 06:35 Dose:  4 mg


Oxycodone/Acetaminophen (Percocet 5/325 Mg Tab)  1 tab PO Q6H PRN


   PRN Reason: Pain, severe (8-10)


   Stop: 02/12/19 20:26


   Last Admin: 02/12/19 11:53 Dose:  1 tab


Pantoprazole Sodium (Protonix Ec Tab)  40 mg PO 0630 [facility]


   Last Admin: 02/12/19 05:49 Dose:  40 mg


Prednisone (Prednisone Tab)  5 mg PO 0800 [facility]


   Last Admin: 02/12/19 08:39 Dose:  5 mg


Psyllium Hydrophilic Mucilloid (Hydrocil Instant)  1 pkt PO DAILY [facility]


   Last Admin: 02/12/19 10:29 Dose:  1 pkt


Tacrolimus (Prograf Cap)  2 mg PO DAILY [facility]


   Last Admin: 02/12/19 10:25 Dose:  2 mg


Tacrolimus (Prograf Cap)  0.5 mg PO HS [facility]


   Last Admin: 02/11/19 22:04 Dose:  0.5 mg


Tacrolimus (Prograf Cap)  2 mg PO HS [facility]


   Last Admin: 02/11/19 22:05 Dose:  2 mg











- Labs


Labs: 


                                        





                                 02/10/19 07:00 





                                 02/10/19 07:00 





                                        











APTT  31.8 Seconds (26.9-38.3)   02/09/19  07:00    














- Constitutional


Appears: Well, Non-toxic, No Acute Distress





- Head Exam


Head Exam: ATRAUMATIC, NORMOCEPHALIC





- Extremities Exam


Additional comments: 


Left Lower Extremity





VASC: DP and PT palpable, TG warm to warm, no edema





DERM: 4 cm X 2 cm wound to the submet 1 base with Integra graft application at 

this time, irregular in shape, no drainage, negative malodor, no xiomara wound 

erythema noted, graft intact at this time with staples





NEURO: diminished sensation





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








- Neurological Exam


Neurological Exam: Alert, Awake, Oriented x3





- Psychiatric Exam


Psychiatric exam: Normal Affect, Normal Mood





Assessment and Plan





- Assessment and Plan (Free Text)


Assessment: 


57 y/o male patient POD1 left foot wound debridement and graft application


Plan: 


Patient seen and evaluated with Dr. Delvalle


Plan discussed


Chart, labs and vitals were reviewed


Wound Cultures: Acinetobacter Baumannii, E. Faecalis, Corneybacterium species


Infectious disease consult, recommendations were appreciated


Graft site evaluated and dressed with adaptic, saline wet to dry dressing, Isatu INGRAM


Patient to keep dressing intact until next visit to wound care center on Tuesday

 next week


Patient to remain non-weight bearing to allow the graft to adhere


Patient demonstrated verbal understanding


Patient stable for discharge from Podiatry standpoint








<Gerald Delvalle - Last Filed: 02/13/19 10:16>





Objective





- Vital Signs/Intake and Output


Vital Signs (last 24 hours): 


                                        











Temp Pulse Resp BP Pulse Ox


 


 98 F   72   20   112/67   98 


 


 02/12/19 06:00  02/12/19 08:39  02/12/19 06:00  02/12/19 10:25  02/12/19 06:00











- Labs


Labs: 


                                        





                                 02/10/19 07:00 





                                 02/10/19 07:00 





                                        











APTT  31.8 Seconds (26.9-38.3)   02/09/19  07:00    














Attending/Attestation





- Attestation


I have personally seen and examined this patient.: Yes


I have fully participated in the care of the patient.: Yes


I have reviewed all pertinent clinical information, including history, physical 

exam and plan: Yes

## 2019-02-13 NOTE — PCM.OP
<Radha Roberson - Last Filed: 02/13/19 13:08>





Operative Report





- Operative Report


Date of Surgery/Procedure: 02/11/19


Time of Surgery/Procedure: 14:00


Surgeon: Dr. Delvalle


Assistant: Dr. Radha Roberson, PGY1


Anesthesia/Sedation: 





Iv Sedation with Local


Anesthesiologist: Dr. Shaikh


Pre-Operative Diagnosis: 





Left foot non-healing wound at previous TMA site


Post-Operative Diagnosis: 





same as above


Indication for Surgery: 


The patient is a 58 year-old male with the above diagnoses.  The patient has 

exhausted all conservative treatment at this time and now requires surgical 

intervention.  The patient signed the consent after careful explanation of 

risks, benefits, complication and alternatives for surgical procedure. No 

guarantees were given nor implied. NPO status was confirmed prior to taking 

patient to the OR.





Operative Findings: 


The patient was brought in to the operating room and placed on the operating 

room table in a supine position.  Timeout was performed for identification of 

the correct patient and procedure. After induction of IV sedation, the patient 

received a total of 5 mL of 1:1 mixture of 1% lidocaine plain and 0.5% Marcaine 

in a local block fashion.  Once local anesthesia was achieved, the left foot and

ankle was then prepped and draped in normal sterile manner and the procedure 

began. No tourniquet was used during the procedure.


Procedure/Operation Description: 


Procedure 1: Attention was directed to the anterior aspect of the left foot 

where the ulceration is located. The ulcer measures 4cm x 2cm x 1 cm with 

fibrogranular wound base. The wound was debrided utilizing Misonix on Power 7 

until good bleeding tissue was noted. Neurovascular was preserved while the 

debridement occurred. The site was then copiously flushed with bacitracin 

containing saline. 





Procedure 2: Next, an integra graft of the cute to the appropriate site was 

placed over the wound. The remaining portion of the graft was scraped and packed

under. Staples were used to secure the graft in place. The wound was then 

dressed with adaptic, bulky dressing, ABD, kerlix, and Coban.








Estimated Blood Loss: 





1 cc


Blood Replaced: 





None


Complications: 





none


Specimen: 





Wound Culture was obtained after debridement with Misonix


Discharge & Condition: 


The patient tolerated the anesthesia and procedure well and was escorted to the 

recovery room with vital signs stable and neurovascular status intact to the 

left leg. Podiatry will continue to follow patient while in house, and patient 

will follow up at the Wound Center upon discharge.








<Gerald Delvalle - Last Filed: 02/13/19 17:54>





Attending/Attestation





- Attestation


I have personally seen and examined this patient.: Yes


I have fully participated in the care of the patient.: Yes


I have reviewed all pertinent clinical information, including history, physical 

exam and plan: Yes

## 2019-03-19 ENCOUNTER — HOSPITAL ENCOUNTER (EMERGENCY)
Dept: HOSPITAL 42 - ED | Age: 59
Discharge: HOME | End: 2019-03-19
Payer: MEDICARE

## 2019-03-19 VITALS
RESPIRATION RATE: 18 BRPM | OXYGEN SATURATION: 98 % | TEMPERATURE: 98.4 F | HEART RATE: 80 BPM | DIASTOLIC BLOOD PRESSURE: 79 MMHG | SYSTOLIC BLOOD PRESSURE: 132 MMHG

## 2019-03-19 VITALS — BODY MASS INDEX: 23 KG/M2

## 2019-03-19 DIAGNOSIS — N48.1: Primary | ICD-10-CM

## 2019-03-19 NOTE — ED PDOC
Arrival/HPI





- General


Chief Complaint: Abnormal Skin Integrity


Time Seen by Provider: 03/19/19 10:34


Historian: Patient





- History of Present Illness


Narrative History of Present Illness (Text): 





03/19/19 11:15


58-year-old male with past medical history of diabetes presents to the emergency

room complaining of redness, swelling, discharge and a small shallow superficial

ulcer to the glans penis which he noticed a few days ago.  Patient states that 

he mentioned his symptoms to his PMD who suggested that he sees a urologist, 

however he is unable to obtain an appointment until April.  Otherwise reports no

fever, chills, urinary symptoms, abdominal pain, nausea, vomiting, diarrhea.  

Patient states that he is currently not sexually active, he is  to his 

wife for 20 years, he has never had any type of STDs in the past, denies any 

history of syphilis, herpes, chancroid.





PMD Yumiko Collazo can't recall name





Past Medical History





- Past History


Past History: No Previous





- Infectious Disease


Hx of Infectious Diseases: None





- Tetanus Immunization


Tetanus Immunization: Unknown





- Cardiac


Hx Cardiac Disorders: Yes


Hx Hypertension: Yes


Hx Mitral Valve Prolapse: Yes


Hx Pacemaker: Yes


Hx Peripheral Vascular Disease: Yes





- Pulmonary


Hx Respiratory Disorders: No





- Neurological


Hx Neurological Disorder: No





- HEENT


Hx HEENT Disorder: No





- Renal


Hx Renal Disorder: Yes


Hx Dialysis: Yes


Hx Renal Failure: Yes


Other/Comment: s/p kidney transplant





- Endocrine/Metabolic


Hx Diabetes Mellitus Type 2: Yes





- Hematological/Oncological


Hx Blood Transfusions: No


Hx Blood Transfusion Reaction: No





- Integumentary


Hx Dermatological Disorder: No





- Musculoskeletal/Rheumatological


Hx Arthritis: Yes


Hx Falls: Yes


Hx Fractures: Yes


Hx Herniated Disk: Yes


Hx Unsteady Gait: Yes





- Gastrointestinal


Hx Gastrointestinal Disorders: No





- Genitourinary/Gynecological


Hx Genitourinary Disorders: No





- Psychiatric


Hx Psychophysiologic Disorder: No


Hx Emotional Abuse: No


Hx Physical Abuse: No


Hx Substance Use: No





- Surgical History


Hx Amputation: Yes


Hx Cardiac Catheterization: Yes


Hx Kidney Transplant: Yes


Hx Open Heart Surgery: Yes





- Anesthesia


Hx Anesthesia Reactions: No


Hx Malignant Hyperthermia: No





- Suicidal Assessment


Feels Threatened In Home Enviroment: No





Family/Social History


Family/Social History: No Known Family HX


Smoking Status: Former Smoker


Hx Alcohol Use: Yes (SOCIAL)


Hx Substance Use: No


Hx Substance Use Treatment: No





Allergies/Home Meds


Allergies/Adverse Reactions: 


Allergies





No Known Allergies Allergy (Verified 02/06/19 13:13)


   








Home Medications: 


                                    Home Meds











 Medication  Instructions  Recorded  Confirmed


 


Atorvastatin Calcium [Lipitor] 40 mg PO DIN 10/30/14 02/06/19


 


Escitalopram [Lexapro] 5 mg PO QAM 10/30/14 02/06/19


 


Mycophenolate Sodium [Myfortic] 360 mg PO BID 10/30/14 02/06/19


 


Insulin Aspart, Recombinant 0 unit SQ ACTID 01/10/18 02/06/19





[Novolog]   


 


Insulin Detemir [Levemir] 0 unit SC ACHS 02/01/18 02/06/19


 


Tacrolimus [Prograf Cap] 0.5 mg PO HS 02/01/18 02/06/19


 


Tacrolimus [Prograf Cap] 2 mg PO BID 02/01/18 02/06/19


 


Famotidine [Pepcid] 20 mg PO QAM 02/08/18 02/06/19


 


Docusate [Colace] 100 mg PO PRN 05/13/18 02/06/19














Review of Systems





- Review of Systems


Constitutional: absent: Fatigue, Fevers


Respiratory: absent: SOB, Cough


Cardiovascular: absent: Chest Pain, Palpitations


Gastrointestinal: absent: Abdominal Pain, Diarrhea, Vomiting


Genitourinary Male: absent: Dysuria, Frequency, Hematuria, Urinary Output 

Changes


Musculoskeletal: absent: Arthralgias, Back Pain, Neck Pain


Skin: Skin Lesions (to the glans penis).  absent: Rash


Neurological: absent: Headache, Dizziness





Physical Exam





Vital Signs











  Temp Pulse Resp BP Pulse Ox


 


 03/19/19 10:34  98.4 F  80  18  132/79  98











Temperature: Afebrile


Blood Pressure: Normal


Pulse: Regular


Respiratory Rate: Normal


Appearance: Positive for: Well-Appearing, Non-Toxic, Comfortable


Pain Distress: None


Mental Status: Positive for: Alert and Oriented X 3





- Systems Exam


Head: Present: Atraumatic, Normocephalic


Pupils: Present: PERRL


Extroacular Muscles: Present: EOMI


Conjunctiva: Present: Normal


Mouth: Present: Moist Mucous Membranes


Neck: Present: Normal Range of Motion


Respiratory/Chest: Present: Clear to Auscultation, Good Air Exchange.  No: 

Respiratory Distress, Accessory Muscle Use


Cardiovascular: Present: Regular Rate and Rhythm, Normal S1, S2.  No: Murmurs


Abdomen: No: Tenderness, Distention, Peritoneal Signs


Genitourinary Male: Present: Circumcised Penis, Erythema (+erythema, mild edema 

and white d/c from the glans penis with 1 shallow ulcer measuring ~1x1 cm), 

Other (Male EMT, Ray was present as chaperone during the exam.).  No: Penile 

Discharge, Testicle Swelling


Back: Present: Normal Inspection


Upper Extremity: Present: Normal Inspection.  No: Cyanosis, Edema


Lower Extremity: Present: Normal Inspection, Other (+R leg prosthesis ).  No: 

Edema


Neurological: Present: GCS=15, CN II-XII Intact, Speech Normal


Skin: Present: Warm, Dry, Normal Color.  No: Rashes


Lymphatic: Present: Inguinal Adenopathy (+1 non-tender, R inguinal 

lymphadenopathy)


Psychiatric: Present: Alert, Oriented x 3, Normal Insight, Normal Concentration





Medical Decision Making


ED Course and Treatment: 





03/19/19 11:18


Diagnosis of balanitis discussed with the patient.  Patient encouraged to 

continue to monitor and control his blood sugar levels as this will affect the 

infection.  Advised to follow-up with a urologist for further evaluation, 

possible STD testing.





Advised to follow up with primary care physician and urology in 1-2 days without

 fail. Advised to take medication as prescribed. Return to the emergency room at

 any time for any new or worsening symptoms.





Patient states he fully agrees with and understands discharge instructions. 

States that he agrees with the plan and disposition. Verbalized and repeated 

discharge instructions and plan. I have given the patient opportunity to ask any

 additional questions.








- PA / NP / Resident Statement


MD/DO has reviewed & agrees with the documentation as recorded.





Disposition/Present on Arrival





- Present on Arrival


Any Indicators Present on Arrival: No


History of DVT/PE: No


History of Uncontrolled Diabetes: No


Urinary Catheter: No


History of Decub. Ulcer: No


History Surgical Site Infection Following: CABG - Mediastinitis





- Disposition


Have Diagnosis and Disposition been Completed?: Yes


Diagnosis: 


 Balanitis





Disposition: HOME/ ROUTINE


Disposition Time: 11:10


Patient Plan: Discharge


Condition: STABLE


Discharge Instructions (ExitCare):  Balanitis


Additional Instructions: 


Thank you for letting us take care of you today. You were treated for balanitis.

 The emergency medical care you received today was directed at your acute 

symptoms. If you were prescribed any medication, please fill it and take as 

directed. It may take several days for your symptoms to resolve. Return to the 

Emergency Department if your symptoms worsen, do not improve, or if you have any

 other problems.





Please contact your doctor in 2 days for re-evaluation and follow up / or call 

one of the physicians/clinics you have been referred to that are listed on the 

Patient Visit Information form that is included in your discharge packet. Bring 

any paperwork you were given at discharge with you along with any medications 

you are taking to your follow up visit. Our treatment cannot replace ongoing 

medical care by a primary care provider (PCP) outside of the emergency 

department.





Thank you for allowing the [facility] team to be part of your care today.








Prescriptions: 


Nystatin [Mycostatin Oint] 30 applic TOP BID #1 tube


Referrals: 


Andrea Calderon MD [Primary Care Provider] - Follow up with primary


Liza Owens MD [Staff Provider] - Follow up with primary


Forms:  R2G (English)

## 2020-02-18 NOTE — RAD
PROCEDURE:  Right Foot Radiographs.



HISTORY:

nonhealing ulcer right heel  



COMPARISON:

None.



FINDINGS:



BONES:

Normal. No fracture. 



JOINTS:

Normal. 



SOFT TISSUES:

Normal. 



OTHER FINDINGS:

None.



IMPRESSION:

No evidence of osteomyelitis safety awareness/fall

## 2020-03-08 NOTE — CARD
--------------- APPROVED REPORT --------------





EKG Measurement

Heart [id number]

TN [id number]

[id number]

[id number]

[id number]



<Conclusion>

Electronic atrial pacemaker

Nonspecific intraventricular block

Possible Lateral infarct, age undetermined

Abnormal ECG [email or web address]

## 2024-06-05 NOTE — OP
Complains of sore throat that started this morning.   PROCEDURE DATE:  12/21/2017



PREOPERATIVE DIAGNOSIS:  Left diabetic foot ulceration.



POSTOPERATIVE DIAGNOSIS:  Left diabetic foot ulceration.



NAME OF PROCEDURE:  Partial first metatarsal ostectomy, irrigation and

debridement of left foot ulceration with application of the wound VAC.



SURGEON:  Benjamin Goel DPM



ASSISTANT:  Luis E Pardo DPM, PGY-1.



TYPE OF ANESTHESIA:  IV sedation.



ANESTHESIOLOGIST:  Dr. Jose Maria Souza DO



INDICATION:  The patient is 57-year-old male with above diagnosis.  The

patient is one day status post of irrigation and debridement of eschar, wet

gangrene foot and abscess of the left foot including all nonviable tissue, bone 
and tendon.

  The patient has exhausted all conservative treatment

at this time and now requires additional surgical intervention for 

first partial metatarsal ostectomy and addition of irrigation and

debridement of left foot with wound VAC application.  The patient signed

the consent after careful explanation of risks, benefits, complications,

and alternatives for surgical procedure.  No guarantees were given nor

implied.  N.p.o. status confirmed prior to taking the patient to the OR.



PREPARATION:  The patient was brought into the operating room and placed on

the operating table in a supine position.  A time-out was performed by

identification of the correct patient and procedure, once IV sedation was

achieved, the left foot was then prepped and draped in a normal sterile

manner and the procedure began.  No tourniquet was used during the

procedure.



DESCRIPTION OF PROCEDURE:  Attention was directed to the medial aspect of

the TMA stump of the patient's left foot, using a blade #15 and a pickup,

all narcotic and nonviable tissue was excisionally divided down into the

level of healthy fresh bleeding tissues.  Next utilizing a pickup and a

blade #15, all periosteal tissue was carefully resected off the distal portion 
of

the resected first metatarsal.  Next, using a large bone cutter, bone was

resected off the distal aspect of the previously resected partial first

metatarsal.  The bone was passed off to the field and sent for Pathology. 

The partial first metatarsal bone edges were rounded and smoothed using a

Rayray rasp.  The operating site was then flushed with copious amounts of

mixture of saline solution with a bulb syringe.  Next, wound VAC was

applied to the medial ulceration of the TMA site and padded with gauze, ABD

and Kerlix.  Wound VAC turned on and no leakage was detected.  Wound VAC is

running continuously on 125 mmHg.



POSTOPERATIVE CONDITION:  The patient tolerated the anesthesia and

procedure well and was escorted to the recovery room with a vital signs

stable and neurovascular status intact to the left foot.  Podiatry will

continue to follow up in-house and we will follow up with Dr. Goel upon

discharge.





__________________________________________

Benjamin Goel DPM



__________________________________________

Luis E Pardo DPM



DD:  12/25/2017 10:09:43

DT:  12/25/2017 14:32:45

Job # [id number]



[email or web address]